# Patient Record
Sex: FEMALE | Race: WHITE | NOT HISPANIC OR LATINO | Employment: UNEMPLOYED | URBAN - METROPOLITAN AREA
[De-identification: names, ages, dates, MRNs, and addresses within clinical notes are randomized per-mention and may not be internally consistent; named-entity substitution may affect disease eponyms.]

---

## 2018-07-10 ENCOUNTER — OFFICE VISIT - HEALTHEAST (OUTPATIENT)
Dept: FAMILY MEDICINE | Facility: CLINIC | Age: 58
End: 2018-07-10

## 2018-07-10 DIAGNOSIS — L03.90 CELLULITIS: ICD-10-CM

## 2018-07-10 LAB
ANION GAP SERPL CALCULATED.3IONS-SCNC: 11 MMOL/L (ref 5–18)
BASOPHILS # BLD AUTO: 0.1 THOU/UL (ref 0–0.2)
BASOPHILS NFR BLD AUTO: 1 % (ref 0–2)
BUN SERPL-MCNC: 22 MG/DL (ref 8–22)
CHLORIDE BLD-SCNC: 97 MMOL/L (ref 98–107)
CO2 SERPL-SCNC: 31 MMOL/L (ref 22–31)
CREAT SERPL-MCNC: 1.1 MG/DL (ref 0.7–1.3)
EOSINOPHIL # BLD AUTO: 0.4 THOU/UL (ref 0–0.4)
EOSINOPHIL NFR BLD AUTO: 4 % (ref 0–6)
ERYTHROCYTE [DISTWIDTH] IN BLOOD BY AUTOMATED COUNT: 13.3 % (ref 11–14.5)
ERYTHROCYTE [SEDIMENTATION RATE] IN BLOOD BY WESTERGREN METHOD: 55 MM/HR (ref 0–20)
GLUCOSE BLD-MCNC: 285 MG/DL (ref 70–125)
HCT VFR BLD AUTO: 35.1 % (ref 35–47)
HGB BLD-MCNC: 11.5 G/DL (ref 12–16)
LYMPHOCYTES # BLD AUTO: 2.5 THOU/UL (ref 0.8–4.4)
LYMPHOCYTES NFR BLD AUTO: 27 % (ref 20–40)
MCH RBC QN AUTO: 27 PG (ref 27–34)
MCHC RBC AUTO-ENTMCNC: 32.8 G/DL (ref 32–36)
MCV RBC AUTO: 82 FL (ref 80–100)
MONOCYTES # BLD AUTO: 0.7 THOU/UL (ref 0–0.9)
MONOCYTES NFR BLD AUTO: 7 % (ref 2–10)
NEUTROPHILS # BLD AUTO: 5.6 THOU/UL (ref 2–7.7)
NEUTROPHILS NFR BLD AUTO: 61 % (ref 50–70)
PLATELET # BLD AUTO: 348 THOU/UL (ref 140–440)
PMV BLD AUTO: 7.8 FL (ref 7–10)
POTASSIUM BLD-SCNC: 4.1 MMOL/L (ref 3.5–5.5)
RBC # BLD AUTO: 4.26 MILL/UL (ref 3.8–5.4)
SODIUM SERPL-SCNC: 139 MMOL/L (ref 136–145)
WBC: 9.3 THOU/UL (ref 4–11)

## 2018-07-10 RX ORDER — AMLODIPINE BESYLATE 10 MG/1
10 TABLET ORAL DAILY
Refills: 0 | Status: SHIPPED | COMMUNITY
Start: 2018-04-16 | End: 2023-01-01

## 2018-07-10 RX ORDER — PRAMIPEXOLE DIHYDROCHLORIDE 0.25 MG/1
0.12 TABLET ORAL AT BEDTIME
Refills: 3 | Status: SHIPPED | COMMUNITY
Start: 2018-06-18

## 2018-07-10 RX ORDER — CITALOPRAM HYDROBROMIDE 20 MG/1
20 TABLET ORAL EVERY MORNING
Refills: 0 | Status: SHIPPED | COMMUNITY
Start: 2018-04-25

## 2018-07-10 RX ORDER — INSULIN GLARGINE 100 [IU]/ML
35 INJECTION, SOLUTION SUBCUTANEOUS AT BEDTIME
Refills: 3 | Status: SHIPPED | COMMUNITY
Start: 2018-04-25 | End: 2022-11-16

## 2018-07-11 ENCOUNTER — HOSPITAL ENCOUNTER (OUTPATIENT)
Dept: ULTRASOUND IMAGING | Facility: CLINIC | Age: 58
Discharge: HOME | End: 2018-07-11
Attending: FAMILY MEDICINE

## 2018-07-11 ENCOUNTER — OFFICE VISIT - HEALTHEAST (OUTPATIENT)
Dept: FAMILY MEDICINE | Facility: CLINIC | Age: 58
End: 2018-07-11

## 2018-07-11 ENCOUNTER — COMMUNICATION - HEALTHEAST (OUTPATIENT)
Dept: SCHEDULING | Facility: CLINIC | Age: 58
End: 2018-07-11

## 2018-07-11 DIAGNOSIS — M79.89 RIGHT LEG SWELLING: ICD-10-CM

## 2018-07-11 DIAGNOSIS — R11.0 NAUSEA: ICD-10-CM

## 2018-07-13 ENCOUNTER — OFFICE VISIT - HEALTHEAST (OUTPATIENT)
Dept: FAMILY MEDICINE | Facility: CLINIC | Age: 58
End: 2018-07-13

## 2018-07-13 DIAGNOSIS — Z51.89 ENCOUNTER FOR WOUND RE-CHECK: ICD-10-CM

## 2018-08-13 ENCOUNTER — OFFICE VISIT - HEALTHEAST (OUTPATIENT)
Dept: VASCULAR SURGERY | Facility: CLINIC | Age: 58
End: 2018-08-13

## 2018-08-13 DIAGNOSIS — Z87.2 HISTORY OF CELLULITIS: ICD-10-CM

## 2018-08-13 DIAGNOSIS — L97.512 DIABETIC ULCER OF TOE OF RIGHT FOOT ASSOCIATED WITH TYPE 2 DIABETES MELLITUS, WITH FAT LAYER EXPOSED (H): ICD-10-CM

## 2018-08-13 DIAGNOSIS — E11.621 DIABETIC ULCER OF TOE OF RIGHT FOOT ASSOCIATED WITH TYPE 2 DIABETES MELLITUS, WITH FAT LAYER EXPOSED (H): ICD-10-CM

## 2018-08-13 DIAGNOSIS — L85.3 XEROSIS CUTIS: ICD-10-CM

## 2018-08-13 DIAGNOSIS — L08.9 WOUND INFECTION: ICD-10-CM

## 2018-08-13 DIAGNOSIS — M20.41 HAMMERTOE OF RIGHT FOOT: ICD-10-CM

## 2018-08-13 DIAGNOSIS — T14.8XXA WOUND INFECTION: ICD-10-CM

## 2018-08-13 DIAGNOSIS — R23.4 FISSURE IN SKIN: ICD-10-CM

## 2018-08-14 ENCOUNTER — COMMUNICATION - HEALTHEAST (OUTPATIENT)
Dept: OTHER | Facility: CLINIC | Age: 58
End: 2018-08-14

## 2018-08-16 ENCOUNTER — COMMUNICATION - HEALTHEAST (OUTPATIENT)
Dept: OTHER | Facility: CLINIC | Age: 58
End: 2018-08-16

## 2018-08-17 ENCOUNTER — COMMUNICATION - HEALTHEAST (OUTPATIENT)
Dept: SCHEDULING | Facility: CLINIC | Age: 58
End: 2018-08-17

## 2018-08-21 ENCOUNTER — RECORDS - HEALTHEAST (OUTPATIENT)
Dept: VASCULAR ULTRASOUND | Facility: CLINIC | Age: 58
End: 2018-08-21

## 2018-08-21 ENCOUNTER — COMMUNICATION - HEALTHEAST (OUTPATIENT)
Dept: VASCULAR SURGERY | Facility: CLINIC | Age: 58
End: 2018-08-21

## 2018-08-21 DIAGNOSIS — R23.4 CHANGES IN SKIN TEXTURE: ICD-10-CM

## 2018-08-21 DIAGNOSIS — L85.3 XEROSIS CUTIS: ICD-10-CM

## 2018-08-21 DIAGNOSIS — L08.9 LOCAL INFECTION OF THE SKIN AND SUBCUTANEOUS TISSUE, UNSPECIFIED: ICD-10-CM

## 2018-08-21 DIAGNOSIS — M20.41 OTHER HAMMER TOE(S) (ACQUIRED), RIGHT FOOT: ICD-10-CM

## 2018-08-21 DIAGNOSIS — E11.621 TYPE 2 DIABETES MELLITUS WITH FOOT ULCER (CODE) (H): ICD-10-CM

## 2018-08-21 DIAGNOSIS — T14.8XXA OTHER INJURY OF UNSPECIFIED BODY REGION, INITIAL ENCOUNTER: ICD-10-CM

## 2018-08-21 DIAGNOSIS — Z87.2 PERSONAL HISTORY OF DISEASES OF THE SKIN AND SUBCUTANEOUS TISSUE: ICD-10-CM

## 2018-08-21 DIAGNOSIS — L97.512 NON-PRESSURE CHRONIC ULCER OF OTHER PART OF RIGHT FOOT WITH FAT LAYER EXPOSED (H): ICD-10-CM

## 2018-08-27 ENCOUNTER — OFFICE VISIT - HEALTHEAST (OUTPATIENT)
Dept: VASCULAR SURGERY | Facility: CLINIC | Age: 58
End: 2018-08-27

## 2018-08-27 ENCOUNTER — COMMUNICATION - HEALTHEAST (OUTPATIENT)
Dept: VASCULAR SURGERY | Facility: CLINIC | Age: 58
End: 2018-08-27

## 2018-08-27 ENCOUNTER — AMBULATORY - HEALTHEAST (OUTPATIENT)
Dept: LAB | Facility: CLINIC | Age: 58
End: 2018-08-27

## 2018-08-27 DIAGNOSIS — Z87.2 HISTORY OF CELLULITIS: ICD-10-CM

## 2018-08-27 DIAGNOSIS — M20.41 HAMMERTOE OF RIGHT FOOT: ICD-10-CM

## 2018-08-27 DIAGNOSIS — R23.4 FISSURE IN SKIN: ICD-10-CM

## 2018-08-27 DIAGNOSIS — L85.3 XEROSIS CUTIS: ICD-10-CM

## 2018-08-27 DIAGNOSIS — E11.621 DIABETIC ULCER OF TOE OF RIGHT FOOT ASSOCIATED WITH TYPE 2 DIABETES MELLITUS, WITH FAT LAYER EXPOSED (H): ICD-10-CM

## 2018-08-27 DIAGNOSIS — L97.512 DIABETIC ULCER OF TOE OF RIGHT FOOT ASSOCIATED WITH TYPE 2 DIABETES MELLITUS, WITH FAT LAYER EXPOSED (H): ICD-10-CM

## 2018-08-27 LAB
C REACTIVE PROTEIN LHE: 0.9 MG/DL (ref 0–0.8)
ERYTHROCYTE [SEDIMENTATION RATE] IN BLOOD BY WESTERGREN METHOD: 16 MM/HR (ref 0–20)

## 2018-08-27 ASSESSMENT — MIFFLIN-ST. JEOR: SCORE: 1365.14

## 2018-08-29 ENCOUNTER — COMMUNICATION - HEALTHEAST (OUTPATIENT)
Dept: VASCULAR SURGERY | Facility: CLINIC | Age: 58
End: 2018-08-29

## 2018-09-09 ENCOUNTER — OFFICE VISIT - HEALTHEAST (OUTPATIENT)
Dept: FAMILY MEDICINE | Facility: CLINIC | Age: 58
End: 2018-09-09

## 2018-09-09 DIAGNOSIS — L03.115 CELLULITIS OF RIGHT LOWER LEG: ICD-10-CM

## 2018-09-10 ENCOUNTER — OFFICE VISIT - HEALTHEAST (OUTPATIENT)
Dept: VASCULAR SURGERY | Facility: CLINIC | Age: 58
End: 2018-09-10

## 2018-09-10 ENCOUNTER — COMMUNICATION - HEALTHEAST (OUTPATIENT)
Dept: VASCULAR SURGERY | Facility: CLINIC | Age: 58
End: 2018-09-10

## 2018-09-10 DIAGNOSIS — Z87.2 HISTORY OF CELLULITIS: ICD-10-CM

## 2018-09-10 DIAGNOSIS — L97.512 DIABETIC ULCER OF TOE OF RIGHT FOOT ASSOCIATED WITH TYPE 2 DIABETES MELLITUS, WITH FAT LAYER EXPOSED (H): ICD-10-CM

## 2018-09-10 DIAGNOSIS — L08.9 WOUND INFECTION: ICD-10-CM

## 2018-09-10 DIAGNOSIS — M20.41 HAMMERTOE OF RIGHT FOOT: ICD-10-CM

## 2018-09-10 DIAGNOSIS — T14.8XXA WOUND INFECTION: ICD-10-CM

## 2018-09-10 DIAGNOSIS — L85.3 XEROSIS CUTIS: ICD-10-CM

## 2018-09-10 DIAGNOSIS — E11.621 DIABETIC ULCER OF TOE OF RIGHT FOOT ASSOCIATED WITH TYPE 2 DIABETES MELLITUS, WITH FAT LAYER EXPOSED (H): ICD-10-CM

## 2018-09-10 DIAGNOSIS — R23.4 FISSURE IN SKIN: ICD-10-CM

## 2018-09-13 ENCOUNTER — COMMUNICATION - HEALTHEAST (OUTPATIENT)
Dept: VASCULAR SURGERY | Facility: CLINIC | Age: 58
End: 2018-09-13

## 2018-09-14 ENCOUNTER — COMMUNICATION - HEALTHEAST (OUTPATIENT)
Dept: VASCULAR SURGERY | Facility: CLINIC | Age: 58
End: 2018-09-14

## 2018-09-24 ENCOUNTER — OFFICE VISIT - HEALTHEAST (OUTPATIENT)
Dept: VASCULAR SURGERY | Facility: CLINIC | Age: 58
End: 2018-09-24

## 2018-09-24 DIAGNOSIS — L97.512 DIABETIC ULCER OF TOE OF RIGHT FOOT ASSOCIATED WITH TYPE 2 DIABETES MELLITUS, WITH FAT LAYER EXPOSED (H): ICD-10-CM

## 2018-09-24 DIAGNOSIS — R23.4 FISSURE IN SKIN: ICD-10-CM

## 2018-09-24 DIAGNOSIS — M20.41 HAMMERTOE OF RIGHT FOOT: ICD-10-CM

## 2018-09-24 DIAGNOSIS — E11.621 DIABETIC ULCER OF TOE OF RIGHT FOOT ASSOCIATED WITH TYPE 2 DIABETES MELLITUS, WITH FAT LAYER EXPOSED (H): ICD-10-CM

## 2018-09-24 DIAGNOSIS — L85.3 XEROSIS CUTIS: ICD-10-CM

## 2018-09-24 DIAGNOSIS — Z87.2 HISTORY OF CELLULITIS: ICD-10-CM

## 2018-10-15 ENCOUNTER — OFFICE VISIT - HEALTHEAST (OUTPATIENT)
Dept: VASCULAR SURGERY | Facility: CLINIC | Age: 58
End: 2018-10-15

## 2018-10-15 DIAGNOSIS — M20.41 HAMMERTOE OF RIGHT FOOT: ICD-10-CM

## 2018-10-15 DIAGNOSIS — R23.4 FISSURE IN SKIN: ICD-10-CM

## 2018-10-15 DIAGNOSIS — L85.3 XEROSIS CUTIS: ICD-10-CM

## 2018-10-15 DIAGNOSIS — Z87.2 HISTORY OF CELLULITIS: ICD-10-CM

## 2018-10-15 DIAGNOSIS — E11.621 DIABETIC ULCER OF TOE OF RIGHT FOOT ASSOCIATED WITH TYPE 2 DIABETES MELLITUS, WITH FAT LAYER EXPOSED (H): ICD-10-CM

## 2018-10-15 DIAGNOSIS — L97.512 DIABETIC ULCER OF TOE OF RIGHT FOOT ASSOCIATED WITH TYPE 2 DIABETES MELLITUS, WITH FAT LAYER EXPOSED (H): ICD-10-CM

## 2018-11-05 ENCOUNTER — OFFICE VISIT - HEALTHEAST (OUTPATIENT)
Dept: VASCULAR SURGERY | Facility: CLINIC | Age: 58
End: 2018-11-05

## 2018-11-05 DIAGNOSIS — R23.4 FISSURE IN SKIN: ICD-10-CM

## 2018-11-05 DIAGNOSIS — L97.512 DIABETIC ULCER OF TOE OF RIGHT FOOT ASSOCIATED WITH TYPE 2 DIABETES MELLITUS, WITH FAT LAYER EXPOSED (H): ICD-10-CM

## 2018-11-05 DIAGNOSIS — M20.41 HAMMERTOE OF RIGHT FOOT: ICD-10-CM

## 2018-11-05 DIAGNOSIS — L85.3 XEROSIS CUTIS: ICD-10-CM

## 2018-11-05 DIAGNOSIS — E11.621 DIABETIC ULCER OF TOE OF RIGHT FOOT ASSOCIATED WITH TYPE 2 DIABETES MELLITUS, WITH FAT LAYER EXPOSED (H): ICD-10-CM

## 2018-11-26 ENCOUNTER — OFFICE VISIT - HEALTHEAST (OUTPATIENT)
Dept: VASCULAR SURGERY | Facility: CLINIC | Age: 58
End: 2018-11-26

## 2018-11-26 DIAGNOSIS — Z87.2 HISTORY OF CELLULITIS: ICD-10-CM

## 2018-11-26 DIAGNOSIS — L85.3 XEROSIS CUTIS: ICD-10-CM

## 2018-11-26 DIAGNOSIS — E11.621 DIABETIC ULCER OF TOE OF RIGHT FOOT ASSOCIATED WITH TYPE 2 DIABETES MELLITUS, WITH FAT LAYER EXPOSED (H): ICD-10-CM

## 2018-11-26 DIAGNOSIS — M20.41 HAMMERTOE OF RIGHT FOOT: ICD-10-CM

## 2018-11-26 DIAGNOSIS — R23.4 FISSURE IN SKIN: ICD-10-CM

## 2018-11-26 DIAGNOSIS — L97.512 DIABETIC ULCER OF TOE OF RIGHT FOOT ASSOCIATED WITH TYPE 2 DIABETES MELLITUS, WITH FAT LAYER EXPOSED (H): ICD-10-CM

## 2019-02-25 ENCOUNTER — OFFICE VISIT - HEALTHEAST (OUTPATIENT)
Dept: FAMILY MEDICINE | Facility: CLINIC | Age: 59
End: 2019-02-25

## 2019-02-25 DIAGNOSIS — I10 ESSENTIAL HYPERTENSION: ICD-10-CM

## 2019-02-25 DIAGNOSIS — R10.11 RUQ ABDOMINAL PAIN: ICD-10-CM

## 2019-02-25 DIAGNOSIS — R11.14 BILIOUS VOMITING WITH NAUSEA: ICD-10-CM

## 2019-02-26 ENCOUNTER — COMMUNICATION - HEALTHEAST (OUTPATIENT)
Dept: FAMILY MEDICINE | Facility: CLINIC | Age: 59
End: 2019-02-26

## 2019-02-26 ENCOUNTER — HOSPITAL ENCOUNTER (OUTPATIENT)
Dept: ULTRASOUND IMAGING | Facility: CLINIC | Age: 59
Discharge: HOME | End: 2019-02-26
Attending: FAMILY MEDICINE

## 2019-02-26 DIAGNOSIS — R10.11 RUQ ABDOMINAL PAIN: ICD-10-CM

## 2019-02-26 DIAGNOSIS — R11.14 BILIOUS VOMITING WITH NAUSEA: ICD-10-CM

## 2019-09-19 ENCOUNTER — RECORDS - HEALTHEAST (OUTPATIENT)
Dept: ADMINISTRATIVE | Facility: OTHER | Age: 59
End: 2019-09-19

## 2019-09-19 LAB
LAB AP CHARGES (HE HISTORICAL CONVERSION): NORMAL
PATH REPORT.COMMENTS IMP SPEC: NORMAL
PATH REPORT.FINAL DX SPEC: NORMAL
PATH REPORT.GROSS SPEC: NORMAL
PATH REPORT.MICROSCOPIC SPEC OTHER STN: NORMAL
PATH REPORT.RELEVANT HX SPEC: NORMAL
RESULT FLAG (HE HISTORICAL CONVERSION): NORMAL

## 2020-07-09 ENCOUNTER — COMMUNICATION - HEALTHEAST (OUTPATIENT)
Dept: VASCULAR SURGERY | Facility: CLINIC | Age: 60
End: 2020-07-09

## 2020-07-10 ENCOUNTER — OFFICE VISIT - HEALTHEAST (OUTPATIENT)
Dept: VASCULAR SURGERY | Facility: CLINIC | Age: 60
End: 2020-07-10

## 2020-07-10 DIAGNOSIS — L97.512 DIABETIC ULCER OF TOE OF RIGHT FOOT ASSOCIATED WITH TYPE 2 DIABETES MELLITUS, WITH FAT LAYER EXPOSED (H): ICD-10-CM

## 2020-07-10 DIAGNOSIS — E11.621 DIABETIC ULCER OF TOE OF RIGHT FOOT ASSOCIATED WITH TYPE 2 DIABETES MELLITUS, WITH FAT LAYER EXPOSED (H): ICD-10-CM

## 2020-07-10 DIAGNOSIS — M20.41 HAMMERTOE OF RIGHT FOOT: ICD-10-CM

## 2020-07-10 RX ORDER — PIOGLITAZONEHYDROCHLORIDE 15 MG/1
7.5 TABLET ORAL DAILY
Status: SHIPPED | COMMUNITY
Start: 2020-06-23 | End: 2022-06-25

## 2020-07-10 RX ORDER — ATORVASTATIN CALCIUM 40 MG/1
40 TABLET, FILM COATED ORAL DAILY
Status: SHIPPED | COMMUNITY
Start: 2020-02-01

## 2020-07-15 ENCOUNTER — OFFICE VISIT - HEALTHEAST (OUTPATIENT)
Dept: VASCULAR SURGERY | Facility: CLINIC | Age: 60
End: 2020-07-15

## 2020-07-15 DIAGNOSIS — L02.619 CELLULITIS AND ABSCESS OF FOOT: ICD-10-CM

## 2020-07-15 DIAGNOSIS — L03.119 CELLULITIS AND ABSCESS OF FOOT: ICD-10-CM

## 2020-07-15 DIAGNOSIS — M20.41 HAMMER TOE OF RIGHT FOOT: ICD-10-CM

## 2020-07-15 RX ORDER — LISINOPRIL 10 MG/1
10 TABLET ORAL DAILY
Status: SHIPPED | COMMUNITY
Start: 2020-07-15

## 2020-07-17 ENCOUNTER — COMMUNICATION - HEALTHEAST (OUTPATIENT)
Dept: VASCULAR SURGERY | Facility: CLINIC | Age: 60
End: 2020-07-17

## 2020-07-20 ENCOUNTER — OFFICE VISIT - HEALTHEAST (OUTPATIENT)
Dept: VASCULAR SURGERY | Facility: CLINIC | Age: 60
End: 2020-07-20

## 2020-07-20 DIAGNOSIS — L03.119 CELLULITIS AND ABSCESS OF FOOT: ICD-10-CM

## 2020-07-20 DIAGNOSIS — L97.513 DIABETIC ULCER OF TOE OF RIGHT FOOT ASSOCIATED WITH TYPE 2 DIABETES MELLITUS, WITH NECROSIS OF MUSCLE (H): ICD-10-CM

## 2020-07-20 DIAGNOSIS — E11.621 DIABETIC ULCER OF TOE OF RIGHT FOOT ASSOCIATED WITH TYPE 2 DIABETES MELLITUS, WITH NECROSIS OF MUSCLE (H): ICD-10-CM

## 2020-07-20 DIAGNOSIS — L02.619 CELLULITIS AND ABSCESS OF FOOT: ICD-10-CM

## 2020-07-22 ENCOUNTER — COMMUNICATION - HEALTHEAST (OUTPATIENT)
Dept: VASCULAR SURGERY | Facility: CLINIC | Age: 60
End: 2020-07-22

## 2020-07-22 LAB
BACTERIA SPEC CULT: NO GROWTH
GRAM STAIN RESULT: NORMAL
GRAM STAIN RESULT: NORMAL

## 2020-07-23 ENCOUNTER — AMBULATORY - HEALTHEAST (OUTPATIENT)
Dept: PODIATRY | Facility: CLINIC | Age: 60
End: 2020-07-23

## 2020-07-23 ENCOUNTER — SURGERY - HEALTHEAST (OUTPATIENT)
Dept: PODIATRY | Facility: CLINIC | Age: 60
End: 2020-07-23

## 2020-07-23 DIAGNOSIS — M86.9 OSTEOMYELITIS OF ANKLE AND FOOT (H): ICD-10-CM

## 2020-07-24 ENCOUNTER — AMBULATORY - HEALTHEAST (OUTPATIENT)
Dept: SURGERY | Facility: HOSPITAL | Age: 60
End: 2020-07-24

## 2020-07-24 DIAGNOSIS — Z11.59 ENCOUNTER FOR SCREENING FOR OTHER VIRAL DISEASES: ICD-10-CM

## 2020-07-27 ENCOUNTER — COMMUNICATION - HEALTHEAST (OUTPATIENT)
Dept: VASCULAR SURGERY | Facility: CLINIC | Age: 60
End: 2020-07-27

## 2020-07-28 ENCOUNTER — AMBULATORY - HEALTHEAST (OUTPATIENT)
Dept: FAMILY MEDICINE | Facility: CLINIC | Age: 60
End: 2020-07-28

## 2020-07-28 ENCOUNTER — COMMUNICATION - HEALTHEAST (OUTPATIENT)
Dept: VASCULAR SURGERY | Facility: CLINIC | Age: 60
End: 2020-07-28

## 2020-07-28 DIAGNOSIS — E11.621 DIABETIC ULCER OF TOE OF RIGHT FOOT ASSOCIATED WITH TYPE 2 DIABETES MELLITUS, WITH NECROSIS OF MUSCLE (H): ICD-10-CM

## 2020-07-28 DIAGNOSIS — Z11.59 ENCOUNTER FOR SCREENING FOR OTHER VIRAL DISEASES: ICD-10-CM

## 2020-07-28 DIAGNOSIS — L02.619 CELLULITIS AND ABSCESS OF FOOT: ICD-10-CM

## 2020-07-28 DIAGNOSIS — L03.119 CELLULITIS AND ABSCESS OF FOOT: ICD-10-CM

## 2020-07-28 DIAGNOSIS — L97.513 DIABETIC ULCER OF TOE OF RIGHT FOOT ASSOCIATED WITH TYPE 2 DIABETES MELLITUS, WITH NECROSIS OF MUSCLE (H): ICD-10-CM

## 2020-07-30 ENCOUNTER — ANESTHESIA - HEALTHEAST (OUTPATIENT)
Dept: SURGERY | Facility: HOSPITAL | Age: 60
End: 2020-07-30

## 2020-07-30 ASSESSMENT — MIFFLIN-ST. JEOR: SCORE: 1417.3

## 2020-07-31 ENCOUNTER — SURGERY - HEALTHEAST (OUTPATIENT)
Dept: SURGERY | Facility: HOSPITAL | Age: 60
End: 2020-07-31

## 2020-08-04 ENCOUNTER — COMMUNICATION - HEALTHEAST (OUTPATIENT)
Dept: VASCULAR SURGERY | Facility: CLINIC | Age: 60
End: 2020-08-04

## 2020-08-07 ENCOUNTER — COMMUNICATION - HEALTHEAST (OUTPATIENT)
Dept: VASCULAR SURGERY | Facility: CLINIC | Age: 60
End: 2020-08-07

## 2020-08-07 ENCOUNTER — OFFICE VISIT - HEALTHEAST (OUTPATIENT)
Dept: VASCULAR SURGERY | Facility: CLINIC | Age: 60
End: 2020-08-07

## 2020-08-07 DIAGNOSIS — L97.513 DIABETIC ULCER OF TOE OF RIGHT FOOT ASSOCIATED WITH TYPE 2 DIABETES MELLITUS, WITH NECROSIS OF MUSCLE (H): ICD-10-CM

## 2020-08-07 DIAGNOSIS — M86.9 OSTEOMYELITIS OF ANKLE AND FOOT (H): ICD-10-CM

## 2020-08-07 DIAGNOSIS — E11.621 DIABETIC ULCER OF TOE OF RIGHT FOOT ASSOCIATED WITH TYPE 2 DIABETES MELLITUS, WITH NECROSIS OF MUSCLE (H): ICD-10-CM

## 2020-08-10 ENCOUNTER — COMMUNICATION - HEALTHEAST (OUTPATIENT)
Dept: VASCULAR SURGERY | Facility: CLINIC | Age: 60
End: 2020-08-10

## 2020-08-20 ENCOUNTER — COMMUNICATION - HEALTHEAST (OUTPATIENT)
Dept: VASCULAR SURGERY | Facility: CLINIC | Age: 60
End: 2020-08-20

## 2020-08-21 ENCOUNTER — COMMUNICATION - HEALTHEAST (OUTPATIENT)
Dept: VASCULAR SURGERY | Facility: CLINIC | Age: 60
End: 2020-08-21

## 2020-08-21 ENCOUNTER — OFFICE VISIT - HEALTHEAST (OUTPATIENT)
Dept: VASCULAR SURGERY | Facility: CLINIC | Age: 60
End: 2020-08-21

## 2020-08-21 DIAGNOSIS — M86.9 OSTEOMYELITIS OF ANKLE AND FOOT (H): ICD-10-CM

## 2020-09-18 ENCOUNTER — AMBULATORY - HEALTHEAST (OUTPATIENT)
Dept: SURGERY | Facility: AMBULATORY SURGERY CENTER | Age: 60
End: 2020-09-18

## 2020-09-18 ENCOUNTER — SURGERY - HEALTHEAST (OUTPATIENT)
Dept: VASCULAR SURGERY | Facility: CLINIC | Age: 60
End: 2020-09-18

## 2020-09-18 ENCOUNTER — COMMUNICATION - HEALTHEAST (OUTPATIENT)
Dept: VASCULAR SURGERY | Facility: CLINIC | Age: 60
End: 2020-09-18

## 2020-09-18 ENCOUNTER — OFFICE VISIT - HEALTHEAST (OUTPATIENT)
Dept: VASCULAR SURGERY | Facility: CLINIC | Age: 60
End: 2020-09-18

## 2020-09-18 DIAGNOSIS — M20.41 HAMMER TOE OF RIGHT FOOT: ICD-10-CM

## 2020-09-18 DIAGNOSIS — Z11.59 ENCOUNTER FOR SCREENING FOR OTHER VIRAL DISEASES: ICD-10-CM

## 2020-09-18 RX ORDER — FUROSEMIDE 20 MG
20 TABLET ORAL
Status: SHIPPED | COMMUNITY
Start: 2020-09-02 | End: 2023-01-01

## 2020-09-18 RX ORDER — POTASSIUM CHLORIDE 1500 MG/1
20 TABLET, EXTENDED RELEASE ORAL DAILY
Status: SHIPPED | COMMUNITY
Start: 2020-09-02 | End: 2022-04-27

## 2020-09-18 ASSESSMENT — MIFFLIN-ST. JEOR: SCORE: 1417.3

## 2020-09-20 ENCOUNTER — AMBULATORY - HEALTHEAST (OUTPATIENT)
Dept: FAMILY MEDICINE | Facility: CLINIC | Age: 60
End: 2020-09-20

## 2020-09-20 DIAGNOSIS — Z11.59 ENCOUNTER FOR SCREENING FOR OTHER VIRAL DISEASES: ICD-10-CM

## 2020-09-22 ENCOUNTER — COMMUNICATION - HEALTHEAST (OUTPATIENT)
Dept: SCHEDULING | Facility: CLINIC | Age: 60
End: 2020-09-22

## 2020-09-22 ASSESSMENT — MIFFLIN-ST. JEOR: SCORE: 1455.86

## 2020-09-23 ENCOUNTER — ANESTHESIA - HEALTHEAST (OUTPATIENT)
Dept: SURGERY | Facility: AMBULATORY SURGERY CENTER | Age: 60
End: 2020-09-23

## 2020-09-24 ENCOUNTER — SURGERY - HEALTHEAST (OUTPATIENT)
Dept: SURGERY | Facility: AMBULATORY SURGERY CENTER | Age: 60
End: 2020-09-24

## 2020-09-24 ENCOUNTER — COMMUNICATION - HEALTHEAST (OUTPATIENT)
Dept: VASCULAR SURGERY | Facility: CLINIC | Age: 60
End: 2020-09-24

## 2020-09-24 ASSESSMENT — MIFFLIN-ST. JEOR: SCORE: 1455.86

## 2020-09-28 ENCOUNTER — AMBULATORY - HEALTHEAST (OUTPATIENT)
Dept: PODIATRY | Facility: CLINIC | Age: 60
End: 2020-09-28

## 2020-09-28 ENCOUNTER — COMMUNICATION - HEALTHEAST (OUTPATIENT)
Dept: VASCULAR SURGERY | Facility: CLINIC | Age: 60
End: 2020-09-28

## 2020-09-28 ENCOUNTER — COMMUNICATION - HEALTHEAST (OUTPATIENT)
Dept: PODIATRY | Facility: CLINIC | Age: 60
End: 2020-09-28

## 2020-09-28 DIAGNOSIS — M20.41 HAMMER TOE OF RIGHT FOOT: ICD-10-CM

## 2020-09-30 ENCOUNTER — HOSPITAL ENCOUNTER (OUTPATIENT)
Dept: NUCLEAR MEDICINE | Facility: CLINIC | Age: 60
Discharge: HOME | End: 2020-09-30
Attending: FAMILY MEDICINE

## 2020-09-30 ENCOUNTER — HOSPITAL ENCOUNTER (OUTPATIENT)
Dept: CARDIOLOGY | Facility: CLINIC | Age: 60
Discharge: HOME | End: 2020-09-30
Attending: FAMILY MEDICINE

## 2020-09-30 DIAGNOSIS — R07.9 CHEST PAIN, UNSPECIFIED TYPE: ICD-10-CM

## 2020-09-30 LAB
CV STRESS CURRENT BP HE: NORMAL
CV STRESS CURRENT HR HE: 101
CV STRESS CURRENT HR HE: 103
CV STRESS CURRENT HR HE: 104
CV STRESS CURRENT HR HE: 104
CV STRESS CURRENT HR HE: 106
CV STRESS CURRENT HR HE: 108
CV STRESS CURRENT HR HE: 111
CV STRESS CURRENT HR HE: 85
CV STRESS CURRENT HR HE: 85
CV STRESS CURRENT HR HE: 95
CV STRESS CURRENT HR HE: 96
CV STRESS CURRENT HR HE: 98
CV STRESS CURRENT HR HE: 98
CV STRESS CURRENT HR HE: 99
CV STRESS CURRENT HR HE: 99
CV STRESS DEVIATION TIME HE: NORMAL
CV STRESS ECHO PERCENT HR HE: NORMAL
CV STRESS EXERCISE STAGE HE: NORMAL
CV STRESS FINAL RESTING BP HE: NORMAL
CV STRESS FINAL RESTING HR HE: 96
CV STRESS MAX HR HE: 111
CV STRESS MAX TREADMILL GRADE HE: 0
CV STRESS MAX TREADMILL SPEED HE: 0
CV STRESS PEAK DIA BP HE: NORMAL
CV STRESS PEAK SYS BP HE: NORMAL
CV STRESS PHASE HE: NORMAL
CV STRESS PROTOCOL HE: NORMAL
CV STRESS RESTING PT POSITION HE: NORMAL
CV STRESS ST DEVIATION AMOUNT HE: NORMAL
CV STRESS ST DEVIATION ELEVATION HE: NORMAL
CV STRESS ST EVELATION AMOUNT HE: NORMAL
CV STRESS TEST TYPE HE: NORMAL
CV STRESS TOTAL STAGE TIME MIN 1 HE: NORMAL
NUC STRESS EJECTION FRACTION: 59 %
RATE PRESSURE PRODUCT: NORMAL
STRESS ECHO BASELINE DIASTOLIC HE: 78
STRESS ECHO BASELINE HR: 84
STRESS ECHO BASELINE SYSTOLIC BP: 172
STRESS ECHO CALCULATED PERCENT HR: 69 %
STRESS ECHO LAST STRESS DIASTOLIC BP: 77
STRESS ECHO LAST STRESS HR: 104
STRESS ECHO LAST STRESS SYSTOLIC BP: 155
STRESS ECHO TARGET HR: 162

## 2020-10-01 ENCOUNTER — OFFICE VISIT - HEALTHEAST (OUTPATIENT)
Dept: VASCULAR SURGERY | Facility: CLINIC | Age: 60
End: 2020-10-01

## 2020-10-01 DIAGNOSIS — M20.41 HAMMER TOE OF RIGHT FOOT: ICD-10-CM

## 2020-10-11 ENCOUNTER — COMMUNICATION - HEALTHEAST (OUTPATIENT)
Dept: VASCULAR SURGERY | Facility: CLINIC | Age: 60
End: 2020-10-11

## 2020-10-12 ENCOUNTER — COMMUNICATION - HEALTHEAST (OUTPATIENT)
Dept: VASCULAR SURGERY | Facility: CLINIC | Age: 60
End: 2020-10-12

## 2020-10-15 ENCOUNTER — OFFICE VISIT - HEALTHEAST (OUTPATIENT)
Dept: VASCULAR SURGERY | Facility: CLINIC | Age: 60
End: 2020-10-15

## 2020-10-15 DIAGNOSIS — L02.619 CELLULITIS AND ABSCESS OF FOOT: ICD-10-CM

## 2020-10-15 DIAGNOSIS — M20.41 HAMMER TOE OF RIGHT FOOT: ICD-10-CM

## 2020-10-15 DIAGNOSIS — L03.119 CELLULITIS AND ABSCESS OF FOOT: ICD-10-CM

## 2020-10-16 ENCOUNTER — COMMUNICATION - HEALTHEAST (OUTPATIENT)
Dept: VASCULAR SURGERY | Facility: CLINIC | Age: 60
End: 2020-10-16

## 2020-10-17 ENCOUNTER — COMMUNICATION - HEALTHEAST (OUTPATIENT)
Dept: SCHEDULING | Facility: CLINIC | Age: 60
End: 2020-10-17

## 2020-10-19 ENCOUNTER — COMMUNICATION - HEALTHEAST (OUTPATIENT)
Dept: SCHEDULING | Facility: CLINIC | Age: 60
End: 2020-10-19

## 2020-10-22 ENCOUNTER — COMMUNICATION - HEALTHEAST (OUTPATIENT)
Dept: VASCULAR SURGERY | Facility: CLINIC | Age: 60
End: 2020-10-22

## 2020-10-26 ENCOUNTER — OFFICE VISIT - HEALTHEAST (OUTPATIENT)
Dept: PHARMACY | Facility: CLINIC | Age: 60
End: 2020-10-26

## 2020-10-26 DIAGNOSIS — E78.5 HYPERLIPIDEMIA, UNSPECIFIED HYPERLIPIDEMIA TYPE: ICD-10-CM

## 2020-10-26 DIAGNOSIS — J45.20 MILD INTERMITTENT ASTHMA WITHOUT COMPLICATION: ICD-10-CM

## 2020-10-26 DIAGNOSIS — G25.81 RESTLESS LEG SYNDROME: ICD-10-CM

## 2020-10-26 DIAGNOSIS — I10 ESSENTIAL HYPERTENSION: ICD-10-CM

## 2020-10-26 DIAGNOSIS — F41.9 ANXIETY: ICD-10-CM

## 2020-10-26 DIAGNOSIS — L08.9 TOE INFECTION: ICD-10-CM

## 2020-10-26 DIAGNOSIS — E11.628 TYPE 2 DIABETES MELLITUS WITH OTHER SKIN COMPLICATION, WITHOUT LONG-TERM CURRENT USE OF INSULIN (H): ICD-10-CM

## 2020-10-26 PROCEDURE — 99607 MTMS BY PHARM ADDL 15 MIN: CPT | Performed by: PHARMACIST

## 2020-10-26 PROCEDURE — 99605 MTMS BY PHARM NP 15 MIN: CPT | Performed by: PHARMACIST

## 2020-10-28 ENCOUNTER — COMMUNICATION - HEALTHEAST (OUTPATIENT)
Dept: VASCULAR SURGERY | Facility: CLINIC | Age: 60
End: 2020-10-28

## 2020-10-30 ENCOUNTER — AMBULATORY - HEALTHEAST (OUTPATIENT)
Dept: VASCULAR SURGERY | Facility: CLINIC | Age: 60
End: 2020-10-30

## 2020-10-30 ENCOUNTER — OFFICE VISIT - HEALTHEAST (OUTPATIENT)
Dept: VASCULAR SURGERY | Facility: CLINIC | Age: 60
End: 2020-10-30

## 2020-10-30 DIAGNOSIS — D17.23 BENIGN LIPOMATOUS NEOPLASM OF SKIN AND SUBCUTANEOUS TISSUE OF RIGHT LEG: ICD-10-CM

## 2020-10-30 RX ORDER — DIAZEPAM 5 MG
5 TABLET ORAL EVERY 6 HOURS PRN
Qty: 1 TABLET | Refills: 0 | Status: SHIPPED | OUTPATIENT
Start: 2020-10-30 | End: 2023-03-15

## 2020-11-09 ENCOUNTER — COMMUNICATION - HEALTHEAST (OUTPATIENT)
Dept: VASCULAR SURGERY | Facility: CLINIC | Age: 60
End: 2020-11-09

## 2020-11-10 ENCOUNTER — AMBULATORY - HEALTHEAST (OUTPATIENT)
Dept: VASCULAR SURGERY | Facility: CLINIC | Age: 60
End: 2020-11-10

## 2020-11-11 ENCOUNTER — AMBULATORY - HEALTHEAST (OUTPATIENT)
Dept: PODIATRY | Facility: CLINIC | Age: 60
End: 2020-11-11

## 2020-11-20 ENCOUNTER — SURGERY - HEALTHEAST (OUTPATIENT)
Dept: PODIATRY | Facility: CLINIC | Age: 60
End: 2020-11-20

## 2020-11-20 ENCOUNTER — COMMUNICATION - HEALTHEAST (OUTPATIENT)
Dept: VASCULAR SURGERY | Facility: CLINIC | Age: 60
End: 2020-11-20

## 2020-11-20 ENCOUNTER — AMBULATORY - HEALTHEAST (OUTPATIENT)
Dept: VASCULAR SURGERY | Facility: CLINIC | Age: 60
End: 2020-11-20

## 2020-11-20 ENCOUNTER — OFFICE VISIT - HEALTHEAST (OUTPATIENT)
Dept: VASCULAR SURGERY | Facility: CLINIC | Age: 60
End: 2020-11-20

## 2020-11-20 DIAGNOSIS — Z20.822 COVID-19 RULED OUT: ICD-10-CM

## 2020-11-20 DIAGNOSIS — M20.42 HAMMER TOE OF LEFT FOOT: ICD-10-CM

## 2020-11-22 ENCOUNTER — COMMUNICATION - HEALTHEAST (OUTPATIENT)
Dept: VASCULAR SURGERY | Facility: CLINIC | Age: 60
End: 2020-11-22

## 2020-12-01 ENCOUNTER — COMMUNICATION - HEALTHEAST (OUTPATIENT)
Dept: VASCULAR SURGERY | Facility: CLINIC | Age: 60
End: 2020-12-01

## 2020-12-02 ENCOUNTER — AMBULATORY - HEALTHEAST (OUTPATIENT)
Dept: PODIATRY | Facility: CLINIC | Age: 60
End: 2020-12-02

## 2020-12-02 ENCOUNTER — COMMUNICATION - HEALTHEAST (OUTPATIENT)
Dept: VASCULAR SURGERY | Facility: CLINIC | Age: 60
End: 2020-12-02

## 2020-12-02 DIAGNOSIS — M20.41 HAMMER TOE OF RIGHT FOOT: ICD-10-CM

## 2020-12-03 ENCOUNTER — AMBULATORY - HEALTHEAST (OUTPATIENT)
Dept: VASCULAR SURGERY | Facility: CLINIC | Age: 60
End: 2020-12-03

## 2020-12-03 ENCOUNTER — AMBULATORY - HEALTHEAST (OUTPATIENT)
Dept: LAB | Facility: CLINIC | Age: 60
End: 2020-12-03

## 2020-12-03 DIAGNOSIS — Z20.822 COVID-19 RULED OUT: ICD-10-CM

## 2020-12-03 ASSESSMENT — MIFFLIN-ST. JEOR
SCORE: 1455.86
SCORE: 1455.86

## 2020-12-04 ENCOUNTER — ANESTHESIA - HEALTHEAST (OUTPATIENT)
Dept: SURGERY | Facility: AMBULATORY SURGERY CENTER | Age: 60
End: 2020-12-04

## 2020-12-04 LAB
SARS-COV-2 PCR COMMENT: NORMAL
SARS-COV-2 RNA SPEC QL NAA+PROBE: NEGATIVE
SARS-COV-2 VIRUS SPECIMEN SOURCE: NORMAL

## 2020-12-05 ENCOUNTER — COMMUNICATION - HEALTHEAST (OUTPATIENT)
Dept: SCHEDULING | Facility: CLINIC | Age: 60
End: 2020-12-05

## 2020-12-07 ENCOUNTER — SURGERY - HEALTHEAST (OUTPATIENT)
Dept: SURGERY | Facility: AMBULATORY SURGERY CENTER | Age: 60
End: 2020-12-07

## 2020-12-07 ENCOUNTER — HOSPITAL ENCOUNTER (OUTPATIENT)
Dept: SURGERY | Facility: AMBULATORY SURGERY CENTER | Age: 60
Discharge: HOME | End: 2020-12-07
Attending: PODIATRIST | Admitting: PODIATRIST
Payer: COMMERCIAL

## 2020-12-07 DIAGNOSIS — M20.42 HAMMER TOE OF LEFT FOOT: ICD-10-CM

## 2020-12-07 LAB
GLUCOSE BLDC GLUCOMTR-MCNC: 128 MG/DL (ref 70–125)
GLUCOSE BLDC GLUCOMTR-MCNC: 139 MG/DL (ref 70–125)
GLUCOSE BLDC GLUCOMTR-MCNC: 81 MG/DL (ref 70–125)

## 2020-12-07 RX ORDER — OXYCODONE HYDROCHLORIDE 5 MG/1
5 TABLET ORAL EVERY 6 HOURS PRN
Qty: 30 TABLET | Refills: 0 | Status: SHIPPED | OUTPATIENT
Start: 2020-12-07 | End: 2022-04-27

## 2020-12-07 ASSESSMENT — MIFFLIN-ST. JEOR
SCORE: 1455.86
SCORE: 1455.86

## 2020-12-16 ENCOUNTER — OFFICE VISIT - HEALTHEAST (OUTPATIENT)
Dept: VASCULAR SURGERY | Facility: CLINIC | Age: 60
End: 2020-12-16

## 2020-12-16 DIAGNOSIS — M20.42 HAMMER TOE OF LEFT FOOT: ICD-10-CM

## 2020-12-30 ENCOUNTER — OFFICE VISIT - HEALTHEAST (OUTPATIENT)
Dept: VASCULAR SURGERY | Facility: CLINIC | Age: 60
End: 2020-12-30

## 2020-12-30 DIAGNOSIS — M20.42 HAMMER TOE OF LEFT FOOT: ICD-10-CM

## 2021-01-09 ENCOUNTER — HEALTH MAINTENANCE LETTER (OUTPATIENT)
Age: 61
End: 2021-01-09

## 2021-01-21 ENCOUNTER — COMMUNICATION - HEALTHEAST (OUTPATIENT)
Dept: VASCULAR SURGERY | Facility: CLINIC | Age: 61
End: 2021-01-21

## 2021-03-12 ENCOUNTER — AMBULATORY - HEALTHEAST (OUTPATIENT)
Dept: NURSING | Facility: CLINIC | Age: 61
End: 2021-03-12

## 2021-03-22 ENCOUNTER — NURSE TRIAGE (OUTPATIENT)
Dept: NURSING | Facility: CLINIC | Age: 61
End: 2021-03-22

## 2021-03-22 ENCOUNTER — COMMUNICATION - HEALTHEAST (OUTPATIENT)
Dept: SCHEDULING | Facility: CLINIC | Age: 61
End: 2021-03-22

## 2021-03-22 ENCOUNTER — COMMUNICATION - HEALTHEAST (OUTPATIENT)
Dept: VASCULAR SURGERY | Facility: CLINIC | Age: 61
End: 2021-03-22

## 2021-04-02 ENCOUNTER — AMBULATORY - HEALTHEAST (OUTPATIENT)
Dept: NURSING | Facility: CLINIC | Age: 61
End: 2021-04-02

## 2021-05-01 ENCOUNTER — HEALTH MAINTENANCE LETTER (OUTPATIENT)
Age: 61
End: 2021-05-01

## 2021-05-04 ENCOUNTER — OFFICE VISIT - HEALTHEAST (OUTPATIENT)
Dept: FAMILY MEDICINE | Facility: CLINIC | Age: 61
End: 2021-05-04

## 2021-05-04 ENCOUNTER — COMMUNICATION - HEALTHEAST (OUTPATIENT)
Dept: VASCULAR SURGERY | Facility: CLINIC | Age: 61
End: 2021-05-04

## 2021-05-04 ENCOUNTER — HOSPITAL ENCOUNTER (OUTPATIENT)
Dept: ULTRASOUND IMAGING | Facility: CLINIC | Age: 61
Discharge: HOME | End: 2021-05-04
Payer: COMMERCIAL

## 2021-05-04 DIAGNOSIS — M79.662 PAIN OF LEFT LOWER LEG: ICD-10-CM

## 2021-05-26 ENCOUNTER — RECORDS - HEALTHEAST (OUTPATIENT)
Dept: ADMINISTRATIVE | Facility: CLINIC | Age: 61
End: 2021-05-26

## 2021-05-26 VITALS — SYSTOLIC BLOOD PRESSURE: 136 MMHG | DIASTOLIC BLOOD PRESSURE: 78 MMHG | HEART RATE: 76 BPM | RESPIRATION RATE: 16 BRPM

## 2021-05-26 VITALS
DIASTOLIC BLOOD PRESSURE: 82 MMHG | RESPIRATION RATE: 14 BRPM | SYSTOLIC BLOOD PRESSURE: 136 MMHG | TEMPERATURE: 98.1 F | HEART RATE: 84 BPM

## 2021-05-26 VITALS — SYSTOLIC BLOOD PRESSURE: 124 MMHG | RESPIRATION RATE: 18 BRPM | DIASTOLIC BLOOD PRESSURE: 80 MMHG | HEART RATE: 80 BPM

## 2021-05-27 ENCOUNTER — RECORDS - HEALTHEAST (OUTPATIENT)
Dept: ADMINISTRATIVE | Facility: CLINIC | Age: 61
End: 2021-05-27

## 2021-05-27 VITALS — DIASTOLIC BLOOD PRESSURE: 78 MMHG | SYSTOLIC BLOOD PRESSURE: 130 MMHG | HEART RATE: 88 BPM | TEMPERATURE: 98.3 F

## 2021-05-27 VITALS
SYSTOLIC BLOOD PRESSURE: 130 MMHG | DIASTOLIC BLOOD PRESSURE: 78 MMHG | TEMPERATURE: 98 F | HEART RATE: 76 BPM | RESPIRATION RATE: 16 BRPM

## 2021-05-27 VITALS
HEART RATE: 76 BPM | SYSTOLIC BLOOD PRESSURE: 130 MMHG | RESPIRATION RATE: 18 BRPM | TEMPERATURE: 99.4 F | DIASTOLIC BLOOD PRESSURE: 68 MMHG

## 2021-05-27 VITALS
SYSTOLIC BLOOD PRESSURE: 128 MMHG | HEART RATE: 86 BPM | WEIGHT: 196 LBS | OXYGEN SATURATION: 97 % | TEMPERATURE: 98.8 F | DIASTOLIC BLOOD PRESSURE: 79 MMHG | RESPIRATION RATE: 15 BRPM

## 2021-05-27 VITALS
DIASTOLIC BLOOD PRESSURE: 80 MMHG | HEART RATE: 94 BPM | RESPIRATION RATE: 16 BRPM | OXYGEN SATURATION: 97 % | TEMPERATURE: 98.8 F | SYSTOLIC BLOOD PRESSURE: 142 MMHG

## 2021-05-27 VITALS
SYSTOLIC BLOOD PRESSURE: 122 MMHG | TEMPERATURE: 97.2 F | RESPIRATION RATE: 18 BRPM | HEART RATE: 78 BPM | DIASTOLIC BLOOD PRESSURE: 78 MMHG

## 2021-05-29 ENCOUNTER — RECORDS - HEALTHEAST (OUTPATIENT)
Dept: ADMINISTRATIVE | Facility: CLINIC | Age: 61
End: 2021-05-29

## 2021-06-01 VITALS — BODY MASS INDEX: 28.99 KG/M2 | HEIGHT: 65 IN | WEIGHT: 174 LBS

## 2021-06-01 VITALS — BODY MASS INDEX: 29.12 KG/M2 | WEIGHT: 175 LBS

## 2021-06-01 VITALS — WEIGHT: 175 LBS | BODY MASS INDEX: 29.12 KG/M2

## 2021-06-02 ENCOUNTER — RECORDS - HEALTHEAST (OUTPATIENT)
Dept: ADMINISTRATIVE | Facility: CLINIC | Age: 61
End: 2021-06-02

## 2021-06-02 VITALS — WEIGHT: 188.5 LBS | BODY MASS INDEX: 31.37 KG/M2

## 2021-06-02 VITALS — WEIGHT: 180 LBS | BODY MASS INDEX: 29.95 KG/M2

## 2021-06-04 VITALS — WEIGHT: 189 LBS | BODY MASS INDEX: 32.27 KG/M2 | HEIGHT: 64 IN

## 2021-06-04 VITALS
SYSTOLIC BLOOD PRESSURE: 140 MMHG | OXYGEN SATURATION: 98 % | TEMPERATURE: 97.8 F | HEART RATE: 97 BPM | WEIGHT: 189 LBS | DIASTOLIC BLOOD PRESSURE: 80 MMHG | HEIGHT: 64 IN | BODY MASS INDEX: 32.27 KG/M2

## 2021-06-05 VITALS — BODY MASS INDEX: 32.32 KG/M2 | HEIGHT: 65 IN | WEIGHT: 194 LBS

## 2021-06-05 VITALS
BODY MASS INDEX: 32.32 KG/M2 | WEIGHT: 194 LBS | BODY MASS INDEX: 32.32 KG/M2 | HEIGHT: 65 IN | HEIGHT: 65 IN | WEIGHT: 194 LBS

## 2021-06-09 NOTE — TELEPHONE ENCOUNTER
Writer unable to find that MRI was done. Called patient who says it was done Tuesday at [facility].  Called [facility] to fax over the report.

## 2021-06-09 NOTE — PROGRESS NOTES
I reviewed the MRI report with the patient via phone this evening which is positive for osteomyelitis of the distal phalanx 3rd digit right foot. Due to the presence of osteomyelitis, I reviewed the treatment options to include either 6 weeks IV antibiotics with Infectious Disease vs amputation of the involved bone. She would like to proceed with amputation at this time for a definitive procedure. I will ask my office to coordinate surgery for next week. She will continue on doxycycline and follow-up with Dr. Garner's office for a pre-op physical, covid testing.

## 2021-06-09 NOTE — PROGRESS NOTES
Patient reports had ulcer on distal right 3rd toe, appears closed.  Thin callous covering area.  Denies pain.

## 2021-06-09 NOTE — PROGRESS NOTES
"Gill Mendez is a 60 y.o. female who is being evaluated via a billable video visit.      The patient has been notified of following:     \"This video visit will be conducted via a call between you and your physician/provider. We have found that certain health care needs can be provided without the need for an in-person physical exam.  This service lets us provide the care you need with a video conversation.  If a prescription is necessary we can send it directly to your pharmacy.  If lab work is needed we can place an order for that and you can then stop by our lab to have the test done at a later time.    Video visits are billed at different rates depending on your insurance coverage. Please reach out to your insurance provider with any questions.    If during the course of the call the physician/provider feels a video visit is not appropriate, you will not be charged for this service.\"    Patient has given verbal consent to a Video visit? Yes  How would you like to obtain your AVS? AVS Preference: Mail a copy.  Patient would like the video invitation sent by: Text to cell phone: [phone]  Will anyone else be joining your video visit? No              Video-Visit Details    Type of service:  Video Visit      Originating Location (pt. Location): Home    Distant Location (provider location):  [facility] VASCULAR [facility]      Platform used for Video Visit: Soundsupply            Patient reports:    Change in status of the wound:  yes; improvement    Change of drainage- color, amount, odor: no    Change of swelling:  yes; decreased    Change in Pain status:  no    New wounds developed:  no    Dressing Supplies Sufficient:  yes    Reviewed with patient that I will contact them with scheduling a follow up appointment, supply needs and any further teaching that is identified by the provider during the call. I will also send out an AVS with all education, a wound measuring tape and their next scheduled visit. I " will include instructions to assist with installing the application on their mobile device if appropriate for future video visits.

## 2021-06-09 NOTE — PATIENT INSTRUCTIONS - HE
Wound Care Instructions    daily Cleanse your toe wound(s) with Normal Saline    Pat Dry with non-sterile gauze    Apply dry gauze into/onto the wounds    It is not ok to get your wound wet in the bath or shower    Keep pressure off wound; try to use open toed shoe/sandal    Fax xray records to [phone]    SEEK MEDICAL CARE IF:    You have an increase in swelling, pain, or redness around the wound.    You have an increase in the amount of pus coming from the wound.    There is a bad smell coming from the wound.    The wound appears to be worsening/enlarging    You have a fever greater than 101.5 F        It is ok to continue current wound care treatment/products for the next 2-3 days until new wound care supplies are ordered and arrive. If longer than this please contact our office at [phone].      Malinda Gardner DNP, RN, CNP, [facility]  [phone]

## 2021-06-09 NOTE — TELEPHONE ENCOUNTER
Did not receive report from [facility]. Called back to check and was transferred to . The radiologist did not finish signing the report which is why it is unavailable. They will message him to finish signing and the results should be available in Epic.

## 2021-06-10 NOTE — PROGRESS NOTES
Podiatry Progress Note        ASSESSMENT: S/P amputation 3rd digit right foot     TREATMENT:  -Surgical site right foot is progressing well. Gauze dressing applied today which she will leave intact. Recommend limited to non-weight bearing with surgical shoe. Referred for x-rays today.     -Follow-up with me in 2 weeks for suture removal.       Chris Vega DPM  [facility]/Foot & Ankle Surgery      HPI: Gill Mendez was seen again today s/p amputation 3rd digit right foot. Doing well today. Denies foot pain.     Past Medical History:   Diagnosis Date     Asthma      Cellulitis      Diabetes mellitus (H)      Gastroparesis      Hyperlipidemia      Hyperlipidemia     Created by Conversion      Hypertension      Hypertension     Created by Conversion  Replacement Utility updated for latest IMO load     PONV (postoperative nausea and vomiting)      Type 2 Diabetes Mellitus     Created by Conversion        Allergies   Allergen Reactions     Aspirin Unknown     Codeine Unknown     Patient states possibly caused N/V but can't remember for sure     Aspirin (Tartrazine Only) Rash         Current Outpatient Medications:      albuterol (PROVENTIL HFA;VENTOLIN HFA) 90 mcg/actuation inhaler, Inhale 2 puffs every 6 (six) hours as needed for wheezing., Disp: , Rfl:      amLODIPine (NORVASC) 10 MG tablet, 10 mg daily. , Disp: , Rfl: 0     atorvastatin (LIPITOR) 40 MG tablet, TAKE ONE TABLET BY MOUTH NIGHTLY FOR CHOLESTEROL, Disp: , Rfl:      beclomethasone (QVAR) 80 mcg/actuation inhaler, Inhale 2 puffs 2 (two) times a day., Disp: , Rfl:      citalopram (CELEXA) 20 MG tablet, TAKE ONE TABLET BY MOUTH EVERY DAY FOR MOOD, Disp: , Rfl: 0     diazePAM (VALIUM) 5 MG tablet, Take 1 tablet (5 mg total) by mouth every 6 (six) hours as needed for anxiety. One tablet one hour prior to procedure, Disp: 1 tablet, Rfl: 0     glipiZIDE (GLUCOTROL) 5 MG tablet, Take 5 mg by mouth 2 (two) times a day before meals., Disp: , Rfl:       hydroCHLOROthiazide (HYDRODIURIL) 25 MG tablet, Take 25 mg by mouth daily., Disp: , Rfl: 1     HYDROcodone-acetaminophen 5-325 mg per tablet, Take 1 tablet by mouth every 4 (four) hours as needed., Disp: 30 tablet, Rfl: 0     HYDROcodone-acetaminophen 5-325 mg per tablet, Take 1 tablet by mouth every 4 (four) hours as needed., Disp: 30 tablet, Rfl: 0     LANTUS SOLOSTAR U-100 INSULIN 100 unit/mL (3 mL) pen, Inject 55 Units under the skin at bedtime. , Disp: , Rfl: 3     lisinopriL (PRINIVIL,ZESTRIL) 10 MG tablet, Take 10 mg by mouth daily., Disp: , Rfl:      metFORMIN (GLUCOPHAGE) 500 MG tablet, Take 2,000 mg by mouth daily with breakfast. , Disp: , Rfl:      pioglitazone (ACTOS) 15 MG tablet, Take 15 mg by mouth., Disp: , Rfl:      pramipexole (MIRAPEX) 0.25 MG tablet, every morning. , Disp: , Rfl: 3    Review of Systems - Negative       OBJECTIVE:  Appearance: alert, well appearing, and in no distress.    Vitals:    08/07/20 1056   BP: 130/78   Pulse: 88   Temp: 98.3  F (36.8  C)       Surgical site on the Right foot amputated 3rd digit has intact sutures with skin edges well approximated, no gapping. No erythema. Neurovascular status is unchanged.

## 2021-06-10 NOTE — TELEPHONE ENCOUNTER
Pt called with concern, she had AMPUTATION, third digit right foot  7/31 and her 4th digit is black and blue. She confirmed it looked like bruising, which would be normal. She will email a photo to confirm to the vascular 1 email, please review. F/U 8/7.

## 2021-06-10 NOTE — ANESTHESIA CARE TRANSFER NOTE
Last vitals:   Vitals:    07/31/20 0803   BP: 129/60   Pulse: 99   Resp: 16   Temp: 36.4  C (97.6  F)   SpO2: 99%     Patient's level of consciousness is drowsy  Spontaneous respirations: yes  Maintains airway independently: yes  Dentition unchanged: yes  Oropharynx: oropharynx clear of all foreign objects    QCDR Measures:  ASA# 20 - Surgical No data recorded  PQRS# 430 - Adult PONV Prevention: 4558F - Pt received => 2 anti-emetic agents (different classes) preop & intraop  ASA# 8 - Peds PONV Prevention: NA - Not pediatric patient, not GA or 2 or more risk factors NOT present  PQRS# 424 - Yecenia-op Temp Management: 4559F - At least one body temp DOCUMENTED => 35.5C or 95.9F within required timeframe  PQRS# 426 - PACU Transfer Protocol: - Transfer of care checklist used  ASA# 14 - Acute Post-op Pain: ASA14B - Patient did NOT experience pain >= 7 out of 10

## 2021-06-10 NOTE — PATIENT INSTRUCTIONS - HE
Please continue to remain non-weight bearing on the right foot.    Your doctor has ordered for you to have imaging done.  Please contact [facility] to arrange your appointment for imaging.     [id number] [facility] [address]  [place] [address]  [phone]    [address] [address]   [phone]        [facility] Virtual Check-In Number    [phone]    When you arrive for your IN OFFICE visit. Please call this number from the parking lot.      The staff will then virtually check you in and meet you at the door to escort you to a room.    If you arrive early and a room is not yet ready for you staff may have you wait can call you back when they are ready.     Please remember to wear a mask into your appointment     No Visitors Allowed    If you are having any symptoms- cough, fever, rash, loss of taste and smell or shortness of breath we ask that you please call and reschedule your appointment.

## 2021-06-10 NOTE — PROGRESS NOTES
Podiatry Progress Note        ASSESSMENT:   S/P amputation 3rd digit right foot   Hammertoe      TREATMENT:  -Surgical site right foot is progressing well. Sutures removed today, steri-strips applied.  Recommend limited to non-weight bearing with CAM boot x1 week.     -I have asked her to follow-up with me in 4 weeks to discuss hammertoe surgery on her right foot.       Chris Vega DPM  [facility] Podiatry/Foot & Ankle Surgery      HPI: Gill Mendez was seen again today s/p amputation 3rd digit right foot. Doing well today. Denies foot pain. She is interested in hammertoe surgery for digits 4,5 right foot.     Past Medical History:   Diagnosis Date     Asthma      Cellulitis      Diabetes mellitus (H)      Gastroparesis      Hyperlipidemia      Hyperlipidemia     Created by Conversion      Hypertension      Hypertension     Created by Conversion  Replacement Utility updated for latest IMO load     PONV (postoperative nausea and vomiting)      Type 2 Diabetes Mellitus     Created by Conversion        Allergies   Allergen Reactions     Aspirin Unknown     Codeine Unknown     Patient states possibly caused N/V but can't remember for sure     Aspirin (Tartrazine Only) Rash         Current Outpatient Medications:      albuterol (PROVENTIL HFA;VENTOLIN HFA) 90 mcg/actuation inhaler, Inhale 2 puffs every 6 (six) hours as needed for wheezing., Disp: , Rfl:      amLODIPine (NORVASC) 10 MG tablet, 10 mg daily. , Disp: , Rfl: 0     atorvastatin (LIPITOR) 40 MG tablet, TAKE ONE TABLET BY MOUTH NIGHTLY FOR CHOLESTEROL, Disp: , Rfl:      beclomethasone (QVAR) 80 mcg/actuation inhaler, Inhale 2 puffs 2 (two) times a day., Disp: , Rfl:      citalopram (CELEXA) 20 MG tablet, TAKE ONE TABLET BY MOUTH EVERY DAY FOR MOOD, Disp: , Rfl: 0     diazePAM (VALIUM) 5 MG tablet, Take 1 tablet (5 mg total) by mouth every 6 (six) hours as needed for anxiety. One tablet one hour prior to procedure, Disp: 1 tablet, Rfl: 0     glipiZIDE  (GLUCOTROL) 5 MG tablet, Take 5 mg by mouth 2 (two) times a day before meals., Disp: , Rfl:      hydroCHLOROthiazide (HYDRODIURIL) 25 MG tablet, Take 25 mg by mouth daily., Disp: , Rfl: 1     HYDROcodone-acetaminophen 5-325 mg per tablet, Take 1 tablet by mouth every 4 (four) hours as needed., Disp: 30 tablet, Rfl: 0     HYDROcodone-acetaminophen 5-325 mg per tablet, Take 1 tablet by mouth every 4 (four) hours as needed., Disp: 30 tablet, Rfl: 0     LANTUS SOLOSTAR U-100 INSULIN 100 unit/mL (3 mL) pen, Inject 55 Units under the skin at bedtime. , Disp: , Rfl: 3     lisinopriL (PRINIVIL,ZESTRIL) 10 MG tablet, Take 10 mg by mouth daily., Disp: , Rfl:      metFORMIN (GLUCOPHAGE) 500 MG tablet, Take 2,000 mg by mouth daily with breakfast. , Disp: , Rfl:      pioglitazone (ACTOS) 15 MG tablet, Take 15 mg by mouth., Disp: , Rfl:      pramipexole (MIRAPEX) 0.25 MG tablet, every morning. , Disp: , Rfl: 3    Review of Systems - Negative       OBJECTIVE:  Appearance: alert, well appearing, and in no distress.    Vitals:    08/21/20 0853   BP: 130/68   Pulse: 76   Resp: 18   Temp: 99.4  F (37.4  C)       Surgical site on the Right foot amputated 3rd digit has intact sutures with skin edges well coapted, no gapping. No erythema. Neurovascular status is unchanged. Contracted digits 4,5 right foot.

## 2021-06-10 NOTE — TELEPHONE ENCOUNTER
Spoke to patient to notify her that an order has been placed for a knee walker. Gave her the phone number for [facility] in [place].

## 2021-06-10 NOTE — ANESTHESIA POSTPROCEDURE EVALUATION
Patient: Gill Mendez  Procedure(s):  AMPUTATION, third digit right foot (Right)  Anesthesia type: general    Patient location: Phase II Recovery  Last vitals:   Vitals Value Taken Time   /60 7/31/2020  8:03 AM   Temp 36.5  C (97.7  F) 7/31/2020  8:42 AM   Pulse 94 7/31/2020  8:31 AM   Resp 16 7/31/2020  8:03 AM   SpO2 98 % 7/31/2020  8:31 AM   Vitals shown include unvalidated device data.  Post vital signs: stable  Level of consciousness: awake and responds to simple questions  Post-anesthesia pain: pain controlled  Post-anesthesia nausea and vomiting: no  Pulmonary: unassisted, return to baseline  Cardiovascular: stable and blood pressure at baseline  Hydration: adequate  Anesthetic events: no    QCDR Measures:  ASA# 11 - Yecenia-op Cardiac Arrest: ASA11B - Patient did NOT experience unanticipated cardiac arrest  ASA# 12 - Yecenia-op Mortality Rate: ASA12B - Patient did NOT die  ASA# 13 - PACU Re-Intubation Rate: NA - No ETT / LMA used for case  ASA# 10 - Composite Anes Safety: ASA10A - No serious adverse event    Additional Notes:

## 2021-06-11 NOTE — TELEPHONE ENCOUNTER
Called patient and left a message to inform them that a referral for a knee scooter was sent to [facility] and they would get in contact with them to set up an appointment for a rolling knee walker

## 2021-06-11 NOTE — PROGRESS NOTES
FOOT AND ANKLE SURGERY/PODIATRY Progress Note        ASSESSMENT:   Hammertoe  DM2      TREATMENT:  -Patient has semi-rigid hammertoes 4,5 on her right foot and would like to proceed with surgery in an attempt to avoid future skin breakdown which was observed in previous toe amputations. We discussed the procedures today including post-op course to remain limited walking x3-4 weeks or until fully healed. Reviewed potential risks of surgery including but not limited to infection, bleeding complications and need for additional surgery including amputation.     -All questions invited and answered. Patient consents to surgery. I will ask my office to coordinate surgery at [facility]. Pre-op physical with Dr. Garner's office.    Chris Vega DPM  [facility] Podiatry/Foot & Ankle Surgery      HPI: Patient states she is interested in hammertoe surgery for digits 4,5 on her right foot. She would like to schedule surgery to prevent possible skin breakdown in light of previous skin ulcerations.     Past Medical History:   Diagnosis Date     Asthma      Cellulitis      Diabetes mellitus (H)      Gastroparesis      Hyperlipidemia      Hyperlipidemia     Created by Conversion      Hypertension      Hypertension     Created by Conversion  Replacement Utility updated for latest IMO load     PONV (postoperative nausea and vomiting)      Type 2 Diabetes Mellitus     Created by Conversion        Past Surgical History:   Procedure Laterality Date     HERNIA REPAIR       HYSTERECTOMY       UT REMOVAL OF OVARY(S)      Description: Oophorectomy - Bilateral (Removal Of Both Ovaries);  Recorded: 10/09/2008;     TOE AMPUTATION Right 7/31/2020    Procedure: AMPUTATION, third digit right foot;  Surgeon: Chris eVga DPM;  Location: [facility];  Service: Podiatry     TONSILLECTOMY         Allergies   Allergen Reactions     Aspirin Unknown     Codeine Unknown     Patient states possibly caused N/V but can't  remember for sure     Aspirin (Tartrazine Only) Rash         Current Outpatient Medications:      albuterol (PROVENTIL HFA;VENTOLIN HFA) 90 mcg/actuation inhaler, Inhale 2 puffs every 6 (six) hours as needed for wheezing., Disp: , Rfl:      amLODIPine (NORVASC) 10 MG tablet, 10 mg daily. , Disp: , Rfl: 0     atorvastatin (LIPITOR) 40 MG tablet, TAKE ONE TABLET BY MOUTH NIGHTLY FOR CHOLESTEROL, Disp: , Rfl:      beclomethasone (QVAR) 80 mcg/actuation inhaler, Inhale 2 puffs 2 (two) times a day., Disp: , Rfl:      citalopram (CELEXA) 20 MG tablet, TAKE ONE TABLET BY MOUTH EVERY DAY FOR MOOD, Disp: , Rfl: 0     diazePAM (VALIUM) 5 MG tablet, Take 1 tablet (5 mg total) by mouth every 6 (six) hours as needed for anxiety. One tablet one hour prior to procedure, Disp: 1 tablet, Rfl: 0     furosemide (LASIX) 20 MG tablet, TAKE ONE TABLET BY MOUTH EVERY MORNING AND ONE TABLET IN THE EARLY AFTERNOON, Disp: , Rfl:      glipiZIDE (GLUCOTROL) 5 MG tablet, Take 5 mg by mouth 2 (two) times a day before meals., Disp: , Rfl:      hydroCHLOROthiazide (HYDRODIURIL) 25 MG tablet, Take 25 mg by mouth daily., Disp: , Rfl: 1     HYDROcodone-acetaminophen 5-325 mg per tablet, Take 1 tablet by mouth every 4 (four) hours as needed., Disp: 30 tablet, Rfl: 0     HYDROcodone-acetaminophen 5-325 mg per tablet, Take 1 tablet by mouth every 4 (four) hours as needed., Disp: 30 tablet, Rfl: 0     LANTUS SOLOSTAR U-100 INSULIN 100 unit/mL (3 mL) pen, Inject 55 Units under the skin at bedtime. , Disp: , Rfl: 3     lisinopriL (PRINIVIL,ZESTRIL) 10 MG tablet, Take 10 mg by mouth daily., Disp: , Rfl:      metFORMIN (GLUCOPHAGE) 500 MG tablet, Take 2,000 mg by mouth daily with breakfast. , Disp: , Rfl:      pioglitazone (ACTOS) 15 MG tablet, Take 15 mg by mouth., Disp: , Rfl:      potassium chloride 20 mEq TbER, TAKE ONE TABLET BY MOUTH EVERY DAY WITH MEALS, Disp: , Rfl:      pramipexole (MIRAPEX) 0.25 MG tablet, every morning. , Disp: , Rfl: 3    Family  History   Problem Relation Age of Onset     Diabetes Mother      Acute Myocardial Infarction Neg Hx        Social History     Socioeconomic History     Marital status:      Spouse name: Not on file     Number of children: Not on file     Years of education: Not on file     Highest education level: Not on file   Occupational History     Not on file   Social Needs     Financial resource strain: Not on file     Food insecurity     Worry: Not on file     Inability: Not on file     Transportation needs     Medical: Not on file     Non-medical: Not on file   Tobacco Use     Smoking status: Never Smoker     Smokeless tobacco: Never Used   Substance and Sexual Activity     Alcohol use: No     Drug use: No     Sexual activity: Not on file   Lifestyle     Physical activity     Days per week: Not on file     Minutes per session: Not on file     Stress: Not on file   Relationships     Social connections     Talks on phone: Not on file     Gets together: Not on file     Attends [employer] service: Not on file     Active member of club or organization: Not on file     Attends meetings of clubs or organizations: Not on file     Relationship status: Not on file     Intimate partner violence     Fear of current or ex partner: Not on file     Emotionally abused: Not on file     Physically abused: Not on file     Forced sexual activity: Not on file   Other Topics Concern     Not on file   Social History Narrative     Not on file       10 point Review of Systems is negative except for hammertoes which is noted in HPI.       Vitals:    09/18/20 0834   BP: 140/80   Pulse: 97   Temp: 97.8  F (36.6  C)   SpO2: 98%       BMI= Body mass index is 32.44 kg/m .    OBJECTIVE:  General appearance: Patient is alert and fully cooperative with history & exam.  No sign of distress is noted during the visit.  Vascular: Dorsalis pedis and posterior tibial pulses are palpable. There is pedal hair growth right.  CFT < 3 sec from anterior tibial  surface to distal digits right. There is no appreciable edema noted.  Dermatologic: Turgor and texture are within normal limits. No coloration or temperature changes. No primary or secondary lesions noted.  Neurologic: All epicritic and proprioceptive sensations are grossly intact right.  Musculoskeletal: Amputated digits 2,3 right. Contracted digits 4,5 right at PIPJ and 5th MPJ right 5th digit.

## 2021-06-11 NOTE — ANESTHESIA POSTPROCEDURE EVALUATION
Patient: Gill Mendez  Procedure(s):  ARTHROPLASTY, digits 4,5 right foot (Right)  Anesthesia type: MAC    Patient location: Phase II Recovery  Last vitals:   Vitals Value Taken Time   /70 9/24/2020 10:15 AM   Temp  9/24/2020 10:26 AM   Pulse 107 9/24/2020 10:23 AM   Resp 16 9/24/2020  9:49 AM   SpO2 94 % 9/24/2020 10:23 AM   Vitals shown include unvalidated device data.  Post vital signs: stable  Level of consciousness: awake and responds to simple questions  Post-anesthesia pain: pain controlled  Post-anesthesia nausea and vomiting: no  Pulmonary: unassisted, return to baseline  Cardiovascular: stable and blood pressure at baseline  Hydration: adequate  Anesthetic events: no    QCDR Measures:  ASA# 11 - Yecenia-op Cardiac Arrest: ASA11B - Patient did NOT experience unanticipated cardiac arrest  ASA# 12 - Yecenia-op Mortality Rate: ASA12B - Patient did NOT die  ASA# 13 - PACU Re-Intubation Rate: NA - No ETT / LMA used for case  ASA# 10 - Composite Anes Safety: ASA10A - No serious adverse event    Additional Notes:

## 2021-06-11 NOTE — PATIENT INSTRUCTIONS - HE
Surgery Information    **ALL [facility] PAPERWORK IS TO BE FAXED -[phone], IT WILL BE PROCESSED AFTER SURGERY IS COMPLETE.      Surgery Date     Surgery Time   ( Arrive at the hospital two hours prior to your surgery and 1 hour prior at the surgery center. Check in at the . The only exception is if you are scheduled for surgery at 7am, you should arrive at 5:30am)    IF YOU NEED TO RESCHEDULE OR CANCEL YOUR SURGERY FOR ANY REASON PLEASE CALL THE CLINIC AS SOON AS POSSIBLE [phone].    Admission Type: Outpatient    Surgeon: Dr. Vega    Surgery Procedure:Hammertoe 4th and 5th Right foot (cpt 47269    Surgery Location: [facility], [facility], [address]    Anticoagulation Schedule      1. Plavix:    2. Aspirin:    3. Other:     Additional Information: If you use a CPAP machine for sleep apnea please bring it with you for surgery. We will want to monitor your breathing using your normal equipment.   If you need to reach the surgery scheduler please call the main number at [phone] and ask for Roberta for Dr. Vega    [facility] INFORMATION    We need to know a lot of information about you before surgery.     1-5 Days Before:     A nurse will call you for a pre-screening interview. The phone call with the nurse will be much faster and easier if you  Have organized your information prior to the call.     Please have the following information available:    Preoperative Exam completed and faxed to our office    Primary care provider's and any specialty providers' contact information available. .    If requested by your primary care provider, have any heart and lung exams at least 3 days before surgery.    Have a complete and accurate list of medications available.     Have a list of your allergies/sensitivities and reactions    Know your health history, surgical history, and medical problems    Know any anesthesia issues with you or within your family.     BE  SURE TO NOTIFY US IF YOU ARE TAKING ANY BLOOD THINNING AGENTS: Coumadin, Plavix, Aspirin, Xarelto, Eliquis    Someone planned to bring you and stay with you after the surgery    Day Before Surgery    1. STOP Smoking and Drinking: It is important to stop smoking and drinking at least 24 hours before surgery. Smoking and drinking may cause complications in your recovery from anesthesia and may lengthen the healing process.    2. Pack for your hospital stay: If it will be required for you to stay at the hospital after surgery, bring personal items such as a robe, slippers, pajamas, additional clothing and toiletries. Don't forget:    List of medication    Eyeglass case or contact case with solution. You cannot wear contacts during surgery    Copies of your physical exam , lab results and EKG    Copy of Health Care Directives, Living Will or Power of     Insurance Cards    Photo ID    CPAP machine    3. NOTHING BY MOUTH 8 HOURS BEFORE. This includes gum, hard candy, water and mints. The only exception is if you have been instructed by your doctor to take your medications with sips of water. You may rinse your mouth or brush your teeth, but do not swallow water.     4. Remove Nail Polish  Day of Surgery    1. Medications- Take as indicated with sips of water     2. Wear comfortable loose fitting clothes. Wear your glasses-Not contacts. Do not wear jewelry and remove body piercing's. Surgery may be cancelled if they are not removed.     3. If same day surgery-Have a someone come with you to surgery that can help you understand the surgeon's instructions, drive you home and stay with you overnight the first night.     4. A nurse will call you at home within 72 hours following surgery to see how you are doing. Our nurses and doctors will discuss recovery with you.      The surgery scheduler Roberta for Dr. Vega will contact you to schedule if you were not scheduled today or you need to make any adjustments to your  surgery.     You will need a preop physical within 30 days before surgery with your primary care provider. Please note if you do not get a complete History and Physical your surgery will be cancelled.   Please contact your primary to schedule.     A nurse should contact you from the hospital 1-2 days before surgery to review with you.    If you would like a [facility] [place] Estimate for your upcoming service/procedure contact Cost of Care Estimates at [phone], advocates are available Monday through Friday 8am - 5pm. You may also submit a request online at [email or web address]    [facility]  [address]  [place]  [address]     [phone] for facility charge    Anesthesiology charge  [phone]          Please don't hesitate to call us with any additional question or problems  Our number is [phone]     Instructions for Patients Scheduled for Vascular or Podiatry Procedures During the COVID 19 Pandemic    Your Provider has determined that your condition warrants going ahead with your procedure at this time.  You will need to be tested for COVID19 within 72 hours of your procedure. We highly encourage patients to get tested for COVID-19 at one of our designated [facility] testing sites. We process the tests in our lab, which allows us to get the results quickly. If you choose to get tested at a non-[facility] location, you will need to contact your primary care provider to make those arrangements and ensure the results are available to your surgeon before you are arriving for your procedure. If we do not receive the results in time, your procedure may be postponed or canceled.  Please make sure your test is collected 3-days prior to your procedure date.  The results will need to get faxed to [phone].  After testing, you will need to remain in self-quarantine until your procedure.  If you are notified of a  positive COVID-19 result; you will need to call your provider for further recommendations.    Please call [phone] and press option 2 to speak to a nurse.  If the test is positive; DO NOT PRESENT FOR YOUR PROCEDURE until you have been given further instructions.  You will not be called with negative results so arrive as instructed unless otherwise notified.  Don't:    Don't invite visitors or friends into your home.    Don't leave your home unless absolutely necessary.    Don't share utensils and other household items with others in the home.  Do:    Wash your hands regularly with soap and water and use hand  with at least 60% alcohol if you don't have easy access to soap and water.     Disinfect surface areas daily, including doorknobs, electronics - especially phones, laptops and other devices.     Wash utensils and other items thoroughly.     Separate yourself from others in the household as best you can, including pets.    Keep your hands away from your face.     Practice all other prevention tips the CDC recommends, including covering your coughs and sneezes with a tissue or your sleeve and immediately throwing the tissue into the trash and washing your hands.    Call your hospital if you develop the following signs before your surgery:    Fever.    Cough.    Shortness of breath.    Sore throat.    Runny or stuffy nose.    Muscle or body aches.    Headaches.    Fatigue.    Vomiting and diarrhea.    These steps will help keep you & your family, other patients, and hospital staff safe from COVID19.

## 2021-06-11 NOTE — PATIENT INSTRUCTIONS - HE
Please go and get an xray downstairs after your appointment today. Please leave gauze dressing intact.

## 2021-06-11 NOTE — PROGRESS NOTES
Podiatry Progress Note        ASSESSMENT: S/P Arthroplasty digits 4,5 right foot      TREATMENT:  -Surgical sites right foot are progressing well. Gauze dressing applied today which she will keep intact, dry. Continue limited walking with knee walker. Referred for x-rays.     -She will follow-up with me in 2 weeks for suture removal.     Chris Vega DPM  [facility] Podiatry/Foot & Ankle Surgery      HPI: Gill Mendez was seen again today s/p arthroplasty digits 4,5 right foot. She has remained non-weight bearing with a knee walker. Recently admitted to the hospital for chest tightness but doing well today.     Past Medical History:   Diagnosis Date     Anxiety      Asthma      Cellulitis      Diabetes mellitus (H)      Gastroparesis      Hyperlipidemia      Hyperlipidemia     Created by Conversion      Hypertension      Hypertension     Created by Conversion  Replacement Utility updated for latest IMO load     PONV (postoperative nausea and vomiting)      Shortness of breath      Type 2 Diabetes Mellitus     Created by Conversion        Allergies   Allergen Reactions     Aspirin Unknown     Codeine Unknown     Patient states possibly caused N/V but can't remember for sure     Aspirin (Tartrazine Only) Rash         Current Outpatient Medications:      albuterol (PROVENTIL HFA;VENTOLIN HFA) 90 mcg/actuation inhaler, Inhale 2 puffs every 6 (six) hours as needed for wheezing., Disp: , Rfl:      amLODIPine (NORVASC) 10 MG tablet, Take 10 mg by mouth daily. , Disp: , Rfl: 0     atorvastatin (LIPITOR) 40 MG tablet, Take 40 mg by mouth at bedtime. , Disp: , Rfl:      cetirizine (ZYRTEC) 10 MG tablet, Take 10 mg by mouth daily., Disp: , Rfl:      cholecalciferol, vitamin D3, 5,000 unit Tab, Take 5,000 Units by mouth daily., Disp: , Rfl:      citalopram (CELEXA) 20 MG tablet, Take 20 mg by mouth every morning. , Disp: , Rfl: 0     fluticasone propionate (FLOVENT HFA) 110 mcg/actuation inhaler, Inhale 1 puff 2  (two) times a day., Disp: , Rfl:      furosemide (LASIX) 20 MG tablet, Take 20 mg by mouth 2 (two) times a day at 9am and 6pm. , Disp: , Rfl:      glipiZIDE (GLUCOTROL) 5 MG tablet, Take 5 mg by mouth 2 (two) times a day before meals., Disp: , Rfl:      lisinopriL (PRINIVIL,ZESTRIL) 10 MG tablet, Take 10 mg by mouth daily., Disp: , Rfl:      metFORMIN (GLUCOPHAGE) 500 MG tablet, Take 2,000 mg by mouth daily with breakfast. , Disp: , Rfl:      oxyCODONE (ROXICODONE) 5 MG immediate release tablet, Take 1 tablet (5 mg total) by mouth every 6 (six) hours as needed for pain., Disp: 30 tablet, Rfl: 0     pioglitazone (ACTOS) 15 MG tablet, Take 7.5 mg by mouth daily. , Disp: , Rfl:      potassium chloride 20 mEq TbER, Take 20 mEq by mouth daily. , Disp: , Rfl:      pramipexole (MIRAPEX) 0.25 MG tablet, Take 0.25 mg by mouth 2 (two) times a day. , Disp: , Rfl: 3     LANTUS SOLOSTAR U-100 INSULIN 100 unit/mL (3 mL) pen, Inject 40 Units under the skin at bedtime. , Disp: , Rfl: 3  No current facility-administered medications for this visit.     Review of Systems - Negative       OBJECTIVE:  Appearance: alert, well appearing, and in no distress.    Vitals:    10/01/20 1343   BP: 136/82   Pulse: 84   Resp: 14   Temp: 98.1  F (36.7  C)       Surgical site on the Right foot digits 4,5 have intact sutures, skin edges well approximated, rectus digits. Neurovascular status is unchanged right foot. No erythema.

## 2021-06-11 NOTE — TELEPHONE ENCOUNTER
Patient called with tightness in her chest and a headache wondering if she should go in. Dr. Vega suggested that with those symptoms that she should go in to the ER.

## 2021-06-11 NOTE — TELEPHONE ENCOUNTER
Patient scheduled for surgery with DR Vega @ [facility] on 9/24/2020 @ 9:00am for arthroplasty digit 4,5 right foot patient will obtain pre-op H&P covid 9/20 @ [facility] post-ops made patient given all instructions confirmed all cd 9/18/2020

## 2021-06-11 NOTE — ANESTHESIA CARE TRANSFER NOTE
Last vitals:   Vitals:    09/24/20 0949   BP: 144/70   Pulse: (!) 110   Resp: 16   Temp:    SpO2: 100%     Patient's level of consciousness is drowsy  Spontaneous respirations: yes  Maintains airway independently: yes  Dentition unchanged: yes  Oropharynx: oropharynx clear of all foreign objects    QCDR Measures:  ASA# 20 - Surgical Safety Checklist: WHO surgical safety checklist completed prior to induction    PQRS# 430 - Adult PONV Prevention: 4558F - Pt received => 2 anti-emetic agents (different classes) preop & intraop  ASA# 8 - Peds PONV Prevention: NA - Not pediatric patient, not GA or 2 or more risk factors NOT present  PQRS# 424 - [place]enia-op Temp Management: 4559F - At least one body temp DOCUMENTED => 35.5C or 95.9F within required timeframe  PQRS# 426 - PACU Transfer Protocol: - Transfer of care checklist used  ASA# 14 - Acute Post-op Pain: ASA14B - Patient did NOT experience pain >= 7 out of 10

## 2021-06-12 NOTE — PATIENT INSTRUCTIONS - HE
Please continue to remain limited to non-weight bearing on the right foot and use the rolling knee walker. Change the dressing 1x a day.

## 2021-06-12 NOTE — PROGRESS NOTES
Podiatry Progress Note        ASSESSMENT:   Cellulitis 5th digit right foot  S/P Arthroplasty digits 4,5 right foot  Soft tissue mass right leg       TREATMENT:  -Surgical sites right foot are progressing well. Although, there is localized erythema to the 5th digit. I will start her on doxycycline. Also discussed that there is superficial gapping along the 5th digit incision, and despite this I will remove all sutures today because I am concerned that the sutures are irritating her skin. Sutures removed, steri-strips applied. Gauze dressing applied today which she will change daily. Continue limited walking with knee walker.     -There is a small soft tissue prominence along the distal anterior right leg. No signs of a discrete mass or signs of infection. Because the lesions is not causing pain and has only been present for about one week without a history of trauma, I recommend she continue to monitor. I have asked her to continue to monitor and contact my office with any increase in symptoms. Will consider an MRI.     -She will follow-up with me in 7-10 days.     Chris Vega DPM  [facility] Podiatry/Foot & Ankle Surgery      HPI: Gill Mendez was seen again today s/p arthroplasty digits 4,5 right foot. She has remained non-weight bearing with a knee walker. She has noticed an area of a possible soft tissue mass distal right leg. She denies trauma, pain or redness.    Past Medical History:   Diagnosis Date     Anxiety      Asthma      Cellulitis      Diabetes mellitus (H)      Gastroparesis      Hyperlipidemia      Hyperlipidemia     Created by Conversion      Hypertension      Hypertension     Created by Conversion  Replacement Utility updated for latest IMO load     PONV (postoperative nausea and vomiting)      Shortness of breath      Type 2 Diabetes Mellitus     Created by Conversion        Allergies   Allergen Reactions     Aspirin Unknown     Codeine Unknown     Patient states possibly caused N/V  but can't remember for sure     Aspirin (Tartrazine Only) Rash         Current Outpatient Medications:      albuterol (PROVENTIL HFA;VENTOLIN HFA) 90 mcg/actuation inhaler, Inhale 2 puffs every 6 (six) hours as needed for wheezing., Disp: , Rfl:      amLODIPine (NORVASC) 10 MG tablet, Take 10 mg by mouth daily. , Disp: , Rfl: 0     atorvastatin (LIPITOR) 40 MG tablet, Take 40 mg by mouth at bedtime. , Disp: , Rfl:      cetirizine (ZYRTEC) 10 MG tablet, Take 10 mg by mouth daily., Disp: , Rfl:      cholecalciferol, vitamin D3, 5,000 unit Tab, Take 5,000 Units by mouth daily., Disp: , Rfl:      citalopram (CELEXA) 20 MG tablet, Take 20 mg by mouth every morning. , Disp: , Rfl: 0     fluticasone propionate (FLOVENT HFA) 110 mcg/actuation inhaler, Inhale 1 puff 2 (two) times a day., Disp: , Rfl:      furosemide (LASIX) 20 MG tablet, Take 20 mg by mouth 2 (two) times a day at 9am and 6pm. , Disp: , Rfl:      glipiZIDE (GLUCOTROL) 5 MG tablet, Take 5 mg by mouth 2 (two) times a day before meals., Disp: , Rfl:      lisinopriL (PRINIVIL,ZESTRIL) 10 MG tablet, Take 10 mg by mouth daily., Disp: , Rfl:      metFORMIN (GLUCOPHAGE) 500 MG tablet, Take 2,000 mg by mouth daily with breakfast. , Disp: , Rfl:      oxyCODONE (ROXICODONE) 5 MG immediate release tablet, Take 1 tablet (5 mg total) by mouth every 6 (six) hours as needed for pain., Disp: 30 tablet, Rfl: 0     pioglitazone (ACTOS) 15 MG tablet, Take 7.5 mg by mouth daily. , Disp: , Rfl:      potassium chloride 20 mEq TbER, Take 20 mEq by mouth daily. , Disp: , Rfl:      pramipexole (MIRAPEX) 0.25 MG tablet, Take 0.25 mg by mouth 2 (two) times a day. , Disp: , Rfl: 3     doxycycline (MONODOX) 100 MG capsule, Take 1 capsule (100 mg total) by mouth 2 (two) times a day for 10 days., Disp: 20 capsule, Rfl: 0     LANTUS SOLOSTAR U-100 INSULIN 100 unit/mL (3 mL) pen, Inject 40 Units under the skin at bedtime. , Disp: , Rfl: 3    Review of Systems - Negative        OBJECTIVE:  Appearance: alert, well appearing, and in no distress.    Vitals:    10/15/20 1231   BP: 124/80   Pulse: 80   Resp: 18       Surgical site on the Right foot digits 4,5 have intact sutures, skin edges well coapted 4th digit with superficial gapping along dorsal 5th digit, rectus digits. Neurovascular status is unchanged right foot. Mild erythema 5th digit right. Small raised soft tissue prominence along distal anterior right leg, no open lesions, drainage or erythema noted.

## 2021-06-12 NOTE — PROGRESS NOTES
Podiatry Progress Note        ASSESSMENT:   S/P Arthroplasty digits 4,5 right foot  Lipoma right leg      TREATMENT:  Surgical sites right foot: No erythema 4th digit with very small stable eschar along the dorsal digit. There is blanchable erythema to the 5th digit which has not changed very much over the past few weeks despite IV and oral antibiotics. I discussed with the patient that there does not appear to be an infection in the 5th digit, but we should continue to closely monitor. Pen line drawn along base of the 5th digit, I have asked her to contact my office should erythema extend proximal to this line. She has finished course of bactrim. Limited walking in the surgical shoe.     Lipoma right leg: Small soft tissue mass along the anterior lateral left leg, which may have increased in size since her last visit. Referred for MRI. I will contact her with the MRI report when available and we will be guided by the results.     -She will follow-up with me in 3 weeks or sooner if problems develop.     Chris Vega DPM  [facility] Podiatry/Foot & Ankle Surgery      HPI: Gill Mendez was seen again today s/p arthroplasty digits 4,5 right foot. Since her hospitalization, she has remained limited walking in a surgical shoe and finished course of antibiotics. She believes the soft tissue mass on the right leg has increased in size and somewhat painful.     Past Medical History:   Diagnosis Date     Anxiety      Asthma      Cellulitis      Diabetes mellitus (H)      Gastroparesis      Hyperlipidemia      Hyperlipidemia     Created by Conversion      Hypertension      Hypertension     Created by Conversion  Replacement Utility updated for latest IMO load     PONV (postoperative nausea and vomiting)      Shortness of breath      Type 2 Diabetes Mellitus     Created by Conversion        Allergies   Allergen Reactions     Aspirin Unknown     Codeine Unknown     Patient states possibly caused N/V but can't  remember for sure     Aspirin (Tartrazine Only) Rash         Current Outpatient Medications:      albuterol (PROVENTIL HFA;VENTOLIN HFA) 90 mcg/actuation inhaler, Inhale 2 puffs every 6 (six) hours as needed for wheezing., Disp: , Rfl:      amLODIPine (NORVASC) 10 MG tablet, Take 10 mg by mouth daily. , Disp: , Rfl: 0     atorvastatin (LIPITOR) 40 MG tablet, Take 40 mg by mouth at bedtime. , Disp: , Rfl:      cetirizine (ZYRTEC) 10 MG tablet, Take 10 mg by mouth daily., Disp: , Rfl:      citalopram (CELEXA) 20 MG tablet, Take 20 mg by mouth every morning. , Disp: , Rfl: 0     fluticasone propionate (FLOVENT HFA) 110 mcg/actuation inhaler, Inhale 1 puff 2 (two) times a day., Disp: , Rfl:      furosemide (LASIX) 20 MG tablet, Take 20 mg by mouth 2 (two) times a day at 9am and 6pm. , Disp: , Rfl:      LANTUS SOLOSTAR U-100 INSULIN 100 unit/mL (3 mL) pen, Inject 40 Units under the skin at bedtime. , Disp: , Rfl: 3     lisinopriL (PRINIVIL,ZESTRIL) 10 MG tablet, Take 10 mg by mouth daily., Disp: , Rfl:      metFORMIN (GLUCOPHAGE) 500 MG tablet, Take 2,000 mg by mouth daily with breakfast. , Disp: , Rfl:      pioglitazone (ACTOS) 15 MG tablet, Take 7.5 mg by mouth daily. , Disp: , Rfl:      potassium chloride 20 mEq TbER, Take 20 mEq by mouth daily. , Disp: , Rfl:      pramipexole (MIRAPEX) 0.25 MG tablet, Take 0.25 mg by mouth 2 (two) times a day. , Disp: , Rfl: 3     semaglutide 14 mg Tab, Take by mouth., Disp: , Rfl:      semaglutide 7 mg Tab, Take by mouth., Disp: , Rfl:      diazePAM (VALIUM) 5 MG tablet, Take 1 tablet (5 mg total) by mouth every 6 (six) hours as needed for anxiety., Disp: 1 tablet, Rfl: 0     glipiZIDE (GLUCOTROL) 5 MG tablet, Take 5 mg by mouth 2 (two) times a day before meals., Disp: , Rfl:     Review of Systems - Negative       OBJECTIVE:  Appearance: alert, well appearing, and in no distress.    Vitals:    10/30/20 1003   BP: 130/78   Pulse: 76   Resp: 16   Temp: 98  F (36.7  C)       Surgical  site on the Right foot digits 4,5 have small stable eschar along dorsal 5th digit with blanchable rubor of the 5th digit to the base of the digit, rectus digits. Incision along dorsal 4th digit appears well coapted with small eschar centrally, no erythema. Neurovascular status is unchanged right foot. Small soft tissue mass along the distal lateral right leg, similar in size to previous visit with mild pain to palpation.

## 2021-06-12 NOTE — PROGRESS NOTES
MTM Transition of Care Encounter  Assessment & Plan                                                     Post Discharge Medication Reconciliation Status: discharge medications reconciled, continue medications without change    1. Toe infection  Recently hospitalized. Finishing course of antibiotic and following up with podiatry.     2. Type 2 diabetes mellitus   Most recent A1c uncontrolled and above goal of <7% per ADA guidelines. Agree with endocrinology recommendation to switch glipizide to GLP-1 agonist Rybelsus. Medication education provided. Question continued need for very low dose pioglitazone, which appears to have contributed to weight gain per patient report.     3. Hypertension  Most recent blood pressure is above goal of <140/90 mm Hg per JNC-8 hypertension guidelines, but typically within goal. Recommend recheck at upcoming PCP visit. Room to increase lisinopril if needed.     4. Hyperlipidemia  Appropriately on a high intensity statin given age and diabetes diagnosis per ACC/AHA guidelines.     5. Restless leg syndrome  Well controlled on pramipexole. Consider checking ferritin level as iron deficiency can contribute to symptoms.    6. Mild intermittent asthma  Well controlled on ICS. Reviewed administration technique and recommended rinsing mouth after use to prevent thrush.   Plan   1. Inhaler education.     7. Anxiety  Well controlled on selective serotonin reuptake inhibitor.     Follow Up  PRN with MTM    Subjective & Objective                                                       Gill Mendez is a 60 y.o. female called for a transitions of care visit. she was discharged from [facility] on 10/19/20 for toe infection.    Patient consented to a telehealth visit: Yes  Chief Complaint: Hospital discharge medication review  Medication Adherence/Access: Good; no issues identified.     Per discharge summary:  Gill Mendez is a 60 y.o. old female with history of DM2, HTN, mild moderate asthma, history of  osteomyelitis, and recent right 4th and 5th digit arthroplasty presents with cellulitis vs osteomyelitis of 2-days failing outpatient PO antibiotic treatment with doxycycline. She was started on intravenous antibiotics including typical diabetic pseudomonal coverage and continuation of empiric MRSA coverage, with zosyn and vancomycin, and podiatry evaluated patient further with plain film imaging. Clinically she improved quickly and markedly; podiatry determined there was surgical or invasive indication and she was transitioned to monotherapy with Bactrim for 10-additional days. Podiatry outpatient follow-up as per podiatry team, and PCP f/u ordered/recommended.      She verifies continuing to take course of Bactrim 800-160 mg 1 tablet two times a day. A little bit of nausea with the antibiotic, but tolerable. She has a follow up with podiatry 10/30.     She has type 2 diabetes. Takes metformin 1000 mg two times a day, glipizide 5 mg two times a day, pioglitazone 7.5 mg daily, and Lantus 40 units subcutaneous at bedtime. She sees an endocrinologist. Next appointment soon. Plan is to take her off glipizide and replace with Rybelsus. She mentions pioglitazone caused weight gain. She cannot take aspirin, as this causes a rash.     For HTN, she is on amlodipine 10 mg daily, lisinopril 10 mg daily, and furosemide 20 mg two times a day. She takes potassium chloride 20 meq daily.   For hyperlipidemia, she is on atorvastatin 40 mg daily. Wonders if it is best to take morning or evening.     She has mild intermittent asthma. Maintenance inhaler is Flovent 100 mcg 1 puff two times a day and has albuterol MDI for rescue PRN use. She does not rinse her mouth after Flovent use. She hasn't used albuterol in a long time. Feels her breathing is well controlled. Also takes cetirizine for allergies.     For anxiety, she takes citalopram 20 mg everyday. She says it keeps her calm. She is on pramipexole for RLS. Dose is 0.25 mg 2-3  times a day. She finds this effective.      Lab Results   Component Value Date    HGBA1C 8.7 (H) 09/25/2020    HGBA1C 11.3 (H) 08/11/2011    HGBA1C 13.2 (H) 05/31/2011     Lab Results   Component Value Date    MICROALBUR 2.08 (H) 05/31/2011    LDLCALC 129.0 08/11/2011    CREATININE 1.04 10/18/2020       PMH: reviewed in EPIC   Allergies/ADRs: reviewed in EPIC   Alcohol: no  Tobacco:   Social History     Tobacco Use   Smoking Status Never Smoker   Smokeless Tobacco Never Used     Recent Vitals:   BP Readings from Last 3 Encounters:   10/19/20 153/73   10/15/20 124/80   10/01/20 136/82      Wt Readings from Last 3 Encounters:   10/19/20 197 lb 12.8 oz (89.7 kg)   09/25/20 206 lb 4.8 oz (93.6 kg)   09/24/20 194 lb (88 kg)     ----------------    The patient was given a summary of these recommendations via [employer]    I spent 30 minutes with this patient today;  . I offer these suggestions with the understanding that I don't fully understand Gill's past medical history and the complexity of her health conditions.  Gill should make no changes without the approval of his primary care provider.. A copy of the visit note was provided to the patient's provider.     Radha Bowers, PharmD, BCACP  Medication Management (MTM) Pharmacist  [facility]       Telemedicine Visit Details    Type of service:  Telephone     Start Time: 11:00 AM  End Time (time video/phone call stopped): 11:30 AM    Originating Location (pt. Location): Home    Distant Location (provider location):  [place] [facility]ERAPY MANAGEMENT [facility]    Mode of Communication:   Telephone     Current Outpatient Medications   Medication Sig Dispense Refill     albuterol (PROVENTIL HFA;VENTOLIN HFA) 90 mcg/actuation inhaler Inhale 2 puffs every 6 (six) hours as needed for wheezing.       amLODIPine (NORVASC) 10 MG tablet Take 10 mg by mouth daily.   0     atorvastatin (LIPITOR) 40 MG tablet Take 40 mg by mouth  at bedtime.        cetirizine (ZYRTEC) 10 MG tablet Take 10 mg by mouth daily.       citalopram (CELEXA) 20 MG tablet Take 20 mg by mouth every morning.   0     fluticasone propionate (FLOVENT HFA) 110 mcg/actuation inhaler Inhale 1 puff 2 (two) times a day.       furosemide (LASIX) 20 MG tablet Take 20 mg by mouth 2 (two) times a day at 9am and 6pm.        glipiZIDE (GLUCOTROL) 5 MG tablet Take 5 mg by mouth 2 (two) times a day before meals.       LANTUS SOLOSTAR U-100 INSULIN 100 unit/mL (3 mL) pen Inject 40 Units under the skin at bedtime.   3     lisinopriL (PRINIVIL,ZESTRIL) 10 MG tablet Take 10 mg by mouth daily.       metFORMIN (GLUCOPHAGE) 500 MG tablet Take 2,000 mg by mouth daily with breakfast.        pioglitazone (ACTOS) 15 MG tablet Take 7.5 mg by mouth daily.        potassium chloride 20 mEq TbER Take 20 mEq by mouth daily.        pramipexole (MIRAPEX) 0.25 MG tablet Take 0.25 mg by mouth 2 (two) times a day.   3     sulfamethoxazole-trimethoprim (SEPTRA DS) 800-160 mg per tablet Take 1 tablet by mouth 2 (two) times a day for 10 days. 20 tablet 0     No current facility-administered medications for this visit.

## 2021-06-12 NOTE — TELEPHONE ENCOUNTER
Per Dr. Vega, pt to call back today if redness of right toes has not improved.  Pt states no improve and a little more swelling.  Dr. Vega per note is On Fab and paged to call back pt.  Radha Hobson RN, MA  [facility]    Triage Nurse Advisor

## 2021-06-13 NOTE — ANESTHESIA PROCEDURE NOTES
Peripheral Block    Patient location during procedure: pre-op  Start time: 12/7/2020 12:46 PM  End time: 12/7/2020 12:51 PM  post-op analgesia per surgeon order as noted in medical record  Staffing:  Performing  Anesthesiologist: Michelle Gilliland MD  Preanesthetic Checklist  Completed: patient identified, site marked, risks, benefits, and alternatives discussed, timeout performed, consent obtained, airway assessed, oxygen available, suction available, emergency drugs available and hand hygiene performed  Peripheral Block  Block type: sciatic, popliteal  Prep: ChloraPrep  Patient position: supine  Patient monitoring: cardiac monitor, continuous pulse oximetry, heart rate and blood pressure  Laterality: left  Injection technique: ultrasound guided    Ultrasound used to visualize needle placement in proximity to nerve being blocked: yes   US used to visualize anesthetic spread  Visualized anatomic structures normal  No Pathological Findings  Permanent ultrasound image captured for medical record  Sterile gel and probe cover used for ultrasound.  Needle  Needle type: Stimuplex   Needle gauge: 21 G  Needle length: 4 in  no peripheral nerve catheter placed  Assessment  Injection assessment: no difficulty with injection, negative aspiration for heme, no paresthesia on injection and incremental injection

## 2021-06-13 NOTE — PROGRESS NOTES
I reviewed MRI report with the patient which indicates no mass in the right leg. I have left a message for a return call with radiology on 11/4, but they have not returned my call. I will again contact them tomorrow and plan to talk with the patient tomorrow.

## 2021-06-13 NOTE — PROGRESS NOTES
I talked with the patient regarding her concerns. She continues to deny having pain along the distal right leg, no increase in soft tissue prominence. We discussed that surgery is an option, however, I recommend she avoid surgery at this time in the presence of no symptoms. She agrees with the treatment plan and will continue to monitor. She is scheduled [facility].

## 2021-06-13 NOTE — ANESTHESIA PROCEDURE NOTES
Peripheral Block    Patient location during procedure: pre-op  Start time: 12/7/2020 12:56 PM  End time: 12/7/2020 1:01 PM  post-op analgesia per surgeon order as noted in medical record  Staffing:  Performing  Anesthesiologist: Michelle Gilliland MD  Preanesthetic Checklist  Completed: patient identified, site marked, risks, benefits, and alternatives discussed, timeout performed, consent obtained, airway assessed, oxygen available, suction available, emergency drugs available and hand hygiene performed  Peripheral Block  Block type: saphenous, adductor canal block  Prep: ChloraPrep  Patient position: supine  Patient monitoring: cardiac monitor, continuous pulse oximetry, heart rate and blood pressure  Laterality: left  Injection technique: ultrasound guided    Ultrasound used to visualize needle placement in proximity to nerve being blocked: yes   US used to visualize anesthetic spread  Visualized anatomic structures normal  No Pathological Findings  Permanent ultrasound image captured for medical record  Sterile gel and probe cover used for ultrasound.  Needle  Needle type: Stimuplex   Needle gauge: 21 G  Needle length: 6 in  no peripheral nerve catheter placed  Assessment  Injection assessment: no difficulty with injection, negative aspiration for heme, no paresthesia on injection and incremental injection

## 2021-06-13 NOTE — PROGRESS NOTES
I reviewed the MRI report with radiology today which is negative for any type of soft tissue mass or gas in the soft tissues of the dorsal lateral right leg. The patient is having minimal symptoms at this time, and as such I recommend she continue to monitor but contact me should her symptoms increase. She will follow-up with me next week.

## 2021-06-13 NOTE — ANESTHESIA CARE TRANSFER NOTE
Last vitals:   Vitals:    12/07/20 1409   BP: 127/59   Pulse: 100   Resp: 16   Temp: 36.4  C (97.5  F)   SpO2: 95%     Patient's level of consciousness is drowsy  Spontaneous respirations: yes  Maintains airway independently: yes  Dentition unchanged: yes  Oropharynx: oropharynx clear of all foreign objects    QCDR Measures:  ASA# 20 - Surgical Safety Checklist: WHO surgical safety checklist completed prior to induction    PQRS# 430 - Adult PONV Prevention: 4558F - Pt received => 2 anti-emetic agents (different classes) preop & intraop  ASA# 8 - Peds PONV Prevention: NA - Not pediatric patient, not GA or 2 or more risk factors NOT present  PQRS# 424 - [place]enia-op Temp Management: 4559F - At least one body temp DOCUMENTED => 35.5C or 95.9F within required timeframe  PQRS# 426 - PACU Transfer Protocol: - Transfer of care checklist used  ASA# 14 - Acute Post-op Pain: ASA14B - Patient did NOT experience pain >= 7 out of 10

## 2021-06-13 NOTE — PROGRESS NOTES
On admission to pre-op pt's BG=81.  Pt asymptomatic.  Informed MDA Dr. Kim and obtained order for 1/2 amp D50.  Med given as ordered.  Recheck glucometer 30 minutes later with YJ=792.  Care turned over to CRNA and OR RN and they will monitor.

## 2021-06-13 NOTE — PROGRESS NOTES
FOOT AND ANKLE SURGERY/PODIATRY Progress Note        ASSESSMENT:   Hammertoe  DM2      TREATMENT:  -Surgical sites on the right foot are well coapted, progressing well. She is concerned about developing sores on the left foot associated with hammertoe deformities and we discussed arthroplasty procedure today for digits 2-5. Reviewed post-op course. Reviewed potential risks of surgery including but not limited to infection, bleeding complications and need for additional surgery including amputation.     -All questions invited and answered. She consents to surgery. I will ask my staff to coordinate surgery at [facility]. Pre-op physical with Dr. Garner's office.    Chris Vega DPM  [facility] Podiatry/Foot & Ankle Surgery      HPI: Gill Mendez was seen again today s/p arthroplasty digits 4,5 right foot. She is doing well, denies pain in the 5th digit on the right foot. She is concerned about developing sores on the left foot and would like to discuss surgery for digits 2-5 on her left foot.     Past Medical History:   Diagnosis Date     Anxiety      Asthma      Cellulitis      Diabetes mellitus (H)      Gastroparesis      Hyperlipidemia      Hyperlipidemia     Created by Conversion      Hypertension      Hypertension     Created by Conversion  Replacement Utility updated for latest IMO load     PONV (postoperative nausea and vomiting)      Shortness of breath      Type 2 Diabetes Mellitus     Created by Conversion        Past Surgical History:   Procedure Laterality Date     ACHILLES TENDON REPAIR Bilateral     Left was plate  , right was torn     ENDOMETRIAL BIOPSY       FOOT ARTHROPLASTY Right 09/24/2020    Fourth and fifth toe by Dr. Vega     HERNIA REPAIR       HYSTERECTOMY       CT REMOVAL OF OVARY(S)      Description: Oophorectomy - Bilateral (Removal Of Both Ovaries);  Recorded: 10/09/2008;     TOE AMPUTATION Right 7/31/2020    Procedure: AMPUTATION, third digit right foot;   Surgeon: Chris Vega DPM;  Location: [facility];  Service: Podiatry     TONSILLECTOMY         Allergies   Allergen Reactions     Aspirin Unknown     Codeine Unknown     Patient states possibly caused N/V but can't remember for sure     Aspirin (Tartrazine Only) Rash         Current Outpatient Medications:      albuterol (PROVENTIL HFA;VENTOLIN HFA) 90 mcg/actuation inhaler, Inhale 2 puffs every 6 (six) hours as needed for wheezing., Disp: , Rfl:      amLODIPine (NORVASC) 10 MG tablet, Take 10 mg by mouth daily. , Disp: , Rfl: 0     atorvastatin (LIPITOR) 40 MG tablet, Take 40 mg by mouth at bedtime. , Disp: , Rfl:      cetirizine (ZYRTEC) 10 MG tablet, Take 10 mg by mouth daily., Disp: , Rfl:      citalopram (CELEXA) 20 MG tablet, Take 20 mg by mouth every morning. , Disp: , Rfl: 0     diazePAM (VALIUM) 5 MG tablet, Take 1 tablet (5 mg total) by mouth every 6 (six) hours as needed for anxiety., Disp: 1 tablet, Rfl: 0     fluticasone propionate (FLOVENT HFA) 110 mcg/actuation inhaler, Inhale 1 puff 2 (two) times a day., Disp: , Rfl:      furosemide (LASIX) 20 MG tablet, Take 20 mg by mouth 2 (two) times a day at 9am and 6pm. , Disp: , Rfl:      glipiZIDE (GLUCOTROL) 5 MG tablet, Take 5 mg by mouth 2 (two) times a day before meals., Disp: , Rfl:      LANTUS SOLOSTAR U-100 INSULIN 100 unit/mL (3 mL) pen, Inject 40 Units under the skin at bedtime. , Disp: , Rfl: 3     lisinopriL (PRINIVIL,ZESTRIL) 10 MG tablet, Take 10 mg by mouth daily., Disp: , Rfl:      metFORMIN (GLUCOPHAGE) 500 MG tablet, Take 2,000 mg by mouth daily with breakfast. , Disp: , Rfl:      pioglitazone (ACTOS) 15 MG tablet, Take 7.5 mg by mouth daily. , Disp: , Rfl:      potassium chloride 20 mEq TbER, Take 20 mEq by mouth daily. , Disp: , Rfl:      pramipexole (MIRAPEX) 0.25 MG tablet, Take 0.25 mg by mouth 2 (two) times a day. , Disp: , Rfl: 3     semaglutide 14 mg Tab, Take by mouth., Disp: , Rfl:      semaglutide 7 mg Tab, Take by  mouth., Disp: , Rfl:     Family History   Problem Relation Age of Onset     Diabetes Mother      Hypertension Mother      Acute Myocardial Infarction Neg Hx        Social History     Socioeconomic History     Marital status:      Spouse name: Not on file     Number of children: Not on file     Years of education: Not on file     Highest education level: Not on file   Occupational History     Not on file   Social Needs     Financial resource strain: Not on file     Food insecurity     Worry: Not on file     Inability: Not on file     Transportation needs     Medical: Not on file     Non-medical: Not on file   Tobacco Use     Smoking status: Never Smoker     Smokeless tobacco: Never Used   Substance and Sexual Activity     Alcohol use: No     Drug use: No     Sexual activity: Not on file   Lifestyle     Physical activity     Days per week: Not on file     Minutes per session: Not on file     Stress: Not on file   Relationships     Social connections     Talks on phone: Not on file     Gets together: Not on file     Attends Sikh service: Not on file     Active member of club or organization: Not on file     Attends meetings of clubs or organizations: Not on file     Relationship status: Not on file     Intimate partner violence     Fear of current or ex partner: Not on file     Emotionally abused: Not on file     Physically abused: Not on file     Forced sexual activity: Not on file   Other Topics Concern     Not on file   Social History Narrative     Not on file       10 point Review of Systems is negative except for hammertoes which is noted in HPI.       Vitals:    11/20/20 0959   BP: 136/78   Pulse: 76   Resp: 16       BMI= There is no height or weight on file to calculate BMI.    OBJECTIVE:  General appearance: Patient is alert and fully cooperative with history & exam.  No sign of distress is noted during the visit.  Vascular: Dorsalis pedis palpable bilateral. There is no appreciable edema  noted.  Dermatologic: Turgor and texture are within normal limits. No coloration or temperature changes. No primary or secondary lesions noted.  Neurologic: Diminished to light touch bilateral.   Musculoskeletal: Contracted digits 2-5 left foot at PIPJ, adductovarus 5th digit left.     Imaging:     Mr Tibia Fibular With Without Contrast Right    Result Date: 11/3/2020  EXAM DATE:         11/03/2020 EXAM: MRI ANKLE RIGHT W/O CONTRAST LOCATION: [facility] DATE/TIME: 11/3/2020 1:30 PM INDICATION: Lump anterolateral distal right leg. COMPARISON: Right foot radiographic exam 10/18/2020. TECHNIQUE: Unenhanced. FINDINGS: TENDONS: Visualized peroneal tendons appear intact with peroneus longus tendinopathy. Visualized posterior tibialis and flexor digitorum longus tendons are remarkable for tendinopathy of the distal posterior tibialis. Flexor hallucis longus tendon transfer to the posteromedial calcaneus. No significant medial tenosynovitis. Anterior ankle extensors where visualized unremarkable. Distal Achilles tendinopathy without significant tear. No distal Achilles paratenonitis or retrocalcaneal bursitis. LIGAMENTS: Anterior talofibular ligament intact. Calcaneofibular ligament where visualized unremarkable. Posterior talofibular ligament intact. Syndesmotic inferior tibiofibular ligaments are intact. Deltoid ligament complex intact. JOINTS AND BONES: No acute ankle or hindfoot fracture. No stress fracture. Tarsal bones where visualized intact. No bone lesion. No osteonecrosis. No focal talar dome osteochondral lesion. No focal hindfoot cartilage defect. No significant ankle or hindfoot joint effusion. SOFT TISSUES: Preservation of sinus tarsi fat signal. Intrinsic muscle bulk normal with some fatty infiltration of the distal leg musculature. No high-grade muscle edema. Distal leg soft tissue swelling. No well-defined space-occupying mass in the anterolateral distal leg. Thickened proximal medial  bundle plantar fascia without adjacent edema. IMPRESSION: 1.  No space-occupying mass seen in the anterolateral right distal leg soft tissues. 2.  Postop change right flexor hallucis longus tendon transfer to the posteromedial calcaneus. Moderate distal Achilles tendinopathy. 3.  No evidence for acute right ankle or tendon injury. 4.  No acute ankle or hindfoot fracture. No stress fracture.

## 2021-06-13 NOTE — TELEPHONE ENCOUNTER
Surgery Scheduled      Surgery/Procedure: arthroplasty digits two three four and five left foot     Special Equipment: TBD    [place] [facility] [place]: 12[place]07/2020    Time: 13:00    Surgeon: Dr. Vega    OR Confirmed/ :  Yes with Bitty on 11/20/2020      Orders In:  Yes    Entered on [employer] / [employer] Calendar:  Yes    Post Op: 12/16 & 12/30 MW    Covid Scheduled: 12/3 [place] lab     Blood Thinners Addressed: No

## 2021-06-13 NOTE — BRIEF OP NOTE
[facility]    Brief Operative Note    Pre-operative diagnosis: Hammer toe of left foot [M20.42]  Post-operative diagnosis Same as pre-operative diagnosis    Procedure: ARTHROPLASTY, digits two, three, four and five left foot (Left)  Surgeon: Surgeon(s) and Role:     * Chris Vega DPM - Primary  Anesthesia: MAC with Popliteal Block   Estimated Blood Loss: Less than 10 ml    Drains:      Specimens: * No specimens in log *  Findings:   None.  Complications: None.  Implants:         [unfilled]

## 2021-06-13 NOTE — OP NOTE
Date: 12/7/20    Surgeon: GEORGE Vega DPM    Preoperative diagnosis:   1. Hammertoe 2nd digit left foot  2. Hammertoe 3rd digit left foot  3. Hammertoe 4th digit left foot  4. Hammertoe 5th digit left foot    Postoperative diagnosis: Same    Procedure:   1. Arthroplasty 2nd digit left foot   2. Arthroplasty 3rd digit left foot   3. Arthroplasty 4th digit left foot   4. Arthroplasty 5th digit left foot     Anesthesia: Popliteal block with IV-sedation    Hemostasis: Pneumatic ankle tourniquet 250 mmHg    Pathology: none    Injectables: none    Materials: Arthrex 1.5mm absorbable wire, 4-0 vicryl, 4-0 nylon    Complications: None    Blood loss: 2 cc      Findings: Patient presents for operative intervention for hammertoe deformity digits 2,3,4,5 left foot. Conservative measures were attempted and exhausted. Patient questions invited and answered, including appropriate risk, benefits and complications. No guarantees given or implied. Patient has been NPO.    Description: Patient was brought to the operating room and placed on the table in supine position. IV-sedation and popliteal block was administered by the anesthesia department. The foot was then prepped and draped in usual aseptic manner. The extremity was elevated and exsanguinated. Well-padded ankle pneumatic tourniquet was inflated to 250mmHg and the following procedure was then performed: Attention was directed to the dorsal aspect of the 2nd digit where a dorsal incision was made overlying the PIPJ. The incision was carried into the subcutaneous tissue with care taken to avoid any neurovascular structures and cauterize superficial bleeders. Again the incision was carried into the PIPJ where the head of the proximal phalanx was freed from all soft tissue including the collateral ligaments and extensor digitorum longus tendon. A sagittal saw was used the resect the head of the proximal phalanx and removed from the surgical site en toto. Next, a 1.5mm Arthrex  absorbable pin was inserted into the middle phalanx, cut, and inserted into the proximal phalanx while the digit was held in a corrected and rectus position. The extensor digitorum longus tendon and subcutaneous tissue was reapproximated with 4-0 vicryl in a simple suture fashion and the skin was closed with 4-0 nylon in a simple suture fashion. There was noted to be a small contracture at the 2nd MPJ. A #15 blade was used to make a an incision along the dorsal 2nd MPJ where the EDL tendon was released and the digit was noted to be in a more rectus position. The skin was closed with 4-0 nylon in a simple suture fashion.    Attention was directed to the dorsal aspect of the 3rd digit where a dorsal incision was made overlying the PIPJ. The incision was carried into the subcutaneous tissue with care taken to avoid any neurovascular structures and cauterize superficial bleeders. Again the incision was carried into the PIPJ where the head of the proximal phalanx was freed from all soft tissue including the collateral ligaments and extensor digitorum longus tendon. A sagittal saw was used the resect the head of the proximal phalanx and removed from the surgical site en toto. Next, a 1.5mm Arthrex absorbable pin was inserted into the middle phalanx, cut, and inserted into the proximal phalanx while the digit was held in a corrected and rectus position. The extensor digitorum longus tendon and subcutaneous tissue was reapproximated with 4-0 vicryl in a simple suture fashion and the skin was closed with 4-0 nylon in a simple suture fashion.     Attention was directed to the dorsal aspect of the 4th digit where a dorsal incision was made overlying the PIPJ. The incision was carried into the subcutaneous tissue with care taken to avoid any neurovascular structures and cauterize superficial bleeders. Again the incision was carried into the PIPJ where the head of the proximal phalanx was freed from all soft tissue including the  collateral ligaments and extensor digitorum longus tendon. A sagittal saw was used the resect the head of the proximal phalanx and removed from the surgical site en toto. Next, a 1.5mm Arthrex absorbable pin was inserted into the middle phalanx, cut, and inserted into the proximal phalanx while the digit was held in a corrected and rectus position. The extensor digitorum longus tendon and subcutaneous tissue was reapproximated with 4-0 vicryl in a simple suture fashion and the skin was closed with 4-0 nylon in a simple suture fashion.     Attention was directed to the dorsal aspect of the 5th digit where two dorsal oblique semi-converging incisions were made overlying the PIPJ. The incisions was carried into the subcutaneous tissue with care taken to avoid any neurovascular structures and cauterize superficial bleeders. Again the incision was carried into the PIPJ where the head of the proximal phalanx was freed from all soft tissue including the collateral ligaments and extensor digitorum longus tendon. A sagittal saw was used the resect the head of the proximal phalanx and removed from the surgical site en toto. The extensor digitorum longus tendon and subcutaneous tissue was reapproximated with 4-0 vicryl in a simple suture fashion and the skin was closed with 4-0 nylon in a simple suture fashion.     C-arm was used to visualize the reduction of each digit. Dressings consisted of adaptic, 4x4's, kaleigh roll and an ace wrap. The pneumatic tourniquet was released and a hyperemic response was noted to the digits on the left foot.     The patient appeared to tolerate all the procedures and anesthesia well without apparent complications. Patient was transported from the operating room to the recovery room with vital signs stable and neurovascular status as it was pre-operatively to the left foot. Patient to be discharged home per anesthesia. Written and verbal homecare instructions given to remain limited walking on  the left foot in a surgical shoe, keep surgical dressing intact until her first post-op visit with me. Patient to follow up in office for post-operative management in one week.

## 2021-06-13 NOTE — PATIENT INSTRUCTIONS - HE
Please stop and get an xray on your way out.    You are able to go back to a regular shoe on the right foot.         You will nee a pre op physical before the surgery       Surgery Date 12/7/20    Surgery Time TBD  ( Arrive at the hospital two hours prior to your surgery and 1 hour prior at the surgery center. Check in at the . The only exception is if you are scheduled for surgery at 7am, you should arrive at 5:30am)    IF YOU NEED TO RESCHEDULE OR CANCEL YOUR SURGERY FOR ANY REASON PLEASE CALL THE CLINIC AS SOON AS POSSIBLE -[phone].    Admission Type: Outpatient     Surgeon: Dr. Vega    Surgery Procedure:     Surgery Location: [facility], [address]      Additional Information: If you use a CPAP machine for sleep apnea please bring it with you for surgery. We will want to monitor your breathing using your normal equipment.     HOSPITAL AND SURGERY CENTER INFORMATION    We need to know a lot of information about you before surgery.     1-5 Days Before:     A nurse will call you for a pre-screening interview. The phone call with the nurse will be much faster and easier if you  Have organized your information prior to the call.     Please have the following information available:    Preoperative Exam completed and faxed to our office    Primary care provider's and any specialty providers' contact information available. .    If requested by your primary care provider, have any heart and lung exams at least 3 days before surgery.    Have a complete and accurate list of medications available.     Have a list of your allergies/sensitivities and reactions    Know your health history, surgical history, and medical problems    Know any anesthesia issues with you or within your family.     BE SURE TO NOTIFY US IF YOU ARE TAKING ANY BLOOD THINNING AGENTS: Coumadin, Plavix, Aspirin, Xarelto, Eliquis    Someone planned to bring you and stay with you after the  surgery    Day Before Surgery    1. STOP Smoking and Drinking: It is important to stop smoking and drinking at least 24 hours before surgery. Smoking and drinking may cause complications in your recovery from anesthesia and may lengthen the healing process.    2. Pack for your hospital stay: If it will be required for you to stay at the hospital after surgery, bring personal items such as a robe, slippers, pajamas, additional clothing and toiletries. Don't forget:    List of medication    Eyeglass case or contact case with solution. You cannot wear contacts during surgery    Copies of your physical exam , lab results and EKG    Copy of Health Care Directives, Living Will or Power of     Insurance Cards    Photo ID    CPAP machine    3. NOTHING BY MOUTH 8 HOURS BEFORE. This includes gum, hard candy, water and mints. The only exception is if you have been instructed by your doctor to take your medications with sips of water. You may rinse your mouth or brush your teeth, but do not swallow water.     4. Remove Nail Polish  Day of Surgery    1. Medications- Take as indicated with sips of water     2. Wear comfortable loose fitting clothes. Wear your glasses-Not contacts. Do not wear jewelry and remove body piercing's. Surgery may be cancelled if they are not removed.     3. If same day surgery-Have a someone come with you to surgery that can help you understand the surgeon's instructions, drive you home and stay with you overnight the first night.     4. A nurse will call you at home within 72 hours following surgery to see how you are doing. Our nurses and doctors will discuss recovery with you.        The surgery scheduler Roberta Bello at [phone] will contact you to schedule if you were not scheduled today.     You will need a preop physical within 30 days before surgery with your primary care provider. Please note if you do not get a complete History and Physical your surgery will be cancelled.   Please  contact your primary to schedule.     A nurse should contact you from the hospital 1-2 days before surgery to review with you.    If you would like a [facility] Estimate for your upcoming service/procedure contact Cost of Care Estimates at [phone], advocates are available Monday through Friday 8am - 5pm. You may also submit a request online at [email or web address]    [facility]  [address]  [place]  [address]     [phone] for facility charge    Anesthesiology charge  [phone]          Please don't hesitate to call us with any additional question or problems  Our number is [phone]     Instructions for Patients Scheduled for Vascular or Podiatry Procedures During the COVID 19 Pandemic    Your Provider has determined that your condition warrants going ahead with your procedure at this time.  You will need to be tested for COVID19 within 72 hours of your procedure. We highly encourage patients to get tested for COVID-19 at one of our designated [facility] testing sites. We process the tests in our lab, which allows us to get the results quickly. If you choose to get tested at a non-[facility] location, you will need to contact your primary care provider to make those arrangements and ensure the results are available to your surgeon before you are arriving for your procedure. If we do not receive the results in time, your procedure may be postponed or canceled.  Please make sure your test is collected 3-days prior to your procedure date.  The results will need to get faxed to [phone].  After testing, you will need to remain in self-quarantine until your procedure.  If you are notified of a positive COVID-19 result; you will need to call your provider for further recommendations.    Please call [phone] and press option 2 to speak to a nurse.  If the test is positive; DO NOT PRESENT FOR YOUR  PROCEDURE until you have been given further instructions.  You will not be called with negative results so arrive as instructed unless otherwise notified.  Don't:    Don't invite visitors or friends into your home.    Don't leave your home unless absolutely necessary.    Don't share utensils and other household items with others in the home.  Do:    Wash your hands regularly with soap and water and use hand  with at least 60% alcohol if you don't have easy access to soap and water.     Disinfect surface areas daily, including doorknobs, electronics - especially phones, laptops and other devices.     Wash utensils and other items thoroughly.     Separate yourself from others in the household as best you can, including pets.    Keep your hands away from your face.     Practice all other prevention tips the CDC recommends, including covering your coughs and sneezes with a tissue or your sleeve and immediately throwing the tissue into the trash and washing your hands.    Call your hospital if you develop the following signs before your surgery:    Fever.    Cough.    Shortness of breath.    Sore throat.    Runny or stuffy nose.    Muscle or body aches.    Headaches.    Fatigue.    Vomiting and diarrhea.    These steps will help keep you & your family, other patients, and hospital staff safe from COVID19.

## 2021-06-13 NOTE — ANESTHESIA POSTPROCEDURE EVALUATION
Patient: Gill Mendez  Procedure(s):  ARTHROPLASTY, digits two, three, four and five left foot (Left)  Anesthesia type: regional    Patient location: PACU  Last vitals:   Vitals Value Taken Time   /62 12/07/20 1420   Temp 36.4  C (97.5  F) 12/07/20 1409   Pulse 99 12/07/20 1432   Resp 16 12/07/20 1409   SpO2 93 % 12/07/20 1432   Vitals shown include unvalidated device data.  Post vital signs: stable  Level of consciousness: awake and responds to simple questions  Post-anesthesia pain: pain controlled  Post-anesthesia nausea and vomiting: no  Pulmonary: unassisted, return to baseline  Cardiovascular: stable and blood pressure at baseline  Hydration: adequate  Anesthetic events: no    QCDR Measures:  ASA# 11 - Yecenia-op Cardiac Arrest: ASA11B - Patient did NOT experience unanticipated cardiac arrest  ASA# 12 - Yecenia-op Mortality Rate: ASA12B - Patient did NOT die  ASA# 13 - PACU Re-Intubation Rate: ASA13B - Patient did NOT require a new airway mgmt  ASA# 10 - Composite Anes Safety: ASA10A - No serious adverse event    Additional Notes:

## 2021-06-13 NOTE — PROGRESS NOTES
Podiatry Progress Note        ASSESSMENT: S/P Arthroplasty digits 2-5 left foot      TREATMENT:  -Surgical sites on the left foot are progressing well. No signs of infection. Gauze dressing applied today which she will leave. Continue non-weight bearing when able. Referred for x-rays.     -She will follow-up with me in 2 weeks for suture removal.     Chris Vega DPM  [facility] Podiatry/Foot & Ankle Surgery      HPI: Gill Mendez was seen again today s/p arthroplasty digits 2-5 left foot. Doing well today. Her surgical dressing did fall off, but her  replaced it. Denies foot pain.    Past Medical History:   Diagnosis Date     Anxiety      Asthma      Cellulitis      Diabetes mellitus (H)      Gastroparesis      Hyperlipidemia      Hyperlipidemia     Created by Conversion      Hypertension      Hypertension     Created by Conversion  Replacement Utility updated for latest IMO load     PONV (postoperative nausea and vomiting)      Shortness of breath      Type 2 Diabetes Mellitus     Created by Conversion        Allergies   Allergen Reactions     Aspirin Unknown     Codeine Unknown     Patient states possibly caused N/V but can't remember for sure     Aspirin (Tartrazine Only) Rash         Current Outpatient Medications:      albuterol (PROVENTIL HFA;VENTOLIN HFA) 90 mcg/actuation inhaler, Inhale 2 puffs every 6 (six) hours as needed for wheezing., Disp: , Rfl:      amLODIPine (NORVASC) 10 MG tablet, Take 10 mg by mouth daily. , Disp: , Rfl: 0     atorvastatin (LIPITOR) 40 MG tablet, Take 40 mg by mouth at bedtime. , Disp: , Rfl:      cetirizine (ZYRTEC) 10 MG tablet, Take 10 mg by mouth daily., Disp: , Rfl:      citalopram (CELEXA) 20 MG tablet, Take 20 mg by mouth every morning. , Disp: , Rfl: 0     diazePAM (VALIUM) 5 MG tablet, Take 1 tablet (5 mg total) by mouth every 6 (six) hours as needed for anxiety., Disp: 1 tablet, Rfl: 0     fluticasone propionate (FLOVENT HFA) 110 mcg/actuation inhaler,  Inhale 1 puff 2 (two) times a day., Disp: , Rfl:      furosemide (LASIX) 20 MG tablet, Take 20 mg by mouth 2 (two) times a day at 9am and 6pm. , Disp: , Rfl:      glipiZIDE (GLUCOTROL) 5 MG tablet, Take 5 mg by mouth 2 (two) times a day before meals., Disp: , Rfl:      LANTUS SOLOSTAR U-100 INSULIN 100 unit/mL (3 mL) pen, Inject 40 Units under the skin at bedtime. , Disp: , Rfl: 3     lisinopriL (PRINIVIL,ZESTRIL) 10 MG tablet, Take 10 mg by mouth daily., Disp: , Rfl:      metFORMIN (GLUCOPHAGE) 500 MG tablet, Take 2,000 mg by mouth daily with breakfast. , Disp: , Rfl:      oxyCODONE (ROXICODONE) 5 MG immediate release tablet, Take 1 tablet (5 mg total) by mouth every 6 (six) hours as needed for pain., Disp: 30 tablet, Rfl: 0     pioglitazone (ACTOS) 15 MG tablet, Take 7.5 mg by mouth daily. , Disp: , Rfl:      potassium chloride 20 mEq TbER, Take 20 mEq by mouth daily. , Disp: , Rfl:      pramipexole (MIRAPEX) 0.25 MG tablet, Take 0.25 mg by mouth 2 (two) times a day. , Disp: , Rfl: 3     semaglutide 14 mg Tab, Take by mouth., Disp: , Rfl:      semaglutide 7 mg Tab, Take by mouth., Disp: , Rfl:     Review of Systems - Negative       OBJECTIVE:  Appearance: alert, well appearing, and in no distress.    Vitals:    12/16/20 0900   BP: 122/78   Pulse: 78   Resp: 18   Temp: 97.2  F (36.2  C)       Surgical sites on the Left foot digits 2-5 have intact sutures with skin edges well approximated, no gapping. No erythema left foot. Rectus digits 2-5 left foot.

## 2021-06-14 NOTE — TELEPHONE ENCOUNTER
Spoke with Dr Vega and Patient. Review photo. Dr Vega felt looks good for surgery done. If a wound advised to make an apt. Patient reports small scab. Will cover with dry gauze and make apt if concerns arise.

## 2021-06-14 NOTE — PATIENT INSTRUCTIONS - HE
The bandage that were placed on today can be removed today or tomorrow.   Steri strips should stay in place until they naturally fall off.  Keep it dry for 2 days. Then you can shower but do not soak.

## 2021-06-14 NOTE — PROGRESS NOTES
Podiatry Progress Note        ASSESSMENT: S/P Arthroplasty digits 2-5 left foot      TREATMENT:  -Surgical sites on the left foot are progressing well. Sutures removed today, steri-strips applied. Recommend walking in the CAM boot x2-3 days then transfer to regular shoes. Ok to shower in 2 days, no soaking x1 weeks.    -Gill Mendez is discharged from my care at this time, but encouraged to return with concerns as they develop.    Chris Vega DPM  [facility] Podiatry/Foot & Ankle Surgery      HPI: Gill Mendez was seen again today s/p arthroplasty digits 2-5 left foot. Doing well today. Denies foot pain.    Past Medical History:   Diagnosis Date     Anxiety      Asthma      Cellulitis      Diabetes mellitus (H)      Gastroparesis      Hyperlipidemia      Hyperlipidemia     Created by Conversion      Hypertension      Hypertension     Created by Conversion  Replacement Utility updated for latest IMO load     PONV (postoperative nausea and vomiting)      Shortness of breath      Type 2 Diabetes Mellitus     Created by Conversion        Allergies   Allergen Reactions     Aspirin Unknown     Codeine Unknown     Patient states possibly caused N/V but can't remember for sure     Aspirin (Tartrazine Only) Rash         Current Outpatient Medications:      albuterol (PROVENTIL HFA;VENTOLIN HFA) 90 mcg/actuation inhaler, Inhale 2 puffs every 6 (six) hours as needed for wheezing., Disp: , Rfl:      amLODIPine (NORVASC) 10 MG tablet, Take 10 mg by mouth daily. , Disp: , Rfl: 0     atorvastatin (LIPITOR) 40 MG tablet, Take 40 mg by mouth at bedtime. , Disp: , Rfl:      cetirizine (ZYRTEC) 10 MG tablet, Take 10 mg by mouth daily., Disp: , Rfl:      citalopram (CELEXA) 20 MG tablet, Take 20 mg by mouth every morning. , Disp: , Rfl: 0     diazePAM (VALIUM) 5 MG tablet, Take 1 tablet (5 mg total) by mouth every 6 (six) hours as needed for anxiety., Disp: 1 tablet, Rfl: 0     empagliflozin 25 mg Tab, Take 25 mg by  mouth., Disp: , Rfl:      fluticasone propionate (FLOVENT HFA) 110 mcg/actuation inhaler, Inhale 1 puff 2 (two) times a day., Disp: , Rfl:      furosemide (LASIX) 20 MG tablet, Take 20 mg by mouth 2 (two) times a day at 9am and 6pm. , Disp: , Rfl:      glipiZIDE (GLUCOTROL) 5 MG tablet, Take 5 mg by mouth 2 (two) times a day before meals., Disp: , Rfl:      LANTUS SOLOSTAR U-100 INSULIN 100 unit/mL (3 mL) pen, Inject 40 Units under the skin at bedtime. , Disp: , Rfl: 3     lisinopriL (PRINIVIL,ZESTRIL) 10 MG tablet, Take 10 mg by mouth daily., Disp: , Rfl:      metFORMIN (GLUCOPHAGE) 500 MG tablet, Take 2,000 mg by mouth daily with breakfast. , Disp: , Rfl:      oxyCODONE (ROXICODONE) 5 MG immediate release tablet, Take 1 tablet (5 mg total) by mouth every 6 (six) hours as needed for pain., Disp: 30 tablet, Rfl: 0     pioglitazone (ACTOS) 15 MG tablet, Take 7.5 mg by mouth daily. , Disp: , Rfl:      potassium chloride 20 mEq TbER, Take 20 mEq by mouth daily. , Disp: , Rfl:      pramipexole (MIRAPEX) 0.25 MG tablet, Take 0.25 mg by mouth 2 (two) times a day. , Disp: , Rfl: 3     semaglutide 14 mg Tab, Take by mouth., Disp: , Rfl:      semaglutide 7 mg Tab, Take by mouth., Disp: , Rfl:     Review of Systems - Negative       OBJECTIVE:  Appearance: alert, well appearing, and in no distress.    Vitals:    12/30/20 0901   BP: 142/80   Pulse: 94   Resp: 16   Temp: 98.8  F (37.1  C)   SpO2: 97%       Surgical sites on the Left foot digits 2-5 have intact sutures with skin edges well coapted, no gapping. No erythema left foot. Rectus digits 2-5 left foot.

## 2021-06-16 PROBLEM — Z87.39 HISTORY OF OSTEOMYELITIS: Status: ACTIVE | Noted: 2020-07-24

## 2021-06-16 PROBLEM — L03.031 CELLULITIS OF TOE OF RIGHT FOOT: Status: ACTIVE | Noted: 2020-10-17

## 2021-06-16 PROBLEM — E11.621 DIABETIC ULCER OF TOE OF RIGHT FOOT ASSOCIATED WITH TYPE 2 DIABETES MELLITUS, WITH NECROSIS OF MUSCLE (H): Status: ACTIVE | Noted: 2020-07-20

## 2021-06-16 PROBLEM — L08.9 TOE INFECTION: Status: ACTIVE | Noted: 2020-10-17

## 2021-06-16 PROBLEM — R07.9 CHEST PAIN: Status: ACTIVE | Noted: 2020-09-24

## 2021-06-16 PROBLEM — L97.513 DIABETIC ULCER OF TOE OF RIGHT FOOT ASSOCIATED WITH TYPE 2 DIABETES MELLITUS, WITH NECROSIS OF MUSCLE (H): Status: ACTIVE | Noted: 2020-07-20

## 2021-06-16 PROBLEM — E11.628 TYPE 2 DIABETES MELLITUS WITH SKIN COMPLICATION, WITHOUT LONG-TERM CURRENT USE OF INSULIN (H): Status: ACTIVE | Noted: 2020-10-17

## 2021-06-16 PROBLEM — M20.42 HAMMER TOE OF LEFT FOOT: Status: ACTIVE | Noted: 2020-07-15

## 2021-06-16 NOTE — TELEPHONE ENCOUNTER
Pt is calling in about a cut under her right foot, like a crack in her skin that has now developed into a possible cellulitis, or infection. Pt reports red rash, that started today on the inside of her right ankle, and is swollen, and painful, and spreading up her right leg. Pt reports fever of 101.4. Pt has had cellulitis many times before.   Care advice given, and per protocol pt should be evaluated by a physician within 4 hours. Pt may need IV antibiotics. Pt will have her  drive her to the ER.    Ruy Kelly RN [facility] Triage/Medication Refill    Additional Information    Negative: [1] Widespread rash AND [2] bright red, sunburn-like AND [3] too weak to stand    Negative: Sounds like a life-threatening emergency to the triager    Negative: Stitches (or staples) and  not  infected    Negative: Skin glue used to close wound and not infected    Negative:  surgical wound infection suspected    Negative: Surgical wound infection suspected (post-op)    Negative: Animal bite wound infection suspected    [1] Looks infected (spreading redness, red streak, pus) AND [2] fever    Negative: Black (necrotic) or blisters develop in wound    Negative: Patient sounds very sick or weak to the triager    Negative: [1] Widespread rash AND [2] bright red, sunburn-like    Negative: Severe pain in the wound    Protocols used: WOUND INFECTION-A-AH

## 2021-06-17 NOTE — TELEPHONE ENCOUNTER
Telephone Encounter by Emelia Hawley at 7/9/2020 12:31 PM     Author: Emelia Hawley Service: -- Author Type: Patient Access    Filed: 7/9/2020 12:32 PM Encounter Date: 7/9/2020 Status: Signed    : Emelia Hawley (Patient Access)       Zeb Fernandez her sister-in -law .  Thank you so much for taking care of Mayelin. In the past 3 days, there is decreased erythema and edema, but still very swollen on the distal end. I put a mepilex dressing on and that seemed to help ( hope thats okay) she had a small - mod amt of serous drainage..  Thanks again! Wondering if Augmentin would work better than the Clindamycin, she is not allergic to PCN. This is not my area of expertise , really appreciate any suggestions you might have. Thanks again !!

## 2021-06-17 NOTE — TELEPHONE ENCOUNTER
Telephone Encounter by Emelia Hawley at 8/7/2020  2:28 PM     Author: Emelia Hawley Service: -- Author Type: Patient Access    Filed: 8/7/2020  2:29 PM Encounter Date: 8/7/2020 Status: Signed    : Emelia Hawley (Patient Access)       Is the bruised toe normal. It looks swollen . Had an x ray are the results back !  Worried

## 2021-06-17 NOTE — TELEPHONE ENCOUNTER
Telephone Encounter by Emelia Hawley at 8/4/2020 11:56 AM     Author: Emelia Hawley Service: -- Author Type: Patient Access    Filed: 8/4/2020 11:57 AM Encounter Date: 8/4/2020 Status: Signed    : Emelia Hawley (Patient Access)

## 2021-06-17 NOTE — TELEPHONE ENCOUNTER
Telephone Encounter by Emelia Hawley at 5/4/2021 10:59 AM     Author: Emelia Hawley Service: -- Author Type: Patient Access    Filed: 5/4/2021 11:00 AM Encounter Date: 5/4/2021 Status: Signed    : Emelia Hawley (Patient Access)

## 2021-06-17 NOTE — PROGRESS NOTES
"  Assessment & Plan:       1. Pain of left lower leg  US Venous Leg Left      Medical Decision Making  Patient presents with acute onset left calf pain.  Leg ultrasound was negative for DVT as well as an obvious muscle tear.  Patient was noted to have ecchymosis and tenderness throughout the left calf possibly consistent with a mild muscle injury/calf sprain.  Recommend rest, alternate warm and cold compresses, and using over-the-counter analgesics as needed for discomfort.  Recommend patient monitor symptoms for 1 week.  If no improvement by that time, she will follow-up with her PCP or orthopedic specialty.  Discussed signs of worsening symptoms and when to be seen more urgently.      Subjective:       Gill Mendez is a 61 y.o. female here for evaluation of left calf pain.  Patient first noticed symptoms while she was gardening yesterday.  She noted a burning pain in the left calf.  Since then she has noted bruising and indentation of the left calf.  Patient does have previous history of Achilles tendon repair in the bilateral lower extremities.  She also had a tendon rupture of the right calf previously that required surgical intervention.  Patient denies any sensation of \"popping\" or specific injury to the leg.  She has no history of blood clots.  Patient is not on any blood thinners.  No recent procedures or history of estrogen containing medications.    The following portions of the patient's history were reviewed and updated as appropriate: allergies, current medications and problem list.    Review of Systems  Pertinent items are noted in HPI.     Allergies  Allergies   Allergen Reactions     Aspirin Unknown     Codeine Unknown     Patient states possibly caused N/V but can't remember for sure     Semaglutide Nausea And Vomiting     Aspirin (Tartrazine Only) Rash       Family History   Problem Relation Age of Onset     Diabetes Mother      Hypertension Mother      Acute Myocardial Infarction Neg Hx  "       Social History     Socioeconomic History     Marital status:      Spouse name: None     Number of children: None     Years of education: None     Highest education level: None   Occupational History     None   Social Needs     Financial resource strain: None     Food insecurity     Worry: None     Inability: None     Transportation needs     Medical: None     Non-medical: None   Tobacco Use     Smoking status: Never Smoker     Smokeless tobacco: Never Used   Substance and Sexual Activity     Alcohol use: No     Drug use: No     Sexual activity: None   Lifestyle     Physical activity     Days per week: None     Minutes per session: None     Stress: None   Relationships     Social connections     Talks on phone: None     Gets together: None     Attends [employer] service: None     Active member of club or organization: None     Attends meetings of clubs or organizations: None     Relationship status: None     Intimate partner violence     Fear of current or ex partner: None     Emotionally abused: None     Physically abused: None     Forced sexual activity: None   Other Topics Concern     None   Social History Narrative     None         Objective:       /79   Pulse 86   Temp 98.8  F (37.1  C)   Resp 15   Wt 196 lb (88.9 kg)   SpO2 97%   Breastfeeding No   BMI 32.62 kg/m    General appearance: alert, appears stated age, cooperative, no distress and non-toxic  Extremities: Left lower extremity: appearance of indentation of the lower calf region with tenderness to palpation throughout; full range of motion of left ankle without difficulty, no tenderness or pain in the knee  Skin: Left lower extremity: ecchymosis of the posterior calf; no erythema or increased warmth to touch; skin intact; no purulence seen    Imaging    Us Venous Leg Left    Result Date: 5/4/2021  EXAM: US VENOUS LEG LEFT LOCATION: [facility] DATE/TIME: 5/4/2021 1:41 PM INDICATION: Left posterior calf  pain and bruising. COMPARISON: None. TECHNIQUE: Venous Duplex ultrasound of the left lower extremity with and without compression, augmentation and duplex. Color flow and spectral Doppler with waveform analysis performed. FINDINGS: Exam includes the common femoral, femoral, popliteal, and contralateral common femoral veins as well as segmentally visualized deep calf veins and greater saphenous vein. LEFT: No deep vein thrombosis. No superficial thrombophlebitis. No popliteal cyst. No evidence of discrete sonographic correlative at site of pain/bruising. No discrete intramuscular nor soft tissue hematoma.     1.  Left leg negative for DVT. 2.  No evidence of discrete hematoma.    Discussed findings with the patient.

## 2021-06-17 NOTE — TELEPHONE ENCOUNTER
Telephone Encounter by Emelia Hawley at 7/17/2020  8:45 AM     Author: Emelia Hawley Service: -- Author Type: Patient Access    Filed: 7/17/2020  8:46 AM Encounter Date: 7/17/2020 Status: Signed    : Emelia Hawley (Patient Access)

## 2021-06-17 NOTE — PATIENT INSTRUCTIONS - HE
Patient Instructions by Roberto Carlos Harper MD at 2/25/2019  6:00 PM     Author: Roberto Carlos Harper MD Service: -- Author Type: Resident    Filed: 2/25/2019  6:37 PM Encounter Date: 2/25/2019 Status: Signed    : Roberto Carlos Harper MD (Resident)       Patient Education     Abdominal Pain    Abdominal pain is pain in the stomach or belly area. Everyone has this pain from time to time. In many cases it goes away on its own. But abdominal pain can sometimes be due to a serious problem, such as appendicitis. So its important to know when to seek help.  Causes of abdominal pain  There are many possible causes of abdominal pain. Common causes in adults include:    Constipation, diarrhea, or gas    Stomach acid flowing back up into the esophagus (acid reflux or heartburn)    Severe acid reflux, called GERD (gastroesophageal reflux disease)    A sore in the lining of the stomach or small intestine (peptic ulcer)    Inflammation of the gallbladder, liver, or pancreas    Gallstones or kidney stones    Appendicitis     Intestinal blockage     An internal organ pushing through a muscle or other tissue (hernia)    Urinary tract infections    In women, menstrual cramps, fibroids, or endometriosis    Inflammation or infection of the intestines  Diagnosing the cause of abdominal pain  Your healthcare provider will do a physical exam help find the cause of your pain. If needed, tests will be ordered. Belly pain has many possible causes. So it can be hard to find the reason for your pain. Giving details about your pain can help. Tell your provider where and when you feel the pain, and what makes it better or worse. Also let your provider know if you have other symptoms such as:    Fever    Tiredness    Upset stomach (nausea)    Vomiting    Changes in bathroom habits  Treating abdominal pain  Some causes of pain need emergency medical treatment right away. These include appendicitis or a bowel blockage. Other problems can be treated  with rest, fluids, or medicines. Your healthcare provider can give you specific instructions for treatment or self-care based on what is causing your pain.  If you have vomiting or diarrhea, sip water or other clear fluids. When you are ready to eat solid foods again, start with small amounts of easy-to-digest, low-fat foods. These include apple sauce, toast, or crackers.   When to seek medical care  Call 911 or go to the hospital right away if you:    Cant pass stool and are vomiting    Are vomiting blood or have bloody diarrhea or black, tarry diarrhea    Have chest, neck, or shoulder pain    Feel like you might pass out    Have pain in your shoulder blades with nausea    Have sudden, severe belly pain    Have new, severe pain unlike any you have felt before    Have a belly that is rigid, hard, and tender to touch  Call your healthcare provider if you have:    Pain for more than 5 days    Bloating for more than 2 days    Diarrhea for more than 5 days    A fever of 100.4 F (38 C) or higher, or as directed by your healthcare provider    Pain that gets worse    Weight loss for no reason    Continued lack of appetite    Blood in your stool  How to prevent abdominal pain  Here are some tips to help prevent abdominal pain:    Eat smaller amounts of food at one time.    Avoid greasy, fried, or other high-fat foods.    Avoid foods that give you gas.    Exercise regularly.    Drink plenty of fluids.  To help prevent GERD symptoms:    Quit smoking.    Reduce alcohol and certain foods that increase stomach acid.    Avoid aspirin and over-the-counter pain and fever medicines (NSAIDS or nonsteroidal anti-inflammatory drugs), if possible    Lose extra weight.    Finish eating at least 2 hours before you go to bed or lie down.    Raise the head of your bed.  Date Last Reviewed: 7/1/2016 2000-2017 [employer]. [address] [place] [address], [place] [address]. All rights reserved. This information is not intended as a  substitute for professional medical care. Always follow your healthcare professional's instructions.

## 2021-06-17 NOTE — TELEPHONE ENCOUNTER
Telephone Encounter by Emelia Hawley at 10/12/2020 10:07 AM     Author: Emelia Hawley Service: -- Author Type: Patient Access    Filed: 10/12/2020 10:07 AM Encounter Date: 10/12/2020 Status: Signed    : Emelia Hawley (Patient Access)

## 2021-06-17 NOTE — TELEPHONE ENCOUNTER
Patient is calling stating there are a few marks on her lower leg near the ankle that are bruised and dented in.  She will email photo for us to help assess

## 2021-06-18 NOTE — PATIENT INSTRUCTIONS - HE
Patient Instructions by Ashley Askew RN at 7/20/2020  1:00 PM     Author: Ashley Askew RN Service: -- Author Type: Registered Nurse    Filed: 7/20/2020  1:28 PM Encounter Date: 7/20/2020 Status: Signed    : Ashley Askew RN (Registered Nurse)       Right 3rd toe:    Daily cleanse with normal saline.  Cover with dry gauze.            An MRI has been scheduled for 7/21/20, arrival time 7:15 AM.    [facility]  [address], [address]  [place] [address]  [phone]      Magnetic Resonance Imaging (MRI)     You will be asked to hold very still during the scan.     Magnetic resonance imaging (MRI) is a test that lets your doctor see detailed pictures of the inside of your body. MRI combines the use of strong magnets and radio waves to form an MRI image.  How do I get ready for an MRI?    Follow any directions you are given for not eating or drinking before the test.    Ask your provider if you should stop taking any medicine before the test.    Follow your normal daily routine unless your provider tells you otherwise.    You'll be asked to remove your watch, jewelry, hearing aids, credit cards, pens, pocket knives, eyeglasses, and other metal objects.    You may be asked to remove your makeup. Makeup may contain some metal.    Most MRI tests take 30 to 60 minutes. Depending on the type of MRI you are having, the test may take longer. Give yourself extra time to check in.      MRI uses strong magnets. Metal is affected by magnets and can distort the image. The magnet used in MRI can cause metal objects in your body to move. If you have a metal implant, you may not be able to have an MRI unless the implant is certified as MRI safe. People with these implants should not have an MRI:    Ear (cochlear) implants    Certain clips used for brain aneurysms    Certain metal coils put in blood vessels    Most defibrillators    Most pacemakers  Be sure to tell the radiologist or  technologist if you:    Have had any previous surgeries    Have a pacemaker, surgical clips, metal plate or pins, an artificial joint, staples or screws, ear (cochlear) implants, or other implants    Wear a medicated adhesive patch    Have metal splinters in your body    Have implanted nerve stimulators or drug-infusion ports    Have tattoos or body piercings. Some tattoo inks contain metal.    Work with metal    Have braces. You must remove any dental work.    Have a bullet or other metal in your body  Also tell the radiologist or technologist if you:    Are pregnant or think you may be    Are afraid of small, enclosed spaces (claustrophobic)    Are allergic to X-ray dye (contrast medium), iodine, shellfish, or any medicines    Have other allergies    Are breastfeeding    Have a history of cancer    Have any serious health problems. This includes kidney disease or a liver transplant. You may not be able to have the contrast material used for MRI.   What happens during an MRI?    You may be asked to wear a hospital gown.    You may be given earplugs to wear if you need them.    You may be injected with a special dye (contrast) that improves the MRI image.     Youll lie down on a platform that slides into the magnet.  What happens after an MRI?    You can get back to normal activities right away. If you were given contrast, it will pass naturally through your body within a day. You may be told to drink more water or other fluids during this time.     Your doctor will discuss the test results with you over the phone.    Date Last Reviewed: 6/1/2017 2000-2017 [employer]. [address] [place] [address], [place] [id number]. All rights reserved. This information is not intended as a substitute for professional medical care. Always follow your healthcare professional's instructions.

## 2021-06-18 NOTE — PATIENT INSTRUCTIONS - HE
Patient Instructions by Ashley Askew RN at 10/30/2020 10:00 AM     Author: Ashley Askew RN Service: -- Author Type: Registered Nurse    Filed: 10/30/2020 10:24 AM Encounter Date: 10/30/2020 Status: Signed    : Ashley Askew RN (Registered Nurse)       Continue limited walking in surgical shoe on right foot.    Apply dry gauze, change every other day.    Call to schedule MRI.  [phone]    Please let MRI scheduling know that you will be taking Valium at time of MRI and you will need a .    Magnetic Resonance Imaging (MRI)     You will be asked to hold very still during the scan.     Magnetic resonance imaging (MRI) is a test that lets your doctor see detailed pictures of the inside of your body. MRI combines the use of strong magnets and radio waves to form an MRI image.  How do I get ready for an MRI?    Follow any directions you are given for not eating or drinking before the test.    Ask your provider if you should stop taking any medicine before the test.    Follow your normal daily routine unless your provider tells you otherwise.    You'll be asked to remove your watch, jewelry, hearing aids, credit cards, pens, pocket knives, eyeglasses, and other metal objects.    You may be asked to remove your makeup. Makeup may contain some metal.    Most MRI tests take 30 to 60 minutes. Depending on the type of MRI you are having, the test may take longer. Give yourself extra time to check in.      MRI uses strong magnets. Metal is affected by magnets and can distort the image. The magnet used in MRI can cause metal objects in your body to move. If you have a metal implant, you may not be able to have an MRI unless the implant is certified as MRI safe. People with these implants should not have an MRI:    Ear (cochlear) implants    Certain clips used for brain aneurysms    Certain metal coils put in blood vessels    Most defibrillators    Most pacemakers  Be sure to tell the  radiologist or technologist if you:    Have had any previous surgeries    Have a pacemaker, surgical clips, metal plate or pins, an artificial joint, staples or screws, ear (cochlear) implants, or other implants    Wear a medicated adhesive patch    Have metal splinters in your body    Have implanted nerve stimulators or drug-infusion ports    Have tattoos or body piercings. Some tattoo inks contain metal.    Work with metal    Have braces. You must remove any dental work.    Have a bullet or other metal in your body  Also tell the radiologist or technologist if you:    Are pregnant or think you may be    Are afraid of small, enclosed spaces (claustrophobic)    Are allergic to X-ray dye (contrast medium), iodine, shellfish, or any medicines    Have other allergies    Are breastfeeding    Have a history of cancer    Have any serious health problems. This includes kidney disease or a liver transplant. You may not be able to have the contrast material used for MRI.   What happens during an MRI?    You may be asked to wear a hospital gown.    You may be given earplugs to wear if you need them.    You may be injected with a special dye (contrast) that improves the MRI image.     Youll lie down on a platform that slides into the magnet.  What happens after an MRI?    You can get back to normal activities right away. If you were given contrast, it will pass naturally through your body within a day. You may be told to drink more water or other fluids during this time.     Your doctor will discuss the test results with you during a follow-up appointment or over the phone.      Date Last Reviewed: 6/1/2017 2000-2017 [employer]. [address] [place] [address], [place] [address]. All rights reserved. This information is not intended as a substitute for professional medical care. Always follow your healthcare professional's instructions.        [facility] Virtual Check-In  Number    [phone]    When you arrive for your IN OFFICE visit. Please call this number from the parking lot.      The staff will then virtually check you in and meet you at the door to escort you to a room.    If you arrive early and a room is not yet ready for you staff may have you wait can call you back when they are ready.     Please remember to wear a mask into your appointment     No Visitors Allowed    If you are having any symptoms- cough, fever, rash, loss of taste and smell or shortness of breath we ask that you please call and reschedule your appointment.

## 2021-06-19 NOTE — PROGRESS NOTES
Assessment/Plan:   Right leg swelling  Right lower leg cellulitis and wounds right heel and 4th toe.  Nausea  She is being treated for cellulitis right lower leg since last week and yesterday was increased to 500mg four times a day of cephalexin.  She has a full thickness crack on her right heel through a very thick callous and an open wound on the the right 4th toe tip and plantar surface - thick layer of skin off and surface raw but not indurated, purulent or red. Not much change from yesterday, maybe more nauseated which I think is due to the antibiotics. No fever. Foot is puffy, mild calf tenderness and possibly positive bakari's sign. Venous ultrasound obtained and there was no DVT. Zofran improved nausea. I did not repeat labs today given minimal change from yesterday. She has a plan to follow up on Friday. I think referral to wound care clinic is worth considering given these wounds on foot with persistent cellulitis lower leg felt to be related to the foot wounds - the callous may well need debridement - and she has diabetes.  She will check with her insurance before her follow up visit or a specialty  can determine if and/or where she can go for wound care.   - US Venous Leg Right  - ondansetron disintegrating tablet 4 mg (ZOFRAN-ODT); Take 1 tablet (4 mg total) by mouth once.  - ondansetron (ZOFRAN ODT) 4 MG disintegrating tablet; Take 1 tablet (4 mg total) by mouth every 8 (eight) hours as needed for nausea.  Dispense: 20 tablet; Refill: 0    Continue to  elevate the leg, consider ice and heat packs to the area  Continue compression sock when working, ACE wrap at home. Wear socks, lotion for dry calloused feet.   Continue wound care  Continue cephalexin, eat yogurt or probiotics  Zofran for nausea as needed  Follow up as planned.     Subjective:      Gill Mendez is a 58 y.o. female who presents to recheck the right leg swelling and redness. She was diagnosed with cellulitis of her right lower  leg last week while on vacation up [place].  She was 2 days in the [facility] and discharged on cephalexin 250mg four times a day. She had a large crack in the callous skin on her heel which was felt to be the source of infection. She has had some nausea and not much improvement and so was seen yesterday in [facility]. At that time it was noted that there was some swelling, some persistent redness and induration of the right medial lower shin. Her antibiotic was increased to 500mg four times a day. She comes in today due to increased swelling of the lower leg and foot, more nausea and skin peeling of the sore on her right 4th toe.  The crack in the heel skin persists. She has a follow up visit arranged for Friday.  No fever. No diarrhea or vomiting. Nausea and decreased appetite. No rash or other new lesions. She raises concern about blood clot in the leg. No shortness of breath or chest pain. No history of blood clots. zofran dose in the office was given and helped with the nausea.   Allergies   Allergen Reactions     Codeine Unknown     Patient states possibly caused N/V but can't remember for sure     Aspirin (Tartrazine Only) Rash     Current Outpatient Prescriptions on File Prior to Visit   Medication Sig Dispense Refill     amLODIPine (NORVASC) 10 MG tablet 10 mg.  0     amLODIPine (NORVASC) 5 MG tablet Take 1 tablet (5 mg total) by mouth daily. 90 tablet 3     beclomethasone (QVAR) 80 mcg/actuation inhaler Inhale 2 puffs 2 (two) times a day.       cholecalciferol, vitamin D3, 1,000 unit tablet Take 1,000 Units by mouth daily.       citalopram (CELEXA) 20 MG tablet TAKE ONE TABLET BY MOUTH EVERY DAY FOR MOOD  0     cyanocobalamin, vitamin B-12, 1,000 mcg Subl Place 1,000 mcg under the tongue daily.       hydroCHLOROthiazide (HYDRODIURIL) 25 MG tablet Take 25 mg by mouth daily.  1     JANUVIA 100 mg tablet TAKE ONE TABLET BY MOUTH EVERY DAY FOR BLOOD SUGAR  0     LANTUS SOLOSTAR U-100 INSULIN 100 unit/mL  (3 mL) pen INJECT 58 UNITS NIGHTLY AT BEDTIME  3     lisinopril-hydrochlorothiazide (ZESTORETIC) 20-25 mg per tablet Take 1 tablet by mouth daily.       meloxicam (MOBIC) 15 MG tablet Take 15 mg by mouth daily.  1     metFORMIN (GLUCOPHAGE) 500 MG tablet Take 1,000 mg by mouth 2 (two) times a day with meals.       pramipexole (MIRAPEX) 0.25 MG tablet TAKE ONE TABLET BY MOUTH TWICE A DAY FOR RESTLESS LEGS  3     tiZANidine (ZANAFLEX) 4 MG tablet Take 4 mg by mouth at bedtime.  0     albuterol (PROVENTIL HFA;VENTOLIN HFA) 90 mcg/actuation inhaler Inhale 2 puffs every 6 (six) hours as needed for wheezing.       No current facility-administered medications on file prior to visit.      Patient Active Problem List   Diagnosis     Trochanteric Bursitis     Hypertension     Gastroparesis     Type 2 Diabetes Mellitus     Mild Intermittent Asthma     Allergy To Aspirin     Hyperlipidemia     Skin Abscess Of The Leg     Cellulitis     Dyspnea on exertion       Objective:     /72  Pulse (!) 101  Temp 98  F (36.7  C) (Oral)   Resp 16  Wt 175 lb (79.4 kg)  SpO2 96%  Breastfeeding? No  BMI 29.12 kg/m2    Physical  General Appearance: Alert, cooperative, no distress  Head: Normocephalic, without obvious abnormality, atraumatic  Eyes: Conjunctivae are normal.   Lungs: Clear to auscultation bilaterally, respirations unlabored  Heart: Regular rate and rhythm  Abdomen: Soft, non-tender, non-distended, no masses, no organomegaly  Extremities: Foot is puffy extending to above the wound, mild calf tenderness and possibly positive bakari's sign. Pulses palpable, normal cap refill.   Skin: the wound remains inside the marker line drawn yesterday. She has a full thickness crack on her right heel through a very thick callous and an open wound on the the right 4th toe tip and plantar surface - thick layer of skin off and surface raw but not indurated, purulent or red.  Psychiatric: Patient has a normal mood and affect.

## 2021-06-19 NOTE — PROGRESS NOTES
ASSESSMENT:   1. Cellulitis  HM1(CBC and Differential)    ISTAT CHEM 7 (HE CLINIC ONLY)    HM1 (CBC with Diff)    Sedimentation Rate    JIC RED    cephalexin (KEFLEX) 500 MG capsule    sulfamethoxazole-trimethoprim (BACTRIM DS) 800-160 mg per tablet       PLAN:  58-year-old female presents to clinic for evaluation of worsening cellulitis to her right lower extremity.  Of note, patient was discharged from [facility] approximately 6 days ago where she was on IV antibiotics for cellulitis for 3 days.  Patient does not have lab results available.  Notes that she was improving on 250 mg Keflex 4 times daily, however she returned to work today and had worsening of the redness and pain to her lower extremity.  CBC is obtained, no leukocytosis noted.  No significant electrolyte derangement, blood sugar is elevated.  Patient notes that her sugars at home have been in the  range.  Sed rate is elevated at 55.  There is only a moderate area of cellulitis noted at this point, I think it would be reasonable to increase her dose of Keflex to 500 mg 4 times daily and add Bactrim and allow the patient to attempt outpatient management for another 24 hours.  We had a lengthy discussion about reasons that would prompt presentation to the emergency department including fevers, worsening of the erythema.  This area was marked with indelible marker.  Did also consider DVT as part of my differential, however without calf tenderness and with what appears to be a cellulitis I think this is less likely, however I did discuss this possibility with the patient and should she develop worsening of the swelling, calf pain she will also presented to the emergency department.  She will return here to clinic on Friday (today being Tuesday) for a recheck of this area with me if she has no worsening in that time.    I discussed red flag symptoms, return precautions to clinic/ER and follow up care with patient/parent.   Expected clinical course, symptomatic cares advised. Questions answered. Patient/parent amenable with plan.    Patient Instructions:  Patient Instructions   Please stop taking your current Keflex prescription, and begin the new one that I sent to the pharmacy today.  This is an increased dose of this medication.  I have also added a second antibiotic, Bactrim.  Please begin this today as well.  You will take this twice daily for 10 days.  Please monitor this closely for the next 24 hours.  If this worsens, you develop high fevers, he began to vomit or have any other concerns please present immediately to the emergency department as she would likely need to be admitted at that time.  Please see your primary care provider in the next 2-3 days for a recheck.      SUBJECTIVE:   Gill Mendez is a 58 y.o. female who presents today with redness and swelling to the right lower leg.  Was discharged from [facility] for cellulitis to this same area on Thursday (today being Tuesday).  Is diabetic, sugars in 90's.  Today feels feverish, nauseated which is how she felt upon admission.  Is taking Keflex 250mg four times a day, had initially been feeling better and now worse again today.  No drainage from the leg.  Is now painful, swelling noted to the ankle.      ROS:  Comprehensive 12 pt ROS completed, positives noted in HPI, otherwise negative.      Past Medical History:  Patient Active Problem List   Diagnosis     Trochanteric Bursitis     Hypertension     Gastroparesis     Type 2 Diabetes Mellitus     Mild Intermittent Asthma     Allergy To Aspirin     Hyperlipidemia     Skin Abscess Of The Leg     Cellulitis     Dyspnea on exertion       Surgical History:  Past Surgical History:   Procedure Laterality Date     NH ABDOMEN SURGERY PROC UNLISTED      Description: Hernia Repair;  Recorded: 10/09/2008;  Comments: right groin area sx as a child     NH REMOVAL OF OVARY(S)      Description: Oophorectomy - Bilateral (Removal Of  Both Ovaries);  Recorded: 10/09/2008;     HI REMOVE TONSILS/ADENOIDS,<13 Y/O      Description: Tonsillectomy With Adenoidectomy;  Recorded: 03/20/2008;     HI TOTAL ABDOM HYSTERECTOMY      Description: Hysterectomy;  Recorded: 03/20/2008;     HI VAG HYST,RMV TUBE/OVARY,FIX ENTEROCE      Description: Vag Hysterect Uterus <250g Remov Both Ovaries Rep Enterocele;  Recorded: 01/26/2010;           Family History:  Family History   Problem Relation Age of Onset     Acute Myocardial Infarction Neg Hx        Reviewed; Non-contributory    History   Smoking Status     Never Smoker   Smokeless Tobacco     Never Used         Current Medications:  Current Outpatient Prescriptions on File Prior to Visit   Medication Sig Dispense Refill     albuterol (PROVENTIL HFA;VENTOLIN HFA) 90 mcg/actuation inhaler Inhale 2 puffs every 6 (six) hours as needed for wheezing.       amLODIPine (NORVASC) 5 MG tablet Take 1 tablet (5 mg total) by mouth daily. 90 tablet 3     beclomethasone (QVAR) 80 mcg/actuation inhaler Inhale 2 puffs 2 (two) times a day.       cholecalciferol, vitamin D3, 1,000 unit tablet Take 1,000 Units by mouth daily.       lisinopril-hydrochlorothiazide (ZESTORETIC) 20-25 mg per tablet Take 1 tablet by mouth daily.       metFORMIN (GLUCOPHAGE) 500 MG tablet Take 1,000 mg by mouth 2 (two) times a day with meals.       [DISCONTINUED] insulin glargine (LANTUS) 100 unit/mL injection Inject 52 Units under the skin bedtime.        cyanocobalamin, vitamin B-12, 1,000 mcg Subl Place 1,000 mcg under the tongue daily.       No current facility-administered medications on file prior to visit.        Allergies:   Allergies   Allergen Reactions     Codeine Unknown     Patient states possibly caused N/V but can't remember for sure     Aspirin (Tartrazine Only) Rash       OBJECTIVE:   Vitals:    07/10/18 1459   BP: 132/74   Pulse: 89   Resp: 14   Temp: 98.3  F (36.8  C)   TempSrc: Oral   SpO2: 96%   Weight: 175 lb (79.4 kg)     Physical  exam reveals a pleasant 58 y.o. female.   Appears healthy, alert and cooperative. Non-toxic appearance.  Lungs: Chest is clear, no wheezing, rhonchi or rales. Symmetric air entry throughout both lung fields.  Heart: regular rate and rhythm, no murmur, rub or gallop  Abdomen: soft, nontender. No masses or organomegaly  MSK: Right lower extremity-normal range of motion of the right lower extremity.  CMS is intact.  No calf tenderness, no swelling of the calf.  Mild amount of swelling distal to the area of cellulitis overlying the ankle.  2+ bilateral pedal pulses.  Sensation to light touch is intact.  Skin: pink, warm, dry.  There is an approximate 6 cm area of erythema overlying the anterior portion of the right lower leg just proximal to the medial malleolus.  No abscess is identified, no fluctuance.  No drainage from this area.  Area is warm to the touch.  It is tender to palpation.  Distal CMS is intact.  There is a significant cracking of the skin overlying the heel.     RADIOLOGY  none  LABORATORY STUDIES    Recent Results (from the past 24 hour(s))   ISTAT CHEM 7 (HE CLINIC ONLY)   Result Value Ref Range    Sodium 139 136 - 145 mmol/L    Potassium 4.1 3.5 - 5.5 mmol/L    CO2 31 22 - 31 mmol/L    Chloride 97 (L) 98 - 107 mmol/L    Anion Gap, Calculation 11 5 - 18 mmol/L    Glucose 285 (H) 70 - 125 mg/dL    BUN 22 8 - 22 mg/dL    Creatinine 1.10 0.70 - 1.30 mg/dL   HM1 (CBC with Diff)   Result Value Ref Range    WBC 9.3 4.0 - 11.0 thou/uL    RBC 4.26 3.80 - 5.40 mill/uL    Hemoglobin 11.5 (L) 12.0 - 16.0 g/dL    Hematocrit 35.1 35.0 - 47.0 %    MCV 82 80 - 100 fL    MCH 27.0 27.0 - 34.0 pg    MCHC 32.8 32.0 - 36.0 g/dL    RDW 13.3 11.0 - 14.5 %    Platelets 348 140 - 440 thou/uL    MPV 7.8 7.0 - 10.0 fL    Neutrophils % 61 50 - 70 %    Lymphocytes % 27 20 - 40 %    Monocytes % 7 2 - 10 %    Eosinophils % 4 0 - 6 %    Basophils % 1 0 - 2 %    Neutrophils Absolute 5.6 2.0 - 7.7 thou/uL    Lymphocytes Absolute 2.5  0.8 - 4.4 thou/uL    Monocytes Absolute 0.7 0.0 - 0.9 thou/uL    Eosinophils Absolute 0.4 0.0 - 0.4 thou/uL    Basophils Absolute 0.1 0.0 - 0.2 thou/uL   Sedimentation Rate   Result Value Ref Range    Sed Rate 55 (H) 0 - 20 mm/hr           Felicitas Fraire, CNP

## 2021-06-19 NOTE — PROGRESS NOTES
ASSESSMENT:   1. Cellulitis  Ambulatory referral to Wound Clinic       PLAN:  58-year-old female presents for recheck of cellulitis to the right lower extremity.  This is markedly improved since her initial evaluation 3 days ago.  She does note that she feels much better.  She was seen the day after I initially saw her, DVT was ruled out at that time.  She will continue on her current antibiotic regimen of Keflex and Bactrim.  I did place a referral to the wound clinic for evaluation of this heel wound which was thought to be the port of entry causing the cellulitis.  She will return here to clinic with new or worsening symptoms, will follow up with her own primary care provider next week for a recheck.    I discussed red flag symptoms, return precautions to clinic/ER and follow up care with patient/parent.  Expected clinical course, symptomatic cares advised. Questions answered. Patient/parent amenable with plan.    Patient Instructions:  Patient Instructions   Continue your antibiotics until they are gone.  I have placed a referral to the wound clinic for evaluation of your heel. They will call you to schedule this.  Return here to clinic with fever, worsening symptoms.  See primary care as needed for follow up.      SUBJECTIVE:   Gill Mendez is a 58 y.o. female who presents today for a recheck of cellulitis to her right lower extremity.  This patient was seen by me 3 days ago and diagnosed with ongoing cellulitis following hospitalization for the same.  At that point, I did alter her antibiotic regimen increasing it to 500 mg Keflex 4 times daily as well as Bactrim DS twice daily.  She has been taking this since that time.  She did present to clinic again the following day after I saw her with ongoing nausea and concern for DVT.  Venous Doppler was obtained, and it was negative.  Nausea was likely secondary to antibiotic regimen.  She does note that nausea is now improved, she is taking Zofran for this and also  eating yogurt with her antibiotics.  She notes that her leg feels much improved, minimal pain, improved swelling.  No further nausea, no vomiting, no fevers, no paresthesias.      ROS:  Comprehensive 12 pt ROS completed, positives noted in HPI, otherwise negative.      Past Medical History:  Patient Active Problem List   Diagnosis     Trochanteric Bursitis     Hypertension     Gastroparesis     Type 2 Diabetes Mellitus     Mild Intermittent Asthma     Allergy To Aspirin     Hyperlipidemia     Skin Abscess Of The Leg     Cellulitis     Dyspnea on exertion       Surgical History:  Past Surgical History:   Procedure Laterality Date     NH ABDOMEN SURGERY PROC UNLISTED      Description: Hernia Repair;  Recorded: 10/09/2008;  Comments: right groin area sx as a child     NH REMOVAL OF OVARY(S)      Description: Oophorectomy - Bilateral (Removal Of Both Ovaries);  Recorded: 10/09/2008;     NH REMOVE TONSILS/ADENOIDS,<11 Y/O      Description: Tonsillectomy With Adenoidectomy;  Recorded: 03/20/2008;     NH TOTAL ABDOM HYSTERECTOMY      Description: Hysterectomy;  Recorded: 03/20/2008;     NH VAG HYST,RMV TUBE/OVARY,FIX ENTEROCE      Description: Vag Hysterect Uterus <250g Remov Both Ovaries Rep Enterocele;  Recorded: 01/26/2010;           Family History:  Family History   Problem Relation Age of Onset     Acute Myocardial Infarction Neg Hx        Reviewed; Non-contributory    History   Smoking Status     Never Smoker   Smokeless Tobacco     Never Used     Current Medications:  Current Outpatient Prescriptions on File Prior to Visit   Medication Sig Dispense Refill     albuterol (PROVENTIL HFA;VENTOLIN HFA) 90 mcg/actuation inhaler Inhale 2 puffs every 6 (six) hours as needed for wheezing.       amLODIPine (NORVASC) 10 MG tablet 10 mg.  0     amLODIPine (NORVASC) 5 MG tablet Take 1 tablet (5 mg total) by mouth daily. 90 tablet 3     beclomethasone (QVAR) 80 mcg/actuation inhaler Inhale 2 puffs 2 (two) times a day.        cephalexin (KEFLEX) 500 MG capsule Take 1 capsule (500 mg total) by mouth 4 (four) times a day for 7 days. 28 capsule 0     cholecalciferol, vitamin D3, 1,000 unit tablet Take 1,000 Units by mouth daily.       citalopram (CELEXA) 20 MG tablet TAKE ONE TABLET BY MOUTH EVERY DAY FOR MOOD  0     cyanocobalamin, vitamin B-12, 1,000 mcg Subl Place 1,000 mcg under the tongue daily.       hydroCHLOROthiazide (HYDRODIURIL) 25 MG tablet Take 25 mg by mouth daily.  1     JANUVIA 100 mg tablet TAKE ONE TABLET BY MOUTH EVERY DAY FOR BLOOD SUGAR  0     LANTUS SOLOSTAR U-100 INSULIN 100 unit/mL (3 mL) pen INJECT 58 UNITS NIGHTLY AT BEDTIME  3     lisinopril-hydrochlorothiazide (ZESTORETIC) 20-25 mg per tablet Take 1 tablet by mouth daily.       meloxicam (MOBIC) 15 MG tablet Take 15 mg by mouth daily.  1     metFORMIN (GLUCOPHAGE) 500 MG tablet Take 1,000 mg by mouth 2 (two) times a day with meals.       ondansetron (ZOFRAN ODT) 4 MG disintegrating tablet Take 1 tablet (4 mg total) by mouth every 8 (eight) hours as needed for nausea. 20 tablet 0     pramipexole (MIRAPEX) 0.25 MG tablet TAKE ONE TABLET BY MOUTH TWICE A DAY FOR RESTLESS LEGS  3     sulfamethoxazole-trimethoprim (BACTRIM DS) 800-160 mg per tablet Take 1 tablet by mouth 2 (two) times a day for 10 days. 20 tablet 0     tiZANidine (ZANAFLEX) 4 MG tablet Take 4 mg by mouth at bedtime.  0     No current facility-administered medications on file prior to visit.        Allergies:   Allergies   Allergen Reactions     Codeine Unknown     Patient states possibly caused N/V but can't remember for sure     Aspirin (Tartrazine Only) Rash       OBJECTIVE:   Vitals:    07/13/18 1040   Pulse: 100   Resp: 16   Temp: 98.4  F (36.9  C)   TempSrc: Oral   SpO2: 96%   Weight: 175 lb (79.4 kg)     Physical exam reveals a pleasant 58 y.o. female.   Appears healthy, alert and cooperative. Non-toxic appearance.  Skin: pink, warm, dry.  There is an area of erythema to the medial aspect of the  right lower extremity.  This was marked by me with indelible marker on her first presentation, it is improved vastly from initial evaluation.  Nontender.  Swelling to the lower extremity is also improved.  2+ bilateral pedal pulses.  CMS is intact.  There continues to be significant cracking of the surface of the heel, nontender.     RADIOLOGY    none  LABORATORY STUDIES    none      Felicitas Fraire, CNP

## 2021-06-20 NOTE — PROGRESS NOTES
Chief Complaint   Patient presents with     Ankle Pain     Right ankle          HPI    Patient is here for one day of mild pain with redness and swelling of right lower leg without injury. No fever, chills.     ROS: Pertinent ROS noted in HPI.     Allergies   Allergen Reactions     Codeine Unknown     Patient states possibly caused N/V but can't remember for sure     Aspirin (Tartrazine Only) Rash       Patient Active Problem List   Diagnosis     Trochanteric Bursitis     Hypertension     Gastroparesis     Type 2 Diabetes Mellitus     Mild Intermittent Asthma     Allergy To Aspirin     Hyperlipidemia     Skin Abscess Of The Leg     Cellulitis     Dyspnea on exertion       Family History   Problem Relation Age of Onset     Diabetes Mother      Acute Myocardial Infarction Neg Hx        Social History     Social History     Marital status:      Spouse name: N/A     Number of children: N/A     Years of education: N/A     Occupational History     Not on file.     Social History Main Topics     Smoking status: Never Smoker     Smokeless tobacco: Never Used     Alcohol use No     Drug use: No     Sexual activity: Not on file     Other Topics Concern     Not on file     Social History Narrative           Objective:    Vitals:    09/09/18 1240   BP: 124/78   Pulse: 80   Resp: 18   Temp: 98  F (36.7  C)   SpO2: 96%     Gen:NAD  MSK: large area of moderate erythema with mild induration at right medial distal lower leg to the medial ankle, with warmth and tenderness to palpation. No fluctuance. There is a 0.5 cm open sore at right plantar aspect of the heel without erythema, purulence nor fluctuance. Full ROM and strength of right foot and and ankle.         Cellulitis of right lower leg  -     cephalexin (KEFLEX) 500 MG capsule; Take 1 capsule (500 mg total) by mouth 4 (four) times a day for 10 days.      Affected area demarcated. Close follow up as directed.

## 2021-06-24 NOTE — PROGRESS NOTES
"WALK IN CARE - VISIT NOTE    HPI    Gill Mendez is a 59 y.o. female brought in by self presenting for evaluation of RUQ pain:    Right sided pain, started about a week ago  She has been vomiting once each day with continuous nausea the past week - sometimes food if she eats before, sometimes bile like  No fevers  No inciting event  Does have diabetes and her blood sugars have been up and down  Has been taking tylenol for pain but not helping  Deep breaths and movement make the pain worse  When she eats the pain comes as soon as she is cleaning up the table.   No Hx of gallstones or issues with gallbladder   Has had diarrhea the past week - BMs do not help the pain  No blood in stools  No other URI symptoms  \"just blah and nauseated\"  No joint pain  Small headache once and awhile       ROS  Complete ROS negative except as noted in HPI.    OBJECTIVE  Vitals:    02/25/19 1833   BP: 130/80   Pulse:    Resp:    Temp:    SpO2:        Physical Exam  General: No acute distress, sitting comfortable in chair  Eyes: EOMI,  normal conjunctiva, normal sclera   Ears: external ears normal  Nose: no rhinorrhea  Neck: good ROM, supple  CV: RRR, distal and peripheral pulses in tact  Resp: CTA bilaterally, no wheezing, no rhonchi, no rhales, no respiratory distress  Abdomen: soft, not tympanic, Positive Vitale's sign, negative McBurney's, normoactive bowel sounds   Neuro: moves all 4 extremities, no focal deficits noted  Extrem: no edema, no cyanosis, well-perfused      ASSESSMENT/PLAN  1. RUQ abdominal pain  2. Bilious vomiting with nausea  At this point differential remains broad but most likely a gallbladder source, cholelithiasis versus cholecystitis.  Patient elected not to collect labs including CBC and CMP today in clinic.  Given duration of symptoms and overall wellness, I do not think this is an emergent cholecystitis.  Differential could also include a pancreatitis, however patient is not having alcohol use history.  " Possible this is peptic ulcer disease, but no blood in stools or history of.  Patient does not have an acute abdomen.  Given prior hysterectomy it is possible there are some adhesions resulting in bowel obstruction, but has normoactive bowel sounds throughout and is still having regular bowel movements so this is less likely.  Urinary source possible, but no changes to urgency frequency or blood in her urine making less likely.  Given that patient has eaten in the last 8 hours will place future ultrasound order to get done likely in the morning after fasting.  - US Abdomen Limited; Future    If US is normal or not consistent with pato, please call patient and instruct to go to follow up here or PCP for further labs and work up (Lipase, UA/UC, CMP, CBC) if symptoms persist.    If US is abnormal please direct patient as needed (surgery vs further work up)    3. Hypertension  Normalized on recheck - likely white coat HTN      Options for treatment and follow-up care were reviewed with the patient and/or guardian. Discussed symptoms in which to return to clinic sooner or go to the ER for evaluation. Gill Mendez and/or guardian engaged in the decision making process and verbalized understanding of the options discussed and agreed with the final plan.    Roberto Carlos Harper MD

## 2021-06-24 NOTE — TELEPHONE ENCOUNTER
Test Results  Who is calling?:  Patient  Who ordered the test:  Dr. Roberto Carlos Harper  Type of test: Imaging  Date of test:  Today  Where was the test performed:  [facility]  What are your questions/concerns?:  Patient stated she would like to know her US results from today.  Okay to leave a detailed message?:  Yes   [phone]

## 2021-06-26 NOTE — PROGRESS NOTES
Progress Notes by Malinda Gardner NP at 8/13/2018  2:00 PM     Author: Malinda Gardner NP Service: -- Author Type: Nurse Practitioner    Filed: 8/13/2018  3:20 PM Encounter Date: 8/13/2018 Status: Signed    : Malinda Gardner NP (Nurse Practitioner)       [facility] Consult Note    Date of Service:  8/13/2018    Requesting Provider: Josie Spaulding CNP [facility]    Chief Complaint: right heel wound; right 4th toe wound    History of Present Illness: Gill Mendez is being seen at the Vascular Clinic today regarding right heel wound; recent cellulitis and new right 4th toe ulcer. They arrive to the clinic today alone. The patient reports that July 4th she was vacationing at [facility] she developed infection; was hospitalized for 3 days; source of infection was a fissure in her right heel; this has since closed over but is very hard; and she has been soaking and applying vanicream. The 4th toe wound started 2 weeks ago after she cut her nail too short she continued to pick at this. No recent antibiotics. Was previously using vanicream to the toe wound; arrives today with the shoe and sock soaked with blood.  She does not have diabetes shoes or insoles; wears sketchers; She works 35 hours per week at [employer] is on her feet most of the day. Reports pain of 2/10 shooting intermittent pain; currently using apap or motrin for pain. Checks fs daily running . Denies any fevers, chills, or generalized ill feeling. Denies history of cancer. Sleeps in a bed with legs elevated. Lives  and adult son; 2 cats they leave the wound alone; non-smoker.      Review of Systems:   Constitutional:  negative  for fever, chills or night sweats  EENTM: negative for glasses;  negative [place]iatam  GI:  negative for nausea/vomiting;  negative for constipation  negative diarrhea;  negative for fecal incontinence negative weight loss  :  negative dysuria, negative incontinence  MS: patient is ambulatory;  does not  use assistive devices  Cardiac:  negative   Respiratory:  positive shortness of breath; +asthma; well controlled with MDI  Endocrine:  positive diabetes  Psych:  negative depression/anxiety    Past Medical History:    Past Medical History:   Diagnosis Date   ? Allergy To Aspirin     Created by Conversion    ? Asthma    ? Cellulitis    ? Cellulitis     Created by Conversion    ? Diabetes mellitus (H)    ? Dyspnea on exertion 8/20/2015   ? Gastroparesis    ? Hyperlipidemia    ? Hyperlipidemia     Created by Conversion    ? Hypertension    ? Hypertension     Created by Conversion  Replacement Utility updated for latest IMO load   ? Mild Intermittent Asthma     Created by Conversion    ? Skin Abscess Of The Leg     Created by Conversion    ? Trochanteric Bursitis     Created by Conversion    ? Type 2 Diabetes Mellitus     Created by Conversion         Surgical History:   Past Surgical History:   Procedure Laterality Date   ? NM ABDOMEN SURGERY PROC UNLISTED      Description: Hernia Repair;  Recorded: 10/09/2008;  Comments: right groin area sx as a child   ? NM REMOVAL OF OVARY(S)      Description: Oophorectomy - Bilateral (Removal Of Both Ovaries);  Recorded: 10/09/2008;   ? NM REMOVE TONSILS/ADENOIDS,<13 Y/O      Description: Tonsillectomy With Adenoidectomy;  Recorded: 03/20/2008;   ? NM TOTAL ABDOM HYSTERECTOMY      Description: Hysterectomy;  Recorded: 03/20/2008;   ? NM VAG HYST,RMV TUBE/OVARY,FIX ENTEROCE      Description: Vag Hysterect Uterus <250g Remov Both Ovaries Rep Enterocele;  Recorded: 01/26/2010;        Medications:    Current Outpatient Prescriptions:   ?  albuterol (PROVENTIL HFA;VENTOLIN HFA) 90 mcg/actuation inhaler, Inhale 2 puffs every 6 (six) hours as needed for wheezing., Disp: , Rfl:   ?  amLODIPine (NORVASC) 10 MG tablet, 10 mg., Disp: , Rfl: 0  ?  beclomethasone (QVAR) 80 mcg/actuation inhaler, Inhale 2 puffs 2 (two) times a day., Disp: , Rfl:   ?  cholecalciferol, vitamin D3, 1,000 unit  tablet, Take 1,000 Units by mouth daily., Disp: , Rfl:   ?  citalopram (CELEXA) 20 MG tablet, TAKE ONE TABLET BY MOUTH EVERY DAY FOR MOOD, Disp: , Rfl: 0  ?  cyanocobalamin, vitamin B-12, 1,000 mcg Subl, Place 1,000 mcg under the tongue daily., Disp: , Rfl:   ?  hydroCHLOROthiazide (HYDRODIURIL) 25 MG tablet, Take 25 mg by mouth daily., Disp: , Rfl: 1  ?  JANUVIA 100 mg tablet, TAKE ONE TABLET BY MOUTH EVERY DAY FOR BLOOD SUGAR, Disp: , Rfl: 0  ?  LANTUS SOLOSTAR U-100 INSULIN 100 unit/mL (3 mL) pen, INJECT 58 UNITS NIGHTLY AT BEDTIME, Disp: , Rfl: 3  ?  lisinopril-hydrochlorothiazide (ZESTORETIC) 20-25 mg per tablet, Take 1 tablet by mouth daily., Disp: , Rfl:   ?  meloxicam (MOBIC) 15 MG tablet, Take 15 mg by mouth daily., Disp: , Rfl: 1  ?  metFORMIN (GLUCOPHAGE) 500 MG tablet, Take 1,000 mg by mouth 2 (two) times a day with meals., Disp: , Rfl:   ?  pramipexole (MIRAPEX) 0.25 MG tablet, TAKE ONE TABLET BY MOUTH TWICE A DAY FOR RESTLESS LEGS, Disp: , Rfl: 3  ?  tiZANidine (ZANAFLEX) 4 MG tablet, Take 4 mg by mouth at bedtime., Disp: , Rfl: 0    Current Facility-Administered Medications:   ?  lidocaine 2 % jelly (XYLOCAINE), , Topical, PRN, Malinda Gardner NP, 1 application at 08/13/18 1416    Allergies:   Allergies   Allergen Reactions   ? Codeine Unknown     Patient states possibly caused N/V but can't remember for sure   ? Aspirin (Tartrazine Only) Rash       Family history:   Family History   Problem Relation Age of Onset   ? Diabetes Mother    ? Acute Myocardial Infarction Neg Hx         Social History:   Social History     Social History   ? Marital status:      Spouse name: N/A   ? Number of children: N/A   ? Years of education: N/A     Occupational History   ? Not on file.     Social History Main Topics   ? Smoking status: Never Smoker   ? Smokeless tobacco: Never Used   ? Alcohol use No   ? Drug use: No   ? Sexual activity: Not on file     Other Topics Concern   ? Not on file     Social History  Narrative        Physical Exam  Vitals: Blood pressure 130/78, pulse 88, temperature 98.8  F (37.1  C), temperature source Oral, resp. rate 16, not currently breastfeeding.  General: This is a 58 y.o. female who appears their reported age, not in acute distress  Head: normocephalic, Atraumatic; not wearing glasses; non-icteric sclera; no exudate; no hearing loss  Respiratory: Clear throughout all lung fields; unlabored breathing; no cough   Cardiac: Regular, Rate and Rhythm, no murmurs appreciated   Skin: Uniformly warm and dry  Psych: Alert and oriented x4; calm and cooperative throughout exam  Extremities: right 4th toe is swollen; mallet toe deformity; on the tip there is a full thickness wound; when this was debrided there was significant bleeding; pressure applied; there was blood on her old sock and shoe; the wound did not probe to deeper tissue stuctures; no overt bone exposed; no pain     Sensation: Decreased to pinprick and light touch in a stocking distribution bilaterally     Peripheral Vascular: normal dorsalis pedis, posterior tibial pulses to right , using a handheld doppler these were strong easily found and biphasic in nature.  Good capillary refill. No unusual venous distention. Negative for spider veins, telangiectasias, hemosiderin deposition or hyperpigmentation and fibrosis or scarring     Circumferential volume measures:    No flowsheet data found.    Ulceration(s)/Wound(s):     Wound 08/13/18 right 4th toe (Active)   Pre Size Length 1 8/13/2018  2:00 PM   Pre Size Width 1.5 8/13/2018  2:00 PM   Pre Size Depth 0.1 8/13/2018  2:00 PM   Pre Total Sq cm 1.5 8/13/2018  2:00 PM       Laboratory studies:   Lab Results   Component Value Date    SEDRATE 55 (H) 07/10/2018     Lab Results   Component Value Date    CREATININE 1.10 07/10/2018     Lab Results   Component Value Date    HGBA1C 11.3 (H) 08/11/2011     Lab Results   Component Value Date    BUN 22 07/10/2018     Lab Results   Component Value  Date    ALBUMIN 3.4 08/11/2011     No results found for: [id number]    8/13/18 right 4th toe plantar          Impression:   Xerosis of right heel  Hammertoe deformity  Type 2 diabetes with right 4th toe ulcer  Wound infection  Hx of cellulitis      Assessment/Plan:  1. Excisional debridement of all the ulcer(s) was recommended today. After consent was obtained and 2% Lidocaine HCL jelly was applied all of the ulcers were excisionally debrided using a sterile curet under clean condition the epidermal, dermal and down into the subcutaneous tissue  were sharply debrided for a total square cm of 1.5. Devitalized and non viable tissue was removed to improve granulation tissue formation, stimulate wound healing, decrease overall bacteria load, disrupt biofilm formation and decrease edge senescence. Patient tolerated this well and the ulcers appeared much  afterwards.    2. Edema. na    3. Treatment appears to have infection today; did want to obtain culture however due to the bleeding could not do this; will empirically start on augmentin; has tolerated this in the past; 875-125mg two times a day for 10 days; will have her purchase am lactin and apply to dry skin on right heel; will have her apply silvercel to the toe; cover with 2x2 gauze; secure with surgilast; change every 2 days; we had to hold pressure on the wound for 15 minutes to achieve hemostasis; if wound is not improved in 2 weeks will have her obtain esr and crp and imaging if these are elevated; will check TERESA due to toe wound co-morbidity of diabetes; will help get this scheduled    4. Offloading: will have her  crest pad to help offload the 4th toe; will have her get diabetic shoes and insoles; can get these through [facility] [employer]; rx written for these today; will take her out of work; needs to limit time up on her feet; will have her get a knee rolling walker    5. Nutrition: diabetic diet; we spoke about having tight glucose control  for optimal wound healing    Patient to return to clinic in 2 week(s) for re-evaluation. They were instructed to call the clinic sooner with any further questions or concerns. Answered all questions.    Malinda Gardner DNP, RN, CNP, [facility]  [phone]      This note was electronically signed by Malinda Gardner

## 2021-06-26 NOTE — PROGRESS NOTES
Progress Notes by Malinda Gardner NP at 9/24/2018 10:40 AM     Author: Malinda Gardner NP Service: -- Author Type: Nurse Practitioner    Filed: 9/24/2018 11:07 AM Encounter Date: 9/24/2018 Status: Signed    : Malinda Gardner NP (Nurse Practitioner)       Follow up Vascular Visit       Date of Service:9/24/2018    Date Last Seen: 8/21/2018; 8/13/2018    Chief Complaint: right 4th toe ulcer; new heel ulcer; new cellulitis of the left leg    History:   Past Medical History:   Diagnosis Date   ? Allergy To Aspirin     Created by Conversion    ? Asthma    ? Cellulitis    ? Cellulitis     Created by Conversion    ? Diabetes mellitus (H)    ? Dyspnea on exertion 8/20/2015   ? Gastroparesis    ? Hyperlipidemia    ? Hyperlipidemia     Created by Conversion    ? Hypertension    ? Hypertension     Created by Conversion  Replacement Utility updated for latest IMO load   ? Mild Intermittent Asthma     Created by Conversion    ? Skin Abscess Of The Leg     Created by Conversion    ? Trochanteric Bursitis     Created by Conversion    ? Type 2 Diabetes Mellitus     Created by Conversion        Pt returns to the Vascular clinic with regards to their right 4th toe ulcer and right plantar heel wound and recent left leg cellulitis. Pt arrives alone today. Last seen 2 weeks ago. Completed Keflex tolerated well. She has returned to work; 4hour shifts 5 days per week and using the knee rolling walker.  She picked up silicone crest pad and states she likes this and has been using this however today she arrives without this in place. She picked up the am lactin lotion and has been applying to the heel twice per day. She has been dressing the toe wound with silvercel and surgilast. She had ABIs completed and this demonstrated adequate blood flow. She is wearing her new diabetic shoes and insoles likes these; fitting well. We also obtained inflammatory markers and xray previously these were normal.     Allergies: Codeine and  Aspirin (tartrazine only)    Physical Exam:    /70  Pulse 84  Temp 98.4  F (36.9  C) (Oral)   Resp 16    General:  Patient presents to clinic in no apparent distress.  Head: normocephalic atraumatic  Psychiatric:  Alert and oriented x3.   Respiratory: unlabored breathing; no cough  Integumentary:  Skin is uniformly warm, dry and pink.    Wound #1 Location: right 4th toe tip  Size: 0.3x0.4W x 0.1cmdepth. Post debridement This is improved. No sinus tract present, Wound base: initially covered with fibrinous material this was sharply debrided to reveal nearly 100% viable tissue; the bottom edge was not attached; this was trimmed back  No undermining present. Periwound: no denudement, erythema, induration, maceration or warmth.    Heel is still very dry with small fissures; thickened skin was pared down with scalpel intact underneath    Circumferential volume measures:    No flowsheet data found.    Ulceration(s)/Wound(s):     Wound 08/13/18 right 4th toe (Active)   Pre Size Length 0.2 9/24/2018 10:00 AM   Pre Size Width 0.3 9/24/2018 10:00 AM   Pre Size Depth 0.1 9/24/2018 10:00 AM   Pre Total Sq cm 0.06 9/24/2018 10:00 AM   Post Size Length 0.3 9/24/2018 10:00 AM   Post Size Width 0.4 9/24/2018 10:00 AM   Post Size Depth 0.1 9/24/2018 10:00 AM   Post Total Sq cm 0.12 9/24/2018 10:00 AM       VASC Wound 09/10/18 right  heel (Active)   Pre Size Length 0 9/24/2018 10:00 AM   Pre Size Width 0 9/24/2018 10:00 AM   Pre Size Depth 0 9/24/2018 10:00 AM   Pre Total Sq cm 0 9/24/2018 10:00 AM   Post Size Length 0 9/24/2018 10:00 AM   Post Size Width 0 9/24/2018 10:00 AM   Post Size Depth 0 9/24/2018 10:00 AM   Post Total Sq cm 0 9/24/2018 10:00 AM   Undermined 0 9/24/2018 10:00 AM        Lab Values    Lab Results   Component Value Date    SEDRATE 16 08/27/2018     Lab Results   Component Value Date    CREATININE 1.10 07/10/2018     Lab Results   Component Value Date    HGBA1C 11.3 (H) 08/11/2011     Lab Results    Component Value Date    BUN 22 07/10/2018     Lab Results   Component Value Date    ALBUMIN 3.4 08/11/2011     No results found for: [id number]      9/24/18 right 4th toe post myrna            9/10/18 right 4th toe tip      9/10/18 right plantar heel      8/27/18 right 4th toe tip        8/14/18 right 4th toe tip          Impression:  1. Diabetic ulcer of toe of right foot associated with type 2 diabetes mellitus, with fat layer exposed (H)     2. Hammertoe of right foot     3. History of cellulitis     4. Fissure in skin     5. Xerosis cutis           Are any of these wounds new today: yes right plantar heel    Assessment/Plan:          1. Debridement: Excisional debridement of the ulcer(s) was recommended today, after consent was obtained and 2% Xylocaine was applied using a sterile curet the epidermal, dermal and down into the subcutaneous tissue was sharply debrided for a total square cm of 0.12. The non-viable and necrotic tissue was removed and the wounds appeared much  afterwards.             2. Edema: na.              3.  Wound treatment:will continue with the am lactin to the heel apply to intact skin only; will continue with silvercel to the toe, last esr and crp were normal; xray normal; brayan showed adequate blood flow for wound healing; ok to work will still keep this to 4 hour shifts.           4. Nutrition: diabetic diet           5. Offloading: continue crest pad; continue crutches or knee rolling walker; diabetic shoes insoles on order     Patient will follow up with me in 3 weeks for reevaluation. They were instructed to call the clinic sooner with any signs or symptoms of infection or any further questions/concerns. Answered all questions.    Malinda Gardner DNP, RN, CNP, CWOCN  [facility]  [phone]        This note was electronically signed by Malinda Gardner

## 2021-06-26 NOTE — PROGRESS NOTES
Progress Notes by Malinda Gardner NP at 11/26/2018 10:40 AM     Author: Malinda Gardner NP Service: -- Author Type: Nurse Practitioner    Filed: 11/26/2018 11:10 AM Encounter Date: 11/26/2018 Status: Signed    : Malinda Gardner NP (Nurse Practitioner)       Follow up Vascular Visit       Date of Service:11/26/2018    Date Last Seen: 8/21/2018; 8/13/2018    Chief Complaint: right 4th toe ulcer; heel ulcer; recent cellulitis of the left leg    History:   Past Medical History:   Diagnosis Date   ? Allergy To Aspirin     Created by Conversion    ? Asthma    ? Cellulitis    ? Cellulitis     Created by Conversion    ? Diabetes mellitus (H)    ? Dyspnea on exertion 8/20/2015   ? Gastroparesis    ? Hyperlipidemia    ? Hyperlipidemia     Created by Conversion    ? Hypertension    ? Hypertension     Created by Conversion  Replacement Utility updated for latest IMO load   ? Mild Intermittent Asthma     Created by Conversion    ? Skin Abscess Of The Leg     Created by Conversion    ? Trochanteric Bursitis     Created by Conversion    ? Type 2 Diabetes Mellitus     Created by Conversion        Pt returns to the Vascular clinic with regards to their right 4th toe ulcer and right plantar heel wound and recent left leg cellulitis. Pt arrives alone today. Last seen 3 weeks ago. Completed Keflex tolerated well. She had her carpal tunnel surgery and has been off work for 2 weeks; returns back today  She picked up silicone crest pad and states she likes this and has been using this she arrives today with this in place; we had her flip this the other we had her flip this the other way and this has been working well for her. She picked up the am lactin lotion and has been applying to the heel twice per day. She has been dressing the toe wound with silvercel and surgilast. She had ABIs completed and this demonstrated adequate blood flow. She is wearing her new diabetic shoes and insoles likes these; fitting well. We also  obtained inflammatory markers and xray previously these were normal.     Allergies: Codeine and Aspirin (tartrazine only)    Physical Exam:    /86   Pulse 96   Temp 98.7  F (37.1  C) (Oral)   Resp 16     General:  Patient presents to clinic in no apparent distress.  Head: normocephalic atraumatic  Psychiatric:  Alert and oriented x3.   Respiratory: unlabored breathing; no cough  Integumentary:  Skin is uniformly warm, dry and pink.    Wound #1 Location: right 4th toe tip  Size: healed    Circumferential volume measures:    No flowsheet data found.    Ulceration(s)/Wound(s):     Wound 08/13/18 right 4th toe (Active)   Pre Size Length 0 11/26/2018 10:00 AM   Pre Size Width 0 11/26/2018 10:00 AM   Pre Size Depth 0 11/26/2018 10:00 AM   Pre Total Sq cm 0 11/26/2018 10:00 AM   Post Size Length 0.3 11/5/2018 10:00 AM   Post Size Width 0.1 11/5/2018 10:00 AM   Post Size Depth 0.1 11/5/2018 10:00 AM   Post Total Sq cm 0.03 11/5/2018 10:00 AM        Lab Values    Lab Results   Component Value Date    SEDRATE 16 08/27/2018     Lab Results   Component Value Date    CREATININE 1.10 07/10/2018     Lab Results   Component Value Date    HGBA1C 11.3 (H) 08/11/2011     Lab Results   Component Value Date    BUN 22 07/10/2018     Lab Results   Component Value Date    ALBUMIN 3.4 08/11/2011     No results found for: [id number]      10/15/18 right 4th toe tip; pre debridement          9/24/18 right 4th toe post myrna            9/10/18 right 4th toe tip      9/10/18 right plantar heel      8/27/18 right 4th toe tip        8/14/18 right 4th toe tip          Impression:  1. Diabetic ulcer of toe of right foot associated with type 2 diabetes mellitus, with fat layer exposed (H)     2. Hammertoe of right foot     3. Fissure in skin     4. Xerosis cutis     5. History of cellulitis           Are any of these wounds new today: no    Assessment/Plan:          1. Debridement: na           2. Edema: na.              3.  Wound  treatment:will continue with the am lactin to the heel apply to intact skin only; last esr and crp were normal; xray normal; brayan showed adequate blood flow for wound healing; wound healed; no dressing; will slowly get her back to full time work; 8 hour shift 3 days per week x3 weeks and then can go to full time 5 days per week; note written             4. Nutrition: diabetic diet           5. Offloading: continue crest pad; we will have her flip this the other way and see how this goes for additional offloading; diabetic shoes insoles brand new      Patient will follow up with me PRN for reevaluation. They were instructed to call the clinic sooner with any signs or symptoms of infection or any further questions/concerns. Answered all questions.    Malinda Gardner DNP, RN, CNP, [facility]  [facility]  [phone]        This note was electronically signed by Malinda Gardner

## 2021-06-26 NOTE — PROGRESS NOTES
Progress Notes by Malinda Gardner NP at 9/10/2018 11:00 AM     Author: Malinda Gardner NP Service: -- Author Type: Nurse Practitioner    Filed: 9/10/2018  1:00 PM Encounter Date: 9/10/2018 Status: Signed    : Malinda Gardner NP (Nurse Practitioner)       Follow up Vascular Visit       Date of Service:9/10/2018    Date Last Seen: 8/21/2018; 8/13/2018    Chief Complaint: right 4th toe ulcer; new heel ulcer; new cellulitis of the left leg    History:   Past Medical History:   Diagnosis Date   ? Allergy To Aspirin     Created by Conversion    ? Asthma    ? Cellulitis    ? Cellulitis     Created by Conversion    ? Diabetes mellitus (H)    ? Dyspnea on exertion 8/20/2015   ? Gastroparesis    ? Hyperlipidemia    ? Hyperlipidemia     Created by Conversion    ? Hypertension    ? Hypertension     Created by Conversion  Replacement Utility updated for latest IMO load   ? Mild Intermittent Asthma     Created by Conversion    ? Skin Abscess Of The Leg     Created by Conversion    ? Trochanteric Bursitis     Created by Conversion    ? Type 2 Diabetes Mellitus     Created by Conversion        Pt returns to the Vascular clinic with regards to their right 4th toe ulcer and now new right plantar heel wound and new left leg cellulitis. Pt arrives alone today. Last seen 2 weeks ago. She developed increased redness; fevers up to 102F and chills 3 days ago; was seen in UC and started on Keflex tolerating well; temp was 99F yesterday and feeling better. She has returned to work; 4hour shifts 5 days per week and using the knee rolling walker. She noted the heel wound also on Saturday does not know how this started or what caused this.  She picked up silicone crest pad and states she likes this and has been using this however today she arrives without this in place. She picked up the am lactin lotion and has been applying to the heel twice per day prior to the wound occuring. She has been dressing the toe wound with silvercel  and surgilast. She had ABIs completed and this demonstrated adequate blood flow. We provided her with rx for diabetic shoes and insoles these will be ready in a few weeks; arrives today wearing crocs. We also obtained inflammatory markers last visit and these were normal.     Allergies: Codeine and Aspirin (tartrazine only)    Physical Exam:    /72  Pulse 88  Temp 98.6  F (37  C) (Oral)   Resp 16    General:  Patient presents to clinic in no apparent distress.  Head: normocephalic atraumatic  Psychiatric:  Alert and oriented x3.   Respiratory: unlabored breathing; no cough  Integumentary:  Skin is uniformly warm, dry and pink.    Wound #1 Location: right 4th toe tip  Size: 0.5x0.6W x 0.1cmdepth. This is improved. No sinus tract present, Wound base: initially covered with fibrinous material this was sharply debrided to reveal nearly 100% viable tissue; the bottom edge was not attached; this was trimmed back  No undermining present. Periwound: no denudement, erythema, induration, maceration or warmth.    Heel is still very dry with small fissures; is improved from initial visit but now with a 0.3x0.2x0.1cm ulcer; this was debrided; 100% viable; could not feel any foreign retained body; non-tender  There is erythema which has been outlined to the right medial ankle region; the erythema has not spread past the outlined area; minimally tender to palpation; minimally warm to touch    Circumferential volume measures:    No flowsheet data found.    Ulceration(s)/Wound(s):     Wound 08/13/18 right 4th toe (Active)   Pre Size Length 0.5 9/10/2018 10:00 AM   Pre Size Width 0.6 9/10/2018 10:00 AM   Pre Size Depth 0.1 9/10/2018 10:00 AM   Pre Total Sq cm 0.3 9/10/2018 10:00 AM       VASC Wound 09/10/18 right  heel (Active)   Pre Size Length 0.3 9/10/2018 10:00 AM   Pre Size Width 0.2 9/10/2018 10:00 AM   Pre Size Depth 0.1 9/10/2018 10:00 AM   Pre Total Sq cm 0.06 9/10/2018 10:00 AM        Lab Values    Lab Results    Component Value Date    SEDRATE 16 08/27/2018     Lab Results   Component Value Date    CREATININE 1.10 07/10/2018     Lab Results   Component Value Date    HGBA1C 11.3 (H) 08/11/2011     Lab Results   Component Value Date    BUN 22 07/10/2018     Lab Results   Component Value Date    ALBUMIN 3.4 08/11/2011     No results found for: [id number]    9/10/18 right 4th toe tip      9/10/18 right plantar heel      8/27/18 right 4th toe tip        8/14/18 right 4th toe tip          Impression:  1. Xerosis cutis  XR Foot Right 3 or More VWS Standing    XR Foot Right 3 or More VWS Standing    Change dressing   2. Fissure in skin  XR Foot Right 3 or More VWS Standing    XR Foot Right 3 or More VWS Standing    Change dressing   3. Diabetic ulcer of toe of right foot associated with type 2 diabetes mellitus, with fat layer exposed (H)  XR Foot Right 3 or More VWS Standing    XR Foot Right 3 or More VWS Standing    Change dressing   4. Hammertoe of right foot  XR Foot Right 3 or More VWS Standing    XR Foot Right 3 or More VWS Standing    Change dressing   5. History of cellulitis  XR Foot Right 3 or More VWS Standing    XR Foot Right 3 or More VWS Standing    Change dressing   6. Wound infection  XR Foot Right 3 or More VWS Standing    XR Foot Right 3 or More VWS Standing    Change dressing         Are any of these wounds new today: yes right plantar heel    Assessment/Plan:          1. Debridement: Excisional debridement of the ulcer(s) was recommended today, after consent was obtained and 2% Xylocaine was applied using a sterile curet the epidermal, dermal and down into the subcutaneous tissue was sharply debrided for a total square cm of 0.36. The non-viable and necrotic tissue was removed and the wounds appeared much  afterwards.             2. Edema: na.              3.  Wound treatment:will continue with the am lactin to the heel apply to intact skin only; will continue with silvercel to the toe and now the  heel; wound is improved on the toe; last esr and crp were normal; questionable retained foreign body to the right plantar heel will obtain 3 view xray and also look at the toe at the same time for osteomyelitis; brayan showed adequate blood flow for wound healing; continue on keflex for the cellulitis until completion will take out of work for 1 week to help recovery of the cellulitis; when she returns to work in 1 week will still keep this to 4 hour shifts.           4. Nutrition: diabetic diet           5. Offloading: continue crest pad; continue crutches or knee rolling walker; diabetic shoes insoles on order     Patient will follow up with me in 2 weeks for reevaluation. They were instructed to call the clinic sooner with any signs or symptoms of infection or any further questions/concerns. Answered all questions.    Malinda Gardner DNP, RN, CNP, [facility]  [phone]        This note was electronically signed by Malinda Gardner

## 2021-06-26 NOTE — PROGRESS NOTES
Progress Notes by Malinda Gardner NP at 8/27/2018 10:20 AM     Author: Malinda Gardner NP Service: -- Author Type: Nurse Practitioner    Filed: 8/27/2018 12:50 PM Encounter Date: 8/27/2018 Status: Signed    : Malinda Gardner NP (Nurse Practitioner)       Follow up Vascular Visit       Date of Service:8/27/2018    Date Last Seen: 8/21/2018; 8/13/2018    Chief Complaint: right 4th toe ulcer    History:   Past Medical History:   Diagnosis Date   ? Allergy To Aspirin     Created by Conversion    ? Asthma    ? Cellulitis    ? Cellulitis     Created by Conversion    ? Diabetes mellitus (H)    ? Dyspnea on exertion 8/20/2015   ? Gastroparesis    ? Hyperlipidemia    ? Hyperlipidemia     Created by Conversion    ? Hypertension    ? Hypertension     Created by Conversion  Replacement Utility updated for latest IMO load   ? Mild Intermittent Asthma     Created by Conversion    ? Skin Abscess Of The Leg     Created by Conversion    ? Trochanteric Bursitis     Created by Conversion    ? Type 2 Diabetes Mellitus     Created by Conversion        Pt returns to the Vascular clinic with regards to their right 4th toe ulcer. Pt arrives alone today. Last seen 2 weeks ago. She has completed her augmentin; tolerated well; no side effects. She denies fevers, chills, or flu like symptoms. She has been taken out of work. She is using crutches to get around however today she is not using these. She picked up silicone crest pad and states she likes this and has been using this however today she arrives without this in place. She picked up the am lactin lotion and has been applying to the heel twice per day. She has been dressing with silvercel and surgilast. She had ABIs completed and this demonstrated adequate blood flow. We provided her with rx for diabetic shoes and insoles she was contacted by [facility] but did not make appt she thought these were not covered by insurance and she cannot afford; explained to her that these  "usually are covered by insurance; she was more willing to consider these.    Allergies: Codeine and Aspirin (tartrazine only)    Physical Exam:    /78  Pulse 88  Temp 98.4  F (36.9  C) (Oral)   Resp 16  Ht 5' 5\" (1.651 m) Comment: per pt report  Wt 174 lb (78.9 kg) Comment: per pt report  BMI 28.96 kg/m2    General:  Patient presents to clinic in no apparent distress.  Head: normocephalic atraumatic  Psychiatric:  Alert and oriented x3.   Respiratory: unlabored breathing; no cough  Integumentary:  Skin is uniformly warm, dry and pink.    Wound #1 Location: right 4th toe tip  Size: 0.7L x 1W x 0.1cmdepth.  No sinus tract present, Wound base: initially covered with fibrinous material this was sharply debrided to reveal nearly 100% viable tissue; the bottom edge was not attached; this was trimmed back  No undermining present. Periwound: no denudement, erythema, induration, maceration or warmth.    Heel is still very dry with small fissures; is improved from initial visit    Circumferential volume measures:    No flowsheet data found.    Ulceration(s)/Wound(s):     Wound 08/13/18 right 4th toe (Active)   Pre Size Length 0.7 8/27/2018 10:00 AM   Pre Size Width 1 8/27/2018 10:00 AM   Pre Size Depth 0.1 8/27/2018 10:00 AM   Pre Total Sq cm 0.7 8/27/2018 10:00 AM        Lab Values    Lab Results   Component Value Date    SEDRATE 55 (H) 07/10/2018     Lab Results   Component Value Date    CREATININE 1.10 07/10/2018     Lab Results   Component Value Date    HGBA1C 11.3 (H) 08/11/2011     Lab Results   Component Value Date    BUN 22 07/10/2018     Lab Results   Component Value Date    ALBUMIN 3.4 08/11/2011     No results found for: [id number]          Impression:  1. Xerosis cutis  Sedimentation Rate    C-Reactive Protein(CRP)    Change dressing   2. Fissure in skin  Sedimentation Rate    C-Reactive Protein(CRP)    Change dressing   3. Diabetic ulcer of toe of right foot associated with type 2 diabetes mellitus, " with fat layer exposed (H)  Sedimentation Rate    C-Reactive Protein(CRP)    Change dressing   4. Hammertoe of right foot  Sedimentation Rate    C-Reactive Protein(CRP)    Change dressing   5. History of cellulitis  Sedimentation Rate    C-Reactive Protein(CRP)    Change dressing         8/27/18 right 4th toe         8/13/18 right 4th toe            Are any of these wounds new today: No; Location: na    Assessment/Plan:          1. Debridement: Excisional debridement of the ulcer(s) was recommended today, after consent was obtained and 2% Xylocaine was applied using a sterile curet the epidermal, dermal and down into the subcutaneous tissue was sharply debrided for a total square cm of 0.7. The non-viable and necrotic tissue was removed and the wounds appeared much  afterwards.             2. Edema: na.              3.  Wound treatment:will continue with the am lactin to the heel; will continue with silvercel to the toe; wound is minimally improved; will obtain esr and crp; if these are elevated will obtain MRI and xrays to rule out osteomyelitis; brayan showed adequate blood flow for wound healing           4. Nutrition: diabetic diet           5. Offloading: continue crest pad; continue crutches or knee rolling walker; make appt with [facility] for diabetic shoes insoles; keep out of work     Patient will follow up with me in 2 weeks for reevaluation. They were instructed to call the clinic sooner with any signs or symptoms of infection or any further questions/concerns. Answered all questions.    Malinda Gardner DNP, RN, CNP, [facility]  [facility]  [phone]        This note was electronically signed by Malinda Gardner

## 2021-06-26 NOTE — PROGRESS NOTES
Progress Notes by Malinda Gardner NP at 10/15/2018 10:40 AM     Author: Malinda Gardner NP Service: -- Author Type: Nurse Practitioner    Filed: 10/15/2018 10:54 AM Encounter Date: 10/15/2018 Status: Signed    : Malinda Gardner NP (Nurse Practitioner)       Follow up Vascular Visit       Date of Service:10/15/2018    Date Last Seen: 8/21/2018; 8/13/2018    Chief Complaint: right 4th toe ulcer; heel ulcer; recent cellulitis of the left leg    History:   Past Medical History:   Diagnosis Date   ? Allergy To Aspirin     Created by Conversion    ? Asthma    ? Cellulitis    ? Cellulitis     Created by Conversion    ? Diabetes mellitus (H)    ? Dyspnea on exertion 8/20/2015   ? Gastroparesis    ? Hyperlipidemia    ? Hyperlipidemia     Created by Conversion    ? Hypertension    ? Hypertension     Created by Conversion  Replacement Utility updated for latest IMO load   ? Mild Intermittent Asthma     Created by Conversion    ? Skin Abscess Of The Leg     Created by Conversion    ? Trochanteric Bursitis     Created by Conversion    ? Type 2 Diabetes Mellitus     Created by Conversion        Pt returns to the Vascular clinic with regards to their right 4th toe ulcer and right plantar heel wound and recent left leg cellulitis. Pt arrives alone today. Last seen 3 weeks ago. Completed Keflex tolerated well. She has returned to work; 4hour shifts 5 days per week and using the knee rolling walker.  She picked up silicone crest pad and states she likes this and has been using this however today she arrives without this in place; also did not arrive using the knee rolling walker. She picked up the am lactin lotion and has been applying to the heel twice per day. She has been dressing the toe wound with silvercel and surgilast. She had ABIs completed and this demonstrated adequate blood flow. She is wearing her new diabetic shoes and insoles likes these; fitting well. We also obtained inflammatory markers and xray  previously these were normal.     Allergies: Codeine and Aspirin (tartrazine only)    Physical Exam:    /80  Pulse 88  Temp 98.2  F (36.8  C) (Oral)   Resp 16    General:  Patient presents to clinic in no apparent distress.  Head: normocephalic atraumatic  Psychiatric:  Alert and oriented x3.   Respiratory: unlabored breathing; no cough  Integumentary:  Skin is uniformly warm, dry and pink.    Wound #1 Location: right 4th toe tip  Size: 0.5x0.2W x 0.1cmdepth. Post debridement This is improved. No sinus tract present, Wound base: initially covered with fibrinous material this was sharply debrided to reveal nearly 100% viable tissue; the bottom edge was not attached; this was trimmed back  No undermining present. Periwound: no denudement, erythema, induration, maceration or warmth.    Heel is still very dry with small fissures    Circumferential volume measures:    No flowsheet data found.    Ulceration(s)/Wound(s):     Wound 08/13/18 right 4th toe (Active)   Pre Size Length 0.2 10/15/2018 10:00 AM   Pre Size Width 0.2 10/15/2018 10:00 AM   Pre Size Depth 0.1 10/15/2018 10:00 AM   Pre Total Sq cm 0.04 10/15/2018 10:00 AM   Post Size Length 0.5 10/15/2018 10:00 AM   Post Size Width 0.2 10/15/2018 10:00 AM   Post Size Depth 0.1 10/15/2018 10:00 AM   Post Total Sq cm 0.1 10/15/2018 10:00 AM        Lab Values    Lab Results   Component Value Date    SEDRATE 16 08/27/2018     Lab Results   Component Value Date    CREATININE 1.10 07/10/2018     Lab Results   Component Value Date    HGBA1C 11.3 (H) 08/11/2011     Lab Results   Component Value Date    BUN 22 07/10/2018     Lab Results   Component Value Date    ALBUMIN 3.4 08/11/2011     No results found for: [id number]      10/15/18 right 4th toe tip; pre debridement          9/24/18 right 4th toe post myrna            9/10/18 right 4th toe tip      9/10/18 right plantar heel      8/27/18 right 4th toe tip        8/14/18 right 4th toe tip          Impression:  1.  Diabetic ulcer of toe of right foot associated with type 2 diabetes mellitus, with fat layer exposed (H)  Change dressing   2. Hammertoe of right foot  Change dressing   3. History of cellulitis  Change dressing   4. Fissure in skin  Change dressing   5. Xerosis cutis  Change dressing         Are any of these wounds new today: no    Assessment/Plan:          1. Debridement: Excisional debridement of the ulcer(s) was recommended today, after consent was obtained and 2% Xylocaine was applied using a sterile curet the epidermal, dermal and down into the subcutaneous tissue was sharply debrided for a total square cm of 0.10. The non-viable and necrotic tissue was removed and the wounds appeared much  afterwards.             2. Edema: na.              3.  Wound treatment:will continue with the am lactin to the heel apply to intact skin only; will continue with silvercel to the toe, last esr and crp were normal; xray normal; brayan showed adequate blood flow for wound healing; ok to work will still keep this to 4 hour shifts; note written today for this.           4. Nutrition: diabetic diet           5. Offloading: continue crest pad; continue crutches or knee rolling walker; diabetic shoes insoles brand new; reminded her again that she needs to be using knee rolling walker and crest pad 100% of the time; she has arrived to all her appt without the knee rolling walker and crest pad.     Patient will follow up with me in 3 weeks for reevaluation. They were instructed to call the clinic sooner with any signs or symptoms of infection or any further questions/concerns. Answered all questions.    Malinda Gardner DNP, RN, CNP, [facility]  [phone]        This note was electronically signed by Malinda Gardner

## 2021-06-26 NOTE — PROGRESS NOTES
Progress Notes by Malinda Gardner NP at 11/5/2018 10:40 AM     Author: Malinda Gardner NP Service: -- Author Type: Nurse Practitioner    Filed: 11/5/2018 12:21 PM Encounter Date: 11/5/2018 Status: Signed    : Malinda Gardner NP (Nurse Practitioner)       Follow up Vascular Visit       Date of Service:11/5/2018    Date Last Seen: 8/21/2018; 8/13/2018    Chief Complaint: right 4th toe ulcer; heel ulcer; recent cellulitis of the left leg    History:   Past Medical History:   Diagnosis Date   ? Allergy To Aspirin     Created by Conversion    ? Asthma    ? Cellulitis    ? Cellulitis     Created by Conversion    ? Diabetes mellitus (H)    ? Dyspnea on exertion 8/20/2015   ? Gastroparesis    ? Hyperlipidemia    ? Hyperlipidemia     Created by Conversion    ? Hypertension    ? Hypertension     Created by Conversion  Replacement Utility updated for latest IMO load   ? Mild Intermittent Asthma     Created by Conversion    ? Skin Abscess Of The Leg     Created by Conversion    ? Trochanteric Bursitis     Created by Conversion    ? Type 2 Diabetes Mellitus     Created by Conversion        Pt returns to the Vascular clinic with regards to their right 4th toe ulcer and right plantar heel wound and recent left leg cellulitis. Pt arrives alone today. Last seen 3 weeks ago. Completed Keflex tolerated well. She has returned to work; 4hour shifts 5 days per week and using the knee rolling walker.  She picked up silicone crest pad and states she likes this and has been using this she arrives today with this in place; also did not arrive using the knee rolling walker. She picked up the am lactin lotion and has been applying to the heel twice per day. She has been dressing the toe wound with silvercel and surgilast. She had ABIs completed and this demonstrated adequate blood flow. She is wearing her new diabetic shoes and insoles likes these; fitting well. We also obtained inflammatory markers and xray previously these were  normal.     Allergies: Codeine and Aspirin (tartrazine only)    Physical Exam:    /80  Pulse 72  Temp 98.7  F (37.1  C) (Oral)   Resp 16    General:  Patient presents to clinic in no apparent distress.  Head: normocephalic atraumatic  Psychiatric:  Alert and oriented x3.   Respiratory: unlabored breathing; no cough  Integumentary:  Skin is uniformly warm, dry and pink.    Wound #1 Location: right 4th toe tip  Size: 0.3x0.2W x 0.1cmdepth. Post debridement This is improved. No sinus tract present, Wound base: initially covered with fibrinous material this was sharply debrided to reveal nearly 100% viable tissue; the bottom edge was not attached; this was trimmed back  No undermining present. Periwound: no denudement, erythema, induration, maceration or warmth.    Heel is still very dry with small fissures    Circumferential volume measures:    No flowsheet data found.    Ulceration(s)/Wound(s):     Wound 08/13/18 right 4th toe (Active)   Pre Size Length 0.3 11/5/2018 10:00 AM   Pre Size Width 0.3 11/5/2018 10:00 AM   Pre Size Depth 0.1 11/5/2018 10:00 AM   Pre Total Sq cm 0.09 11/5/2018 10:00 AM   Post Size Length 0.5 10/15/2018 10:00 AM   Post Size Width 0.2 10/15/2018 10:00 AM   Post Size Depth 0.1 10/15/2018 10:00 AM   Post Total Sq cm 0.1 10/15/2018 10:00 AM        Lab Values    Lab Results   Component Value Date    SEDRATE 16 08/27/2018     Lab Results   Component Value Date    CREATININE 1.10 07/10/2018     Lab Results   Component Value Date    HGBA1C 11.3 (H) 08/11/2011     Lab Results   Component Value Date    BUN 22 07/10/2018     Lab Results   Component Value Date    ALBUMIN 3.4 08/11/2011     No results found for: [id number]      10/15/18 right 4th toe tip; pre debridement          9/24/18 right 4th toe post myrna            9/10/18 right 4th toe tip      9/10/18 right plantar heel      8/27/18 right 4th toe tip        8/14/18 right 4th toe tip          Impression:  1. Diabetic ulcer of toe of  right foot associated with type 2 diabetes mellitus, with fat layer exposed (H)     2. Hammertoe of right foot     3. Fissure in skin     4. Xerosis cutis           Are any of these wounds new today: no    Assessment/Plan:          1. Debridement: Excisional debridement of the ulcer(s) was recommended today, after consent was obtained and 2% Xylocaine was applied using a sterile curet the epidermal, dermal and down into the subcutaneous tissue was sharply debrided for a total square cm of 0.06. The non-viable and necrotic tissue was removed and the wounds appeared much  afterwards.             2. Edema: na.              3.  Wound treatment:will continue with the am lactin to the heel apply to intact skin only; will continue with silvercel to the toe, last esr and crp were normal; xray normal; brayan showed adequate blood flow for wound healing; ok to work will still keep this to 4 hour shifts; note written today for this.           4. Nutrition: diabetic diet           5. Offloading: continue crest pad; we will have her flip this the other way and see how this goes for additional offloading; continue crutches or knee rolling walker; diabetic shoes insoles brand new; reminded her again that she needs to be using knee rolling walker and crest pad 100% of the time; she has arrived to all her appt without the knee rolling walker but had her crest pad.     Patient will follow up with me in 3 weeks for reevaluation. They were instructed to call the clinic sooner with any signs or symptoms of infection or any further questions/concerns. Answered all questions.    Malinda Gardner DNP, RN, CNP, [facility]  [facility]  [phone]        This note was electronically signed by Malinda Gardner

## 2021-06-29 NOTE — PROGRESS NOTES
Progress Notes by Chris Vega DPM at 7/15/2020 11:00 AM     Author: Chris Vega DPM Service: -- Author Type: Physician    Filed: 7/15/2020  1:51 PM Encounter Date: 7/15/2020 Status: Signed    : Chris Vega DPM (Physician)       FOOT AND ANKLE SURGERY/PODIATRY CONSULT NOTE        ASSESSMENT:   Resolving cellulitis 3rd digit right foot  Hammertoe  DM2        TREATMENT:  -No erythema or signs of infection on the 3rd digit right foot. I discussed with the patient that there is a very small area of fluctuance along the distal 3rd digit, but in comparison to the previous picture, the infection seems to be progressing well.    -We discussed pros and cons of sharp debridement to drain the area of fluid collection. Because the area of fluid collection is very small and the toe seems to be progressing well, I recommend we avoid sharp debridement today. She will closely monitor for any erythema, open lesions and contact my office if this occurs.     -I have asked her to follow-up with me in 7-10 days if the area of fluctuance has not improved and we will consider sharp debridement at that time if necessary. All questions invited and answered.       Chris Vega DPM  [facility] [place]      HPI: Gill Mendez was seen today at the request of Malinda Gardner for an infection in the 3rd digit right foot. The patient states she believes she injured the 3rd toe on 7/4 while walking in water. The next day she noticed redness and was placed on antibiotics which she finished two days ago. She has noticed significant improvement since taking antibiotics, denies any open lesions. Denies smoking. PMH significant for DM2. Previous amputation of the 2nd digit right foot due to infection.     Past Medical History:   Diagnosis Date   ? Allergy To Aspirin     Created by Conversion    ? Asthma    ? Cellulitis    ? Cellulitis     Created by Conversion    ? Diabetes mellitus (H)    ? Dyspnea on exertion  8/20/2015   ? Gastroparesis    ? Hyperlipidemia    ? Hyperlipidemia     Created by Conversion    ? Hypertension    ? Hypertension     Created by Conversion  Replacement Utility updated for latest IMO load   ? Mild Intermittent Asthma     Created by Conversion    ? Skin Abscess Of The Leg     Created by Conversion    ? Trochanteric Bursitis     Created by Conversion    ? Type 2 Diabetes Mellitus     Created by Conversion        Past Surgical History:   Procedure Laterality Date   ? MD ABDOMEN SURGERY PROC UNLISTED      Description: Hernia Repair;  Recorded: 10/09/2008;  Comments: right groin area sx as a child   ? MD REMOVAL OF OVARY(S)      Description: Oophorectomy - Bilateral (Removal Of Both Ovaries);  Recorded: 10/09/2008;   ? MD REMOVE TONSILS/ADENOIDS,<11 Y/O      Description: Tonsillectomy With Adenoidectomy;  Recorded: 03/20/2008;   ? MD TOTAL ABDOM HYSTERECTOMY      Description: Hysterectomy;  Recorded: 03/20/2008;   ? MD VAG HYST,RMV TUBE/OVARY,FIX ENTEROCE      Description: Vag Hysterect Uterus <250g Remov Both Ovaries Rep Enterocele;  Recorded: 01/26/2010;       Allergies   Allergen Reactions   ? Codeine Unknown     Patient states possibly caused N/V but can't remember for sure   ? Aspirin (Tartrazine Only) Rash         Current Outpatient Medications:   ?  albuterol (PROVENTIL HFA;VENTOLIN HFA) 90 mcg/actuation inhaler, Inhale 2 puffs every 6 (six) hours as needed for wheezing., Disp: , Rfl:   ?  amLODIPine (NORVASC) 10 MG tablet, 10 mg., Disp: , Rfl: 0  ?  atorvastatin (LIPITOR) 40 MG tablet, TAKE ONE TABLET BY MOUTH NIGHTLY FOR CHOLESTEROL, Disp: , Rfl:   ?  beclomethasone (QVAR) 80 mcg/actuation inhaler, Inhale 2 puffs 2 (two) times a day., Disp: , Rfl:   ?  citalopram (CELEXA) 20 MG tablet, TAKE ONE TABLET BY MOUTH EVERY DAY FOR MOOD, Disp: , Rfl: 0  ?  hydroCHLOROthiazide (HYDRODIURIL) 25 MG tablet, Take 25 mg by mouth daily., Disp: , Rfl: 1  ?  JANUVIA 100 mg tablet, TAKE ONE TABLET BY MOUTH EVERY  DAY FOR BLOOD SUGAR, Disp: , Rfl: 0  ?  LANTUS SOLOSTAR U-100 INSULIN 100 unit/mL (3 mL) pen, INJECT 58 UNITS NIGHTLY AT BEDTIME, Disp: , Rfl: 3  ?  lisinopriL (PRINIVIL,ZESTRIL) 10 MG tablet, Take 10 mg by mouth daily., Disp: , Rfl:   ?  metFORMIN (GLUCOPHAGE) 500 MG tablet, Take 1,000 mg by mouth 2 (two) times a day with meals., Disp: , Rfl:   ?  pioglitazone (ACTOS) 15 MG tablet, Take 15 mg by mouth., Disp: , Rfl:   ?  pramipexole (MIRAPEX) 0.25 MG tablet, TAKE ONE TABLET BY MOUTH TWICE A DAY FOR RESTLESS LEGS, Disp: , Rfl: 3    Past Surgical History:   Procedure Laterality Date   ? NE ABDOMEN SURGERY PROC UNLISTED      Description: Hernia Repair;  Recorded: 10/09/2008;  Comments: right groin area sx as a child   ? NE REMOVAL OF OVARY(S)      Description: Oophorectomy - Bilateral (Removal Of Both Ovaries);  Recorded: 10/09/2008;   ? NE REMOVE TONSILS/ADENOIDS,<11 Y/O      Description: Tonsillectomy With Adenoidectomy;  Recorded: 03/20/2008;   ? NE TOTAL ABDOM HYSTERECTOMY      Description: Hysterectomy;  Recorded: 03/20/2008;   ? NE VAG HYST,RMV TUBE/OVARY,FIX ENTEROCE      Description: Vag Hysterect Uterus <250g Remov Both Ovaries Rep Enterocele;  Recorded: 01/26/2010;       Social History     Social History Narrative   ? Not on file       Family History   Problem Relation Age of Onset   ? Diabetes Mother    ? Acute Myocardial Infarction Neg Hx        Review of Systems - 10 point Review of Systems is negative except for infection right foot which is noted in HPI.      OBJECTIVE:  Appearance: alert, well appearing, and in no distress.    There were no vitals filed for this visit.    BMI= There is no height or weight on file to calculate BMI.    General appearance: Patient is alert and fully cooperative with history & exam.  No sign of distress is noted during the visit.     Psychiatric: Affect is pleasant & appropriate.  Patient appears motivated to improve health.     Respiratory: Breathing is regular &  unlabored while sitting.     HEENT: Hearing is intact to spoken word.  Speech is clear.  No gross evidence of visual impairment that would impact ambulation.        Vascular: Dorsalis pedis palpableRight.  Dermatologic:   Wound 08/13/18 right 4th toe (Active)   Pre Size Length 0 11/26/18 1000   Pre Size Width 0 11/26/18 1000   Pre Size Depth 0 11/26/18 1000   Pre Total Sq cm 0 11/26/18 1000   Post Size Length 0.3 11/05/18 1000   Post Size Width 0.1 11/05/18 1000   Post Size Depth 0.1 11/05/18 1000   Post Total Sq cm 0.03 11/05/18 1000   No erythema 3rd digit right foot. Small area of fluctuance distal 3rd digit right foot, no open lesions noted.   Neurologic: Diminished to light touch Right.  Musculoskeletal: Contracted digits noted Right. Amputated 2nd digit right foot.     Imaging:     No results found.         Picture:

## 2021-06-29 NOTE — PROGRESS NOTES
Progress Notes by Chris Vega DPM at 7/20/2020  1:00 PM     Author: Chris Vega DPM Service: -- Author Type: Physician    Filed: 7/20/2020  2:18 PM Encounter Date: 7/20/2020 Status: Signed    : Chris Vega DPM (Physician)       FOOT AND ANKLE SURGERY/PODIATRY Progress Note      ASSESSMENT:   Ulceration 3rd digit right foot  Cellulitis 3rd digit right foot  Hammertoe  DM2      A new wound was identified today: yes,  it is located 3rd digit right foot.  TREATMENT:  Cellulitis right foot: There is fluctuance along the distal 3rd digit on exam today which is a new finding since her last visit. Localized erythema. After sharp debridement, the resulting ulceration probes to deep tissues. I will start her on doxycycline, wound culture obtained today. Referred for x-rays and MRI to evaluate for osteomyelitis.     Ulceration right 3rd digit: After discussion of risk factors and consent obtained 2% Lidocaine HCL jelly was applied, under clean conditions, the right and foot ulceration(s) were debrided using #15 blade scalpel.  Devitalized and nonviable tissue, along with any fibrin and slough, was removed to improve granulation tissue formation, stimulate wound healing, decrease overall bacteria load, disrupt biofilm formation and decrease edge senescence.  Total excisional debridement was 0.4 sq cm into the muscle/fascia with a depth of 0.4 cm.   Ulcers were improved afterwards and .  Measures were as noted on the flow sheet. Patient tolerated this well. A gauze dressing was applied. She will continue to apply a gauze dressing qday.    -She will follow-up with me in one week, but I will contact her with the imaging reports and we will be guided by the results.      Chris Vega DPM  [facility]      HPI: Gill Mendez was seen again today after she became concerned about fluid collection along the distal 3rd digit right foot. She has not noticed any open lesions or  drainage.       Past Medical History:   Diagnosis Date   ? Allergy To Aspirin     Created by Conversion    ? Asthma    ? Cellulitis    ? Cellulitis     Created by Conversion    ? Diabetes mellitus (H)    ? Dyspnea on exertion 8/20/2015   ? Gastroparesis    ? Hyperlipidemia    ? Hyperlipidemia     Created by Conversion    ? Hypertension    ? Hypertension     Created by Conversion  Replacement Utility updated for latest IMO load   ? Mild Intermittent Asthma     Created by Conversion    ? Skin Abscess Of The Leg     Created by Conversion    ? Trochanteric Bursitis     Created by Conversion    ? Type 2 Diabetes Mellitus     Created by Conversion        Past Surgical History:   Procedure Laterality Date   ? VT ABDOMEN SURGERY PROC UNLISTED      Description: Hernia Repair;  Recorded: 10/09/2008;  Comments: right groin area sx as a child   ? VT REMOVAL OF OVARY(S)      Description: Oophorectomy - Bilateral (Removal Of Both Ovaries);  Recorded: 10/09/2008;   ? VT REMOVE TONSILS/ADENOIDS,<11 Y/O      Description: Tonsillectomy With Adenoidectomy;  Recorded: 03/20/2008;   ? VT TOTAL ABDOM HYSTERECTOMY      Description: Hysterectomy;  Recorded: 03/20/2008;   ? VT VAG HYST,RMV TUBE/OVARY,FIX ENTEROCE      Description: Vag Hysterect Uterus <250g Remov Both Ovaries Rep Enterocele;  Recorded: 01/26/2010;       Allergies   Allergen Reactions   ? Codeine Unknown     Patient states possibly caused N/V but can't remember for sure   ? Aspirin (Tartrazine Only) Rash         Current Outpatient Medications:   ?  albuterol (PROVENTIL HFA;VENTOLIN HFA) 90 mcg/actuation inhaler, Inhale 2 puffs every 6 (six) hours as needed for wheezing., Disp: , Rfl:   ?  amLODIPine (NORVASC) 10 MG tablet, 10 mg., Disp: , Rfl: 0  ?  atorvastatin (LIPITOR) 40 MG tablet, TAKE ONE TABLET BY MOUTH NIGHTLY FOR CHOLESTEROL, Disp: , Rfl:   ?  beclomethasone (QVAR) 80 mcg/actuation inhaler, Inhale 2 puffs 2 (two) times a day., Disp: , Rfl:   ?  citalopram (CELEXA)  20 MG tablet, TAKE ONE TABLET BY MOUTH EVERY DAY FOR MOOD, Disp: , Rfl: 0  ?  hydroCHLOROthiazide (HYDRODIURIL) 25 MG tablet, Take 25 mg by mouth daily., Disp: , Rfl: 1  ?  JANUVIA 100 mg tablet, TAKE ONE TABLET BY MOUTH EVERY DAY FOR BLOOD SUGAR, Disp: , Rfl: 0  ?  LANTUS SOLOSTAR U-100 INSULIN 100 unit/mL (3 mL) pen, INJECT 58 UNITS NIGHTLY AT BEDTIME, Disp: , Rfl: 3  ?  lisinopriL (PRINIVIL,ZESTRIL) 10 MG tablet, Take 10 mg by mouth daily., Disp: , Rfl:   ?  metFORMIN (GLUCOPHAGE) 500 MG tablet, Take 1,000 mg by mouth 2 (two) times a day with meals., Disp: , Rfl:   ?  pioglitazone (ACTOS) 15 MG tablet, Take 15 mg by mouth., Disp: , Rfl:   ?  pramipexole (MIRAPEX) 0.25 MG tablet, TAKE ONE TABLET BY MOUTH TWICE A DAY FOR RESTLESS LEGS, Disp: , Rfl: 3  ?  diazePAM (VALIUM) 5 MG tablet, Take 1 tablet (5 mg total) by mouth every 6 (six) hours as needed for anxiety., Disp: 1 tablet, Rfl: 0  ?  doxycycline (MONODOX) 100 MG capsule, Take 1 capsule (100 mg total) by mouth 2 (two) times a day for 10 days., Disp: 20 capsule, Rfl: 0    Review of Systems - 10 point Review of Systems is negative except for right foot ulcer which is noted in HPI.      OBJECTIVE:  There were no vitals filed for this visit.  General appearance: Patient is alert and fully cooperative with history & exam.  No sign of distress is noted during the visit.   Vascular: Dorsalis pedis palpableRight.  Dermatologic:    Wound 08/13/18 right 4th toe (Active)   Pre Size Length 0 11/26/18 1000   Pre Size Width 0 11/26/18 1000   Pre Size Depth 0 11/26/18 1000   Pre Total Sq cm 0 11/26/18 1000   Post Size Length 0.3 11/05/18 1000   Post Size Width 0.1 11/05/18 1000   Post Size Depth 0.1 11/05/18 1000   Post Total Sq cm 0.03 11/05/18 1000       VASC Wound 07/20/20 Right 3rd toe (Active)   Post Size Length 0.5 07/20/20 1331   Post Size Width 0.8 07/20/20 1331   Post Size Depth 0.2 07/20/20 1331   Post Total Sq cm 0.4 07/20/20 1331   After sharp debridement of  hyperkeratotic build-up and fluctuance, purulent drainage distal 3rd digit with localized erythema, ulceration probes to deep tissues does not appear to probe to bone.   Neurologic: Diminished to light touch Right.  Musculoskeletal: Amputated distal 2nd digit right. Contracted digits noted Right.    Imaging:     No results found.       Picture:

## 2021-06-29 NOTE — PROGRESS NOTES
Progress Notes by Malinda Gardner NP at 7/10/2020  2:15 PM     Author: Malinda Gardner NP Service: -- Author Type: Nurse Practitioner    Filed: 7/10/2020  2:36 PM Encounter Date: 7/10/2020 Status: Signed    : Malinda Gardner NP (Nurse Practitioner)                   Follow up Vascular Visit       Date of Service:7/10/2020    Date Last Seen: 7/9/2020; 7/9/2020    Chief Complaint: new right 3rd toe wound        Pt returns to [facility] with regards to their new right 3rd toe wound. This visit is being done virtually due to the Covid-19 pandemic. They arrive today on the phone with her sister in law. They are currently using mepilex border to the wounds. This is being done by the patient every 2 days; she is having moderate serous drainage. [place] She went to [facility]; 3 view xrays were done; I cannot see these in her EHR; she reports these were negative for osteomyelitis. She was started on clindamycin; she is tolerating this well; taking probiotics. She has diabetes with peripheral neuropathy; she is currently working with an endocrinologist and her blood sugars are improving; running 100-150s. . They are using nothing for compression. They are feeling well today. Denies fevers, chills. No shortness of breath. Denies history of gout.     Allergies: Codeine and Aspirin (tartrazine only)    Medications:   Current Outpatient Medications:   ?  albuterol (PROVENTIL HFA;VENTOLIN HFA) 90 mcg/actuation inhaler, Inhale 2 puffs every 6 (six) hours as needed for wheezing., Disp: , Rfl:   ?  amLODIPine (NORVASC) 10 MG tablet, 10 mg., Disp: , Rfl: 0  ?  atorvastatin (LIPITOR) 40 MG tablet, TAKE ONE TABLET BY MOUTH NIGHTLY FOR CHOLESTEROL, Disp: , Rfl:   ?  cyanocobalamin, vitamin B-12, 1,000 mcg Subl, Place 1,000 mcg under the tongue daily., Disp: , Rfl:   ?  JANUVIA 100 mg tablet, TAKE ONE TABLET BY MOUTH EVERY DAY FOR BLOOD  SUGAR, Disp: , Rfl: 0  ?  LANTUS SOLOSTAR U-100 INSULIN 100 unit/mL (3 mL) pen, INJECT 58 UNITS NIGHTLY AT BEDTIME, Disp: , Rfl: 3  ?  lisinopril-hydrochlorothiazide (ZESTORETIC) 20-25 mg per tablet, Take 1 tablet by mouth daily., Disp: , Rfl:   ?  metFORMIN (GLUCOPHAGE) 500 MG tablet, Take 1,000 mg by mouth 2 (two) times a day with meals., Disp: , Rfl:   ?  pioglitazone (ACTOS) 15 MG tablet, Take 15 mg by mouth., Disp: , Rfl:   ?  pramipexole (MIRAPEX) 0.25 MG tablet, TAKE ONE TABLET BY MOUTH TWICE A DAY FOR RESTLESS LEGS, Disp: , Rfl: 3  ?  beclomethasone (QVAR) 80 mcg/actuation inhaler, Inhale 2 puffs 2 (two) times a day., Disp: , Rfl:   ?  cholecalciferol, vitamin D3, 1,000 unit tablet, Take 1,000 Units by mouth daily., Disp: , Rfl:   ?  citalopram (CELEXA) 20 MG tablet, TAKE ONE TABLET BY MOUTH EVERY DAY FOR MOOD, Disp: , Rfl: 0  ?  hydroCHLOROthiazide (HYDRODIURIL) 25 MG tablet, Take 25 mg by mouth daily., Disp: , Rfl: 1    History:   Past Medical History:   Diagnosis Date   ? Allergy To Aspirin     Created by Conversion    ? Asthma    ? Cellulitis    ? Cellulitis     Created by Conversion    ? Diabetes mellitus (H)    ? Dyspnea on exertion 8/20/2015   ? Gastroparesis    ? Hyperlipidemia    ? Hyperlipidemia     Created by Conversion    ? Hypertension    ? Hypertension     Created by Conversion  Replacement Utility updated for latest IMO load   ? Mild Intermittent Asthma     Created by Conversion    ? Skin Abscess Of The Leg     Created by Conversion    ? Trochanteric Bursitis     Created by Conversion    ? Type 2 Diabetes Mellitus     Created by Conversion        Physical Exam:    There were no vitals taken for this visit.    General:  Patient presents to clinic in no apparent distress.  Head: normocephalic atraumatic  Psychiatric:  Alert and oriented x3.   Respiratory: unlabored breathing; no cough  Integumentary:  Skin is uniformly warm, dry and pink.    Wound #1 Location: right 3rd toe tip  Size: 1L x 1W x  0depth.  nosinus tract present, Wound base: area of fluctuance on the tip of the 3rd toe; tip is swollen and bulbous  No undermining present. Wound is ? thickness. There is moderate drainage. Periwound: no denudement, erythema, induration, maceration or warmth.      Circumferential volume measures:    No flowsheet data found.    Ulceration(s)/Wound(s):     Wound 08/13/18 right 4th toe (Active)   Pre Size Length 0 11/26/18 1000   Pre Size Width 0 11/26/18 1000   Pre Size Depth 0 11/26/18 1000   Pre Total Sq cm 0 11/26/18 1000   Post Size Length 0.3 11/05/18 1000   Post Size Width 0.1 11/05/18 1000   Post Size Depth 0.1 11/05/18 1000   Post Total Sq cm 0.03 11/05/18 1000        Lab Values    Lab Results   Component Value Date    SEDRATE 16 08/27/2018     Lab Results   Component Value Date    CREATININE 1.10 07/10/2018     Lab Results   Component Value Date    HGBA1C 11.3 (H) 08/11/2011     Lab Results   Component Value Date    BUN 22 07/10/2018     Lab Results   Component Value Date    ALBUMIN 3.4 08/11/2011     No results found for: [id number]          Impression:  1. Diabetic ulcer of toe of right foot associated with type 2 diabetes mellitus, with fat layer exposed (H)     2. Hammertoe of right foot                     Are any of these wounds new today: Yes; Location: right 3rd toe    Assessment/Plan:          1. Debridement: an     2.  Wound treatment: wound treatment will include irrigation and dressings to promote autolytic debridement which will include:stop the mepilex; may be trapping too much moisture; go to dry gauze; change daily; keep dry; will have her see Dr. Vega next week; she was instructed to fax xrays she had done in [place] to our clinic new wound           3. Edema: na.            4. Nutrition: diabetic diet; working with endocrinologist now; having improved blood sugars           5. Offloading: opened toed shoes for now; most likely will need post op shoe when comes to clinic     Patient will  follow up with me in 1 weeks for reevaluation. They were instructed to call the clinic sooner with any signs or symptoms of infection or any further questions/concerns. Answered all questions.          Malinda Gardner DNP, RN, CNP, CWOCN, CFCN, CLT  [facility]   [phone]        This note was electronically signed by Malinda Gardner

## 2021-07-03 NOTE — ANESTHESIA PREPROCEDURE EVALUATION
Anesthesia Preprocedure Evaluation by Michelle Gilliland MD at 12/7/2020 11:42 AM     Author: Michelle Gilliland MD Service: -- Author Type: Physician    Filed: 12/7/2020 11:44 AM Date of Service: 12/7/2020 11:42 AM Status: Signed    : Michelle Gilliland MD (Physician)       Anesthesia Evaluation      Patient summary reviewed   History of anesthetic complications (PONV)     Airway   Mallampati: III  Neck ROM: full   Pulmonary     breath sounds clear to auscultation  (+) asthma                           Cardiovascular   Exercise tolerance: > or = 4 METS  (+) hypertension, , hypercholesterolemia,     (-) angina  ECG reviewed  Rhythm: regular     ROS comment: retinopathy     Neuro/Psych      Endo/Other    (+) diabetes mellitus type 2,      Comments: Hammer toe of L food s/f arthroplasty of digits 2-5    GI/Hepatic/Renal      Comments: Gastroparesis - mild, asymptomatic     Other findings:     NPO > 8 hrs      Dental - normal exam                              Anesthesia Plan  Planned anesthetic: MAC and peripheral nerve block  Preop pop/saph PNB for post-op pain per surgeon request (vs local from surgeon)  MAC -   Plan for propofol gtt w/ ketamine / fentanyl PRN for pain.  Discussed potential need to convert to GA w/ ETT vs LMA if not tolerating.  Explained that it is possible to remember certain aspects of the OR experience during MAC dependent on the depth of sedation and patient understands this.  Decadron and zofran for PONV ppx.      ASA 3   Induction: intravenous   Anesthetic plan and risks discussed with: patient  Anesthesia plan special considerations: antiemetics,   Post-op plan: routine recovery      Results for orders placed or performed in visit on 12/03/20   COVID-19 Virus PCR MRF    Specimen: Respiratory   Result Value Ref Range    COVID-19 VIRUS SPECIMEN SOURCE Nasopharyngeal     2019-nCOV       Test received-See reflex to IDDL test SARS CoV2 (COVID-19) Virus RT-PCR   SARS-CoV-2  (COVID-19) RT-PCR-IDDL    Specimen: Respiratory   Result Value Ref Range    SARS-CoV-2 Virus Specimen Source Nasopharyngeal     SARS-CoV-2 PCR Result NEGATIVE     SARS-COV-2 PCR COMMENT       Testing was performed using the [employer] SARS-CoV-2 Assay on the [employer]

## 2021-07-03 NOTE — ADDENDUM NOTE
Addendum Note by Katalina Askew RN at 7/29/2020 10:21 AM     Author: Katalina Askew RN Service: -- Author Type: Registered Nurse    Filed: 7/29/2020 10:21 AM Encounter Date: 7/28/2020 Status: Signed    : Katalina Askew RN (Registered Nurse)    Addended by: KATALINA ASKEW on: 7/29/2020 10:21 AM        Modules accepted: Orders

## 2021-07-03 NOTE — ADDENDUM NOTE
Addendum Note by Katalina Askew RN at 7/20/2020  1:00 PM     Author: Katalina Askew RN Service: -- Author Type: Registered Nurse    Filed: 7/20/2020  2:40 PM Encounter Date: 7/20/2020 Status: Signed    : Katalina Askew RN (Registered Nurse)    Addended by: KATALINA ASKEW on: 7/20/2020 02:40 PM        Modules accepted: Orders

## 2021-07-03 NOTE — ADDENDUM NOTE
Addendum Note by Katalina Askew RN at 11/20/2020 10:00 AM     Author: Katalina Askew RN Service: -- Author Type: Registered Nurse    Filed: 11/20/2020 10:41 AM Encounter Date: 11/20/2020 Status: Signed    : Katalina Askew RN (Registered Nurse)    Addended by: KATALINA ASKEW on: 11/20/2020 10:41 AM        Modules accepted: Orders

## 2021-07-03 NOTE — ANESTHESIA PREPROCEDURE EVALUATION
Anesthesia Preprocedure Evaluation by Rudolph An MD at 9/24/2020  8:26 AM     Author: Rudolph An MD Service: -- Author Type: Physician    Filed: 9/24/2020  8:27 AM Date of Service: 9/24/2020  8:26 AM Status: Signed    : Rudolph An MD (Physician)       Anesthesia Evaluation      Patient summary reviewed   History of anesthetic complications (PONV)     Airway   Mallampati: III  Neck ROM: full   Pulmonary     breath sounds clear to auscultation  (+) asthma  mild,                          Cardiovascular   Exercise tolerance: > or = 4 METS  (+) hypertension, , hypercholesterolemia,     (-) angina  ECG reviewed  Rhythm: regular     ROS comment: retinopathy     Neuro/Psych      Endo/Other    (+) diabetes mellitus type 2,      GI/Hepatic/Renal      Comments: Gastroparesis - mild     Other findings:     NPO > 8 hrs      Dental - normal exam                              Anesthesia Plan  Planned anesthetic: MAC    Versed/fentanyl/propofol  Ketamine PRN  Decadron/zofran      ASA 3   Induction: intravenous   Anesthetic plan and risks discussed with: patient  Anesthesia plan special considerations: antiemetics,   Post-op plan: routine recovery      Results for orders placed or performed in visit on 09/20/20   COVID-19 Virus PCR MRF    Specimen: Nasopharyngeal Swab; Respiratory   Result Value Ref Range    COVID-19 VIRUS SPECIMEN SOURCE Nasopharyngeal     2019-nCOV Not Detected

## 2021-07-03 NOTE — ANESTHESIA PREPROCEDURE EVALUATION
Anesthesia Preprocedure Evaluation by Sahuna Sutton MD at 7/31/2020  6:01 AM     Author: Shauna Sutton MD Service: -- Author Type: Physician    Filed: 7/31/2020  6:53 AM Date of Service: 7/31/2020  6:01 AM Status: Addendum    : Shauna Sutton MD (Physician)    Related Notes: Original Note by Shauna Sutton MD (Physician) filed at 7/31/2020  6:33 AM       Anesthesia Evaluation      Patient summary reviewed   History of anesthetic complications (PONV)     Airway   Mallampati: III  Neck ROM: full   Pulmonary     breath sounds clear to auscultation  (+) asthma  mild,                          Cardiovascular   Exercise tolerance: > or = 4 METS  (+) hypertension, , hypercholesterolemia,     (-) angina  ECG reviewed  Rhythm: regular     ROS comment: retinopathy     Neuro/Psych      Endo/Other    (+) diabetes mellitus type 2,      GI/Hepatic/Renal      Comments: Gastroparesis - mild     Other findings:     NPO > 8 hrs     Results for KATIUSKA JUSTIN (MRN [id number]) as of 7/31/2020 7/28/2020 10:51  2019-nCOV: Not Detected        Dental                             Anesthesia Plan  Planned anesthetic: total IV anesthesia and MAC      TIVA  Did fine with MAC previously. Mild gastroparesis.  Zofran, decadron 4 mg    ASA 3     Anesthetic plan and risks discussed with: patient  Anesthesia plan special considerations: antiemetics,   Post-op plan: routine recovery

## 2021-07-03 NOTE — ADDENDUM NOTE
Addendum Note by Bassem Yoder, RN at 7/24/2020  9:37 AM     Author: Bassem Yoder RN Service: -- Author Type: Registered Nurse    Filed: 7/24/2020 10:50 AM Encounter Date: 7/24/2020 Status: Signed    : Bassem Yoder RN (Registered Nurse)    Addended by: BASSEM YODER on: 7/24/2020 10:50 AM        Modules accepted: Orders

## 2021-07-03 NOTE — ADDENDUM NOTE
Addendum Note by Bassem Yoder, RN at 7/24/2020  9:37 AM     Author: Bassem Yoder, RN Service: -- Author Type: Registered Nurse    Filed: 7/27/2020  8:58 AM Encounter Date: 7/24/2020 Status: Signed    : Bassem Yoder RN (Registered Nurse)    Addended by: BASSEM YODER on: 7/27/2020 08:58 AM        Modules accepted: Orders

## 2021-07-13 ENCOUNTER — RECORDS - HEALTHEAST (OUTPATIENT)
Dept: ADMINISTRATIVE | Facility: CLINIC | Age: 61
End: 2021-07-13

## 2021-07-21 ENCOUNTER — RECORDS - HEALTHEAST (OUTPATIENT)
Dept: ADMINISTRATIVE | Facility: CLINIC | Age: 61
End: 2021-07-21

## 2021-08-02 ENCOUNTER — OFFICE VISIT (OUTPATIENT)
Dept: PODIATRY | Facility: CLINIC | Age: 61
End: 2021-08-02
Payer: COMMERCIAL

## 2021-08-02 VITALS — BODY MASS INDEX: 31.49 KG/M2 | WEIGHT: 189 LBS | HEART RATE: 97 BPM | HEIGHT: 65 IN | OXYGEN SATURATION: 97 %

## 2021-08-02 DIAGNOSIS — M21.622 TAILOR'S BUNION OF LEFT FOOT: Primary | ICD-10-CM

## 2021-08-02 DIAGNOSIS — S90.812A ABRASION OF LEFT FOOT, INITIAL ENCOUNTER: ICD-10-CM

## 2021-08-02 PROCEDURE — 99212 OFFICE O/P EST SF 10 MIN: CPT | Performed by: PODIATRIST

## 2021-08-02 RX ORDER — POTASSIUM CHLORIDE 1500 MG/1
20 TABLET, EXTENDED RELEASE ORAL
COMMUNITY
Start: 2020-09-02 | End: 2022-04-27

## 2021-08-02 RX ORDER — SAXAGLIPTIN 5 MG/1
5 TABLET, FILM COATED ORAL
COMMUNITY
Start: 2021-04-23 | End: 2022-04-27

## 2021-08-02 RX ORDER — FLUTICASONE PROPIONATE 110 UG/1
1 AEROSOL, METERED RESPIRATORY (INHALATION)
COMMUNITY
Start: 2020-06-18 | End: 2022-07-13

## 2021-08-02 ASSESSMENT — PAIN SCALES - GENERAL: PAINLEVEL: NO PAIN (0)

## 2021-08-02 ASSESSMENT — MIFFLIN-ST. JEOR: SCORE: 1433.18

## 2021-08-02 NOTE — PROGRESS NOTES
FOOT AND ANKLE SURGERY/PODIATRY PROGRESS NOTE        ASSESSMENT:   Tailor's bunion left foot  Abrasion left foot  DM2      TREATMENT:  -There is a very small superficial abrasion along the medial 1st MPJ left foot. No signs of infection. I recommend she avoid the shoes that may have caused this lesion and monitor for any signs of infection. No dressing necessary due to the superficial nature of the lesion.    -She does have a very mild tailor's bunion on the left foot which appears to be mostly asymptomatic. I discussed that although I do not recommend surgery at this time, remodeling of the head of the 5th metatarsal is possible. She will continue to monitor.     -Patient's questions invited and answered. She was encouraged to call my office with any further questions or concerns.     Chris Vega DPM  [facility]/Foot & Ankle Surgery  [phone]      HPI: Gill Mendez was seen again today for concerns related to an abrasion on her left foot and possible bunion. She believes the abrasion occurred as a result of a pair of shoes. No active drainage noted. She has not had any significant symptoms from the bunion along the lateral left foot.    Past Medical History:   Diagnosis Date     Anxiety      Asthma      Cellulitis      Diabetes mellitus (H)      Essential hypertension     Created by Conversion  Replacement Utility updated for latest IMO load     Gastroparesis      Hyperlipidemia      Hypertension      Other and unspecified hyperlipidemia     Created by Conversion      PONV (postoperative nausea and vomiting)      Shortness of breath      Type II or unspecified type diabetes mellitus without mention of complication, not stated as uncontrolled     Created by Conversion        Past Surgical History:   Procedure Laterality Date     AMPUTATE TOE(S) Right 7/31/2020    Procedure: AMPUTATION, third digit right foot;  Surgeon: Chris Vega DPM;  Location: [facility] [place];  Service:  Podiatry     C REMOVAL OF OVARY(S)      Description: Oophorectomy - Bilateral (Removal Of Both Ovaries);  Recorded: 10/09/2008;     ENDOMETRIAL BIOPSY       FOOT ARTHROPLASTY Right 09/24/2020    Fourth and fifth toe by Dr. Vega     HC REPAIR OF HAMMERTOE,ONE Left 12/7/2020    Procedure: ARTHROPLASTY, digits two, three, four and five left foot;  Surgeon: Chris Vega DPM;  Location: [facility];  Service: Podiatry     HERNIA REPAIR       HYSTERECTOMY       REPAIR TENDON ACHILLES Bilateral     Left was plate  , right was torn     TONSILLECTOMY         Allergies   Allergen Reactions     Aspirin Unknown     Codeine Unknown     Patient states possibly caused N/V but can't remember for sure     Semaglutide Nausea and Vomiting     Acetaminophen-Codeine Rash     Aspirin (Tartrazine Only) [Tartrazine] Rash         Current Outpatient Medications:      albuterol (PROVENTIL HFA;VENTOLIN HFA) 90 mcg/actuation inhaler, [ALBUTEROL (PROVENTIL HFA;VENTOLIN HFA) 90 MCG/ACTUATION INHALER] Inhale 2 puffs every 6 (six) hours as needed for wheezing., Disp: , Rfl:      atorvastatin (LIPITOR) 40 MG tablet, [ATORVASTATIN (LIPITOR) 40 MG TABLET] Take 40 mg by mouth at bedtime. , Disp: , Rfl:      cetirizine (ZYRTEC) 10 MG tablet, [CETIRIZINE (ZYRTEC) 10 MG TABLET] Take 10 mg by mouth daily., Disp: , Rfl:      citalopram (CELEXA) 20 MG tablet, [CITALOPRAM (CELEXA) 20 MG TABLET] Take 20 mg by mouth every morning. , Disp: , Rfl: 0     diazePAM (VALIUM) 5 MG tablet, [DIAZEPAM (VALIUM) 5 MG TABLET] Take 1 tablet (5 mg total) by mouth every 6 (six) hours as needed for anxiety., Disp: 1 tablet, Rfl: 0     empagliflozin 25 mg Tab, [EMPAGLIFLOZIN 25 MG TAB] Take 25 mg by mouth., Disp: , Rfl:      fluticasone propionate (FLOVENT HFA) 110 mcg/actuation inhaler, [FLUTICASONE PROPIONATE (FLOVENT HFA) 110 MCG/ACTUATION INHALER] Inhale 1 puff 2 (two) times a day., Disp: , Rfl:      furosemide (LASIX) 20 MG tablet, [FUROSEMIDE (LASIX) 20 MG  TABLET] Take 20 mg by mouth 2 (two) times a day at 9am and 6pm. , Disp: , Rfl:      glipiZIDE (GLUCOTROL) 5 MG tablet, [GLIPIZIDE (GLUCOTROL) 5 MG TABLET] Take 5 mg by mouth 2 (two) times a day before meals., Disp: , Rfl:      LANTUS SOLOSTAR U-100 INSULIN 100 unit/mL (3 mL) pen, [LANTUS SOLOSTAR U-100 INSULIN 100 UNIT/ML (3 ML) PEN] Inject 40 Units under the skin at bedtime. , Disp: , Rfl: 3     lisinopriL (PRINIVIL,ZESTRIL) 10 MG tablet, [LISINOPRIL (PRINIVIL,ZESTRIL) 10 MG TABLET] Take 10 mg by mouth daily., Disp: , Rfl:      metFORMIN (GLUCOPHAGE) 500 MG tablet, [METFORMIN (GLUCOPHAGE) 500 MG TABLET] Take 2,000 mg by mouth daily with breakfast. , Disp: , Rfl:      oxyCODONE (ROXICODONE) 5 MG immediate release tablet, [OXYCODONE (ROXICODONE) 5 MG IMMEDIATE RELEASE TABLET] Take 1 tablet (5 mg total) by mouth every 6 (six) hours as needed for pain., Disp: 30 tablet, Rfl: 0     pioglitazone (ACTOS) 15 MG tablet, [PIOGLITAZONE (ACTOS) 15 MG TABLET] Take 7.5 mg by mouth daily. , Disp: , Rfl:      potassium chloride ER (KLOR-CON M) 20 MEQ CR tablet, Take 20 mEq by mouth, Disp: , Rfl:      pramipexole (MIRAPEX) 0.25 MG tablet, [PRAMIPEXOLE (MIRAPEX) 0.25 MG TABLET] Take 0.25 mg by mouth 2 (two) times a day. , Disp: , Rfl: 3     saxagliptin (ONGLYZA) 5 MG TABS tablet, Take 5 mg by mouth, Disp: , Rfl:      amLODIPine (NORVASC) 10 MG tablet, [AMLODIPINE (NORVASC) 10 MG TABLET] Take 10 mg by mouth daily.  (Patient not taking: Reported on 8/2/2021), Disp: , Rfl: 0     fluticasone (FLOVENT HFA) 110 MCG/ACT inhaler, Inhale 1 puff into the lungs (Patient not taking: Reported on 8/2/2021), Disp: , Rfl:      potassium chloride 20 mEq TbER, [POTASSIUM CHLORIDE 20 MEQ TBER] Take 20 mEq by mouth daily.  (Patient not taking: Reported on 8/2/2021), Disp: , Rfl:     Family History   Problem Relation Age of Onset     Diabetes Mother      Hypertension Mother      Acute Myocardial Infarction No family hx of        Social History  "    Socioeconomic History     Marital status:      Spouse name: Not on file     Number of children: Not on file     Years of education: Not on file     Highest education level: Not on file   Occupational History     Not on file   Tobacco Use     Smoking status: Never Smoker     Smokeless tobacco: Never Used   Substance and Sexual Activity     Alcohol use: No     Drug use: No     Sexual activity: Not on file   Other Topics Concern     Not on file   Social History Narrative     Not on file     Social Determinants of Health     Financial Resource Strain:      Difficulty of Paying Living Expenses:    Food Insecurity:      Worried About Running Out of Food in the Last Year:      Ran Out of Food in the Last Year:    Transportation Needs:      Lack of Transportation (Medical):      Lack of Transportation (Non-Medical):    Physical Activity:      Days of Exercise per Week:      Minutes of Exercise per Session:    Stress:      Feeling of Stress :    Social Connections:      Frequency of Communication with Friends and Family:      Frequency of Social Gatherings with Friends and Family:      Attends Confucianist Services:      Active Member of Clubs or Organizations:      Attends Club or Organization Meetings:      Marital Status:    Intimate Partner Violence:      Fear of Current or Ex-Partner:      Emotionally Abused:      Physically Abused:      Sexually Abused:        10 point Review of Systems is negative except for bunion which is noted in HPI.     Pulse 97   Ht 1.651 m (5' 5\")   Wt 85.7 kg (189 lb)   SpO2 97%   BMI 31.45 kg/m      BMI= Body mass index is 31.45 kg/m .    OBJECTIVE:  General appearance: Patient is alert and fully cooperative with history & exam.  No sign of distress is noted during the visit.    Vascular: Dorsalis pedis and posterior tibial pulses are palpable. There is pedal hair growth left.  CFT < 3 sec from anterior tibial surface to distal digits left. There is no appreciable edema " noted.  Dermatologic: Superficial small abrasion medial 1st MPJ left foot, does not probe to deep tissues. No erythema or active bleeding noted left foot.   Neurologic: Diminished to light touch left.   Musculoskeletal: Mild tailor's bunion left foot.    Imaging:     No results found.

## 2021-08-02 NOTE — LETTER
8/2/2021         RE: Gill Mendez  [address] [facility] [address]        Dear Colleague,    Thank you for referring your patient, Gill Mendez, to the [facility]. Please see a copy of my visit note below.        FOOT AND ANKLE SURGERY/PODIATRY PROGRESS NOTE        ASSESSMENT:   Tailor's bunion left foot  Abrasion left foot  DM2      TREATMENT:  -There is a very small superficial abrasion along the medial 1st MPJ left foot. No signs of infection. I recommend she avoid the shoes that may have caused this lesion and monitor for any signs of infection. No dressing necessary due to the superficial nature of the lesion.    -She does have a very mild tailor's bunion on the left foot which appears to be mostly asymptomatic. I discussed that although I do not recommend surgery at this time, remodeling of the head of the 5th metatarsal is possible. She will continue to monitor.     -Patient's questions invited and answered. She was encouraged to call my office with any further questions or concerns.     Chris Vega DPM  [facility] Podiatry/Foot & Ankle Surgery  [phone]      HPI: Gill Mendez was seen again today for concerns related to an abrasion on her left foot and possible bunion. She believes the abrasion occurred as a result of a pair of shoes. No active drainage noted. She has not had any significant symptoms from the bunion along the lateral left foot.    Past Medical History:   Diagnosis Date     Anxiety      Asthma      Cellulitis      Diabetes mellitus (H)      Essential hypertension     Created by Conversion  Replacement Utility updated for latest IMO load     Gastroparesis      Hyperlipidemia      Hypertension      Other and unspecified hyperlipidemia     Created by Conversion      PONV (postoperative nausea and vomiting)      Shortness of breath      Type II or unspecified type diabetes mellitus without mention of complication, not stated as uncontrolled      Created by Conversion        Past Surgical History:   Procedure Laterality Date     AMPUTATE TOE(S) Right 7/31/2020    Procedure: AMPUTATION, third digit right foot;  Surgeon: Chris Vega DPM;  Location: [facility];  Service: Podiatry     C REMOVAL OF OVARY(S)      Description: Oophorectomy - Bilateral (Removal Of Both Ovaries);  Recorded: 10/09/2008;     ENDOMETRIAL BIOPSY       FOOT ARTHROPLASTY Right 09/24/2020    Fourth and fifth toe by Dr. Vega     HC REPAIR OF HAMMERTOE,ONE Left 12/7/2020    Procedure: ARTHROPLASTY, digits two, three, four and five left foot;  Surgeon: Chris Vega DPM;  Location: [facility];  Service: Podiatry     HERNIA REPAIR       HYSTERECTOMY       REPAIR TENDON ACHILLES Bilateral     Left was plate  , right was torn     TONSILLECTOMY         Allergies   Allergen Reactions     Aspirin Unknown     Codeine Unknown     Patient states possibly caused N/V but can't remember for sure     Semaglutide Nausea and Vomiting     Acetaminophen-Codeine Rash     Aspirin (Tartrazine Only) [Tartrazine] Rash         Current Outpatient Medications:      albuterol (PROVENTIL HFA;VENTOLIN HFA) 90 mcg/actuation inhaler, [ALBUTEROL (PROVENTIL HFA;VENTOLIN HFA) 90 MCG/ACTUATION INHALER] Inhale 2 puffs every 6 (six) hours as needed for wheezing., Disp: , Rfl:      atorvastatin (LIPITOR) 40 MG tablet, [ATORVASTATIN (LIPITOR) 40 MG TABLET] Take 40 mg by mouth at bedtime. , Disp: , Rfl:      cetirizine (ZYRTEC) 10 MG tablet, [CETIRIZINE (ZYRTEC) 10 MG TABLET] Take 10 mg by mouth daily., Disp: , Rfl:      citalopram (CELEXA) 20 MG tablet, [CITALOPRAM (CELEXA) 20 MG TABLET] Take 20 mg by mouth every morning. , Disp: , Rfl: 0     diazePAM (VALIUM) 5 MG tablet, [DIAZEPAM (VALIUM) 5 MG TABLET] Take 1 tablet (5 mg total) by mouth every 6 (six) hours as needed for anxiety., Disp: 1 tablet, Rfl: 0     empagliflozin 25 mg Tab, [EMPAGLIFLOZIN 25 MG TAB] Take 25 mg by mouth., Disp: , Rfl:       fluticasone propionate (FLOVENT HFA) 110 mcg/actuation inhaler, [FLUTICASONE PROPIONATE (FLOVENT HFA) 110 MCG/ACTUATION INHALER] Inhale 1 puff 2 (two) times a day., Disp: , Rfl:      furosemide (LASIX) 20 MG tablet, [FUROSEMIDE (LASIX) 20 MG TABLET] Take 20 mg by mouth 2 (two) times a day at 9am and 6pm. , Disp: , Rfl:      glipiZIDE (GLUCOTROL) 5 MG tablet, [GLIPIZIDE (GLUCOTROL) 5 MG TABLET] Take 5 mg by mouth 2 (two) times a day before meals., Disp: , Rfl:      LANTUS SOLOSTAR U-100 INSULIN 100 unit/mL (3 mL) pen, [LANTUS SOLOSTAR U-100 INSULIN 100 UNIT/ML (3 ML) PEN] Inject 40 Units under the skin at bedtime. , Disp: , Rfl: 3     lisinopriL (PRINIVIL,ZESTRIL) 10 MG tablet, [LISINOPRIL (PRINIVIL,ZESTRIL) 10 MG TABLET] Take 10 mg by mouth daily., Disp: , Rfl:      metFORMIN (GLUCOPHAGE) 500 MG tablet, [METFORMIN (GLUCOPHAGE) 500 MG TABLET] Take 2,000 mg by mouth daily with breakfast. , Disp: , Rfl:      oxyCODONE (ROXICODONE) 5 MG immediate release tablet, [OXYCODONE (ROXICODONE) 5 MG IMMEDIATE RELEASE TABLET] Take 1 tablet (5 mg total) by mouth every 6 (six) hours as needed for pain., Disp: 30 tablet, Rfl: 0     pioglitazone (ACTOS) 15 MG tablet, [PIOGLITAZONE (ACTOS) 15 MG TABLET] Take 7.5 mg by mouth daily. , Disp: , Rfl:      potassium chloride ER (KLOR-CON M) 20 MEQ CR tablet, Take 20 mEq by mouth, Disp: , Rfl:      pramipexole (MIRAPEX) 0.25 MG tablet, [PRAMIPEXOLE (MIRAPEX) 0.25 MG TABLET] Take 0.25 mg by mouth 2 (two) times a day. , Disp: , Rfl: 3     saxagliptin (ONGLYZA) 5 MG TABS tablet, Take 5 mg by mouth, Disp: , Rfl:      amLODIPine (NORVASC) 10 MG tablet, [AMLODIPINE (NORVASC) 10 MG TABLET] Take 10 mg by mouth daily.  (Patient not taking: Reported on 8/2/2021), Disp: , Rfl: 0     fluticasone (FLOVENT HFA) 110 MCG/ACT inhaler, Inhale 1 puff into the lungs (Patient not taking: Reported on 8/2/2021), Disp: , Rfl:      potassium chloride 20 mEq TbER, [POTASSIUM CHLORIDE 20 MEQ TBER] Take 20 mEq by mouth  "daily.  (Patient not taking: Reported on 8/2/2021), Disp: , Rfl:     Family History   Problem Relation Age of Onset     Diabetes Mother      Hypertension Mother      Acute Myocardial Infarction No family hx of        Social History     Socioeconomic History     Marital status:      Spouse name: Not on file     Number of children: Not on file     Years of education: Not on file     Highest education level: Not on file   Occupational History     Not on file   Tobacco Use     Smoking status: Never Smoker     Smokeless tobacco: Never Used   Substance and Sexual Activity     Alcohol use: No     Drug use: No     Sexual activity: Not on file   Other Topics Concern     Not on file   Social History Narrative     Not on file     Social Determinants of Health     Financial Resource Strain:      Difficulty of Paying Living Expenses:    Food Insecurity:      Worried About Running Out of Food in the Last Year:      Ran Out of Food in the Last Year:    Transportation Needs:      Lack of Transportation (Medical):      Lack of Transportation (Non-Medical):    Physical Activity:      Days of Exercise per Week:      Minutes of Exercise per Session:    Stress:      Feeling of Stress :    Social Connections:      Frequency of Communication with Friends and Family:      Frequency of Social Gatherings with Friends and Family:      Attends Congregational Services:      Active Member of Clubs or Organizations:      Attends Club or Organization Meetings:      Marital Status:    Intimate Partner Violence:      Fear of Current or Ex-Partner:      Emotionally Abused:      Physically Abused:      Sexually Abused:        10 point Review of Systems is negative except for bunion which is noted in HPI.     Pulse 97   Ht 1.651 m (5' 5\")   Wt 85.7 kg (189 lb)   SpO2 97%   BMI 31.45 kg/m      BMI= Body mass index is 31.45 kg/m .    OBJECTIVE:  General appearance: Patient is alert and fully cooperative with history & exam.  No sign of distress is " noted during the visit.    Vascular: Dorsalis pedis and posterior tibial pulses are palpable. There is pedal hair growth left.  CFT < 3 sec from anterior tibial surface to distal digits left. There is no appreciable edema noted.  Dermatologic: Superficial small abrasion medial 1st MPJ left foot, does not probe to deep tissues. No erythema or active bleeding noted left foot.   Neurologic: Diminished to light touch left.   Musculoskeletal: Mild tailor's bunion left foot.    Imaging:     No results found.         Again, thank you for allowing me to participate in the care of your patient.        Sincerely,        Chris Vega DPM

## 2021-10-11 ENCOUNTER — HEALTH MAINTENANCE LETTER (OUTPATIENT)
Age: 61
End: 2021-10-11

## 2021-12-05 ENCOUNTER — HEALTH MAINTENANCE LETTER (OUTPATIENT)
Age: 61
End: 2021-12-05

## 2022-01-27 ENCOUNTER — OFFICE VISIT (OUTPATIENT)
Dept: FAMILY MEDICINE | Facility: CLINIC | Age: 62
End: 2022-01-27
Payer: COMMERCIAL

## 2022-01-27 ENCOUNTER — TELEPHONE (OUTPATIENT)
Dept: PODIATRY | Facility: CLINIC | Age: 62
End: 2022-01-27
Payer: COMMERCIAL

## 2022-01-27 VITALS
SYSTOLIC BLOOD PRESSURE: 136 MMHG | WEIGHT: 186 LBS | DIASTOLIC BLOOD PRESSURE: 81 MMHG | RESPIRATION RATE: 16 BRPM | TEMPERATURE: 98.3 F | BODY MASS INDEX: 30.95 KG/M2 | HEART RATE: 97 BPM | OXYGEN SATURATION: 99 %

## 2022-01-27 DIAGNOSIS — L03.116 CELLULITIS OF LEFT LOWER EXTREMITY: ICD-10-CM

## 2022-01-27 DIAGNOSIS — M79.89 SWELLING OF LEFT FOOT: Primary | ICD-10-CM

## 2022-01-27 PROCEDURE — 99214 OFFICE O/P EST MOD 30 MIN: CPT | Performed by: FAMILY MEDICINE

## 2022-01-27 RX ORDER — CEPHALEXIN 500 MG/1
500 CAPSULE ORAL 3 TIMES DAILY
Qty: 30 CAPSULE | Refills: 0 | Status: SHIPPED | OUTPATIENT
Start: 2022-01-27 | End: 2022-02-06

## 2022-01-27 NOTE — TELEPHONE ENCOUNTER
Spoke to patient.  Picture sent via [employer].  Dr. Vega has not seen patient since early August 2021.  Advised patient to go to walk in or urgent care to be evaluated.  Patient stated that even though it has been this long, it is the same foot Dr. Vega was treating prior.  Writer again began to ask patient to go to walk in or urgent care and the call was disconnected.  Attempted to call patient back, no answer.  Will send recommendations to patient through [employer].

## 2022-01-27 NOTE — TELEPHONE ENCOUNTER
Patient states that her foot is significantly swollen, tight, and there are red lines on the foot. Patient will send photos via [employer] for review, and would like a call back to discuss. She would like to know if she needs to come in to be seen right away or what she should do. Ph: [phone]

## 2022-01-27 NOTE — PROGRESS NOTES
OUTPATIENT VISIT NOTE                                                   Date of Visit: 1/27/2022     Chief Complaint   Patient presents with:  Foot Swelling: left foot swollen  red gong up leg noticed it last night            History of Present Illness   Gill Mendez is a 61 year old adult with DM and diabetic neuropathy in for evaluation of left foot swelling that she first noted last night.  Has also noted redness and warmth of the foot.  Fell about three weeks ago, but no pain following that fall  Has had no fever.  Has noted something on the bottom of her left foot than is a little tender.  She states she has had multiple episodes of cellulitis in the past.    As noted patient has diabetic neuropathy.  She has had two toes amputated on the right foot.       MEDICATIONS   Current Outpatient Medications   Medication     albuterol (PROVENTIL HFA;VENTOLIN HFA) 90 mcg/actuation inhaler     atorvastatin (LIPITOR) 40 MG tablet     cephALEXin (KEFLEX) 500 MG capsule     cetirizine (ZYRTEC) 10 MG tablet     citalopram (CELEXA) 20 MG tablet     fluticasone (FLOVENT HFA) 110 MCG/ACT inhaler     furosemide (LASIX) 20 MG tablet     LANTUS SOLOSTAR U-100 INSULIN 100 unit/mL (3 mL) pen     lisinopriL (PRINIVIL,ZESTRIL) 10 MG tablet     metFORMIN (GLUCOPHAGE) 500 MG tablet     pioglitazone (ACTOS) 15 MG tablet     potassium chloride 20 mEq TbER     amLODIPine (NORVASC) 10 MG tablet     diazePAM (VALIUM) 5 MG tablet     empagliflozin 25 mg Tab     fluticasone propionate (FLOVENT HFA) 110 mcg/actuation inhaler     glipiZIDE (GLUCOTROL) 5 MG tablet     oxyCODONE (ROXICODONE) 5 MG immediate release tablet     potassium chloride ER (KLOR-CON M) 20 MEQ CR tablet     pramipexole (MIRAPEX) 0.25 MG tablet     saxagliptin (ONGLYZA) 5 MG TABS tablet     No current facility-administered medications for this visit.         SOCIAL HISTORY   Social History     Tobacco Use     Smoking status: Never Smoker     Smokeless tobacco: Never Used    Substance Use Topics     Alcohol use: No           Physical Exam   Vitals:    01/27/22 1503   BP: 136/81   Pulse: 97   Resp: 16   Temp: 98.3  F (36.8  C)   TempSrc: Oral   SpO2: 99%   Weight: 84.4 kg (186 lb)        GEN:  NAD  LEFT FOOT:  Generalized swelling and warmth over the foot.  Small Wound on the sole of foot over the 1st MTP joint--mildly tender.  Normal DP pulse.  Mild tenderness along 5th metatarsal.    Posterior calf without tenderness or cord.     Diagnostic Studies   XRAY:  X-RAY left foot:    No  dislocation. Chip--probably old off base of 2nd proximal phalanx . No other fracture Personally reviewed.          Assessment and Plan     Swelling of left foot  - XR Foot Left G/E 3 Views    Cellulitis of left lower extremity  - cephALEXin (KEFLEX) 500 MG capsule  Dispense: 30 capsule; Refill: 0    Small chip fracture of 2nd proximal phalanx.  Non displaced--appears old.  ? Happened two weeks ago with fall.    Advised rigid soled shoe.    Cellulitis--elevate, antibiotic as directed.  Avoid extensive weight bearing.  Recheck promptly if worsening--fever, increasing swelling, increasing pain, increased erythema                     Discussed signs / symptoms that warrant urgent / emergent medical attention.     Recheck if worsening or not improving.       Ramakrishna Peoples MD          Pertinent History     The following portions of the patient's history were reviewed and updated as appropriate: allergies, current medications, past family history, past medical history, past social history, past surgical history and problem list.

## 2022-01-30 ENCOUNTER — HEALTH MAINTENANCE LETTER (OUTPATIENT)
Age: 62
End: 2022-01-30

## 2022-01-31 ENCOUNTER — TELEPHONE (OUTPATIENT)
Dept: PODIATRY | Facility: CLINIC | Age: 62
End: 2022-01-31
Payer: COMMERCIAL

## 2022-01-31 NOTE — TELEPHONE ENCOUNTER
Swelling of left foot  - XR Foot Left G/E 3 Views     Cellulitis of left lower extremity  - cephALEXin (KEFLEX) 500 MG capsule  Dispense: 30 capsule; Refill: 0     Small chip fracture of 2nd proximal phalanx.  Non displaced--appears old.  ? Happened two weeks ago with fall.    Advised rigid soled shoe.    Recommended from Urgent care-     Dr patel can you review pt chart and see if she should see you or someone else.

## 2022-01-31 NOTE — TELEPHONE ENCOUNTER
Patient was seen at walk in care last week for what she believes is cellulitis on her foot. She was started on abx, but today she says the area looks darker/a little more red. Offered to schedule follow up appointment with Dr. Vega on Thursday, but patient would like to speak with nursing first to see if it is OK to wait to be seen until then or if she needs to go back to walk in care.   Asked pt to send photos; she states that she sent photos via [employer] last week and the area looks about the same, there is just an increase in redness.    [phone]

## 2022-01-31 NOTE — TELEPHONE ENCOUNTER
Please see chart notes before calling pt- she hung up on nurse last week- does not look like visit for jorge kilpatrick- looks like venous insufficiency- I would encourage her to see her PCP first.

## 2022-02-03 ENCOUNTER — OFFICE VISIT (OUTPATIENT)
Dept: PODIATRY | Facility: CLINIC | Age: 62
End: 2022-02-03
Attending: PODIATRIST
Payer: COMMERCIAL

## 2022-02-03 VITALS — RESPIRATION RATE: 18 BRPM | SYSTOLIC BLOOD PRESSURE: 138 MMHG | HEART RATE: 64 BPM | DIASTOLIC BLOOD PRESSURE: 76 MMHG

## 2022-02-03 DIAGNOSIS — E08.621 DIABETIC ULCER OF LEFT MIDFOOT ASSOCIATED WITH DIABETES MELLITUS DUE TO UNDERLYING CONDITION, LIMITED TO BREAKDOWN OF SKIN (H): ICD-10-CM

## 2022-02-03 DIAGNOSIS — L97.421 DIABETIC ULCER OF LEFT MIDFOOT ASSOCIATED WITH DIABETES MELLITUS DUE TO UNDERLYING CONDITION, LIMITED TO BREAKDOWN OF SKIN (H): ICD-10-CM

## 2022-02-03 DIAGNOSIS — L02.619 CELLULITIS AND ABSCESS OF FOOT: Primary | ICD-10-CM

## 2022-02-03 DIAGNOSIS — L03.119 CELLULITIS AND ABSCESS OF FOOT: Primary | ICD-10-CM

## 2022-02-03 PROCEDURE — 10060 I&D ABSCESS SIMPLE/SINGLE: CPT | Mod: 59 | Performed by: PODIATRIST

## 2022-02-03 PROCEDURE — 87070 CULTURE OTHR SPECIMN AEROBIC: CPT | Performed by: PODIATRIST

## 2022-02-03 PROCEDURE — 11042 DBRDMT SUBQ TIS 1ST 20SQCM/<: CPT | Performed by: PODIATRIST

## 2022-02-03 PROCEDURE — 99213 OFFICE O/P EST LOW 20 MIN: CPT | Mod: 25 | Performed by: PODIATRIST

## 2022-02-03 RX ORDER — SULFAMETHOXAZOLE/TRIMETHOPRIM 800-160 MG
1 TABLET ORAL 2 TIMES DAILY
Qty: 20 TABLET | Refills: 0 | Status: SHIPPED | OUTPATIENT
Start: 2022-02-03 | End: 2022-04-27

## 2022-02-03 ASSESSMENT — PAIN SCALES - GENERAL: PAINLEVEL: MODERATE PAIN (5)

## 2022-02-03 NOTE — LETTER
2/3/2022         RE: Gill Mendez  [address] [address]        Dear Colleague,    Thank you for referring your patient, Gill Mendez, to the [facility]. Please see a copy of my visit note below.    FOOT AND ANKLE SURGERY/PODIATRY Progress Note      ASSESSMENT:   Cellulitis left foot  Diabetic Ulceration left foot     A new wound was identified today: yes,  it is located left foot.    TREATMENT:  -I discussed with the patient that she has non-viable tissue along the plantar left foot with fluctuance indication of a localized abscess. I recommend I&D today. She consents to this procedure.     -A #15 blade was used to sharply excisionally debride non-viable tissue along the plantar left midfoot which tracked from the ulceration just proximal to the 2nd MPJ into the plantar proximal left arch. Serous drainage was noted. Aerobic/anaerobic culture obtained. After removal of all non-viable tissue, there were noted to be three full thickness wounds which did not probe to deep tissues.     -After discussion of risk factors and consent obtained 2% Lidocaine HCL jelly was applied, under clean conditions, the left and foot ulceration(s) were debrided using #15 blade scalpel.  Devitalized and nonviable tissue, along with any fibrin and slough, was removed to improve granulation tissue formation, stimulate wound healing, decrease overall bacteria load, disrupt biofilm formation and decrease edge senescence. Wound drainage was scant Serous. Total excisional debridement was 8 sq cm into the subcutaneous tissue with a depth of 0.2 cm.   Ulcers were improved afterwards and .  Measures were as noted on the flow sheet. A gauze dressing was applied. She will continue to apply a gauze dressing qday.    -CAM boot dispensed today. Recommend limited walking on the left foot.     -I have asked her to discontinue keflex and begin Bactrim pending culture report.     -She will  follow-up in one week.     Chris Vega DPM  [facility]      HPI: Gill Mendez was seen again today for a new ulcer on the plantar left foot. She first noticed the sore about one week ago and shortly after noticed redness in the left foot. After starting keflex, she has noticed decreased redness. Denies N/V/F/C.       Past Medical History:   Diagnosis Date     Anxiety      Asthma      Cellulitis      Diabetes mellitus (H)      Essential hypertension     Created by Conversion  Replacement Utility updated for latest IMO load     Gastroparesis      Hyperlipidemia      Hypertension      Other and unspecified hyperlipidemia     Created by Conversion      PONV (postoperative nausea and vomiting)      Shortness of breath      Type II or unspecified type diabetes mellitus without mention of complication, not stated as uncontrolled     Created by Conversion        Past Surgical History:   Procedure Laterality Date     AMPUTATE TOE(S) Right 7/31/2020    Procedure: AMPUTATION, third digit right foot;  Surgeon: Chris Vega DPM;  Location: [facility];  Service: Podiatry     ENDOMETRIAL BIOPSY       FOOT ARTHROPLASTY Right 09/24/2020    Fourth and fifth toe by Dr. Vega     HC REPAIR OF HAMMERTOE,ONE Left 12/7/2020    Procedure: ARTHROPLASTY, digits two, three, four and five left foot;  Surgeon: Chris Vega DPM;  Location: [facility];  Service: Podiatry     HERNIA REPAIR       HYSTERECTOMY       REPAIR TENDON ACHILLES Bilateral     Left was plate  , right was torn     TONSILLECTOMY       ZZC REMOVAL OF OVARY(S)      Description: Oophorectomy - Bilateral (Removal Of Both Ovaries);  Recorded: 10/09/2008;       Allergies   Allergen Reactions     Codeine Unknown     Patient states possibly caused N/V but can't remember for sure     Semaglutide Nausea and Vomiting     Acetaminophen-Codeine Rash     Aspirin Rash         Current Outpatient Medications:       sulfamethoxazole-trimethoprim (BACTRIM DS) 800-160 MG tablet, Take 1 tablet by mouth 2 times daily, Disp: 20 tablet, Rfl: 0     albuterol (PROVENTIL HFA;VENTOLIN HFA) 90 mcg/actuation inhaler, [ALBUTEROL (PROVENTIL HFA;VENTOLIN HFA) 90 MCG/ACTUATION INHALER] Inhale 2 puffs every 6 (six) hours as needed for wheezing., Disp: , Rfl:      amLODIPine (NORVASC) 10 MG tablet, [AMLODIPINE (NORVASC) 10 MG TABLET] Take 10 mg by mouth daily.  (Patient not taking: Reported on 8/2/2021), Disp: , Rfl: 0     atorvastatin (LIPITOR) 40 MG tablet, [ATORVASTATIN (LIPITOR) 40 MG TABLET] Take 40 mg by mouth at bedtime. , Disp: , Rfl:      cephALEXin (KEFLEX) 500 MG capsule, Take 1 capsule (500 mg) by mouth 3 times daily for 10 days, Disp: 30 capsule, Rfl: 0     cetirizine (ZYRTEC) 10 MG tablet, [CETIRIZINE (ZYRTEC) 10 MG TABLET] Take 10 mg by mouth daily., Disp: , Rfl:      citalopram (CELEXA) 20 MG tablet, [CITALOPRAM (CELEXA) 20 MG TABLET] Take 20 mg by mouth every morning. , Disp: , Rfl: 0     diazePAM (VALIUM) 5 MG tablet, [DIAZEPAM (VALIUM) 5 MG TABLET] Take 1 tablet (5 mg total) by mouth every 6 (six) hours as needed for anxiety. (Patient not taking: Reported on 1/27/2022), Disp: 1 tablet, Rfl: 0     empagliflozin 25 mg Tab, [EMPAGLIFLOZIN 25 MG TAB] Take 25 mg by mouth. (Patient not taking: Reported on 1/27/2022), Disp: , Rfl:      fluticasone (FLOVENT HFA) 110 MCG/ACT inhaler, Inhale 1 puff into the lungs , Disp: , Rfl:      fluticasone propionate (FLOVENT HFA) 110 mcg/actuation inhaler, [FLUTICASONE PROPIONATE (FLOVENT HFA) 110 MCG/ACTUATION INHALER] Inhale 1 puff 2 (two) times a day. (Patient not taking: Reported on 1/27/2022), Disp: , Rfl:      furosemide (LASIX) 20 MG tablet, [FUROSEMIDE (LASIX) 20 MG TABLET] Take 20 mg by mouth 2 (two) times a day at 9am and 6pm. , Disp: , Rfl:      glipiZIDE (GLUCOTROL) 5 MG tablet, [GLIPIZIDE (GLUCOTROL) 5 MG TABLET] Take 5 mg by mouth 2 (two) times a day before meals. (Patient not  taking: Reported on 1/27/2022), Disp: , Rfl:      LANTUS SOLOSTAR U-100 INSULIN 100 unit/mL (3 mL) pen, [LANTUS SOLOSTAR U-100 INSULIN 100 UNIT/ML (3 ML) PEN] Inject 40 Units under the skin at bedtime. , Disp: , Rfl: 3     lisinopriL (PRINIVIL,ZESTRIL) 10 MG tablet, [LISINOPRIL (PRINIVIL,ZESTRIL) 10 MG TABLET] Take 10 mg by mouth daily., Disp: , Rfl:      metFORMIN (GLUCOPHAGE) 500 MG tablet, [METFORMIN (GLUCOPHAGE) 500 MG TABLET] Take 2,000 mg by mouth daily with breakfast. , Disp: , Rfl:      oxyCODONE (ROXICODONE) 5 MG immediate release tablet, [OXYCODONE (ROXICODONE) 5 MG IMMEDIATE RELEASE TABLET] Take 1 tablet (5 mg total) by mouth every 6 (six) hours as needed for pain. (Patient not taking: Reported on 1/27/2022), Disp: 30 tablet, Rfl: 0     pioglitazone (ACTOS) 15 MG tablet, [PIOGLITAZONE (ACTOS) 15 MG TABLET] Take 7.5 mg by mouth daily. , Disp: , Rfl:      potassium chloride 20 mEq TbER, Take 20 mEq by mouth daily , Disp: , Rfl:      potassium chloride ER (KLOR-CON M) 20 MEQ CR tablet, Take 20 mEq by mouth, Disp: , Rfl:      pramipexole (MIRAPEX) 0.25 MG tablet, [PRAMIPEXOLE (MIRAPEX) 0.25 MG TABLET] Take 0.25 mg by mouth 2 (two) times a day. , Disp: , Rfl: 3     saxagliptin (ONGLYZA) 5 MG TABS tablet, Take 5 mg by mouth, Disp: , Rfl:     Review of Systems - 10 point Review of Systems is negative except for left foot infection which is noted in HPI.      OBJECTIVE:  /76   Pulse 64   Resp 18   General appearance: Patient is alert and fully cooperative with history & exam.  No sign of distress is noted during the visit.    Vascular: Dorsalis pedis palpableLeft.  Dermatologic:    VASC Wound left plantart foot distal (Active)   Post Size Length 0.7 02/03/22 1300   Post Size Width 1 02/03/22 1300   Post Size Depth 0.1 02/03/22 1300   Post Total Sq cm 0.7 02/03/22 1300       VASC Wound Left plantar foot middle (Active)   Post Size Length 2.6 02/03/22 1300   Post Size Width 2.4 02/03/22 1300   Post Size  Depth 0.1 02/03/22 1300   Post Total Sq cm 6.24 02/03/22 1300       VASC Wound Left plantar foot proximal (Active)   Post Size Length 0.4 02/03/22 1300   Post Size Width 0.6 02/03/22 1300   Post Size Depth 0.1 02/03/22 1300   Post Total Sq cm 0.24 02/03/22 1300   Ulceration plantar left foot x3, do not probe to deep tissues. There is superficial fluctuance that connects the three ulcers, after debridement there is serous drainage. Erythema from the plantar ulcer extends to the medial arch left foot, no ascending cellulitis noted.   Neurologic: Diminished to light touch Left.  Musculoskeletal: Contracted digits noted Left.    Imaging:     XR Foot Left G/E 3 Views    Result Date: 1/27/2022  EXAM: XR FOOT LEFT G/E 3 VIEWS LOCATION: [facility] DATE/TIME: 1/27/2022 3:36 PM INDICATION: Swelling of left foot. COMPARISON: None.     IMPRESSION: Postoperative changes of screw fixation across an old fracture or osteotomy of the calcaneus. This appears well-healed. There is degenerative change at the subtalar joints. Degenerative change at the first TMT joint. Postoperative changes to the second through fourth toes. No evidence for acute fracture.         Picture:                 Again, thank you for allowing me to participate in the care of your patient.        Sincerely,        Chris Vega DPM

## 2022-02-03 NOTE — PROGRESS NOTES
FOOT AND ANKLE SURGERY/PODIATRY Progress Note      ASSESSMENT:   Cellulitis left foot  Diabetic Ulceration left foot     A new wound was identified today: yes,  it is located left foot.    TREATMENT:  -I discussed with the patient that she has non-viable tissue along the plantar left foot with fluctuance indication of a localized abscess. I recommend I&D today. She consents to this procedure.     -A #15 blade was used to sharply excisionally debride non-viable tissue along the plantar left midfoot which tracked from the ulceration just proximal to the 2nd MPJ into the plantar proximal left arch. Serous drainage was noted. Aerobic/anaerobic culture obtained. After removal of all non-viable tissue, there were noted to be three full thickness wounds which did not probe to deep tissues.     -After discussion of risk factors and consent obtained 2% Lidocaine HCL jelly was applied, under clean conditions, the left and foot ulceration(s) were debrided using #15 blade scalpel.  Devitalized and nonviable tissue, along with any fibrin and slough, was removed to improve granulation tissue formation, stimulate wound healing, decrease overall bacteria load, disrupt biofilm formation and decrease edge senescence. Wound drainage was scant Serous. Total excisional debridement was 8 sq cm into the subcutaneous tissue with a depth of 0.2 cm.   Ulcers were improved afterwards and .  Measures were as noted on the flow sheet. A gauze dressing was applied. She will continue to apply a gauze dressing qday.    -CAM boot dispensed today. Recommend limited walking on the left foot.     -I have asked her to discontinue keflex and begin Bactrim pending culture report.     -She will follow-up in one week.     Chris Vega DPM  [facility] [place]      HPI: Gill Mendez was seen again today for a new ulcer on the plantar left foot. She first noticed the sore about one week ago and shortly after noticed redness in the  left foot. After starting keflex, she has noticed decreased redness. Denies N/V/F/C.       Past Medical History:   Diagnosis Date     Anxiety      Asthma      Cellulitis      Diabetes mellitus (H)      Essential hypertension     Created by Conversion  Replacement Utility updated for latest IMO load     Gastroparesis      Hyperlipidemia      Hypertension      Other and unspecified hyperlipidemia     Created by Conversion      PONV (postoperative nausea and vomiting)      Shortness of breath      Type II or unspecified type diabetes mellitus without mention of complication, not stated as uncontrolled     Created by Conversion        Past Surgical History:   Procedure Laterality Date     AMPUTATE TOE(S) Right 7/31/2020    Procedure: AMPUTATION, third digit right foot;  Surgeon: Chris Vega DPM;  Location: [facility] OR;  Service: Podiatry     ENDOMETRIAL BIOPSY       FOOT ARTHROPLASTY Right 09/24/2020    Fourth and fifth toe by Dr. Vega     HC REPAIR OF HAMMERTOE,ONE Left 12/7/2020    Procedure: ARTHROPLASTY, digits two, three, four and five left foot;  Surgeon: Chris Vega DPM;  Location: [facility] OR;  Service: Podiatry     HERNIA REPAIR       HYSTERECTOMY       REPAIR TENDON ACHILLES Bilateral     Left was plate  , right was torn     TONSILLECTOMY       ZZC REMOVAL OF OVARY(S)      Description: Oophorectomy - Bilateral (Removal Of Both Ovaries);  Recorded: 10/09/2008;       Allergies   Allergen Reactions     Codeine Unknown     Patient states possibly caused N/V but can't remember for sure     Semaglutide Nausea and Vomiting     Acetaminophen-Codeine Rash     Aspirin Rash         Current Outpatient Medications:      sulfamethoxazole-trimethoprim (BACTRIM DS) 800-160 MG tablet, Take 1 tablet by mouth 2 times daily, Disp: 20 tablet, Rfl: 0     albuterol (PROVENTIL HFA;VENTOLIN HFA) 90 mcg/actuation inhaler, [ALBUTEROL (PROVENTIL HFA;VENTOLIN HFA) 90 MCG/ACTUATION INHALER] Inhale 2 puffs every  6 (six) hours as needed for wheezing., Disp: , Rfl:      amLODIPine (NORVASC) 10 MG tablet, [AMLODIPINE (NORVASC) 10 MG TABLET] Take 10 mg by mouth daily.  (Patient not taking: Reported on 8/2/2021), Disp: , Rfl: 0     atorvastatin (LIPITOR) 40 MG tablet, [ATORVASTATIN (LIPITOR) 40 MG TABLET] Take 40 mg by mouth at bedtime. , Disp: , Rfl:      cephALEXin (KEFLEX) 500 MG capsule, Take 1 capsule (500 mg) by mouth 3 times daily for 10 days, Disp: 30 capsule, Rfl: 0     cetirizine (ZYRTEC) 10 MG tablet, [CETIRIZINE (ZYRTEC) 10 MG TABLET] Take 10 mg by mouth daily., Disp: , Rfl:      citalopram (CELEXA) 20 MG tablet, [CITALOPRAM (CELEXA) 20 MG TABLET] Take 20 mg by mouth every morning. , Disp: , Rfl: 0     diazePAM (VALIUM) 5 MG tablet, [DIAZEPAM (VALIUM) 5 MG TABLET] Take 1 tablet (5 mg total) by mouth every 6 (six) hours as needed for anxiety. (Patient not taking: Reported on 1/27/2022), Disp: 1 tablet, Rfl: 0     empagliflozin 25 mg Tab, [EMPAGLIFLOZIN 25 MG TAB] Take 25 mg by mouth. (Patient not taking: Reported on 1/27/2022), Disp: , Rfl:      fluticasone (FLOVENT HFA) 110 MCG/ACT inhaler, Inhale 1 puff into the lungs , Disp: , Rfl:      fluticasone propionate (FLOVENT HFA) 110 mcg/actuation inhaler, [FLUTICASONE PROPIONATE (FLOVENT HFA) 110 MCG/ACTUATION INHALER] Inhale 1 puff 2 (two) times a day. (Patient not taking: Reported on 1/27/2022), Disp: , Rfl:      furosemide (LASIX) 20 MG tablet, [FUROSEMIDE (LASIX) 20 MG TABLET] Take 20 mg by mouth 2 (two) times a day at 9am and 6pm. , Disp: , Rfl:      glipiZIDE (GLUCOTROL) 5 MG tablet, [GLIPIZIDE (GLUCOTROL) 5 MG TABLET] Take 5 mg by mouth 2 (two) times a day before meals. (Patient not taking: Reported on 1/27/2022), Disp: , Rfl:      LANTUS SOLOSTAR U-100 INSULIN 100 unit/mL (3 mL) pen, [LANTUS SOLOSTAR U-100 INSULIN 100 UNIT/ML (3 ML) PEN] Inject 40 Units under the skin at bedtime. , Disp: , Rfl: 3     lisinopriL (PRINIVIL,ZESTRIL) 10 MG tablet, [LISINOPRIL  (PRINIVIL,ZESTRIL) 10 MG TABLET] Take 10 mg by mouth daily., Disp: , Rfl:      metFORMIN (GLUCOPHAGE) 500 MG tablet, [METFORMIN (GLUCOPHAGE) 500 MG TABLET] Take 2,000 mg by mouth daily with breakfast. , Disp: , Rfl:      oxyCODONE (ROXICODONE) 5 MG immediate release tablet, [OXYCODONE (ROXICODONE) 5 MG IMMEDIATE RELEASE TABLET] Take 1 tablet (5 mg total) by mouth every 6 (six) hours as needed for pain. (Patient not taking: Reported on 1/27/2022), Disp: 30 tablet, Rfl: 0     pioglitazone (ACTOS) 15 MG tablet, [PIOGLITAZONE (ACTOS) 15 MG TABLET] Take 7.5 mg by mouth daily. , Disp: , Rfl:      potassium chloride 20 mEq TbER, Take 20 mEq by mouth daily , Disp: , Rfl:      potassium chloride ER (KLOR-CON M) 20 MEQ CR tablet, Take 20 mEq by mouth, Disp: , Rfl:      pramipexole (MIRAPEX) 0.25 MG tablet, [PRAMIPEXOLE (MIRAPEX) 0.25 MG TABLET] Take 0.25 mg by mouth 2 (two) times a day. , Disp: , Rfl: 3     saxagliptin (ONGLYZA) 5 MG TABS tablet, Take 5 mg by mouth, Disp: , Rfl:     Review of Systems - 10 point Review of Systems is negative except for left foot infection which is noted in HPI.      OBJECTIVE:  /76   Pulse 64   Resp 18   General appearance: Patient is alert and fully cooperative with history & exam.  No sign of distress is noted during the visit.    Vascular: Dorsalis pedis palpableLeft.  Dermatologic:    VASC Wound left plantart foot distal (Active)   Post Size Length 0.7 02/03/22 1300   Post Size Width 1 02/03/22 1300   Post Size Depth 0.1 02/03/22 1300   Post Total Sq cm 0.7 02/03/22 1300       VASC Wound Left plantar foot middle (Active)   Post Size Length 2.6 02/03/22 1300   Post Size Width 2.4 02/03/22 1300   Post Size Depth 0.1 02/03/22 1300   Post Total Sq cm 6.24 02/03/22 1300       VASC Wound Left plantar foot proximal (Active)   Post Size Length 0.4 02/03/22 1300   Post Size Width 0.6 02/03/22 1300   Post Size Depth 0.1 02/03/22 1300   Post Total Sq cm 0.24 02/03/22 1300   Ulceration plantar  left foot x3, do not probe to deep tissues. There is superficial fluctuance that connects the three ulcers, after debridement there is serous drainage. Erythema from the plantar ulcer extends to the medial arch left foot, no ascending cellulitis noted.   Neurologic: Diminished to light touch Left.  Musculoskeletal: Contracted digits noted Left.    Imaging:     XR Foot Left G/E 3 Views    Result Date: 1/27/2022  EXAM: XR FOOT LEFT G/E 3 VIEWS LOCATION: [facility] DATE/TIME: 1/27/2022 3:36 PM INDICATION: Swelling of left foot. COMPARISON: None.     IMPRESSION: Postoperative changes of screw fixation across an old fracture or osteotomy of the calcaneus. This appears well-healed. There is degenerative change at the subtalar joints. Degenerative change at the first TMT joint. Postoperative changes to the second through fourth toes. No evidence for acute fracture.         Picture:

## 2022-02-03 NOTE — PATIENT INSTRUCTIONS
"Important lnstructions      Stop the kelflex, start bactrim.     Monitor redness, if extending beyond pen line please call clinic.      1. WEIGHT BEARING STATUS: You are to remain LIMITED WEIGHT BEARING on your left foot. Limited weight bearing means that it is only okay for you to apply light pressure on the affected foot when transferring from your assistive device to a chair or bed.    2. OFFLOADING DEVICE: N/A    3. STABILIZATION DEVICE: Use a CAM BOOT . You will need to Wear this anytime you are up and out of bed, it is okay to remove when you are sleeping..     4. PROTEIN SUPPLEMENTS: Drink protein shakes 2x per day, morning and night (Ensure, Boost, Glucerna)     This is in addition to your normal diet    If you are on a renal diet, make sure to get a protein shake that is best suited for this diet. Please ask if you would like a referral to see a  Registered Dietician.     5. ELEVATE: Elevating your leg means laying with your head on a pillow and your foot ABOVE YOUR WAIST.     6. DO NOT MOVE YOUR FOOT.    There is a risk of worsening the wound or incision. To give yourself a higher chance of healing, please DO NOT swing foot back and forth and wiggle foot/toes especially when inside a stabilization device.        Dressing Change lnstructions                 DAILY and as needed, Cleanse your left foot wound(s) with Normal saline.    Pat Dry with non-sterile gauze    Primary Dressing: Apply dry gauze into/onto the wounds    Secondary dressing: Cover with dry gauze    Secure with non-sterile roll gauze (4\" x 75\" roll) and tape (1\" roll tape) as needed; avoid adhesive directly on the skin    It IS NOT ok to get your wound wet in the bath or shower    SEEK MEDICAL CARE IF:    You have an increase in swelling, pain, or redness around the wound.    You have an increase in the amount of pus coming from the wound.    There is a bad smell coming from the wound.    The wound appears to be worsening/enlarging    You " have a fever greater than 101.5 F      It is ok to continue current wound care treatment/products for the next 2-3 days until new wound care supplies are ordered and arrive. If longer than this please contact our office at [phone].

## 2022-02-05 LAB — BACTERIA SPEC CULT: NO GROWTH

## 2022-02-06 ENCOUNTER — NURSE TRIAGE (OUTPATIENT)
Dept: NURSING | Facility: CLINIC | Age: 62
End: 2022-02-06
Payer: COMMERCIAL

## 2022-02-06 ENCOUNTER — HOSPITAL ENCOUNTER (EMERGENCY)
Facility: CLINIC | Age: 62
Discharge: HOME | End: 2022-02-06
Attending: EMERGENCY MEDICINE | Admitting: EMERGENCY MEDICINE
Payer: COMMERCIAL

## 2022-02-06 VITALS
OXYGEN SATURATION: 96 % | RESPIRATION RATE: 16 BRPM | HEART RATE: 90 BPM | BODY MASS INDEX: 30.29 KG/M2 | WEIGHT: 182 LBS | SYSTOLIC BLOOD PRESSURE: 140 MMHG | DIASTOLIC BLOOD PRESSURE: 68 MMHG | TEMPERATURE: 98.5 F

## 2022-02-06 DIAGNOSIS — Z51.89 VISIT FOR WOUND CHECK: ICD-10-CM

## 2022-02-06 PROCEDURE — 99282 EMERGENCY DEPT VISIT SF MDM: CPT

## 2022-02-06 ASSESSMENT — ENCOUNTER SYMPTOMS
WOUND: 1
FEVER: 0

## 2022-02-06 NOTE — DISCHARGE INSTRUCTIONS
Keep your appointment to see Dr. Vega for Thursday.  Continue to follow all instructions provided by him at your last visit.  Continue any antibiotics you are currently on.  Try to limit pressure or walking on this area.    Contact Dr. Vega or return the emergency department if you develop increasing pain or redness, pus draining, fever, or any other concerns.    Thank you for choosing [facility] Emergency [facility].  It has been my pleasure caring for you today.     ~Dr. Nichole MD

## 2022-02-06 NOTE — TELEPHONE ENCOUNTER
Pt calling to report discoloration and increased pain to her wound on the bottom of her foot. She is being treated for cellulitis on the bottom of the foot and started antibiotics on Friday    New discoloration: Yellow-green color noted under the skin around the wound bed yesterday. Pt sent in pictures of the wound to her PCP via [employer]    The area aches more than it had been  No fever  Pain: 4.5/10  T: 97.7    Pt has a hx of loosing toes from worsening cellulitis and she wants to make sure that it is getting better; not worse.     Disposition: See PCP within 24 hours. Pt was given care advice and transferred to [facility]. She will go to the [facility] if no appts.     Debbie Reyez RN  [facility] Nurse Advisor 8:13 AM 2022    Reason for Disposition    [1] Taking antibiotic > 24 hours AND [2] cellulitis symptoms are WORSE (e.g., spreading redness, pain, swelling)    Additional Information    Negative: Shock suspected (e.g., cold/pale/clammy skin, too weak to stand, low BP, rapid pulse)    Negative: Sounds like a life-threatening emergency to the triager    Negative:  surgical wound infection suspected (post-op)    Negative: Surgical wound infection suspected (post-op)    Negative: [1] Widespread rash AND [2] drug rash suspected (i.e., allergic reaction to antibiotic)    Negative: Animal bite wound infection suspected    Negative: SEVERE pain    Negative: [1] SEVERE pain with bending of finger (or toe) AND [2] cellulitis on hand (or foot)    Negative: Fever > 104 F (40 C)    Negative: [1] Widespread rash AND [2] bright red, sunburn-like    Negative: Black (necrotic), dark purple, or blisters develop in area of cellulitis    Negative: Patient sounds very sick or weak to the triager    Negative: [1] Taking antibiotic > 24 hours AND [2] fever > 100.4 F (38.0 C)    Negative: [1] Taking antibiotic > 24 hours AND [2] red streak (or line) runs from area of infection    Negative: [1] Fever > 100.0 F (37.8 C)  AND [2] new onset    Negative: [1] Red streak runs from area of infection AND [2] new onset    Negative: [1] Caller has URGENT question AND [2] triager unable to answer question    Protocols used: CELLULITIS ON ANTIBIOTIC FOLLOW-UP CALL-A-    COVID 19 Nurse Triage Plan/Patient Instructions    Please be aware that novel coronavirus (COVID-19) may be circulating in the community. If you develop symptoms such as fever, cough, or SOB or if you have concerns about the presence of another infection including coronavirus (COVID-19), please contact your health care provider or visit [email or web address].     Disposition/Instructions    In-Person Visit with provider recommended. Reference Visit Selection Guide.    Thank you for taking steps to prevent the spread of this virus.  o Limit your contact with others.  o Wear a simple mask to cover your cough.  o Wash your hands well and often.    Resources    [facility]: About COVID-19: [email or web address]    CDC: What to Do If You're Sick: www.cdc.gov/coronavirus/2019-ncov/about/steps-when-sick.html    CDC: Ending Home Isolation: www.cdc.gov/coronavirus/2019-ncov/hcp/disposition-in-home-patients.html     CDC: Caring for Someone: www.cdc.gov/coronavirus/2019-ncov/if-you-are-sick/care-for-someone.html     [facility]: Interim Guidance for Hospital Discharge to Home: www.health.[facility].[place]/diseases/coronavirus/hcp/hospdischarge.pdf    [facility] clinical trials (COVID-19 research studies): [email or web address]-[email or web address]-trials     Below are the COVID-19 hotlines at the [place] Department of Health ([facility]). Interpreters are available.   o For health questions: Call 779-690-6411 or 1-606.778.1388 (7 a.m. to 7 p.m.)  o For questions about schools and childcare: Call 734-647-3239 or 1-356.973.5806 (7 a.m. to 7 p.m.)

## 2022-02-06 NOTE — ED PROVIDER NOTES
EMERGENCY DEPARTMENT ENCOUNTER      NAME: Gill Mendez  AGE: 61 year old adult  YOB: 1960  MRN: [id number]  EVALUATION DATE & TIME: 2022  9:05 AM    PCP: Marian Garner    ED PROVIDER: Mulu Varela M.D.        Chief Complaint   Patient presents with     Foot Pain         FINAL IMPRESSION:    1. Visit for wound check            MEDICAL DECISION MAKIN year old adult with history of diabetes and underwent incision and drainage of the left foot diabetic ulcer by Dr. Vega of podiatry just a few days ago.  She has been taking her antibiotics.  Today she noticed that the skin seem to be a little more white around the wound margins.  Concerned this could be abscess.  Referred to the ER by nurse triage line.  No increasing pain, swelling, redness.  No drainage.  On exam it seems more consistent with a little bit of ischemia to the skin itself but there is no fluctuance or swelling.  No erythema to this area.  Discussed with on-call podiatry covering for Dr. Vega and plan is for patient to keep her outpatient appointment for this Thursday to see Dr. Vega.  No indication for change in antibiotics.        ED COURSE:  9:21 AM I met with the patient to gather history and perform my exam. ED course and treatment discussed.    9:44 AM I spoke with Dr. Guajardo, Podiatry.  We discussed the exam.  He is feels comfortable with the patient being discharged with a soft dressing and keeping her appointment with Dr. Vega for Thursday.  We both agree this does not seem consistent with infection, I discussed with him about the possibility of a little ischemic skin condition but he feels at this time we will just continue to monitor.    9:49 AM I rechecked and updated the patient. I discussed the plan for discharge with the patient, and patient is agreeable. We discussed supportive cares at home and reasons for return to the ER including new or worsening symptoms - all questions and concerns  "addressed. Patient to be discharged by RN.  Patient feels very comfortable with this plan and all of her questions have been answered.        COVID-19 PPE worn during patient evaluation:  Mask: n95 and homemade masks   Eye Protection: goggles   Gown: none  Hair cover: yes  Face shield: yes   Patient wearing a mask: yes    At the conclusion of the encounter I discussed the disposition. Their questions were answered. The patient (and any family present) acknowledged understanding and were agreeable with the care plan.        CONSULTANTS:  Podiatry - Dr. Guajardo        MEDICATIONS GIVEN IN THE EMERGENCY:  Medications - No data to display          NEW PRESCRIPTIONS STARTED AT TODAY'S ER VISIT     Medication List      There are no discharge medications for this visit.             CONDITION:  stable        DISPOSITION:  discharge home         =================================================================  =================================================================    HPI    Patient information was obtained from: the patient     Use of Intrepreter: N/A      Gill Mendez is a 61 year old adult with history of anxiety, type 2 diabetes mellitus, cellulitis, s/p amputate toes (right, 2020), hypertension, and hyperlipidemia who presents to the ER with complaints of foot pain.     Per Chart Review:   2/3/2022 the patient presented to her primary care clinic for evaluation of a diabetic ulceration on her left foot. She underwent an I&D in which serous drainage was noted. She was given a CAM boot and recommended limiting walking on the left foot. She was prescribed Bactrim pending the culture report (which has come back reporting \"no growth\").     The patient reports that around the wound on her left foot there is a new area of whiteness. She states she is concerned that this whiteness is an abscess. She denies any redness or increased pain around the area. The patient has a history of toe amputation due to cellulitis, " and so she is being very cautious about her current cellulitis. She called the nurse triage line this morning and was referred to the ED. The patient notes that her next appointment with her podiatrist is in four days (2/10). The patient denies fever, and any other symptoms or complaints at this time.     REVIEW OF SYSTEMS  Review of Systems   Constitutional: Negative for fever.   Musculoskeletal:        Denies increased foot pain or swellin   Skin: Positive for wound.         PAST MEDICAL HISTORY:  Past Medical History:   Diagnosis Date     Anxiety      Asthma      Cellulitis      Diabetes mellitus (H)      Essential hypertension     Created by Conversion  Replacement Utility updated for latest IMO load     Gastroparesis      Hyperlipidemia      Hypertension      Other and unspecified hyperlipidemia     Created by Conversion      PONV (postoperative nausea and vomiting)      Shortness of breath      Type II or unspecified type diabetes mellitus without mention of complication, not stated as uncontrolled     Created by Conversion          PAST SURGICAL HISTORY:  Past Surgical History:   Procedure Laterality Date     AMPUTATE TOE(S) Right 7/31/2020    Procedure: AMPUTATION, third digit right foot;  Surgeon: Chris Vega DPM;  Location: [facility] [place];  Service: Podiatry     ENDOMETRIAL BIOPSY       FOOT ARTHROPLASTY Right 09/24/2020    Fourth and fifth toe by Dr. Vega     HC REPAIR OF HAMMERTOE,ONE Left 12/7/2020    Procedure: ARTHROPLASTY, digits two, three, four and five left foot;  Surgeon: Chris Vega DPM;  Location: [facility];  Service: Podiatry     HERNIA REPAIR       HYSTERECTOMY       REPAIR TENDON ACHILLES Bilateral     Left was plate  , right was torn     TONSILLECTOMY       ZZC REMOVAL OF OVARY(S)      Description: Oophorectomy - Bilateral (Removal Of Both Ovaries);  Recorded: 10/09/2008;         CURRENT MEDICATIONS:    Prior to Admission medications    Medication Sig Start Date  End Date Taking? Authorizing Provider   albuterol (PROVENTIL HFA;VENTOLIN HFA) 90 mcg/actuation inhaler [ALBUTEROL (PROVENTIL HFA;VENTOLIN HFA) 90 MCG/ACTUATION INHALER] Inhale 2 puffs every 6 (six) hours as needed for wheezing. 8/17/15   Provider, Historical   amLODIPine (NORVASC) 10 MG tablet [AMLODIPINE (NORVASC) 10 MG TABLET] Take 10 mg by mouth daily.   Patient not taking: Reported on 8/2/2021 4/16/18   Provider, Historical   atorvastatin (LIPITOR) 40 MG tablet [ATORVASTATIN (LIPITOR) 40 MG TABLET] Take 40 mg by mouth at bedtime.  2/1/20   Provider, Historical   cephALEXin (KEFLEX) 500 MG capsule Take 1 capsule (500 mg) by mouth 3 times daily for 10 days 1/27/22 2/6/22  Ramakrishna Peoples MD   cetirizine (ZYRTEC) 10 MG tablet [CETIRIZINE (ZYRTEC) 10 MG TABLET] Take 10 mg by mouth daily. 9/24/20   Provider, Historical   citalopram (CELEXA) 20 MG tablet [CITALOPRAM (CELEXA) 20 MG TABLET] Take 20 mg by mouth every morning.  4/25/18   Provider, Historical   diazePAM (VALIUM) 5 MG tablet [DIAZEPAM (VALIUM) 5 MG TABLET] Take 1 tablet (5 mg total) by mouth every 6 (six) hours as needed for anxiety.  Patient not taking: Reported on 1/27/2022 10/30/20   Chris Vega DPM   empagliflozin 25 mg Tab [EMPAGLIFLOZIN 25 MG TAB] Take 25 mg by mouth.  Patient not taking: Reported on 1/27/2022 11/24/20   Provider, Historical   fluticasone (FLOVENT HFA) 110 MCG/ACT inhaler Inhale 1 puff into the lungs  6/18/20   Reported, Patient   fluticasone propionate (FLOVENT HFA) 110 mcg/actuation inhaler [FLUTICASONE PROPIONATE (FLOVENT HFA) 110 MCG/ACTUATION INHALER] Inhale 1 puff 2 (two) times a day.  Patient not taking: Reported on 1/27/2022 9/24/20   Provider, Historical   furosemide (LASIX) 20 MG tablet [FUROSEMIDE (LASIX) 20 MG TABLET] Take 20 mg by mouth 2 (two) times a day at 9am and 6pm.  9/2/20   Provider, Historical   glipiZIDE (GLUCOTROL) 5 MG tablet [GLIPIZIDE (GLUCOTROL) 5 MG TABLET] Take 5 mg by mouth 2 (two) times a  day before meals.  Patient not taking: Reported on 1/27/2022 7/30/20   Provider, Historical   LANTUS SOLOSTAR U-100 INSULIN 100 unit/mL (3 mL) pen [LANTUS SOLOSTAR U-100 INSULIN 100 UNIT/ML (3 ML) PEN] Inject 40 Units under the skin at bedtime.  4/25/18   Provider, Historical   lisinopriL (PRINIVIL,ZESTRIL) 10 MG tablet [LISINOPRIL (PRINIVIL,ZESTRIL) 10 MG TABLET] Take 10 mg by mouth daily. 7/15/20   Provider, Historical   metFORMIN (GLUCOPHAGE) 500 MG tablet [METFORMIN (GLUCOPHAGE) 500 MG TABLET] Take 2,000 mg by mouth daily with breakfast.  8/17/15   Provider, Historical   oxyCODONE (ROXICODONE) 5 MG immediate release tablet [OXYCODONE (ROXICODONE) 5 MG IMMEDIATE RELEASE TABLET] Take 1 tablet (5 mg total) by mouth every 6 (six) hours as needed for pain.  Patient not taking: Reported on 1/27/2022 12/7/20   Chris Vega DPM   pioglitazone (ACTOS) 15 MG tablet [PIOGLITAZONE (ACTOS) 15 MG TABLET] Take 7.5 mg by mouth daily.  6/23/20   Provider, Historical   potassium chloride 20 mEq TbER Take 20 mEq by mouth daily  9/2/20   Provider, Historical   potassium chloride ER (KLOR-CON M) 20 MEQ CR tablet Take 20 mEq by mouth 9/2/20   Reported, Patient   pramipexole (MIRAPEX) 0.25 MG tablet [PRAMIPEXOLE (MIRAPEX) 0.25 MG TABLET] Take 0.25 mg by mouth 2 (two) times a day.  6/18/18   Provider, Historical   saxagliptin (ONGLYZA) 5 MG TABS tablet Take 5 mg by mouth 4/23/21   Reported, Patient   sulfamethoxazole-trimethoprim (BACTRIM DS) 800-160 MG tablet Take 1 tablet by mouth 2 times daily 2/3/22   Chris Vega DPM         ALLERGIES:  Allergies   Allergen Reactions     Codeine Unknown     Patient states possibly caused N/V but can't remember for sure     Semaglutide Nausea and Vomiting     Acetaminophen-Codeine Rash     Aspirin Rash         FAMILY HISTORY:  Family History   Problem Relation Age of Onset     Diabetes Mother      Hypertension Mother      Acute Myocardial Infarction No family hx of           SOCIAL HISTORY:  Social History     Socioeconomic History     Marital status:      Spouse name: Not on file     Number of children: Not on file     Years of education: Not on file     Highest education level: Not on file   Occupational History     Not on file   Tobacco Use     Smoking status: Never Smoker     Smokeless tobacco: Never Used   Substance and Sexual Activity     Alcohol use: No     Drug use: No     Sexual activity: Not on file   Other Topics Concern     Not on file   Social History Narrative     Not on file     Social Determinants of Health     Financial Resource Strain: Not on file   Food Insecurity: Not on file   Transportation Needs: Not on file   Physical Activity: Not on file   Stress: Not on file   Social Connections: Not on file   Intimate Partner Violence: Not on file   Housing Stability: Not on file         VITALS:  Patient Vitals for the past 24 hrs:   BP Temp Pulse Resp SpO2 Weight   02/06/22 0911 (!) 156/76 98.5  F (36.9  C) 98 16 99 % 82.6 kg (182 lb)       Wt Readings from Last 3 Encounters:   02/06/22 82.6 kg (182 lb)   01/27/22 84.4 kg (186 lb)   08/02/21 85.7 kg (189 lb)         PHYSICAL EXAM    Constitutional:  Well developed, Well nourished, NAD, GCS 15  HENT:  Normocephalic, Atraumatic, Bilateral external ears normal, Nose normal. Neck-  Normal range of motion, No tenderness, Supple, No stridor.   Eyes:  PERRL, EOMI, Conjunctiva normal, No discharge.  Respiratory:  No respiratory distress, Speaks full sentences easily. No cough.   Cardiovascular:  Normal heart rate  GI:  No excessive obesity.    : deferred  Musculoskeletal: 2+ DP pulses. No edema. No cyanosis, No clubbing. Good range of motion in all major joints. No major deformities noted.   Integument:  Warm, Dry, No erythema, No rash.  No petechiae. (See photo below).  Prior incision and drainage wound appears to be healing well.  Good scab formation.  No significant erythema.  No drainage.  No  fluctuance.  Neurologic:  Alert & oriented x 3, No focal deficits noted.   Psychiatric:  Affect normal, Cooperative                LAB:  Chart review    Swab Aerobic Bacterial Culture Routine  Order: [id number]   Collected 2/3/2022  1:27 PM     Status: Final result     Visible to patient: Yes (seen)     Dx: Cellulitis and abscess of foot; Diabe...    Specimen Information: Foot, Left; Swab         0 Result Notes    Culture No Growth            Resulting Agency: [facility]           Specimen Collected: 02/03/22  1:27 PM Last Resulted: 02/05/22  9:52 AM               RADIOLOGY:  None      EKG:    None.         PROCEDURES:  None.       I, Liberty Song, am serving as a scribe to document services personally performed by Dr. Mulu Varela based on my observation and the provider's statements to me. I, Dr. Mulu Varela MD attest that Liberyt Dykeston is acting in a scribe capacity, has observed my performance of the services and has documented them in accordance with my direction.        Mulu Varela M.D. [facility]  Emergency Medicine and Medical Toxicology  Formerly [facility] EMERGENCY ROOM  [address] [id number] [phone]  Dept: [phone]           Mulu Varela MD  02/06/22 0963

## 2022-02-10 ENCOUNTER — OFFICE VISIT (OUTPATIENT)
Dept: VASCULAR SURGERY | Facility: CLINIC | Age: 62
End: 2022-02-10
Attending: PODIATRIST
Payer: COMMERCIAL

## 2022-02-10 VITALS
HEART RATE: 80 BPM | DIASTOLIC BLOOD PRESSURE: 66 MMHG | RESPIRATION RATE: 18 BRPM | TEMPERATURE: 97.8 F | SYSTOLIC BLOOD PRESSURE: 138 MMHG

## 2022-02-10 DIAGNOSIS — E11.621 DIABETIC ULCER OF LEFT MIDFOOT ASSOCIATED WITH TYPE 2 DIABETES MELLITUS, UNSPECIFIED ULCER STAGE (H): Primary | ICD-10-CM

## 2022-02-10 DIAGNOSIS — L97.429 DIABETIC ULCER OF LEFT MIDFOOT ASSOCIATED WITH TYPE 2 DIABETES MELLITUS, UNSPECIFIED ULCER STAGE (H): Primary | ICD-10-CM

## 2022-02-10 PROCEDURE — G0463 HOSPITAL OUTPT CLINIC VISIT: HCPCS

## 2022-02-10 PROCEDURE — 99212 OFFICE O/P EST SF 10 MIN: CPT | Performed by: PODIATRIST

## 2022-02-10 ASSESSMENT — PAIN SCALES - GENERAL: PAINLEVEL: NO PAIN (0)

## 2022-02-10 NOTE — PATIENT INSTRUCTIONS
Congratulations! Your wound has healed.    A gauze dressing was placed today and can be removed tomorrow     For the next 2 weeks Dr. Vega would like you to remain LIMITED WEIGHT BEARING MEANS THAT IT IS ONLY OKAY FOR YOU TO APPLY LIGHT PRESSURE ON THE AFFECTED FOOT WHEN TRANSFERRING FROM YOUR ASSISTIVE DEVICE TO A CHAIR OR BED. on your left foot with the use of your CAM BOOT, to allow the newly healed skin to become stronger.      You may begin showering as normal in 1 weeks      Continue to monitor the area for breakdown & call us if your wound reopens.        We want to hear from you!   In the next few weeks, you should receive a call or email to complete a survey about your visit at [facility]. Please help us improve your appointment experience by letting us know how we did today. We strive to make your experience good and value any ways in which we could do better.      We value your input and suggestions.    Thank you for choosing the [facility]!

## 2022-02-10 NOTE — PROGRESS NOTES
FOOT AND ANKLE SURGERY/PODIATRY Progress Note      ASSESSMENT:   Diabetic Ulceration left foot       TREATMENT:  -No open lesions along the plantar left foot with stable eschars. I reviewed previous culture report, no growth.     -I am pleased with her progress and recommend continued limited walking x2 weeks in the CAM boot, keep dry x1 week.     -She will closely monitor for any skin irritation or skin breakdown and return to see me should this occur.     -She is discharged from my care and will follow-up with me as concerns develop.    Chris Vega DPM  [facility]      HPI: Gill Mendez was seen again today for left foot ulcers. She has remained limited walking in the CAM boot and taking bactrim as directed.      Past Medical History:   Diagnosis Date     Anxiety      Asthma      Cellulitis      Diabetes mellitus (H)      Essential hypertension     Created by Conversion  Replacement Utility updated for latest IMO load     Gastroparesis      Hyperlipidemia      Hypertension      Other and unspecified hyperlipidemia     Created by Conversion      PONV (postoperative nausea and vomiting)      Shortness of breath      Type II or unspecified type diabetes mellitus without mention of complication, not stated as uncontrolled     Created by Conversion        Past Surgical History:   Procedure Laterality Date     AMPUTATE TOE(S) Right 7/31/2020    Procedure: AMPUTATION, third digit right foot;  Surgeon: Chris Vega DPM;  Location: [facility];  Service: Podiatry     ENDOMETRIAL BIOPSY       FOOT ARTHROPLASTY Right 09/24/2020    Fourth and fifth toe by Dr. Vega      REPAIR OF HAMMERTOE,ONE Left 12/7/2020    Procedure: ARTHROPLASTY, digits two, three, four and five left foot;  Surgeon: Chris Vega DPM;  Location: [facility];  Service: Podiatry     HERNIA REPAIR       HYSTERECTOMY       REPAIR TENDON ACHILLES Bilateral     Left was plate  , right was torn      TONSILLECTOMY       ZZC REMOVAL OF OVARY(S)      Description: Oophorectomy - Bilateral (Removal Of Both Ovaries);  Recorded: 10/09/2008;       Allergies   Allergen Reactions     Codeine Unknown     Patient states possibly caused N/V but can't remember for sure     Semaglutide Nausea and Vomiting     Acetaminophen-Codeine Rash     Aspirin Rash         Current Outpatient Medications:      albuterol (PROVENTIL HFA;VENTOLIN HFA) 90 mcg/actuation inhaler, [ALBUTEROL (PROVENTIL HFA;VENTOLIN HFA) 90 MCG/ACTUATION INHALER] Inhale 2 puffs every 6 (six) hours as needed for wheezing., Disp: , Rfl:      amLODIPine (NORVASC) 10 MG tablet, [AMLODIPINE (NORVASC) 10 MG TABLET] Take 10 mg by mouth daily.  (Patient not taking: Reported on 8/2/2021), Disp: , Rfl: 0     atorvastatin (LIPITOR) 40 MG tablet, [ATORVASTATIN (LIPITOR) 40 MG TABLET] Take 40 mg by mouth at bedtime. , Disp: , Rfl:      cetirizine (ZYRTEC) 10 MG tablet, [CETIRIZINE (ZYRTEC) 10 MG TABLET] Take 10 mg by mouth daily., Disp: , Rfl:      citalopram (CELEXA) 20 MG tablet, [CITALOPRAM (CELEXA) 20 MG TABLET] Take 20 mg by mouth every morning. , Disp: , Rfl: 0     diazePAM (VALIUM) 5 MG tablet, [DIAZEPAM (VALIUM) 5 MG TABLET] Take 1 tablet (5 mg total) by mouth every 6 (six) hours as needed for anxiety. (Patient not taking: Reported on 1/27/2022), Disp: 1 tablet, Rfl: 0     empagliflozin 25 mg Tab, [EMPAGLIFLOZIN 25 MG TAB] Take 25 mg by mouth. (Patient not taking: Reported on 1/27/2022), Disp: , Rfl:      fluticasone (FLOVENT HFA) 110 MCG/ACT inhaler, Inhale 1 puff into the lungs , Disp: , Rfl:      fluticasone propionate (FLOVENT HFA) 110 mcg/actuation inhaler, [FLUTICASONE PROPIONATE (FLOVENT HFA) 110 MCG/ACTUATION INHALER] Inhale 1 puff 2 (two) times a day. (Patient not taking: Reported on 1/27/2022), Disp: , Rfl:      furosemide (LASIX) 20 MG tablet, [FUROSEMIDE (LASIX) 20 MG TABLET] Take 20 mg by mouth 2 (two) times a day at 9am and 6pm. , Disp: , Rfl:       glipiZIDE (GLUCOTROL) 5 MG tablet, [GLIPIZIDE (GLUCOTROL) 5 MG TABLET] Take 5 mg by mouth 2 (two) times a day before meals. (Patient not taking: Reported on 1/27/2022), Disp: , Rfl:      LANTUS SOLOSTAR U-100 INSULIN 100 unit/mL (3 mL) pen, [LANTUS SOLOSTAR U-100 INSULIN 100 UNIT/ML (3 ML) PEN] Inject 40 Units under the skin at bedtime. , Disp: , Rfl: 3     lisinopriL (PRINIVIL,ZESTRIL) 10 MG tablet, [LISINOPRIL (PRINIVIL,ZESTRIL) 10 MG TABLET] Take 10 mg by mouth daily., Disp: , Rfl:      metFORMIN (GLUCOPHAGE) 500 MG tablet, [METFORMIN (GLUCOPHAGE) 500 MG TABLET] Take 2,000 mg by mouth daily with breakfast. , Disp: , Rfl:      oxyCODONE (ROXICODONE) 5 MG immediate release tablet, [OXYCODONE (ROXICODONE) 5 MG IMMEDIATE RELEASE TABLET] Take 1 tablet (5 mg total) by mouth every 6 (six) hours as needed for pain. (Patient not taking: Reported on 1/27/2022), Disp: 30 tablet, Rfl: 0     pioglitazone (ACTOS) 15 MG tablet, [PIOGLITAZONE (ACTOS) 15 MG TABLET] Take 7.5 mg by mouth daily. , Disp: , Rfl:      potassium chloride 20 mEq TbER, Take 20 mEq by mouth daily , Disp: , Rfl:      potassium chloride ER (KLOR-CON M) 20 MEQ CR tablet, Take 20 mEq by mouth, Disp: , Rfl:      pramipexole (MIRAPEX) 0.25 MG tablet, [PRAMIPEXOLE (MIRAPEX) 0.25 MG TABLET] Take 0.25 mg by mouth 2 (two) times a day. , Disp: , Rfl: 3     saxagliptin (ONGLYZA) 5 MG TABS tablet, Take 5 mg by mouth, Disp: , Rfl:      sulfamethoxazole-trimethoprim (BACTRIM DS) 800-160 MG tablet, Take 1 tablet by mouth 2 times daily, Disp: 20 tablet, Rfl: 0    Review of Systems - 10 point Review of Systems is negative except for left foot ulcer which is noted in HPI.      OBJECTIVE:  /66   Pulse 80   Temp 97.8  F (36.6  C)   Resp 18   General appearance: Patient is alert and fully cooperative with history & exam.  No sign of distress is noted during the visit.    Vascular: Dorsalis pedis non-palpableLeft.  Dermatologic:    VASC Wound left plantart foot distal  (Active)   Post Size Length 0 02/10/22 1500   Post Size Width 0 02/10/22 1500   Post Size Depth 0 02/10/22 1500   Post Total Sq cm 0 02/10/22 1500   Undermined 0 02/10/22 1500       VASC Wound Left plantar foot middle (Active)   Post Size Length 2.6 02/03/22 1300   Post Size Width 2.4 02/03/22 1300   Post Size Depth 0.1 02/03/22 1300   Post Total Sq cm 6.24 02/03/22 1300       VASC Wound Left plantar foot proximal (Active)   Post Size Length 0.4 02/03/22 1300   Post Size Width 0.6 02/03/22 1300   Post Size Depth 0.1 02/03/22 1300   Post Total Sq cm 0.24 02/03/22 1300   Stable eschars plantar left foot, no open lesions or erythema noted.   Neurologic: Diminished to light touch Left.  Musculoskeletal: Contracted digits noted Left.    Imaging:     XR Foot Left G/E 3 Views    Result Date: 1/27/2022  EXAM: XR FOOT LEFT G/E 3 VIEWS LOCATION: [facility] DATE/TIME: 1/27/2022 3:36 PM INDICATION: Swelling of left foot. COMPARISON: None.     IMPRESSION: Postoperative changes of screw fixation across an old fracture or osteotomy of the calcaneus. This appears well-healed. There is degenerative change at the subtalar joints. Degenerative change at the first TMT joint. Postoperative changes to the second through fourth toes. No evidence for acute fracture.         Picture:

## 2022-02-28 ENCOUNTER — TRANSFERRED RECORDS (OUTPATIENT)
Dept: HEALTH INFORMATION MANAGEMENT | Facility: CLINIC | Age: 62
End: 2022-02-28
Payer: COMMERCIAL

## 2022-02-28 LAB — RETINOPATHY: POSITIVE

## 2022-03-21 ENCOUNTER — TELEPHONE (OUTPATIENT)
Dept: VASCULAR SURGERY | Facility: CLINIC | Age: 62
End: 2022-03-21
Payer: COMMERCIAL

## 2022-03-21 ENCOUNTER — OFFICE VISIT (OUTPATIENT)
Dept: FAMILY MEDICINE | Facility: CLINIC | Age: 62
End: 2022-03-21
Payer: COMMERCIAL

## 2022-03-21 VITALS
DIASTOLIC BLOOD PRESSURE: 77 MMHG | HEART RATE: 92 BPM | SYSTOLIC BLOOD PRESSURE: 133 MMHG | TEMPERATURE: 98.5 F | WEIGHT: 182 LBS | BODY MASS INDEX: 30.29 KG/M2 | OXYGEN SATURATION: 97 % | RESPIRATION RATE: 16 BRPM

## 2022-03-21 DIAGNOSIS — E11.628 DIABETIC FOOT INFECTION (H): Primary | ICD-10-CM

## 2022-03-21 DIAGNOSIS — E11.621 TYPE 2 DIABETES MELLITUS WITH FOOT ULCER, WITHOUT LONG-TERM CURRENT USE OF INSULIN (H): ICD-10-CM

## 2022-03-21 DIAGNOSIS — L97.509 TYPE 2 DIABETES MELLITUS WITH FOOT ULCER, WITHOUT LONG-TERM CURRENT USE OF INSULIN (H): ICD-10-CM

## 2022-03-21 DIAGNOSIS — L08.9 DIABETIC FOOT INFECTION (H): Primary | ICD-10-CM

## 2022-03-21 PROCEDURE — 99214 OFFICE O/P EST MOD 30 MIN: CPT | Performed by: PHYSICIAN ASSISTANT

## 2022-03-21 RX ORDER — CLINDAMYCIN HCL 300 MG
300 CAPSULE ORAL 3 TIMES DAILY
Qty: 30 CAPSULE | Refills: 0 | Status: SHIPPED | OUTPATIENT
Start: 2022-03-21 | End: 2022-03-31

## 2022-03-21 ASSESSMENT — ENCOUNTER SYMPTOMS
WOUND: 1
CHILLS: 0
ARTHRALGIAS: 1
COLOR CHANGE: 1
FEVER: 0

## 2022-03-21 NOTE — TELEPHONE ENCOUNTER
Patient is concerned of an infection on the left foot; she states there is an increase in redness and the sore on the bottom of her foot has opened up a little. Patient will send photo via myOrder.   Ph: [phone]

## 2022-03-22 NOTE — PROGRESS NOTES
Patient presents with:  Sore: on left foot redness, swelling and pain      Clinical Decision Making: Patient has cellulitis secondary from foot wound.  Recommend offloading wound with the boot given by podiatry.  Patient treated with clindamycin today.  Recommend close follow-up with podiatry in 2 days.  No current findings concerning for osteomyelitis or sepsis.      ICD-10-CM    1. Diabetic foot infection (H)  E11.628 clindamycin (CLEOCIN) 300 MG capsule    L08.9    2. Type 2 diabetes mellitus with foot ulcer, without long-term current use of insulin (H)  E11.621     L97.509        Patient Instructions   -Complete antibiotics as ordered. Take with food to help prevent stomach upset.  -Elevate the affected limb as much as possible. Wear your boot when you are up and about.   -Wash your hands before and after touching any cuts, scratches, or bandages to prevent infections.  -Keep the infected area clean; warm water soak for 10-15 minutes daily. Pat dry.  -Tylenol as needed for discomfort  - Keep your follow up appointment.           HPI:  Gill Mendez is a 62 year old adult with past medical history of diabetic foot ulcer, toe infection, type 2 diabetes, cellulitis, osteomyelitis, hammertoe of the left foot, hypertension who presents today complaining of left foot redness, swelling, and pain x 2 days. Patient had reopened a heeling wound. She has a follow up appointment with Podiatry in 2 days. She has not had any fevers or chills. Pain is 8/10. She has a protective boot at home.  Patient was previously on Bactrim on 2/3/2022 for this wound.    History obtained from the patient.    Problem List:  2022-02: Diabetic ulcer of left midfoot associated with diabetes   mellitus due to underlying condition, limited to breakdown of skin (H)  2020-10: Toe infection  2020-10: Type 2 diabetes mellitus with skin complication, without   long-term current use of insulin (H)  2020-10: Cellulitis of toe of right foot  2020-09:  Chest pain  2020-07: History of osteomyelitis  2020-07: Diabetic ulcer of toe of right foot associated with type 2   diabetes mellitus, with necrosis of muscle (H)  2020-07: Hammer toe of left foot  2016-06: Cellulitis of fifth toe of left foot  2015-08: Dyspnea on exertion  2006-06: Insomnia, unspecified  2006-05: Herpetic gingivostomatitis  Trochanteric Bursitis  Hypertension  Gastroparesis  Type 2 Diabetes Mellitus  Asthma  Allergy To Aspirin  Hyperlipidemia  Cellulitis and abscess of foot  Cellulitis      Past Medical History:   Diagnosis Date     Anxiety      Asthma      Cellulitis      Diabetes mellitus (H)      Essential hypertension     Created by Conversion  Replacement Utility updated for latest IMO load     Gastroparesis      Hyperlipidemia      Hypertension      Other and unspecified hyperlipidemia     Created by Conversion      PONV (postoperative nausea and vomiting)      Shortness of breath      Type II or unspecified type diabetes mellitus without mention of complication, not stated as uncontrolled     Created by Conversion        Social History     Tobacco Use     Smoking status: Never Smoker     Smokeless tobacco: Never Used   Substance Use Topics     Alcohol use: No       Review of Systems   Constitutional: Negative for chills and fever.   Musculoskeletal: Positive for arthralgias.   Skin: Positive for color change and wound.       Vitals:    03/21/22 1847   BP: 133/77   Pulse: 92   Resp: 16   Temp: 98.5  F (36.9  C)   TempSrc: Oral   SpO2: 97%   Weight: 82.6 kg (182 lb)       Physical Exam  Vitals and nursing note reviewed.   Constitutional:       General: Mayelin Mendez is not in acute distress.     Appearance: Mayelin Mendez is not toxic-appearing or diaphoretic.   HENT:      Head: Normocephalic and atraumatic.      Right Ear: External ear normal.      Left Ear: External ear normal.   Eyes:      Conjunctiva/sclera: Conjunctivae normal.   Pulmonary:      Effort: Pulmonary effort is normal. No  respiratory distress.   Musculoskeletal:        Feet:    Neurological:      Mental Status: Mayelin eMndez is alert.   Psychiatric:         Mood and Affect: Mood normal.         Behavior: Behavior normal.         Thought Content: Thought content normal.         Judgment: Judgment normal.         At the end of the encounter, I discussed results, diagnosis, medications. Discussed red flags for immediate return to clinic/ER, as well as indications for follow up if no improvement. Patient understood and agreed to plan. Patient was stable for discharge.

## 2022-03-22 NOTE — PATIENT INSTRUCTIONS
-Complete antibiotics as ordered. Take with food to help prevent stomach upset.  -Elevate the affected limb as much as possible. Wear your boot when you are up and about.   -Wash your hands before and after touching any cuts, scratches, or bandages to prevent infections.  -Keep the infected area clean; warm water soak for 10-15 minutes daily. Pat dry.  -Tylenol as needed for discomfort  - Keep your follow up appointment.

## 2022-03-23 ENCOUNTER — OFFICE VISIT (OUTPATIENT)
Dept: VASCULAR SURGERY | Facility: CLINIC | Age: 62
End: 2022-03-23
Attending: PODIATRIST
Payer: COMMERCIAL

## 2022-03-23 ENCOUNTER — ANCILLARY PROCEDURE (OUTPATIENT)
Dept: VASCULAR ULTRASOUND | Facility: CLINIC | Age: 62
End: 2022-03-23
Attending: PODIATRIST
Payer: COMMERCIAL

## 2022-03-23 VITALS — SYSTOLIC BLOOD PRESSURE: 130 MMHG | HEART RATE: 68 BPM | DIASTOLIC BLOOD PRESSURE: 80 MMHG

## 2022-03-23 DIAGNOSIS — I82.4Y2 DEEP VEIN THROMBOSIS (DVT) OF PROXIMAL VEIN OF LEFT LOWER EXTREMITY, UNSPECIFIED CHRONICITY (H): ICD-10-CM

## 2022-03-23 DIAGNOSIS — M79.669 CALF PAIN: ICD-10-CM

## 2022-03-23 DIAGNOSIS — L74.4 ANHIDROSIS: Primary | ICD-10-CM

## 2022-03-23 PROCEDURE — 99213 OFFICE O/P EST LOW 20 MIN: CPT | Performed by: PODIATRIST

## 2022-03-23 PROCEDURE — 93971 EXTREMITY STUDY: CPT | Mod: 26 | Performed by: SURGERY

## 2022-03-23 PROCEDURE — 93971 EXTREMITY STUDY: CPT | Mod: LT

## 2022-03-23 PROCEDURE — G0463 HOSPITAL OUTPT CLINIC VISIT: HCPCS

## 2022-03-23 RX ORDER — AMMONIUM LACTATE 12 G/100G
LOTION TOPICAL 2 TIMES DAILY
Qty: 225 G | Refills: 3 | Status: SHIPPED | OUTPATIENT
Start: 2022-03-23

## 2022-03-23 ASSESSMENT — PAIN SCALES - GENERAL: PAINLEVEL: SEVERE PAIN (6)

## 2022-03-23 NOTE — PROGRESS NOTES
FOOT AND ANKLE SURGERY/PODIATRY Progress Note      ASSESSMENT:   Fissure left foot  Left calf pain      TREATMENT:  -Superficial fissure left plantar forefoot. No signs of infection on exam today.     -I will start her on LacHydrin cream bid.     -She will finish course of clindamycin.     -There is mild pain along the posterior left calf. Ultrasound was negative for DVT.    -She will follow-up with me in 3-4 weeks if no improvement is noted.     Chris Vega DPM  [facility]      HPI: Gill Mendez was seen again today for a new sore on her left foot which she noticed last week. She was seen at urgent care on 3/21 and placed on clindamycin.     Past Medical History:   Diagnosis Date     Anxiety      Asthma      Cellulitis      Diabetes mellitus (H)      Essential hypertension     Created by Conversion  Replacement Utility updated for latest IMO load     Gastroparesis      Hyperlipidemia      Hypertension      Other and unspecified hyperlipidemia     Created by Conversion      PONV (postoperative nausea and vomiting)      Shortness of breath      Type II or unspecified type diabetes mellitus without mention of complication, not stated as uncontrolled     Created by Conversion        Past Surgical History:   Procedure Laterality Date     AMPUTATE TOE(S) Right 7/31/2020    Procedure: AMPUTATION, third digit right foot;  Surgeon: Chris Vega DPM;  [place] [facility] [place]: Podiatry     ENDOMETRIAL BIOPSY       FOOT ARTHROPLASTY Right 09/24/2020    Fourth and fifth toe by Dr. Vega      REPAIR OF HAMMERTOE,ONE Left 12/7/2020    Procedure: ARTHROPLASTY, digits two, three, four and five left foot;  Surgeon: Chris Vega DPM;  Location: [facility];  Service: Podiatry     HERNIA REPAIR       HYSTERECTOMY       REPAIR TENDON ACHILLES Bilateral     Left was plate  , right was torn     TONSILLECTOMY       ZZC REMOVAL OF OVARY(S)      Description: Oophorectomy -  Bilateral (Removal Of Both Ovaries);  Recorded: 10/09/2008;       Allergies   Allergen Reactions     Codeine Unknown     Patient states possibly caused N/V but can't remember for sure     Semaglutide Nausea and Vomiting     Acetaminophen-Codeine Rash     Aspirin Rash         Current Outpatient Medications:      albuterol (PROVENTIL HFA;VENTOLIN HFA) 90 mcg/actuation inhaler, [ALBUTEROL (PROVENTIL HFA;VENTOLIN HFA) 90 MCG/ACTUATION INHALER] Inhale 2 puffs every 6 (six) hours as needed for wheezing., Disp: , Rfl:      amLODIPine (NORVASC) 10 MG tablet, Take 10 mg by mouth daily , Disp: , Rfl: 0     ammonium lactate (LAC-HYDRIN) 12 % external lotion, Apply topically 2 times daily, Disp: 225 g, Rfl: 3     atorvastatin (LIPITOR) 40 MG tablet, [ATORVASTATIN (LIPITOR) 40 MG TABLET] Take 40 mg by mouth at bedtime. , Disp: , Rfl:      cetirizine (ZYRTEC) 10 MG tablet, [CETIRIZINE (ZYRTEC) 10 MG TABLET] Take 10 mg by mouth daily., Disp: , Rfl:      citalopram (CELEXA) 20 MG tablet, [CITALOPRAM (CELEXA) 20 MG TABLET] Take 20 mg by mouth every morning. , Disp: , Rfl: 0     clindamycin (CLEOCIN) 300 MG capsule, Take 1 capsule (300 mg) by mouth 3 times daily for 10 days, Disp: 30 capsule, Rfl: 0     diazePAM (VALIUM) 5 MG tablet, [DIAZEPAM (VALIUM) 5 MG TABLET] Take 1 tablet (5 mg total) by mouth every 6 (six) hours as needed for anxiety., Disp: 1 tablet, Rfl: 0     empagliflozin 25 mg Tab, Take 25 mg by mouth , Disp: , Rfl:      fluticasone (FLOVENT HFA) 110 MCG/ACT inhaler, Inhale 1 puff into the lungs , Disp: , Rfl:      fluticasone propionate (FLOVENT HFA) 110 mcg/actuation inhaler, Inhale 1 puff into the lungs 2 times daily , Disp: , Rfl:      furosemide (LASIX) 20 MG tablet, [FUROSEMIDE (LASIX) 20 MG TABLET] Take 20 mg by mouth 2 (two) times a day at 9am and 6pm. , Disp: , Rfl:      glipiZIDE (GLUCOTROL) 5 MG tablet, Take 5 mg by mouth 2 times daily (before meals) , Disp: , Rfl:      LANTUS SOLOSTAR U-100 INSULIN 100  unit/mL (3 mL) pen, [LANTUS SOLOSTAR U-100 INSULIN 100 UNIT/ML (3 ML) PEN] Inject 40 Units under the skin at bedtime. , Disp: , Rfl: 3     lisinopriL (PRINIVIL,ZESTRIL) 10 MG tablet, [LISINOPRIL (PRINIVIL,ZESTRIL) 10 MG TABLET] Take 10 mg by mouth daily., Disp: , Rfl:      metFORMIN (GLUCOPHAGE) 500 MG tablet, [METFORMIN (GLUCOPHAGE) 500 MG TABLET] Take 2,000 mg by mouth daily with breakfast. , Disp: , Rfl:      oxyCODONE (ROXICODONE) 5 MG immediate release tablet, [OXYCODONE (ROXICODONE) 5 MG IMMEDIATE RELEASE TABLET] Take 1 tablet (5 mg total) by mouth every 6 (six) hours as needed for pain., Disp: 30 tablet, Rfl: 0     pioglitazone (ACTOS) 15 MG tablet, [PIOGLITAZONE (ACTOS) 15 MG TABLET] Take 7.5 mg by mouth daily. , Disp: , Rfl:      potassium chloride 20 mEq TbER, Take 20 mEq by mouth daily , Disp: , Rfl:      potassium chloride ER (KLOR-CON M) 20 MEQ CR tablet, Take 20 mEq by mouth, Disp: , Rfl:      pramipexole (MIRAPEX) 0.25 MG tablet, [PRAMIPEXOLE (MIRAPEX) 0.25 MG TABLET] Take 0.25 mg by mouth 2 (two) times a day. , Disp: , Rfl: 3     saxagliptin (ONGLYZA) 5 MG TABS tablet, Take 5 mg by mouth, Disp: , Rfl:      sulfamethoxazole-trimethoprim (BACTRIM DS) 800-160 MG tablet, Take 1 tablet by mouth 2 times daily, Disp: 20 tablet, Rfl: 0    Review of Systems - 10 point Review of Systems is negative except for fissure left foot which is noted in HPI.      OBJECTIVE:  /80   Pulse 68   General appearance: Patient is alert and fully cooperative with history & exam.  No sign of distress is noted during the visit.    Vascular: Dorsalis pedis palpableLeft.  Dermatologic:    VASC Wound left foot  (Active)   Superficial fissure along the plantar left forefoot. No erythema left foot.  Neurologic: Diminished to light touch Left.  Musculoskeletal: Mild pain posterior left calf.     Imaging:     No results found.       Picture:

## 2022-03-23 NOTE — PATIENT INSTRUCTIONS
Your provider would like you to start using Lac-Hydrin    Apply this to the affected area twice per day

## 2022-04-20 ENCOUNTER — TRANSFERRED RECORDS (OUTPATIENT)
Dept: HEALTH INFORMATION MANAGEMENT | Facility: CLINIC | Age: 62
End: 2022-04-20
Payer: COMMERCIAL

## 2022-04-20 LAB
ALBUMIN (URINE) MG/SPEC: 14.8 UG/ML
ALBUMIN/CREATININE RATIO: 47 MG/G CREAT (ref 0–29)
ALT SERPL-CCNC: 24 IU/L (ref 0–32)
AST SERPL-CCNC: 24 IU/L (ref 0–40)
CHOLESTEROL (EXTERNAL): 128 MG/DL (ref 100–199)
CREATININE (EXTERNAL): 1.17 MG/DL (ref 0.57–1)
CREATININE (URINE): 31.7 MG/DL
GFR ESTIMATED (EXTERNAL): 53 ML/MIN/1.73M2
GLUCOSE (EXTERNAL): 121 MG/DL (ref 65–99)
HBA1C MFR BLD: 9.8 % (ref 4.8–5.6)
HDLC SERPL-MCNC: 54 MG/DL
LDL CHOLESTEROL CALCULATED (EXTERNAL): 57 MG/DL (ref 0–99)
POTASSIUM (EXTERNAL): 5.1 MMOL/L (ref 3.5–5.2)
TRIGLYCERIDES (EXTERNAL): 89 MG/DL (ref 0–149)
TSH SERPL-ACNC: 1.49 UIU/ML (ref 0.45–4.5)

## 2022-04-27 ENCOUNTER — OFFICE VISIT (OUTPATIENT)
Dept: ENDOCRINOLOGY | Facility: CLINIC | Age: 62
End: 2022-04-27
Payer: COMMERCIAL

## 2022-04-27 ENCOUNTER — LAB (OUTPATIENT)
Dept: LAB | Facility: CLINIC | Age: 62
End: 2022-04-27

## 2022-04-27 VITALS
WEIGHT: 182.2 LBS | HEART RATE: 100 BPM | BODY MASS INDEX: 30.32 KG/M2 | DIASTOLIC BLOOD PRESSURE: 62 MMHG | SYSTOLIC BLOOD PRESSURE: 114 MMHG

## 2022-04-27 DIAGNOSIS — L97.509 TYPE 2 DIABETES MELLITUS WITH FOOT ULCER, WITHOUT LONG-TERM CURRENT USE OF INSULIN (H): ICD-10-CM

## 2022-04-27 DIAGNOSIS — L97.509 TYPE 2 DIABETES MELLITUS WITH FOOT ULCER, WITHOUT LONG-TERM CURRENT USE OF INSULIN (H): Primary | ICD-10-CM

## 2022-04-27 DIAGNOSIS — E11.621 TYPE 2 DIABETES MELLITUS WITH FOOT ULCER, WITHOUT LONG-TERM CURRENT USE OF INSULIN (H): Primary | ICD-10-CM

## 2022-04-27 DIAGNOSIS — E11.621 TYPE 2 DIABETES MELLITUS WITH FOOT ULCER, WITHOUT LONG-TERM CURRENT USE OF INSULIN (H): ICD-10-CM

## 2022-04-27 PROCEDURE — 82985 ASSAY OF GLYCATED PROTEIN: CPT | Mod: 90

## 2022-04-27 PROCEDURE — 99204 OFFICE O/P NEW MOD 45 MIN: CPT | Performed by: INTERNAL MEDICINE

## 2022-04-27 PROCEDURE — 99000 SPECIMEN HANDLING OFFICE-LAB: CPT

## 2022-04-27 PROCEDURE — 36415 COLL VENOUS BLD VENIPUNCTURE: CPT

## 2022-04-27 RX ORDER — DULAGLUTIDE 0.75 MG/.5ML
0.75 INJECTION, SOLUTION SUBCUTANEOUS
Qty: 2 ML | Refills: 1 | Status: SHIPPED | OUTPATIENT
Start: 2022-04-27 | End: 2022-05-24 | Stop reason: DRUGHIGH

## 2022-04-27 NOTE — PROGRESS NOTES
ENDOCRINOLOGY NEW PATIENT VISIT        HISTORY OF PRESENT ILLNESS    Gill Mendez is seen in consultation at the request of Marian Garner for diabetes mellitus.      Has previously followed with Dr. Quinteros in the [facility] system, with last visit on 6/3/2021.  I reviewed that note through care everywhere.    Diabetes was diagnosed in 2005.  Has been complicated by diabetic retinopathy, neuropathy and nephropathy.    The patient has recently been following in podiatry at [facility] for foot ulcer; has history of 2 toe amputations.    She recalls last eye exam at [facility] in [place] showed diabetic retinopathy and she has been receiving a Avastin injections.    She does not have history of hypoglycemia requiring hospitalization.  No history of severe hypoglycemia requiring the aid of another person or causing loss of consciousness.  No history suggestive of hypoglycemia unawareness.    Current diabetes regimen: Lantus 42 units nightly, Onglyza 5 mg daily, Jardiance 25 mg daily, Actos 7.5 mg daily, metformin extended release 2000 mg daily.    Previously on prandial insulin: Found it difficult to take consistently.  Also prescribed Rybelsus in the past: This caused nausea.  Was on glipizide, this was discontinued due to escalating insulin requirement.    No history of pancreatitis or thyroid cancer.    Typically has 3 meals per day.  Breakfast is usually whole-wheat toast with peanut butter.  Lunch is very light, typically either a fruit or vegetable.  Dinner is her largest meal of the day (typically roast chicken or other meat with vegetables or sweet potatoes).    Does not have glucose meter with her today.  Typically checks blood glucose in the morning and at bedtime (usually 3 to 4 hours after her evening meal).  Fasting glucose ranges 80s to 170s, bedtime glucose values are typically in the 200s.  No recurrent hypoglycemia.    Previously used freestyle lilia but had reaction to sensor adhesive.    Pertinent Social  History: , has 2 sons and a daughter.  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    PAST MEDICAL HISTORY  Past Medical History:   Diagnosis Date     Anxiety      Asthma      Cellulitis      Diabetes mellitus (H)      Essential hypertension     Created by Conversion  Replacement Utility updated for latest IMO load     Gastroparesis      Hyperlipidemia      Hypertension      Other and unspecified hyperlipidemia     Created by Conversion      PONV (postoperative nausea and vomiting)      Shortness of breath      Type II or unspecified type diabetes mellitus without mention of complication, not stated as uncontrolled     Created by Conversion        MEDICATIONS  Current Outpatient Medications   Medication Sig Dispense Refill     albuterol (PROVENTIL HFA;VENTOLIN HFA) 90 mcg/actuation inhaler [ALBUTEROL (PROVENTIL HFA;VENTOLIN HFA) 90 MCG/ACTUATION INHALER] Inhale 2 puffs every 6 (six) hours as needed for wheezing.       amLODIPine (NORVASC) 10 MG tablet Take 10 mg by mouth daily   0     ammonium lactate (LAC-HYDRIN) 12 % external lotion Apply topically 2 times daily 225 g 3     atorvastatin (LIPITOR) 40 MG tablet [ATORVASTATIN (LIPITOR) 40 MG TABLET] Take 40 mg by mouth at bedtime.        citalopram (CELEXA) 20 MG tablet [CITALOPRAM (CELEXA) 20 MG TABLET] Take 20 mg by mouth every morning.   0     fluticasone (FLOVENT HFA) 110 MCG/ACT inhaler Inhale 1 puff into the lungs        furosemide (LASIX) 20 MG tablet [FUROSEMIDE (LASIX) 20 MG TABLET] Take 20 mg by mouth 2 (two) times a day at 9am and 6pm.        LANTUS SOLOSTAR U-100 INSULIN 100 unit/mL (3 mL) pen Inject 42 Units Subcutaneous At Bedtime  3     lisinopriL (PRINIVIL,ZESTRIL) 10 MG tablet [LISINOPRIL (PRINIVIL,ZESTRIL) 10 MG TABLET] Take 10 mg by mouth daily.       metFORMIN (GLUCOPHAGE) 500 MG tablet [METFORMIN (GLUCOPHAGE) 500 MG TABLET] Take 2,000 mg by mouth  daily with breakfast.        pramipexole (MIRAPEX) 0.25 MG tablet [PRAMIPEXOLE (MIRAPEX) 0.25 MG TABLET] Take 0.25 mg by mouth 2 (two) times a day.   3     saxagliptin (ONGLYZA) 5 MG TABS tablet Take 5 mg by mouth       cetirizine (ZYRTEC) 10 MG tablet [CETIRIZINE (ZYRTEC) 10 MG TABLET] Take 10 mg by mouth daily. (Patient not taking: Reported on 4/27/2022)       diazePAM (VALIUM) 5 MG tablet [DIAZEPAM (VALIUM) 5 MG TABLET] Take 1 tablet (5 mg total) by mouth every 6 (six) hours as needed for anxiety. (Patient not taking: Reported on 4/27/2022) 1 tablet 0     empagliflozin 25 mg Tab Take 25 mg by mouth  (Patient not taking: Reported on 4/27/2022)       fluticasone propionate (FLOVENT HFA) 110 mcg/actuation inhaler Inhale 1 puff into the lungs 2 times daily  (Patient not taking: Reported on 4/27/2022)       glipiZIDE (GLUCOTROL) 5 MG tablet Take 5 mg by mouth 2 times daily (before meals)  (Patient not taking: Reported on 4/27/2022)       pioglitazone (ACTOS) 15 MG tablet [PIOGLITAZONE (ACTOS) 15 MG TABLET] Take 7.5 mg by mouth daily.  (Patient not taking: Reported on 4/27/2022)         Allergies, family, and social history were reviewed and documented as needed in EHR.     REVIEW OF SYSTEMS  A complete 10-point ROS was performed and pertinent positives and negatives are noted in the HPI.    PHYSICAL EXAM  /62 (BP Location: Right arm, Patient Position: Sitting, Cuff Size: Adult Large)   Pulse 100   Wt 82.6 kg (182 lb 3.2 oz)   BMI 30.32 kg/m    Body mass index is 30.32 kg/m .  Constitutional: Vital signs reviewed, as recorded above. Patient is alert, oriented and appears in no acute distress.  Eyes: PER, EOMI, no stare, lid lag, or retraction; no conjunctival injection.  Neck: Neck supple, no palpable thyromegaly.  Lymphatic: No cervical or supraclavicular LAD.  Cardiovascular: RRR, normal S1/S2, no audible murmurs, rubs or gallops; No LE edema.  Respiratory: CTAB, without wheezes, crackles or rhonchi; normal  chest wall motion and respiratory effort.  GI: Positive bowel sounds, soft, NT/ND.  MSK: No clubbing or cyanosis; normal muscle bulk and tone.  Skin: Normal skin color, temperature, turgor and texture, no purple striae.  Neurological: Alert and oriented times 3. No tremor.    Foot exam: DP and PT pulses palpable, although diminished.  Ulceration in the left plantar midfoot appears to be healing, without any discharge or surrounding erythema.    DATA REVIEW  Each of the following laboratory and/or imaging studies were reviewed.    Patient is able to access lab results from primary care visit on 4/21/2022: Cholesterol 128, triglycerides 89, HDL 54, LDL 57.  TSH 1.49.  CMP remarkable for creatinine of 1.17, EGFR 53.  A1c 9.8.  Urine microalbumin to creatinine ratio 47.    ASSESSMENT  1.  Diabetes mellitus, type 2.  With hyperglycemia based on reported glucose data and recent A1c.  Most pronounced hyperglycemia appears to be postprandial.  Would discontinue Onglyza and add GLP-1 receptor agonist, advancing dose as tolerated over the coming months.  There has been some question about whether A1c is a true reflection of her degree of glycemic control; we will check fructosamine today to confirm.  Referral to diabetes care and  for comprehensive education and consideration of CGM, if covered.  We discussed benefits and potential side effects of GLP-1 receptor agonists, changes in diabetes regimen and glucose monitoring today.    2.  Diabetes preventive care.  -History of diabetic retinopathy, we will request records of eye exam  -CKD and microalbuminuria on urine microalbumin screen on 4/21/2022, on lisinopril; blood pressure optimally controlled  -Foot exam performed today: Foot care discussed in detail    3.  Dyslipidemia.  On statin therapy, optimal lipid profile in 4/2022.    PLAN  -Labs today  -Start Trulicity 0.75 mg once weekly  -When you start Trulicity, stop Onglyza  -Continue remainder of  medications without changes  -Check blood glucose fasting, and either before evening meal or 2 hours after evening meal; also check with any unusual symptoms that could be due to low blood glucose  -Update me via [facility] on tolerance of Trulicity in 3 weeks, then we can adjust dose   -Referral to diabetes education for comprehensive education and possible application for Dexcom CGM  -Return for a follow-up visit earliest available  -We will communicate results via [facility], or if needed by phone      Orders Placed This Encounter   Procedures     Fructosamine     I spent a total of 53 minutes on the date of encounter reviewing medical records, evaluating the patient, coordinating care and documenting in the EHR, as detailed above.    Addendum 4/29/2022:  Received records from vitreoretinal surgery, which will be scanned into EMR.  Last seen 2/28/2022 for proliferative diabetic retinopathy with diabetic macular edema, partial posterior vitreous detachment detachment and resolving vitreous hemorrhage.  Avastin was administered and follow-up was planned in 7 to 8 weeks.    Levy Lake MD   Division of Diabetes, Endocrinology and Metabolism  Department of Medicine    cc: Marian Garner MD

## 2022-04-27 NOTE — PATIENT INSTRUCTIONS
-Labs today  -Start Trulicity 0.75 mg once weekly  -When you start Trulicity, stop Onglyza  -Continue remainder of medications without changes  -Check blood glucose fasting, and either before evening meal or 2 hours after evening meal; also check with any unusual symptoms that could be due to low blood glucose  -Update me via [employer] how you are tolerating Trulicity in 3 weeks, then we can adjust dose   -Referral to diabetes education for comprehensive education and possible application for Dexcom CGM  -Return for a follow-up visit earliest available  -We will communicate results via [employer], or if needed by phone

## 2022-04-27 NOTE — LETTER
4/27/2022         RE: Gill Mendez  [address] [place] [id number]        Dear Colleague,    Thank you for referring your patient, Gill Mendez, to the [facility]. Please see a copy of my visit note below.      ENDOCRINOLOGY NEW PATIENT VISIT        HISTORY OF PRESENT ILLNESS    Gill Mendez is seen in consultation at the request of Marian Garner for diabetes mellitus.      Has previously followed with Dr. Quinteros in the [facility] system, with last visit on 6/3/2021.  I reviewed that note through care everywhere.    Diabetes was diagnosed in 2005.  Has been complicated by diabetic retinopathy, neuropathy and nephropathy.    The patient has recently been following in podiatry at [facility] for foot ulcer; has history of 2 toe amputations.    She recalls last eye exam at [facility] in [place] showed diabetic retinopathy and she has been receiving a Avastin injections.    She does not have history of hypoglycemia requiring hospitalization.  No history of severe hypoglycemia requiring the aid of another person or causing loss of consciousness.  No history suggestive of hypoglycemia unawareness.    Current diabetes regimen: Lantus 42 units nightly, Onglyza 5 mg daily, Jardiance 25 mg daily, Actos 7.5 mg daily, metformin extended release 2000 mg daily.    Previously on prandial insulin: Found it difficult to take consistently.  Also prescribed Rybelsus in the past: This caused nausea.  Was on glipizide, this was discontinued due to escalating insulin requirement.    No history of pancreatitis or thyroid cancer.    Typically has 3 meals per day.  Breakfast is usually whole-wheat toast with peanut butter.  Lunch is very light, typically either a fruit or vegetable.  Dinner is her largest meal of the day (typically roast chicken or other meat with vegetables or sweet potatoes).    Does not have glucose meter with her today.  Typically checks blood glucose in the morning and at bedtime (usually 3  to 4 hours after her evening meal).  Fasting glucose ranges 80s to 170s, bedtime glucose values are typically in the 200s.  No recurrent hypoglycemia.    Previously used freestyle lilia but had reaction to sensor adhesive.    Pertinent Social History: , has 2 sons and a daughter.  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    PAST MEDICAL HISTORY  Past Medical History:   Diagnosis Date     Anxiety      Asthma      Cellulitis      Diabetes mellitus (H)      Essential hypertension     Created by Conversion  Replacement Utility updated for latest IMO load     Gastroparesis      Hyperlipidemia      Hypertension      Other and unspecified hyperlipidemia     Created by Conversion      PONV (postoperative nausea and vomiting)      Shortness of breath      Type II or unspecified type diabetes mellitus without mention of complication, not stated as uncontrolled     Created by Conversion        MEDICATIONS  Current Outpatient Medications   Medication Sig Dispense Refill     albuterol (PROVENTIL HFA;VENTOLIN HFA) 90 mcg/actuation inhaler [ALBUTEROL (PROVENTIL HFA;VENTOLIN HFA) 90 MCG/ACTUATION INHALER] Inhale 2 puffs every 6 (six) hours as needed for wheezing.       amLODIPine (NORVASC) 10 MG tablet Take 10 mg by mouth daily   0     ammonium lactate (LAC-HYDRIN) 12 % external lotion Apply topically 2 times daily 225 g 3     atorvastatin (LIPITOR) 40 MG tablet [ATORVASTATIN (LIPITOR) 40 MG TABLET] Take 40 mg by mouth at bedtime.        citalopram (CELEXA) 20 MG tablet [CITALOPRAM (CELEXA) 20 MG TABLET] Take 20 mg by mouth every morning.   0     fluticasone (FLOVENT HFA) 110 MCG/ACT inhaler Inhale 1 puff into the lungs        furosemide (LASIX) 20 MG tablet [FUROSEMIDE (LASIX) 20 MG TABLET] Take 20 mg by mouth 2 (two) times a day at 9am and 6pm.        LANTUS SOLOSTAR U-100 INSULIN 100 unit/mL (3 mL) pen Inject 42 Units  Subcutaneous At Bedtime  3     lisinopriL (PRINIVIL,ZESTRIL) 10 MG tablet [LISINOPRIL (PRINIVIL,ZESTRIL) 10 MG TABLET] Take 10 mg by mouth daily.       metFORMIN (GLUCOPHAGE) 500 MG tablet [METFORMIN (GLUCOPHAGE) 500 MG TABLET] Take 2,000 mg by mouth daily with breakfast.        pramipexole (MIRAPEX) 0.25 MG tablet [PRAMIPEXOLE (MIRAPEX) 0.25 MG TABLET] Take 0.25 mg by mouth 2 (two) times a day.   3     saxagliptin (ONGLYZA) 5 MG TABS tablet Take 5 mg by mouth       cetirizine (ZYRTEC) 10 MG tablet [CETIRIZINE (ZYRTEC) 10 MG TABLET] Take 10 mg by mouth daily. (Patient not taking: Reported on 4/27/2022)       diazePAM (VALIUM) 5 MG tablet [DIAZEPAM (VALIUM) 5 MG TABLET] Take 1 tablet (5 mg total) by mouth every 6 (six) hours as needed for anxiety. (Patient not taking: Reported on 4/27/2022) 1 tablet 0     empagliflozin 25 mg Tab Take 25 mg by mouth  (Patient not taking: Reported on 4/27/2022)       fluticasone propionate (FLOVENT HFA) 110 mcg/actuation inhaler Inhale 1 puff into the lungs 2 times daily  (Patient not taking: Reported on 4/27/2022)       glipiZIDE (GLUCOTROL) 5 MG tablet Take 5 mg by mouth 2 times daily (before meals)  (Patient not taking: Reported on 4/27/2022)       pioglitazone (ACTOS) 15 MG tablet [PIOGLITAZONE (ACTOS) 15 MG TABLET] Take 7.5 mg by mouth daily.  (Patient not taking: Reported on 4/27/2022)         Allergies, family, and social history were reviewed and documented as needed in EHR.     REVIEW OF SYSTEMS  A complete 10-point ROS was performed and pertinent positives and negatives are noted in the HPI.    PHYSICAL EXAM  /62 (BP Location: Right arm, Patient Position: Sitting, Cuff Size: Adult Large)   Pulse 100   Wt 82.6 kg (182 lb 3.2 oz)   BMI 30.32 kg/m    Body mass index is 30.32 kg/m .  Constitutional: Vital signs reviewed, as recorded above. Patient is alert, oriented and appears in no acute distress.  Eyes: PER, EOMI, no stare, lid lag, or retraction; no conjunctival  injection.  Neck: Neck supple, no palpable thyromegaly.  Lymphatic: No cervical or supraclavicular LAD.  Cardiovascular: RRR, normal S1/S2, no audible murmurs, rubs or gallops; No LE edema.  Respiratory: CTAB, without wheezes, crackles or rhonchi; normal chest wall motion and respiratory effort.  GI: Positive bowel sounds, soft, NT/ND.  MSK: No clubbing or cyanosis; normal muscle bulk and tone.  Skin: Normal skin color, temperature, turgor and texture, no purple striae.  Neurological: Alert and oriented times 3. No tremor.    Foot exam: DP and PT pulses palpable, although diminished.  Ulceration in the left plantar midfoot appears to be healing, without any discharge or surrounding erythema.    DATA REVIEW  Each of the following laboratory and/or imaging studies were reviewed.    Patient is able to access lab results from primary care visit on 4/21/2022: Cholesterol 128, triglycerides 89, HDL 54, LDL 57.  TSH 1.49.  CMP remarkable for creatinine of 1.17, EGFR 53.  A1c 9.8.  Urine microalbumin to creatinine ratio 47.    ASSESSMENT  1.  Diabetes mellitus, type 2.  With hyperglycemia based on reported glucose data and recent A1c.  Most pronounced hyperglycemia appears to be postprandial.  Would discontinue Onglyza and add GLP-1 receptor agonist, advancing dose as tolerated over the coming months.  There has been some question about whether A1c is a true reflection of her degree of glycemic control; we will check fructosamine today to confirm.  Referral to diabetes care and  for comprehensive education and consideration of CGM, if covered.  We discussed benefits and potential side effects of GLP-1 receptor agonists, changes in diabetes regimen and glucose monitoring today.    2.  Diabetes preventive care.  -History of diabetic retinopathy, we will request records of eye exam  -CKD and microalbuminuria on urine microalbumin screen on 4/21/2022, on lisinopril; blood pressure optimally controlled  -Foot  exam performed today: Foot care discussed in detail    3.  Dyslipidemia.  On statin therapy, optimal lipid profile in 4/2022.    PLAN  -Labs today  -Start Trulicity 0.75 mg once weekly  -When you start Trulicity, stop Onglyza  -Continue remainder of medications without changes  -Check blood glucose fasting, and either before evening meal or 2 hours after evening meal; also check with any unusual symptoms that could be due to low blood glucose  -Update me via [employer] on tolerance of Trulicity in 3 weeks, then we can adjust dose   -Referral to diabetes education for comprehensive education and possible application for Dexcom CGM  -Return for a follow-up visit earliest available  -We will communicate results via [employer], or if needed by phone      Orders Placed This Encounter   Procedures     Fructosamine     I spent a total of 53 minutes on the date of encounter reviewing medical records, evaluating the patient, coordinating care and documenting in the EHR, as detailed above.      Jeffrey Lake MD   Division of Diabetes, Endocrinology and Metabolism  Department of Medicine      cc: Marian Garner MD             Again, thank you for allowing me to participate in the care of your patient.        Sincerely,        JEFFREY Lake MD

## 2022-04-29 LAB — FRUCTOSAMINE SERPL-SCNC: 362 UMOL/L

## 2022-06-25 ENCOUNTER — MYC MEDICAL ADVICE (OUTPATIENT)
Dept: VASCULAR SURGERY | Facility: CLINIC | Age: 62
End: 2022-06-25

## 2022-06-25 ENCOUNTER — OFFICE VISIT (OUTPATIENT)
Dept: FAMILY MEDICINE | Facility: CLINIC | Age: 62
End: 2022-06-25
Payer: COMMERCIAL

## 2022-06-25 ENCOUNTER — HOSPITAL ENCOUNTER (OUTPATIENT)
Dept: GENERAL RADIOLOGY | Facility: HOSPITAL | Age: 62
Discharge: HOME | End: 2022-06-25
Attending: FAMILY MEDICINE | Admitting: FAMILY MEDICINE
Payer: COMMERCIAL

## 2022-06-25 ENCOUNTER — NURSE TRIAGE (OUTPATIENT)
Dept: NURSING | Facility: CLINIC | Age: 62
End: 2022-06-25

## 2022-06-25 ENCOUNTER — TELEPHONE (OUTPATIENT)
Dept: FAMILY MEDICINE | Facility: CLINIC | Age: 62
End: 2022-06-25

## 2022-06-25 VITALS
DIASTOLIC BLOOD PRESSURE: 81 MMHG | OXYGEN SATURATION: 98 % | HEART RATE: 90 BPM | SYSTOLIC BLOOD PRESSURE: 127 MMHG | RESPIRATION RATE: 16 BRPM | WEIGHT: 175.3 LBS | BODY MASS INDEX: 29.17 KG/M2 | TEMPERATURE: 98.3 F

## 2022-06-25 DIAGNOSIS — L03.116 CELLULITIS OF LEFT LOWER EXTREMITY: ICD-10-CM

## 2022-06-25 DIAGNOSIS — M79.672 LEFT FOOT PAIN: ICD-10-CM

## 2022-06-25 DIAGNOSIS — M79.89 SWELLING OF LEFT FOOT: Primary | ICD-10-CM

## 2022-06-25 DIAGNOSIS — M79.89 SWELLING OF LEFT FOOT: ICD-10-CM

## 2022-06-25 PROCEDURE — 73630 X-RAY EXAM OF FOOT: CPT | Mod: LT,FY

## 2022-06-25 PROCEDURE — 99214 OFFICE O/P EST MOD 30 MIN: CPT | Performed by: FAMILY MEDICINE

## 2022-06-25 NOTE — TELEPHONE ENCOUNTER
Patient called back, she is aware of Dr. Brown's message. She has contacted her provider and will follow up with him/her.     No questions at this time.     Christian Griffin, Medical Assistant

## 2022-06-25 NOTE — TELEPHONE ENCOUNTER
Mayelin is having trouble with left foot as she has a cut on the bottom of her foot and now is red and painful.  Foot is red and warm to touch.   Patient denies fever cough and shortness of breath.  Patient states that she will go to urgent care to be seen in person.      COVID 19 Nurse Triage Plan/Patient Instructions    Please be aware that novel coronavirus (COVID-19) may be circulating in the community. If you develop symptoms such as fever, cough, or SOB or if you have concerns about the presence of another infection including coronavirus (COVID-19), please contact your health care provider or visit [email or web address].     Disposition/Instructions    In-Person Visit with provider recommended. Reference Visit Selection Guide.    Thank you for taking steps to prevent the spread of this virus.  o Limit your contact with others.  o Wear a simple mask to cover your cough.  o Wash your hands well and often.    Resources    [facility]: About COVID-19: [email or web address]    CDC: What to Do If You're Sick: www.cdc.gov/coronavirus/2019-ncov/about/steps-when-sick.html    CDC: Ending Home Isolation: www.cdc.gov/coronavirus/2019-ncov/hcp/disposition-in-home-patients.html     CDC: Caring for Someone: www.cdc.gov/coronavirus/2019-ncov/if-you-are-sick/care-for-someone.html     [facility]: Interim Guidance for Hospital Discharge to Home: www.health.[facility].mn.us/diseases/coronavirus/hcp/hospdischarge.pdf    [facility] clinical trials (COVID-19 research studies): [email or web address].[email or web address]-clinical-trials     Below are the COVID-19 hotlines at the [facility] ([facility]). Interpreters are available.   o For health questions: Call 531-171-8479 or 1-252.660.5803 (7 a.m. to 7 p.m.)  o For questions about schools and childcare: Call 979-545-5590 or 1-945.162.7985 (7 a.m. to 7 p.m.)                       Reason for Disposition    [1] Looks infected AND [2] large red area (>2 inches or 5  cm) or streak    Additional Information    Negative: [1] Major bleeding (e.g., actively dripping or spurting) AND [2] can't be stopped    Negative: Amputation    Negative: Shock suspected (e.g., cold/pale/clammy skin, too weak to stand, low BP, rapid pulse)    Negative: [1] Knife wound (or other possibly deep cut) AND [2] to chest, abdomen, back, neck, or head    Negative: [1] Cutter (self-mutilator) AND [2] suicidal or out-of-control    Negative: Sounds like a life-threatening emergency to the triager    Negative: [1] Bleeding AND [2] won't stop after 10 minutes of direct pressure (using correct technique)    Negative: Skin is split open or gaping (or length > 1/2 inch or 12 mm on the skin, 1/4 inch or 6 mm on the face)    Negative: [1] Deep cut AND [2] can see bone or tendons    Negative: Sensation of something in the wound (i.e., retained object in wound)    Negative: [1] Dirt in the wound AND [2] not removed with 15 minutes of scrubbing    Negative: Wound causes numbness (i.e., loss of sensation)    Negative: Wound causes weakness (i.e., decreased ability to move hand, finger, toe)    Negative: [1] SEVERE pain AND [2] not improved 2 hours after pain medicine    Protocols used: CUTS AND [id number]

## 2022-06-25 NOTE — TELEPHONE ENCOUNTER
Called patient, left message to return phone call.     Please relay Dr. Brown's message below to patient.     Christian Griffin, Medical Assistant

## 2022-06-25 NOTE — PROGRESS NOTES
Assessment & Plan     Swelling of left foot  - XR Foot Left G/E 3 Views    Left foot pain  - XR Foot Left G/E 3 Views    Cellulitis of left lower extremity  - amoxicillin-clavulanate (AUGMENTIN) 875-125 MG tablet  Dispense: 20 tablet; Refill: 0  I think that she does have cellulitis, I am going to go ahead and treat her as noted above.  The laceration noted on the plantar aspect was well-healed and not the source of infection.  Though she has some tenderness around the mid foot.  The x-ray did not show any major signs of osteomyelitis, but if the pain continues she will need to follow-up with her primary care physician.      {Provider  Link to [facility] Help Grid :[id number]}    No follow-ups on file.    Kevin Prabhakar MD  [facility]    David Dick is a 62 year old adult who presents to clinic today for the following health issues:  Chief Complaint   Patient presents with     Foot Pain     Redness and pain of the left sole of the foot since yesterday, has almost heeled wound there, hx of cellulitis on this foot      HPI      MS Injury/Pain    Onset of symptoms was 1 day(s) ago.  Location: left foot on the plantar surface. Has some redness with the pain. Has history of laceration on the same foot which is being managed but has pain close to the area.   Context:       The injury happened while no injury.     Course of symptoms is worsening.    Severity moderate  Current and Associated symptoms: Pain, Swelling, Redness and Tenderness  Denies  Current injury  Aggravating Factors: walking and movement  Therapies to improve symptoms include: none  This is the first time this type of problem has occurred for this patient.   Has a history of DM2 and worried about cellulitis. Worried about infection to the bone as well.    Review of Systems  Constitutional, HEENT, cardiovascular, pulmonary, gi and gu systems are negative, except as otherwise noted.      Objective    /81   Pulse 90   Temp  98.3  F (36.8  C) (Oral)   Resp 16   Wt 79.5 kg (175 lb 4.8 oz)   SpO2 98%   BMI 29.17 kg/m    Physical Exam   GENERAL: healthy, alert and no distress  RESP: Quiet unlabored respiration noted.  CV: peripheral pulses strong and no peripheral edema  MS: Left foot showing mild redness on the mid  medial foot around the first metatarsal tarsal bone.  There is also tenderness noted around deep tissues.  She does have a scar and noted in the crease of the pad of the first metatarso phalangeal joint.  There is normal pulsation of the dorsalis pedis though thin.

## 2022-06-25 NOTE — TELEPHONE ENCOUNTER
----- Message from Kevin Prabhakar MD sent at 6/25/2022  4:07 PM CDT -----  X-ray of the foot did not show any fractures or erosions.  Other findings were nonspecific.  As we had discussed I think that she needs to follow-up with the primary as well as the surgeon for continued evaluation.  Encouraged to finish the with antibiotics as well.

## 2022-06-27 NOTE — TELEPHONE ENCOUNTER
Please advise time to double-book patient with Dr. Vega. She is available anytime after 1:00pm Tuesday or anytime Wednesday. She is leaving town on Thursday and needs to rule out infection prior to leaving town. Okay to schedule and leave message with appointment time. [phone]

## 2022-06-29 ENCOUNTER — OFFICE VISIT (OUTPATIENT)
Dept: VASCULAR SURGERY | Facility: CLINIC | Age: 62
End: 2022-06-29
Attending: PODIATRIST
Payer: COMMERCIAL

## 2022-06-29 VITALS — DIASTOLIC BLOOD PRESSURE: 76 MMHG | SYSTOLIC BLOOD PRESSURE: 120 MMHG | TEMPERATURE: 98.5 F | HEART RATE: 96 BPM

## 2022-06-29 DIAGNOSIS — L74.4 ANHIDROSIS: Primary | ICD-10-CM

## 2022-06-29 PROCEDURE — G0463 HOSPITAL OUTPT CLINIC VISIT: HCPCS

## 2022-06-29 PROCEDURE — 99212 OFFICE O/P EST SF 10 MIN: CPT | Performed by: PODIATRIST

## 2022-06-29 ASSESSMENT — PAIN SCALES - GENERAL: PAINLEVEL: MILD PAIN (3)

## 2022-06-29 NOTE — PROGRESS NOTES
FOOT AND ANKLE SURGERY/PODIATRY Progress Note      ASSESSMENT:   Anhidrosis   DM2      TREATMENT:  -I discussed with the patient that she has dry skin along the plantar left foot with possible superficial fissuring. No open lesions or signs of infection.     -I recommend she continue to use LacHydrin bid and avoid soaking.    -She will continue to monitor for any open lesions or skin irritation.     -All questions invited and answered. She will follow-up with me as concerns develop.     Chris Vega DPM  [facility]      HPI: Gill Mendez was seen again today for a right foot infection. She was seen in urgent care on 6/25 for a right foot infection and placed on Augmentin. She reports noticing some mild pain in both feet over the past week and became concerned. She denies noticing any open lesions.     Past Medical History:   Diagnosis Date     Anxiety      Asthma      Cellulitis      Diabetes mellitus (H)      Essential hypertension     Created by Conversion  Replacement Utility updated for latest IMO load     Gastroparesis      Hyperlipidemia      Hypertension      Other and unspecified hyperlipidemia     Created by Conversion      PONV (postoperative nausea and vomiting)      Shortness of breath      Type II or unspecified type diabetes mellitus without mention of complication, not stated as uncontrolled     Created by Conversion        Past Surgical History:   Procedure Laterality Date     AMPUTATE TOE(S) Right 7/31/2020    Procedure: AMPUTATION, third digit right foot;  Surgeon: Chris Vega DPM;  Location: [facility];  Service: Podiatry     ENDOMETRIAL BIOPSY       FOOT ARTHROPLASTY Right 09/24/2020    Fourth and fifth toe by Dr. Vega     HC REPAIR OF HAMMERTOSHERYL,ONE Left 12/7/2020    Procedure: ARTHROPLASTY, digits two, three, four and five left foot;  Surgeon: Chris Vega DPM;  Location: [facility];  Service: Podiatry     HERNIA REPAIR       HYSTERECTOMY        REPAIR TENDON ACHILLES Bilateral     Left was plate  , right was torn     TONSILLECTOMY       ZZC REMOVAL OF OVARY(S)      Description: Oophorectomy - Bilateral (Removal Of Both Ovaries);  Recorded: 10/09/2008;       Allergies   Allergen Reactions     Codeine Unknown     Patient states possibly caused N/V but can't remember for sure     Semaglutide Nausea and Vomiting     Acetaminophen-Codeine Rash     Aspirin Rash         Current Outpatient Medications:      albuterol (PROVENTIL HFA;VENTOLIN HFA) 90 mcg/actuation inhaler, [ALBUTEROL (PROVENTIL HFA;VENTOLIN HFA) 90 MCG/ACTUATION INHALER] Inhale 2 puffs every 6 (six) hours as needed for wheezing., Disp: , Rfl:      amLODIPine (NORVASC) 10 MG tablet, Take 10 mg by mouth daily , Disp: , Rfl: 0     ammonium lactate (LAC-HYDRIN) 12 % external lotion, Apply topically 2 times daily, Disp: 225 g, Rfl: 3     amoxicillin-clavulanate (AUGMENTIN) 875-125 MG tablet, Take 1 tablet by mouth 2 times daily, Disp: 20 tablet, Rfl: 0     atorvastatin (LIPITOR) 40 MG tablet, Take 40 mg by mouth At Bedtime, Disp: , Rfl:      blood glucose (NO BRAND SPECIFIED) test strip, Use to test blood sugar 2 times daily as directed., Disp: 200 strip, Rfl: 3     blood glucose monitoring (NO BRAND SPECIFIED) meter device kit, Use to test blood sugar two times daily or as directed., Disp: 1 kit, Rfl: 0     citalopram (CELEXA) 20 MG tablet, [CITALOPRAM (CELEXA) 20 MG TABLET] Take 20 mg by mouth every morning. , Disp: , Rfl: 0     diazePAM (VALIUM) 5 MG tablet, [DIAZEPAM (VALIUM) 5 MG TABLET] Take 1 tablet (5 mg total) by mouth every 6 (six) hours as needed for anxiety., Disp: 1 tablet, Rfl: 0     dulaglutide (TRULICITY) 1.5 MG/0.5ML pen, Inject 1.5 mg Subcutaneous every 7 days, Disp: 2 mL, Rfl: 4     empagliflozin 25 mg Tab, Take 25 mg by mouth, Disp: , Rfl:      fluticasone (FLOVENT HFA) 110 MCG/ACT inhaler, Inhale 1 puff into the lungs , Disp: , Rfl:      fluticasone propionate (FLOVENT HFA) 110  mcg/actuation inhaler, Inhale 1 puff into the lungs 2 times daily, Disp: , Rfl:      furosemide (LASIX) 20 MG tablet, [FUROSEMIDE (LASIX) 20 MG TABLET] Take 20 mg by mouth 2 (two) times a day at 9am and 6pm. , Disp: , Rfl:      LANTUS SOLOSTAR U-100 INSULIN 100 unit/mL (3 mL) pen, Inject 42 Units Subcutaneous At Bedtime, Disp: , Rfl: 3     lisinopriL (PRINIVIL,ZESTRIL) 10 MG tablet, [LISINOPRIL (PRINIVIL,ZESTRIL) 10 MG TABLET] Take 10 mg by mouth daily., Disp: , Rfl:      metFORMIN (GLUCOPHAGE) 500 MG tablet, [METFORMIN (GLUCOPHAGE) 500 MG TABLET] Take 2,000 mg by mouth daily with breakfast. , Disp: , Rfl:      pramipexole (MIRAPEX) 0.25 MG tablet, [PRAMIPEXOLE (MIRAPEX) 0.25 MG TABLET] Take 0.25 mg by mouth 2 (two) times a day. , Disp: , Rfl: 3    Review of Systems - 10 point Review of Systems is negative except for foot pain which is noted in HPI.      OBJECTIVE:  /76   Pulse 96   Temp 98.5  F (36.9  C)   General appearance: Patient is alert and fully cooperative with history & exam.  No sign of distress is noted during the visit.    Vascular: Dorsalis pedis palpableBilateral.  Dermatologic: Dry, flaky skin plantar left forefoot. No erythema or open lesions bilateral feet.   Neurologic: Diminished to light touch Bilateral.  Musculoskeletal: Contracted digits noted Bilateral.    Imaging:     XR Foot Left G/E 3 Views    Result Date: 6/25/2022  EXAM: XR FOOT LEFT G/E 3 VIEWS LOCATION: [facility] DATE/TIME: 6/25/2022 10:55 AM INDICATION: Left foot pain and swelling. COMPARISON: 01/27/2022.     IMPRESSION: Left foot negative for fracture, erosion, or joint malalignment. Nonspecific sclerosis in the fibular sesamoid at the MTP joint. Operative changes and arthroplasty involving the PIP joints in the proximal phalanges of the lesser toes. Subtalar joints remain normally aligned. Operative changes in the calcaneus, including posterior body calcaneal deformity and screw fixation. These  appear well-healed without evidence of hardware loosening or other complication. Moderate nonspecific heterotopic ossification at the Achilles and plantar fascial attachments. Moderate arterial calcifications throughout the foot and ankle. Moderate soft tissue swelling in the hindfoot. Marked soft tissue swelling in the forefoot.         Picture: None

## 2022-07-13 ENCOUNTER — OFFICE VISIT (OUTPATIENT)
Dept: ENDOCRINOLOGY | Facility: CLINIC | Age: 62
End: 2022-07-13

## 2022-07-13 ENCOUNTER — LAB (OUTPATIENT)
Dept: LAB | Facility: CLINIC | Age: 62
End: 2022-07-13
Payer: COMMERCIAL

## 2022-07-13 ENCOUNTER — HOSPITAL ENCOUNTER (OUTPATIENT)
Dept: ULTRASOUND IMAGING | Facility: CLINIC | Age: 62
Discharge: HOME | End: 2022-07-13
Attending: INTERNAL MEDICINE | Admitting: INTERNAL MEDICINE
Payer: COMMERCIAL

## 2022-07-13 VITALS
DIASTOLIC BLOOD PRESSURE: 72 MMHG | BODY MASS INDEX: 29.17 KG/M2 | SYSTOLIC BLOOD PRESSURE: 140 MMHG | HEART RATE: 88 BPM | WEIGHT: 175.3 LBS

## 2022-07-13 DIAGNOSIS — E04.9 GOITER: ICD-10-CM

## 2022-07-13 DIAGNOSIS — L97.509 TYPE 2 DIABETES MELLITUS WITH FOOT ULCER, WITHOUT LONG-TERM CURRENT USE OF INSULIN (H): Primary | ICD-10-CM

## 2022-07-13 DIAGNOSIS — L97.509 TYPE 2 DIABETES MELLITUS WITH FOOT ULCER, WITHOUT LONG-TERM CURRENT USE OF INSULIN (H): ICD-10-CM

## 2022-07-13 DIAGNOSIS — E11.621 TYPE 2 DIABETES MELLITUS WITH FOOT ULCER, WITHOUT LONG-TERM CURRENT USE OF INSULIN (H): Primary | ICD-10-CM

## 2022-07-13 DIAGNOSIS — E11.621 TYPE 2 DIABETES MELLITUS WITH FOOT ULCER, WITHOUT LONG-TERM CURRENT USE OF INSULIN (H): ICD-10-CM

## 2022-07-13 LAB
ANION GAP SERPL CALCULATED.3IONS-SCNC: 11 MMOL/L (ref 7–15)
BUN SERPL-MCNC: 23.1 MG/DL (ref 8–23)
CALCIUM SERPL-MCNC: 9.6 MG/DL (ref 8.8–10.2)
CHLORIDE SERPL-SCNC: 99 MMOL/L (ref 98–107)
CREAT SERPL-MCNC: 0.96 MG/DL (ref 0.51–1.17)
DEPRECATED HCO3 PLAS-SCNC: 30 MMOL/L (ref 22–29)
GFR SERPL CREATININE-BSD FRML MDRD: 67 ML/MIN/1.73M2
GLUCOSE SERPL-MCNC: 261 MG/DL (ref 70–99)
HBA1C MFR BLD: 8.6 % (ref 0–5.6)
POTASSIUM SERPL-SCNC: 4.2 MMOL/L (ref 3.4–5.3)
SODIUM SERPL-SCNC: 140 MMOL/L (ref 136–145)

## 2022-07-13 PROCEDURE — 36415 COLL VENOUS BLD VENIPUNCTURE: CPT

## 2022-07-13 PROCEDURE — 83036 HEMOGLOBIN GLYCOSYLATED A1C: CPT

## 2022-07-13 PROCEDURE — 99215 OFFICE O/P EST HI 40 MIN: CPT | Performed by: INTERNAL MEDICINE

## 2022-07-13 PROCEDURE — 76536 US EXAM OF HEAD AND NECK: CPT

## 2022-07-13 PROCEDURE — 80048 BASIC METABOLIC PNL TOTAL CA: CPT

## 2022-07-13 RX ORDER — DULAGLUTIDE 3 MG/.5ML
3 INJECTION, SOLUTION SUBCUTANEOUS WEEKLY
Qty: 2 ML | Refills: 3 | Status: SHIPPED | OUTPATIENT
Start: 2022-07-13 | End: 2022-09-28 | Stop reason: DRUGHIGH

## 2022-07-13 NOTE — LETTER
7/13/2022         RE: Gill Mendez  [id number], [place] [address]        Dear Colleague,    Thank you for referring your patient, Gill Mendez, to the [facility]. Please see a copy of my visit note below.      ENDOCRINOLOGY FOLLOW-UP         HISTORY OF PRESENT ILLNESS    Gill Mendez is seen in follow-up for the issues outlined below.     1.  Diabetes mellitus.     In the interim since last visit, the patient was seen on 6/25/2022 with cellulitis: Antibiotics were prescribed and she notes resolution of her symptoms.  She was seen in podiatry and was generally thought to be doing well aside from fissures on feet: Lac-Hydrin cream was prescribed.  The patient has been using this and has no new concerns related to feet.    We transitioned from Onglyza to Trulicity at last visit: Since she was tolerating Trulicity, we increased dose on 5/24/2022.  She had about 1 week of significant nausea which improved thereafter.    Current diabetes regimen: Lantus 38 units nightly, Trulicity 1.5 mg weekly, Jardiance 25 mg daily, Actos 7.5 mg daily, metformin extended release 2000 mg daily.    I had referred patient for visit with diabetes care and : She had to reschedule her appointment in June since her mother was hospitalized.  She has another appointment scheduled for 7/25/2022.  Is looking forward to discussing diabetes management and CGM options.    She is not checking glucose as frequently as she would like.  Average 0.3 readings per day.  Average glucose is 174.  She does note that if she delays a meal, she sometimes feels shaky and sweaty.  Does not typically check glucose when she has the symptoms.    She has lost 7 pounds since last visit.    2.  Goiter.  Noted on today's exam.    The patient does not recall prior history of thyroid disease.  She does not have history of excessive head or neck radiation exposure.  Family history is notable for mother who had  thyroid nodules removed surgically: No thyroid cancer.    Pertinent endocrine and related history:  1.  Diabetes mellitus, type 2. Has previously followed with Dr. Quinteros in the [facility] system.  -Diabetes was diagnosed in 2005.  Has been complicated by diabetic retinopathy, neuropathy and nephropathy.  -Previously on prandial insulin: Found it difficult to take consistently.  Also prescribed Rybelsus in the past: This caused nausea.  Was on glipizide, this was discontinued due to escalating insulin requirement.  -Previously used freestyle lilia but had reaction to sensor adhesive.  2.  History of foot ulcer; has history of 2 toe amputations.    Pertinent Social History: , has 2 sons and a daughter.  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    PAST MEDICAL HISTORY  Past Medical History:   Diagnosis Date     Anxiety      Asthma      Cellulitis      Diabetes mellitus (H)      Essential hypertension     Created by Conversion  Replacement Utility updated for latest IMO load     Gastroparesis      Hyperlipidemia      Hypertension      Other and unspecified hyperlipidemia     Created by Conversion      PONV (postoperative nausea and vomiting)      Shortness of breath      Type II or unspecified type diabetes mellitus without mention of complication, not stated as uncontrolled     Created by Conversion        MEDICATIONS  Current Outpatient Medications   Medication Sig Dispense Refill     albuterol (PROVENTIL HFA;VENTOLIN HFA) 90 mcg/actuation inhaler [ALBUTEROL (PROVENTIL HFA;VENTOLIN HFA) 90 MCG/ACTUATION INHALER] Inhale 2 puffs every 6 (six) hours as needed for wheezing.       amLODIPine (NORVASC) 10 MG tablet Take 10 mg by mouth daily   0     ammonium lactate (LAC-HYDRIN) 12 % external lotion Apply topically 2 times daily 225 g 3     atorvastatin (LIPITOR) 40 MG tablet Take 40 mg by mouth At Bedtime       blood  glucose (NO BRAND SPECIFIED) test strip Use to test blood sugar 2 times daily as directed. 200 strip 3     blood glucose monitoring (NO BRAND SPECIFIED) meter device kit Use to test blood sugar two times daily or as directed. 1 kit 0     citalopram (CELEXA) 20 MG tablet [CITALOPRAM (CELEXA) 20 MG TABLET] Take 20 mg by mouth every morning.   0     diazePAM (VALIUM) 5 MG tablet [DIAZEPAM (VALIUM) 5 MG TABLET] Take 1 tablet (5 mg total) by mouth every 6 (six) hours as needed for anxiety. 1 tablet 0     dulaglutide (TRULICITY) 1.5 MG/0.5ML pen Inject 1.5 mg Subcutaneous every 7 days 2 mL 4     empagliflozin 25 mg Tab Take 25 mg by mouth       fluticasone propionate (FLOVENT HFA) 110 mcg/actuation inhaler Inhale 1 puff into the lungs 2 times daily       furosemide (LASIX) 20 MG tablet [FUROSEMIDE (LASIX) 20 MG TABLET] Take 20 mg by mouth 2 (two) times a day at 9am and 6pm.        LANTUS SOLOSTAR U-100 INSULIN 100 unit/mL (3 mL) pen Inject 38 Units Subcutaneous At Bedtime  3     lisinopriL (PRINIVIL,ZESTRIL) 10 MG tablet [LISINOPRIL (PRINIVIL,ZESTRIL) 10 MG TABLET] Take 10 mg by mouth daily.       metFORMIN (GLUCOPHAGE) 500 MG tablet [METFORMIN (GLUCOPHAGE) 500 MG TABLET] Take 2,000 mg by mouth daily with breakfast.        pramipexole (MIRAPEX) 0.25 MG tablet [PRAMIPEXOLE (MIRAPEX) 0.25 MG TABLET] Take 0.25 mg by mouth 2 (two) times a day.   3       Allergies, family, and social history were reviewed and documented as needed in EHR.     REVIEW OF SYSTEMS  A focused ROS was performed, with pertinent positives and negatives as noted in the HPI.    PHYSICAL EXAM  BP (!) 140/72 (BP Location: Right arm, Patient Position: Sitting, Cuff Size: Adult Regular)   Pulse 88   Wt 79.5 kg (175 lb 4.8 oz)   BMI 29.17 kg/m    Body mass index is 29.17 kg/m .  Constitutional: Vital signs reviewed, as recorded above. Patient is alert, oriented and appears in no acute distress.  Eyes: PER, EOMI, no stare, lid lag, or retraction; no  conjunctival injection.  Neck: On my exam today, the right lobe of the thyroid feels more prominent (approximately 1.5 times the size of normal) although the gland is low-lying and it is unclear if there is a palpable nodule.  No tenderness on exam.  Lymphatic: No cervical or supraclavicular LAD.  Cardiovascular: No LE edema.  MSK: No clubbing or cyanosis; normal muscle bulk and tone.  Skin: Normal skin color, temperature, turgor and texture.  Neurological: Alert and oriented times 3. No tremor.    DATA REVIEW  Each of the following laboratory and/or imaging studies were reviewed.    Lab on 04/27/2022   Component Date Value Ref Range Status     Fructosamine 04/27/2022 362 (A) 205 - 285 umol/L Final    INTERPRETIVE INFORMATION:  Fructosamine  Variations in levels of serum proteins (albumin and   immunoglobulins) may affect fructosamine results.  Performed By: [facility]  [address] [place] [id number]  : Theodora Cortes MD     Component      Latest Ref Rng & Units 4/20/2022   Cholesterol (External)      100 - 199 mg/dL 128   Triglycerides (External)      0 - 149 mg/dL 89   HDL Cholesterol (External)      >39 mg/dL 54   LDL Cholesterol Calculated (External)      0 - 99 mg/dL 57   Hemoglobin A1C (External)      4.8 - 5.6 % 9.8 (A)   TSH (External)      0.450 - 4.500 uIU/mL 1.490       ASSESSMENT  1.  Diabetes mellitus, type 2.  Limited glucose data, but most glucose values appear to be within acceptable range.  May have some hypoglycemia when delaying meals.  Since she is tolerating Trulicity, would increase dose upward and reduce basal insulin slightly.  Continue remainder of regimen without changes, although would consider tapering off Actos in the future depending on her progress.  Anticipating visit with diabetes care and  later this month: Would benefit from comprehensive education and consideration of CGM options, if covered.    2.  Diabetes preventive  care.  -History of diabetic retinopathy, I reviewed records of eye exam performed on 2/28/2022: Proliferative diabetic retinopathy with diabetic macular edema, status post PRP and Avastin, most recently receiving a Avastin in ophthalmology  -CKD and microalbuminuria on urine microalbumin screen on 4/21/2022, on lisinopril; blood pressure optimally controlled  -Foot exam performed 4/27/2022: Foot care discussed in detail at that visit    3.  Dyslipidemia.  On statin therapy, optimal lipid profile in 4/2022.    PLAN  -Labs today  -Increase Trulicity to 3 mg once weekly  -With increase in Trulicity, reduce Lantus to 35 units once daily  -Continue remainder of medications without changes  -Schedule thyroid ultrasound  -Return for a follow-up visit in 2 months  -We will communicate results via [employer], or if needed by phone    Orders Placed This Encounter   Procedures     US Thyroid     Hemoglobin A1c     Basic metabolic panel     I spent a total of 44 minutes on the date of encounter reviewing medical records, evaluating the patient, coordinating care and documenting in the EHR, as detailed above.      Jeffrey Lake MD   Division of Diabetes, Endocrinology and Metabolism  Department of Medicine              Again, thank you for allowing me to participate in the care of your patient.        Sincerely,        JEFFREY Lake MD

## 2022-07-13 NOTE — PROGRESS NOTES
ENDOCRINOLOGY FOLLOW-UP         HISTORY OF PRESENT ILLNESS    Gill Mendez is seen in follow-up for the issues outlined below.     1.  Diabetes mellitus.     In the interim since last visit, the patient was seen on 6/25/2022 with cellulitis: Antibiotics were prescribed and she notes resolution of her symptoms.  She was seen in podiatry and was generally thought to be doing well aside from fissures on feet: Lac-Hydrin cream was prescribed.  The patient has been using this and has no new concerns related to feet.    We transitioned from Onglyza to Trulicity at last visit: Since she was tolerating Trulicity, we increased dose on 5/24/2022.  She had about 1 week of significant nausea which improved thereafter.    Current diabetes regimen: Lantus 38 units nightly, Trulicity 1.5 mg weekly, Jardiance 25 mg daily, Actos 7.5 mg daily, metformin extended release 2000 mg daily.    I had referred patient for visit with diabetes care and : She had to reschedule her appointment in June since her mother was hospitalized.  She has another appointment scheduled for 7/25/2022.  Is looking forward to discussing diabetes management and CGM options.    She is not checking glucose as frequently as she would like.  Average 0.3 readings per day.  Average glucose is 174.  She does note that if she delays a meal, she sometimes feels shaky and sweaty.  Does not typically check glucose when she has the symptoms.    She has lost 7 pounds since last visit.    2.  Goiter.  Noted on today's exam.    The patient does not recall prior history of thyroid disease.  She does not have history of excessive head or neck radiation exposure.  Family history is notable for mother who had thyroid nodules removed surgically: No thyroid cancer.    Pertinent endocrine and related history:  1.  Diabetes mellitus, type 2. Has previously followed with Dr. Quinteros in the [facility] system.  -Diabetes was diagnosed in 2005.  Has been complicated  by diabetic retinopathy, neuropathy and nephropathy.  -Previously on prandial insulin: Found it difficult to take consistently.  Also prescribed Rybelsus in the past: This caused nausea.  Was on glipizide, this was discontinued due to escalating insulin requirement.  -Previously used freestyle lilia but had reaction to sensor adhesive.  2.  History of foot ulcer; has history of 2 toe amputations.    Pertinent Social History: , has 2 sons and a daughter.  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    PAST MEDICAL HISTORY  Past Medical History:   Diagnosis Date     Anxiety      Asthma      Cellulitis      Diabetes mellitus (H)      Essential hypertension     Created by Conversion  Replacement Utility updated for latest IMO load     Gastroparesis      Hyperlipidemia      Hypertension      Other and unspecified hyperlipidemia     Created by Conversion      PONV (postoperative nausea and vomiting)      Shortness of breath      Type II or unspecified type diabetes mellitus without mention of complication, not stated as uncontrolled     Created by Conversion        MEDICATIONS  Current Outpatient Medications   Medication Sig Dispense Refill     albuterol (PROVENTIL HFA;VENTOLIN HFA) 90 mcg/actuation inhaler [ALBUTEROL (PROVENTIL HFA;VENTOLIN HFA) 90 MCG/ACTUATION INHALER] Inhale 2 puffs every 6 (six) hours as needed for wheezing.       amLODIPine (NORVASC) 10 MG tablet Take 10 mg by mouth daily   0     ammonium lactate (LAC-HYDRIN) 12 % external lotion Apply topically 2 times daily 225 g 3     atorvastatin (LIPITOR) 40 MG tablet Take 40 mg by mouth At Bedtime       blood glucose (NO BRAND SPECIFIED) test strip Use to test blood sugar 2 times daily as directed. 200 strip 3     blood glucose monitoring (NO BRAND SPECIFIED) meter device kit Use to test blood sugar two times daily or as directed. 1 kit 0     citalopram  (CELEXA) 20 MG tablet [CITALOPRAM (CELEXA) 20 MG TABLET] Take 20 mg by mouth every morning.   0     diazePAM (VALIUM) 5 MG tablet [DIAZEPAM (VALIUM) 5 MG TABLET] Take 1 tablet (5 mg total) by mouth every 6 (six) hours as needed for anxiety. 1 tablet 0     dulaglutide (TRULICITY) 1.5 MG/0.5ML pen Inject 1.5 mg Subcutaneous every 7 days 2 mL 4     empagliflozin 25 mg Tab Take 25 mg by mouth       fluticasone propionate (FLOVENT HFA) 110 mcg/actuation inhaler Inhale 1 puff into the lungs 2 times daily       furosemide (LASIX) 20 MG tablet [FUROSEMIDE (LASIX) 20 MG TABLET] Take 20 mg by mouth 2 (two) times a day at 9am and 6pm.        LANTUS SOLOSTAR U-100 INSULIN 100 unit/mL (3 mL) pen Inject 38 Units Subcutaneous At Bedtime  3     lisinopriL (PRINIVIL,ZESTRIL) 10 MG tablet [LISINOPRIL (PRINIVIL,ZESTRIL) 10 MG TABLET] Take 10 mg by mouth daily.       metFORMIN (GLUCOPHAGE) 500 MG tablet [METFORMIN (GLUCOPHAGE) 500 MG TABLET] Take 2,000 mg by mouth daily with breakfast.        pramipexole (MIRAPEX) 0.25 MG tablet [PRAMIPEXOLE (MIRAPEX) 0.25 MG TABLET] Take 0.25 mg by mouth 2 (two) times a day.   3       Allergies, family, and social history were reviewed and documented as needed in EHR.     REVIEW OF SYSTEMS  A focused ROS was performed, with pertinent positives and negatives as noted in the HPI.    PHYSICAL EXAM  BP (!) 140/72 (BP Location: Right arm, Patient Position: Sitting, Cuff Size: Adult Regular)   Pulse 88   Wt 79.5 kg (175 lb 4.8 oz)   BMI 29.17 kg/m    Body mass index is 29.17 kg/m .  Constitutional: Vital signs reviewed, as recorded above. Patient is alert, oriented and appears in no acute distress.  Eyes: PER, EOMI, no stare, lid lag, or retraction; no conjunctival injection.  Neck: On my exam today, the right lobe of the thyroid feels more prominent (approximately 1.5 times the size of normal) although the gland is low-lying and it is unclear if there is a palpable nodule.  No tenderness on  exam.  Lymphatic: No cervical or supraclavicular LAD.  Cardiovascular: No LE edema.  MSK: No clubbing or cyanosis; normal muscle bulk and tone.  Skin: Normal skin color, temperature, turgor and texture.  Neurological: Alert and oriented times 3. No tremor.    DATA REVIEW  Each of the following laboratory and/or imaging studies were reviewed.    Lab on 04/27/2022   Component Date Value Ref Range Status     Fructosamine 04/27/2022 362 (A) 205 - 285 umol/L Final    INTERPRETIVE INFORMATION:  Fructosamine  Variations in levels of serum proteins (albumin and   immunoglobulins) may affect fructosamine results.  Performed By: [id number]  [address] [place] [address]  : Theodora Cortes MD     Component      Latest Ref Rng & Units 4/20/2022   Cholesterol (External)      100 - 199 mg/dL 128   Triglycerides (External)      0 - 149 mg/dL 89   HDL Cholesterol (External)      >39 mg/dL 54   LDL Cholesterol Calculated (External)      0 - 99 mg/dL 57   Hemoglobin A1C (External)      4.8 - 5.6 % 9.8 (A)   TSH (External)      0.450 - 4.500 uIU/mL 1.490       ASSESSMENT  1.  Diabetes mellitus, type 2.  Limited glucose data, but most glucose values appear to be within acceptable range.  May have some hypoglycemia when delaying meals.  Since she is tolerating Trulicity, would increase dose upward and reduce basal insulin slightly.  Continue remainder of regimen without changes, although would consider tapering off Actos in the future depending on her progress.  Anticipating visit with diabetes care and  later this month: Would benefit from comprehensive education and consideration of CGM options, if covered.    2.  Diabetes preventive care.  -History of diabetic retinopathy, I reviewed records of eye exam performed on 2/28/2022: Proliferative diabetic retinopathy with diabetic macular edema, status post PRP and Avastin, most recently receiving a Avastin in ophthalmology  -CKD  and microalbuminuria on urine microalbumin screen on 4/21/2022, on lisinopril; blood pressure optimally controlled  -Foot exam performed 4/27/2022: Foot care discussed in detail at that visit    3.  Dyslipidemia.  On statin therapy, optimal lipid profile in 4/2022.    4.  Goiter.  Asymmetric thyroid, with prominence of the right lobe of the thyroid noted on exam today.  Normal thyroid function test earlier this year.  Referred for thyroid ultrasound to further evaluate.  We discussed frequency of thyroid nodules, low risk of thyroid malignancy, and proposed testing.    PLAN  -Labs today  -Increase Trulicity to 3 mg once weekly  -With increase in Trulicity, reduce Lantus to 35 units once daily  -Continue remainder of medications without changes  -Schedule thyroid ultrasound  -Return for a follow-up visit in 2 months  -We will communicate results via [employer], or if needed by phone    Orders Placed This Encounter   Procedures     US Thyroid     Hemoglobin A1c     Basic metabolic panel     I spent a total of 44 minutes on the date of encounter reviewing medical records, evaluating the patient, coordinating care and documenting in the EHR, as detailed above.      Levy Lake MD   Division of Diabetes, Endocrinology and Metabolism  Department of Medicine

## 2022-07-13 NOTE — PATIENT INSTRUCTIONS
-Labs today  -Increase Trulicity to 3 mg once weekly  -With increase in Trulicity, reduce Lantus to 35 units once daily  -Continue remainder of medications without changes  -Schedule thyroid ultrasound  -Return for a follow-up visit in 2 months  -We will communicate results via [employer], or if needed by phone

## 2022-07-14 ENCOUNTER — MYC MEDICAL ADVICE (OUTPATIENT)
Dept: ENDOCRINOLOGY | Facility: CLINIC | Age: 62
End: 2022-07-14

## 2022-07-14 DIAGNOSIS — E04.1 THYROID NODULE: Primary | ICD-10-CM

## 2022-07-20 ENCOUNTER — HOSPITAL ENCOUNTER (OUTPATIENT)
Dept: ULTRASOUND IMAGING | Facility: HOSPITAL | Age: 62
Discharge: HOME | End: 2022-07-20
Attending: INTERNAL MEDICINE | Admitting: RADIOLOGY
Payer: COMMERCIAL

## 2022-07-20 DIAGNOSIS — E04.1 THYROID NODULE: ICD-10-CM

## 2022-07-20 PROCEDURE — 88173 CYTOPATH EVAL FNA REPORT: CPT | Mod: 26 | Performed by: PATHOLOGY

## 2022-07-20 PROCEDURE — 88173 CYTOPATH EVAL FNA REPORT: CPT | Mod: TC | Performed by: INTERNAL MEDICINE

## 2022-07-20 PROCEDURE — 88172 CYTP DX EVAL FNA 1ST EA SITE: CPT | Mod: 26 | Performed by: PATHOLOGY

## 2022-07-20 PROCEDURE — 272N000431 US BIOPSY THYROID FINE NEEDLE ASPIRATION

## 2022-07-22 LAB
PATH REPORT.COMMENTS IMP SPEC: NORMAL
PATH REPORT.COMMENTS IMP SPEC: NORMAL
PATH REPORT.FINAL DX SPEC: NORMAL
PATH REPORT.GROSS SPEC: NORMAL
PATH REPORT.MICROSCOPIC SPEC OTHER STN: NORMAL
PATH REPORT.RELEVANT HX SPEC: NORMAL

## 2022-08-10 ENCOUNTER — ALLIED HEALTH/NURSE VISIT (OUTPATIENT)
Dept: EDUCATION SERVICES | Facility: CLINIC | Age: 62
End: 2022-08-10
Payer: COMMERCIAL

## 2022-08-10 VITALS — WEIGHT: 176 LBS | BODY MASS INDEX: 29.29 KG/M2

## 2022-08-10 DIAGNOSIS — E11.628: Primary | ICD-10-CM

## 2022-08-10 PROCEDURE — G0108 DIAB MANAGE TRN  PER INDIV: HCPCS

## 2022-08-10 NOTE — LETTER
"    8/10/2022         RE: Gill Mendez  [address] [facility] [address]        Dear Colleague,    Thank you for referring your patient, Gill Mendez, to the [facility]. Please see a copy of my visit note below.    Diabetes Self-Management Education & Support    Presents for:      SUBJECTIVE/OBJECTIVE:  Diabetes education in the past 24mo: No  Diabetes type: Type 2  Disease course: Improving  How confident are you filling out medical forms by yourself:: Quite a bit  Cultural Influences/Ethnic Background:  Not  or       Diabetes Symptoms & Complications:  Neuropathy: No  Polydipsia: No  Polyphagia: No  Polyuria: No  Visual change: No  Slow healing wounds: No  Autonomic neuropathy: No  CVA: No  Heart disease: No  Nephropathy: No  Peripheral neuropathy: No  Peripheral Vascular Disease: No  Retinopathy: No  Sexual dysfunction: No    Patient Problem List and Family Medical History reviewed for relevant medical history, current medical status, and diabetes risk factors.    Vitals:  Wt 79.8 kg (176 lb)   BMI 29.29 kg/m    Estimated body mass index is 29.29 kg/m  as calculated from the following:    Height as of 8/2/21: 1.651 m (5' 5\").    Weight as of this encounter: 79.8 kg (176 lb).   Last 3 BP:   BP Readings from Last 3 Encounters:   07/13/22 (!) 140/72   06/29/22 120/76   06/25/22 127/81       History   Smoking Status     Never Smoker   Smokeless Tobacco     Never Used       Labs:  Lab Results   Component Value Date    A1C 8.6 07/13/2022     Lab Results   Component Value Date     07/13/2022     03/22/2021     03/22/2021     Lab Results   Component Value Date    .0 08/11/2011    .0 07/23/2009     Direct Measure HDL   Date Value Ref Range Status   08/11/2011 49 >39 mg/dL Final   ]  GFR Estimate   Date Value Ref Range Status   07/13/2022 67 >60 mL/min/1.73m2 Final     Comment:     GFR not calculated when sex unspecified or " nonbinary.  Effective December 21, 2021 eGFRcr in adults is calculated using the 2021 CKD-EPI creatinine equation which includes age and gender (Nicolasa denney al., [place], DOI: 10.1056/LFBYdn1412679)   03/22/2021 43 (L) >60 mL/min/1.73m2 Final     GFR Estimate If Black   Date Value Ref Range Status   03/22/2021 53 (L) >60 mL/min/1.73m2 Final     Lab Results   Component Value Date    CR 0.96 07/13/2022     No results found for: MICROALBUMIN    Healthy Eating:  Cultural/Episcopalian diet restrictions?: No  Meal planning/habits: Avoiding sweets, Carb counting, Smaller portions  How many times a week on average do you eat food made away from home (restaurant/take-out)?: 1  Meals include: Breakfast, Dinner  Beverages: Water, Coffee    Being Active:  Days per week of moderate to strenuous exercise (like a brisk walk): (P) 2  On average, minutes per day of exercise at this level: (P) 30  How intense was your typical exercise? : (P) Moderate (like brisk walking)  Exercise Minutes per Week: (P) 60  Barrier to exercise: None    Monitoring:  Blood Glucose Meter: Accu-chek  Times checking blood sugar at home (number): 3  Times checking blood sugar at home (per): Day  Blood glucose trend: Decreasing      Taking Medications:  Diabetes Medication(s)     Biguanides       metFORMIN (GLUCOPHAGE) 500 MG tablet    [METFORMIN (GLUCOPHAGE) 500 MG TABLET] Take 2,000 mg by mouth daily with breakfast.     Insulin       LANTUS SOLOSTAR U-100 INSULIN 100 unit/mL (3 mL) pen    Inject 35 Units Subcutaneous At Bedtime    Sodium-Glucose Co-Transporter 2 (SGLT2) Inhibitors       empagliflozin 25 mg Tab    Take 25 mg by mouth    Incretin Mimetic Agents (GLP-1 Receptor Agonists)       Dulaglutide (TRULICITY) 3 MG/0.5ML SOPN    Inject 3 mg Subcutaneous once a week     Patient taking differently: Inject 3 mg Subcutaneous once a week Taking Wednesday          Current Treatments: Insulin Injections  Dose schedule: (P) At bedtime  Given by: (P)  Patient  Injection/Infusion sites: (P) Abdomen    Problem Solving:                 Reducing Risks:  CAD Risks: Diabetes Mellitus, Hypertension  CAD Risks: Diabetes Mellitus, Hypertension  Has dilated eye exam at least once a year?: Yes  Sees dentist every 6 months?: No  Feet checked by healthcare provider in the last year?: Yes    Healthy Coping:  Informal Support system:: Children, Family, Spouse  Patient Activation Measure Survey Score:  No flowsheet data found.    Diabetes knowledge and skills assessment:   Patient is knowledgeable in diabetes management concepts related to: Healthy Eating, Being Active, Taking Medication and Problem Solving    Patient needs further education on the following diabetes management concepts: Monitoring    Based on learning assessment above, most appropriate setting for further diabetes education would be: Individual setting.      INTERVENTIONS:         Sensor was inserted with no resistance or bleeding at insertion site.    Education provided today on:  AADE Self-Care Behaviors:  Monitoring: individual blood glucose targets, frequency of monitoring and use of glucose control solution  Problem Solving: low blood glucose - causes, signs/symptoms, treatment and prevention and carrying a carbohydrate source at all times    CGM-specific education:   Dexcom sensor: insertion technique, sensor site location and rotation, insulin administration in relation to sensor placement, Dexcom : setting alerts/alarms, Dosing insulin based on sensor glucose results and Dexcom Mobile annamarie       Education Materials Provided:  No new materials provided today    ASSESSMENT:  Mayelin is here alone today for her diabetes education.  She is interested in getting a Dexcom continuous glucose monitor.  She has previously tried the freestyle lilia and did have a reaction to the adhesive.    She is currently taking Trulicity 3 mg once a week on Wednesdays, empagliflozin 25 mg daily, Lantus 35 units in the  evening and metformin 2000 mg daily.    Reviewed how Dexcom sensor works to monitor glucose, how often glucose is monitored, need for Bluetooth connection since she is going to use her phone to get her data and 10-day where.  Reviewed sensor as well as transmitter application.    Mayelin was able to apply Dexcom sensor to her lower right abdomen as well as attached transmitter without assistance.  Gave her a cover tape in case the sensor does start to come off before the 10-day timeframe.  Also gave her a uni-solve wipes to assist in adhesive removal if she does have a reaction.    Unfortunately we were not able to get the annamarie to work on her phone while she was in clinic.  She did opt to leave an attempt to get the annamarie up and running at home.  Reviewed 2 hour sensor warm up after the sensor is started with the annamarie.  If she is unable to get the annamarie to work or is having difficulty getting readings from the sensor asked her to contact me via [employer].    All additional questions and concerns addressed today.    Pt verbalized understanding of concepts discussed and recommendations provided today.    PLAN  See Patient Instructions for co-developed, patient-stated behavior change goals.  AVS printed and provided to patient today. See Follow-Up section for recommended follow-up.     The service provided today was under the supervising provider, Abena Madrigal, who was available if needed.     Time Spent: 30 minutes  Encounter Type: Individual    Any diabetes medication dose changes were made via the CDE Protocol and Collaborative Practice Agreement with the patient's endocrinology provider. A copy of this encounter was shared with the provider.             Yes

## 2022-08-11 NOTE — PROGRESS NOTES
"Diabetes Self-Management Education & Support    Presents for:      SUBJECTIVE/OBJECTIVE:  Diabetes education in the past 24mo: No  Diabetes type: Type 2  Disease course: Improving  How confident are you filling out medical forms by yourself:: Quite a bit  Cultural Influences/Ethnic Background:  Not  or       Diabetes Symptoms & Complications:  Neuropathy: No  Polydipsia: No  Polyphagia: No  Polyuria: No  Visual change: No  Slow healing wounds: No  Autonomic neuropathy: No  CVA: No  Heart disease: No  Nephropathy: No  Peripheral neuropathy: No  Peripheral Vascular Disease: No  Retinopathy: No  Sexual dysfunction: No    Patient Problem List and Family Medical History reviewed for relevant medical history, current medical status, and diabetes risk factors.    Vitals:  Wt 79.8 kg (176 lb)   BMI 29.29 kg/m    Estimated body mass index is 29.29 kg/m  as calculated from the following:    Height as of 8/2/21: 1.651 m (5' 5\").    Weight as of this encounter: 79.8 kg (176 lb).   Last 3 BP:   BP Readings from Last 3 Encounters:   07/13/22 (!) 140/72   06/29/22 120/76   06/25/22 127/81       History   Smoking Status     Never Smoker   Smokeless Tobacco     Never Used       Labs:  Lab Results   Component Value Date    A1C 8.6 07/13/2022     Lab Results   Component Value Date     07/13/2022     03/22/2021     03/22/2021     Lab Results   Component Value Date    .0 08/11/2011    .0 07/23/2009     Direct Measure HDL   Date Value Ref Range Status   08/11/2011 49 >39 mg/dL Final   ]  GFR Estimate   Date Value Ref Range Status   07/13/2022 67 >60 mL/min/1.73m2 Final     Comment:     GFR not calculated when sex unspecified or nonbinary.  Effective December 21, 2021 eGFRcr in adults is calculated using the 2021 CKD-EPI creatinine equation which includes age and gender (Nicolasa et al., NEJ, DOI: 10.1056/JSCQbu1113646)   03/22/2021 43 (L) >60 mL/min/1.73m2 Final     GFR Estimate If Black "   Date Value Ref Range Status   03/22/2021 53 (L) >60 mL/min/1.73m2 Final     Lab Results   Component Value Date    CR 0.96 07/13/2022     No results found for: MICROALBUMIN    Healthy Eating:  Cultural/Episcopal diet restrictions?: No  Meal planning/habits: Avoiding sweets, Carb counting, Smaller portions  How many times a week on average do you eat food made away from home (restaurant/take-out)?: 1  Meals include: Breakfast, Dinner  Beverages: Water, Coffee    Being Active:  Days per week of moderate to strenuous exercise (like a brisk walk): (P) 2  On average, minutes per day of exercise at this level: (P) 30  How intense was your typical exercise? : (P) Moderate (like brisk walking)  Exercise Minutes per Week: (P) 60  Barrier to exercise: None    Monitoring:  Blood Glucose Meter: Accu-chek  Times checking blood sugar at home (number): 3  Times checking blood sugar at home (per): Day  Blood glucose trend: Decreasing      Taking Medications:  Diabetes Medication(s)     Biguanides       metFORMIN (GLUCOPHAGE) 500 MG tablet    [METFORMIN (GLUCOPHAGE) 500 MG TABLET] Take 2,000 mg by mouth daily with breakfast.     Insulin       LANTUS SOLOSTAR U-100 INSULIN 100 unit/mL (3 mL) pen    Inject 35 Units Subcutaneous At Bedtime    Sodium-Glucose Co-Transporter 2 (SGLT2) Inhibitors       empagliflozin 25 mg Tab    Take 25 mg by mouth    Incretin Mimetic Agents (GLP-1 Receptor Agonists)       Dulaglutide (TRULICITY) 3 MG/0.5ML SOPN    Inject 3 mg Subcutaneous once a week     Patient taking differently: Inject 3 mg Subcutaneous once a week Taking Wednesday          Current Treatments: Insulin Injections  Dose schedule: (P) At bedtime  Given by: (P) Patient  Injection/Infusion sites: (P) Abdomen    Problem Solving:                 Reducing Risks:  CAD Risks: Diabetes Mellitus, Hypertension  CAD Risks: Diabetes Mellitus, Hypertension  Has dilated eye exam at least once a year?: Yes  Sees dentist every 6 months?: No  Feet  checked by healthcare provider in the last year?: Yes    Healthy Coping:  Informal Support system:: Children, Family, Spouse  Patient Activation Measure Survey Score:  No flowsheet data found.    Diabetes knowledge and skills assessment:   Patient is knowledgeable in diabetes management concepts related to: Healthy Eating, Being Active, Taking Medication and Problem Solving    Patient needs further education on the following diabetes management concepts: Monitoring    Based on learning assessment above, most appropriate setting for further diabetes education would be: Individual setting.      INTERVENTIONS:         Sensor was inserted with no resistance or bleeding at insertion site.    Education provided today on:  AADE Self-Care Behaviors:  Monitoring: individual blood glucose targets, frequency of monitoring and use of glucose control solution  Problem Solving: low blood glucose - causes, signs/symptoms, treatment and prevention and carrying a carbohydrate source at all times    CGM-specific education:   Dexcom sensor: insertion technique, sensor site location and rotation, insulin administration in relation to sensor placement, Dexcom : setting alerts/alarms, Dosing insulin based on sensor glucose results and Dexcom Mobile annamarie       Education Materials Provided:  No new materials provided today    ASSESSMENT:  Mayelin is here alone today for her diabetes education.  She is interested in getting a Dexcom continuous glucose monitor.  She has previously tried the freestyle lilia and did have a reaction to the adhesive.    She is currently taking Trulicity 3 mg once a week on Wednesdays, empagliflozin 25 mg daily, Lantus 35 units in the evening and metformin 2000 mg daily.    Reviewed how Dexcom sensor works to monitor glucose, how often glucose is monitored, need for Bluetooth connection since she is going to use her phone to get her data and 10-day where.  Reviewed sensor as well as transmitter  application.    Mayelin was able to apply Dexcom sensor to her lower right abdomen as well as attached transmitter without assistance.  Gave her a cover tape in case the sensor does start to come off before the 10-day timeframe.  Also gave her a uni-solve wipes to assist in adhesive removal if she does have a reaction.    Unfortunately we were not able to get the annamarie to work on her phone while she was in clinic.  She did opt to leave an attempt to get the annamraie up and running at home.  Reviewed 2 hour sensor warm up after the sensor is started with the annamarie.  If she is unable to get the annamarie to work or is having difficulty getting readings from the sensor asked her to contact me via [employer].    All additional questions and concerns addressed today.    Pt verbalized understanding of concepts discussed and recommendations provided today.    PLAN  See Patient Instructions for co-developed, patient-stated behavior change goals.  AVS printed and provided to patient today. See Follow-Up section for recommended follow-up.     The service provided today was under the supervising provider, Abena Madrigal, who was available if needed.     Time Spent: 30 minutes  Encounter Type: Individual    Any diabetes medication dose changes were made via the CDE Protocol and Collaborative Practice Agreement with the patient's endocrinology provider. A copy of this encounter was shared with the provider.

## 2022-09-24 ENCOUNTER — HEALTH MAINTENANCE LETTER (OUTPATIENT)
Age: 62
End: 2022-09-24

## 2022-09-28 ENCOUNTER — OFFICE VISIT (OUTPATIENT)
Dept: ENDOCRINOLOGY | Facility: CLINIC | Age: 62
End: 2022-09-28
Payer: COMMERCIAL

## 2022-09-28 ENCOUNTER — LAB (OUTPATIENT)
Dept: LAB | Facility: CLINIC | Age: 62
End: 2022-09-28

## 2022-09-28 VITALS
SYSTOLIC BLOOD PRESSURE: 136 MMHG | WEIGHT: 172.1 LBS | DIASTOLIC BLOOD PRESSURE: 62 MMHG | BODY MASS INDEX: 28.64 KG/M2 | HEART RATE: 92 BPM

## 2022-09-28 DIAGNOSIS — E11.621 TYPE 2 DIABETES MELLITUS WITH FOOT ULCER, WITHOUT LONG-TERM CURRENT USE OF INSULIN (H): Primary | ICD-10-CM

## 2022-09-28 DIAGNOSIS — E11.621 TYPE 2 DIABETES MELLITUS WITH FOOT ULCER, WITHOUT LONG-TERM CURRENT USE OF INSULIN (H): ICD-10-CM

## 2022-09-28 DIAGNOSIS — L97.509 TYPE 2 DIABETES MELLITUS WITH FOOT ULCER, WITHOUT LONG-TERM CURRENT USE OF INSULIN (H): Primary | ICD-10-CM

## 2022-09-28 DIAGNOSIS — L97.509 TYPE 2 DIABETES MELLITUS WITH FOOT ULCER, WITHOUT LONG-TERM CURRENT USE OF INSULIN (H): ICD-10-CM

## 2022-09-28 DIAGNOSIS — E04.1 THYROID NODULE: ICD-10-CM

## 2022-09-28 LAB
ANION GAP SERPL CALCULATED.3IONS-SCNC: 11 MMOL/L (ref 7–15)
BUN SERPL-MCNC: 23.2 MG/DL (ref 8–23)
CALCIUM SERPL-MCNC: 9.4 MG/DL (ref 8.8–10.2)
CHLORIDE SERPL-SCNC: 101 MMOL/L (ref 98–107)
CREAT SERPL-MCNC: 1 MG/DL (ref 0.51–1.17)
DEPRECATED HCO3 PLAS-SCNC: 30 MMOL/L (ref 22–29)
GFR SERPL CREATININE-BSD FRML MDRD: 64 ML/MIN/1.73M2
GLUCOSE SERPL-MCNC: 91 MG/DL (ref 70–99)
HBA1C MFR BLD: 6.8 % (ref 0–5.6)
POTASSIUM SERPL-SCNC: 4.4 MMOL/L (ref 3.4–5.3)
SODIUM SERPL-SCNC: 142 MMOL/L (ref 136–145)

## 2022-09-28 PROCEDURE — 80048 BASIC METABOLIC PNL TOTAL CA: CPT

## 2022-09-28 PROCEDURE — 99214 OFFICE O/P EST MOD 30 MIN: CPT | Performed by: INTERNAL MEDICINE

## 2022-09-28 PROCEDURE — 83036 HEMOGLOBIN GLYCOSYLATED A1C: CPT

## 2022-09-28 PROCEDURE — 36415 COLL VENOUS BLD VENIPUNCTURE: CPT

## 2022-09-28 RX ORDER — DULAGLUTIDE 4.5 MG/.5ML
4.5 INJECTION, SOLUTION SUBCUTANEOUS
Qty: 2 ML | Refills: 5 | Status: SHIPPED | OUTPATIENT
Start: 2022-09-28 | End: 2022-11-28 | Stop reason: ALTCHOICE

## 2022-09-28 NOTE — LETTER
9/28/2022         RE: Gill Mendez  [address] [facility] [address]        Dear Colleague,    Thank you for referring your patient, Gill Mendez, to the [facility]. Please see a copy of my visit note below.      ENDOCRINOLOGY FOLLOW-UP         HISTORY OF PRESENT ILLNESS    Gill Mendez is seen in follow-up for the issues outlined below.     1.  Diabetes mellitus.  After last visit in 7/2022, we increased Trulicity dose and reduced Lantus dose.  Tolerating Trulicity without side effects.    Saw diabetes care and  and trialed Dexcom CGM: Unfortunately, she developed a rash with use of adhesive, much like she did with the freestyle lilia.  Since last visit, she has been checking glucose more frequently, checking first thing in the morning and at times either before dinner or at bedtime.  I reviewed glucose meter download: Average glucose is 115 (average check of 1.6 readings per day).  No hypoglycemia recorded.    Current diabetes regimen: Lantus 35 units nightly, Trulicity  3 mg weekly, Jardiance 25 mg daily, Actos 7.5 mg daily, metformin extended release 2000 mg daily.    Has lost 4 pounds since last visit.    Hoping to have hip replacement surgery at the end of this year, provided glycemic control is optimized.    2.  Goiter.  Noted on exam in 7/2022. I referred patient for thyroid US, which was performed 7/13/2022 and showed a   heterogenous gland with a 3.7 x 3 x 1.8 cm mixed cystic and solid nodule in the right lobe of the thyroid.    The patient underwent ultrasound-guided FNA of thyroid nodule on 7/20/2022, with benign cytology findings.    She has not noted a change in the feel of her neck.    She discovered that 2 of her sisters also have thyroid nodules, although no thyroid cancer history.    Pertinent endocrine and related history:  1.  Diabetes mellitus, type 2. Has previously followed with Dr. Quinteros in the [facility].  -Diabetes was  diagnosed in 2005.  Has been complicated by diabetic retinopathy, neuropathy and nephropathy.  -Previously on prandial insulin: Found it difficult to take consistently.  Also prescribed Rybelsus in the past: This caused nausea.  Was on glipizide, this was discontinued due to escalating insulin requirement.  -Previously used freestyle lilia but had reaction to sensor adhesive.  2.  History of foot ulcer; has history of 2 toe amputations.  3. Thyroid nodule. Goiter palpated on exam in 7/2022.  -No  history of excessive head or neck radiation exposure.  Family history is notable for mother who had thyroid nodules removed surgically: No thyroid cancer.  2 sisters also have thyroid nodules, no thyroid cancer.    Pertinent Social History: , has 2 sons and a daughter.  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    PAST MEDICAL HISTORY  Past Medical History:   Diagnosis Date     Anxiety      Asthma      Cellulitis      Diabetes mellitus (H)      Essential hypertension     Created by Conversion  Replacement Utility updated for latest IMO load     Gastroparesis      Hyperlipidemia      Hypertension      Other and unspecified hyperlipidemia     Created by Conversion      PONV (postoperative nausea and vomiting)      Shortness of breath      Type II or unspecified type diabetes mellitus without mention of complication, not stated as uncontrolled     Created by Conversion        MEDICATIONS  Current Outpatient Medications   Medication Sig Dispense Refill     albuterol (PROVENTIL HFA;VENTOLIN HFA) 90 mcg/actuation inhaler [ALBUTEROL (PROVENTIL HFA;VENTOLIN HFA) 90 MCG/ACTUATION INHALER] Inhale 2 puffs every 6 (six) hours as needed for wheezing.       amLODIPine (NORVASC) 10 MG tablet Take 10 mg by mouth daily   0     ammonium lactate (LAC-HYDRIN) 12 % external lotion Apply topically 2 times daily 225 g 3     atorvastatin  (LIPITOR) 40 MG tablet Take 40 mg by mouth At Bedtime       blood glucose (NO BRAND SPECIFIED) test strip Use to test blood sugar 2 times daily as directed. 200 strip 3     blood glucose monitoring (NO BRAND SPECIFIED) meter device kit Use to test blood sugar two times daily or as directed. 1 kit 0     citalopram (CELEXA) 20 MG tablet [CITALOPRAM (CELEXA) 20 MG TABLET] Take 20 mg by mouth every morning.   0     diazePAM (VALIUM) 5 MG tablet [DIAZEPAM (VALIUM) 5 MG TABLET] Take 1 tablet (5 mg total) by mouth every 6 (six) hours as needed for anxiety. 1 tablet 0     Dulaglutide (TRULICITY) 3 MG/0.5ML SOPN Inject 3 mg Subcutaneous once a week (Patient taking differently: Inject 3 mg Subcutaneous once a week Taking Wednesday) 2 mL 3     empagliflozin 25 mg Tab Take 25 mg by mouth       fluticasone propionate (FLOVENT HFA) 110 mcg/actuation inhaler Inhale 1 puff into the lungs 2 times daily       furosemide (LASIX) 20 MG tablet [FUROSEMIDE (LASIX) 20 MG TABLET] Take 20 mg by mouth 2 (two) times a day at 9am and 6pm.        LANTUS SOLOSTAR U-100 INSULIN 100 unit/mL (3 mL) pen Inject 35 Units Subcutaneous At Bedtime  3     lisinopriL (PRINIVIL,ZESTRIL) 10 MG tablet [LISINOPRIL (PRINIVIL,ZESTRIL) 10 MG TABLET] Take 10 mg by mouth daily.       metFORMIN (GLUCOPHAGE) 500 MG tablet [METFORMIN (GLUCOPHAGE) 500 MG TABLET] Take 2,000 mg by mouth daily with breakfast.        pramipexole (MIRAPEX) 0.25 MG tablet [PRAMIPEXOLE (MIRAPEX) 0.25 MG TABLET] Take 0.25 mg by mouth 2 (two) times a day.   3       Allergies, family, and social history were reviewed and documented as needed in EHR.     REVIEW OF SYSTEMS  A focused ROS was performed, with pertinent positives and negatives as noted in the HPI.    PHYSICAL EXAM  /62 (BP Location: Right arm, Patient Position: Sitting)   Pulse 92   Wt 78.1 kg (172 lb 1.6 oz)   BMI 28.64 kg/m    Body mass index is 28.64 kg/m .  Constitutional: Vital signs reviewed, as recorded above. Patient  is alert, oriented and appears in no acute distress.  Eyes: PER, EOMI, no stare, lid lag, or retraction; no conjunctival injection.  Respiratory: Normal chest wall motion and respiratory effort.  MSK: No clubbing or cyanosis; normal muscle bulk and tone.  Skin: No lesions on visible skin,  Neurological: Alert and oriented times 3. No tremor.    DATA REVIEW  Each of the following laboratory and/or imaging studies were reviewed.    Final Diagnosis   THYROID, RIGHT, NODULE, ULTRASOUND-GUIDED FNA, CYTOLOGY:  -BENIGN.  BENIGN APPEARING FOLLICULAR CELLS AND ABUNDANT COLLOID COMPATIBLE WITH BENIGN COLLOID NODULE       Component      Latest Ref Rng & Units 7/13/2022   Creatinine      0.51 - 1.17 mg/dL 0.96   Sodium      136 - 145 mmol/L 140   Potassium      3.4 - 5.3 mmol/L 4.2   Urea Nitrogen      8.0 - 23.0 mg/dL 23.1 (H)   Chloride      98 - 107 mmol/L 99   Carbon Dioxide (CO2)      22 - 29 mmol/L 30 (H)   Anion Gap      7 - 15 mmol/L 11   Glucose      70 - 99 mg/dL 261 (H)   GFR Estimate      >60 mL/min/1.73m2 67   Calcium      8.8 - 10.2 mg/dL 9.6   Hemoglobin A1C      0.0 - 5.6 % 8.6 (H)       ASSESSMENT  1.  Diabetes mellitus, type 2.  Optimal control based on review of glucose meter data.  Unfortunately, unable to use CGM due to skin rash/reaction to adhesives.  Would maximize Trulicity dose and discontinue Actos altogether.    2.  Diabetes preventive care.  -History of diabetic retinopathy, I reviewed records of eye exam performed on 2/28/2022: Proliferative diabetic retinopathy with diabetic macular edema, status post PRP and Avastin, most recently receiving a Avastin in ophthalmology  -CKD and microalbuminuria on urine microalbumin screen on 4/21/2022, on lisinopril; blood pressure optimally controlled  -Foot exam performed 4/27/2022: Foot care discussed in detail at that visit    3.  Dyslipidemia.  On statin therapy, optimal lipid profile in 4/2022.    4.  Goiter, with thyroid nodule.  Asymmetric thyroid, with  prominence of the right lobe of the thyroid noted on exam in 7/2022; thyroid ultrasound revealed a right lobe nodule, which was aspirated on 7/20/2022.  Cytology was benign.  Anticipate follow-up ultrasound in 7/2023.      PLAN  -Labs today  -Increase Trulicity to 4.5 mg once weekly  -With increase in Trulicity, discontinue Actos  -Continue remainder of medications without changes  -Return for a follow-up visit in 3 months--contact me sooner if noting glucose is consistently 180s or higher or under 70 twice or more in a week  -We will communicate results via [employer], or if needed by phone      Orders Placed This Encounter   Procedures     Hemoglobin A1c     Basic metabolic panel       I spent a total of 31 minutes on the date of encounter reviewing medical records, evaluating the patient, coordinating care and documenting in the EHR, as detailed above.      Jeffrey Lake MD   Division of Diabetes, Endocrinology and Metabolism  Department of Medicine              Again, thank you for allowing me to participate in the care of your patient.        Sincerely,        JEFFREY Lake MD

## 2022-09-28 NOTE — PROGRESS NOTES
ENDOCRINOLOGY FOLLOW-UP         HISTORY OF PRESENT ILLNESS    Gill Mendez is seen in follow-up for the issues outlined below.     1.  Diabetes mellitus.  After last visit in 7/2022, we increased Trulicity dose and reduced Lantus dose.  Tolerating Trulicity without side effects.    Saw diabetes care and  and trialed Dexcom CGM: Unfortunately, she developed a rash with use of adhesive, much like she did with the freestyle lilia.  Since last visit, she has been checking glucose more frequently, checking first thing in the morning and at times either before dinner or at bedtime.  I reviewed glucose meter download: Average glucose is 115 (average check of 1.6 readings per day).  No hypoglycemia recorded.    Current diabetes regimen: Lantus 35 units nightly, Trulicity  3 mg weekly, Jardiance 25 mg daily, Actos 7.5 mg daily, metformin extended release 2000 mg daily.    Has lost 4 pounds since last visit.    Hoping to have hip replacement surgery at the end of this year, provided glycemic control is optimized.    2.  Goiter.  Noted on exam in 7/2022. I referred patient for thyroid US, which was performed 7/13/2022 and showed a   heterogenous gland with a 3.7 x 3 x 1.8 cm mixed cystic and solid nodule in the right lobe of the thyroid.    The patient underwent ultrasound-guided FNA of thyroid nodule on 7/20/2022, with benign cytology findings.    She has not noted a change in the feel of her neck.    She discovered that 2 of her sisters also have thyroid nodules, although no thyroid cancer history.    Pertinent endocrine and related history:  1.  Diabetes mellitus, type 2. Has previously followed with Dr. Quinteros in the [facility].  -Diabetes was diagnosed in 2005.  Has been complicated by diabetic retinopathy, neuropathy and nephropathy.  -Previously on prandial insulin: Found it difficult to take consistently.  Also prescribed Rybelsus in the past: This caused nausea.  Was on glipizide, this was  discontinued due to escalating insulin requirement.  -Previously used freestyle lilia but had reaction to sensor adhesive.  2.  History of foot ulcer; has history of 2 toe amputations.  3. Thyroid nodule. Goiter palpated on exam in 7/2022.  -No  history of excessive head or neck radiation exposure.  Family history is notable for mother who had thyroid nodules removed surgically: No thyroid cancer.  2 sisters also have thyroid nodules, no thyroid cancer.    Pertinent Social History: , has 2 sons and a daughter.  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    PAST MEDICAL HISTORY  Past Medical History:   Diagnosis Date     Anxiety      Asthma      Cellulitis      Diabetes mellitus (H)      Essential hypertension     Created by Conversion  Replacement Utility updated for latest IMO load     Gastroparesis      Hyperlipidemia      Hypertension      Other and unspecified hyperlipidemia     Created by Conversion      PONV (postoperative nausea and vomiting)      Shortness of breath      Type II or unspecified type diabetes mellitus without mention of complication, not stated as uncontrolled     Created by Conversion        MEDICATIONS  Current Outpatient Medications   Medication Sig Dispense Refill     albuterol (PROVENTIL HFA;VENTOLIN HFA) 90 mcg/actuation inhaler [ALBUTEROL (PROVENTIL HFA;VENTOLIN HFA) 90 MCG/ACTUATION INHALER] Inhale 2 puffs every 6 (six) hours as needed for wheezing.       amLODIPine (NORVASC) 10 MG tablet Take 10 mg by mouth daily   0     ammonium lactate (LAC-HYDRIN) 12 % external lotion Apply topically 2 times daily 225 g 3     atorvastatin (LIPITOR) 40 MG tablet Take 40 mg by mouth At Bedtime       blood glucose (NO BRAND SPECIFIED) test strip Use to test blood sugar 2 times daily as directed. 200 strip 3     blood glucose monitoring (NO BRAND SPECIFIED) meter device kit Use to test blood sugar two  times daily or as directed. 1 kit 0     citalopram (CELEXA) 20 MG tablet [CITALOPRAM (CELEXA) 20 MG TABLET] Take 20 mg by mouth every morning.   0     diazePAM (VALIUM) 5 MG tablet [DIAZEPAM (VALIUM) 5 MG TABLET] Take 1 tablet (5 mg total) by mouth every 6 (six) hours as needed for anxiety. 1 tablet 0     Dulaglutide (TRULICITY) 3 MG/0.5ML SOPN Inject 3 mg Subcutaneous once a week (Patient taking differently: Inject 3 mg Subcutaneous once a week Taking Wednesday) 2 mL 3     empagliflozin 25 mg Tab Take 25 mg by mouth       fluticasone propionate (FLOVENT HFA) 110 mcg/actuation inhaler Inhale 1 puff into the lungs 2 times daily       furosemide (LASIX) 20 MG tablet [FUROSEMIDE (LASIX) 20 MG TABLET] Take 20 mg by mouth 2 (two) times a day at 9am and 6pm.        LANTUS SOLOSTAR U-100 INSULIN 100 unit/mL (3 mL) pen Inject 35 Units Subcutaneous At Bedtime  3     lisinopriL (PRINIVIL,ZESTRIL) 10 MG tablet [LISINOPRIL (PRINIVIL,ZESTRIL) 10 MG TABLET] Take 10 mg by mouth daily.       metFORMIN (GLUCOPHAGE) 500 MG tablet [METFORMIN (GLUCOPHAGE) 500 MG TABLET] Take 2,000 mg by mouth daily with breakfast.        pramipexole (MIRAPEX) 0.25 MG tablet [PRAMIPEXOLE (MIRAPEX) 0.25 MG TABLET] Take 0.25 mg by mouth 2 (two) times a day.   3       Allergies, family, and social history were reviewed and documented as needed in EHR.     REVIEW OF SYSTEMS  A focused ROS was performed, with pertinent positives and negatives as noted in the HPI.    PHYSICAL EXAM  /62 (BP Location: Right arm, Patient Position: Sitting)   Pulse 92   Wt 78.1 kg (172 lb 1.6 oz)   BMI 28.64 kg/m    Body mass index is 28.64 kg/m .  Constitutional: Vital signs reviewed, as recorded above. Patient is alert, oriented and appears in no acute distress.  Eyes: PER, EOMI, no stare, lid lag, or retraction; no conjunctival injection.  Respiratory: Normal chest wall motion and respiratory effort.  MSK: No clubbing or cyanosis; normal muscle bulk and tone.  Skin:  No lesions on visible skin,  Neurological: Alert and oriented times 3. No tremor.    DATA REVIEW  Each of the following laboratory and/or imaging studies were reviewed.    Final Diagnosis   THYROID, RIGHT, NODULE, ULTRASOUND-GUIDED FNA, CYTOLOGY:  -BENIGN.  BENIGN APPEARING FOLLICULAR CELLS AND ABUNDANT COLLOID COMPATIBLE WITH BENIGN COLLOID NODULE       Component      Latest Ref Rng & Units 7/13/2022   Creatinine      0.51 - 1.17 mg/dL 0.96   Sodium      136 - 145 mmol/L 140   Potassium      3.4 - 5.3 mmol/L 4.2   Urea Nitrogen      8.0 - 23.0 mg/dL 23.1 (H)   Chloride      98 - 107 mmol/L 99   Carbon Dioxide (CO2)      22 - 29 mmol/L 30 (H)   Anion Gap      7 - 15 mmol/L 11   Glucose      70 - 99 mg/dL 261 (H)   GFR Estimate      >60 mL/min/1.73m2 67   Calcium      8.8 - 10.2 mg/dL 9.6   Hemoglobin A1C      0.0 - 5.6 % 8.6 (H)       ASSESSMENT  1.  Diabetes mellitus, type 2.  Optimal control based on review of glucose meter data.  Unfortunately, unable to use CGM due to skin rash/reaction to adhesives.  Would maximize Trulicity dose and discontinue Actos altogether.    2.  Diabetes preventive care.  -History of diabetic retinopathy, I reviewed records of eye exam performed on 2/28/2022: Proliferative diabetic retinopathy with diabetic macular edema, status post PRP and Avastin, most recently receiving a Avastin in ophthalmology  -CKD and microalbuminuria on urine microalbumin screen on 4/21/2022, on lisinopril; blood pressure optimally controlled  -Foot exam performed 4/27/2022: Foot care discussed in detail at that visit    3.  Dyslipidemia.  On statin therapy, optimal lipid profile in 4/2022.    4.  Goiter, with thyroid nodule.  Asymmetric thyroid, with prominence of the right lobe of the thyroid noted on exam in 7/2022; thyroid ultrasound revealed a right lobe nodule, which was aspirated on 7/20/2022.  Cytology was benign.  Anticipate follow-up ultrasound in 7/2023.      PLAN  -Labs today  -Increase Trulicity  to 4.5 mg once weekly  -With increase in Trulicity, discontinue Actos  -Continue remainder of medications without changes  -Return for a follow-up visit in 3 months--contact me sooner if noting glucose is consistently 180s or higher or under 70 twice or more in a week  -We will communicate results via [employer], or if needed by phone      Orders Placed This Encounter   Procedures     Hemoglobin A1c     Basic metabolic panel       I spent a total of 31 minutes on the date of encounter reviewing medical records, evaluating the patient, coordinating care and documenting in the EHR, as detailed above.      Levy Lake MD   Division of Diabetes, Endocrinology and Metabolism  Department of Medicine

## 2022-09-28 NOTE — PATIENT INSTRUCTIONS
-Labs today  -Increase Trulicity to 4.5 mg once weekly  -With increase in Trulicity, discontinue Actos  -Continue remainder of medications without changes  -Return for a follow-up visit in 3 months--contact me sooner if noting glucose is consistently 180s or higher or under 70 twice or more in a week  -We will communicate results via [employer], or if needed by phone

## 2022-10-17 ENCOUNTER — TRANSFERRED RECORDS (OUTPATIENT)
Dept: HEALTH INFORMATION MANAGEMENT | Facility: CLINIC | Age: 62
End: 2022-10-17

## 2022-10-17 LAB — RETINOPATHY: POSITIVE

## 2022-11-16 ENCOUNTER — MYC MEDICAL ADVICE (OUTPATIENT)
Dept: ENDOCRINOLOGY | Facility: CLINIC | Age: 62
End: 2022-11-16

## 2022-11-16 DIAGNOSIS — L97.509 TYPE 2 DIABETES MELLITUS WITH FOOT ULCER, WITHOUT LONG-TERM CURRENT USE OF INSULIN (H): Primary | ICD-10-CM

## 2022-11-16 DIAGNOSIS — E11.621 TYPE 2 DIABETES MELLITUS WITH FOOT ULCER, WITHOUT LONG-TERM CURRENT USE OF INSULIN (H): Primary | ICD-10-CM

## 2022-11-16 RX ORDER — INSULIN GLARGINE 100 [IU]/ML
35 INJECTION, SOLUTION SUBCUTANEOUS AT BEDTIME
Qty: 30 ML | Refills: 0 | OUTPATIENT
Start: 2022-11-16 | End: 2022-11-21

## 2022-11-21 RX ORDER — INSULIN GLARGINE 100 [IU]/ML
35 INJECTION, SOLUTION SUBCUTANEOUS AT BEDTIME
Qty: 30 ML | Refills: 0 | Status: SHIPPED | OUTPATIENT
Start: 2022-11-21 | End: 2023-07-21

## 2022-11-28 RX ORDER — DULAGLUTIDE 3 MG/.5ML
3 INJECTION, SOLUTION SUBCUTANEOUS WEEKLY
Qty: 2 ML | Refills: 3 | Status: SHIPPED | OUTPATIENT
Start: 2022-11-28 | End: 2022-12-18

## 2022-11-28 NOTE — TELEPHONE ENCOUNTER
Pt on 35 units lantus, pt's trulicity 4.5mg is on backorder. Please advise if would like to try different dosage of trulicity or increase lantus.

## 2022-12-15 ENCOUNTER — MYC MEDICAL ADVICE (OUTPATIENT)
Dept: ENDOCRINOLOGY | Facility: CLINIC | Age: 62
End: 2022-12-15

## 2022-12-15 DIAGNOSIS — L97.509 TYPE 2 DIABETES MELLITUS WITH FOOT ULCER, WITHOUT LONG-TERM CURRENT USE OF INSULIN (H): Primary | ICD-10-CM

## 2022-12-15 DIAGNOSIS — E11.621 TYPE 2 DIABETES MELLITUS WITH FOOT ULCER, WITHOUT LONG-TERM CURRENT USE OF INSULIN (H): Primary | ICD-10-CM

## 2022-12-18 RX ORDER — DULAGLUTIDE 1.5 MG/.5ML
1.5 INJECTION, SOLUTION SUBCUTANEOUS
Qty: 2 ML | Refills: 1 | Status: SHIPPED | OUTPATIENT
Start: 2022-12-18 | End: 2023-03-15

## 2023-01-01 ENCOUNTER — HOSPITAL ENCOUNTER (INPATIENT)
Facility: CLINIC | Age: 63
LOS: 1 days | Discharge: HOME | DRG: 177 | End: 2023-01-02
Attending: EMERGENCY MEDICINE | Admitting: FAMILY MEDICINE
Payer: COMMERCIAL

## 2023-01-01 ENCOUNTER — APPOINTMENT (OUTPATIENT)
Dept: CT IMAGING | Facility: CLINIC | Age: 63
DRG: 177 | End: 2023-01-01
Payer: COMMERCIAL

## 2023-01-01 DIAGNOSIS — J12.82 PNEUMONIA DUE TO 2019 NOVEL CORONAVIRUS: Primary | ICD-10-CM

## 2023-01-01 DIAGNOSIS — J45.21 EXACERBATION OF INTERMITTENT ASTHMA, UNSPECIFIED ASTHMA SEVERITY: ICD-10-CM

## 2023-01-01 DIAGNOSIS — R09.02 HYPOXIA: ICD-10-CM

## 2023-01-01 DIAGNOSIS — U07.1 PNEUMONIA DUE TO 2019 NOVEL CORONAVIRUS: Primary | ICD-10-CM

## 2023-01-01 LAB
ANION GAP SERPL CALCULATED.3IONS-SCNC: 9 MMOL/L (ref 5–18)
ATRIAL RATE - MUSE: 112 BPM
BASOPHILS # BLD AUTO: 0 10E3/UL (ref 0–0.2)
BASOPHILS NFR BLD AUTO: 0 %
BUN SERPL-MCNC: 24 MG/DL (ref 8–22)
CALCIUM SERPL-MCNC: 8.8 MG/DL (ref 8.5–10.5)
CHLORIDE BLD-SCNC: 99 MMOL/L (ref 98–107)
CO2 SERPL-SCNC: 29 MMOL/L (ref 22–31)
CREAT SERPL-MCNC: 1.07 MG/DL (ref 0.6–1.3)
D DIMER PPP FEU-MCNC: 1.67 UG/ML FEU (ref 0–0.5)
DIASTOLIC BLOOD PRESSURE - MUSE: NORMAL MMHG
EOSINOPHIL # BLD AUTO: 0 10E3/UL (ref 0–0.7)
EOSINOPHIL NFR BLD AUTO: 0 %
ERYTHROCYTE [DISTWIDTH] IN BLOOD BY AUTOMATED COUNT: 14 % (ref 10–15)
GFR SERPL CREATININE-BSD FRML MDRD: 59 ML/MIN/1.73M2
GLUCOSE BLD-MCNC: 128 MG/DL (ref 70–125)
GLUCOSE BLDC GLUCOMTR-MCNC: 172 MG/DL (ref 70–99)
GLUCOSE BLDC GLUCOMTR-MCNC: 315 MG/DL (ref 70–99)
HBA1C MFR BLD: 8.7 %
HCT VFR BLD AUTO: 32.3 % (ref 35–53)
HGB BLD-MCNC: 10.2 G/DL (ref 11.7–17.7)
IMM GRANULOCYTES # BLD: 0 10E3/UL
IMM GRANULOCYTES NFR BLD: 1 %
INTERPRETATION ECG - MUSE: NORMAL
LYMPHOCYTES # BLD AUTO: 1.5 10E3/UL (ref 0.8–5.3)
LYMPHOCYTES NFR BLD AUTO: 24 %
MCH RBC QN AUTO: 26.2 PG (ref 26.5–33)
MCHC RBC AUTO-ENTMCNC: 31.6 G/DL (ref 31.5–36.5)
MCV RBC AUTO: 83 FL (ref 78–100)
MONOCYTES # BLD AUTO: 0.6 10E3/UL (ref 0–1.3)
MONOCYTES NFR BLD AUTO: 10 %
NEUTROPHILS # BLD AUTO: 4.1 10E3/UL (ref 1.6–8.3)
NEUTROPHILS NFR BLD AUTO: 65 %
NRBC # BLD AUTO: 0 10E3/UL
NRBC BLD AUTO-RTO: 0 /100
P AXIS - MUSE: 38 DEGREES
PLATELET # BLD AUTO: 352 10E3/UL (ref 150–450)
POTASSIUM BLD-SCNC: 4.1 MMOL/L (ref 3.5–5)
PR INTERVAL - MUSE: 130 MS
QRS DURATION - MUSE: 70 MS
QT - MUSE: 336 MS
QTC - MUSE: 458 MS
R AXIS - MUSE: 5 DEGREES
RBC # BLD AUTO: 3.89 10E6/UL (ref 3.8–5.9)
SODIUM SERPL-SCNC: 137 MMOL/L (ref 136–145)
SYSTOLIC BLOOD PRESSURE - MUSE: NORMAL MMHG
T AXIS - MUSE: 58 DEGREES
TROPONIN I SERPL-MCNC: <0.01 NG/ML (ref 0–0.29)
VENTRICULAR RATE- MUSE: 112 BPM
WBC # BLD AUTO: 6.3 10E3/UL (ref 4–11)

## 2023-01-01 PROCEDURE — 250N000013 HC RX MED GY IP 250 OP 250 PS 637

## 2023-01-01 PROCEDURE — 120N000001 HC R&B MED SURG/OB

## 2023-01-01 PROCEDURE — 96375 TX/PRO/DX INJ NEW DRUG ADDON: CPT

## 2023-01-01 PROCEDURE — 83036 HEMOGLOBIN GLYCOSYLATED A1C: CPT

## 2023-01-01 PROCEDURE — 93005 ELECTROCARDIOGRAM TRACING: CPT | Performed by: EMERGENCY MEDICINE

## 2023-01-01 PROCEDURE — 999N000157 HC STATISTIC RCP TIME EA 10 MIN

## 2023-01-01 PROCEDURE — XW033E5 INTRODUCTION OF REMDESIVIR ANTI-INFECTIVE INTO PERIPHERAL VEIN, PERCUTANEOUS APPROACH, NEW TECHNOLOGY GROUP 5: ICD-10-PCS | Performed by: FAMILY MEDICINE

## 2023-01-01 PROCEDURE — 84484 ASSAY OF TROPONIN QUANT: CPT

## 2023-01-01 PROCEDURE — 250N000011 HC RX IP 250 OP 636: Performed by: EMERGENCY MEDICINE

## 2023-01-01 PROCEDURE — 96374 THER/PROPH/DIAG INJ IV PUSH: CPT

## 2023-01-01 PROCEDURE — 250N000012 HC RX MED GY IP 250 OP 636 PS 637

## 2023-01-01 PROCEDURE — 80048 BASIC METABOLIC PNL TOTAL CA: CPT

## 2023-01-01 PROCEDURE — 258N000003 HC RX IP 258 OP 636

## 2023-01-01 PROCEDURE — 71275 CT ANGIOGRAPHY CHEST: CPT

## 2023-01-01 PROCEDURE — 85379 FIBRIN DEGRADATION QUANT: CPT

## 2023-01-01 PROCEDURE — 250N000011 HC RX IP 250 OP 636

## 2023-01-01 PROCEDURE — 96361 HYDRATE IV INFUSION ADD-ON: CPT

## 2023-01-01 PROCEDURE — 36415 COLL VENOUS BLD VENIPUNCTURE: CPT

## 2023-01-01 PROCEDURE — 85025 COMPLETE CBC W/AUTO DIFF WBC: CPT

## 2023-01-01 PROCEDURE — 94640 AIRWAY INHALATION TREATMENT: CPT

## 2023-01-01 PROCEDURE — 99285 EMERGENCY DEPT VISIT HI MDM: CPT | Mod: 25

## 2023-01-01 PROCEDURE — 99223 1ST HOSP IP/OBS HIGH 75: CPT | Mod: AI

## 2023-01-01 PROCEDURE — 250N000009 HC RX 250

## 2023-01-01 RX ORDER — LIDOCAINE 40 MG/G
CREAM TOPICAL
Status: DISCONTINUED | OUTPATIENT
Start: 2023-01-01 | End: 2023-01-02 | Stop reason: HOSPADM

## 2023-01-01 RX ORDER — CALCIUM CARBONATE 500 MG/1
1000 TABLET, CHEWABLE ORAL 4 TIMES DAILY PRN
Status: DISCONTINUED | OUTPATIENT
Start: 2023-01-01 | End: 2023-01-02 | Stop reason: HOSPADM

## 2023-01-01 RX ORDER — HYDROXYZINE HYDROCHLORIDE 25 MG/1
25 TABLET, FILM COATED ORAL EVERY 6 HOURS PRN
Status: DISCONTINUED | OUTPATIENT
Start: 2023-01-01 | End: 2023-01-02 | Stop reason: HOSPADM

## 2023-01-01 RX ORDER — ONDANSETRON 2 MG/ML
4 INJECTION INTRAMUSCULAR; INTRAVENOUS ONCE
Status: COMPLETED | OUTPATIENT
Start: 2023-01-01 | End: 2023-01-01

## 2023-01-01 RX ORDER — IOPAMIDOL 755 MG/ML
75 INJECTION, SOLUTION INTRAVASCULAR ONCE
Status: COMPLETED | OUTPATIENT
Start: 2023-01-01 | End: 2023-01-01

## 2023-01-01 RX ORDER — CITALOPRAM HYDROBROMIDE 20 MG/1
20 TABLET ORAL EVERY MORNING
Status: DISCONTINUED | OUTPATIENT
Start: 2023-01-02 | End: 2023-01-02 | Stop reason: HOSPADM

## 2023-01-01 RX ORDER — ALBUTEROL SULFATE 90 UG/1
2 AEROSOL, METERED RESPIRATORY (INHALATION) EVERY 6 HOURS PRN
Status: DISCONTINUED | OUTPATIENT
Start: 2023-01-01 | End: 2023-01-02 | Stop reason: HOSPADM

## 2023-01-01 RX ORDER — ONDANSETRON 4 MG/1
4 TABLET, ORALLY DISINTEGRATING ORAL EVERY 6 HOURS PRN
Status: DISCONTINUED | OUTPATIENT
Start: 2023-01-01 | End: 2023-01-02 | Stop reason: HOSPADM

## 2023-01-01 RX ORDER — ALBUTEROL SULFATE 0.83 MG/ML
5 SOLUTION RESPIRATORY (INHALATION) ONCE
Status: COMPLETED | OUTPATIENT
Start: 2023-01-01 | End: 2023-01-01

## 2023-01-01 RX ORDER — DEXTROSE MONOHYDRATE 25 G/50ML
25-50 INJECTION, SOLUTION INTRAVENOUS
Status: DISCONTINUED | OUTPATIENT
Start: 2023-01-01 | End: 2023-01-02 | Stop reason: HOSPADM

## 2023-01-01 RX ORDER — LISINOPRIL 10 MG/1
10 TABLET ORAL DAILY
Status: DISCONTINUED | OUTPATIENT
Start: 2023-01-02 | End: 2023-01-02 | Stop reason: HOSPADM

## 2023-01-01 RX ORDER — ASPIRIN 81 MG/1
81 TABLET ORAL 2 TIMES DAILY
COMMUNITY
End: 2023-08-23

## 2023-01-01 RX ORDER — DEXAMETHASONE SODIUM PHOSPHATE 10 MG/ML
6 INJECTION, SOLUTION INTRAMUSCULAR; INTRAVENOUS ONCE
Status: COMPLETED | OUTPATIENT
Start: 2023-01-01 | End: 2023-01-01

## 2023-01-01 RX ORDER — METFORMIN HCL 500 MG
1000 TABLET, EXTENDED RELEASE 24 HR ORAL
COMMUNITY
End: 2024-09-27

## 2023-01-01 RX ORDER — DIAZEPAM 5 MG
5 TABLET ORAL EVERY 6 HOURS PRN
Status: DISCONTINUED | OUTPATIENT
Start: 2023-01-01 | End: 2023-01-02 | Stop reason: HOSPADM

## 2023-01-01 RX ORDER — PROCHLORPERAZINE MALEATE 10 MG
10 TABLET ORAL EVERY 6 HOURS PRN
Status: DISCONTINUED | OUTPATIENT
Start: 2023-01-01 | End: 2023-01-02 | Stop reason: HOSPADM

## 2023-01-01 RX ORDER — OXYCODONE HYDROCHLORIDE 5 MG/1
5 TABLET ORAL EVERY 6 HOURS PRN
COMMUNITY
End: 2023-03-15

## 2023-01-01 RX ORDER — PRAMIPEXOLE DIHYDROCHLORIDE 0.5 MG/1
0.5 TABLET ORAL AT BEDTIME
Status: DISCONTINUED | OUTPATIENT
Start: 2023-01-01 | End: 2023-01-02 | Stop reason: HOSPADM

## 2023-01-01 RX ORDER — ASPIRIN 81 MG/1
81 TABLET ORAL 2 TIMES DAILY
Status: DISCONTINUED | OUTPATIENT
Start: 2023-01-01 | End: 2023-01-02 | Stop reason: HOSPADM

## 2023-01-01 RX ORDER — ONDANSETRON 2 MG/ML
4 INJECTION INTRAMUSCULAR; INTRAVENOUS EVERY 6 HOURS PRN
Status: DISCONTINUED | OUTPATIENT
Start: 2023-01-01 | End: 2023-01-02 | Stop reason: HOSPADM

## 2023-01-01 RX ORDER — POLYETHYLENE GLYCOL 3350 17 G/17G
17 POWDER, FOR SOLUTION ORAL DAILY PRN
Status: DISCONTINUED | OUTPATIENT
Start: 2023-01-01 | End: 2023-01-02 | Stop reason: HOSPADM

## 2023-01-01 RX ORDER — AMOXICILLIN 250 MG
1 CAPSULE ORAL 2 TIMES DAILY PRN
Status: DISCONTINUED | OUTPATIENT
Start: 2023-01-01 | End: 2023-01-02 | Stop reason: HOSPADM

## 2023-01-01 RX ORDER — IBUPROFEN 600 MG/1
600 TABLET, FILM COATED ORAL ONCE
Status: COMPLETED | OUTPATIENT
Start: 2023-01-01 | End: 2023-01-01

## 2023-01-01 RX ORDER — AMOXICILLIN 250 MG
2 CAPSULE ORAL 2 TIMES DAILY PRN
Status: DISCONTINUED | OUTPATIENT
Start: 2023-01-01 | End: 2023-01-02 | Stop reason: HOSPADM

## 2023-01-01 RX ORDER — ENOXAPARIN SODIUM 100 MG/ML
40 INJECTION SUBCUTANEOUS EVERY 24 HOURS
Status: DISCONTINUED | OUTPATIENT
Start: 2023-01-01 | End: 2023-01-02 | Stop reason: HOSPADM

## 2023-01-01 RX ORDER — FLUTICASONE PROPIONATE 110 UG/1
2 AEROSOL, METERED RESPIRATORY (INHALATION) DAILY
Status: DISCONTINUED | OUTPATIENT
Start: 2023-01-02 | End: 2023-01-02 | Stop reason: HOSPADM

## 2023-01-01 RX ORDER — PROCHLORPERAZINE 25 MG
25 SUPPOSITORY, RECTAL RECTAL EVERY 12 HOURS PRN
Status: DISCONTINUED | OUTPATIENT
Start: 2023-01-01 | End: 2023-01-02 | Stop reason: HOSPADM

## 2023-01-01 RX ORDER — ATORVASTATIN CALCIUM 40 MG/1
40 TABLET, FILM COATED ORAL DAILY
Status: DISCONTINUED | OUTPATIENT
Start: 2023-01-02 | End: 2023-01-02 | Stop reason: HOSPADM

## 2023-01-01 RX ORDER — NICOTINE POLACRILEX 4 MG
15-30 LOZENGE BUCCAL
Status: DISCONTINUED | OUTPATIENT
Start: 2023-01-01 | End: 2023-01-02 | Stop reason: HOSPADM

## 2023-01-01 RX ORDER — AMMONIUM LACTATE 12 G/100G
LOTION TOPICAL 2 TIMES DAILY
Status: DISCONTINUED | OUTPATIENT
Start: 2023-01-01 | End: 2023-01-02 | Stop reason: HOSPADM

## 2023-01-01 RX ORDER — ONDANSETRON 4 MG/1
4 TABLET, ORALLY DISINTEGRATING ORAL EVERY 6 HOURS PRN
COMMUNITY
End: 2023-03-15

## 2023-01-01 RX ORDER — HYDROXYZINE HYDROCHLORIDE 25 MG/1
25 TABLET, FILM COATED ORAL EVERY 6 HOURS PRN
COMMUNITY
End: 2023-03-15

## 2023-01-01 RX ORDER — DEXAMETHASONE SODIUM PHOSPHATE 10 MG/ML
6 INJECTION, SOLUTION INTRAMUSCULAR; INTRAVENOUS DAILY
Status: DISCONTINUED | OUTPATIENT
Start: 2023-01-02 | End: 2023-01-02 | Stop reason: HOSPADM

## 2023-01-01 RX ADMIN — ONDANSETRON 4 MG: 2 INJECTION INTRAMUSCULAR; INTRAVENOUS at 13:04

## 2023-01-01 RX ADMIN — SODIUM CHLORIDE 50 ML: 9 INJECTION, SOLUTION INTRAVENOUS at 18:45

## 2023-01-01 RX ADMIN — IBUPROFEN 600 MG: 600 TABLET ORAL at 13:50

## 2023-01-01 RX ADMIN — ALBUTEROL SULFATE 5 MG: 2.5 SOLUTION RESPIRATORY (INHALATION) at 13:05

## 2023-01-01 RX ADMIN — ASPIRIN 81 MG: 81 TABLET ORAL at 20:08

## 2023-01-01 RX ADMIN — ENOXAPARIN SODIUM 40 MG: 100 INJECTION SUBCUTANEOUS at 17:52

## 2023-01-01 RX ADMIN — DEXAMETHASONE SODIUM PHOSPHATE 6 MG: 10 INJECTION, SOLUTION INTRAMUSCULAR; INTRAVENOUS at 14:33

## 2023-01-01 RX ADMIN — PRAMIPEXOLE DIHYDROCHLORIDE 0.5 MG: 0.5 TABLET ORAL at 20:08

## 2023-01-01 RX ADMIN — REMDESIVIR 200 MG: 100 INJECTION, POWDER, LYOPHILIZED, FOR SOLUTION INTRAVENOUS at 17:52

## 2023-01-01 RX ADMIN — SODIUM CHLORIDE 1000 ML: 9 INJECTION, SOLUTION INTRAVENOUS at 13:04

## 2023-01-01 RX ADMIN — INSULIN GLARGINE 35 UNITS: 100 INJECTION, SOLUTION SUBCUTANEOUS at 22:10

## 2023-01-01 RX ADMIN — IOPAMIDOL 75 ML: 755 INJECTION, SOLUTION INTRAVENOUS at 13:54

## 2023-01-01 ASSESSMENT — ACTIVITIES OF DAILY LIVING (ADL)
WALKING_OR_CLIMBING_STAIRS_DIFFICULTY: NO
CONCENTRATING,_REMEMBERING_OR_MAKING_DECISIONS_DIFFICULTY: NO
ADLS_ACUITY_SCORE: 35
WEAR_GLASSES_OR_BLIND: YES
VISION_MANAGEMENT: GLASSES
FALL_HISTORY_WITHIN_LAST_SIX_MONTHS: NO
ADLS_ACUITY_SCORE: 35
ADLS_ACUITY_SCORE: 35
TOILETING_ISSUES: NO
EQUIPMENT_CURRENTLY_USED_AT_HOME: WALKER, ROLLING
ADLS_ACUITY_SCORE: 35
ADLS_ACUITY_SCORE: 35
DIFFICULTY_EATING/SWALLOWING: NO
ADLS_ACUITY_SCORE: 21
CHANGE_IN_FUNCTIONAL_STATUS_SINCE_ONSET_OF_CURRENT_ILLNESS/INJURY: NO
DOING_ERRANDS_INDEPENDENTLY_DIFFICULTY: NO
DRESSING/BATHING_DIFFICULTY: NO

## 2023-01-01 ASSESSMENT — ENCOUNTER SYMPTOMS
FEVER: 0
CHILLS: 0
HEADACHES: 0
COUGH: 1
NAUSEA: 1
SHORTNESS OF BREATH: 1
VOMITING: 0
PALPITATIONS: 0
DIARRHEA: 0
ABDOMINAL PAIN: 0

## 2023-01-01 NOTE — ED TRIAGE NOTES
Patient presents to ED with SOB, she has Covid and began to have sx of that on Dec 25th, she reports that she has asthma and has been doing her scheduled inhalers, but not using her rescue inhaler.  Lindsay Guan RN.......1/1/2023 11:20 AM     Triage Assessment     Row Name 01/01/23 1119       Triage Assessment (Adult)    Airway WDL WDL       Respiratory WDL    Respiratory WDL X;cough  SOB       Skin Circulation/Temperature WDL    Skin Circulation/Temperature WDL WDL       Cardiac WDL    Cardiac WDL WDL       Peripheral/Neurovascular WDL    Peripheral Neurovascular WDL WDL       Cognitive/Neuro/Behavioral WDL    Cognitive/Neuro/Behavioral WDL WDL

## 2023-01-01 NOTE — PHARMACY-ADMISSION MEDICATION HISTORY
Pharmacy Note - Admission Medication History    Pertinent Provider Information: List updated with patient over phone. Patient was fairly confident about most medications. Has been taking aspirin BID since surgery 12/1/22. Stated she is supposed to take for longer than 30 days.      ______________________________________________________________________    Prior To Admission (PTA) med list completed and updated in EMR.       PTA Med List   Medication Sig Note Last Dose     albuterol (PROVENTIL HFA;VENTOLIN HFA) 90 mcg/actuation inhaler [ALBUTEROL (PROVENTIL HFA;VENTOLIN HFA) 90 MCG/ACTUATION INHALER] Inhale 2 puffs every 6 (six) hours as needed for wheezing.  Unknown     ammonium lactate (LAC-HYDRIN) 12 % external lotion Apply topically 2 times daily  1/1/2023 at AM     aspirin 81 MG EC tablet Take 81 mg by mouth 2 times daily 1/1/2023: Post-op 1/1/2023 at AM     atorvastatin (LIPITOR) 40 MG tablet Take 40 mg by mouth daily  1/1/2023     citalopram (CELEXA) 20 MG tablet [CITALOPRAM (CELEXA) 20 MG TABLET] Take 20 mg by mouth every morning.   1/1/2023 at AM     diazePAM (VALIUM) 5 MG tablet [DIAZEPAM (VALIUM) 5 MG TABLET] Take 1 tablet (5 mg total) by mouth every 6 (six) hours as needed for anxiety.  Unknown     dulaglutide (TRULICITY) 1.5 MG/0.5ML pen Inject 1.5 mg Subcutaneous every 7 days (Patient taking differently: Inject 4.5 mg Subcutaneous every 7 days Fridays)  12/30/2022     empagliflozin 25 mg Tab Take 25 mg by mouth daily  1/1/2023 at AM     fluticasone propionate (FLOVENT HFA) 110 mcg/actuation inhaler Inhale 2 puffs into the lungs daily  1/1/2023 at AM, didn't bring     hydrOXYzine (ATARAX) 25 MG tablet Take 25 mg by mouth every 6 hours as needed (pain)  Unknown     insulin aspart (NOVOLOG PEN) 100 UNIT/ML pen Do Not give Correction Insulin if Pre-Meal BG less than 150.   For Pre-Meal  - 199 give 1 unit.   For Pre-Meal  - 249 give 2 units.   For Pre-Meal  - 299 give 3 units.   For Pre-Meal   - 349 give 4 units.   For Pre-Meal - 399 give 5 units.   For Pre-Meal -449 give 6 units  For Pre-Meal BG greater than or equal to 450 give 7 units.  not started yet     insulin glargine (LANTUS SOLOSTAR) 100 UNIT/ML pen Inject 35 Units Subcutaneous At Bedtime  12/31/2022 at PM     lisinopriL (PRINIVIL,ZESTRIL) 10 MG tablet [LISINOPRIL (PRINIVIL,ZESTRIL) 10 MG TABLET] Take 10 mg by mouth daily.  1/1/2023 at AM     metFORMIN (GLUCOPHAGE XR) 500 MG 24 hr tablet Take 2,000 mg by mouth daily (with breakfast)  1/1/2023 at AM     ondansetron (ZOFRAN ODT) 4 MG ODT tab Take 4 mg by mouth every 6 hours as needed for nausea  Unknown     oxyCODONE (ROXICODONE) 5 MG tablet Take 5 mg by mouth every 6 hours as needed for severe pain (7-10)  Unknown     pramipexole (MIRAPEX) 0.25 MG tablet Take 0.5 mg by mouth At Bedtime  12/31/2022 at PM       Information source(s): Patient and [facility]  Method of interview communication: phone    Summary of Changes to PTA Med List  New: ASA, oxycodone, Zofran, Hydroxyzine  Discontinued: Lasix, amlodipine  Changed: Mirapex dose, Trulicity dose, Flovent dosing    Patient was asked about OTC/herbal products specifically.  PTA med list reflects this.    In the past week, patient estimated taking medication this percent of the time:  greater than 90%.    Allergies were reviewed, assessed, and updated with the patient.      Patient did not bring any medications to the hospital and can't retrieve from home. No multi-dose medications are available for use during hospital stay.     The information provided in this note is only as accurate as the sources available at the time of the update(s).    Thank you for the opportunity to participate in the care of this patient.    Tomy Vee [facility]  1/1/2023 3:02 PM

## 2023-01-01 NOTE — ED PROVIDER NOTES
"Emergency Department Midlevel Supervisory Note     I personally saw the patient and performed a substantive portion of the visit including all aspects of the medical decision making.    ED Course:   Nely Hernandez PA-C staffed patient with me. I agree with their assessment and plan of management, and I will see the patient.  I met with the patient to introduce myself, gather additional history, perform my initial exam, and discuss the plan.     Brief HPI:     Gill Mendez is a 62 year old adult who presents for evaluation of shortness of breath.     On 12/1/2022, patient had hip replacement surgery on the left hip. Patient reports nerve injury during surgery causing her to lose function of her left lower leg. She is currently regaining function of the left lower leg. 7 days ago patient was diagnosed with COVID. She has been at home doing well until yesterday evening. Yesterday evening, patient started to notice increased shortness of breath. Patient used her oxygen saturation monitor yesterday evening which showed her stating at 88%. She also complains of chest pain with coughing. She denies chest pain while not coughing. Chest pain never goes to her back. She also complains of mild nausea.    I, Melva Benites, am serving as a scribe to document services personally performed by Josie Hayes DO, based on my observations and the provider's statements to me.   I, Josie Hayes DO attest that Melva Benites was acting in a scribe capacity, has observed my performance of the services and has documented them in accordance with my direction.    Brief Physical Exam: /57 (BP Location: Right arm)   Pulse 98   Temp 98.9  F (37.2  C) (Oral)   Resp 16   Ht 1.651 m (5' 5\")   Wt 81.6 kg (180 lb)   SpO2 93%   BMI 29.95 kg/m    Constitutional:  Alert, in no acute distress  EYES: Conjunctivae clear  HENT:  Atraumatic, normocephalic  Respiratory:  Respirations even, unlabored, in no acute respiratory " distress  Cardiovascular:  Tachycardic rate and regular rhythm, good peripheral perfusion  GI: Soft, nondistended, nontender, no palpable masses, no rebound, no guarding   Musculoskeletal:  No edema. No cyanosis. Range of motion major extremities intact.    Integument: Warm, Dry, No erythema, No rash.   Neurologic:  Alert & oriented, no focal deficits noted  Psych: Normal mood and affect     MDM:  Patient presenting for evaluation of shortness of breath.  Diagnosed with COVID one week ago.  Symptoms worse over past 3 days.  Recent surgery.  She is tachycardic and hypoxic on arrival.  She notes subjective fevers.  Will plan for tylenol, fluids, nebulizer.  EKG obtained to evaluate for arrhythmia or ischemia, reassuring.  Troponin negative.  Labs reassuring.  Pending CT chest to evaluate for PE, pneumonia, pericardial effusion,etc.  Pending these results and patient reevaluation at time of hand off to oncoming provider.       1. Pneumonia due to 2019 novel coronavirus    2. Exacerbation of intermittent asthma, unspecified asthma severity    3. Hypoxia        Labs and Imaging:  Results for orders placed or performed during the hospital encounter of 01/01/23   CT Chest Pulmonary Embolism w Contrast    Impression    IMPRESSION:  1.  No PE.  2.  Groundglass and more confluent pulmonary opacities have the appearance of atypical infection.  3.  Hepatic steatosis.    Basic metabolic panel   Result Value Ref Range    Sodium 137 136 - 145 mmol/L    Potassium 4.1 3.5 - 5.0 mmol/L    Chloride 99 98 - 107 mmol/L    Carbon Dioxide (CO2) 29 22 - 31 mmol/L    Anion Gap 9 5 - 18 mmol/L    Urea Nitrogen 24 (H) 8 - 22 mg/dL    Creatinine 1.07 0.60 - 1.30 mg/dL    Calcium 8.8 8.5 - 10.5 mg/dL    Glucose 128 (H) 70 - 125 mg/dL    GFR Estimate 59 (L) >60 mL/min/1.73m2   Troponin I (now)   Result Value Ref Range    Troponin I <0.01 0.00 - 0.29 ng/mL   CBC with platelets and differential   Result Value Ref Range    WBC Count 6.3 4.0 - 11.0  10e3/uL    RBC Count 3.89 3.80 - 5.90 10e6/uL    Hemoglobin 10.2 (L) 11.7 - 17.7 g/dL    Hematocrit 32.3 (L) 35.0 - 53.0 %    MCV 83 78 - 100 fL    MCH 26.2 (L) 26.5 - 33.0 pg    MCHC 31.6 31.5 - 36.5 g/dL    RDW 14.0 10.0 - 15.0 %    Platelet Count 352 150 - 450 10e3/uL    % Neutrophils 65 %    % Lymphocytes 24 %    % Monocytes 10 %    % Eosinophils 0 %    % Basophils 0 %    % Immature Granulocytes 1 %    NRBCs per 100 WBC 0 <1 /100    Absolute Neutrophils 4.1 1.6 - 8.3 10e3/uL    Absolute Lymphocytes 1.5 0.8 - 5.3 10e3/uL    Absolute Monocytes 0.6 0.0 - 1.3 10e3/uL    Absolute Eosinophils 0.0 0.0 - 0.7 10e3/uL    Absolute Basophils 0.0 0.0 - 0.2 10e3/uL    Absolute Immature Granulocytes 0.0 <=0.4 10e3/uL    Absolute NRBCs 0.0 10e3/uL   Result Value Ref Range    Hemoglobin A1C 8.7 (H) <5.7 %   D dimer quantitative   Result Value Ref Range    D-Dimer Quantitative 1.67 (H) 0.00 - 0.50 ug/mL FEU   Glucose by meter   Result Value Ref Range    GLUCOSE BY METER POCT 172 (H) 70 - 99 mg/dL   ECG 12-LEAD WITH MUSE (LHE)   Result Value Ref Range    Systolic Blood Pressure  mmHg    Diastolic Blood Pressure  mmHg    Ventricular Rate 112 BPM    Atrial Rate 112 BPM    CT Interval 130 ms    QRS Duration 70 ms     ms    QTc 458 ms    P Axis 38 degrees    R AXIS 5 degrees    T Axis 58 degrees    Interpretation ECG       Sinus tachycardia  Junctional ST depression, probably normal  Borderline ECG  When compared with ECG of 24-SEP-2020 14:06,  No significant change was found  Confirmed by SEE ED PROVIDER NOTE FOR, ECG INTERPRETATION (4000),  Virgilio Caballero ([id number]) on 1/1/2023 11:32:10 AM       I have reviewed the relevant laboratory and radiology studies    Procedures:  I was present for the key portions of this procedure: none    Josie Hayes DO  [facility] EMERGENCY ROOM  [id number] [id number] [phone]      Freddy Josie SAUCEDO DO  01/01/23 2030

## 2023-01-01 NOTE — ED NOTES
EMERGENCY DEPARTMENT SIGN OUT NOTE      ED COURSE AND MEDICAL DECISION MAKING  2:05 PM Patient was signed out to me by Dr Anna Hayes.  2:08 PM I met with the patient to gather history and to perform my initial exam. I discussed the plan for care while in the Emergency Department. Pt still requiring 2L supplemental O2, just received neb and lung sounds improved, but still having some faint wheezes.  IV decadron 6mg ordered for covid/asthma. Awaiting CTA.  3:50 PM CTA neg, consistent with covid PNA.  Will hospitalize for hypoxia, covid infection.  Will discuss with admitting remdesivir.  Pt on decadron in ED.  4:00 PM I spoke with [place] resident, accepts for hospitalization. They will order remdesivir or any other treatments per covid protocol.  Accepts for hospitalization.    In brief, Gill Mendez is a 62 year old adult who initially presented for shortness of breath.   Seven days ago, patient was diagnosed with COVID. She has been at home doing well until yesterday evening. Yesterday evening patient started to notice increased shortness of breath. Patient used her oxygen saturation monitor yesterday evening which showed her stating at 88%. She also complains of chest pain with coughing.    At time of sign out, disposition was pending a CT study and repeat evaluation.     FINAL IMPRESSION    1. Pneumonia due to 2019 novel coronavirus    2. Exacerbation of intermittent asthma, unspecified asthma severity    3. Hypoxia        ED MEDS  Medications   aspirin EC tablet 81 mg (has no administration in time range)   ammonium lactate (LAC-HYDRIN) 12 % lotion (has no administration in time range)   atorvastatin (LIPITOR) tablet 40 mg (has no administration in time range)   citalopram (celeXA) tablet 20 mg (has no administration in time range)   diazepam (VALIUM) tablet 5 mg (has no administration in time range)   hydrOXYzine (ATARAX) tablet 25 mg (has no administration in time range)   insulin glargine (LANTUS  PEN) injection 35 Units (has no administration in time range)   lisinopril (ZESTRIL) tablet 10 mg (has no administration in time range)   pramipexole (MIRAPEX) tablet 0.5 mg (has no administration in time range)   albuterol (PROVENTIL HFA/VENTOLIN HFA) inhaler (has no administration in time range)   lidocaine 1 % 0.1-1 mL (has no administration in time range)   lidocaine (LMX4) cream (has no administration in time range)   sodium chloride (PF) 0.9% PF flush 3 mL (has no administration in time range)   sodium chloride (PF) 0.9% PF flush 3 mL (has no administration in time range)   melatonin tablet 1 mg (has no administration in time range)   Patient is already receiving anticoagulation with heparin, enoxaparin (LOVENOX), warfarin (COUMADIN)  or other anticoagulant medication (has no administration in time range)   senna-docusate (SENOKOT-S/PERICOLACE) 8.6-50 MG per tablet 1 tablet (has no administration in time range)     Or   senna-docusate (SENOKOT-S/PERICOLACE) 8.6-50 MG per tablet 2 tablet (has no administration in time range)   polyethylene glycol (MIRALAX) Packet 17 g (has no administration in time range)   ondansetron (ZOFRAN ODT) ODT tab 4 mg (has no administration in time range)     Or   ondansetron (ZOFRAN) injection 4 mg (has no administration in time range)   prochlorperazine (COMPAZINE) injection 10 mg (has no administration in time range)     Or   prochlorperazine (COMPAZINE) tablet 10 mg (has no administration in time range)     Or   prochlorperazine (COMPAZINE) suppository 25 mg (has no administration in time range)   calcium carbonate (TUMS) chewable tablet 1,000 mg (has no administration in time range)   glucose gel 15-30 g (has no administration in time range)     Or   dextrose 50 % injection 25-50 mL (has no administration in time range)     Or   glucagon injection 1 mg (has no administration in time range)   insulin aspart (NovoLOG) injection (RAPID ACTING) (has no administration in time range)    insulin aspart (NovoLOG) injection (RAPID ACTING) (has no administration in time range)   remdesivir 200 mg in sodium chloride 0.9 % 250 mL intermittent infusion (has no administration in time range)     Followed by   0.9% sodium chloride BOLUS (has no administration in time range)   remdesivir 100 mg in sodium chloride 0.9 % 250 mL intermittent infusion (has no administration in time range)     And   0.9% sodium chloride BOLUS (has no administration in time range)   dexamethasone PF (DECADRON) injection 6 mg (has no administration in time range)   fluticasone (FLOVENT HFA) 110 MCG/ACT Inhaler 2 puff (has no administration in time range)   enoxaparin ANTICOAGULANT (LOVENOX) injection 40 mg (has no administration in time range)   ondansetron (ZOFRAN) injection 4 mg (4 mg Intravenous Given 1/1/23 1304)   0.9% sodium chloride BOLUS (1,000 mLs Intravenous New Bag 1/1/23 1304)   ibuprofen (ADVIL/MOTRIN) tablet 600 mg (600 mg Oral Given 1/1/23 1350)   albuterol (PROVENTIL) neb solution 5 mg (5 mg Nebulization Given 1/1/23 1305)   iopamidol (ISOVUE-370) solution 75 mL (75 mLs Intravenous Given 1/1/23 1354)   dexamethasone PF (DECADRON) injection 6 mg (6 mg Intravenous Given 1/1/23 1433)       LAB  Labs Ordered and Resulted from Time of ED Arrival to Time of ED Departure   BASIC METABOLIC PANEL - Abnormal       Result Value    Sodium 137      Potassium 4.1      Chloride 99      Carbon Dioxide (CO2) 29      Anion Gap 9      Urea Nitrogen 24 (*)     Creatinine 1.07      Calcium 8.8      Glucose 128 (*)     GFR Estimate 59 (*)    CBC WITH PLATELETS AND DIFFERENTIAL - Abnormal    WBC Count 6.3      RBC Count 3.89      Hemoglobin 10.2 (*)     Hematocrit 32.3 (*)     MCV 83      MCH 26.2 (*)     MCHC 31.6      RDW 14.0      Platelet Count 352      % Neutrophils 65      % Lymphocytes 24      % Monocytes 10      % Eosinophils 0      % Basophils 0      % Immature Granulocytes 1      NRBCs per 100 WBC 0      Absolute Neutrophils  4.1      Absolute Lymphocytes 1.5      Absolute Monocytes 0.6      Absolute Eosinophils 0.0      Absolute Basophils 0.0      Absolute Immature Granulocytes 0.0      Absolute NRBCs 0.0     TROPONIN I - Normal    Troponin I <0.01     GLUCOSE MONITOR NURSING POCT   HEMOGLOBIN A1C   GLUCOSE MONITOR NURSING POCT   GLUCOSE MONITOR NURSING POCT   D DIMER QUANTITATIVE       RADIOLOGY    CT Chest Pulmonary Embolism w Contrast   Final Result   IMPRESSION:   1.  No PE.   2.  Groundglass and more confluent pulmonary opacities have the appearance of atypical infection.   3.  Hepatic steatosis.           DISCHARGE MEDS  New Prescriptions    No medications on file       Miguel Sandoval DO  [facility] EMERGENCY ROOM  [address] [id number] [phone]     Miguel Sandoval MD  01/01/23 1756

## 2023-01-01 NOTE — ED PROVIDER NOTES
EMERGENCY DEPARTMENT ENCOUNTER      NAME: Gill Mendez  AGE: 62 year old adult  YOB: 1960  MRN: [id number]  EVALUATION DATE & TIME: 1/1/2023 11:18 AM    PCP: Marian Garner    ED PROVIDER: Nely Hernandez PA-C      Chief Complaint   Patient presents with     Covid Concern     Shortness of Breath     FINAL IMPRESSION:  1. Infection due to 2019 novel coronavirus    2. Exacerbation of intermittent asthma, unspecified asthma severity    3. Hypoxia          ED COURSE & MEDICAL DECISION MAKING:    Pertinent Labs & Imaging studies reviewed. (See chart for details)  62 year old adult presents to the Emergency Department for evaluation of COVID+ for 7 days and gradually worsening SOB for two days. On 12/1/22, patient had left hip replaced.  Vital signs reviewed and patient is tachycardic and hypertensive.  Patient was hypoxic in triage. Patient is febrile.  On exam patient is resting comfortably.  Patient is currently on 4 L of oxygen at 100%.  Patient's lungs are clear to auscultation bilaterally.  No respiratory distress.  No wheezing.     Differential diagnosis includes but not limited to pneumonia, pneumothorax, hemothorax, PE, costochondritis, ACS, dissection.  No elevated white blood count.  Decreased hemoglobin at 10.2.  Basic and Troponin are unremarkable.  CT PE is pending.  EKG was unremarkable.  No ischemia.    Patient received IV fluids, albuterol neb, Decadron, ibuprofen and Zofran for symptoms.  Patient signed out to Dr. Sandoval pending CT PE studies.      ED COURSE:   11:44 AM I saw the patient. Staffed with Dr. Hayes.   2:25 PM I signed out to Dr. Sandoval.      Additional Documentation    History:    Supplemental history from: Documented in HPI, if applicable    External Record(s) reviewed: Documented in HPI, if applicable.    Work Up:    Chart documentation includes differential considered and any EKGs or imaging interpreted by provider.    In additional to work up documented, I considered  the following work up: See chart documentation, if applicable.    External consultation:    Discussion of management with another provider: See chart documentation, if applicable    Complicating factors:    Care impacted by chronic illness: N/A    Care affected by social determinants of health: N/A    Disposition considerations: Signed out to Dr. Sandoval.      MEDICATIONS GIVEN IN THE EMERGENCY:  Medications   dexamethasone PF (DECADRON) injection 6 mg (has no administration in time range)   ondansetron (ZOFRAN) injection 4 mg (4 mg Intravenous Given 1/1/23 1304)   0.9% sodium chloride BOLUS (1,000 mLs Intravenous New Bag 1/1/23 1304)   ibuprofen (ADVIL/MOTRIN) tablet 600 mg (600 mg Oral Given 1/1/23 1350)   albuterol (PROVENTIL) neb solution 5 mg (5 mg Nebulization Given 1/1/23 1305)   iopamidol (ISOVUE-370) solution 75 mL (75 mLs Intravenous Given 1/1/23 1354)       NEW PRESCRIPTIONS STARTED AT TODAY'S ER VISIT  New Prescriptions    No medications on file          =================================================================    HPI    Patient information was obtained from: pati.    Use of : N/A    Gill Mendez is a 62 year old adult with a pertinent history of hip replacement, hypertension, diabetes type 2, asthma, cellulitis, who presents to this ED for evaluation of shortness of breath.    On 12/1/2022, patient had hip replacement surgery on the left hip.  Patient reports nerve injury during surgery causing her to lose function of her left lower leg.  She is currently regaining function of the left lower leg.  7 days ago patient was diagnosed with COVID.  She has been at home doing well until yesterday evening.  Yesterday evening patient started to notice increased shortness of breath.  Patient used her oxygen saturation monitor yesterday evening which showed her stating at 88%.  She also complains of chest pain with coughing.  She denies chest pain while not coughing.  Chest pain never goes  to her back.  She also complains of mild nausea.    She denies fevers, chills, headache, palpitations, abdominal pain, vomiting, diarrhea, urinary symptoms      REVIEW OF SYSTEMS   Review of Systems   Constitutional: Negative for chills and fever.   Respiratory: Positive for cough and shortness of breath.    Cardiovascular: Negative for chest pain and palpitations.   Gastrointestinal: Positive for nausea. Negative for abdominal pain, diarrhea and vomiting.   Neurological: Negative for headaches.   All other systems reviewed and are negative.      PAST MEDICAL HISTORY:  Past Medical History:   Diagnosis Date     Anxiety      Asthma      Cellulitis      Diabetes mellitus (H)      Essential hypertension     Created by Conversion  Replacement Utility updated for latest IMO load     Gastroparesis      Hyperlipidemia      Hypertension      Other and unspecified hyperlipidemia     Created by Conversion      PONV (postoperative nausea and vomiting)      Shortness of breath      Type II or unspecified type diabetes mellitus without mention of complication, not stated as uncontrolled     Created by Conversion        PAST SURGICAL HISTORY:  Past Surgical History:   Procedure Laterality Date     AMPUTATE TOE(S) Right 7/31/2020    Procedure: AMPUTATION, third digit right foot;  Surgeon: Chris Vega DPM;  Location: [facility];  Service: Podiatry     ENDOMETRIAL BIOPSY       FOOT ARTHROPLASTY Right 09/24/2020    Fourth and fifth toe by Dr. Vega     HC REPAIR OF HAMMERTOE,ONE Left 12/7/2020    Procedure: ARTHROPLASTY, digits two, three, four and five left foot;  Surgeon: Chris Vega DPM;  [facility];  Service: Podiatry     HERNIA REPAIR       HYSTERECTOMY       REPAIR TENDON ACHILLES Bilateral     Left was plate  , right was torn     TONSILLECTOMY       ZZC REMOVAL OF OVARY(S)      Description: Oophorectomy - Bilateral (Removal Of Both Ovaries);  Recorded: 10/09/2008;     CURRENT  "MEDICATIONS:    albuterol (PROVENTIL HFA;VENTOLIN HFA) 90 mcg/actuation inhaler  amLODIPine (NORVASC) 10 MG tablet  ammonium lactate (LAC-HYDRIN) 12 % external lotion  atorvastatin (LIPITOR) 40 MG tablet  blood glucose (NO BRAND SPECIFIED) test strip  blood glucose monitoring (NO BRAND SPECIFIED) meter device kit  citalopram (CELEXA) 20 MG tablet  diazePAM (VALIUM) 5 MG tablet  dulaglutide (TRULICITY) 1.5 MG/0.5ML pen  empagliflozin 25 mg Tab  fluticasone propionate (FLOVENT HFA) 110 mcg/actuation inhaler  furosemide (LASIX) 20 MG tablet  insulin aspart (NOVOLOG PEN) 100 UNIT/ML pen  insulin glargine (LANTUS SOLOSTAR) 100 UNIT/ML pen  lisinopriL (PRINIVIL,ZESTRIL) 10 MG tablet  metFORMIN (GLUCOPHAGE) 500 MG tablet  pramipexole (MIRAPEX) 0.25 MG tablet        ALLERGIES:  Allergies   Allergen Reactions     Codeine Unknown     Patient states possibly caused N/V but can't remember for sure     Semaglutide Nausea and Vomiting     Acetaminophen-Codeine Rash       FAMILY HISTORY:  Family History   Problem Relation Age of Onset     Diabetes Mother      Hypertension Mother      Acute Myocardial Infarction No family hx of        SOCIAL HISTORY:   Social History     Socioeconomic History     Marital status:    Tobacco Use     Smoking status: Never     Smokeless tobacco: Never   Substance and Sexual Activity     Alcohol use: No     Drug use: No       VITALS:  BP (!) 142/61   Pulse 119   Temp 100.2  F (37.9  C) (Oral)   Resp 17   Ht 1.651 m (5' 5\")   Wt 81.6 kg (180 lb)   SpO2 94%   BMI 29.95 kg/m      PHYSICAL EXAM    Physical Exam  Vitals and nursing note reviewed.   Constitutional:       General: Mayelin Mendez is not in acute distress.     Appearance: Normal appearance. Mayelin Mendez is not ill-appearing.   HENT:      Head: Atraumatic.      Right Ear: External ear normal.      Left Ear: External ear normal.      Nose: Nose normal.      Mouth/Throat:      Mouth: Mucous membranes are moist.   Eyes:      " Conjunctiva/sclera: Conjunctivae normal.      Pupils: Pupils are equal, round, and reactive to light.   Cardiovascular:      Rate and Rhythm: Regular rhythm. Tachycardia present.      Pulses: Normal pulses.      Heart sounds: Normal heart sounds. No murmur heard.    No friction rub. No gallop.   Pulmonary:      Effort: Pulmonary effort is normal. No tachypnea, accessory muscle usage or respiratory distress.      Breath sounds: Normal breath sounds. No stridor. No decreased breath sounds, wheezing or rales.   Abdominal:      Palpations: Abdomen is soft.      Tenderness: There is no abdominal tenderness. There is no guarding or rebound.   Musculoskeletal:      Cervical back: Normal range of motion.      Right lower leg: No edema.      Left lower leg: No edema.      Comments: Left leg in brace.   Skin:     General: Skin is dry.      Findings: No rash.   Neurological:      General: No focal deficit present.      Mental Status: Mayelin Mendez is alert and oriented to person, place, and time. Mental status is at baseline.      Cranial Nerves: No cranial nerve deficit.      Motor: No weakness.   Psychiatric:         Mood and Affect: Mood normal.         Thought Content: Thought content normal.         LAB:  All pertinent labs reviewed and interpreted.  Labs Ordered and Resulted from Time of ED Arrival to Time of ED Departure   BASIC METABOLIC PANEL - Abnormal       Result Value    Sodium 137      Potassium 4.1      Chloride 99      Carbon Dioxide (CO2) 29      Anion Gap 9      Urea Nitrogen 24 (*)     Creatinine 1.07      Calcium 8.8      Glucose 128 (*)     GFR Estimate 59 (*)    CBC WITH PLATELETS AND DIFFERENTIAL - Abnormal    WBC Count 6.3      RBC Count 3.89      Hemoglobin 10.2 (*)     Hematocrit 32.3 (*)     MCV 83      MCH 26.2 (*)     MCHC 31.6      RDW 14.0      Platelet Count 352      % Neutrophils 65      % Lymphocytes 24      % Monocytes 10      % Eosinophils 0      % Basophils 0      % Immature Granulocytes 1       NRBCs per 100 WBC 0      Absolute Neutrophils 4.1      Absolute Lymphocytes 1.5      Absolute Monocytes 0.6      Absolute Eosinophils 0.0      Absolute Basophils 0.0      Absolute Immature Granulocytes 0.0      Absolute NRBCs 0.0     TROPONIN I - Normal    Troponin I <0.01          RADIOLOGY:  Reviewed all pertinent imaging. Please see official radiology report.  CT Chest Pulmonary Embolism w Contrast    (Results Pending)       ECG results from 01/01/23   ECG 12-LEAD WITH MUSE ([facility])     Value    Systolic Blood Pressure     Diastolic Blood Pressure     Ventricular Rate 112    Atrial Rate 112    VA Interval 130    QRS Duration 70        QTc 458    P Axis 38    R AXIS 5    T Axis 58    Interpretation ECG      Sinus tachycardia  Junctional ST depression, probably normal  Borderline ECG  When compared with ECG of 24-SEP-2020 14:06,  No significant change was found  Confirmed by SEE ED PROVIDER NOTE FOR, ECG INTERPRETATION ([id number]),  Virgilio Caballero ([id number]) on 1/1/2023 11:32:10 AM     EKG Reviewed by Dr. Freddy Hernandez PA-C  [facility] EMERGENCY ROOM  [address] [place] [id number] [phone]     Nely Hernandez PA-C  01/02/23 0497

## 2023-01-01 NOTE — H&P
"[facility]    History and Physical - Hospitalist Service       Date of Admission:  1/1/2023    Assessment & Plan      Gill Mendez is a 62 year old adult admitted on 1/1/2023 and is admitted for COVID-19 infection, hypoxia, and exacerbation of intermittent asthma.    # Confirmed COVID-19 infection    # Acute Hypoxic Respiratory Failure secondary to COVID-19 infection   # Intermittent Asthma Exacerbation     Symptom Onset Date used to determine duration of isolation.  If unsure, enter \"unknown\"   Date of 1st Positive Test 12/25/2022   Vaccination Status Please review vaccine status on Storyboard       - COVID-19 special precautions, continuous pulse-ox  - Oxygen: continue current support with O2 Device: None (Room air) at Oxygen Delivery: 2 LPM; titrate to keep SpO2 between 90-96%  - Labs: Standard COVID admission labs ordered (CBC with diff, CMP, INR, D-dimer, CRP).   - Imaging: no additional imaging needed at this time  - Breathing treatments: PTA fluticasone, PTA albuterol  - Antibiotics: not indicated   - COVID-Focused Medications: Dexamethasone 6 mg x 10 days or until hospital discharge, started on 1/1/2023 and Remdesivir x 5 days or until hospital discharge, started on 1/1/2023  - DVT Prophylaxis:              - At high risk of thrombotic complications due to COVID-19 (DDimer = pending )         - PROPHYLACTIC dosing: lovenox 40mg daily    Type 2 Diabetes Mellitus   Last A1c unknown.   -Sliding scale insulin, medium insulin resistance dosing  - PTA lantus 35 units at bedtime  -Held PTA metformin, Trulicity, empagliflozin      Chronic Conditions:  Hypertension  Normotensive on admission.  Continue to monitor.  - PTA lisinopril 10mg PO every day    HLD  - PTA atorvastatin 40mg PO every day     Anxiety  - PTA hydroxyzine 25mg PO q6hrs prn  - PTA diazepam 5mg PO q6hrs prn  - PTA celexa 20mg PO every morning    Restless Leg Syndrome  - PTA pramipexole 0.25mg at bed time         Diet: " "Moderate Consistent Carb (60 g CHO per Meal) Diet    DVT Prophylaxis: Enoxaparin (Lovenox) SQ  Mcdaniels Catheter: Not present  Fluids: PO  Lines: None     Cardiac Monitoring: None  Code Status: Full Code      Clinically Significant Risk Factors Present on Admission                  # Hypertension: home medication list includes antihypertensive(s)   # Acute Respiratory Failure: Documented O2 saturation < 91%.  Continue supplemental oxygen as needed     # Overweight: Estimated body mass index is 29.95 kg/m  as calculated from the following:    Height as of this encounter: 1.651 m (5' 5\").    Weight as of this encounter: 81.6 kg (180 lb).           Disposition Plan      Expected Discharge Date: 01/03/2023                The patient's care was discussed with the Attending Physician, Dr. Phoebe Fried.      Miguel Augustin DO  Hospitalist Service  [facility]  Securely message with [employer] (more info)  Text page via [facility] Paging/Directory   ______________________________________________________________________    Chief Complaint   Shortness of breath    History of Present Illness   Gill Mendez is a 62 year old adult who is being admitted for acute hypoxic respiratory failure and asthma exacerbation 2/2 COVID-19 infection. Patient states symptoms of malaise and nausea onset on 12/25/22 and she tested positive for COVID that day. Since then she has had a cough and progressive SOB that was not responsive to her inhalers so she decided to present to the ED.     In the ED, received decadron IV and was given a nebulizer with mild relief. She was stable on 2 lpm via NC, not on oxygen at home. CTA chest was performed and did not show any evidence of PE but did show ground glass opacities and more confluent pulmonary opacities that have the appearance of atypical infection.    Patient was seen in the ED room where she was boarding and she was sitting comfortably in bed. She states she does not feel short of " "breath with supplemental oxygen and is feeling better. Cough and malaise improved as well. Has an appetite and would like to eat.     Of note, she had a left JOHNATHON that resulted in nerve damage and she is now unable to extend her lower leg. She was placed on ASA 81 following this procedure, and instructed to take it \"for longer than 30 days.\" Symptoms are currently at baseline, no acute worsening of pain.      Past Medical History    Past Medical History:   Diagnosis Date     Anxiety      Asthma      Cellulitis      Diabetes mellitus (H)      Essential hypertension     Created by Conversion  Replacement Utility updated for latest IMO load     Gastroparesis      Hyperlipidemia      Hypertension      Other and unspecified hyperlipidemia     Created by Conversion      PONV (postoperative nausea and vomiting)      Shortness of breath      Type II or unspecified type diabetes mellitus without mention of complication, not stated as uncontrolled     Created by Conversion        Past Surgical History   Past Surgical History:   Procedure Laterality Date     AMPUTATE TOE(S) Right 7/31/2020    Procedure: AMPUTATION, third digit right foot;  Surgeon: Chris Vega DPM;  [place] [facility] [place]iat[place]     ENDOMETRIAL BIOPSY       FOOT ARTHROPLASTY Right 09/24/2020    Fourth and fifth toe by Dr. Vega     HC REPAIR OF HAMMERTOE,ONE Left 12/7/2020    Procedure: ARTHROPLASTY, digits two, three, four and five left foot;  Surgeon: Chris Vega DPM;  Location: [facility];  Service: Podiatry     HERNIA REPAIR       HYSTERECTOMY       REPAIR TENDON ACHILLES Bilateral     Left was plate  , right was torn     TONSILLECTOMY       ZZC REMOVAL OF OVARY(S)      Description: Oophorectomy - Bilateral (Removal Of Both Ovaries);  Recorded: 10/09/2008;       Prior to Admission Medications   Prior to Admission Medications   Prescriptions Last Dose Informant Patient Reported? Taking?   albuterol (PROVENTIL " HFA;VENTOLIN HFA) 90 mcg/actuation inhaler Unknown  Yes Yes   Sig: [ALBUTEROL (PROVENTIL HFA;VENTOLIN HFA) 90 MCG/ACTUATION INHALER] Inhale 2 puffs every 6 (six) hours as needed for wheezing.   ammonium lactate (LAC-HYDRIN) 12 % external lotion 1/1/2023 at AM  No Yes   Sig: Apply topically 2 times daily   aspirin 81 MG EC tablet 1/1/2023 at AM  Yes Yes   Sig: Take 81 mg by mouth 2 times daily   atorvastatin (LIPITOR) 40 MG tablet 1/1/2023  Yes Yes   Sig: Take 40 mg by mouth daily   blood glucose (NO BRAND SPECIFIED) test strip   No No   Sig: Use to test blood sugar 2 times daily as directed.   blood glucose monitoring (NO BRAND SPECIFIED) meter device kit   No No   Sig: Use to test blood sugar two times daily or as directed.   citalopram (CELEXA) 20 MG tablet 1/1/2023 at AM  Yes Yes   Sig: [CITALOPRAM (CELEXA) 20 MG TABLET] Take 20 mg by mouth every morning.    diazePAM (VALIUM) 5 MG tablet Unknown  No Yes   Sig: [DIAZEPAM (VALIUM) 5 MG TABLET] Take 1 tablet (5 mg total) by mouth every 6 (six) hours as needed for anxiety.   dulaglutide (TRULICITY) 1.5 MG/0.5ML pen 12/30/2022  No Yes   Sig: Inject 1.5 mg Subcutaneous every 7 days   Patient taking differently: Inject 4.5 mg Subcutaneous every 7 days Fridays   empagliflozin 25 mg Tab 1/1/2023 at AM  Yes Yes   Sig: Take 25 mg by mouth daily   fluticasone propionate (FLOVENT HFA) 110 mcg/actuation inhaler 1/1/2023 at AM, didn't bring  Yes Yes   Sig: Inhale 2 puffs into the lungs daily   hydrOXYzine (ATARAX) 25 MG tablet Unknown  Yes Yes   Sig: Take 25 mg by mouth every 6 hours as needed (pain)   insulin aspart (NOVOLOG PEN) 100 UNIT/ML pen not started yet  No Yes   Sig: Do Not give Correction Insulin if Pre-Meal BG less than 150.   For Pre-Meal  - 199 give 1 unit.   For Pre-Meal  - 249 give 2 units.   For Pre-Meal  - 299 give 3 units.   For Pre-Meal  - 349 give 4 units.   For Pre-Meal - 399 give 5 units.   For Pre-Meal -449 give 6  units  For Pre-Meal BG greater than or equal to 450 give 7 units.   insulin glargine (LANTUS SOLOSTAR) 100 UNIT/ML pen 12/31/2022 at PM  No Yes   Sig: Inject 35 Units Subcutaneous At Bedtime   lisinopriL (PRINIVIL,ZESTRIL) 10 MG tablet 1/1/2023 at AM  Yes Yes   Sig: [LISINOPRIL (PRINIVIL,ZESTRIL) 10 MG TABLET] Take 10 mg by mouth daily.   metFORMIN (GLUCOPHAGE XR) 500 MG 24 hr tablet 1/1/2023 at AM  Yes Yes   Sig: Take 2,000 mg by mouth daily (with breakfast)   ondansetron (ZOFRAN ODT) 4 MG ODT tab Unknown  Yes Yes   Sig: Take 4 mg by mouth every 6 hours as needed for nausea   oxyCODONE (ROXICODONE) 5 MG tablet Unknown  Yes Yes   Sig: Take 5 mg by mouth every 6 hours as needed for severe pain (7-10)   pramipexole (MIRAPEX) 0.25 MG tablet 12/31/2022 at PM  Yes Yes   Sig: Take 0.5 mg by mouth At Bedtime      Facility-Administered Medications: None        Review of Systems    The 10 point Review of Systems is negative other than noted in the HPI or here.     Allergies   Allergies   Allergen Reactions     Codeine Unknown     Patient states possibly caused N/V but can't remember for sure     Semaglutide Nausea and Vomiting     Acetaminophen-Codeine Rash        Physical Exam   Vital Signs: Temp: 100.2  F (37.9  C) Temp src: Oral BP: 122/62 Pulse: 106   Resp: 18 SpO2: 95 % O2 Device: None (Room air) Oxygen Delivery: 2 LPM  Weight: 180 lbs 0 oz    Constitutional: Awake, alert, cooperative, no apparent distress, and appears stated age.  Eyes: Lids and lashes normal, pupils equal, round and reactive to light, extra ocular muscles intact, sclera clear, conjunctiva normal.  ENT: Normocephalic, atraumatic. Moist mucous membranes.  Hematologic / Lymphatic: No cervical lymphadenopathy.  Respiratory: Basilar crackles bilaterally. No increased work of breathing, no accessory muscle use, no wheezing.  Cardiovascular: Regular rate and rhythm, normal S1 and S2, no S3 or S4, and no murmur noted  GI: Abdomen soft, non-tender,  non-distended, no masses palpated. Bowel sounds present.  Skin: No bruising or bleeding and normal skin color, texture, turgor.  Musculoskeletal: There is no redness, warmth, or swelling of the joints. Tone is normal. LLE splint in place.   Neurologic: Awake, alert, oriented to name, place and time.  Cranial nerves II-XII are grossly intact.      Medical Decision Making           Data     I have personally reviewed the following data over the past 24 hrs:    6.3  \   10.2 (L)   / 352     137 99 24 (H) /  172 (H)   4.1 29 1.07 \       INR:  N/A PTT:  N/A   D-dimer:  1.67 (H) Fibrinogen:  N/A       Imaging results reviewed over the past 24 hrs:   Recent Results (from the past 24 hour(s))   CT Chest Pulmonary Embolism w Contrast    Narrative    EXAM: CT CHEST PULMONARY EMBOLISM W CONTRAST  LOCATION: [facility]  DATE/TIME: 1/1/2023 1:57 PM    INDICATION: SOB.  COMPARISON: 5/5/16 CT. Thyroid ultrasound and biopsy from July 2022.  TECHNIQUE: CT chest pulmonary angiogram during arterial phase injection of IV contrast. Multiplanar reformats and MIP reconstructions were performed. Dose reduction techniques were used.   CONTRAST: Isovue 370 75mL    FINDINGS:  ANGIOGRAM CHEST: Pulmonary arteries are normal caliber and negative for pulmonary emboli. Thoracic aorta is negative for dissection.    LUNGS AND PLEURA: There are groundglass and more confluent opacities which exhibit a basilar predominance.     MEDIASTINUM/AXILLAE: A low-attenuation right thyroid lesion is larger than on the comparison CT study from 2016. Note that this was recently biopsied.  The heart is at the upper limits of normal in size.    CORONARY ARTERY CALCIFICATION: Severe.    UPPER ABDOMEN: Hepatic steatosis.     MUSCULOSKELETAL: Degenerative change in the spine.       Impression    IMPRESSION:  1.  No PE.  2.  Groundglass and more confluent pulmonary opacities have the appearance of atypical infection.  3.  Hepatic steatosis.

## 2023-01-02 ENCOUNTER — APPOINTMENT (OUTPATIENT)
Dept: PHYSICAL THERAPY | Facility: CLINIC | Age: 63
DRG: 177 | End: 2023-01-02
Payer: COMMERCIAL

## 2023-01-02 VITALS
HEART RATE: 93 BPM | DIASTOLIC BLOOD PRESSURE: 60 MMHG | WEIGHT: 176.15 LBS | TEMPERATURE: 98.8 F | OXYGEN SATURATION: 94 % | HEIGHT: 65 IN | SYSTOLIC BLOOD PRESSURE: 128 MMHG | RESPIRATION RATE: 18 BRPM | BODY MASS INDEX: 29.35 KG/M2

## 2023-01-02 LAB
ANION GAP SERPL CALCULATED.3IONS-SCNC: 11 MMOL/L (ref 5–18)
BUN SERPL-MCNC: 30 MG/DL (ref 8–22)
CALCIUM SERPL-MCNC: 8.2 MG/DL (ref 8.5–10.5)
CHLORIDE BLD-SCNC: 102 MMOL/L (ref 98–107)
CO2 SERPL-SCNC: 25 MMOL/L (ref 22–31)
CREAT SERPL-MCNC: 1.08 MG/DL (ref 0.6–1.3)
ERYTHROCYTE [DISTWIDTH] IN BLOOD BY AUTOMATED COUNT: 14 % (ref 10–15)
GFR SERPL CREATININE-BSD FRML MDRD: 59 ML/MIN/1.73M2
GLUCOSE BLD-MCNC: 182 MG/DL (ref 70–125)
GLUCOSE BLDC GLUCOMTR-MCNC: 152 MG/DL (ref 70–99)
GLUCOSE BLDC GLUCOMTR-MCNC: 166 MG/DL (ref 70–99)
HCT VFR BLD AUTO: 30.4 % (ref 35–53)
HGB BLD-MCNC: 9.4 G/DL (ref 11.7–17.7)
MCH RBC QN AUTO: 26 PG (ref 26.5–33)
MCHC RBC AUTO-ENTMCNC: 30.9 G/DL (ref 31.5–36.5)
MCV RBC AUTO: 84 FL (ref 78–100)
PLATELET # BLD AUTO: 323 10E3/UL (ref 150–450)
POTASSIUM BLD-SCNC: 4 MMOL/L (ref 3.5–5)
RBC # BLD AUTO: 3.62 10E6/UL (ref 3.8–5.9)
SODIUM SERPL-SCNC: 138 MMOL/L (ref 136–145)
WBC # BLD AUTO: 6.1 10E3/UL (ref 4–11)

## 2023-01-02 PROCEDURE — 250N000013 HC RX MED GY IP 250 OP 250 PS 637

## 2023-01-02 PROCEDURE — 80048 BASIC METABOLIC PNL TOTAL CA: CPT

## 2023-01-02 PROCEDURE — 250N000012 HC RX MED GY IP 250 OP 636 PS 637

## 2023-01-02 PROCEDURE — 99238 HOSP IP/OBS DSCHRG MGMT 30/<: CPT | Mod: GC

## 2023-01-02 PROCEDURE — 999N000111 HC STATISTIC OT IP EVAL DEFER

## 2023-01-02 PROCEDURE — 97161 PT EVAL LOW COMPLEX 20 MIN: CPT | Mod: GP

## 2023-01-02 PROCEDURE — 250N000011 HC RX IP 250 OP 636

## 2023-01-02 PROCEDURE — 85014 HEMATOCRIT: CPT

## 2023-01-02 PROCEDURE — 36415 COLL VENOUS BLD VENIPUNCTURE: CPT

## 2023-01-02 PROCEDURE — 97116 GAIT TRAINING THERAPY: CPT | Mod: GP

## 2023-01-02 RX ADMIN — INSULIN ASPART 1 UNITS: 100 INJECTION, SOLUTION INTRAVENOUS; SUBCUTANEOUS at 07:57

## 2023-01-02 RX ADMIN — INSULIN ASPART 1 UNITS: 100 INJECTION, SOLUTION INTRAVENOUS; SUBCUTANEOUS at 12:18

## 2023-01-02 RX ADMIN — DEXAMETHASONE SODIUM PHOSPHATE 6 MG: 10 INJECTION, SOLUTION INTRAMUSCULAR; INTRAVENOUS at 12:19

## 2023-01-02 RX ADMIN — LISINOPRIL 10 MG: 10 TABLET ORAL at 07:57

## 2023-01-02 RX ADMIN — CITALOPRAM HYDROBROMIDE 20 MG: 20 TABLET ORAL at 07:57

## 2023-01-02 RX ADMIN — FLUTICASONE PROPIONATE 2 PUFF: 110 AEROSOL, METERED RESPIRATORY (INHALATION) at 09:43

## 2023-01-02 RX ADMIN — ENOXAPARIN SODIUM 40 MG: 100 INJECTION SUBCUTANEOUS at 16:03

## 2023-01-02 RX ADMIN — ATORVASTATIN CALCIUM 40 MG: 40 TABLET, FILM COATED ORAL at 07:57

## 2023-01-02 RX ADMIN — ASPIRIN 81 MG: 81 TABLET ORAL at 07:57

## 2023-01-02 ASSESSMENT — ACTIVITIES OF DAILY LIVING (ADL)
ADLS_ACUITY_SCORE: 21
DEPENDENT_IADLS:: INDEPENDENT
ADLS_ACUITY_SCORE: 21

## 2023-01-02 NOTE — CONSULTS
Care Management Initial Consult    General Information  Assessment completed with: Patient,    Type of CM/SW Visit: Initial Assessment    Primary Care Provider verified and updated as needed: Yes   Readmission within the last 30 days: no previous admission in last 30 days      Reason for Consult: discharge planning  Advance Care Planning:            Communication Assessment  Patient's communication style: spoken language (English or Bilingual)    Hearing Difficulty or Deaf: no   Wear Glasses or Blind: yes    Cognitive  Cognitive/Neuro/Behavioral: WDL                      Living Environment:   People in home: child(mary), adult, spouse     Current living Arrangements: house      Able to return to prior arrangements: yes       Family/Social Support:  Care provided by: self  Provides care for: no one  Marital Status:   , Children          Description of Support System: Supportive, Involved         Current Resources:   Patient receiving home care services: No     Community Resources: None  Equipment currently used at home: walker, rolling  Supplies currently used at home: None    Employment/Financial:  Employment Status: unemployed        Financial Concerns: No concerns identified   Referral to Financial Worker: No       Lifestyle & Psychosocial Needs:  Social Determinants of Health     Tobacco Use: Low Risk      Smoking Tobacco Use: Never     Smokeless Tobacco Use: Never     Passive Exposure: Not on file   Alcohol Use: Not on file   Financial Resource Strain: Not on file   Food Insecurity: Not on file   Transportation Needs: Not on file   Physical Activity: Not on file   Stress: Not on file   Social Connections: Not on file   Intimate Partner Violence: Not on file   Depression: Not on file   Housing Stability: Not on file       Functional Status:  Prior to admission patient needed assistance:   Dependent ADLs:: Ambulation-walker  Dependent IADLs:: Independent       Mental Health Status:  Mental Health  Status: No Current Concerns       Chemical Dependency Status:  Chemical Dependency Status: No Current Concerns             Values/Beliefs:  Spiritual, Cultural Beliefs, Moravian Practices, Values that affect care: no               Additional Information:  SW introduced self and CM services to Pt and completed initial assessment over the phone due to Covid precautions.  Pt reports living in a private residence with her spouse and adult son.  Pt reports independence at baseline and has been using a walker recently.  Pt is in between primary care providers, however [facility]     No CM needs identified at this time.  SW discussed PT and OT consults with Pt.  Pt reports independence with mobility.  CM following for recommendations.      Family to transport.     CHRISTIANE Redman

## 2023-01-02 NOTE — PROGRESS NOTES
Walked patient in room without oxygen per [facility] orders. O2 stat decreased to 85-86%. Notified [facility].  Lucia Daley RN

## 2023-01-02 NOTE — PROGRESS NOTES
"[facility]    Progress Note - Hospitalist Service       Date of Admission:  1/1/2023    Assessment & Plan   Gill Mendez is a 62 year old adult admitted on 1/1/2023 and is admitted for COVID-19 infection, hypoxia, and exacerbation of intermittent asthma.  Clinically improved today although still requiring oxygen supplementation, anticipate discharge this evening or early morning 1/3/2023.     # Confirmed COVID-19 infection    # Acute Hypoxic Respiratory Failure secondary to COVID-19 infection   # Intermittent Asthma Exacerbation   Patient still requiring oxygen with activity, satting 90-92% at rest on RA but drops to 85% with activity. Repeating walking O2 assessment and hope to discharge patient to home as she is clinically improved. Should patient remain admitted overnight, anticipate early discharge tomorrow 1/3/23 pending improved oxygen saturations as she has a follow up with her orthopedic surgeon and is clinically improved.  Symptom Onset Date used to determine duration of isolation.  If unsure, enter \"unknown\"   Date of 1st Positive Test 12/25/2022   Vaccination Status Please review vaccine status on Storyboard         - COVID-19 special precautions, continuous pulse-ox  - Oxygen: continue current support with O2 Device: None (Room air) at Oxygen Delivery: 2 LPM; titrate to keep SpO2 between 90-96%  - Labs: Standard COVID admission labs ordered (CBC with diff, CMP, INR, D-dimer, CRP).   - Imaging: no additional imaging needed at this time  - Breathing treatments: PTA fluticasone, PTA albuterol  - Antibiotics: not indicated   - COVID-Focused Medications: Dexamethasone 6 mg x 10 days or until hospital discharge, started on 1/1/2023 and Remdesivir x 5 days or until hospital discharge, started on 1/1/2023  - DVT Prophylaxis:              - At high risk of thrombotic complications due to COVID-19 (DDimer = 1.67 )         - PROPHYLACTIC dosing: lovenox 40mg daily     Type 2 Diabetes " "Mellitus   Last A1c unknown.   -Sliding scale insulin, medium insulin resistance dosing  - PTA lantus 35 units at bedtime  - Held PTA metformin, Trulicity, empagliflozin        Chronic Conditions:  Hypertension  Normotensive on admission.  Continue to monitor.  - PTA lisinopril 10mg PO every day     HLD  - PTA atorvastatin 40mg PO every day      Anxiety  - PTA hydroxyzine 25mg PO q6hrs prn  - PTA diazepam 5mg PO q6hrs prn  - PTA celexa 20mg PO every morning     Restless Leg Syndrome  - PTA pramipexole 0.25mg at bed time         Diet: Moderate Consistent Carb (60 g CHO per Meal) Diet    DVT Prophylaxis: Enoxaparin (Lovenox) SQ  Mcdaniels Catheter: Not present  Fluids: PO  Lines: None     Cardiac Monitoring: None  Code Status: Full Code      Clinically Significant Risk Factors                       # DMII: A1C = 8.7 % (Ref range: <5.7 %) within past 3 months, PRESENT ON ADMISSION  # Overweight: Estimated body mass index is 29.31 kg/m  as calculated from the following:    Height as of this encounter: 1.651 m (5' 5\").    Weight as of this encounter: 79.9 kg (176 lb 2.4 oz)., PRESENT ON ADMISSION         Disposition Plan     Expected Discharge Date: 01/03/2023      Destination: home          The patient's care was discussed with the Attending Physician, Dr. Yesi Estrada.    Miguel Augustin DO  Hospitalist Service  [facility]  Securely message with [employer] (more info)  Text page via [facility] Paging/Directory   ______________________________________________________________________    Interval History   Patient was seen in the room where she was sitting on the edge of the bed.  She states she feels greatly improved, no shortness of breath on supplemental oxygen.  Denies fevers, chills, chest pain, cough, abdominal pain, N/V/D.  When discussing possible need to stay admitted for an additional day due to supplemental oxygen requirements, patient became very tearful and stated that she cannot stay here " another night.  We will attempt throughout the day today to perform walking O2 trials performed by nursing staff.    Physical Exam   Vital Signs: Temp: 97.5  F (36.4  C) Temp src: Oral BP: 119/56 Pulse: 97   Resp: 18 SpO2: (!) 85 % O2 Device: None (Room air) Oxygen Delivery: 1/2 LPM  Weight: 176 lbs 2.36 oz    Constitutional: Awake, alert, cooperative, no apparent distress, and appears stated age.  Eyes: Lids and lashes normal, pupils equal, round and reactive to light, extra ocular muscles intact, sclera clear, conjunctiva normal.  ENT: Normocephalic, atraumatic. Moist mucous membranes.  Hematologic / Lymphatic: No cervical lymphadenopathy.  Respiratory: No increased work of breathing, no accessory muscle use, clear to auscultation bilaterally, no crackles or wheezing.  Cardiovascular: Regular rate and rhythm, normal S1 and S2, no S3 or S4, and no murmur noted  GI: Abdomen soft, non-tender, non-distended, no masses palpated. Bowel sounds present.  Skin: No bruising or bleeding and normal skin color, texture, turgor.  Musculoskeletal: There is no redness, warmth, or swelling of the joints.  3/5 LLE strength but 5/5 left dorsiflexion and plantarflexion strength, 5/5 RLE strength, at baseline.  Neurologic: Awake, alert, oriented to name, place and time.  Cranial nerves II-XII are grossly intact.  LLE sensation decreased, at baseline.      Data   ------------------------- PAST 24 HR DATA REVIEWED -----------------------------------------------    I have personally reviewed the following data over the past 24 hrs:    6.1  \   9.4 (L)   / 323     138 102 30 (H) /  152 (H)   4.0 25 1.08 \       TSH: N/A T4: N/A A1C: N/A       INR:  N/A PTT:  N/A   D-dimer:  1.67 (H) Fibrinogen:  N/A

## 2023-01-02 NOTE — PROGRESS NOTES
Physical Therapy Discharge Summary    Reason for therapy discharge:    All goals and outcomes met, no further needs identified.    Progress towards therapy goal(s). See goals on Care Plan in [facility] electronic health record for goal details.  Goals met    Therapy recommendation(s):    Continue home exercise program.

## 2023-01-02 NOTE — PLAN OF CARE
Occupational Therapy: Orders received. Chart reviewed and discussed with care team.? Occupational Therapy not indicated, discussed care with patient and PT and patient appears at baseline with ADLs.? Defer discharge recommendations to physical therapy.? Will complete orders.

## 2023-01-02 NOTE — DISCHARGE SUMMARY
[facility]  Discharge Summary - Medicine & Pediatrics       Date of Admission:  1/1/2023  Date of Discharge:  1/2/2023  Discharging Provider: Miguel Augustin DO       Attending Physician: Yesi Estrada MD  Discharge Service: Hospitalist Service    Discharge Diagnoses   Acute hypoxic respiratory failure due to COVID-19    Follow-ups Needed After Discharge   Follow up with your primary care physician in 1 to 2 weeks for hospital follow-up.      Discharge Disposition   Discharged to home  Condition at discharge: Stable      Hospital Course   Gill Mendez was admitted on 1/1/2023 for acute hypoxic respiratory failure 2/2 COVID-19 infection as well as an asthma exacerbation.  Patient began feeling symptoms on 12/25/2022 with malaise and a cough.  Began to feel shortness of breath that progressed so she decided to present to the ED.  Not on oxygen at home, in the ED was on 2 LPM satting well.  She was started on remdesivir and dexamethasone as well as prophylactic Lovenox.  Patient rapidly improved and on day 2 of admission was on room air and satting 90 to 92% with activity.  She was eager to discharge as she had a follow-up appointment with her surgeon on 1/3 for her hip and she was feeling greatly improved.  Considering her improvement and stable oxygen sats saturations on room air decision was made to allow her to discharge to home.  She was discharged home in stable condition with instructions to follow-up with her primary care provider in 1 to 2 weeks to reevaluate her respiratory symptoms and her asthma.    Consultations This Hospital Stay   PHYSICAL THERAPY ADULT IP CONSULT  OCCUPATIONAL THERAPY ADULT IP CONSULT  CARE MANAGEMENT / SOCIAL WORK IP CONSULT    Code Status   Prior       The patient was discussed with the Attending Physician, Dr. Yesi Augustin DO  [facility] Res[facility]  [facility] [address]  [address]  [place]  [place] [address]  Phone: [phone]  Fax: [phone]  ______________________________________________________________________    Physical Exam   Vital Signs: Temp: 98.8  F (37.1  C) Temp src: Oral BP: 128/60 Pulse: 93   Resp: 18 SpO2: 94 % O2 Device: None (Room air)    Weight: 176 lbs 2.36 oz  Constitutional: Awake, alert, cooperative, no apparent distress, and appears stated age.  Eyes: Lids and lashes normal, pupils equal, round and reactive to light, extra ocular muscles intact, sclera clear, conjunctiva normal.  ENT: Normocephalic, atraumatic. Moist mucous membranes.  Hematologic / Lymphatic: No cervical lymphadenopathy.  Respiratory: No increased work of breathing, no accessory muscle use, clear to auscultation bilaterally, no crackles or wheezing.  Cardiovascular: Regular rate and rhythm, normal S1 and S2, no S3 or S4, and no murmur noted  GI: Abdomen soft, non-tender, non-distended, no masses palpated. Bowel sounds present.  Skin: No bruising or bleeding and normal skin color, texture, turgor.  Musculoskeletal: There is no redness, warmth, or swelling of the joints.  3/5 LLE strength but 5/5 left dorsiflexion and plantarflexion strength, 5/5 RLE strength, at baseline.  Neurologic: Awake, alert, oriented to name, place and time.  Cranial nerves II-XII are grossly intact.  LLE sensation decreased, at baseline.      Primary Care Physician   No primary care provider on file.    Discharge Orders      Reason for your hospital stay    Acute hypoxic respiratory failure due to COVID-19  Asthma exacerbation     Follow-up and recommended labs and tests     Follow up with your primary care provider in 1-2 weeks for a hospital follow up.     Activity    Your activity upon discharge: activity as tolerated     Diet    Follow this diet upon discharge: Orders Placed This Encounter      Moderate Consistent Carb (60 g CHO per Meal) Diet       Significant Results and Procedures   Most Recent 3 CBC's:Recent Labs    Lab Test 01/02/23  0848 01/01/23  1303 03/22/21  1937   WBC 6.1 6.3 15.2*   HGB 9.4* 10.2* 12.5   MCV 84 83 87    352 261     Most Recent 3 BMP's:Recent Labs   Lab Test 01/02/23  1217 01/02/23  0848 01/02/23  0755 01/01/23  1739 01/01/23  1303 09/28/22  0909   NA  --  138  --   --  137 142   POTASSIUM  --  4.0  --   --  4.1 4.4   CHLORIDE  --  102  --   --  99 101   CO2  --  25  --   --  29 30*   BUN  --  30*  --   --  24* 23.2*   CR  --  1.08  --   --  1.07 1.00   ANIONGAP  --  11  --   --  9 11   VALENTE  --  8.2*  --   --  8.8 9.4   * 182* 166*   < > 128* 91    < > = values in this interval not displayed.   ,   Results for orders placed or performed during the hospital encounter of 01/01/23   CT Chest Pulmonary Embolism w Contrast    Narrative    EXAM: CT CHEST PULMONARY EMBOLISM W CONTRAST  LOCATION: [facility]  DATE/TIME: 1/1/2023 1:57 PM    INDICATION: SOB.  COMPARISON: 5/5/16 CT. Thyroid ultrasound and biopsy from July 2022.  TECHNIQUE: CT chest pulmonary angiogram during arterial phase injection of IV contrast. Multiplanar reformats and MIP reconstructions were performed. Dose reduction techniques were used.   CONTRAST: Isovue 370 75mL    FINDINGS:  ANGIOGRAM CHEST: Pulmonary arteries are normal caliber and negative for pulmonary emboli. Thoracic aorta is negative for dissection.    LUNGS AND PLEURA: There are groundglass and more confluent opacities which exhibit a basilar predominance.     MEDIASTINUM/AXILLAE: A low-attenuation right thyroid lesion is larger than on the comparison CT study from 2016. Note that this was recently biopsied.  The heart is at the upper limits of normal in size.    CORONARY ARTERY CALCIFICATION: Severe.    UPPER ABDOMEN: Hepatic steatosis.     MUSCULOSKELETAL: Degenerative change in the spine.       Impression    IMPRESSION:  1.  No PE.  2.  Groundglass and more confluent pulmonary opacities have the appearance of atypical infection.  3.   Hepatic steatosis.        Discharge Medications   Discharge Medication List as of 1/2/2023  5:35 PM      CONTINUE these medications which have NOT CHANGED    Details   albuterol (PROVENTIL HFA;VENTOLIN HFA) 90 mcg/actuation inhaler [ALBUTEROL (PROVENTIL HFA;VENTOLIN HFA) 90 MCG/ACTUATION INHALER] Inhale 2 puffs every 6 (six) hours as needed for wheezing., Historical      ammonium lactate (LAC-HYDRIN) 12 % external lotion Apply topically 2 times dailyDisp-225 g, [id number]      aspirin 81 MG EC tablet Take 81 mg by mouth 2 times daily, Historical      atorvastatin (LIPITOR) 40 MG tablet Take 40 mg by mouth daily, Historical      citalopram (CELEXA) 20 MG tablet [CITALOPRAM (CELEXA) 20 MG TABLET] Take 20 mg by mouth every morning. , R-0, Historical      diazePAM (VALIUM) 5 MG tablet [DIAZEPAM (VALIUM) 5 MG TABLET] Take 1 tablet (5 mg total) by mouth every 6 (six) hours as needed for anxiety., Disp-1 tablet, R-0, NormalOne tab one hour prior to MRI      dulaglutide (TRULICITY) 1.5 MG/0.5ML pen Inject 1.5 mg Subcutaneous every 7 days, Disp-2 mL, R-1, E-PrescribeTo be filled while 4.5 mg dose is out of supply. Once 4.5 mg dose is available, can return to prior dose.      empagliflozin 25 mg Tab Take 25 mg by mouth daily, Historical      fluticasone propionate (FLOVENT HFA) 110 mcg/actuation inhaler Inhale 2 puffs into the lungs daily, Historical      hydrOXYzine (ATARAX) 25 MG tablet Take 25 mg by mouth every 6 hours as needed (pain), Historical      insulin aspart (NOVOLOG PEN) 100 UNIT/ML pen Do Not give Correction Insulin if Pre-Meal BG less than 150.   For Pre-Meal  - 199 give 1 unit.   For Pre-Meal  - 249 give 2 units.   For Pre-Meal  - 299 give 3 units.   For Pre-Meal  - 349 give 4 units.   For Pre-Meal - 399  give 5 units.   For Pre-Meal -449 give 6 units  For Pre-Meal BG greater than or equal to 450 give 7 units., Disp-15 mL, R-0, Local Print      insulin glargine (LANTUS  SOLOSTAR) 100 UNIT/ML pen Inject 35 Units Subcutaneous At Bedtime, Disp-30 mL, R-0, E-PrescribePlease cancel previous rx      lisinopriL (PRINIVIL,ZESTRIL) 10 MG tablet [LISINOPRIL (PRINIVIL,ZESTRIL) 10 MG TABLET] Take 10 mg by mouth daily., Historical      metFORMIN (GLUCOPHAGE XR) 500 MG 24 hr tablet Take 2,000 mg by mouth daily (with breakfast), Historical      ondansetron (ZOFRAN ODT) 4 MG ODT tab Take 4 mg by mouth every 6 hours as needed for nausea, Historical      oxyCODONE (ROXICODONE) 5 MG tablet Take 5 mg by mouth every 6 hours as needed for severe pain (7-10), Historical      pramipexole (MIRAPEX) 0.25 MG tablet Take 0.5 mg by mouth At Bedtime, R-3, Historical      blood glucose (NO BRAND SPECIFIED) test strip Use to test blood sugar 2 times daily as directed., Disp-200 strip, R-3, E-PrescribeFill brand best covered by insurance. Diagnosis: E11.9  (type 2 DM)      blood glucose monitoring (NO BRAND SPECIFIED) meter device kit Use to test blood sugar two times daily or as directed.Disp-1 kit, [id number] brand best covered by insurance. Diagnosis: E11.9  (type 2 DM)           Allergies   Allergies   Allergen Reactions     Codeine Unknown     Patient states possibly caused N/V but can't remember for sure     Semaglutide Nausea and Vomiting     Acetaminophen-Codeine Rash

## 2023-01-02 NOTE — PLAN OF CARE
Problem: Gas Exchange Impaired  Goal: Optimal Gas Exchange  Outcome: Progressing  Intervention: Optimize Oxygenation and Ventilation  Recent Flowsheet Documentation  Taken 1/1/2023 1940 by Meg Musa, RN  Head of Bed (HOB) Positioning: HOB at 30-45 degrees     Problem: Infection  Goal: Absence of Infection Signs and Symptoms  Outcome: Progressing   Goal Outcome Evaluation:               Pt arrived from ED with Covid + pneumonia. Med with Remdesivir in ED. Pt placed on 2 liters O2 and on continuous pulse oximetry.. Pt with recent Left hip replacement.

## 2023-01-02 NOTE — PROGRESS NOTES
01/02/23 1515   Appointment Info   Signing Clinician's Name / Credentials (PT) Adam Gay, PT, DPT   Living Environment   People in Home spouse;child(mary), adult   Current Living Arrangements house   Self-Care   Usual Activity Tolerance good   Current Activity Tolerance good   Equipment Currently Used at Home walker, rolling   Fall history within last six months no   General Information   Onset of Illness/Injury or Date of Surgery 01/01/23   Referring Physician Dr. Estrada   Patient/Family Therapy Goals Statement (PT) None stated   Pertinent History of Current Problem (include personal factors and/or comorbidities that impact the POC) COVID-19, hypoxia   Existing Precautions/Restrictions no known precautions/restrictions   Weight-Bearing Status - LLE full weight-bearing   Weight-Bearing Status - RLE full weight-bearing   Range of Motion (ROM)   ROM Comment Decreased LLE s/p hip surgery due to nerve damage   Strength (Manual Muscle Testing)   Strength Comments WFL   Transfers   Transfers sit-stand transfer   Sit-Stand Transfer   Sit-Stand [place] (Transfers) supervision   Assistive Device (Sit-Stand Transfers) walker, front-wheeled   Gait/Stairs (Locomotion)   [place] Level (Gait) contact guard   Assistive Device (Gait) walker, front-wheeled   Distance in Feet 10'   Distance in Feet (Gait) 60' x 2   Pattern (Gait) step-through   Deviations/Abnormal Patterns (Gait) janna decreased;gait speed decreased   Clinical Impression   Criteria for Skilled Therapeutic Intervention Yes, treatment indicated   PT Diagnosis (PT) Impaired functional mobility   Influenced by the following impairments SOB   Functional limitations due to impairments Transfers, gait   Clinical Presentation (PT Evaluation Complexity) Stable/Uncomplicated   Clinical Presentation Rationale Pt presents as medically diagnosed   Clinical Decision Making (Complexity) low complexity   Planned Therapy Interventions (PT) gait training;home  exercise program;patient/family education;strengthening;transfer training   Anticipated Equipment Needs at Discharge (PT) walker, rolling   Risk & Benefits of therapy have been explained care plan/treatment goals reviewed;patient   PT Total Evaluation Time   PT Eval, Low Complexity Minutes (85927) 10   Physical Therapy Goals   PT Frequency One time eval and treatment only   PT Predicted Duration/Target Date for Goal Attainment 01/02/23   PT Goals Transfers;Gait   PT: Transfers Supervision/stand-by assist;Sit to/from stand;Goal Met   PT: Gait Supervision/stand-by assist;Rolling walker;100 feet;Goal Met   Interventions   Interventions Quick Adds Gait Training;Therapeutic Activity   Therapeutic Activity   Treatment Detail/Skilled Intervention Sit<>stand x2 SBA, no concerns with transfers.   Gait Training   Gait Training Minutes (69667) 15   Symptoms Noted During/After Treatment (Gait Training) none   Treatment Detail/Skilled Intervention Pt amb around room 3 times, short seated rest break, and amb around room 3 more times all SBA with FWW. Moving slowly but no LOB or adverse s/s, on RA, O2 sats around 92%-93% after amb, no SOB or fatigue. Education provided on activity modification and PLB. Cues for navigation and safety.   [place] Level (Gait Training) stand-by assist   Physical Assistance Level (Gait Training) supervision;verbal cues   Weight Bearing (Gait Training) weight-bearing as tolerated   Assistive Device (Gait Training) rolling walker   Pattern Analysis (Gait Training) swing-through gait   Gait Analysis Deviations decreased janna;decreased step length   PT Discharge Planning   PT Plan D/C PT   PT Discharge Recommendation (DC Rec) (S)  home with assist   PT Rationale for DC Rec Home with assist from family as needed, ambulating well, O2 sats are staying up with activity, no mobility concerns with d/c at this time.   PT Brief overview of current status SBA for mobility, amb around room multiple times  with FWW, on RA

## 2023-01-03 NOTE — PLAN OF CARE
Patient A&O,CMS intact, and VSS. Monitored oxygen status throughout day. Communicated with resident about O2 stats and cleared for discharge per resident. Patient ready for discharge. Completed discharge paperwork with patient. Patient stated understanding and questions were answered. All belongings were sent with the patient. Discharged safely.  Lucia Daley RN

## 2023-01-04 ENCOUNTER — PATIENT OUTREACH (OUTPATIENT)
Dept: CARE COORDINATION | Facility: CLINIC | Age: 63
End: 2023-01-04

## 2023-01-04 NOTE — PROGRESS NOTES
"Clinic Care Coordination Contact  [facility]: Post-Discharge Note  SITUATION                                                      Admission:    Admission Date: 01/01/23   Reason for Admission: shortness of breath  Discharge:   Discharge Date: 01/02/23  Discharge Diagnosis: Acute hypoxic respiratory failure due to COVID-19    BACKGROUND                                                      Per hospital discharge summary and inpatient provider notes:      Gill Mendez was admitted on 1/1/2023 for acute hypoxic respiratory failure 2/2 COVID-19 infection as well as an asthma exacerbation  ASSESSMENT           Discharge Assessment  How are you doing now that you are home?: \"much better\"  How are your symptoms? (Red Flag symptoms escalate to triage hotline per guidelines): Improved  Do you feel your condition is stable enough to be safe at home until your provider visit?: Yes  Does the patient have their discharge instructions? : Yes  Does the patient have questions regarding their discharge instructions? : No  Were you started on any new medications or were there changes to any of your previous medications? : No  Do you have questions regarding any of your medications? : Yes (see comment)  Discharge follow-up appointment scheduled within 14 calendar days? : No (patient will call to schedule appointment)  Is patient agreeable to assistance with scheduling? : No       Patient reports shortness of breath is improved. Has dry cough but this is manageable. Reports home oximeter readings of 94-95%. No further concerns or questions per patient. She is provided with 24/7 triage nurse phone number and is encouraged to call if needed.            PLAN                                                      Outpatient Plan:  Follow up with your primary care physician in 1 to 2 weeks for hospital follow-up.       No future appointments.      For any urgent concerns, please contact our 24 hour nurse triage line: [phone] " ([phone])         Meg Amin RN

## 2023-01-29 ENCOUNTER — HEALTH MAINTENANCE LETTER (OUTPATIENT)
Age: 63
End: 2023-01-29

## 2023-02-19 ENCOUNTER — MYC MEDICAL ADVICE (OUTPATIENT)
Dept: ENDOCRINOLOGY | Facility: CLINIC | Age: 63
End: 2023-02-19
Payer: COMMERCIAL

## 2023-02-19 DIAGNOSIS — E11.621 TYPE 2 DIABETES MELLITUS WITH FOOT ULCER, WITHOUT LONG-TERM CURRENT USE OF INSULIN (H): Primary | ICD-10-CM

## 2023-02-19 DIAGNOSIS — L97.509 TYPE 2 DIABETES MELLITUS WITH FOOT ULCER, WITHOUT LONG-TERM CURRENT USE OF INSULIN (H): Primary | ICD-10-CM

## 2023-03-15 ENCOUNTER — OFFICE VISIT (OUTPATIENT)
Dept: ENDOCRINOLOGY | Facility: CLINIC | Age: 63
End: 2023-03-15
Payer: COMMERCIAL

## 2023-03-15 VITALS
BODY MASS INDEX: 30.12 KG/M2 | SYSTOLIC BLOOD PRESSURE: 122 MMHG | HEART RATE: 88 BPM | WEIGHT: 181 LBS | DIASTOLIC BLOOD PRESSURE: 68 MMHG

## 2023-03-15 DIAGNOSIS — L97.509 TYPE 2 DIABETES MELLITUS WITH FOOT ULCER, WITHOUT LONG-TERM CURRENT USE OF INSULIN (H): ICD-10-CM

## 2023-03-15 DIAGNOSIS — E04.1 THYROID NODULE: ICD-10-CM

## 2023-03-15 DIAGNOSIS — E11.621 TYPE 2 DIABETES MELLITUS WITH FOOT ULCER, WITHOUT LONG-TERM CURRENT USE OF INSULIN (H): ICD-10-CM

## 2023-03-15 DIAGNOSIS — Z79.4 TYPE 2 DIABETES MELLITUS WITH HYPERGLYCEMIA, WITH LONG-TERM CURRENT USE OF INSULIN (H): Primary | ICD-10-CM

## 2023-03-15 DIAGNOSIS — E11.65 TYPE 2 DIABETES MELLITUS WITH HYPERGLYCEMIA, WITH LONG-TERM CURRENT USE OF INSULIN (H): Primary | ICD-10-CM

## 2023-03-15 PROCEDURE — 99214 OFFICE O/P EST MOD 30 MIN: CPT | Performed by: INTERNAL MEDICINE

## 2023-03-15 RX ORDER — DULAGLUTIDE 4.5 MG/.5ML
4.5 INJECTION, SOLUTION SUBCUTANEOUS WEEKLY
Qty: 2 ML | Refills: 5 | Status: SHIPPED | OUTPATIENT
Start: 2023-03-15 | End: 2023-03-15

## 2023-03-15 RX ORDER — FLUTICASONE PROPIONATE 110 UG/1
AEROSOL, METERED RESPIRATORY (INHALATION)
COMMUNITY
Start: 2023-02-04 | End: 2024-09-27

## 2023-03-15 RX ORDER — DULAGLUTIDE 4.5 MG/.5ML
4.5 INJECTION, SOLUTION SUBCUTANEOUS WEEKLY
Qty: 6 ML | Refills: 1 | Status: SHIPPED | OUTPATIENT
Start: 2023-03-15 | End: 2023-08-23 | Stop reason: ALTCHOICE

## 2023-03-15 RX ORDER — CHLORTHALIDONE 25 MG/1
1 TABLET ORAL DAILY
COMMUNITY
Start: 2023-02-05

## 2023-03-15 RX ORDER — AMLODIPINE BESYLATE 5 MG/1
1 TABLET ORAL DAILY
COMMUNITY
Start: 2023-02-05

## 2023-03-15 NOTE — PROGRESS NOTES
ENDOCRINOLOGY FOLLOW-UP         HISTORY OF PRESENT ILLNESS    Gill Mendez is seen in follow-up for the issues outlined below.     1.  Diabetes mellitus.  We increased Trulicity and discontinued Actos altogether at last visit on 9/28/2022.    Ms. Mendez confirms that she has made these changes.    In the interim, Ms. Mendez had hip replacement: Unfortunately, she sustained nerve injury after surgery and has had limited mobility.  She just started physical therapy.  She was also hospitalized in 1/2023 for acute hypoxic respiratory failure due to COVID-19 infection as well as asthma exacerbation.    Current diabetes regimen: Lantus 35 units nightly, Trulicity  4.5 mg weekly, Jardiance 25 mg daily, metformin extended release 2000 mg daily.    She was discharged on correction scale NovoLog after her hospitalization but has not been using it.    Due to multiple medical issues since last visit, she has not been able to check glucose as consistently and does not have glucose meter with her today.    2.  Goiter.  She has not noted a change in the feel of her neck.      Pertinent endocrine and related history:  1.  Diabetes mellitus, type 2. Has previously followed with Dr. Quinteros in the [facility] system.  -Diabetes was diagnosed in 2005.  Has been complicated by diabetic retinopathy, neuropathy and nephropathy.  -Previously on prandial insulin: Found it difficult to take consistently.  Also prescribed Rybelsus in the past: This caused nausea.  Was on glipizide, this was discontinued due to escalating insulin requirement.  -Previously used freestyle lilia but had reaction to sensor adhesive.  2.  History of foot ulcer; has history of 2 toe amputations.  3. Thyroid nodule. Goiter palpated on exam in 7/2022.  -No  history of excessive head or neck radiation exposure.  Family history is notable for mother who had thyroid nodules removed surgically: No thyroid cancer.  2 sisters also have thyroid nodules, no thyroid  cancer.  -Thyroid US performed 7/13/2022 and showed a heterogenous gland with a 3.7 x 3 x 1.8 cm mixed cystic and solid nodule in the right lobe of the thyroid.  -Ultrasound-guided FNA of thyroid nodule on 7/20/2022, with benign cytology findings.      Pertinent Social History: , has 2 sons and a daughter.  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    PAST MEDICAL HISTORY  Past Medical History:   Diagnosis Date     Anxiety      Asthma      Cellulitis      Diabetes mellitus (H)      Essential hypertension     Created by Conversion  Replacement Utility updated for latest IMO load     Gastroparesis      Hyperlipidemia      Hypertension      Other and unspecified hyperlipidemia     Created by Conversion      PONV (postoperative nausea and vomiting)      Shortness of breath      Type II or unspecified type diabetes mellitus without mention of complication, not stated as uncontrolled     Created by Conversion        MEDICATIONS  Current Outpatient Medications   Medication Sig Dispense Refill     albuterol (PROVENTIL HFA;VENTOLIN HFA) 90 mcg/actuation inhaler [ALBUTEROL (PROVENTIL HFA;VENTOLIN HFA) 90 MCG/ACTUATION INHALER] Inhale 2 puffs every 6 (six) hours as needed for wheezing.       ammonium lactate (LAC-HYDRIN) 12 % external lotion Apply topically 2 times daily 225 g 3     aspirin 81 MG EC tablet Take 81 mg by mouth 2 times daily       atorvastatin (LIPITOR) 40 MG tablet Take 40 mg by mouth daily       blood glucose (NO BRAND SPECIFIED) test strip Use to test blood sugar 2 times daily as directed. 200 strip 3     blood glucose monitoring (NO BRAND SPECIFIED) meter device kit Use to test blood sugar two times daily or as directed. 1 kit 0     citalopram (CELEXA) 20 MG tablet [CITALOPRAM (CELEXA) 20 MG TABLET] Take 20 mg by mouth every morning.   0     diazePAM (VALIUM) 5 MG tablet [DIAZEPAM (VALIUM) 5 MG TABLET]  Take 1 tablet (5 mg total) by mouth every 6 (six) hours as needed for anxiety. 1 tablet 0     dulaglutide (TRULICITY) 1.5 MG/0.5ML pen Inject 1.5 mg Subcutaneous every 7 days (Patient taking differently: Inject 4.5 mg Subcutaneous every 7 days Fridays) 2 mL 1     empagliflozin (JARDIANCE) 25 MG TABS tablet Take 1 tablet (25 mg) by mouth daily 30 tablet 1     fluticasone propionate (FLOVENT HFA) 110 mcg/actuation inhaler Inhale 2 puffs into the lungs daily       hydrOXYzine (ATARAX) 25 MG tablet Take 25 mg by mouth every 6 hours as needed (pain)       insulin aspart (NOVOLOG PEN) 100 UNIT/ML pen Do Not give Correction Insulin if Pre-Meal BG less than 150.   For Pre-Meal  - 199 give 1 unit.   For Pre-Meal  - 249 give 2 units.   For Pre-Meal  - 299 give 3 units.   For Pre-Meal  - 349 give 4 units.   For Pre-Meal - 399 give 5 units.   For Pre-Meal -449 give 6 units  For Pre-Meal BG greater than or equal to 450 give 7 units. 15 mL 0     insulin glargine (LANTUS SOLOSTAR) 100 UNIT/ML pen Inject 35 Units Subcutaneous At Bedtime 30 mL 0     lisinopriL (PRINIVIL,ZESTRIL) 10 MG tablet [LISINOPRIL (PRINIVIL,ZESTRIL) 10 MG TABLET] Take 10 mg by mouth daily.       metFORMIN (GLUCOPHAGE XR) 500 MG 24 hr tablet Take 2,000 mg by mouth daily (with breakfast)       ondansetron (ZOFRAN ODT) 4 MG ODT tab Take 4 mg by mouth every 6 hours as needed for nausea       oxyCODONE (ROXICODONE) 5 MG tablet Take 5 mg by mouth every 6 hours as needed for severe pain (7-10)       pramipexole (MIRAPEX) 0.25 MG tablet Take 0.5 mg by mouth At Bedtime  3       Allergies, family, and social history were reviewed and documented as needed in EHR.     REVIEW OF SYSTEMS  A focused ROS was performed, with pertinent positives and negatives as noted in the HPI.  Some lower extremity edema after hip surgery.    PHYSICAL EXAM  /68 (BP Location: Right arm, Patient Position: Sitting, Cuff Size: Adult Regular)   Pulse 88    Wt 82.1 kg (181 lb)   BMI 30.12 kg/m    Body mass index is 30.12 kg/m .  Constitutional: Vital signs reviewed, as recorded above. Patient is alert, oriented and appears in no acute distress.  Eyes: PER, EOMI, no stare, lid lag, or retraction; no conjunctival injection.  Cardiovascular: Bilateral lower extremity edema.  Respiratory: Normal chest wall motion and respiratory effort.  MSK: No clubbing or cyanosis; normal muscle bulk and tone.  Skin: No lesions on visible skin,  Neurological: Alert and oriented times 3. No tremor.    DATA REVIEW  Each of the following laboratory and/or imaging studies were reviewed.    Component      Latest Ref Rng & Units 1/1/2023 1/2/2023   Sodium      136 - 145 mmol/L  138   Potassium      3.5 - 5.0 mmol/L  4.0   Chloride      98 - 107 mmol/L  102   Carbon Dioxide      22 - 31 mmol/L  25   Anion Gap      5 - 18 mmol/L  11   Urea Nitrogen      8 - 22 mg/dL  30 (H)   Creatinine      0.60 - 1.30 mg/dL  1.08   Calcium      8.5 - 10.5 mg/dL  8.2 (L)   Glucose      70 - 125 mg/dL  182 (H)   GFR Estimate      >60 mL/min/1.73m2  59 (L)   WBC      4.0 - 11.0 10e3/uL  6.1   RBC Count      3.80 - 5.90 10e6/uL  3.62 (L)   Hemoglobin      11.7 - 17.7 g/dL  9.4 (L)   Hematocrit      35.0 - 53.0 %  30.4 (L)   MCV      78 - 100 fL  84   MCH      26.5 - 33.0 pg  26.0 (L)   MCHC      31.5 - 36.5 g/dL  30.9 (L)   RDW      10.0 - 15.0 %  14.0   Platelet Count      150 - 450 10e3/uL  323   Hemoglobin A1C      <5.7 % 8.7 (H)          ASSESSMENT  1.  Diabetes mellitus, type 2.  Hemoglobin A1c in 1/2023 indicates hyperglycemia, although we do not have recent glucose meter data.  Suspect limited physical activity after recent surgical complication and stress of recent illnesses are probable contributors to progressing hyperglycemia.  Would not make changes at present given limited data: Continue current regimen and start monitoring glucose at home more consistently (unable to use CGM due to skin  rash/reaction to adhesives).  Ms. Mendez will update me with glucose data in 1 week so that we can adjust her regimen as needed.  I will also request follow-up with diabetes care and  for continued titration of her regimen.  Follow-up with me at earliest opening.    2.  Diabetes preventive care.  -History of diabetic retinopathy, I reviewed records of eye exam performed on 10/17/2022: Proliferative diabetic retinopathy, no diabetic macular edema, status post PRP and Avastin (continues on Avastin)  -CKD and microalbuminuria on urine microalbumin screen on 4/21/2022, on lisinopril; blood pressure optimally controlled (check urine microalbumin screen prior to next visit)  -Foot exam performed 4/27/2022: Foot care discussed in detail at that visit    3.  Dyslipidemia.  On statin therapy, optimal lipid profile in 4/2022.  Check fasting lipid profile prior to next visit    4.  Goiter, with thyroid nodule.  Asymmetric thyroid, with prominence of the right lobe of the thyroid noted on exam in 7/2022; thyroid ultrasound revealed a right lobe nodule, which was aspirated on 7/20/2022.  Cytology was benign.  Anticipate follow-up ultrasound prior to next visit.    PLAN  -Continue medications without changes  -Check blood glucose twice daily, fasting and before evening meal; also check with any unusual symptoms  -Please send [employer] message in 1 week with blood glucose data and we can adjust regimen if needed  -Make an appointment with Radha Rodriguez in about 6 weeks  -Follow-up earliest available: please have fasting labs and thyroid ultrasound before visit  -We will communicate results via [employer], or if needed by phone      Orders Placed This Encounter   Procedures     US Thyroid     Comprehensive metabolic panel     Hemoglobin A1c     Lipid Profile     TSH with free T4 reflex       I spent a total of 31 minutes on the date of encounter reviewing medical records, evaluating the patient, coordinating care and  documenting in the EHR, as detailed above.      Levy Lake MD   Division of Diabetes, Endocrinology and Metabolism  Department of Medicine

## 2023-03-15 NOTE — PATIENT INSTRUCTIONS
-Continue medications without changes  -Check blood glucose twice daily, fasting and before evening meal; also check with any unusual symptoms  -Please send [employer] message in 1 week with blood glucose data and we can adjust regimen if needed  -Make an appointment with Radha Rodriguez in about 6 weeks  -Follow-up earliest available: please have fasting labs and thyroid ultrasound before visit  -We will communicate results via [employer], or if needed by phone

## 2023-05-08 ENCOUNTER — HEALTH MAINTENANCE LETTER (OUTPATIENT)
Age: 63
End: 2023-05-08

## 2023-05-12 ENCOUNTER — ALLIED HEALTH/NURSE VISIT (OUTPATIENT)
Dept: EDUCATION SERVICES | Facility: CLINIC | Age: 63
End: 2023-05-12
Payer: COMMERCIAL

## 2023-05-12 VITALS — BODY MASS INDEX: 30.62 KG/M2 | WEIGHT: 184 LBS

## 2023-05-12 DIAGNOSIS — E11.628 TYPE 2 DIABETES MELLITUS WITH OTHER SKIN COMPLICATION, WITHOUT LONG-TERM CURRENT USE OF INSULIN (H): Primary | ICD-10-CM

## 2023-05-12 PROCEDURE — G0108 DIAB MANAGE TRN  PER INDIV: HCPCS

## 2023-05-12 NOTE — CONFIDENTIAL NOTE
100-108  This morning 167 after raisin bran  At night 130-140  202 last night    Not taking Novolog    Wake-up 5-6am  B-most days  Raisin bran  Yogurt and granola  Berries  Piece toast and PB  Scrambled eggs  L-most days  [place]  Egg salad and crackers  Pizza rolls  What ever is around  Chicken  Leftovers  D-last night  Chicken kabobs abs sweet potatoes  Roast beef  Pork chops  Tenderloins  About 6-7pm  Loves to cook  Shopping  Yogurt-yes  Milk-some  Cottage cheese-yes  Cheese-yes  Nuts-yes  Fruit-no-blackberries  Vegetables-brussel sprouts  Fish-not often  Both-grill and kitchen    Desserts and snacks  S-dried fruits, pickles-for RLS    Left leg bad at night  Left side  Not rested  Up for 2-3 hours  No more sleeping pills    pedeler on floor started doing that  Back and leg pain with walking  Before walking tape-3 mile  Some weights with arms  PT didn't help

## 2023-05-12 NOTE — LETTER
5/12/2023         RE: Gill Mendez  [address] [facility] [address]        Dear Colleague,    Thank you for referring your patient, Gill Mendez, to the [facility]. Please see a copy of my visit note below.    Diabetes Self-Management Education & Support    Presents for:      Type of Service: In Person Visit    Assessment Type:   ASSESSMENT:  Mayelin is here alone today for her Diabetes Education.  She had hip surgery that caused a nerve injury and due to lack of physical activity she is having hyperglycemia.  Her last education was about 6 months ago.    She is currently taking Lantus 35 units in the evening, Trulicity 4.5mg weekly on Mondays and Jardiance 25 mg daily.  Stated she has not been using Novolog for some time as her glucose seems to be doing well without it.  She was using this with a correction of 1:50 >150.  Stated given her recent readings she would take 1-2 units and feels this is not needed.    She is checking her glucose 1-3 times daily.  She tried using a CGM, had difficulty with the adhesive.  Stated she does not mind poking her finger.  Reviewed readings, some are noted below:  Fasting Before lunch After lunch Before dinner After dinner  108  147    139   139  167    202       188  104  117      202  163  Her highest reading in the last month was 254  Her 30 day average of 40 readings is 164    We reviewed her current meal plan, which is noted below:  Wakes 5-6am  B-eats most days  Raisin bran, yogurt and granola with berries, peanut butter toast, scrambled eggs  L-most days  [place], egg salad and crackers, pizza rolls, whatever is around, leftovers  D-last night was chicken kabobs and sweet potatoes  Roast beef, pork chops, pork tenderloins  Eats 6-7pm  Loves to cook, does most of shopping  Yogurt-yes  Milk-yes  Cottage cheese-yes  Cheese-yes  Nuts-yes  Fruit-no blackberries  Vegetables-no brussel sprouts  Fish-yes, not often  Likes to grill  and cook in the kitchen  Desserts and snacks-dried fruits, pickles-was told helps with RLS sx  Discussed she is doing well.  Her meal plan is well balanced.  She is not eating many carbs at each meal and not eating much added sugar.    Mayelin's activity has been very limited since her hip surgery in December.  She is having troubled with damaged nerve as well as pain in her back and leg with activity.  She wears a brace since she is unstable and her knee will give out.  She did try PT and feels this did not help her.  She has started doing some light weight lifting to help her arm strength and using a peddler the sits on the floor in front of her.  She and her  are working on something to help keep Mayelin's foot in the device as she can not do this on her own and it keeps falling off.  Encouraged her efforts.  She is working hard on recovery and doing the best she can at this time.  Discussed not pushing herself too hard being sore and uncomfortable the next days.    Mayelin also has trouble sleeping.  Stated the pain in her leg is worse at night.  When she wakes she does not feel rested.  Her leg pain will wake her during the night and she will be up for 2-3 hours.  She tried sleeping pills, did not like how they made her feel and has not been using them.    All additional questions and concerns addressed today.    Patient's most recent   Lab Results   Component Value Date    A1C 8.7 01/01/2023     is not meeting goal of <8.0    Diabetes knowledge and skills assessment:   Patient is knowledgeable in diabetes management concepts related to: Healthy Eating, Being Active, Monitoring and Taking Medication    Continue education with the following diabetes management concepts: Problem Solving and Healthy Coping    Based on learning assessment above, most appropriate setting for further diabetes education would be: Individual setting.      PLAN    Encouraged Mayelin to continue working on her meal planning and activity  "as tolerated.  Discussed some natural options for helping her sleep and she will consider this.  Will check in with her via [employer] in 1 month.  Topics to cover at upcoming visits: Taking Medication, Problem Solving and Healthy Coping    Follow-up: 1 month    See Care Plan for co-developed, patient-state behavior change goals.  AVS provided for patient today.    Education Materials Provided:  Carbohydrate Counting      SUBJECTIVE/OBJECTIVE:  Diabetes education in the past 24mo: No  Diabetes type: Type 2  Disease course: Improving  How confident are you filling out medical forms by yourself:: Extremely  Cultural Influences/Ethnic Background:  Not  or       Diabetes Symptoms & Complications:  Fatigue: No  Neuropathy: No  Polydipsia: No  Polyphagia: No  Polyuria: No  Visual change: No  Slow healing wounds: No  Autonomic neuropathy: No  CVA: No  Heart disease: No  Nephropathy: No  Peripheral neuropathy: Yes  Peripheral Vascular Disease: No  Retinopathy: Yes  Sexual dysfunction: No    Patient Problem List and Family Medical History reviewed for relevant medical history, current medical status, and diabetes risk factors.    Vitals:  Wt 83.5 kg (184 lb)   BMI 30.62 kg/m    Estimated body mass index is 30.62 kg/m  as calculated from the following:    Height as of 1/2/23: 1.651 m (5' 5\").    Weight as of this encounter: 83.5 kg (184 lb).   Last 3 BP:   BP Readings from Last 3 Encounters:   03/15/23 122/68   01/02/23 128/60   09/28/22 136/62       History   Smoking Status     Never   Smokeless Tobacco     Never       Labs:  Lab Results   Component Value Date    A1C 8.7 01/01/2023     Lab Results   Component Value Date     01/02/2023     01/02/2023     Lab Results   Component Value Date    .0 08/11/2011    .0 07/23/2009     Direct Measure HDL   Date Value Ref Range Status   08/11/2011 49 >39 mg/dL Final   ]  GFR Estimate   Date Value Ref Range Status   01/02/2023 59 (L) >60 " mL/min/1.73m2 Final     Comment:     GFR not calculated when sex unspecified or nonbinary.  Effective December 21, 2021 eGFRcr in adults is calculated using the 2021 CKD-EPI creatinine equation which includes age and gender (Nicolasa denney al., NEJM, DOI: 10.1056/HZGCzk7938391)   03/22/2021 43 (L) >60 mL/min/1.73m2 Final     GFR Estimate If Black   Date Value Ref Range Status   03/22/2021 53 (L) >60 mL/min/1.73m2 Final     Lab Results   Component Value Date    CR 1.08 01/02/2023     No results found for: MICROALBUMIN    Healthy Eating:  Cultural/Anglican diet restrictions?: No  Meal planning/habits: Carb counting, Low carb, Low salt, Smaller portions    Being Active:  Barrier to exercise: Other    Monitoring:  Blood Glucose Meter: Accu-chek  Times checking blood sugar at home (number): 2  Times checking blood sugar at home (per): Day  Blood glucose trend: Decreasing      Taking Medications:  Diabetes Medication(s)       Biguanides       metFORMIN (GLUCOPHAGE XR) 500 MG 24 hr tablet    Take 2,000 mg by mouth daily (with breakfast)      Insulin       insulin aspart (NOVOLOG PEN) 100 UNIT/ML pen    Do Not give Correction Insulin if Pre-Meal BG less than 150.   For Pre-Meal  - 199 give 1 unit.   For Pre-Meal  - 249 give 2 units.   For Pre-Meal  - 299 give 3 units.   For Pre-Meal  - 349 give 4 units.   For Pre-Meal - 399 give 5 units.   For Pre-Meal -449 give 6 units  For Pre-Meal BG greater than or equal to 450 give 7 units.     Patient not taking: Reported on 3/15/2023     insulin glargine (LANTUS SOLOSTAR) 100 UNIT/ML pen    Inject 35 Units Subcutaneous At Bedtime      Sodium-Glucose Co-Transporter 2 (SGLT2) Inhibitors       empagliflozin (JARDIANCE) 25 MG TABS tablet    Take 1 tablet (25 mg) by mouth daily      Incretin Mimetic Agents       Dulaglutide (TRULICITY) 4.5 MG/0.5ML SOPN    Inject 4.5 mg Subcutaneous once a week            Current Treatments: Insulin Injections, Oral  Medication (taken by mouth)    Problem Solving:       Reducing Risks:  CAD Risks: Diabetes Mellitus, Hypertension    Healthy Coping:  Informal Support system:: Children, Family, Partner  Patient Activation Measure Survey Score:       View : No data to display.                  Care Plan and Education Provided:       The service provided today was under the supervising provider, Marie Starkey, who was available if needed.       Time Spent: 60 minutes  Encounter Type: Individual    Any diabetes medication dose changes were made via the CDE Protocol per the patient's referring provider. A copy of this encounter was shared with the provider.

## 2023-06-12 NOTE — PROGRESS NOTES
Diabetes Self-Management Education & Support    Presents for:      Type of Service: In Person Visit    Assessment Type:   ASSESSMENT:  Mayelin is here alone today for her Diabetes Education.  She had hip surgery that caused a nerve injury and due to lack of physical activity she is having hyperglycemia.  Her last education was about 6 months ago.    She is currently taking Lantus 35 units in the evening, Trulicity 4.5mg weekly on Mondays and Jardiance 25 mg daily.  Stated she has not been using Novolog for some time as her glucose seems to be doing well without it.  She was using this with a correction of 1:50 >150.  Stated given her recent readings she would take 1-2 units and feels this is not needed.    She is checking her glucose 1-3 times daily.  She tried using a CGM, had difficulty with the adhesive.  Stated she does not mind poking her finger.  Reviewed readings, some are noted below:  Fasting Before lunch After lunch Before dinner After dinner  108  147    139   139  167    202       188  104  117      202  163  Her highest reading in the last month was 254  Her 30 day average of 40 readings is 164    We reviewed her current meal plan, which is noted below:  Wakes 5-6am  B-eats most days  Raisin bran, yogurt and granola with berries, peanut butter toast, scrambled eggs  L-most days  [place], egg salad and crackers, pizza rolls, whatever is around, leftovers  D-last night was chicken kabobs and sweet potatoes  Roast beef, pork chops, pork tenderloins  Eats 6-7pm  Loves to cook, does most of shopping  Yogurt-yes  Milk-yes  Cottage cheese-yes  Cheese-yes  Nuts-yes  Fruit-no blackberries  Vegetables-no brussel sprouts  Fish-yes, not often  Likes to grill and cook in the kitchen  Desserts and snacks-dried fruits, pickles-was told helps with RLS sx  Discussed she is doing well.  Her meal plan is well balanced.  She is not eating many carbs at each meal and not eating much added sugar.    Mayelin's activity has been  very limited since her hip surgery in December.  She is having troubled with damaged nerve as well as pain in her back and leg with activity.  She wears a brace since she is unstable and her knee will give out.  She did try PT and feels this did not help her.  She has started doing some light weight lifting to help her arm strength and using a peddler the sits on the floor in front of her.  She and her  are working on something to help keep Mayelin's foot in the device as she can not do this on her own and it keeps falling off.  Encouraged her efforts.  She is working hard on recovery and doing the best she can at this time.  Discussed not pushing herself too hard being sore and uncomfortable the next days.    Mayelin also has trouble sleeping.  Stated the pain in her leg is worse at night.  When she wakes she does not feel rested.  Her leg pain will wake her during the night and she will be up for 2-3 hours.  She tried sleeping pills, did not like how they made her feel and has not been using them.    All additional questions and concerns addressed today.    Patient's most recent   Lab Results   Component Value Date    A1C 8.7 01/01/2023     is not meeting goal of <8.0    Diabetes knowledge and skills assessment:   Patient is knowledgeable in diabetes management concepts related to: Healthy Eating, Being Active, Monitoring and Taking Medication    Continue education with the following diabetes management concepts: Problem Solving and Healthy Coping    Based on learning assessment above, most appropriate setting for further diabetes education would be: Individual setting.      PLAN    Encouraged Mayelin to continue working on her meal planning and activity as tolerated.  Discussed some natural options for helping her sleep and she will consider this.  Will check in with her via [employer] in 1 month.  Topics to cover at upcoming visits: Taking Medication, Problem Solving and Healthy Coping    Follow-up: 1 month    See  "Care Plan for co-developed, patient-state behavior change goals.  AVS provided for patient today.    Education Materials Provided:  Carbohydrate Counting      SUBJECTIVE/OBJECTIVE:  Diabetes education in the past 24mo: No  Diabetes type: Type 2  Disease course: Improving  How confident are you filling out medical forms by yourself:: Extremely  Cultural Influences/Ethnic Background:  Not  or       Diabetes Symptoms & Complications:  Fatigue: No  Neuropathy: No  Polydipsia: No  Polyphagia: No  Polyuria: No  Visual change: No  Slow healing wounds: No  Autonomic neuropathy: No  CVA: No  Heart disease: No  Nephropathy: No  Peripheral neuropathy: Yes  Peripheral Vascular Disease: No  Retinopathy: Yes  Sexual dysfunction: No    Patient Problem List and Family Medical History reviewed for relevant medical history, current medical status, and diabetes risk factors.    Vitals:  Wt 83.5 kg (184 lb)   BMI 30.62 kg/m    Estimated body mass index is 30.62 kg/m  as calculated from the following:    Height as of 1/2/23: 1.651 m (5' 5\").    Weight as of this encounter: 83.5 kg (184 lb).   Last 3 BP:   BP Readings from Last 3 Encounters:   03/15/23 122/68   01/02/23 128/60   09/28/22 136/62       History   Smoking Status     Never   Smokeless Tobacco     Never       Labs:  Lab Results   Component Value Date    A1C 8.7 01/01/2023     Lab Results   Component Value Date     01/02/2023     01/02/2023     Lab Results   Component Value Date    .0 08/11/2011    .0 07/23/2009     Direct Measure HDL   Date Value Ref Range Status   08/11/2011 49 >39 mg/dL Final   ]  GFR Estimate   Date Value Ref Range Status   01/02/2023 59 (L) >60 mL/min/1.73m2 Final     Comment:     GFR not calculated when sex unspecified or nonbinary.  Effective December 21, 2021 eGFRcr in adults is calculated using the 2021 CKD-EPI creatinine equation which includes age and gender (Nicolasa denney al., NEJM, DOI: 10.1056/MUSGvi0905857) "   03/22/2021 43 (L) >60 mL/min/1.73m2 Final     GFR Estimate If Black   Date Value Ref Range Status   03/22/2021 53 (L) >60 mL/min/1.73m2 Final     Lab Results   Component Value Date    CR 1.08 01/02/2023     No results found for: MICROALBUMIN    Healthy Eating:  Cultural/Quaker diet restrictions?: No  Meal planning/habits: Carb counting, Low carb, Low salt, Smaller portions    Being Active:  Barrier to exercise: Other    Monitoring:  Blood Glucose Meter: Accu-chek  Times checking blood sugar at home (number): 2  Times checking blood sugar at home (per): Day  Blood glucose trend: Decreasing      Taking Medications:  Diabetes Medication(s)     Biguanides       metFORMIN (GLUCOPHAGE XR) 500 MG 24 hr tablet    Take 2,000 mg by mouth daily (with breakfast)    Insulin       insulin aspart (NOVOLOG PEN) 100 UNIT/ML pen    Do Not give Correction Insulin if Pre-Meal BG less than 150.   For Pre-Meal  - 199 give 1 unit.   For Pre-Meal  - 249 give 2 units.   For Pre-Meal  - 299 give 3 units.   For Pre-Meal  - 349 give 4 units.   For Pre-Meal - 399 give 5 units.   For Pre-Meal -449 give 6 units  For Pre-Meal BG greater than or equal to 450 give 7 units.     Patient not taking: Reported on 3/15/2023     insulin glargine (LANTUS SOLOSTAR) 100 UNIT/ML pen    Inject 35 Units Subcutaneous At Bedtime    Sodium-Glucose Co-Transporter 2 (SGLT2) Inhibitors       empagliflozin (JARDIANCE) 25 MG TABS tablet    Take 1 tablet (25 mg) by mouth daily    Incretin Mimetic Agents       Dulaglutide (TRULICITY) 4.5 MG/0.5ML SOPN    Inject 4.5 mg Subcutaneous once a week          Current Treatments: Insulin Injections, Oral Medication (taken by mouth)    Problem Solving:       Reducing Risks:  CAD Risks: Diabetes Mellitus, Hypertension    Healthy Coping:  Informal Support system:: Children, Family, Partner  Patient Activation Measure Survey Score:       View : No data to display.                  Care Plan and  Education Provided:       The service provided today was under the supervising provider, Marie Starkey, who was available if needed.       Time Spent: 60 minutes  Encounter Type: Individual    Any diabetes medication dose changes were made via the CDE Protocol per the patient's referring provider. A copy of this encounter was shared with the provider.

## 2023-07-19 DIAGNOSIS — E11.65 TYPE 2 DIABETES MELLITUS WITH HYPERGLYCEMIA, WITH LONG-TERM CURRENT USE OF INSULIN (H): ICD-10-CM

## 2023-07-19 DIAGNOSIS — Z79.4 TYPE 2 DIABETES MELLITUS WITH HYPERGLYCEMIA, WITH LONG-TERM CURRENT USE OF INSULIN (H): ICD-10-CM

## 2023-07-19 RX ORDER — DULAGLUTIDE 4.5 MG/.5ML
4.5 INJECTION, SOLUTION SUBCUTANEOUS WEEKLY
Qty: 6 ML | Refills: 1 | OUTPATIENT
Start: 2023-07-19

## 2023-07-19 NOTE — TELEPHONE ENCOUNTER
"Requested Prescriptions   Pending Prescriptions Disp Refills     Dulaglutide (TRULICITY) 4.5 MG/0.5ML SOPN 6 mL 1     Sig: Inject 4.5 mg Subcutaneous once a week       GLP-1 Agonists Protocol Failed - 7/19/2023  7:11 AM        Failed - HgbA1C in past 3 or 6 months     If HgbA1C is 8 or greater, it needs to be on file within the past 3 months.  If less than 8, must be on file within the past 6 months.     Recent Labs   Lab Test 01/01/23  1303 07/13/22  1059 04/20/22  0822   A1C 8.7*   < >  --    35787  --   --  9.8*    < > = values in this interval not displayed.             Passed - Medication is active on med list        Passed - Patient is age 18 or older        Passed - No active pregnancy on record        Passed - Normal serum creatinine on file in past 12 months     Recent Labs   Lab Test 01/02/23  0848   CR 1.08       Ok to refill medication if creatinine is low          Passed - No positive pregnancy test in past 12 months        Passed - Recent (6 mo) or future (30 days) visit within the authorizing provider's specialty     Patient had office visit in the last 6 months or has a visit in the next 30 days with authorizing provider.  See \"Patient Info\" tab in inbasket, or \"Choose Columns\" in Meds & Orders section of the refill encounter.                 "

## 2023-07-21 DIAGNOSIS — E11.621 TYPE 2 DIABETES MELLITUS WITH FOOT ULCER, WITHOUT LONG-TERM CURRENT USE OF INSULIN (H): ICD-10-CM

## 2023-07-21 DIAGNOSIS — L97.509 TYPE 2 DIABETES MELLITUS WITH FOOT ULCER, WITHOUT LONG-TERM CURRENT USE OF INSULIN (H): ICD-10-CM

## 2023-07-21 RX ORDER — INSULIN GLARGINE 100 [IU]/ML
35 INJECTION, SOLUTION SUBCUTANEOUS AT BEDTIME
Qty: 30 ML | Refills: 0 | Status: SHIPPED | OUTPATIENT
Start: 2023-07-21 | End: 2023-08-23

## 2023-07-21 NOTE — TELEPHONE ENCOUNTER
insulin glargine (LANTUS SOLOSTAR) 100 UNIT/ML pen         Last Written Prescription Date:  11/21/22  Last Fill Quantity: 30 ml,   # refills: 0  Last Office Visit : 3/15/23  Future Office visit:  8/23/23    Routing refill request to provider for review/approval because:  Insulin - refilled per clinic

## 2023-07-31 NOTE — TELEPHONE ENCOUNTER
"Requested Prescriptions   Signed Prescriptions Disp Refills    insulin glargine (LANTUS SOLOSTAR) 100 UNIT/ML pen 30 mL 0     Sig: Inject 35 Units Subcutaneous At Bedtime       Long Acting Insulin Protocol Failed - 7/21/2023  2:36 PM        Failed - HgbA1C in past 3 or 6 months     If HgbA1C is 8 or greater, it needs to be on file within the past 3 months.  If less than 8, must be on file within the past 6 months.     Recent Labs   Lab Test 01/01/23  1303 07/13/22  1059 04/20/22  0822   A1C 8.7*   < >  --    [id number]  --   --  9.8*    < > = values in this interval not displayed.             Passed - Serum creatinine on file in past 12 months     Recent Labs   Lab Test 01/02/23  0848   CR 1.08       Ok to refill medication if creatinine is low          Passed - Medication is active on med list        Passed - Patient is age 18 or older        Passed - Recent (6 mo) or future (30 days) visit within the authorizing provider's specialty     Patient had office visit in the last 6 months or has a visit in the next 30 days with authorizing provider or within the authorizing provider's specialty.  See \"Patient Info\" tab in inbasket, or \"Choose Columns\" in Meds & Orders section of the refill encounter.              empagliflozin (JARDIANCE) 25 MG TABS tablet 30 tablet 0     Sig: Take 1 tablet (25 mg) by mouth daily       There is no refill protocol information for this order          "

## 2023-08-05 ENCOUNTER — HEALTH MAINTENANCE LETTER (OUTPATIENT)
Age: 63
End: 2023-08-05

## 2023-08-21 ENCOUNTER — LAB (OUTPATIENT)
Dept: LAB | Facility: CLINIC | Age: 63
End: 2023-08-21
Payer: COMMERCIAL

## 2023-08-21 DIAGNOSIS — E11.65 TYPE 2 DIABETES MELLITUS WITH HYPERGLYCEMIA, WITH LONG-TERM CURRENT USE OF INSULIN (H): ICD-10-CM

## 2023-08-21 DIAGNOSIS — E04.1 THYROID NODULE: ICD-10-CM

## 2023-08-21 DIAGNOSIS — Z79.4 TYPE 2 DIABETES MELLITUS WITH HYPERGLYCEMIA, WITH LONG-TERM CURRENT USE OF INSULIN (H): ICD-10-CM

## 2023-08-21 LAB
ALBUMIN SERPL BCG-MCNC: 4.2 G/DL (ref 3.5–5.2)
ALP SERPL-CCNC: 98 U/L (ref 35–129)
ALT SERPL W P-5'-P-CCNC: 23 U/L (ref 0–70)
ANION GAP SERPL CALCULATED.3IONS-SCNC: 7 MMOL/L (ref 7–15)
AST SERPL W P-5'-P-CCNC: 26 U/L (ref 0–45)
BILIRUB SERPL-MCNC: 0.2 MG/DL
BUN SERPL-MCNC: 38.1 MG/DL (ref 8–23)
CALCIUM SERPL-MCNC: 9.6 MG/DL (ref 8.8–10.2)
CHLORIDE SERPL-SCNC: 101 MMOL/L (ref 98–107)
CHOLEST SERPL-MCNC: 166 MG/DL
CREAT SERPL-MCNC: 1.06 MG/DL (ref 0.51–1.17)
DEPRECATED HCO3 PLAS-SCNC: 31 MMOL/L (ref 22–29)
GFR SERPL CREATININE-BSD FRML MDRD: 59 ML/MIN/1.73M2
GLUCOSE SERPL-MCNC: 147 MG/DL (ref 70–99)
HBA1C MFR BLD: 10.5 % (ref 0–5.6)
HDLC SERPL-MCNC: 72 MG/DL
LDLC SERPL CALC-MCNC: 78 MG/DL
NONHDLC SERPL-MCNC: 94 MG/DL
POTASSIUM SERPL-SCNC: 4.9 MMOL/L (ref 3.4–5.3)
PROT SERPL-MCNC: 6.9 G/DL (ref 6.4–8.3)
SODIUM SERPL-SCNC: 139 MMOL/L (ref 136–145)
TRIGL SERPL-MCNC: 78 MG/DL
TSH SERPL DL<=0.005 MIU/L-ACNC: 0.92 UIU/ML (ref 0.3–4.2)

## 2023-08-21 PROCEDURE — 83036 HEMOGLOBIN GLYCOSYLATED A1C: CPT

## 2023-08-21 PROCEDURE — 84443 ASSAY THYROID STIM HORMONE: CPT

## 2023-08-21 PROCEDURE — 80053 COMPREHEN METABOLIC PANEL: CPT

## 2023-08-21 PROCEDURE — 36415 COLL VENOUS BLD VENIPUNCTURE: CPT

## 2023-08-21 PROCEDURE — 80061 LIPID PANEL: CPT

## 2023-08-23 ENCOUNTER — OFFICE VISIT (OUTPATIENT)
Dept: ENDOCRINOLOGY | Facility: CLINIC | Age: 63
End: 2023-08-23
Payer: COMMERCIAL

## 2023-08-23 VITALS
BODY MASS INDEX: 29.79 KG/M2 | HEART RATE: 100 BPM | DIASTOLIC BLOOD PRESSURE: 72 MMHG | WEIGHT: 179 LBS | SYSTOLIC BLOOD PRESSURE: 130 MMHG

## 2023-08-23 DIAGNOSIS — E04.1 THYROID NODULE: ICD-10-CM

## 2023-08-23 DIAGNOSIS — E11.621 TYPE 2 DIABETES MELLITUS WITH FOOT ULCER, WITHOUT LONG-TERM CURRENT USE OF INSULIN (H): ICD-10-CM

## 2023-08-23 DIAGNOSIS — Z79.4 TYPE 2 DIABETES MELLITUS WITH HYPERGLYCEMIA, WITH LONG-TERM CURRENT USE OF INSULIN (H): Primary | ICD-10-CM

## 2023-08-23 DIAGNOSIS — E04.9 GOITER: ICD-10-CM

## 2023-08-23 DIAGNOSIS — E11.65 TYPE 2 DIABETES MELLITUS WITH HYPERGLYCEMIA, WITH LONG-TERM CURRENT USE OF INSULIN (H): Primary | ICD-10-CM

## 2023-08-23 DIAGNOSIS — L97.509 TYPE 2 DIABETES MELLITUS WITH FOOT ULCER, WITHOUT LONG-TERM CURRENT USE OF INSULIN (H): ICD-10-CM

## 2023-08-23 PROCEDURE — 99214 OFFICE O/P EST MOD 30 MIN: CPT | Performed by: INTERNAL MEDICINE

## 2023-08-23 RX ORDER — INSULIN GLARGINE 100 [IU]/ML
40 INJECTION, SOLUTION SUBCUTANEOUS AT BEDTIME
Qty: 45 ML | Refills: 1 | Status: SHIPPED | OUTPATIENT
Start: 2023-08-23 | End: 2024-03-13

## 2023-08-23 RX ORDER — TIRZEPATIDE 7.5 MG/.5ML
7.5 INJECTION, SOLUTION SUBCUTANEOUS
Qty: 2 ML | Refills: 1 | Status: SHIPPED | OUTPATIENT
Start: 2023-08-23 | End: 2023-09-14 | Stop reason: DRUGHIGH

## 2023-08-23 NOTE — Clinical Note
8/23/2023         RE: Gill Mendez  [address] [facility] [address]        Dear Colleague,    Thank you for referring your patient, Gill Mendez, to the [facility]. Please see a copy of my visit note below.      ENDOCRINOLOGY FOLLOW-UP         HISTORY OF PRESENT ILLNESS    Gill Mendez is seen in follow-up for the issues outlined below.     1.  Diabetes mellitus.    We increased Trulicity and discontinued Actos in 9/2022.  However, by the time of visit in 3/2023 Ms. Mendez was experiencing some hyperglycemia.  Since there were clear factors contributing to hyperglycemia (hip replacement with complications thereafter, hospitalization in 1/2023 for acute hypoxic respiratory failure due to COVID-19 infection and asthma exacerbation) we continued her diabetes regimen without changes.    Today, she notes that hyperglycemia has persisted.  Indeed, previsit labs show rising A1c.    She forgot her meter at home today but notes fasting glucose values have mostly been in the mid 100s (100 this morning) and glucose values in the evening have been in the 200s (270's last night).  No hypoglycemia in the past few months.    Has had skin reaction to adhesive from CGM in the past.    Current diabetes regimen: Lantus 35 units nightly, Trulicity  4.5 mg weekly, Jardiance 25 mg daily, metformin extended release 2000 mg daily.    2.  Goiter.  She has not noted a change in the feel of her neck.  She did not yet have a chance to schedule previsit thyroid ultrasound.    Pertinent endocrine and related history:  1.  Diabetes mellitus, type 2. Has previously followed with Dr. Quinteros in the [facility] system.  -Diabetes was diagnosed in 2005.  Has been complicated by diabetic retinopathy, neuropathy and nephropathy.  -Previously on prandial insulin: Found it difficult to take consistently.  Also prescribed Rybelsus in the past: This caused nausea.  Was on glipizide, this was discontinued due to  escalating insulin requirement.  -Previously used freestyle lilia but had reaction to sensor adhesive.  2.  History of foot ulcer; has history of 2 toe amputations.  3. Thyroid nodule. Goiter palpated on exam in 7/2022.  -No  history of excessive head or neck radiation exposure.  Family history is notable for mother who had thyroid nodules removed surgically: No thyroid cancer.  2 sisters also have thyroid nodules, no thyroid cancer.  -Thyroid US performed 7/13/2022 and showed a heterogenous gland with a 3.7 x 3 x 1.8 cm mixed cystic and solid nodule in the right lobe of the thyroid.  -Ultrasound-guided FNA of thyroid nodule on 7/20/2022, with benign cytology findings.    Pertinent Social History: , has 2 sons and a daughter.  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    PAST MEDICAL HISTORY  Past Medical History:   Diagnosis Date    Anxiety     Asthma     Cellulitis     Diabetes mellitus (H)     Essential hypertension     Created by Conversion  Replacement Utility updated for latest IMO load    Gastroparesis     Hyperlipidemia     Hypertension     Other and unspecified hyperlipidemia     Created by Conversion     PONV (postoperative nausea and vomiting)     Shortness of breath     Type II or unspecified type diabetes mellitus without mention of complication, not stated as uncontrolled     Created by Conversion        MEDICATIONS  Current Outpatient Medications   Medication Sig Dispense Refill    albuterol (PROVENTIL HFA;VENTOLIN HFA) 90 mcg/actuation inhaler [ALBUTEROL (PROVENTIL HFA;VENTOLIN HFA) 90 MCG/ACTUATION INHALER] Inhale 2 puffs every 6 (six) hours as needed for wheezing.      amLODIPine (NORVASC) 5 MG tablet Take 1 tablet by mouth daily      ammonium lactate (LAC-HYDRIN) 12 % external lotion Apply topically 2 times daily 225 g 3    aspirin 81 MG EC tablet Take 81 mg by mouth 2 times daily       atorvastatin (LIPITOR) 40 MG tablet Take 40 mg by mouth daily (Patient not taking: Reported on 3/15/2023)      blood glucose (NO BRAND SPECIFIED) test strip Use to test blood sugar 2 times daily as directed. 200 strip 3    blood glucose monitoring (NO BRAND SPECIFIED) meter device kit Use to test blood sugar two times daily or as directed. 1 kit 0    chlorthalidone (HYGROTON) 25 MG tablet Take 1 tablet by mouth daily      citalopram (CELEXA) 20 MG tablet [CITALOPRAM (CELEXA) 20 MG TABLET] Take 20 mg by mouth every morning.   0    Dulaglutide (TRULICITY) 4.5 MG/0.5ML SOPN Inject 4.5 mg Subcutaneous once a week 6 mL 1    empagliflozin (JARDIANCE) 25 MG TABS tablet Take 1 tablet (25 mg) by mouth daily 30 tablet 0    fluticasone (FLOVENT HFA) 110 MCG/ACT inhaler 2 Puffs inhaled each morning. Rinse mouth with water after use to reduce aftertaste and incidence of candidiasis. Do not swallow.      insulin aspart (NOVOLOG PEN) 100 UNIT/ML pen Do Not give Correction Insulin if Pre-Meal BG less than 150.   For Pre-Meal  - 199 give 1 unit.   For Pre-Meal  - 249 give 2 units.   For Pre-Meal  - 299 give 3 units.   For Pre-Meal  - 349 give 4 units.   For Pre-Meal - 399 give 5 units.   For Pre-Meal -449 give 6 units  For Pre-Meal BG greater than or equal to 450 give 7 units. (Patient not taking: Reported on 3/15/2023) 15 mL 0    insulin glargine (LANTUS SOLOSTAR) 100 UNIT/ML pen Inject 35 Units Subcutaneous At Bedtime 30 mL 0    lisinopriL (PRINIVIL,ZESTRIL) 10 MG tablet [LISINOPRIL (PRINIVIL,ZESTRIL) 10 MG TABLET] Take 10 mg by mouth daily.      metFORMIN (GLUCOPHAGE XR) 500 MG 24 hr tablet Take 2,000 mg by mouth daily (with breakfast)      pramipexole (MIRAPEX) 0.25 MG tablet Take 0.5 mg by mouth At Bedtime  3       Allergies, family, and social history were reviewed and documented as needed in EHR.     REVIEW OF SYSTEMS  A focused ROS was performed, with pertinent positives and negatives as  noted in the HPI.  Some lower extremity edema after hip surgery.    PHYSICAL EXAM  /72 (BP Location: Right arm, Patient Position: Sitting, Cuff Size: Adult Regular)   Pulse 100   Wt 81.2 kg (179 lb)   BMI 29.79 kg/m    Body mass index is 29.79 kg/m .  Constitutional: Vital signs reviewed, as recorded above. Patient is alert, oriented and appears in no acute distress.  Eyes: PER, EOMI, no stare, lid lag, or retraction; no conjunctival injection.  Cardiovascular: Bilateral lower extremity edema.  Respiratory: Normal chest wall motion and respiratory effort.  MSK: No clubbing or cyanosis; normal muscle bulk and tone.  Skin: No lesions on visible skin,  Neurological: Alert and oriented times 3. No tremor.    DATA REVIEW  Each of the following laboratory and/or imaging studies were reviewed.      Component      Latest Ref Rng 8/21/2023  8:21 AM   Sodium      136 - 145 mmol/L 139    Potassium      3.4 - 5.3 mmol/L 4.9    Chloride      98 - 107 mmol/L 101    Carbon Dioxide (CO2)      22 - 29 mmol/L 31 (H)    Anion Gap      7 - 15 mmol/L 7    Urea Nitrogen      8.0 - 23.0 mg/dL 38.1 (H)    Creatinine      0.51 - 1.17 mg/dL 1.06    Calcium      8.8 - 10.2 mg/dL 9.6    Glucose      70 - 99 mg/dL 147 (H)    Alkaline Phosphatase      35 - 129 U/L 98    AST      0 - 45 U/L 26    ALT      0 - 70 U/L 23    Protein Total      6.4 - 8.3 g/dL 6.9    Albumin      3.5 - 5.2 g/dL 4.2    Bilirubin Total      <=1.2 mg/dL 0.2    GFR Estimate      >60 mL/min/1.73m2 59 (L)    Cholesterol      <200 mg/dL 166    Triglycerides      <150 mg/dL 78    HDL Cholesterol      >=40 mg/dL 72    LDL Cholesterol Calculated      <=100 mg/dL 78    Non HDL Cholesterol      <130 mg/dL 94    Hemoglobin A1C      0.0 - 5.6 % 10.5 (H)    TSH      0.30 - 4.20 uIU/mL 0.92       Legend:  (H) High  (L) Low  ASSESSMENT  1.  Diabetes mellitus, type 2.  Hemoglobin A1c has risen further.  Would recommend increasing basal insulin and also changing to a GLP-1  receptor/GIP agonist in hopes of intensifying management of postprandial hyperglycemia.  If she has persistent hyperglycemia despite maximizing this regimen, we would need to consider mealtime insulin.  We will transition to a moderate dose of Mounjaro but I have asked patient to update me if she notices GI side effects: In this case, we can reduce dose.  We will continue remainder of medications without changes and plan for follow-up with diabetes care and  in 4 to 6 weeks, follow-up with me in 3 months.    2.  Diabetes preventive care.  -History of diabetic retinopathy, I reviewed records of eye exam performed on 10/17/2022: Proliferative diabetic retinopathy, no diabetic macular edema, status post PRP and Avastin (continues on Avastin)  -CKD and microalbuminuria on urine microalbumin screen on 4/21/2022, on lisinopril; blood pressure optimally controlled.  We had plan to check urine microalbumin screen prior to this visit but it does not appear to have been completed.  -Foot exam performed 4/27/2022: Foot care discussed in detail at that visit; will need foot exam at future visits (we did not have time to address this today)    3.  Dyslipidemia.  On statin therapy, optimal lipid profile on labs drawn prior to this visit.    4.  Goiter, with thyroid nodule.  Asymmetric thyroid, with prominence of the right lobe of the thyroid noted on exam in 7/2022; thyroid ultrasound revealed a right lobe nodule, which was aspirated on 7/20/2022.  Cytology was benign.  Previsit labs show normal TSH.    We had planned for thyroid ultrasound prior to this visit but it does not appear that Ms. Mendez had a chance to complete this: We will anticipate ultrasound prior to next visit.    PLAN  -Increase Lantus 40 units daily  -Stop Trulicity   -When next due for Trulicity injection, can switch to Mounjaro 7.5 mg weekly  -We will plan to increase dose of Mounjaro every month if tolerance--please update me via [employer]  on tolerance of medication and we can keep adjusting dose up  -Continue remainder of medications without changes   -Check blood glucose twice daily, fasting and before evening meal; also check with any unusual symptoms  -Follow-up with Radha Rodriguez in about 4-6 weeks  -Follow-up in November 2023 as scheduled: Please have labs and thyroid ultrasound before visit  -We will communicate results via [employer], or if needed by phone      Orders Placed This Encounter   Procedures    Hemoglobin A1c    Comprehensive metabolic panel    Albumin Random Urine Quantitative with Creat Ratio       I spent a total of 31 minutes on the date of encounter reviewing medical records, evaluating the patient, coordinating care and documenting in the EHR, as detailed above.      Jeffrey Lake MD   Division of Diabetes, Endocrinology and Metabolism  Department of Medicine            Again, thank you for allowing me to participate in the care of your patient.        Sincerely,        JEFFREY Lake MD

## 2023-08-23 NOTE — PATIENT INSTRUCTIONS
-Increase Lantus 40 units daily  -Stop Trulicity   -When next due for Trulicity injection, can switch to Mounjaro 7.5 mg weekly  -We will plan to increase dose of Mounjaro every month if tolerance--please update me via [employer] on tolerance of medication and we can keep adjusting dose up  -Continue remainder of medications without changes   -Check blood glucose twice daily, fasting and before evening meal; also check with any unusual symptoms  -Follow-up with Radha Rodriguez in about 4-6 weeks  -Follow-up in November 2023 as scheduled: Please have labs and thyroid ultrasound before visit  -We will communicate results via [employer], or if needed by phone

## 2023-08-23 NOTE — PROGRESS NOTES
ENDOCRINOLOGY FOLLOW-UP         HISTORY OF PRESENT ILLNESS    Gill Mendez is seen in follow-up for the issues outlined below.     1.  Diabetes mellitus.    We increased Trulicity and discontinued Actos in 9/2022.  However, by the time of visit in 3/2023 Ms. Mendez was experiencing some hyperglycemia.  Since there were clear factors contributing to hyperglycemia (hip replacement with complications thereafter, hospitalization in 1/2023 for acute hypoxic respiratory failure due to COVID-19 infection and asthma exacerbation) we continued her diabetes regimen without changes.    Today, she notes that hyperglycemia has persisted.  Indeed, previsit labs show rising A1c.    She forgot her meter at home today but notes fasting glucose values have mostly been in the mid 100s (100 this morning) and glucose values in the evening have been in the 200s (270's last night).  No hypoglycemia in the past few months.    Has had skin reaction to adhesive from CGM in the past.    Current diabetes regimen: Lantus 35 units nightly, Trulicity  4.5 mg weekly, Jardiance 25 mg daily, metformin extended release 2000 mg daily.    2.  Goiter.  She has not noted a change in the feel of her neck.  She did not yet have a chance to schedule previsit thyroid ultrasound.    Pertinent endocrine and related history:  1.  Diabetes mellitus, type 2. Has previously followed with Dr. Quinteros in the [facility].  -Diabetes was diagnosed in 2005.  Has been complicated by diabetic retinopathy, neuropathy and nephropathy.  -Previously on prandial insulin: Found it difficult to take consistently.  Also prescribed Rybelsus in the past: This caused nausea.  Was on glipizide, this was discontinued due to escalating insulin requirement.  -Previously used freestyle lilia but had reaction to sensor adhesive.  2.  History of foot ulcer; has history of 2 toe amputations.  3. Thyroid nodule. Goiter palpated on exam in 7/2022.  -No  history of excessive head or  neck radiation exposure.  Family history is notable for mother who had thyroid nodules removed surgically: No thyroid cancer.  2 sisters also have thyroid nodules, no thyroid cancer.  -Thyroid US performed 7/13/2022 and showed a heterogenous gland with a 3.7 x 3 x 1.8 cm mixed cystic and solid nodule in the right lobe of the thyroid.  -Ultrasound-guided FNA of thyroid nodule on 7/20/2022, with benign cytology findings.    Pertinent Social History: , has 2 sons and a daughter.  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    PAST MEDICAL HISTORY  Past Medical History:   Diagnosis Date    Anxiety     Asthma     Cellulitis     Diabetes mellitus (H)     Essential hypertension     Created by Conversion  Replacement Utility updated for latest IMO load    Gastroparesis     Hyperlipidemia     Hypertension     Other and unspecified hyperlipidemia     Created by Conversion     PONV (postoperative nausea and vomiting)     Shortness of breath     Type II or unspecified type diabetes mellitus without mention of complication, not stated as uncontrolled     Created by Conversion        MEDICATIONS  Current Outpatient Medications   Medication Sig Dispense Refill    albuterol (PROVENTIL HFA;VENTOLIN HFA) 90 mcg/actuation inhaler [ALBUTEROL (PROVENTIL HFA;VENTOLIN HFA) 90 MCG/ACTUATION INHALER] Inhale 2 puffs every 6 (six) hours as needed for wheezing.      amLODIPine (NORVASC) 5 MG tablet Take 1 tablet by mouth daily      ammonium lactate (LAC-HYDRIN) 12 % external lotion Apply topically 2 times daily 225 g 3    aspirin 81 MG EC tablet Take 81 mg by mouth 2 times daily      atorvastatin (LIPITOR) 40 MG tablet Take 40 mg by mouth daily (Patient not taking: Reported on 3/15/2023)      blood glucose (NO BRAND SPECIFIED) test strip Use to test blood sugar 2 times daily as directed. 200 strip 3    blood glucose monitoring (NO BRAND  SPECIFIED) meter device kit Use to test blood sugar two times daily or as directed. 1 kit 0    chlorthalidone (HYGROTON) 25 MG tablet Take 1 tablet by mouth daily      citalopram (CELEXA) 20 MG tablet [CITALOPRAM (CELEXA) 20 MG TABLET] Take 20 mg by mouth every morning.   0    Dulaglutide (TRULICITY) 4.5 MG/0.5ML SOPN Inject 4.5 mg Subcutaneous once a week 6 mL 1    empagliflozin (JARDIANCE) 25 MG TABS tablet Take 1 tablet (25 mg) by mouth daily 30 tablet 0    fluticasone (FLOVENT HFA) 110 MCG/ACT inhaler 2 Puffs inhaled each morning. Rinse mouth with water after use to reduce aftertaste and incidence of candidiasis. Do not swallow.      insulin aspart (NOVOLOG PEN) 100 UNIT/ML pen Do Not give Correction Insulin if Pre-Meal BG less than 150.   For Pre-Meal  - 199 give 1 unit.   For Pre-Meal  - 249 give 2 units.   For Pre-Meal  - 299 give 3 units.   For Pre-Meal  - 349 give 4 units.   For Pre-Meal - 399 give 5 units.   For Pre-Meal -449 give 6 units  For Pre-Meal BG greater than or equal to 450 give 7 units. (Patient not taking: Reported on 3/15/2023) 15 mL 0    insulin glargine (LANTUS SOLOSTAR) 100 UNIT/ML pen Inject 35 Units Subcutaneous At Bedtime 30 mL 0    lisinopriL (PRINIVIL,ZESTRIL) 10 MG tablet [LISINOPRIL (PRINIVIL,ZESTRIL) 10 MG TABLET] Take 10 mg by mouth daily.      metFORMIN (GLUCOPHAGE XR) 500 MG 24 hr tablet Take 2,000 mg by mouth daily (with breakfast)      pramipexole (MIRAPEX) 0.25 MG tablet Take 0.5 mg by mouth At Bedtime  3       Allergies, family, and social history were reviewed and documented as needed in EHR.     REVIEW OF SYSTEMS  A focused ROS was performed, with pertinent positives and negatives as noted in the HPI.  Some lower extremity edema after hip surgery.    PHYSICAL EXAM  /72 (BP Location: Right arm, Patient Position: Sitting, Cuff Size: Adult Regular)   Pulse 100   Wt 81.2 kg (179 lb)   BMI 29.79 kg/m    Body mass index is 29.79  kg/m .  Constitutional: Vital signs reviewed, as recorded above. Patient is alert, oriented and appears in no acute distress.  Eyes: PER, EOMI, no stare, lid lag, or retraction; no conjunctival injection.  Cardiovascular: Bilateral lower extremity edema.  Respiratory: Normal chest wall motion and respiratory effort.  MSK: No clubbing or cyanosis; normal muscle bulk and tone.  Skin: No lesions on visible skin,  Neurological: Alert and oriented times 3. No tremor.    DATA REVIEW  Each of the following laboratory and/or imaging studies were reviewed.      Component      Latest Ref Rng 8/21/2023  8:21 AM   Sodium      136 - 145 mmol/L 139    Potassium      3.4 - 5.3 mmol/L 4.9    Chloride      98 - 107 mmol/L 101    Carbon Dioxide (CO2)      22 - 29 mmol/L 31 (H)    Anion Gap      7 - 15 mmol/L 7    Urea Nitrogen      8.0 - 23.0 mg/dL 38.1 (H)    Creatinine      0.51 - 1.17 mg/dL 1.06    Calcium      8.8 - 10.2 mg/dL 9.6    Glucose      70 - 99 mg/dL 147 (H)    Alkaline Phosphatase      35 - 129 U/L 98    AST      0 - 45 U/L 26    ALT      0 - 70 U/L 23    Protein Total      6.4 - 8.3 g/dL 6.9    Albumin      3.5 - 5.2 g/dL 4.2    Bilirubin Total      <=1.2 mg/dL 0.2    GFR Estimate      >60 mL/min/1.73m2 59 (L)    Cholesterol      <200 mg/dL 166    Triglycerides      <150 mg/dL 78    HDL Cholesterol      >=40 mg/dL 72    LDL Cholesterol Calculated      <=100 mg/dL 78    Non HDL Cholesterol      <130 mg/dL 94    Hemoglobin A1C      0.0 - 5.6 % 10.5 (H)    TSH      0.30 - 4.20 uIU/mL 0.92       Legend:  (H) High  (L) Low  ASSESSMENT  1.  Diabetes mellitus, type 2.  Hemoglobin A1c has risen further.  Would recommend increasing basal insulin and also changing to a GLP-1 receptor/GIP agonist in hopes of intensifying management of postprandial hyperglycemia.  If she has persistent hyperglycemia despite maximizing this regimen, we would need to consider mealtime insulin.  We will transition to a moderate dose of Mounjaro but  I have asked patient to update me if she notices GI side effects: In this case, we can reduce dose.  We will continue remainder of medications without changes and plan for follow-up with diabetes care and  in 4 to 6 weeks, follow-up with me in 3 months.    2.  Diabetes preventive care.  -History of diabetic retinopathy, I reviewed records of eye exam performed on 10/17/2022: Proliferative diabetic retinopathy, no diabetic macular edema, status post PRP and Avastin (continues on Avastin)  -CKD and microalbuminuria on urine microalbumin screen on 4/21/2022, on lisinopril; blood pressure optimally controlled.  We had plan to check urine microalbumin screen prior to this visit but it does not appear to have been completed.  -Foot exam performed 4/27/2022: Foot care discussed in detail at that visit; will need foot exam at future visits (we did not have time to address this today)    3.  Dyslipidemia.  On statin therapy, optimal lipid profile on labs drawn prior to this visit.    4.  Goiter, with thyroid nodule.  Asymmetric thyroid, with prominence of the right lobe of the thyroid noted on exam in 7/2022; thyroid ultrasound revealed a right lobe nodule, which was aspirated on 7/20/2022.  Cytology was benign.  Previsit labs show normal TSH.    We had planned for thyroid ultrasound prior to this visit but it does not appear that Ms. Mendez had a chance to complete this: We will anticipate ultrasound prior to next visit.    PLAN  -Increase Lantus 40 units daily  -Stop Trulicity   -When next due for Trulicity injection, can switch to Mounjaro 7.5 mg weekly  -We will plan to increase dose of Mounjaro every month if tolerance--please update me via MyChart on tolerance of medication and we can keep adjusting dose up  -Continue remainder of medications without changes   -Check blood glucose twice daily, fasting and before evening meal; also check with any unusual symptoms  -Follow-up with Radha Rodriguez in about  4-6 weeks  -Follow-up in November 2023 as scheduled: Please have labs and thyroid ultrasound before visit  -We will communicate results via MyChart, or if needed by phone      Orders Placed This Encounter   Procedures    Hemoglobin A1c    Comprehensive metabolic panel    Albumin Random Urine Quantitative with Creat Ratio       I spent a total of 31 minutes on the date of encounter reviewing medical records, evaluating the patient, coordinating care and documenting in the EHR, as detailed above.      Levy Lake MD   Division of Diabetes, Endocrinology and Metabolism  Department of Medicine

## 2023-09-05 ENCOUNTER — HOSPITAL ENCOUNTER (OUTPATIENT)
Dept: ULTRASOUND IMAGING | Facility: CLINIC | Age: 63
Discharge: HOME | End: 2023-09-05
Attending: INTERNAL MEDICINE | Admitting: INTERNAL MEDICINE
Payer: COMMERCIAL

## 2023-09-05 DIAGNOSIS — E04.1 THYROID NODULE: ICD-10-CM

## 2023-09-05 PROCEDURE — 76536 US EXAM OF HEAD AND NECK: CPT

## 2023-09-13 ENCOUNTER — MYC MEDICAL ADVICE (OUTPATIENT)
Dept: ENDOCRINOLOGY | Facility: CLINIC | Age: 63
End: 2023-09-13
Payer: COMMERCIAL

## 2023-09-13 DIAGNOSIS — E11.65 TYPE 2 DIABETES MELLITUS WITH HYPERGLYCEMIA, WITH LONG-TERM CURRENT USE OF INSULIN (H): Primary | ICD-10-CM

## 2023-09-13 DIAGNOSIS — Z79.4 TYPE 2 DIABETES MELLITUS WITH HYPERGLYCEMIA, WITH LONG-TERM CURRENT USE OF INSULIN (H): Primary | ICD-10-CM

## 2023-09-14 RX ORDER — TIRZEPATIDE 10 MG/.5ML
10 INJECTION, SOLUTION SUBCUTANEOUS
Qty: 2 ML | Refills: 1 | Status: SHIPPED | OUTPATIENT
Start: 2023-09-14 | End: 2024-03-13

## 2023-10-05 DIAGNOSIS — Z79.4 TYPE 2 DIABETES MELLITUS WITH HYPERGLYCEMIA, WITH LONG-TERM CURRENT USE OF INSULIN (H): Primary | ICD-10-CM

## 2023-10-05 DIAGNOSIS — E11.65 TYPE 2 DIABETES MELLITUS WITH HYPERGLYCEMIA, WITH LONG-TERM CURRENT USE OF INSULIN (H): Primary | ICD-10-CM

## 2023-10-06 ENCOUNTER — ALLIED HEALTH/NURSE VISIT (OUTPATIENT)
Dept: EDUCATION SERVICES | Facility: CLINIC | Age: 63
End: 2023-10-06
Payer: COMMERCIAL

## 2023-10-06 VITALS — BODY MASS INDEX: 28.79 KG/M2 | WEIGHT: 173 LBS

## 2023-10-06 DIAGNOSIS — E11.65 TYPE 2 DIABETES MELLITUS WITH HYPERGLYCEMIA, WITH LONG-TERM CURRENT USE OF INSULIN (H): ICD-10-CM

## 2023-10-06 DIAGNOSIS — Z79.4 TYPE 2 DIABETES MELLITUS WITH HYPERGLYCEMIA, WITH LONG-TERM CURRENT USE OF INSULIN (H): ICD-10-CM

## 2023-10-06 PROCEDURE — G0108 DIAB MANAGE TRN  PER INDIV: HCPCS

## 2023-10-06 NOTE — LETTER
10/6/2023         RE: Gill Mendez  [address] [facility] [address]        Dear Colleague,    Thank you for referring your patient, Gill Mendez, to the [facility]. Please see a copy of my visit note below.    Diabetes Self-Management Education & Support    Presents for:      Type of Service: In Person Visit    Assessment Type:   ASSESSMENT:  Mayelin is here alone today for her follow up on Diabetes.  Stated she is feeling good about her recent success.    She is currently taking Lantus 25 units in the evening, Mounjaro 10mg weekly, Jardiance 25 mg in the morning and Metformin 2000mg daily.  Stated she has not needed to take Novolog for some time.  Since increasing to Mounjaro 10mg weekly she has not adjusted her Lantus dose.  Stated she does feel some nausea the day after taking her Mounjaro, this improves the next day.    She is checking her glucose 1-2 times daily.  Her most recent readings are noted below:  119/169-no insulin last night  116/122/153  130/149  152  147  114/163  152  135/111  123  122/106  128/138  She is concerned about having lower readings overnight.  We discussed goal of bedtime glucose is 100-150.  Discussed decreasing her Lantus to 20 units.  If her fasting glucose starts to increase, above 130, then reach out.  May need to increase dose.    Mayelin is having surgery on her hip again in December.  Stated she is hoping this will decrease her pain.  Stated most days her pain is at a 8/10 when she is sitting and walking.  Stated her surgeon told her to start using  a stationary bike to help strengthen her muscles.  She picked the bike from her parents this week and plans to start soon.    All additional questions and concerns addressed today.    Patient's most recent   Lab Results   Component Value Date    A1C 10.5 08/21/2023     is not meeting goal of <8.0    Diabetes knowledge and skills assessment:   Patient is knowledgeable in diabetes  "management concepts related to: Monitoring and Taking Medication    Continue education with the following diabetes management concepts: Healthy Eating and Taking Medication    Based on learning assessment above, most appropriate setting for further diabetes education would be: Individual setting.      PLAN    Mayelin plans on working on her meal planning and activity.  Decrease Lantus dose to 20 units.  Call if she is having glucose below 80 in the morning or 100 before bed.  Topics to cover at upcoming visits: Healthy Eating, Being Active, and Monitoring    Follow-up: PRN after surgery    See Care Plan for co-developed, patient-state behavior change goals.  AVS provided for patient today.    Education Materials Provided:  No new materials provided today      SUBJECTIVE/OBJECTIVE:     Cultural Influences/Ethnic Background:  Not  or       Diabetes Symptoms & Complications:          Patient Problem List and Family Medical History reviewed for relevant medical history, current medical status, and diabetes risk factors.    Vitals:  Wt 78.5 kg (173 lb)   BMI 28.79 kg/m    Estimated body mass index is 28.79 kg/m  as calculated from the following:    Height as of 1/2/23: 1.651 m (5' 5\").    Weight as of this encounter: 78.5 kg (173 lb).   Last 3 BP:   BP Readings from Last 3 Encounters:   08/23/23 130/72   03/15/23 122/68   01/02/23 128/60       History   Smoking Status    Never   Smokeless Tobacco    Never       Labs:  Lab Results   Component Value Date    A1C 10.5 08/21/2023     Lab Results   Component Value Date     08/21/2023     01/02/2023     01/02/2023     Lab Results   Component Value Date    LDL 78 08/21/2023    .0 07/23/2009     Direct Measure HDL   Date Value Ref Range Status   08/21/2023 72 >=40 mg/dL Final   ]  GFR Estimate   Date Value Ref Range Status   08/21/2023 59 (L) >60 mL/min/1.73m2 Final     Comment:     The generation of the estimated GFR is currently based on " "binary male or female sex. If the electronic health record information indicates another gender identity or if Legal Sex is recorded as \"Unknown\", GFR estimates are not automatically calculated, and application of GFR equations or a direct GFR measurement should be considered according to the individual's appropriate clinical context.   03/22/2021 43 (L) >60 mL/min/1.73m2 Final     GFR Estimate If Black   Date Value Ref Range Status   03/22/2021 53 (L) >60 mL/min/1.73m2 Final     Lab Results   Component Value Date    CR 1.06 08/21/2023     No results found for: MICROALBUMIN    Healthy Eating:       Being Active:       Monitoring:           Taking Medications:  Diabetes Medication(s)       Biguanides       metFORMIN (GLUCOPHAGE XR) 500 MG 24 hr tablet    Take 2,000 mg by mouth daily (with breakfast)      Insulin       insulin glargine (LANTUS SOLOSTAR) 100 UNIT/ML pen    Inject 40 Units Subcutaneous At Bedtime     Patient taking differently: Inject 25 Units Subcutaneous at bedtime     insulin aspart (NOVOLOG PEN) 100 UNIT/ML pen    Do Not give Correction Insulin if Pre-Meal BG less than 150.   For Pre-Meal  - 199 give 1 unit.   For Pre-Meal  - 249 give 2 units.   For Pre-Meal  - 299 give 3 units.   For Pre-Meal  - 349 give 4 units.   For Pre-Meal - 399 give 5 units.   For Pre-Meal -449 give 6 units  For Pre-Meal BG greater than or equal to 450 give 7 units.     Patient not taking: Reported on 10/6/2023      Sodium-Glucose Co-Transporter 2 (SGLT2) Inhibitors       empagliflozin (JARDIANCE) 25 MG TABS tablet    Take 1 tablet (25 mg) by mouth daily      Incretin Mimetic Agents       tirzepatide (MOUNJARO) 10 MG/0.5ML pen    Inject 10 mg Subcutaneous every 7 days                 Problem Solving:                 Reducing Risks:       Healthy Coping:     Patient Activation Measure Survey Score:       No data to display                  Care Plan and Education Provided:  Care Plan: " Diabetes   Updates made by Pilar Rodriguez RN since 10/10/2023 12:00 AM        Problem: HbA1C Not In Goal         Goal: Establish Regular Follow-Ups with PCP         Task: Discuss with PCP the recommended timing for patient's next follow up visit(s)    Responsible User: Pilar Rodriguez RN        Task: Discuss schedule for PCP visits with patient    Responsible User: Pilar Rodriguez RN        Goal: Get HbA1C Level in Goal         Task: Educate patient on diabetes education self-management topics    Responsible User: Pilar Rodriguez RN        Task: Educate patient on benefits of regular glucose monitoring    Responsible User: Pilar Rodriguez RN        Task: Refer patient to appropriate extended care team member, as needed (Medication Therapy Management, Behavioral Health, Physical Therapy, etc.)    Responsible User: Pilar Rodriguez RN        Task: Discuss diabetes treatment plan with patient    Responsible User: Pilar Rodriguez RN        Problem: Diabetes Self-Management Education Needed to Optimize Self-Care Behaviors         Goal: Understand diabetes pathophysiology and disease progression         Task: Provide education on diabetes pathophysiology and disease progression specfic to patient's diabetes type    Responsible User: Pilar Rodriguez RN        Goal: Healthy Eating - follow a healthy eating pattern for diabetes         Task: Provide education on portion control and consistency in amount, composition and timing of food intake    Responsible User: Pilar Rodriguez RN        Task: Provide education on managing carbohydrate intake (carbohydrate counting, plate planning method, etc.)    Responsible User: Pilar Rodriguez RN        Task: Provide education on weight management    Responsible User: Pilar Rodriguez RN        Task: Provide education on heart healthy eating    Responsible User: Pilar Rodriguez RN        Task: Provide education on eating out    Responsible  User: Pilar Rodriguez RN        Task: Develop individualized healthy eating plan with patient    Responsible User: Pilar Rodriguez RN        Goal: Being Active - get regular physical activity, working up to at least 150 minutes per week    Start Date: 10/6/2023   This Visit's Progress: 0%   Note:    Start working with bike, increasing time as able.       Task: Provide education on relationship of activity to glucose and precautions to take if at risk for low glucose    Responsible User: Pilar Rodriguez RN        Task: Discuss barriers to physical activity with patient    Responsible User: Pilar Rodriguez RN        Task: Develop physical activity plan with patient    Responsible User: Pilar Rodriguez RN        Task: Explore community resources including walking groups, assistance programs, and home videos    Responsible User: Pilar Rodriguez RN        Goal: Monitoring - monitor glucose and ketones as directed         Task: Provide education on blood glucose monitoring (purpose, proper technique, frequency, glucose targets, interpreting results, when to use glucose control solution, sharps disposal)    Responsible User: Pilar Rodriguez RN        Task: Provide education on continuous glucose monitoring (sensor placement, use of annamarie or /reader, understanding glucose trends, alerts and alarms, differences between sensor glucose and blood glucose)    Responsible User: Pilar Rodriguez RN        Task: Provide education on ketone monitoring (when to monitor, frequency, etc.)    Responsible User: Pilar Rodriguez RN        Goal: Taking Medication - patient is consistently taking medications as directed         Task: Provide education on action of prescribed medication, including when to take and possible side effects    Responsible User: Pilar Rodriguez RN        Task: Provide education on insulin and injectable diabetes medications, including administration, storage, site  selection and rotation for injection sites    Responsible User: Pilar Rodriguez RN        Task: Discuss barriers to medication adherence with patient and provide management technique ideas as appropriate    Responsible User: Pilar Rodriguez RN        Task: Provide education on frequency and refill details of medications    Responsible User: Pilar Rodriguez RN        Goal: Problem Solving - know how to prevent and manage short-term diabetes complications         Task: Provide education on high blood glucose - causes, signs/symptoms, prevention and treatment    Responsible User: Pilar Rodriguez RN        Task: Provide education on low blood glucose - causes, signs/symptoms, prevention, treatment, carrying a carbohydrate source at all times, and medical identification    Responsible User: Pilar Rodriguez RN        Task: Provide education on safe travel with diabetes    Responsible User: Pilar Rodriguez RN        Task: Provide education on how to care for diabetes on sick days    Responsible User: Pilar Rodriguez RN        Task: Provide education on when to call a health care provider    Responsible User: Pilar Rodriguez RN        Goal: Reducing Risks - know how to prevent and treat long-term diabetes complications         Task: Provide education on major complications of diabetes, prevention, early diagnostic measures and treatment of complications    Responsible User: Pilar Rodriguez RN        Task: Provide education on recommended care for dental, eye and foot health    Responsible User: Pilar Rodriguez RN        Task: Provide education on Hemoglobin A1c - goals and relationship to blood glucose levels    Responsible User: Pilar Rodriguez RN        Task: Provide education on recommendations for heart health - lipid levels and goals, blood pressure and goals, and aspirin therapy, if indicated    Responsible User: Pilar Rodriguez RN        Task: Provide education on  tobacco cessation    Responsible User: Pilar Rodriguez RN        Goal: Healthy Coping - use available resources to cope with the challenges of managing diabetes         Task: Discuss recognizing feelings about having diabetes    Responsible User: Pilar Rodriguez RN        Task: Provide education on the benefits of making appropriate lifestyle changes    Responsible User: Pilar Rodriguez RN        Task: Provide education on benefits of utilizing support systems    Responsible User: Pilar Rodriguez RN        Task: Discuss methods for coping with stress    Responsible User: Pilar Rodriguez RN        Task: Provide education on when to seek professional counseling    Responsible User: Pilar Rodriguez RN           The service provided today was under the supervising provider, Jarad Mccoy, who was available if needed.       Time Spent: 30 minutes  Encounter Type: Individual    Any diabetes medication dose changes were made via the CDE Protocol per the patient's referring provider. A copy of this encounter was shared with the provider.

## 2023-10-06 NOTE — CONFIDENTIAL NOTE
Having surgery again        Avocado toast  Nuts bar  Yogurt  Not real hungry  Sick to stomach day after    8/10  Sitting walking

## 2023-10-06 NOTE — PROGRESS NOTES
Diabetes Self-Management Education & Support    Presents for:      Type of Service: In Person Visit    Assessment Type:   ASSESSMENT:  Mayelin is here alone today for her follow up on Diabetes.  Stated she is feeling good about her recent success.    She is currently taking Lantus 25 units in the evening, Mounjaro 10mg weekly, Jardiance 25 mg in the morning and Metformin 2000mg daily.  Stated she has not needed to take Novolog for some time.  Since increasing to Mounjaro 10mg weekly she has not adjusted her Lantus dose.  Stated she does feel some nausea the day after taking her Mounjaro, this improves the next day.    She is checking her glucose 1-2 times daily.  Her most recent readings are noted below:  119/169-no insulin last night  116/122/153  130/149  152  147  114/163  152  135/111  123  122/106  128/138  She is concerned about having lower readings overnight.  We discussed goal of bedtime glucose is 100-150.  Discussed decreasing her Lantus to 20 units.  If her fasting glucose starts to increase, above 130, then reach out.  May need to increase dose.    Mayelin is having surgery on her hip again in December.  Stated she is hoping this will decrease her pain.  Stated most days her pain is at a 8/10 when she is sitting and walking.  Stated her surgeon told her to start using  a stationary bike to help strengthen her muscles.  She picked the bike from her parents this week and plans to start soon.    All additional questions and concerns addressed today.    Patient's most recent   Lab Results   Component Value Date    A1C 10.5 08/21/2023     is not meeting goal of <8.0    Diabetes knowledge and skills assessment:   Patient is knowledgeable in diabetes management concepts related to: Monitoring and Taking Medication    Continue education with the following diabetes management concepts: Healthy Eating and Taking Medication    Based on learning assessment above, most appropriate setting for further diabetes education  "would be: Individual setting.      PLAN    Mayelin plans on working on her meal planning and activity.  Decrease Lantus dose to 20 units.  Call if she is having glucose below 80 in the morning or 100 before bed.  Topics to cover at upcoming visits: Healthy Eating, Being Active, and Monitoring    Follow-up: PRN after surgery    See Care Plan for co-developed, patient-state behavior change goals.  AVS provided for patient today.    Education Materials Provided:  No new materials provided today      SUBJECTIVE/OBJECTIVE:     Cultural Influences/Ethnic Background:  Not  or       Diabetes Symptoms & Complications:          Patient Problem List and Family Medical History reviewed for relevant medical history, current medical status, and diabetes risk factors.    Vitals:  Wt 78.5 kg (173 lb)   BMI 28.79 kg/m    Estimated body mass index is 28.79 kg/m  as calculated from the following:    Height as of 1/2/23: 1.651 m (5' 5\").    Weight as of this encounter: 78.5 kg (173 lb).   Last 3 BP:   BP Readings from Last 3 Encounters:   08/23/23 130/72   03/15/23 122/68   01/02/23 128/60       History   Smoking Status    Never   Smokeless Tobacco    Never       Labs:  Lab Results   Component Value Date    A1C 10.5 08/21/2023     Lab Results   Component Value Date     08/21/2023     01/02/2023     01/02/2023     Lab Results   Component Value Date    LDL 78 08/21/2023    .0 07/23/2009     Direct Measure HDL   Date Value Ref Range Status   08/21/2023 72 >=40 mg/dL Final   ]  GFR Estimate   Date Value Ref Range Status   08/21/2023 59 (L) >60 mL/min/1.73m2 Final     Comment:     The generation of the estimated GFR is currently based on binary male or female sex. If the electronic health record information indicates another gender identity or if Legal Sex is recorded as \"Unknown\", GFR estimates are not automatically calculated, and application of GFR equations or a direct GFR measurement should be " considered according to the individual's appropriate clinical context.   03/22/2021 43 (L) >60 mL/min/1.73m2 Final     GFR Estimate If Black   Date Value Ref Range Status   03/22/2021 53 (L) >60 mL/min/1.73m2 Final     Lab Results   Component Value Date    CR 1.06 08/21/2023     No results found for: MICROALBUMIN    Healthy Eating:       Being Active:       Monitoring:           Taking Medications:  Diabetes Medication(s)       Biguanides       metFORMIN (GLUCOPHAGE XR) 500 MG 24 hr tablet    Take 2,000 mg by mouth daily (with breakfast)      Insulin       insulin glargine (LANTUS SOLOSTAR) 100 UNIT/ML pen    Inject 40 Units Subcutaneous At Bedtime     Patient taking differently: Inject 25 Units Subcutaneous at bedtime     insulin aspart (NOVOLOG PEN) 100 UNIT/ML pen    Do Not give Correction Insulin if Pre-Meal BG less than 150.   For Pre-Meal  - 199 give 1 unit.   For Pre-Meal  - 249 give 2 units.   For Pre-Meal  - 299 give 3 units.   For Pre-Meal  - 349 give 4 units.   For Pre-Meal - 399 give 5 units.   For Pre-Meal -449 give 6 units  For Pre-Meal BG greater than or equal to 450 give 7 units.     Patient not taking: Reported on 10/6/2023      Sodium-Glucose Co-Transporter 2 (SGLT2) Inhibitors       empagliflozin (JARDIANCE) 25 MG TABS tablet    Take 1 tablet (25 mg) by mouth daily      Incretin Mimetic Agents       tirzepatide (MOUNJARO) 10 MG/0.5ML pen    Inject 10 mg Subcutaneous every 7 days                 Problem Solving:                 Reducing Risks:       Healthy Coping:     Patient Activation Measure Survey Score:       No data to display                  Care Plan and Education Provided:  Care Plan: Diabetes   Updates made by Pialr Rodriguez RN since 10/10/2023 12:00 AM        Problem: HbA1C Not In Goal         Goal: Establish Regular Follow-Ups with PCP         Task: Discuss with PCP the recommended timing for patient's next follow up visit(s)    Responsible  User: Pilar Rodriguez RN        Task: Discuss schedule for PCP visits with patient    Responsible User: Pilar Rodriguez RN        Goal: Get HbA1C Level in Goal         Task: Educate patient on diabetes education self-management topics    Responsible User: Pilar Rodriguez RN        Task: Educate patient on benefits of regular glucose monitoring    Responsible User: Pilar Rodriguez RN        Task: Refer patient to appropriate extended care team member, as needed (Medication Therapy Management, Behavioral Health, Physical Therapy, etc.)    Responsible User: Pilar Rodriguez RN        Task: Discuss diabetes treatment plan with patient    Responsible User: Pilar Rodrgiuez RN        Problem: Diabetes Self-Management Education Needed to Optimize Self-Care Behaviors         Goal: Understand diabetes pathophysiology and disease progression         Task: Provide education on diabetes pathophysiology and disease progression specfic to patient's diabetes type    Responsible User: Pilar Rodriguez RN        Goal: Healthy Eating - follow a healthy eating pattern for diabetes         Task: Provide education on portion control and consistency in amount, composition and timing of food intake    Responsible User: Pilar Rodriguez RN        Task: Provide education on managing carbohydrate intake (carbohydrate counting, plate planning method, etc.)    Responsible User: Pilar Rodriguez RN        Task: Provide education on weight management    Responsible User: Pilar Rodriguez RN        Task: Provide education on heart healthy eating    Responsible User: Pilar Rodriguez RN        Task: Provide education on eating out    Responsible User: Pilar Rodriguez RN        Task: Develop individualized healthy eating plan with patient    Responsible User: Pilar Rodriguez RN        Goal: Being Active - get regular physical activity, working up to at least 150 minutes per week    Start Date: 10/6/2023    This Visit's Progress: 0%   Note:    Start working with bike, increasing time as able.       Task: Provide education on relationship of activity to glucose and precautions to take if at risk for low glucose    Responsible User: Pilar Rodriguez RN        Task: Discuss barriers to physical activity with patient    Responsible User: Pilar Rodriguez RN        Task: Develop physical activity plan with patient    Responsible User: Pilar Rodriguez RN        Task: Explore community resources including walking groups, assistance programs, and home videos    Responsible User: Pilar Rodriguez RN        Goal: Monitoring - monitor glucose and ketones as directed         Task: Provide education on blood glucose monitoring (purpose, proper technique, frequency, glucose targets, interpreting results, when to use glucose control solution, sharps disposal)    Responsible User: Pilar Rodriguez RN        Task: Provide education on continuous glucose monitoring (sensor placement, use of annamarie or /reader, understanding glucose trends, alerts and alarms, differences between sensor glucose and blood glucose)    Responsible User: Pilar Rodriguez RN        Task: Provide education on ketone monitoring (when to monitor, frequency, etc.)    Responsible User: Pilar Rodriguez RN        Goal: Taking Medication - patient is consistently taking medications as directed         Task: Provide education on action of prescribed medication, including when to take and possible side effects    Responsible User: Pilar Rodriguez RN        Task: Provide education on insulin and injectable diabetes medications, including administration, storage, site selection and rotation for injection sites    Responsible User: Pilar Rodriguez RN        Task: Discuss barriers to medication adherence with patient and provide management technique ideas as appropriate    Responsible User: Pilar Rodriguez RN        Task: Provide  education on frequency and refill details of medications    Responsible User: Pilar Rodriguez RN        Goal: Problem Solving - know how to prevent and manage short-term diabetes complications         Task: Provide education on high blood glucose - causes, signs/symptoms, prevention and treatment    Responsible User: Pilar Rodriguez RN        Task: Provide education on low blood glucose - causes, signs/symptoms, prevention, treatment, carrying a carbohydrate source at all times, and medical identification    Responsible User: Pilar Rodriguez RN        Task: Provide education on safe travel with diabetes    Responsible User: Pilar Rodriguez RN        Task: Provide education on how to care for diabetes on sick days    Responsible User: Pilar Rodriguez RN        Task: Provide education on when to call a health care provider    Responsible User: Pilar Rodriguez RN        Goal: Reducing Risks - know how to prevent and treat long-term diabetes complications         Task: Provide education on major complications of diabetes, prevention, early diagnostic measures and treatment of complications    Responsible User: Pilar Rodriguez RN        Task: Provide education on recommended care for dental, eye and foot health    Responsible User: Pilar Rodriguez RN        Task: Provide education on Hemoglobin A1c - goals and relationship to blood glucose levels    Responsible User: Pilar Rodriguez RN        Task: Provide education on recommendations for heart health - lipid levels and goals, blood pressure and goals, and aspirin therapy, if indicated    Responsible User: Pilar Rodriguez RN        Task: Provide education on tobacco cessation    Responsible User: Pilar Rodriguez RN        Goal: Healthy Coping - use available resources to cope with the challenges of managing diabetes         Task: Discuss recognizing feelings about having diabetes    Responsible User: Pilar Rodriguez RN         Task: Provide education on the benefits of making appropriate lifestyle changes    Responsible User: Pliar Rodriguez RN        Task: Provide education on benefits of utilizing support systems    Responsible User: Pilar Rodriguez RN        Task: Discuss methods for coping with stress    Responsible User: Pilar Rodriguez RN        Task: Provide education on when to seek professional counseling    Responsible User: Pilar Rodriguez RN           The service provided today was under the supervising provider, Jarad Mccoy, who was available if needed.       Time Spent: 30 minutes  Encounter Type: Individual    Any diabetes medication dose changes were made via the CDE Protocol per the patient's referring provider. A copy of this encounter was shared with the provider.

## 2023-10-16 ENCOUNTER — LAB (OUTPATIENT)
Dept: LAB | Facility: CLINIC | Age: 63
End: 2023-10-16
Payer: COMMERCIAL

## 2023-10-16 DIAGNOSIS — Z79.4 TYPE 2 DIABETES MELLITUS WITH HYPERGLYCEMIA, WITH LONG-TERM CURRENT USE OF INSULIN (H): ICD-10-CM

## 2023-10-16 DIAGNOSIS — E11.65 TYPE 2 DIABETES MELLITUS WITH HYPERGLYCEMIA, WITH LONG-TERM CURRENT USE OF INSULIN (H): ICD-10-CM

## 2023-10-16 LAB
ALBUMIN SERPL BCG-MCNC: 4.1 G/DL (ref 3.5–5.2)
ALP SERPL-CCNC: 88 U/L (ref 35–129)
ALT SERPL W P-5'-P-CCNC: 18 U/L (ref 0–70)
ANION GAP SERPL CALCULATED.3IONS-SCNC: 10 MMOL/L (ref 7–15)
AST SERPL W P-5'-P-CCNC: 22 U/L (ref 0–45)
BILIRUB SERPL-MCNC: 0.2 MG/DL
BUN SERPL-MCNC: 20.7 MG/DL (ref 8–23)
CALCIUM SERPL-MCNC: 9.7 MG/DL (ref 8.8–10.2)
CHLORIDE SERPL-SCNC: 101 MMOL/L (ref 98–107)
CREAT SERPL-MCNC: 0.97 MG/DL (ref 0.51–1.17)
CREAT UR-MCNC: 49 MG/DL
DEPRECATED HCO3 PLAS-SCNC: 29 MMOL/L (ref 22–29)
EGFRCR SERPLBLD CKD-EPI 2021: 66 ML/MIN/1.73M2
GLUCOSE SERPL-MCNC: 216 MG/DL (ref 70–99)
HBA1C MFR BLD: 8.2 % (ref 0–5.6)
MICROALBUMIN UR-MCNC: 24.6 MG/L
MICROALBUMIN/CREAT UR: 50.2 MG/G CR (ref 0–25)
POTASSIUM SERPL-SCNC: 4.1 MMOL/L (ref 3.4–5.3)
PROT SERPL-MCNC: 7.1 G/DL (ref 6.4–8.3)
SODIUM SERPL-SCNC: 140 MMOL/L (ref 135–145)

## 2023-10-16 PROCEDURE — 80053 COMPREHEN METABOLIC PANEL: CPT

## 2023-10-16 PROCEDURE — 82043 UR ALBUMIN QUANTITATIVE: CPT

## 2023-10-16 PROCEDURE — 82570 ASSAY OF URINE CREATININE: CPT

## 2023-10-16 PROCEDURE — 36415 COLL VENOUS BLD VENIPUNCTURE: CPT

## 2023-10-16 PROCEDURE — 83036 HEMOGLOBIN GLYCOSYLATED A1C: CPT

## 2023-10-23 ENCOUNTER — TELEPHONE (OUTPATIENT)
Dept: ENDOCRINOLOGY | Facility: CLINIC | Age: 63
End: 2023-10-23
Payer: COMMERCIAL

## 2023-10-23 NOTE — TELEPHONE ENCOUNTER
Original fax emailed to [facility] team RN.   Casi Wilhelm RN on 10/23/2023 at 2:56 PM                    Colchicine Counseling:  Patient counseled regarding adverse effects including but not limited to stomach upset (nausea, vomiting, stomach pain, or diarrhea).  Patient instructed to limit alcohol consumption while taking this medication.  Colchicine may reduce blood counts especially with prolonged use.  The patient understands that monitoring of kidney function and blood counts may be required, especially at baseline. The patient verbalized understanding of the proper use and possible adverse effects of colchicine.  All of the patient's questions and concerns were addressed.

## 2023-10-31 ENCOUNTER — MYC MEDICAL ADVICE (OUTPATIENT)
Dept: ENDOCRINOLOGY | Facility: CLINIC | Age: 63
End: 2023-10-31
Payer: COMMERCIAL

## 2023-11-22 ENCOUNTER — LAB (OUTPATIENT)
Dept: LAB | Facility: CLINIC | Age: 63
End: 2023-11-22
Payer: COMMERCIAL

## 2023-11-22 DIAGNOSIS — E11.65 TYPE 2 DIABETES MELLITUS WITH HYPERGLYCEMIA, WITH LONG-TERM CURRENT USE OF INSULIN (H): ICD-10-CM

## 2023-11-22 DIAGNOSIS — Z79.4 TYPE 2 DIABETES MELLITUS WITH HYPERGLYCEMIA, WITH LONG-TERM CURRENT USE OF INSULIN (H): ICD-10-CM

## 2023-11-22 LAB — HBA1C MFR BLD: 7.2 % (ref 0–5.6)

## 2023-11-22 PROCEDURE — 36415 COLL VENOUS BLD VENIPUNCTURE: CPT

## 2023-11-22 PROCEDURE — 83036 HEMOGLOBIN GLYCOSYLATED A1C: CPT

## 2023-11-28 DIAGNOSIS — E11.65 TYPE 2 DIABETES MELLITUS WITH HYPERGLYCEMIA, WITH LONG-TERM CURRENT USE OF INSULIN (H): ICD-10-CM

## 2023-11-28 DIAGNOSIS — Z79.4 TYPE 2 DIABETES MELLITUS WITH HYPERGLYCEMIA, WITH LONG-TERM CURRENT USE OF INSULIN (H): ICD-10-CM

## 2023-11-29 ENCOUNTER — OFFICE VISIT (OUTPATIENT)
Dept: ENDOCRINOLOGY | Facility: CLINIC | Age: 63
End: 2023-11-29
Payer: COMMERCIAL

## 2023-11-29 VITALS
SYSTOLIC BLOOD PRESSURE: 122 MMHG | HEART RATE: 96 BPM | BODY MASS INDEX: 28.29 KG/M2 | DIASTOLIC BLOOD PRESSURE: 64 MMHG | WEIGHT: 170 LBS

## 2023-11-29 DIAGNOSIS — Z79.4 TYPE 2 DIABETES MELLITUS WITH HYPERGLYCEMIA, WITH LONG-TERM CURRENT USE OF INSULIN (H): Primary | ICD-10-CM

## 2023-11-29 DIAGNOSIS — E04.1 THYROID NODULE: ICD-10-CM

## 2023-11-29 DIAGNOSIS — E11.65 TYPE 2 DIABETES MELLITUS WITH HYPERGLYCEMIA, WITH LONG-TERM CURRENT USE OF INSULIN (H): Primary | ICD-10-CM

## 2023-11-29 PROCEDURE — 99214 OFFICE O/P EST MOD 30 MIN: CPT | Performed by: INTERNAL MEDICINE

## 2023-11-29 RX ORDER — MULTIVITAMIN WITH IRON
1 TABLET ORAL DAILY
COMMUNITY

## 2023-11-29 RX ORDER — TIRZEPATIDE 12.5 MG/.5ML
12.5 INJECTION, SOLUTION SUBCUTANEOUS
Qty: 2 ML | Refills: 3 | Status: SHIPPED | OUTPATIENT
Start: 2023-11-29 | End: 2024-03-13 | Stop reason: DRUGHIGH

## 2023-11-29 RX ORDER — TIRZEPATIDE 10 MG/.5ML
10 INJECTION, SOLUTION SUBCUTANEOUS
Qty: 2 ML | Refills: 1 | OUTPATIENT
Start: 2023-11-29

## 2023-11-29 RX ORDER — TRAZODONE HYDROCHLORIDE 50 MG/1
50 TABLET, FILM COATED ORAL AT BEDTIME
COMMUNITY

## 2023-11-29 NOTE — PATIENT INSTRUCTIONS
-Reduce Lantus to 30 units daily  -Increase Mounjaro to 12.5 mg weekly  -Continue remainder of medications without changes   Prior to surgery:  1. Hold metformin day of surgery  2. Hold Jardiance 3 days before surgery  3. Hold Mounjaro 1 week before surgery  4. Reduce Lantus to 24 units the night before surgery  -Check blood glucose twice daily, fasting and before evening meal; also check with any unusual symptoms  -Follow-up in March 2024 as scheduled, with labs before visit  -We will communicate results via [employer], or if needed by phone

## 2023-11-29 NOTE — LETTER
11/29/2023         RE: Gill Mendez  [address] [facility] [address]        Dear Colleague,    Thank you for referring your patient, Gill Mendez, to the [facility]. Please see a copy of my visit note below.      ENDOCRINOLOGY FOLLOW-UP         HISTORY OF PRESENT ILLNESS    Gill Mendez is seen in follow-up for the issues outlined below.     1.  Diabetes mellitus.    We transitioned from Trulicity to Mounjaro in 8/2023.  We have titrated Mounjaro dose and that interim since last visit and she has been seen by diabetes care and .  Tolerating Mounjaro without side effects.    Current diabetes regimen: Lantus 35 units nightly, Mounjaro 10 mg weekly, Jardiance 25 mg daily, metformin extended release 2000 mg daily.    Has had skin reaction to adhesive from CGM in the past.    Using glucometer at home, although she does not have this with her today.  Recalls fasting glucose values are typically in the 110's, 130's at bedtime.  No hypoglycemia to less than 70.    Has lost 9 pounds since last visit in 8/2023.    Anticipating hip revision surgery on 12/14/2023.    2.  Nodular goiter. She has not noted a change in the feel of her neck.      Follow-up thyroid ultrasound performed on 9/5/2023 showed stable appearing right dominant nodule, measuring 3.9 x 3.2 x 2.5 cm (previously 3.7 x 3 x 1.8 cm), mixed cystic and solid with macrocalcifications.    Pertinent endocrine and related history:  1.  Diabetes mellitus, type 2. Has previously followed with Dr. Quinteros in the [facility] system.  -Diabetes was diagnosed in 2005.  Has been complicated by diabetic retinopathy, neuropathy and nephropathy.  -Previously on prandial insulin: Found it difficult to take consistently.  Also prescribed Rybelsus in the past: This caused nausea.  Was on glipizide, this was discontinued due to escalating insulin requirement.  We discontinued Actos in 9/2022.  -Previously used freestyle lilia  but had reaction to sensor adhesive.  2.  History of foot ulcer; has history of 2 toe amputations.  3. Thyroid nodule. Goiter palpated on exam in 7/2022.  -No  history of excessive head or neck radiation exposure.  Family history is notable for mother who had thyroid nodules removed surgically: No thyroid cancer.  2 sisters also have thyroid nodules, no thyroid cancer.  -Thyroid US performed 7/13/2022 and showed a heterogenous gland with a 3.7 x 3 x 1.8 cm mixed cystic and solid nodule in the right lobe of the thyroid.  -Ultrasound-guided FNA of thyroid nodule on 7/20/2022, with benign cytology findings.    Pertinent Social History: , has 2 sons and a daughter.  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    PAST MEDICAL HISTORY  Past Medical History:   Diagnosis Date     Anxiety      Asthma      Cellulitis      Diabetes mellitus (H)      Essential hypertension     Created by Conversion  Replacement Utility updated for latest IMO load     Gastroparesis      Hyperlipidemia      Hypertension      Other and unspecified hyperlipidemia     Created by Conversion      PONV (postoperative nausea and vomiting)      Shortness of breath      Type II or unspecified type diabetes mellitus without mention of complication, not stated as uncontrolled     Created by Conversion        MEDICATIONS  Current Outpatient Medications   Medication Sig Dispense Refill     albuterol (PROVENTIL HFA;VENTOLIN HFA) 90 mcg/actuation inhaler [ALBUTEROL (PROVENTIL HFA;VENTOLIN HFA) 90 MCG/ACTUATION INHALER] Inhale 2 puffs every 6 (six) hours as needed for wheezing.       amLODIPine (NORVASC) 5 MG tablet Take 1 tablet by mouth daily       ammonium lactate (LAC-HYDRIN) 12 % external lotion Apply topically 2 times daily 225 g 3     atorvastatin (LIPITOR) 40 MG tablet Take 40 mg by mouth daily       blood glucose (NO BRAND SPECIFIED) test strip Use to  test blood sugar 2 times daily as directed. 200 strip 3     blood glucose monitoring (NO BRAND SPECIFIED) meter device kit Use to test blood sugar two times daily or as directed. 1 kit 0     chlorthalidone (HYGROTON) 25 MG tablet Take 1 tablet by mouth daily       citalopram (CELEXA) 20 MG tablet [CITALOPRAM (CELEXA) 20 MG TABLET] Take 20 mg by mouth every morning.   0     empagliflozin (JARDIANCE) 25 MG TABS tablet Take 1 tablet (25 mg) by mouth daily 90 tablet 1     fluticasone (FLOVENT HFA) 110 MCG/ACT inhaler 2 Puffs inhaled each morning. Rinse mouth with water after use to reduce aftertaste and incidence of candidiasis. Do not swallow.       insulin aspart (NOVOLOG PEN) 100 UNIT/ML pen Do Not give Correction Insulin if Pre-Meal BG less than 150.   For Pre-Meal  - 199 give 1 unit.   For Pre-Meal  - 249 give 2 units.   For Pre-Meal  - 299 give 3 units.   For Pre-Meal  - 349 give 4 units.   For Pre-Meal - 399 give 5 units.   For Pre-Meal -449 give 6 units  For Pre-Meal BG greater than or equal to 450 give 7 units. (Patient not taking: Reported on 10/6/2023) 15 mL 0     insulin glargine (LANTUS SOLOSTAR) 100 UNIT/ML pen Inject 40 Units Subcutaneous At Bedtime (Patient taking differently: Inject 25 Units Subcutaneous at bedtime) 45 mL 1     lisinopriL (PRINIVIL,ZESTRIL) 10 MG tablet [LISINOPRIL (PRINIVIL,ZESTRIL) 10 MG TABLET] Take 10 mg by mouth daily.       metFORMIN (GLUCOPHAGE XR) 500 MG 24 hr tablet Take 2,000 mg by mouth daily (with breakfast)       pramipexole (MIRAPEX) 0.25 MG tablet Take 0.125 mg by mouth At Bedtime 0.5 with a 0.125  3     tirzepatide (MOUNJARO) 10 MG/0.5ML pen Inject 10 mg Subcutaneous every 7 days 2 mL 1       Allergies, family, and social history were reviewed and documented as needed in EHR.     REVIEW OF SYSTEMS  A focused ROS was performed, with pertinent positives and negatives as noted in the HPI.  Some lower extremity edema after hip  surgery.    PHYSICAL EXAM  /64 (BP Location: Right arm, Patient Position: Sitting, Cuff Size: Adult Regular)   Pulse 96   Wt 77.1 kg (170 lb)   BMI 28.29 kg/m    Body mass index is 28.29 kg/m .  Constitutional: Vital signs reviewed, as recorded above. Patient is alert, oriented and appears in no acute distress.  Eyes: PER, EOMI, no stare, lid lag, or retraction; no conjunctival injection.  Respiratory: Normal chest wall motion and respiratory effort.  MSK: No clubbing or cyanosis; normal muscle bulk and tone.  Skin: No lesions on visible skin,  Neurological: Alert and oriented times 3. No tremor.    Foot exam: DP and PT pulses present, although diminished.  Monofilament sensation partially impaired, vibratory sensation absent.    DATA REVIEW  Each of the following laboratory and/or imaging studies were reviewed.      Component      Latest Ref Rng 10/16/2023  11:33 AM 11/22/2023  10:07 AM   Sodium      135 - 145 mmol/L 140     Potassium      3.4 - 5.3 mmol/L 4.1     Carbon Dioxide (CO2)      22 - 29 mmol/L 29     Anion Gap      7 - 15 mmol/L 10     Urea Nitrogen      8.0 - 23.0 mg/dL 20.7     Creatinine      0.51 - 1.17 mg/dL 0.97     GFR Estimate      >60 mL/min/1.73m2 66     Calcium      8.8 - 10.2 mg/dL 9.7     Chloride      98 - 107 mmol/L 101     Glucose      70 - 99 mg/dL 216 (H)     Alkaline Phosphatase      35 - 129 U/L 88     AST      0 - 45 U/L 22     ALT      0 - 70 U/L 18     Protein Total      6.4 - 8.3 g/dL 7.1     Albumin      3.5 - 5.2 g/dL 4.1     Bilirubin Total      <=1.2 mg/dL 0.2     Creatinine Urine      mg/dL 49.0     Albumin Urine mg/L      mg/L 24.6     Albumin Urine mg/g Cr      0.00 - 25.00 mg/g Cr 50.20 (H)     Hemoglobin A1C      0.0 - 5.6 % 8.2 (H)  7.2 (H)       Legend:  (H) High    ASSESSMENT  1.  Diabetes mellitus, type 2.  Transitioned from Trulicity to Mounjaro in 8/2023.  Tolerating this without side effect, with weight loss on this new regimen in addition to significant  improvement in diabetes control.  Ms. Mendez was congratulated on her achievements.  We will maximize Mounjaro dose and reduce basal insulin.  We discussed how to prepare for upcoming hip revision surgery as below.    2.  Diabetes preventive care.  -History of diabetic retinopathy, I reviewed records of eye exam from 5/1/2023 ([facility] [place]): Proliferative diabetic retinopathy, status post PRP and Avastin (continues on Avastin, although needing less frequent treatment)  -CKD and microalbuminuria on urine microalbumin screen on 10/16/2023, on lisinopril and Jardiance; creatinine has continued to improve on follow-up testing  -Foot exam performed today: Footcare reviewed    3.  Dyslipidemia.  On statin therapy, optimal lipid profile on 8/21/2023.    4.  Goiter, with thyroid nodule.   Asymmetric thyroid, with prominence of the right lobe of the thyroid noted on exam in 7/2022; thyroid ultrasound revealed a right lobe nodule, which was aspirated on 7/20/2022.  Cytology was benign.  Normal TSH on 8/21/2023.  Follow-up thyroid ultrasound in 9/2023 shows no significant change in thyroid nodule.  Continue periodic monitoring.      PLAN  -Reduce Lantus to 30 units daily  -Increase Mounjaro to 12.5 mg weekly  -Continue remainder of medications without changes   Prior to surgery:  1. Hold metformin day of surgery  2. Hold Jardiance 3 days before surgery  3. Hold Mounjaro 1 week before surgery  4. Reduce Lantus to 24 units the night before surgery  -Check blood glucose twice daily, fasting and before evening meal; also check with any unusual symptoms  -Follow-up in March 2024 as scheduled, with labs before visit  -We will communicate results via [employer], or if needed by phone      Orders Placed This Encounter   Procedures     Hemoglobin A1c     Comprehensive metabolic panel       I spent a total of 30 minutes on the date of encounter reviewing medical records, evaluating the patient, coordinating care and  documenting in the EHR, as detailed above.      Jeffrey Lake MD   Division of Diabetes, Endocrinology and Metabolism  Department of Medicine          Again, thank you for allowing me to participate in the care of your patient.        Sincerely,        JEFFREY Lake MD

## 2023-11-29 NOTE — PROGRESS NOTES
ENDOCRINOLOGY FOLLOW-UP         HISTORY OF PRESENT ILLNESS    Gill Mendez is seen in follow-up for the issues outlined below.     1.  Diabetes mellitus.    We transitioned from Trulicity to Mounjaro in 8/2023.  We have titrated Mounjaro dose and that interim since last visit and she has been seen by diabetes care and .  Tolerating Mounjaro without side effects.    Current diabetes regimen: Lantus 35 units nightly, Mounjaro 10 mg weekly, Jardiance 25 mg daily, metformin extended release 2000 mg daily.    Has had skin reaction to adhesive from CGM in the past.    Using glucometer at home, although she does not have this with her today.  Recalls fasting glucose values are typically in the 110's, 130's at bedtime.  No hypoglycemia to less than 70.    Has lost 9 pounds since last visit in 8/2023.    Anticipating hip revision surgery on 12/14/2023.    2.  Nodular goiter. She has not noted a change in the feel of her neck.      Follow-up thyroid ultrasound performed on 9/5/2023 showed stable appearing right dominant nodule, measuring 3.9 x 3.2 x 2.5 cm (previously 3.7 x 3 x 1.8 cm), mixed cystic and solid with macrocalcifications.    Pertinent endocrine and related history:  1.  Diabetes mellitus, type 2. Has previously followed with Dr. Quinteros in the [facility] system.  -Diabetes was diagnosed in 2005.  Has been complicated by diabetic retinopathy, neuropathy and nephropathy.  -Previously on prandial insulin: Found it difficult to take consistently.  Also prescribed Rybelsus in the past: This caused nausea.  Was on glipizide, this was discontinued due to escalating insulin requirement.  We discontinued Actos in 9/2022.  -Previously used freestyle lilia but had reaction to sensor adhesive.  2.  History of foot ulcer; has history of 2 toe amputations.  3. Thyroid nodule. Goiter palpated on exam in 7/2022.  -No  history of excessive head or neck radiation exposure.  Family history is notable for mother  who had thyroid nodules removed surgically: No thyroid cancer.  2 sisters also have thyroid nodules, no thyroid cancer.  -Thyroid US performed 7/13/2022 and showed a heterogenous gland with a 3.7 x 3 x 1.8 cm mixed cystic and solid nodule in the right lobe of the thyroid.  -Ultrasound-guided FNA of thyroid nodule on 7/20/2022, with benign cytology findings.    Pertinent Social History: , has 2 sons and a daughter.  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    PAST MEDICAL HISTORY  Past Medical History:   Diagnosis Date    Anxiety     Asthma     Cellulitis     Diabetes mellitus (H)     Essential hypertension     Created by Conversion  Replacement Utility updated for latest IMO load    Gastroparesis     Hyperlipidemia     Hypertension     Other and unspecified hyperlipidemia     Created by Conversion     PONV (postoperative nausea and vomiting)     Shortness of breath     Type II or unspecified type diabetes mellitus without mention of complication, not stated as uncontrolled     Created by Conversion        MEDICATIONS  Current Outpatient Medications   Medication Sig Dispense Refill    albuterol (PROVENTIL HFA;VENTOLIN HFA) 90 mcg/actuation inhaler [ALBUTEROL (PROVENTIL HFA;VENTOLIN HFA) 90 MCG/ACTUATION INHALER] Inhale 2 puffs every 6 (six) hours as needed for wheezing.      amLODIPine (NORVASC) 5 MG tablet Take 1 tablet by mouth daily      ammonium lactate (LAC-HYDRIN) 12 % external lotion Apply topically 2 times daily 225 g 3    atorvastatin (LIPITOR) 40 MG tablet Take 40 mg by mouth daily      blood glucose (NO BRAND SPECIFIED) test strip Use to test blood sugar 2 times daily as directed. 200 strip 3    blood glucose monitoring (NO BRAND SPECIFIED) meter device kit Use to test blood sugar two times daily or as directed. 1 kit 0    chlorthalidone (HYGROTON) 25 MG tablet Take 1 tablet by mouth daily       citalopram (CELEXA) 20 MG tablet [CITALOPRAM (CELEXA) 20 MG TABLET] Take 20 mg by mouth every morning.   0    empagliflozin (JARDIANCE) 25 MG TABS tablet Take 1 tablet (25 mg) by mouth daily 90 tablet 1    fluticasone (FLOVENT HFA) 110 MCG/ACT inhaler 2 Puffs inhaled each morning. Rinse mouth with water after use to reduce aftertaste and incidence of candidiasis. Do not swallow.      insulin aspart (NOVOLOG PEN) 100 UNIT/ML pen Do Not give Correction Insulin if Pre-Meal BG less than 150.   For Pre-Meal  - 199 give 1 unit.   For Pre-Meal  - 249 give 2 units.   For Pre-Meal  - 299 give 3 units.   For Pre-Meal  - 349 give 4 units.   For Pre-Meal - 399 give 5 units.   For Pre-Meal -449 give 6 units  For Pre-Meal BG greater than or equal to 450 give 7 units. (Patient not taking: Reported on 10/6/2023) 15 mL 0    insulin glargine (LANTUS SOLOSTAR) 100 UNIT/ML pen Inject 40 Units Subcutaneous At Bedtime (Patient taking differently: Inject 25 Units Subcutaneous at bedtime) 45 mL 1    lisinopriL (PRINIVIL,ZESTRIL) 10 MG tablet [LISINOPRIL (PRINIVIL,ZESTRIL) 10 MG TABLET] Take 10 mg by mouth daily.      metFORMIN (GLUCOPHAGE XR) 500 MG 24 hr tablet Take 2,000 mg by mouth daily (with breakfast)      pramipexole (MIRAPEX) 0.25 MG tablet Take 0.125 mg by mouth At Bedtime 0.5 with a 0.125  3    tirzepatide (MOUNJARO) 10 MG/0.5ML pen Inject 10 mg Subcutaneous every 7 days 2 mL 1       Allergies, family, and social history were reviewed and documented as needed in EHR.     REVIEW OF SYSTEMS  A focused ROS was performed, with pertinent positives and negatives as noted in the HPI.  Some lower extremity edema after hip surgery.    PHYSICAL EXAM  /64 (BP Location: Right arm, Patient Position: Sitting, Cuff Size: Adult Regular)   Pulse 96   Wt 77.1 kg (170 lb)   BMI 28.29 kg/m    Body mass index is 28.29 kg/m .  Constitutional: Vital signs reviewed, as recorded above. Patient is alert,  oriented and appears in no acute distress.  Eyes: PER, EOMI, no stare, lid lag, or retraction; no conjunctival injection.  Respiratory: Normal chest wall motion and respiratory effort.  MSK: No clubbing or cyanosis; normal muscle bulk and tone.  Skin: No lesions on visible skin,  Neurological: Alert and oriented times 3. No tremor.    Foot exam: DP and PT pulses present, although diminished.  Monofilament sensation partially impaired, vibratory sensation absent.    DATA REVIEW  Each of the following laboratory and/or imaging studies were reviewed.      Component      Latest Ref Rng 10/16/2023  11:33 AM 11/22/2023  10:07 AM   Sodium      135 - 145 mmol/L 140     Potassium      3.4 - 5.3 mmol/L 4.1     Carbon Dioxide (CO2)      22 - 29 mmol/L 29     Anion Gap      7 - 15 mmol/L 10     Urea Nitrogen      8.0 - 23.0 mg/dL 20.7     Creatinine      0.51 - 1.17 mg/dL 0.97     GFR Estimate      >60 mL/min/1.73m2 66     Calcium      8.8 - 10.2 mg/dL 9.7     Chloride      98 - 107 mmol/L 101     Glucose      70 - 99 mg/dL 216 (H)     Alkaline Phosphatase      35 - 129 U/L 88     AST      0 - 45 U/L 22     ALT      0 - 70 U/L 18     Protein Total      6.4 - 8.3 g/dL 7.1     Albumin      3.5 - 5.2 g/dL 4.1     Bilirubin Total      <=1.2 mg/dL 0.2     Creatinine Urine      mg/dL 49.0     Albumin Urine mg/L      mg/L 24.6     Albumin Urine mg/g Cr      0.00 - 25.00 mg/g Cr 50.20 (H)     Hemoglobin A1C      0.0 - 5.6 % 8.2 (H)  7.2 (H)       Legend:  (H) High    ASSESSMENT  1.  Diabetes mellitus, type 2.  Transitioned from Trulicity to Mounjaro in 8/2023.  Tolerating this without side effect, with weight loss on this new regimen in addition to significant improvement in diabetes control.  Ms. Mendez was congratulated on her achievements.  We will maximize Mounjaro dose and reduce basal insulin.  We discussed how to prepare for upcoming hip revision surgery as below.    2.  Diabetes preventive care.  -History of diabetic  retinopathy, I reviewed records of eye exam from 5/1/2023 ([facility] [place]): Proliferative diabetic retinopathy, status post PRP and Avastin (continues on Avastin, although needing less frequent treatment)  -CKD and microalbuminuria on urine microalbumin screen on 10/16/2023, on lisinopril and Jardiance; creatinine has continued to improve on follow-up testing  -Foot exam performed today: Footcare reviewed    3.  Dyslipidemia.  On statin therapy, optimal lipid profile on 8/21/2023.    4.  Goiter, with thyroid nodule.   Asymmetric thyroid, with prominence of the right lobe of the thyroid noted on exam in 7/2022; thyroid ultrasound revealed a right lobe nodule, which was aspirated on 7/20/2022.  Cytology was benign.  Normal TSH on 8/21/2023.  Follow-up thyroid ultrasound in 9/2023 shows no significant change in thyroid nodule.  Continue periodic monitoring.      PLAN  -Reduce Lantus to 30 units daily  -Increase Mounjaro to 12.5 mg weekly  -Continue remainder of medications without changes   Prior to surgery:  1. Hold metformin day of surgery  2. Hold Jardiance 3 days before surgery  3. Hold Mounjaro 1 week before surgery  4. Reduce Lantus to 24 units the night before surgery  -Check blood glucose twice daily, fasting and before evening meal; also check with any unusual symptoms  -Follow-up in March 2024 as scheduled, with labs before visit  -We will communicate results via [employer], or if needed by phone      Orders Placed This Encounter   Procedures    Hemoglobin A1c    Comprehensive metabolic panel       I spent a total of 30 minutes on the date of encounter reviewing medical records, evaluating the patient, coordinating care and documenting in the EHR, as detailed above.      Levy Lake MD   Division of Diabetes, Endocrinology and Metabolism  Department of Medicine

## 2023-11-30 NOTE — TELEPHONE ENCOUNTER
Levy Lake MD   to Me   NT      11/29/23  5:21 PM  Dose adjusted today endocrinology visit, this dose will not be refilled.    Pharm called.

## 2024-01-16 ENCOUNTER — MYC REFILL (OUTPATIENT)
Dept: ENDOCRINOLOGY | Facility: CLINIC | Age: 64
End: 2024-01-16
Payer: COMMERCIAL

## 2024-01-16 DIAGNOSIS — L97.509 TYPE 2 DIABETES MELLITUS WITH FOOT ULCER, WITHOUT LONG-TERM CURRENT USE OF INSULIN (H): ICD-10-CM

## 2024-01-16 DIAGNOSIS — E11.621 TYPE 2 DIABETES MELLITUS WITH FOOT ULCER, WITHOUT LONG-TERM CURRENT USE OF INSULIN (H): ICD-10-CM

## 2024-01-16 NOTE — TELEPHONE ENCOUNTER
"    Requested Prescriptions   Pending Prescriptions Disp Refills    blood glucose (NO BRAND SPECIFIED) test strip 200 strip 3     Sig: Use to test blood sugar 2 times daily as directed.       Diabetic Supplies Protocol Passed - 1/16/2024  8:31 AM        Passed - Medication is active on med list        Passed - Patient is 18 years of age or older        Passed - Recent (6 mo) or future (30 days) visit within the authorizing provider's specialty     Patient had office visit in the last 6 months or has a visit in the next 30 days with authorizing provider.  See \"Patient Info\" tab in inbasket, or \"Choose Columns\" in Meds & Orders section of the refill encounter.                 "
full range of motion in all extremities

## 2024-03-06 ENCOUNTER — LAB (OUTPATIENT)
Dept: LAB | Facility: CLINIC | Age: 64
End: 2024-03-06
Payer: COMMERCIAL

## 2024-03-06 DIAGNOSIS — E11.65 TYPE 2 DIABETES MELLITUS WITH HYPERGLYCEMIA, WITH LONG-TERM CURRENT USE OF INSULIN (H): ICD-10-CM

## 2024-03-06 DIAGNOSIS — Z79.4 TYPE 2 DIABETES MELLITUS WITH HYPERGLYCEMIA, WITH LONG-TERM CURRENT USE OF INSULIN (H): ICD-10-CM

## 2024-03-06 LAB
ALBUMIN SERPL BCG-MCNC: 4.2 G/DL (ref 3.5–5.2)
ALP SERPL-CCNC: 79 U/L (ref 40–150)
ALT SERPL W P-5'-P-CCNC: 14 U/L (ref 0–70)
ANION GAP SERPL CALCULATED.3IONS-SCNC: 7 MMOL/L (ref 7–15)
AST SERPL W P-5'-P-CCNC: 21 U/L (ref 0–45)
BILIRUB SERPL-MCNC: 0.3 MG/DL
BUN SERPL-MCNC: 23.7 MG/DL (ref 8–23)
CALCIUM SERPL-MCNC: 9.7 MG/DL (ref 8.8–10.2)
CHLORIDE SERPL-SCNC: 101 MMOL/L (ref 98–107)
CREAT SERPL-MCNC: 0.97 MG/DL (ref 0.51–1.17)
DEPRECATED HCO3 PLAS-SCNC: 32 MMOL/L (ref 22–29)
EGFRCR SERPLBLD CKD-EPI 2021: 66 ML/MIN/1.73M2
GLUCOSE SERPL-MCNC: 114 MG/DL (ref 70–99)
HBA1C MFR BLD: 7.4 % (ref 0–5.6)
POTASSIUM SERPL-SCNC: 4.4 MMOL/L (ref 3.4–5.3)
PROT SERPL-MCNC: 7.2 G/DL (ref 6.4–8.3)
SODIUM SERPL-SCNC: 140 MMOL/L (ref 135–145)

## 2024-03-06 PROCEDURE — 80053 COMPREHEN METABOLIC PANEL: CPT

## 2024-03-06 PROCEDURE — 83036 HEMOGLOBIN GLYCOSYLATED A1C: CPT

## 2024-03-06 PROCEDURE — 36415 COLL VENOUS BLD VENIPUNCTURE: CPT

## 2024-03-13 ENCOUNTER — OFFICE VISIT (OUTPATIENT)
Dept: ENDOCRINOLOGY | Facility: CLINIC | Age: 64
End: 2024-03-13
Payer: COMMERCIAL

## 2024-03-13 VITALS
HEART RATE: 88 BPM | SYSTOLIC BLOOD PRESSURE: 120 MMHG | DIASTOLIC BLOOD PRESSURE: 68 MMHG | BODY MASS INDEX: 26.96 KG/M2 | WEIGHT: 162 LBS

## 2024-03-13 DIAGNOSIS — L97.509 TYPE 2 DIABETES MELLITUS WITH FOOT ULCER, WITHOUT LONG-TERM CURRENT USE OF INSULIN (H): Primary | ICD-10-CM

## 2024-03-13 DIAGNOSIS — E11.621 TYPE 2 DIABETES MELLITUS WITH FOOT ULCER, WITHOUT LONG-TERM CURRENT USE OF INSULIN (H): Primary | ICD-10-CM

## 2024-03-13 DIAGNOSIS — E04.9 GOITER: ICD-10-CM

## 2024-03-13 DIAGNOSIS — E04.1 THYROID NODULE: ICD-10-CM

## 2024-03-13 PROCEDURE — 99214 OFFICE O/P EST MOD 30 MIN: CPT | Performed by: INTERNAL MEDICINE

## 2024-03-13 RX ORDER — TIRZEPATIDE 15 MG/.5ML
15 INJECTION, SOLUTION SUBCUTANEOUS
Qty: 2 ML | Refills: 5 | Status: SHIPPED | OUTPATIENT
Start: 2024-03-13 | End: 2024-05-08

## 2024-03-13 NOTE — PATIENT INSTRUCTIONS
-Remain off Lantus  -Increase Mounjaro to 15 mg weekly  -Continue remainder of medications without changes   -Check blood glucose twice daily, fasting and before evening meal; also check with any unusual symptoms  -Eye exam as scheduled next week--update ophthalmologist on treatment with Mounjaro  -Follow-up in June 2024 as scheduled, with labs before visit  -We will communicate results via [employer], or if needed by phone

## 2024-03-13 NOTE — PROGRESS NOTES
ENDOCRINOLOGY FOLLOW-UP         HISTORY OF PRESENT ILLNESS    Gill Mendez is seen in follow-up for the issues outlined below.     1.  Diabetes mellitus.    We transitioned from Trulicity to Mounjaro in 8/2023.  Continues on Mounjaro 12.5 mg weekly: Tolerating without side effects.    Had left hip revision surgery on 12/14/2023: Has recovered well.  Her sensation in the left lower extremity has improved and she is ambulating better.    Her blood sugars have been quite well-controlled and she has not needed to take Lantus for the past week.  Despite being off Lantus, her fingerstick glucose values have been optimal as below.      Current diabetes regimen: Mounjaro 12.5 mg weekly, Jardiance 25 mg daily, metformin extended release 2000 mg daily.    Has had skin reaction to adhesive from CGM in the past.    Using glucometer at home, typically checks glucose 1-2 times per day.  Average glucose in the past 2 weeks has been 110 mg/dL, highest 154 mg/dL.    Has lost an additional 8 pounds in the interim since last visit in 11/2023; at last visit, she had lost 9 pounds since 8/2023.    2.  Nodular goiter.     Follow-up thyroid ultrasound performed on 9/5/2023 showed stable appearing right dominant nodule, measuring 3.9 x 3.2 x 2.5 cm (previously 3.7 x 3 x 1.8 cm), mixed cystic and solid with macrocalcifications.    Pertinent endocrine and related history:  1.  Diabetes mellitus, type 2. Has previously followed with Dr. Quinteros in the [facility] system.  -Diabetes was diagnosed in 2005.  Has been complicated by diabetic retinopathy, neuropathy and nephropathy.  -Previously on prandial insulin: Found it difficult to take consistently.  Also prescribed Rybelsus in the past: This caused nausea.  Was on glipizide, this was discontinued due to escalating insulin requirement.  We discontinued Actos in 9/2022.  -Previously used freestyle lilia but had reaction to sensor adhesive.  2.  History of foot ulcer; has history of 2 toe  amputations.  3. Thyroid nodule. Goiter palpated on exam in 7/2022.  -No  history of excessive head or neck radiation exposure.  Family history is notable for mother who had thyroid nodules removed surgically: No thyroid cancer.  2 sisters also have thyroid nodules, no thyroid cancer.  -Thyroid US performed 7/13/2022 and showed a heterogenous gland with a 3.7 x 3 x 1.8 cm mixed cystic and solid nodule in the right lobe of the thyroid.  -Ultrasound-guided FNA of thyroid nodule on 7/20/2022, with benign cytology findings.    Pertinent Social History: , has 2 sons and a daughter.  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    PAST MEDICAL HISTORY  Past Medical History:   Diagnosis Date    Anxiety     Asthma     Cellulitis     Diabetes mellitus (H)     Essential hypertension     Created by Conversion  Replacement Utility updated for latest IMO load    Gastroparesis     Hyperlipidemia     Hypertension     Other and unspecified hyperlipidemia     Created by Conversion     PONV (postoperative nausea and vomiting)     Shortness of breath     Type II or unspecified type diabetes mellitus without mention of complication, not stated as uncontrolled     Created by Conversion        MEDICATIONS  Current Outpatient Medications   Medication Sig Dispense Refill    albuterol (PROVENTIL HFA;VENTOLIN HFA) 90 mcg/actuation inhaler Inhale 2 puffs into the lungs every 6 hours as needed      amLODIPine (NORVASC) 5 MG tablet Take 1 tablet by mouth daily      ammonium lactate (LAC-HYDRIN) 12 % external lotion Apply topically 2 times daily 225 g 3    atorvastatin (LIPITOR) 40 MG tablet Take 40 mg by mouth daily      chlorthalidone (HYGROTON) 25 MG tablet Take 1 tablet by mouth daily      citalopram (CELEXA) 20 MG tablet [CITALOPRAM (CELEXA) 20 MG TABLET] Take 20 mg by mouth every morning.   0    empagliflozin (JARDIANCE) 25 MG TABS tablet  Take 1 tablet (25 mg) by mouth daily 90 tablet 1    fluticasone (FLOVENT HFA) 110 MCG/ACT inhaler 2 Puffs inhaled each morning. Rinse mouth with water after use to reduce aftertaste and incidence of candidiasis. Do not swallow.      lisinopriL (PRINIVIL,ZESTRIL) 10 MG tablet [LISINOPRIL (PRINIVIL,ZESTRIL) 10 MG TABLET] Take 10 mg by mouth daily.      metFORMIN (GLUCOPHAGE XR) 500 MG 24 hr tablet Take 2,000 mg by mouth daily (with breakfast)      pramipexole (MIRAPEX) 0.25 MG tablet Take 0.125 mg by mouth At Bedtime 0.5 with a 0.125  3    tirzepatide (MOUNJARO) 15 MG/0.5ML pen Inject 15 mg Subcutaneous every 7 days 2 mL 5    traZODone (DESYREL) 50 MG tablet Take 50 mg by mouth at bedtime      blood glucose (NO BRAND SPECIFIED) test strip Use to test blood sugar 2 times daily as directed. 200 strip 3    blood glucose monitoring (NO BRAND SPECIFIED) meter device kit Use to test blood sugar two times daily or as directed. 1 kit 0    magnesium 250 MG tablet Take 1 tablet by mouth daily         Allergies, family, and social history were reviewed and documented as needed in EHR.     REVIEW OF SYSTEMS  A focused ROS was performed, with pertinent positives and negatives as noted in the HPI.      PHYSICAL EXAM  /68 (BP Location: Right arm, Patient Position: Sitting, Cuff Size: Adult Regular)   Pulse 88   Wt 73.5 kg (162 lb)   BMI 26.96 kg/m    Body mass index is 26.96 kg/m .  Constitutional: Vital signs reviewed, as recorded above. Patient is alert, oriented and appears in no acute distress.  Eyes: PER, EOMI, no stare, lid lag, or retraction; no conjunctival injection.  Respiratory: Normal chest wall motion and respiratory effort.  MSK: No clubbing or cyanosis; normal muscle bulk and tone.  Skin: No lesions on visible skin,  Neurological: Alert and oriented times 3. No tremor.    DATA REVIEW  Each of the following laboratory and/or imaging studies were reviewed.          ASSESSMENT  1.  Diabetes mellitus, type 2.   Transitioned from Trulicity to Mounjaro in 8/2023.  Tolerating Mounjaro without side effect, has been off insulin for the past week with excellent glycemic control.  Her hemoglobin A1c is higher than I would expect, although I wonder how much may be related to anemia and/or a reflection of previous glycemic control.  Would maximize Mounjaro dose and remain off insulin.  Continue metformin and Jardiance without changes.      2.  Diabetes preventive care.  -History of diabetic retinopathy, I reviewed records of eye exam from 5/1/2023 ([facility] [place]): Proliferative diabetic retinopathy, status post PRP and Avastin (continues on Avastin, although needing less frequent treatment); following up every 6 months, anticipating visit on 3/18/2024 (have asked her to update her ophthalmologist about treatment with Mounjaro)  -CKD and microalbuminuria on urine microalbumin screen on 10/16/2023, on lisinopril and Jardiance  -Foot exam performed on 11/29/2023: Footcare reviewed at that visit    3.  Dyslipidemia.  On statin therapy, optimal lipid profile on 8/21/2023.    4.  Goiter, with thyroid nodule.   Asymmetric thyroid, with prominence of the right lobe of the thyroid noted on exam in 7/2022; thyroid ultrasound revealed a right lobe nodule, which was aspirated on 7/20/2022.  Cytology was benign.  Normal TSH on 8/21/2023.  Follow-up thyroid ultrasound in 9/2023 shows no significant change in thyroid nodule.  Continue periodic monitoring.      PLAN  -Remain off Lantus  -Increase Mounjaro to 15 mg weekly  -Continue remainder of medications without changes   -Check blood glucose twice daily, fasting and before evening meal; also check with any unusual symptoms  -Eye exam as scheduled next week--update ophthalmologist on treatment with Mounjaro  -Follow-up in June 2024 as scheduled, with labs before visit  -We will communicate results via [employer], or if needed by phone      Orders Placed This Encounter   Procedures     Basic metabolic panel    Hemoglobin A1c    CBC with platelets       Levy Lake MD   Division of Diabetes, Endocrinology and Metabolism  Department of Medicine

## 2024-06-14 ENCOUNTER — LAB (OUTPATIENT)
Dept: LAB | Facility: CLINIC | Age: 64
End: 2024-06-14
Payer: COMMERCIAL

## 2024-06-14 DIAGNOSIS — L97.509 TYPE 2 DIABETES MELLITUS WITH FOOT ULCER, WITHOUT LONG-TERM CURRENT USE OF INSULIN (H): ICD-10-CM

## 2024-06-14 DIAGNOSIS — E11.621 TYPE 2 DIABETES MELLITUS WITH FOOT ULCER, WITHOUT LONG-TERM CURRENT USE OF INSULIN (H): ICD-10-CM

## 2024-06-14 LAB
ANION GAP SERPL CALCULATED.3IONS-SCNC: 8 MMOL/L (ref 7–15)
BUN SERPL-MCNC: 22 MG/DL (ref 8–23)
CALCIUM SERPL-MCNC: 9.3 MG/DL (ref 8.8–10.2)
CHLORIDE SERPL-SCNC: 100 MMOL/L (ref 98–107)
CREAT SERPL-MCNC: 1.04 MG/DL (ref 0.51–1.17)
DEPRECATED HCO3 PLAS-SCNC: 31 MMOL/L (ref 22–29)
EGFRCR SERPLBLD CKD-EPI 2021: 60 ML/MIN/1.73M2
ERYTHROCYTE [DISTWIDTH] IN BLOOD BY AUTOMATED COUNT: 16.6 % (ref 10–15)
GLUCOSE SERPL-MCNC: 93 MG/DL (ref 70–99)
HBA1C MFR BLD: 6.5 % (ref 0–5.6)
HCT VFR BLD AUTO: 37.1 % (ref 35–53)
HGB BLD-MCNC: 11.4 G/DL (ref 11.7–17.7)
MCH RBC QN AUTO: 24.5 PG (ref 26.5–33)
MCHC RBC AUTO-ENTMCNC: 30.7 G/DL (ref 31.5–36.5)
MCV RBC AUTO: 80 FL (ref 78–100)
PLATELET # BLD AUTO: 268 10E3/UL (ref 150–450)
POTASSIUM SERPL-SCNC: 4.7 MMOL/L (ref 3.4–5.3)
RBC # BLD AUTO: 4.65 10E6/UL (ref 3.8–5.9)
SODIUM SERPL-SCNC: 139 MMOL/L (ref 135–145)
WBC # BLD AUTO: 8.6 10E3/UL (ref 4–11)

## 2024-06-14 PROCEDURE — 83036 HEMOGLOBIN GLYCOSYLATED A1C: CPT

## 2024-06-14 PROCEDURE — 36415 COLL VENOUS BLD VENIPUNCTURE: CPT

## 2024-06-14 PROCEDURE — 80048 BASIC METABOLIC PNL TOTAL CA: CPT

## 2024-06-14 PROCEDURE — 85027 COMPLETE CBC AUTOMATED: CPT

## 2024-06-21 ENCOUNTER — OFFICE VISIT (OUTPATIENT)
Dept: ENDOCRINOLOGY | Facility: CLINIC | Age: 64
End: 2024-06-21
Payer: COMMERCIAL

## 2024-06-21 VITALS
HEART RATE: 84 BPM | SYSTOLIC BLOOD PRESSURE: 108 MMHG | DIASTOLIC BLOOD PRESSURE: 60 MMHG | WEIGHT: 154 LBS | BODY MASS INDEX: 25.63 KG/M2

## 2024-06-21 DIAGNOSIS — E11.621 TYPE 2 DIABETES MELLITUS WITH FOOT ULCER, WITHOUT LONG-TERM CURRENT USE OF INSULIN (H): ICD-10-CM

## 2024-06-21 DIAGNOSIS — L97.509 TYPE 2 DIABETES MELLITUS WITH FOOT ULCER, WITHOUT LONG-TERM CURRENT USE OF INSULIN (H): ICD-10-CM

## 2024-06-21 DIAGNOSIS — E04.1 THYROID NODULE: Primary | ICD-10-CM

## 2024-06-21 PROCEDURE — 99214 OFFICE O/P EST MOD 30 MIN: CPT | Performed by: INTERNAL MEDICINE

## 2024-06-21 NOTE — PATIENT INSTRUCTIONS
-Increase Mounjaro to 15 mg weekly  -With increase in Mounjaro, can reduce metformin to 1000 mg (two of 500 mg tablets) once daily  -If side effects with higher Mounjaro dose, we can reduce back to 125 mg weekly and increase metformin to  2000 mg daily again  -Continue remainder of medications without changes   -Check blood glucose twice daily, fasting and before evening meal; also check with any unusual symptoms  -Schedule thyroid ultrasound at convenience  -Follow-up in September 2024 as scheduled, with fasting labs before visit  -We will communicate results via [employer], or if needed by phone

## 2024-06-21 NOTE — PROGRESS NOTES
ENDOCRINOLOGY FOLLOW-UP         HISTORY OF PRESENT ILLNESS    Gill Mendez is seen in follow-up for the issues outlined below.     1.  Diabetes mellitus.    We transitioned from Trulicity to Mounjaro in 8/2023.  We adjusted Mounjaro to 15 mg weekly at the time of our last visit in 3/2024: She developed nausea and GI intolerance so we reduced back to 12.5 mg dose.    Current diabetes regimen: Mounjaro 12.5 mg weekly, Jardiance 25 mg daily, metformin extended release 2000 mg daily.    Using glucometer at home, typically checks glucose 1-2 times per day.  I reviewed downloaded glucose meter data: Average glucose over the past 2 weeks has been 155 mg/dL.    Has had more social stressors recently: Helping care for both of her parents who have chronic medical conditions.    Has lost an additional 8 pounds in the interim since last visit: Weighed 162 pounds in 3/2024, 154 pounds today.    2.  Nodular goiter.  She has noted that her right thyroid seems more prominent, uncertain if this is because she has been losing weight.  No dysphagia.    She did not have a chance to complete previsit thyroid ultrasound.    Follow-up thyroid ultrasound performed on 9/5/2023 showed stable appearing right dominant nodule, measuring 3.9 x 3.2 x 2.5 cm (previously 3.7 x 3 x 1.8 cm), mixed cystic and solid with macrocalcifications.    Pertinent endocrine and related history:  1.  Diabetes mellitus, type 2. Has previously followed with Dr. Quinteros in the [facility] system.  -Diabetes was diagnosed in 2005.  Has been complicated by diabetic retinopathy, neuropathy and nephropathy.  -Previously on prandial insulin: Found it difficult to take consistently.  Also prescribed Rybelsus in the past: This caused nausea.  Was on glipizide, this was discontinued due to escalating insulin requirement.  We discontinued Actos in 9/2022.  -Previously used freestyle lilia but had reaction to sensor adhesive.  2.  History of foot ulcer; has history of 2 toe  amputations.  3. Thyroid nodule. Goiter palpated on exam in 7/2022.  -No  history of excessive head or neck radiation exposure.  Family history is notable for mother who had thyroid nodules removed surgically: No thyroid cancer.  2 sisters also have thyroid nodules, no thyroid cancer.  -Thyroid US performed 7/13/2022 and showed a heterogenous gland with a 3.7 x 3 x 1.8 cm mixed cystic and solid nodule in the right lobe of the thyroid.  -Ultrasound-guided FNA of thyroid nodule on 7/20/2022, with benign cytology findings.    Pertinent Social History: , has 2 sons and a daughter.  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    PAST MEDICAL HISTORY  Past Medical History:   Diagnosis Date    Anxiety     Asthma     Cellulitis     Diabetes mellitus (H)     Essential hypertension     Created by Conversion  Replacement Utility updated for latest IMO load    Gastroparesis     Hyperlipidemia     Hypertension     Other and unspecified hyperlipidemia     Created by Conversion     PONV (postoperative nausea and vomiting)     Shortness of breath     Type II or unspecified type diabetes mellitus without mention of complication, not stated as uncontrolled     Created by Conversion        MEDICATIONS  Current Outpatient Medications   Medication Sig Dispense Refill    albuterol (PROVENTIL HFA;VENTOLIN HFA) 90 mcg/actuation inhaler Inhale 2 puffs into the lungs every 6 hours as needed      amLODIPine (NORVASC) 5 MG tablet Take 1 tablet by mouth daily      ammonium lactate (LAC-HYDRIN) 12 % external lotion Apply topically 2 times daily 225 g 3    atorvastatin (LIPITOR) 40 MG tablet Take 40 mg by mouth daily      blood glucose (NO BRAND SPECIFIED) test strip Use to test blood sugar 2 times daily as directed. 200 strip 3    blood glucose monitoring (NO BRAND SPECIFIED) meter device kit Use to test blood sugar two times daily or as  directed. 1 kit 0    chlorthalidone (HYGROTON) 25 MG tablet Take 1 tablet by mouth daily      citalopram (CELEXA) 20 MG tablet [CITALOPRAM (CELEXA) 20 MG TABLET] Take 20 mg by mouth every morning.   0    empagliflozin (JARDIANCE) 25 MG TABS tablet Take 1 tablet (25 mg) by mouth daily 90 tablet 1    fluticasone (FLOVENT HFA) 110 MCG/ACT inhaler 2 Puffs inhaled each morning. Rinse mouth with water after use to reduce aftertaste and incidence of candidiasis. Do not swallow.      lisinopriL (PRINIVIL,ZESTRIL) 10 MG tablet [LISINOPRIL (PRINIVIL,ZESTRIL) 10 MG TABLET] Take 10 mg by mouth daily.      magnesium 250 MG tablet Take 1 tablet by mouth daily      metFORMIN (GLUCOPHAGE XR) 500 MG 24 hr tablet Take 1,000 mg by mouth daily (with breakfast) Changed on 6/21/24, new prescription not yet sent      pramipexole (MIRAPEX) 0.25 MG tablet Take 0.125 mg by mouth At Bedtime 0.5 with a 0.125  3    tirzepatide (MOUNJARO) 12.5 MG/0.5ML pen Inject 12.5 mg Subcutaneous every 7 days 2 mL 1    tirzepatide (MOUNJARO) 15 MG/0.5ML pen Inject 15 mg Subcutaneous every 7 days 2 mL 3    traZODone (DESYREL) 50 MG tablet Take 50 mg by mouth at bedtime         Allergies, family, and social history were reviewed and documented as needed in EHR.     REVIEW OF SYSTEMS  A focused ROS was performed, with pertinent positives and negatives as noted in the HPI.      PHYSICAL EXAM  /60 (BP Location: Right arm, Patient Position: Sitting, Cuff Size: Adult Regular)   Pulse 84   Wt 69.9 kg (154 lb)   BMI 25.63 kg/m    Body mass index is 25.63 kg/m .  Constitutional: Vital signs reviewed, as recorded above. Patient is alert, oriented and appears in no acute distress.  Eyes: PER, EOMI, no stare, lid lag, or retraction; no conjunctival injection.  Neck: Thyroid is readily palpable, with of right lobe of the thyroid measuring 1.5 times the size of normal.  No tenderness on exam.  Cardiovascular: Regular rate and rhythm with normal S1 and  S2.  Respiratory: Normal chest wall motion and respiratory effort.  MSK: No clubbing or cyanosis; normal muscle bulk and tone.  Skin: No lesions on visible skin,  Neurological: Alert and oriented times 3. No tremor.    DATA REVIEW  Each of the following laboratory and/or imaging studies were reviewed.          ASSESSMENT  1.  Diabetes mellitus, type 2.  Transitioned from Trulicity to Mounjaro in 8/2023.  Tolerating Mounjaro without side effect, has been off with excellent glycemic control based on fingerstick glucose data and previsit A1c.  ~She is interested in rechallenging with higher dose of Mounjaro: We can make this change and reduce metformin dose slightly downward.  If she does not tolerate Mounjaro dose change, we can resume her current regimen.  ~Congratulated on the excellent changes she has made to control diabetes.    2.  Diabetes preventive care.  -History of diabetic retinopathy, I reviewed records of eye exam from 3/18/2024 ([facility]): Proliferative diabetic retinopathy, status post PRP and Avastin (continues on Avastin, although needing less frequent treatment).  As we discussed at last visit, the patient has updated her ophthalmologist that she is now on Mounjaro and not Trulicity.  -CKD and microalbuminuria on urine microalbumin screen on 10/16/2023, on lisinopril and Jardiance.  Check urine microalbumin screen prior to next visit.  -Foot exam performed on 11/29/2023: Footcare reviewed at that visit    3.  Dyslipidemia.  On statin therapy, optimal lipid profile on 8/21/2023.  Recheck before next visit.    4.  Goiter, with thyroid nodule.   Asymmetric thyroid, with prominence of the right lobe of the thyroid noted on exam in 7/2022; thyroid ultrasound revealed a right lobe nodule, which was aspirated on 7/20/2022.  Cytology was benign.  Normal TSH on 8/21/2023.  Follow-up thyroid ultrasound in 9/2023 shows no significant change in thyroid nodule.  Given that she has noted  more prominent thyroid, we can perform thyroid ultrasound slightly earlier.  Check thyroid function test with next lab draw.    PLAN  -Increase Mounjaro to 15 mg weekly  -With increase in Mounjaro, can reduce metformin to 1000 mg (two of 500 mg tablets) once daily  -If side effects with higher Mounjaro dose, we can reduce back to 125 mg weekly and increase metformin to  2000 mg daily again  -Continue remainder of medications without changes   -Check blood glucose twice daily, fasting and before evening meal; also check with any unusual symptoms  -Schedule thyroid ultrasound at convenience  -Follow-up in September 2024 as scheduled, with fasting labs before visit  -We will communicate results via [employer], or if needed by phone      Orders Placed This Encounter   Procedures    US Thyroid    TSH with free T4 reflex    Comprehensive metabolic panel    Hemoglobin A1c    Lipid Profile    Albumin Random Urine Quantitative with Creat Ratio         Levy Lake MD   Division of Diabetes, Endocrinology and Metabolism  Department of Medicine

## 2024-06-30 DIAGNOSIS — L97.509 TYPE 2 DIABETES MELLITUS WITH FOOT ULCER, WITHOUT LONG-TERM CURRENT USE OF INSULIN (H): ICD-10-CM

## 2024-06-30 DIAGNOSIS — E11.621 TYPE 2 DIABETES MELLITUS WITH FOOT ULCER, WITHOUT LONG-TERM CURRENT USE OF INSULIN (H): ICD-10-CM

## 2024-07-03 NOTE — TELEPHONE ENCOUNTER
Requested Prescriptions   Pending Prescriptions Disp Refills    empagliflozin (JARDIANCE) 25 MG TABS tablet 90 tablet 1     Sig: Take 1 tablet (25 mg) by mouth daily       Sodium Glucose Co-Transport Inhibitor Agents Failed - 7/3/2024  5:19 AM        Failed - Has GFR on file in past 12 months and most recent value is normal        Passed - Patient has documented A1c within the specified period of time.     If HgbA1C is 8 or greater, it needs to be on file within the past 3 months.  If less than 8, must be on file within the past 6 months.     Recent Labs   Lab Test 06/14/24  0750 07/13/22  1059 04/20/22  0822   A1C 6.5*   < >  --    [id number]  --   --  9.8*    < > = values in this interval not displayed.             Passed - Medication is active on med list        Passed - Recent (6 mo) or future (90 days) visit within the authorizing provider's specialty     The patient must have completed an in-person or virtual visit within the past 6 months or has a future visit scheduled within the next 90 days with the authorizing provider s specialty.  Urgent care and e-visits do not quality as an office visit for this protocol.          Passed - Medication indicated for associated diagnosis     Medication is associated with one or more of the following diagnoses:     Diabetic nephropathy, With Albuminuria - Type 2 diabetes mellitus     Disorder of cardiovascular system; Prophylaxis - Type 2 diabetes mellitus     Type 2 diabetes mellitus    Disorder of cardiovascular system; Prophylaxis - Heart failure   Chronic kidney disease, (At risk of progression) to reduce the risk of sustained   estimated GFR decline, end-stage kidney disease, cardiovascular death,   and hospitalization for heart failure     Heart failure, (NYHA class II to IV, reduced ejection fraction) to reduce risk of  cardiovascular death and hospitalization           Passed - Patient is age 18 or older        Passed - Patient is not pregnant        Passed - Patient  has documented normal Potassium within the last 12 mos.     Recent Labs   Lab Test 06/14/24  0750 07/13/22  1059 04/20/22  0822   POTASSIUM 4.7   < >  --    [id number]  --   --  5.1    < > = values in this interval not displayed.             Passed - Patient has no positive pregnancy test within the past 12 mos.

## 2024-07-14 ENCOUNTER — HEALTH MAINTENANCE LETTER (OUTPATIENT)
Age: 64
End: 2024-07-14

## 2024-07-24 ENCOUNTER — HOSPITAL ENCOUNTER (OUTPATIENT)
Dept: ULTRASOUND IMAGING | Facility: CLINIC | Age: 64
Discharge: HOME | End: 2024-07-24
Attending: INTERNAL MEDICINE | Admitting: INTERNAL MEDICINE
Payer: COMMERCIAL

## 2024-07-24 DIAGNOSIS — E04.1 THYROID NODULE: ICD-10-CM

## 2024-07-24 PROCEDURE — 76536 US EXAM OF HEAD AND NECK: CPT

## 2024-09-20 ENCOUNTER — LAB (OUTPATIENT)
Dept: LAB | Facility: CLINIC | Age: 64
End: 2024-09-20
Payer: COMMERCIAL

## 2024-09-20 DIAGNOSIS — L97.509 TYPE 2 DIABETES MELLITUS WITH FOOT ULCER, WITHOUT LONG-TERM CURRENT USE OF INSULIN (H): ICD-10-CM

## 2024-09-20 DIAGNOSIS — E11.621 TYPE 2 DIABETES MELLITUS WITH FOOT ULCER, WITHOUT LONG-TERM CURRENT USE OF INSULIN (H): ICD-10-CM

## 2024-09-20 LAB
ALBUMIN SERPL BCG-MCNC: 4 G/DL (ref 3.5–5.2)
ALP SERPL-CCNC: 78 U/L (ref 40–150)
ALT SERPL W P-5'-P-CCNC: 9 U/L (ref 0–70)
ANION GAP SERPL CALCULATED.3IONS-SCNC: 7 MMOL/L (ref 7–15)
AST SERPL W P-5'-P-CCNC: 25 U/L (ref 0–45)
BILIRUB SERPL-MCNC: 0.3 MG/DL
BUN SERPL-MCNC: 15.6 MG/DL (ref 8–23)
CALCIUM SERPL-MCNC: 9.2 MG/DL (ref 8.8–10.4)
CHLORIDE SERPL-SCNC: 104 MMOL/L (ref 98–107)
CHOLEST SERPL-MCNC: 134 MG/DL
CREAT SERPL-MCNC: 0.84 MG/DL (ref 0.51–1.17)
CREAT UR-MCNC: 65.9 MG/DL
EGFRCR SERPLBLD CKD-EPI 2021: 78 ML/MIN/1.73M2
EST. AVERAGE GLUCOSE BLD GHB EST-MCNC: 134 MG/DL
FASTING STATUS PATIENT QL REPORTED: YES
FASTING STATUS PATIENT QL REPORTED: YES
GLUCOSE SERPL-MCNC: 98 MG/DL (ref 70–99)
HBA1C MFR BLD: 6.3 % (ref 0–5.6)
HCO3 SERPL-SCNC: 30 MMOL/L (ref 22–29)
HDLC SERPL-MCNC: 67 MG/DL
LDLC SERPL CALC-MCNC: 58 MG/DL
MICROALBUMIN UR-MCNC: 37.2 MG/L
MICROALBUMIN/CREAT UR: 56.45 MG/G CR (ref 0–25)
NONHDLC SERPL-MCNC: 67 MG/DL
POTASSIUM SERPL-SCNC: 4.7 MMOL/L (ref 3.4–5.3)
PROT SERPL-MCNC: 6.8 G/DL (ref 6.4–8.3)
SODIUM SERPL-SCNC: 141 MMOL/L (ref 135–145)
TRIGL SERPL-MCNC: 45 MG/DL
TSH SERPL DL<=0.005 MIU/L-ACNC: 1.06 UIU/ML (ref 0.3–4.2)

## 2024-09-20 PROCEDURE — 84443 ASSAY THYROID STIM HORMONE: CPT

## 2024-09-20 PROCEDURE — 82043 UR ALBUMIN QUANTITATIVE: CPT

## 2024-09-20 PROCEDURE — 82570 ASSAY OF URINE CREATININE: CPT

## 2024-09-20 PROCEDURE — 80061 LIPID PANEL: CPT

## 2024-09-20 PROCEDURE — 36415 COLL VENOUS BLD VENIPUNCTURE: CPT

## 2024-09-20 PROCEDURE — 83036 HEMOGLOBIN GLYCOSYLATED A1C: CPT

## 2024-09-20 PROCEDURE — 80053 COMPREHEN METABOLIC PANEL: CPT

## 2024-09-27 ENCOUNTER — VIRTUAL VISIT (OUTPATIENT)
Dept: ENDOCRINOLOGY | Facility: CLINIC | Age: 64
End: 2024-09-27
Payer: COMMERCIAL

## 2024-09-27 DIAGNOSIS — E04.1 THYROID NODULE: ICD-10-CM

## 2024-09-27 DIAGNOSIS — R80.9 TYPE 2 DIABETES MELLITUS WITH DIABETIC MICROALBUMINURIA, WITHOUT LONG-TERM CURRENT USE OF INSULIN (H): Primary | ICD-10-CM

## 2024-09-27 DIAGNOSIS — E11.29 TYPE 2 DIABETES MELLITUS WITH DIABETIC MICROALBUMINURIA, WITHOUT LONG-TERM CURRENT USE OF INSULIN (H): Primary | ICD-10-CM

## 2024-09-27 PROCEDURE — 99214 OFFICE O/P EST MOD 30 MIN: CPT | Mod: 95 | Performed by: INTERNAL MEDICINE

## 2024-09-27 RX ORDER — FLUTICASONE FUROATE 100 UG/1
1 POWDER RESPIRATORY (INHALATION) DAILY
COMMUNITY
Start: 2024-08-30

## 2024-09-27 RX ORDER — METFORMIN HCL 500 MG
1000 TABLET, EXTENDED RELEASE 24 HR ORAL
Qty: 180 TABLET | Refills: 1 | Status: SHIPPED | OUTPATIENT
Start: 2024-09-27

## 2024-09-27 NOTE — PROGRESS NOTES
ENDOCRINOLOGY VIDEO FOLLOW-UP         HISTORY OF PRESENT ILLNESS    Gill Mendez is seen in follow-up via billable video visit.    Her  has been diagnosed with cirrhosis in the interim since last visit: Anticipating multiple appointments to workup this new diagnosis.    1.  Diabetes mellitus.  We adjusted Mounjaro dose up to 15 mg weekly at our last visit in 6/2024.  Ms. Mendez initially had some GI side effects but these abated and she is tolerating the medication without side effect at present.    We also reduced metformin dose at our last visit.    Current diabetes regimen: Mounjaro 15 mg weekly,Jardiance 25 mg daily, metformin extended release 1000 mg daily.    Monitoring glucose 1-2 times per day.  Patient sent [employer] update with glucose data.    5:33 am-92  6:22 am-104  4:40 pm-98  8:58 pm-123  7:57 am-80  10:24 pm-124  7:55 am-101    7 day average -123  30 day average -111      Weight has stabilized: Weighed 162 pounds in 3/2024, 154 pounds in 6/2024, notes that she weighs 150 to 152 pounds currently.    2.  Nodular goiter.  She has not noted a change in the feel of her neck since last visit.    Ms. Mendez had follow-up thyroid ultrasound performed on 7/24/2024: This showed that mixed cystic and solid right mid thyroid lobe nodule measured 4.4 x 3.2 x 2.6 cm (previously 3.9 x 3.2 x 2.5 cm).  This represents 17% increase in nodule size.    Pertinent endocrine and related history:  1.  Diabetes mellitus, type 2. Has previously followed with Dr. Quinteros in the [facility] system.  -Diabetes was diagnosed in 2005.  Has been complicated by diabetic retinopathy, neuropathy and nephropathy.  -Previously on prandial insulin: Found it difficult to take consistently.  Also prescribed Rybelsus in the past: This caused nausea.  Was on glipizide, this was discontinued due to escalating insulin requirement.  We discontinued Actos in 9/2022. Transitioned from Trulicity to Mounjaro in 8/2023.  -Previously used  freestyle lilia but had reaction to sensor adhesive.    2.  History of foot ulcer; has history of 2 toe amputations.  3. Thyroid nodule. Goiter palpated on exam in 7/2022.  -No  history of excessive head or neck radiation exposure.  Family history is notable for mother who had thyroid nodules removed surgically: No thyroid cancer.  2 sisters also have thyroid nodules, no thyroid cancer.  -Thyroid US performed 7/13/2022 and showed a heterogenous gland with a 3.7 x 3 x 1.8 cm mixed cystic and solid nodule in the right lobe of the thyroid.  -Ultrasound-guided FNA of thyroid nodule on 7/20/2022, with benign cytology findings.    Pertinent Social History: , has 2 sons and a daughter.  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    PAST MEDICAL HISTORY  Past Medical History:   Diagnosis Date    Anxiety     Asthma     Cellulitis     Diabetes mellitus (H)     Essential hypertension     Created by Conversion  Replacement Utility updated for latest IMO load    Gastroparesis     Hyperlipidemia     Hypertension     Other and unspecified hyperlipidemia     Created by Conversion     PONV (postoperative nausea and vomiting)     Shortness of breath     Type II or unspecified type diabetes mellitus without mention of complication, not stated as uncontrolled     Created by Conversion        MEDICATIONS  Current Outpatient Medications   Medication Sig Dispense Refill    albuterol (PROVENTIL HFA;VENTOLIN HFA) 90 mcg/actuation inhaler Inhale 2 puffs into the lungs every 6 hours as needed      amLODIPine (NORVASC) 5 MG tablet Take 1 tablet by mouth daily      ammonium lactate (LAC-HYDRIN) 12 % external lotion Apply topically 2 times daily 225 g 3    ARNUITY ELLIPTA 100 MCG/ACT inhaler Inhale 1 puff into the lungs daily.      atorvastatin (LIPITOR) 40 MG tablet Take 40 mg by mouth daily      chlorthalidone (HYGROTON) 25 MG tablet Take 1  tablet by mouth daily      citalopram (CELEXA) 20 MG tablet [CITALOPRAM (CELEXA) 20 MG TABLET] Take 20 mg by mouth every morning.   0    empagliflozin (JARDIANCE) 25 MG TABS tablet Take 1 tablet (25 mg) by mouth daily. 90 tablet 1    lisinopriL (PRINIVIL,ZESTRIL) 10 MG tablet [LISINOPRIL (PRINIVIL,ZESTRIL) 10 MG TABLET] Take 10 mg by mouth daily.      magnesium 250 MG tablet Take 1 tablet by mouth daily      metFORMIN (GLUCOPHAGE XR) 500 MG 24 hr tablet Take 2 tablets (1,000 mg) by mouth daily (with breakfast). Changed on 6/21/24, new prescription not yet sent 180 tablet 1    pramipexole (MIRAPEX) 0.25 MG tablet Take 0.125 mg by mouth At Bedtime 0.5 with a 0.125  3    tirzepatide (MOUNJARO) 15 MG/0.5ML pen Inject 15 mg subcutaneously every 7 days. 6 mL 1    traZODone (DESYREL) 50 MG tablet Take 50 mg by mouth at bedtime      blood glucose (NO BRAND SPECIFIED) test strip Use to test blood sugar 2 times daily as directed. 200 strip 3    blood glucose monitoring (NO BRAND SPECIFIED) meter device kit Use to test blood sugar two times daily or as directed. 1 kit 0       Allergies, family, and social history were reviewed and documented as needed in EHR.     REVIEW OF SYSTEMS  A focused ROS was performed, with pertinent positives and negatives as noted in the HPI.      PHYSICAL EXAM  There were no vitals taken for this visit.  There is no height or weight on file to calculate BMI.  Constitutional: Patient is alert, oriented and appears in no acute distress.  Eyes: Eyes grossly normal to inspection, EOMI, no stare, lid lag, or retraction; no conjunctival injection.  ENMT: Lips are without lesions.   Neck: No visible goiter or neck mass.  Respiratory: No audible wheeze or cough. No visible cyanosis. No visible increased work of breathing.  Neurological: Alert and oriented times 3.  Cranial nerves grossly intact.        DATA REVIEW  Each of the following laboratory and/or imaging studies were reviewed.          EXAM: US  THYROID  LOCATION: [facility]  DATE: 7/24/2024     INDICATION: Follow up thyroid nodule  COMPARISON: 9/5/2023 and 7/20/2022  TECHNIQUE: Thyroid ultrasound.      FINDINGS:  RIGHT lobe: 5.7 x 2.1 x 2.3 cm. Homogeneous echotexture.  Isthmus: 3 mm.  LEFT lobe: 4.7 x 1.7 x 1.4 cm. Homogeneous echotexture.     NECK: No cervical lymphadenopathy.     NODULES:  Nodule 1: A right mid thyroid nodule is 4.4 x 3.2 x 2.6 cm (previously 3.9 x 3.2 x 2.5 cm).   Composition: Mixed cystic and solid, 1 point   Echogenicity: Hyperechoic or isoechoic, 1 point   Shape: Wider-than-tall, 0 points   Margin: Smooth, 0 points   Echogenic Foci: None, or large comet-tail artifacts, 0 points   Point Total: 1-2 points. TI-RADS 2. No FNA.                                                                          IMPRESSION:  1.  A right thyroid nodule has slightly increased in size. This was previously biopsied.    ASSESSMENT  1.  Diabetes mellitus, type 2.  Excellent glycemic control based on hemoglobin A1c and fingerstick glucose data.  No change to current medication regimen.  ~Congratulated on the excellent changes she has made to control diabetes.    2.  Diabetes preventive care.  -History of diabetic retinopathy, records of eye exam from 3/18/2024 ([facility] [place]) indicate proliferative diabetic retinopathy, status post PRP and Avastin (continues on Avastin, although needing less frequent treatment).    -CKD and microalbuminuria on urine microalbumin screen in 9/2024, continue lisinopril and Jardiance.   -Foot exam performed on 11/29/2023: Footcare reviewed at that visit    3.  Dyslipidemia.  On statin therapy, optimal lipid profile on 9/28/2024.      4.  Goiter, with thyroid nodule.   Asymmetric thyroid, with prominence of the right lobe of the thyroid noted on exam in 7/2022; thyroid ultrasound revealed a right lobe nodule, which was aspirated on 7/20/2022.  Cytology was benign.  Has had normal  thyroid function testing, most recently in 9/2024.  Follow-up thyroid ultrasound in 7/2024 showed modest change in right thyroid nodule (17% increase) which I would not consider significant, especially given context of benign cytology on prior aspiration.  Continue careful follow-up (anticipate ultrasound around 7/2025).    PLAN  -Continue current medications without changes   -Can reduce frequency of blood glucose checks: check blood glucose once daily, alternating between fasting, before evening meal or at bedtime; also check with any unusual symptoms  -Follow-up in February 2025 as scheduled, with labs before visit  -We will communicate results via [employer], or if needed by phone      Orders Placed This Encounter   Procedures    Basic metabolic panel    Hemoglobin A1c     Video start time: 12:02 PM  Video end time: 12:11 PM    Physician location: On site    Video platform: Carolyne Lake MD   Division of Diabetes, Endocrinology and Metabolism  Department of Medicine

## 2024-09-27 NOTE — LETTER
9/27/2024      Gill Mendez  [address] [place] [id number]      Dear Colleague,    Thank you for referring your patient, Gill Mendez, to the [facility]. Please see a copy of my visit note below.      ENDOCRINOLOGY VIDEO FOLLOW-UP         HISTORY OF PRESENT ILLNESS    Gill Mendez is seen in follow-up via billable video visit.    Her  has been diagnosed with cirrhosis in the interim since last visit: Anticipating multiple appointments to workup this new diagnosis.    1.  Diabetes mellitus.  We adjusted Mounjaro dose up to 15 mg weekly at our last visit in 6/2024.  Ms. Mendez initially had some GI side effects but these abated and she is tolerating the medication without side effect at present.    We also reduced metformin dose at our last visit.    Current diabetes regimen: Mounjaro 15 mg weekly,Jardiance 25 mg daily, metformin extended release 1000 mg daily.    Monitoring glucose 1-2 times per day.  Patient sent [email or web address] update with glucose data.    5:33 am-92  6:22 am-104  4:40 pm-98  8:58 pm-123  7:57 am-80  10:24 pm-124  7:55 am-101    7 day average -123  30 day average -111      Weight has stabilized: Weighed 162 pounds in 3/2024, 154 pounds in 6/2024, notes that she weighs 150 to 152 pounds currently.    2.  Nodular goiter.  She has not noted a change in the feel of her neck since last visit.    Ms. Mendez had follow-up thyroid ultrasound performed on 7/24/2024: This showed that mixed cystic and solid right mid thyroid lobe nodule measured 4.4 x 3.2 x 2.6 cm (previously 3.9 x 3.2 x 2.5 cm).  This represents 17% increase in nodule size.    Pertinent endocrine and related history:  1.  Diabetes mellitus, type 2. Has previously followed with Dr. Quinteros in the [facility] system.  -Diabetes was diagnosed in 2005.  Has been complicated by diabetic retinopathy, neuropathy and nephropathy.  -Previously on prandial insulin: Found it difficult to take consistently.  Also  prescribed Rybelsus in the past: This caused nausea.  Was on glipizide, this was discontinued due to escalating insulin requirement.  We discontinued Actos in 9/2022. Transitioned from Trulicity to Mounjaro in 8/2023.  -Previously used freestyle lilia but had reaction to sensor adhesive.    2.  History of foot ulcer; has history of 2 toe amputations.  3. Thyroid nodule. Goiter palpated on exam in 7/2022.  -No  history of excessive head or neck radiation exposure.  Family history is notable for mother who had thyroid nodules removed surgically: No thyroid cancer.  2 sisters also have thyroid nodules, no thyroid cancer.  -Thyroid US performed 7/13/2022 and showed a heterogenous gland with a 3.7 x 3 x 1.8 cm mixed cystic and solid nodule in the right lobe of the thyroid.  -Ultrasound-guided FNA of thyroid nodule on 7/20/2022, with benign cytology findings.    Pertinent Social History: , has 2 sons and a daughter.  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    PAST MEDICAL HISTORY  Past Medical History:   Diagnosis Date     Anxiety      Asthma      Cellulitis      Diabetes mellitus (H)      Essential hypertension     Created by Conversion  Replacement Utility updated for latest IMO load     Gastroparesis      Hyperlipidemia      Hypertension      Other and unspecified hyperlipidemia     Created by Conversion      PONV (postoperative nausea and vomiting)      Shortness of breath      Type II or unspecified type diabetes mellitus without mention of complication, not stated as uncontrolled     Created by Conversion        MEDICATIONS  Current Outpatient Medications   Medication Sig Dispense Refill     albuterol (PROVENTIL HFA;VENTOLIN HFA) 90 mcg/actuation inhaler Inhale 2 puffs into the lungs every 6 hours as needed       amLODIPine (NORVASC) 5 MG tablet Take 1 tablet by mouth daily       ammonium lactate (LAC-HYDRIN) 12  % external lotion Apply topically 2 times daily 225 g 3     ARNUITY ELLIPTA 100 MCG/ACT inhaler Inhale 1 puff into the lungs daily.       atorvastatin (LIPITOR) 40 MG tablet Take 40 mg by mouth daily       chlorthalidone (HYGROTON) 25 MG tablet Take 1 tablet by mouth daily       citalopram (CELEXA) 20 MG tablet [CITALOPRAM (CELEXA) 20 MG TABLET] Take 20 mg by mouth every morning.   0     empagliflozin (JARDIANCE) 25 MG TABS tablet Take 1 tablet (25 mg) by mouth daily. 90 tablet 1     lisinopriL (PRINIVIL,ZESTRIL) 10 MG tablet [LISINOPRIL (PRINIVIL,ZESTRIL) 10 MG TABLET] Take 10 mg by mouth daily.       magnesium 250 MG tablet Take 1 tablet by mouth daily       metFORMIN (GLUCOPHAGE XR) 500 MG 24 hr tablet Take 2 tablets (1,000 mg) by mouth daily (with breakfast). Changed on 6/21/24, new prescription not yet sent 180 tablet 1     pramipexole (MIRAPEX) 0.25 MG tablet Take 0.125 mg by mouth At Bedtime 0.5 with a 0.125  3     tirzepatide (MOUNJARO) 15 MG/0.5ML pen Inject 15 mg subcutaneously every 7 days. 6 mL 1     traZODone (DESYREL) 50 MG tablet Take 50 mg by mouth at bedtime       blood glucose (NO BRAND SPECIFIED) test strip Use to test blood sugar 2 times daily as directed. 200 strip 3     blood glucose monitoring (NO BRAND SPECIFIED) meter device kit Use to test blood sugar two times daily or as directed. 1 kit 0       Allergies, family, and social history were reviewed and documented as needed in EHR.     REVIEW OF SYSTEMS  A focused ROS was performed, with pertinent positives and negatives as noted in the HPI.      PHYSICAL EXAM  There were no vitals taken for this visit.  There is no height or weight on file to calculate BMI.  Constitutional: Patient is alert, oriented and appears in no acute distress.  Eyes: Eyes grossly normal to inspection, EOMI, no stare, lid lag, or retraction; no conjunctival injection.  ENMT: Lips are without lesions.   Neck: No visible goiter or neck mass.  Respiratory: No audible  wheeze or cough. No visible cyanosis. No visible increased work of breathing.  Neurological: Alert and oriented times 3.  Cranial nerves grossly intact.        DATA REVIEW  Each of the following laboratory and/or imaging studies were reviewed.          EXAM: US THYROID  LOCATION: [facility]  DATE: 7/24/2024     INDICATION: Follow up thyroid nodule  COMPARISON: 9/5/2023 and 7/20/2022  TECHNIQUE: Thyroid ultrasound.      FINDINGS:  RIGHT lobe: 5.7 x 2.1 x 2.3 cm. Homogeneous echotexture.  Isthmus: 3 mm.  LEFT lobe: 4.7 x 1.7 x 1.4 cm. Homogeneous echotexture.     NECK: No cervical lymphadenopathy.     NODULES:  Nodule 1: A right mid thyroid nodule is 4.4 x 3.2 x 2.6 cm (previously 3.9 x 3.2 x 2.5 cm).   Composition: Mixed cystic and solid, 1 point   Echogenicity: Hyperechoic or isoechoic, 1 point   Shape: Wider-than-tall, 0 points   Margin: Smooth, 0 points   Echogenic Foci: None, or large comet-tail artifacts, 0 points   Point Total: 1-2 points. TI-RADS 2. No FNA.                                                                          IMPRESSION:  1.  A right thyroid nodule has slightly increased in size. This was previously biopsied.    ASSESSMENT  1.  Diabetes mellitus, type 2.  Excellent glycemic control based on hemoglobin A1c and fingerstick glucose data.  No change to current medication regimen.  ~Congratulated on the excellent changes she has made to control diabetes.    2.  Diabetes preventive care.  -History of diabetic retinopathy, records of eye exam from 3/18/2024 ([facility]) indicate proliferative diabetic retinopathy, status post PRP and Avastin (continues on Avastin, although needing less frequent treatment).    -CKD and microalbuminuria on urine microalbumin screen in 9/2024, continue lisinopril and Jardiance.   -Foot exam performed on 11/29/2023: Footcare reviewed at that visit    3.  Dyslipidemia.  On statin therapy, optimal lipid profile on  9/28/2024.      4.  Goiter, with thyroid nodule.   Asymmetric thyroid, with prominence of the right lobe of the thyroid noted on exam in 7/2022; thyroid ultrasound revealed a right lobe nodule, which was aspirated on 7/20/2022.  Cytology was benign.  Has had normal thyroid function testing, most recently in 9/2024.  Follow-up thyroid ultrasound in 7/2024 showed modest change in right thyroid nodule (17% increase) which I would not consider significant, especially given context of benign cytology on prior aspiration.  Continue careful follow-up (anticipate ultrasound around 7/2025).    PLAN  -Continue current medications without changes   -Can reduce frequency of blood glucose checks: check blood glucose once daily, alternating between fasting, before evening meal or at bedtime; also check with any unusual symptoms  -Follow-up in February 2025 as scheduled, with labs before visit  -We will communicate results via [employer], or if needed by phone      Orders Placed This Encounter   Procedures     Basic metabolic panel     Hemoglobin A1c     Video start time: 12:02 PM  Video end time: 12:11 PM    Physician location: On site    Video platform: Carolyne Lake MD   Division of Diabetes, Endocrinology and Metabolism  Department of Medicine          Again, thank you for allowing me to participate in the care of your patient.        Sincerely,        Levy Lake MD

## 2024-09-27 NOTE — PATIENT INSTRUCTIONS
-Continue current medications without changes   -Can reduce frequency of blood glucose checks: check blood glucose once daily, alternating between fasting, before evening meal or at bedtime; also check with any unusual symptoms  -Follow-up in February 2025 as scheduled, with labs before visit  -We will communicate results via [employer], or if needed by phone

## 2024-11-22 ENCOUNTER — APPOINTMENT (OUTPATIENT)
Dept: RADIOLOGY | Facility: CLINIC | Age: 64
End: 2024-11-22
Attending: EMERGENCY MEDICINE
Payer: COMMERCIAL

## 2024-11-22 ENCOUNTER — HOSPITAL ENCOUNTER (EMERGENCY)
Facility: CLINIC | Age: 64
Discharge: ANOTHER HOSPITAL | End: 2024-11-23
Attending: EMERGENCY MEDICINE | Admitting: EMERGENCY MEDICINE
Payer: COMMERCIAL

## 2024-11-22 VITALS
WEIGHT: 161.7 LBS | TEMPERATURE: 99 F | HEIGHT: 65 IN | SYSTOLIC BLOOD PRESSURE: 101 MMHG | OXYGEN SATURATION: 96 % | BODY MASS INDEX: 26.94 KG/M2 | HEART RATE: 106 BPM | DIASTOLIC BLOOD PRESSURE: 55 MMHG | RESPIRATION RATE: 30 BRPM

## 2024-11-22 DIAGNOSIS — J81.0 ACUTE PULMONARY EDEMA (H): ICD-10-CM

## 2024-11-22 DIAGNOSIS — I21.4 NSTEMI (NON-ST ELEVATED MYOCARDIAL INFARCTION) (H): ICD-10-CM

## 2024-11-22 PROBLEM — G25.81 RESTLESS LEG SYNDROME: Status: ACTIVE | Noted: 2023-01-31

## 2024-11-22 PROBLEM — E78.5 HYPERLIPIDEMIA: Status: ACTIVE | Noted: 2020-06-18

## 2024-11-22 PROBLEM — I10 ESSENTIAL HYPERTENSION: Status: ACTIVE | Noted: 2020-06-18

## 2024-11-22 LAB
ANION GAP SERPL CALCULATED.3IONS-SCNC: 12 MMOL/L (ref 7–15)
ATRIAL RATE - MUSE: 104 BPM
ATRIAL RATE - MUSE: 122 BPM
BASOPHILS # BLD AUTO: 0 10E3/UL (ref 0–0.2)
BASOPHILS NFR BLD AUTO: 0 %
BUN SERPL-MCNC: 11.9 MG/DL (ref 8–23)
CALCIUM SERPL-MCNC: 9.4 MG/DL (ref 8.8–10.4)
CHLORIDE SERPL-SCNC: 104 MMOL/L (ref 98–107)
CREAT SERPL-MCNC: 0.83 MG/DL (ref 0.51–0.95)
D DIMER PPP FEU-MCNC: 0.83 UG/ML FEU (ref 0–0.5)
DIASTOLIC BLOOD PRESSURE - MUSE: 73 MMHG
DIASTOLIC BLOOD PRESSURE - MUSE: 82 MMHG
EGFRCR SERPLBLD CKD-EPI 2021: 79 ML/MIN/1.73M2
EOSINOPHIL # BLD AUTO: 0 10E3/UL (ref 0–0.7)
EOSINOPHIL NFR BLD AUTO: 0 %
ERYTHROCYTE [DISTWIDTH] IN BLOOD BY AUTOMATED COUNT: 17.3 % (ref 10–15)
FLUAV RNA SPEC QL NAA+PROBE: NEGATIVE
FLUBV RNA RESP QL NAA+PROBE: NEGATIVE
GLUCOSE SERPL-MCNC: 200 MG/DL (ref 70–99)
HCO3 SERPL-SCNC: 26 MMOL/L (ref 22–29)
HCT VFR BLD AUTO: 40.4 % (ref 35–47)
HGB BLD-MCNC: 12.9 G/DL (ref 11.7–15.7)
HOLD SPECIMEN: NORMAL
IMM GRANULOCYTES # BLD: 0.1 10E3/UL
IMM GRANULOCYTES NFR BLD: 1 %
INTERPRETATION ECG - MUSE: NORMAL
INTERPRETATION ECG - MUSE: NORMAL
LACTATE SERPL-SCNC: 1.8 MMOL/L (ref 0.7–2)
LACTATE SERPL-SCNC: 2.5 MMOL/L (ref 0.7–2)
LYMPHOCYTES # BLD AUTO: 1 10E3/UL (ref 0.8–5.3)
LYMPHOCYTES NFR BLD AUTO: 10 %
MCH RBC QN AUTO: 26 PG (ref 26.5–33)
MCHC RBC AUTO-ENTMCNC: 31.9 G/DL (ref 31.5–36.5)
MCV RBC AUTO: 81 FL (ref 78–100)
MONOCYTES # BLD AUTO: 0.5 10E3/UL (ref 0–1.3)
MONOCYTES NFR BLD AUTO: 5 %
NEUTROPHILS # BLD AUTO: 8.4 10E3/UL (ref 1.6–8.3)
NEUTROPHILS NFR BLD AUTO: 84 %
NRBC # BLD AUTO: 0 10E3/UL
NRBC BLD AUTO-RTO: 0 /100
NT-PROBNP SERPL-MCNC: 6095 PG/ML (ref 0–900)
P AXIS - MUSE: 63 DEGREES
P AXIS - MUSE: 69 DEGREES
PLATELET # BLD AUTO: 286 10E3/UL (ref 150–450)
POTASSIUM SERPL-SCNC: 3.5 MMOL/L (ref 3.4–5.3)
PR INTERVAL - MUSE: 160 MS
PR INTERVAL - MUSE: 172 MS
PROCALCITONIN SERPL IA-MCNC: 0.05 NG/ML
QRS DURATION - MUSE: 98 MS
QRS DURATION - MUSE: 98 MS
QT - MUSE: 330 MS
QT - MUSE: 368 MS
QTC - MUSE: 470 MS
QTC - MUSE: 483 MS
R AXIS - MUSE: -19 DEGREES
R AXIS - MUSE: -36 DEGREES
RBC # BLD AUTO: 4.97 10E6/UL (ref 3.8–5.2)
RSV RNA SPEC NAA+PROBE: NEGATIVE
SARS-COV-2 RNA RESP QL NAA+PROBE: NEGATIVE
SODIUM SERPL-SCNC: 142 MMOL/L (ref 135–145)
SYSTOLIC BLOOD PRESSURE - MUSE: 132 MMHG
SYSTOLIC BLOOD PRESSURE - MUSE: 132 MMHG
T AXIS - MUSE: 140 DEGREES
T AXIS - MUSE: 146 DEGREES
TROPONIN T SERPL HS-MCNC: 513 NG/L
TROPONIN T SERPL HS-MCNC: 562 NG/L
VENTRICULAR RATE- MUSE: 104 BPM
VENTRICULAR RATE- MUSE: 122 BPM
WBC # BLD AUTO: 10 10E3/UL (ref 4–11)

## 2024-11-22 PROCEDURE — 93005 ELECTROCARDIOGRAM TRACING: CPT | Performed by: EMERGENCY MEDICINE

## 2024-11-22 PROCEDURE — 250N000013 HC RX MED GY IP 250 OP 250 PS 637: Performed by: EMERGENCY MEDICINE

## 2024-11-22 PROCEDURE — 250N000011 HC RX IP 250 OP 636: Mod: JZ | Performed by: EMERGENCY MEDICINE

## 2024-11-22 PROCEDURE — 99292 CRITICAL CARE ADDL 30 MIN: CPT

## 2024-11-22 PROCEDURE — 83605 ASSAY OF LACTIC ACID: CPT | Performed by: EMERGENCY MEDICINE

## 2024-11-22 PROCEDURE — 96376 TX/PRO/DX INJ SAME DRUG ADON: CPT

## 2024-11-22 PROCEDURE — 96366 THER/PROPH/DIAG IV INF ADDON: CPT

## 2024-11-22 PROCEDURE — 99291 CRITICAL CARE FIRST HOUR: CPT | Mod: 25

## 2024-11-22 PROCEDURE — 96367 TX/PROPH/DG ADDL SEQ IV INF: CPT

## 2024-11-22 PROCEDURE — 82565 ASSAY OF CREATININE: CPT | Performed by: EMERGENCY MEDICINE

## 2024-11-22 PROCEDURE — 84484 ASSAY OF TROPONIN QUANT: CPT | Performed by: EMERGENCY MEDICINE

## 2024-11-22 PROCEDURE — 80048 BASIC METABOLIC PNL TOTAL CA: CPT | Performed by: EMERGENCY MEDICINE

## 2024-11-22 PROCEDURE — 96375 TX/PRO/DX INJ NEW DRUG ADDON: CPT

## 2024-11-22 PROCEDURE — 120N000001 HC R&B MED SURG/OB

## 2024-11-22 PROCEDURE — 71045 X-RAY EXAM CHEST 1 VIEW: CPT

## 2024-11-22 PROCEDURE — 96374 THER/PROPH/DIAG INJ IV PUSH: CPT | Mod: 59

## 2024-11-22 PROCEDURE — 83880 ASSAY OF NATRIURETIC PEPTIDE: CPT | Performed by: EMERGENCY MEDICINE

## 2024-11-22 PROCEDURE — 36415 COLL VENOUS BLD VENIPUNCTURE: CPT | Performed by: EMERGENCY MEDICINE

## 2024-11-22 PROCEDURE — 85379 FIBRIN DEGRADATION QUANT: CPT | Performed by: EMERGENCY MEDICINE

## 2024-11-22 PROCEDURE — 250N000009 HC RX 250: Performed by: EMERGENCY MEDICINE

## 2024-11-22 PROCEDURE — 85004 AUTOMATED DIFF WBC COUNT: CPT | Performed by: EMERGENCY MEDICINE

## 2024-11-22 PROCEDURE — 84145 PROCALCITONIN (PCT): CPT | Performed by: EMERGENCY MEDICINE

## 2024-11-22 PROCEDURE — 96365 THER/PROPH/DIAG IV INF INIT: CPT

## 2024-11-22 PROCEDURE — 87637 SARSCOV2&INF A&B&RSV AMP PRB: CPT | Performed by: EMERGENCY MEDICINE

## 2024-11-22 PROCEDURE — 93005 ELECTROCARDIOGRAM TRACING: CPT | Performed by: INTERNAL MEDICINE

## 2024-11-22 RX ORDER — MORPHINE SULFATE 4 MG/ML
4 INJECTION, SOLUTION INTRAMUSCULAR; INTRAVENOUS ONCE
Status: COMPLETED | OUTPATIENT
Start: 2024-11-22 | End: 2024-11-22

## 2024-11-22 RX ORDER — KETOROLAC TROMETHAMINE 30 MG/ML
30 INJECTION, SOLUTION INTRAMUSCULAR; INTRAVENOUS ONCE
Status: DISCONTINUED | OUTPATIENT
Start: 2024-11-22 | End: 2024-11-22

## 2024-11-22 RX ORDER — PRAMIPEXOLE DIHYDROCHLORIDE 0.5 MG/1
0.5 TABLET ORAL AT BEDTIME
COMMUNITY

## 2024-11-22 RX ORDER — PRAMIPEXOLE DIHYDROCHLORIDE 0.5 MG/1
0.5 TABLET ORAL ONCE
Status: COMPLETED | OUTPATIENT
Start: 2024-11-22 | End: 2024-11-22

## 2024-11-22 RX ORDER — METOPROLOL TARTRATE 1 MG/ML
5 INJECTION, SOLUTION INTRAVENOUS ONCE
Status: COMPLETED | OUTPATIENT
Start: 2024-11-22 | End: 2024-11-22

## 2024-11-22 RX ORDER — BENZONATATE 100 MG/1
100 CAPSULE ORAL 3 TIMES DAILY PRN
COMMUNITY
End: 2024-12-12

## 2024-11-22 RX ORDER — ASPIRIN 81 MG/1
162 TABLET, CHEWABLE ORAL ONCE
Status: COMPLETED | OUTPATIENT
Start: 2024-11-22 | End: 2024-11-22

## 2024-11-22 RX ORDER — FUROSEMIDE 10 MG/ML
40 INJECTION INTRAMUSCULAR; INTRAVENOUS ONCE
Status: COMPLETED | OUTPATIENT
Start: 2024-11-22 | End: 2024-11-22

## 2024-11-22 RX ORDER — LORAZEPAM 1 MG/1
1 TABLET ORAL ONCE
Status: DISCONTINUED | OUTPATIENT
Start: 2024-11-22 | End: 2024-11-22

## 2024-11-22 RX ORDER — NITROGLYCERIN 20 MG/100ML
10-200 INJECTION INTRAVENOUS CONTINUOUS
Status: DISCONTINUED | OUTPATIENT
Start: 2024-11-22 | End: 2024-11-23 | Stop reason: HOSPADM

## 2024-11-22 RX ORDER — NALOXONE HYDROCHLORIDE 0.4 MG/ML
0.4 INJECTION, SOLUTION INTRAMUSCULAR; INTRAVENOUS; SUBCUTANEOUS
Status: DISCONTINUED | OUTPATIENT
Start: 2024-11-22 | End: 2024-11-23 | Stop reason: HOSPADM

## 2024-11-22 RX ORDER — NITROGLYCERIN 0.4 MG/1
0.4 TABLET SUBLINGUAL EVERY 5 MIN PRN
Status: DISCONTINUED | OUTPATIENT
Start: 2024-11-22 | End: 2024-11-23 | Stop reason: HOSPADM

## 2024-11-22 RX ORDER — NALOXONE HYDROCHLORIDE 0.4 MG/ML
0.2 INJECTION, SOLUTION INTRAMUSCULAR; INTRAVENOUS; SUBCUTANEOUS
Status: DISCONTINUED | OUTPATIENT
Start: 2024-11-22 | End: 2024-11-23 | Stop reason: HOSPADM

## 2024-11-22 RX ORDER — HEPARIN SODIUM 10000 [USP'U]/100ML
0-5000 INJECTION, SOLUTION INTRAVENOUS CONTINUOUS
Status: DISCONTINUED | OUTPATIENT
Start: 2024-11-22 | End: 2024-11-23 | Stop reason: HOSPADM

## 2024-11-22 RX ADMIN — METOPROLOL TARTRATE 5 MG: 5 INJECTION INTRAVENOUS at 19:41

## 2024-11-22 RX ADMIN — NITROGLYCERIN 0.4 MG: 0.4 TABLET SUBLINGUAL at 18:29

## 2024-11-22 RX ADMIN — MORPHINE SULFATE 4 MG: 4 INJECTION, SOLUTION INTRAMUSCULAR; INTRAVENOUS at 21:39

## 2024-11-22 RX ADMIN — NITROGLYCERIN 0.4 MG: 0.4 TABLET SUBLINGUAL at 18:10

## 2024-11-22 RX ADMIN — HEPARIN SODIUM 900 UNITS/HR: 10000 INJECTION, SOLUTION INTRAVENOUS at 18:32

## 2024-11-22 RX ADMIN — NITROGLYCERIN 10 MCG/MIN: 20 INJECTION INTRAVENOUS at 20:16

## 2024-11-22 RX ADMIN — METOPROLOL TARTRATE 5 MG: 5 INJECTION INTRAVENOUS at 18:54

## 2024-11-22 RX ADMIN — FUROSEMIDE 40 MG: 10 INJECTION, SOLUTION INTRAMUSCULAR; INTRAVENOUS at 18:50

## 2024-11-22 RX ADMIN — PRAMIPEXOLE DIHYDROCHLORIDE 0.5 MG: 0.5 TABLET ORAL at 23:29

## 2024-11-22 RX ADMIN — ASPIRIN 81 MG CHEWABLE TABLET 162 MG: 81 TABLET CHEWABLE at 17:52

## 2024-11-22 RX ADMIN — NITROGLYCERIN 10 MCG/MIN: 20 INJECTION INTRAVENOUS at 20:17

## 2024-11-22 ASSESSMENT — ACTIVITIES OF DAILY LIVING (ADL)
ADLS_ACUITY_SCORE: 0

## 2024-11-22 ASSESSMENT — COLUMBIA-SUICIDE SEVERITY RATING SCALE - C-SSRS
1. IN THE PAST MONTH, HAVE YOU WISHED YOU WERE DEAD OR WISHED YOU COULD GO TO SLEEP AND NOT WAKE UP?: NO
2. HAVE YOU ACTUALLY HAD ANY THOUGHTS OF KILLING YOURSELF IN THE PAST MONTH?: NO
6. HAVE YOU EVER DONE ANYTHING, STARTED TO DO ANYTHING, OR PREPARED TO DO ANYTHING TO END YOUR LIFE?: NO

## 2024-11-22 ASSESSMENT — ENCOUNTER SYMPTOMS
CHEST TIGHTNESS: 1
COUGH: 1
SHORTNESS OF BREATH: 1
FEVER: 0

## 2024-11-22 NOTE — ED TRIAGE NOTES
"Arrives to ED from  with c/o SOB. Seen at [facility] on 11/19 and today. \"They said it was my asthma\". +chest tightness. Denies hx of blood clots. +cough.      Triage Assessment (Adult)       Row Name 11/22/24 1728          Triage Assessment    Airway WDL WDL        Respiratory WDL    Respiratory WDL X;cough;rhythm/pattern     Rhythm/Pattern, Respiratory tachypneic;shortness of breath        Skin Circulation/Temperature WDL    Skin Circulation/Temperature WDL WDL        Cardiac WDL    Cardiac WDL X;chest pain        Peripheral/Neurovascular WDL    Peripheral Neurovascular WDL WDL        Cognitive/Neuro/Behavioral WDL    Cognitive/Neuro/Behavioral WDL WDL                     "

## 2024-11-22 NOTE — ED PROVIDER NOTES
Emergency Department Encounter      NAME: Gill Mendez  AGE: 64 year old female  YOB: 1960  MRN: [id number]  EVALUATION DATE & TIME: 2024  5:24 PM    PCP: Saúl Hunt    ED PROVIDER: Marcial Rajan M.D.      Chief Complaint   Patient presents with    Shortness of Breath         FINAL IMPRESSION:  1. NSTEMI (non-ST elevated myocardial infarction) (H)    2. Acute pulmonary edema (H)        MEDICAL DECISION MAKIN:47 PM I met with the patient, obtained history, performed an initial exam, and discussed options and plan for diagnostics and treatment here in the ED.   6:18 PM I rechecked and updated patient on results.   7:26 PM I spoke with Dr. Faulkner from Cardiology.   8:51 PM I spoke with Dr. Kam, the intensivist.  He had some concerns about keeping the patient at [facility].  8:55 PM I spoke to cardiology again and we reviewed the events.  He was still okay with her staying at [facility] for now.  9:19 PM I spoke with the hospitalist, Dr. Mcghee. Made plans for admission.   9:45 PM the hospitalist was uncomfortable keeping the patient at this hospital as she told him that she still had 7 out of 10 chest pain while she  she was on a nitroglycerin infusion.  She is also receiving 4 mg of IV morphine.  Our charge nurse is arranging a ICU bed at [facility] currently.  We have not heard if 1 is available at the moment.  I spoke with Dr. Moise as we will need to talk to the intensivist and hospitalist at [facility] prior to her transfer there.  The patient and family are comfortable with her being transferred to [facility] as well.    This patient is a 62-year-old female with a history of diabetes and hypertension who presents with shortness of breath.  She also has been having chest tightness for the past 4 days as well as orthopnea and dyspnea on exertion.  On exam she had JVD, pedal edema and bibasilar rales on exam.  An EKG done in triage revealed ST depression in the  lateral leads.  She was given sublingual nitroglycerin and aspirin.  Chest x-ray was done which showed interstitial edema.  She received 40 mg IV Lasix.  Because of the concern for NSTEMI and unstable angina the patient was started on heparin infusion.  I spoke to cardiology and had them review the EKG as well.  He requested a repeat EKG which was done.  We reviewed these together and discussed her disposition here at [facility] to the ICU.  She had been started on a nitroglycerin infusion which necessitated an ICU admission.  The intensivist had some concerns about keeping her at [facility] but cardiology was okay with her here.  The hospitalist though when he interviewed the patient noted that she was still in 7 at chest pain and was uncomfortable keeping her here.  We are going to transfer to [facility] ICU where there is a Cath Lab available.    Pertinent Labs & Imaging studies reviewed. (See chart for details)    Critical care time 95 minutes not including procedures    Medical Decision Making     History:  Supplemental history from: Documented in chart, if applicable  External Record(s) reviewed: Documented in chart, if applicable.     Work Up:  Chart documentation includes differential considered and any EKGs or imaging independently interpreted by provider, where specified.  In additional to work up documented, I considered the following work up: Documented in chart, if applicable.     External consultation:  Discussion of management with another provider: Documented in chart, if applicable     Complicating factors:  Care impacted by chronic illness: diabetes mellitus, hypertension, asthma  Care affected by social determinants of health: Access to Medical Care     Disposition considerations: Admit    Not Applicable      MEDICATIONS GIVEN IN THE EMERGENCY:  Medications   nitroGLYcerin (NITROSTAT) sublingual tablet 0.4 mg (0.4 mg Sublingual $Given 11/22/24 5765)   heparin 25,000 units in 0.45% NaCl 250  mL ANTICOAGULANT infusion (900 Units/hr Intravenous Rate/Dose Verify 11/22/24 2115)   nitroGLYcerin 50 mg in D5W 250 mL (adult std) infusion CENTRAL (40 mcg/min Intravenous Rate/Dose Change 11/22/24 2111)   morphine (PF) injection 4 mg (has no administration in time range)   aspirin (ASA) chewable tablet 162 mg (162 mg Oral $Given 11/22/24 1752)   heparin loading dose for LOW INTENSITY TREATMENT * Give BEFORE starting heparin infusion (4,400 Units Intravenous $Given 11/22/24 1830)   furosemide (LASIX) injection 40 mg (40 mg Intravenous $Given 11/22/24 1850)   metoprolol (LOPRESSOR) injection 5 mg (5 mg Intravenous $Given 11/22/24 1854)   metoprolol (LOPRESSOR) injection 5 mg (5 mg Intravenous $Given 11/22/24 1941)       NEW PRESCRIPTIONS STARTED AT TODAY'S ER VISIT:  New Prescriptions    No medications on file          =================================================================    HPI    Patient information was obtained from: patient     Use of : N/A         Gill Mendez is a 64 year old female with a past medical history of diabetes mellitus, hypertension, shortness of breath, asthma, who presents for shortness of breath.     The patient reports ongoing chest tightness for the past four days. The pain/tightness is localized in the center of her chest. She feels short of breath upon any type of exertion and is unable to lay flat without being short of breath. She said she has to prop herself up at home in bed. She has a non productive cough as well. The patient takes metoformin, monjaro, and jardiance for her diabetes. She reports visiting the urgency room on 11/19 and 11/22 for these symptoms.     11/22/2024- Urgency Room visit for cough and shortness of breath. Imaging XR chest showed increased interstitial lung markings. No consolidation, mild basilar effusions are new. No signs of pneumothorax. No EKG or labs obtained. Discharged with ceftin and zithromax.     11/19/2024- Urgency Room visit for  shortness of breath. Imaging chest xray unremarkable. Discharged with refill for albuterol and prescription for tessalon perles.     REVIEW OF SYSTEMS   Review of Systems   Constitutional:  Negative for fever.   Respiratory:  Positive for cough, chest tightness and shortness of breath.         PAST MEDICAL HISTORY:  Past Medical History:   Diagnosis Date    Anxiety     Asthma     Cellulitis     Diabetes mellitus (H)     Essential hypertension     Created by Conversion  Replacement Utility updated for latest IMO load    Gastroparesis     Hyperlipidemia     Hypertension     Other and unspecified hyperlipidemia     Created by Conversion     PONV (postoperative nausea and vomiting)     Shortness of breath     Type II or unspecified type diabetes mellitus without mention of complication, not stated as uncontrolled     Created by Conversion        PAST SURGICAL HISTORY:  Past Surgical History:   Procedure Laterality Date    AMPUTATE TOE(S) Right 7/31/2020    Procedure: AMPUTATION, third digit right foot;  Surgeon: Chris Vega DPM;  Location: [facility];  Service: Podiatry    ENDOMETRIAL BIOPSY      FOOT ARTHROPLASTY Right 09/24/2020    Fourth and fifth toe by Dr. Vega    HC REPAIR OF HAMMERTOE,ONE Left 12/7/2020    Procedure: ARTHROPLASTY, digits two, three, four and five left foot;  Surgeon: Chris Vega DPM;  Location: [facility];  Service: Podiatry    HERNIA REPAIR      HYSTERECTOMY      IR LUMBAR PUNCTURE  7/20/2023    REPAIR TENDON ACHILLES Bilateral     Left was plate  , right was torn    TONSILLECTOMY      ZZC REMOVAL OF OVARY(S)      Description: Oophorectomy - Bilateral (Removal Of Both Ovaries);  Recorded: 10/09/2008;       CURRENT MEDICATIONS:      Current Facility-Administered Medications:     heparin 25,000 units in 0.45% NaCl 250 mL ANTICOAGULANT infusion, 0-5,000 Units/hr, Intravenous, Continuous, Marcial Rajan MD, Last Rate: 9 mL/hr at 11/22/24 2115, 900 Units/hr at  11/22/24 2115    morphine (PF) injection 4 mg, 4 mg, Intravenous, Once, Marcial Rajan MD    nitroGLYcerin (NITROSTAT) sublingual tablet 0.4 mg, 0.4 mg, Sublingual, Q5 Min PRN, Marcial Rajan MD, 0.4 mg at 11/22/24 1829    nitroGLYcerin 50 mg in D5W 250 mL (adult std) infusion CENTRAL,  mcg/min, Intravenous, Continuous, Marcial Rajan MD, Last Rate: 12 mL/hr at 11/22/24 2111, 40 mcg/min at 11/22/24 2111    Current Outpatient Medications:     albuterol (PROVENTIL HFA;VENTOLIN HFA) 90 mcg/actuation inhaler, Inhale 2 puffs into the lungs every 6 hours as needed, Disp: , Rfl:     ARNUITY ELLIPTA 100 MCG/ACT inhaler, Inhale 1 puff into the lungs daily., Disp: , Rfl:     atorvastatin (LIPITOR) 40 MG tablet, Take 40 mg by mouth daily, Disp: , Rfl:     benzonatate (TESSALON) 100 MG capsule, Take 100 mg by mouth 3 times daily as needed for cough., Disp: , Rfl:     chlorthalidone (HYGROTON) 25 MG tablet, Take 1 tablet by mouth daily, Disp: , Rfl:     citalopram (CELEXA) 20 MG tablet, [CITALOPRAM (CELEXA) 20 MG TABLET] Take 20 mg by mouth every morning. , Disp: , Rfl: 0    empagliflozin (JARDIANCE) 25 MG TABS tablet, Take 1 tablet (25 mg) by mouth daily., Disp: 90 tablet, Rfl: 1    lisinopriL (PRINIVIL,ZESTRIL) 10 MG tablet, [LISINOPRIL (PRINIVIL,ZESTRIL) 10 MG TABLET] Take 10 mg by mouth daily., Disp: , Rfl:     magnesium 250 MG tablet, Take 2 tablets by mouth daily., Disp: , Rfl:     metFORMIN (GLUCOPHAGE XR) 500 MG 24 hr tablet, Take 2 tablets (1,000 mg) by mouth daily (with breakfast). Changed on 6/21/24, new prescription not yet sent, Disp: 180 tablet, Rfl: 1    pramipexole (MIRAPEX) 0.125 MG tablet, Take 0.125 mg by mouth daily. At 3pm, Disp: , Rfl: 3    pramipexole (MIRAPEX) 0.5 MG tablet, Take 0.5 mg by mouth at bedtime., Disp: , Rfl:     tirzepatide (MOUNJARO) 15 MG/0.5ML pen, Inject 15 mg subcutaneously every 7 days., Disp: 6 mL, Rfl: 1    amLODIPine (NORVASC) 5 MG tablet, Take 1 tablet by mouth  "daily (Patient not taking: Reported on 11/22/2024), Disp: , Rfl:     ALLERGIES:  Allergies   Allergen Reactions    Codeine Unknown     Patient states possibly caused N/V but can't remember for sure    Semaglutide Nausea and Vomiting    Acetaminophen-Codeine Rash       FAMILY HISTORY:  Family History   Problem Relation Age of Onset    Diabetes Mother     Hypertension Mother     Acute Myocardial Infarction No family hx of        SOCIAL HISTORY:   Social History     Socioeconomic History    Marital status:    Tobacco Use    Smoking status: Never    Smokeless tobacco: Never   Substance and Sexual Activity    Alcohol use: No    Drug use: No     Social Drivers of Health      [facility] [employer] [employer]    Financial Resource Strain   Food Insecurity: No Food Insecurity (12/14/2023)    Received from [employer] Vital Sign     Worried About Running Out of Food in the Last Year: Never true     Ran Out of Food in the Last Year: Never true    Received from [employer] [employer]    Social Connections       PHYSICAL EXAM:    Vitals: /71   Pulse 110   Temp 99  F (37.2  C) (Oral)   Resp 25   Ht 1.651 m (5' 5\")   Wt 73.3 kg (161 lb 11.2 oz)   SpO2 96%   BMI 26.91 kg/m     Constitutional: Well developed, well nourished. Comfortable appearing.  HEAD:Normocephalic, atraumatic,   Eyes: PERRLA, EOM intact, conjunctiva clear, no discharge  ENT: mucous membranes moist, nose normal.   Neck- Supple, gross ROM intact. 3 cm JVD with exhaling.  No palpable nodes.  Pulmonary: fine rales over lower lung fields, no respiratory distress, no wheezing, speaks full sentences easily.  Chest: No chest wall tenderness  Cardiovascular: Tachycardia, regular rhythm, no murmurs.  2+ DP pulses.   GI: Soft, no tenderness to deep palpation in all quadrants, not distended, no masses.  " No hepatosplenomegaly.  Musculoskeletal: Moving all 4 extremities intentionally and without pain. No obvious deformity. 1+ non pitting edema in lower legs, no calf tenderness.   Back: No CVA tenderness  Skin: Warm, dry, no rash.  Neurologic: Alert & oriented x 3, speech clear, moving all extremities spontaneously   Psychiatric: Affect normal, cooperative.     LAB:  All pertinent labs reviewed and interpreted.  Labs Ordered and Resulted from Time of ED Arrival to Time of ED Departure   D DIMER QUANTITATIVE - Abnormal       Result Value    D-Dimer Quantitative 0.83 (*)    BASIC METABOLIC PANEL - Abnormal    Sodium 142      Potassium 3.5      Chloride 104      Carbon Dioxide (CO2) 26      Anion Gap 12      Urea Nitrogen 11.9      Creatinine 0.83      GFR Estimate 79      Calcium 9.4      Glucose 200 (*)    TROPONIN T, HIGH SENSITIVITY - Abnormal    Troponin T, High Sensitivity 513 (*)    CBC WITH PLATELETS AND DIFFERENTIAL - Abnormal    WBC Count 10.0      RBC Count 4.97      Hemoglobin 12.9      Hematocrit 40.4      MCV 81      MCH 26.0 (*)     MCHC 31.9      RDW 17.3 (*)     Platelet Count 286      % Neutrophils 84      % Lymphocytes 10      % Monocytes 5      % Eosinophils 0      % Basophils 0      % Immature Granulocytes 1      NRBCs per 100 WBC 0      Absolute Neutrophils 8.4 (*)     Absolute Lymphocytes 1.0      Absolute Monocytes 0.5      Absolute Eosinophils 0.0      Absolute Basophils 0.0      Absolute Immature Granulocytes 0.1      Absolute NRBCs 0.0     LACTIC ACID WHOLE BLOOD WITH 1X REPEAT IN 2 HR WHEN >2 - Abnormal    Lactic Acid, Initial 2.5 (*)    NT PROBNP INPATIENT - Abnormal    N terminal Pro BNP Inpatient 6,095 (*)    TROPONIN T, HIGH SENSITIVITY - Abnormal    Troponin T, High Sensitivity 562 (*)    INFLUENZA A/B, RSV AND SARS-COV2 PCR - Normal    Influenza A PCR Negative      Influenza B PCR Negative      RSV PCR Negative      SARS CoV2 PCR Negative     PROCALCITONIN - Normal    Procalcitonin 0.05      LACTIC ACID WHOLE BLOOD - Normal    Lactic Acid 1.8     HEPARIN UNFRACTIONATED ANTI XA LEVEL       RADIOLOGY:  XR Chest Port 1 View   Final Result   IMPRESSION: Similar appearing chest x-ray from earlier today with diffuse interstitial prominence throughout both lungs. This is more prominent when compared to 11/19/2024. Favor edema however pneumonia could give this appearance as well.          EKG:   Performed at: 22-NOV-2024 at 17:31  Impression: Sinus tachycardia, left axis deviation, anterior infarct, age undetermined. ST & T wave abnormality, consider lateral ischemia, abnormal ECG.   Rate: 122 bpm  Rhythm: sinus  QRS Interval: 98 ms  QTc Interval: 470 ms  Comparison: Compared with EKG from 01-JAN-2023    EKG #2  Performed at: 22-NOV-2024 at 19:52  Impression: Sinus tachycardia, septal infarct, ST & T wave abnormality, consider lateral ischemia. Abnormal ECG.   Rate: 104 bpm  Rhythm: sinus  QRS Interval:98  ms  QTc Interval:  483 ms  Comparison: Compared with EKG from 22-NOV-2024 at 17:31    I have independently reviewed and interpreted the EKG(s) documented above.           I, Kailyn Dunbar am serving as a scribe to document services personally performed by Dr. Marcial Rajan based on my observation and the provider's statements to me. I, Marcial Rajan M.D. attest that Kailyn Dunbar is acting in a scribe capacity, has observed my performance of the services and has documented them in accordance with my direction.      Marcial Rajan M.D.  Emergency Medicine  [facility] EMERGENCY ROOM  [address] [id number] [phone]  Dept: [phone]     Marcial Rajan MD  11/22/24 2349

## 2024-11-23 ENCOUNTER — HOSPITAL ENCOUNTER (INPATIENT)
Facility: HOSPITAL | Age: 64
DRG: 233 | End: 2024-11-23
Attending: INTERNAL MEDICINE | Admitting: INTERNAL MEDICINE
Payer: COMMERCIAL

## 2024-11-23 ENCOUNTER — APPOINTMENT (OUTPATIENT)
Dept: CARDIOLOGY | Facility: HOSPITAL | Age: 64
DRG: 233 | End: 2024-11-23
Attending: INTERNAL MEDICINE
Payer: COMMERCIAL

## 2024-11-23 ENCOUNTER — APPOINTMENT (OUTPATIENT)
Dept: ULTRASOUND IMAGING | Facility: HOSPITAL | Age: 64
DRG: 233 | End: 2024-11-23
Attending: PHYSICIAN ASSISTANT
Payer: COMMERCIAL

## 2024-11-23 DIAGNOSIS — I21.4 NSTEMI (NON-ST ELEVATED MYOCARDIAL INFARCTION) (H): ICD-10-CM

## 2024-11-23 DIAGNOSIS — R07.9 CHEST PAIN: Primary | ICD-10-CM

## 2024-11-23 DIAGNOSIS — I50.20 SYSTOLIC HEART FAILURE, UNSPECIFIED HF CHRONICITY (H): ICD-10-CM

## 2024-11-23 DIAGNOSIS — Z95.1 S/P CABG (CORONARY ARTERY BYPASS GRAFT): ICD-10-CM

## 2024-11-23 LAB
ALBUMIN SERPL BCG-MCNC: 3.4 G/DL (ref 3.5–5.2)
ALP SERPL-CCNC: 64 U/L (ref 40–150)
ALT SERPL W P-5'-P-CCNC: 30 U/L (ref 0–50)
ANION GAP SERPL CALCULATED.3IONS-SCNC: 11 MMOL/L (ref 7–15)
AST SERPL W P-5'-P-CCNC: 29 U/L (ref 0–45)
ATRIAL RATE - MUSE: 108 BPM
BASOPHILS # BLD AUTO: 0 10E3/UL (ref 0–0.2)
BASOPHILS NFR BLD AUTO: 0 %
BILIRUB SERPL-MCNC: 0.3 MG/DL
BUN SERPL-MCNC: 16.6 MG/DL (ref 8–23)
CALCIUM SERPL-MCNC: 8.6 MG/DL (ref 8.8–10.4)
CHLORIDE SERPL-SCNC: 104 MMOL/L (ref 98–107)
CHOLEST SERPL-MCNC: 148 MG/DL
CREAT SERPL-MCNC: 0.9 MG/DL (ref 0.51–0.95)
DIASTOLIC BLOOD PRESSURE - MUSE: 69 MMHG
EGFRCR SERPLBLD CKD-EPI 2021: 72 ML/MIN/1.73M2
EOSINOPHIL # BLD AUTO: 0 10E3/UL (ref 0–0.7)
EOSINOPHIL NFR BLD AUTO: 0 %
ERYTHROCYTE [DISTWIDTH] IN BLOOD BY AUTOMATED COUNT: 17.1 % (ref 10–15)
ERYTHROCYTE [DISTWIDTH] IN BLOOD BY AUTOMATED COUNT: 17.2 % (ref 10–15)
EST. AVERAGE GLUCOSE BLD GHB EST-MCNC: 128 MG/DL
GLUCOSE BLDC GLUCOMTR-MCNC: 136 MG/DL (ref 70–99)
GLUCOSE BLDC GLUCOMTR-MCNC: 139 MG/DL (ref 70–99)
GLUCOSE BLDC GLUCOMTR-MCNC: 175 MG/DL (ref 70–99)
GLUCOSE BLDC GLUCOMTR-MCNC: 230 MG/DL (ref 70–99)
GLUCOSE BLDC GLUCOMTR-MCNC: 255 MG/DL (ref 70–99)
GLUCOSE SERPL-MCNC: 151 MG/DL (ref 70–99)
HBA1C MFR BLD: 6.1 %
HCO3 SERPL-SCNC: 27 MMOL/L (ref 22–29)
HCT VFR BLD AUTO: 34.4 % (ref 35–47)
HCT VFR BLD AUTO: 35.6 % (ref 35–47)
HDLC SERPL-MCNC: 65 MG/DL
HGB BLD-MCNC: 10.8 G/DL (ref 11.7–15.7)
HGB BLD-MCNC: 11.1 G/DL (ref 11.7–15.7)
IMM GRANULOCYTES # BLD: 0 10E3/UL
IMM GRANULOCYTES NFR BLD: 0 %
INTERPRETATION ECG - MUSE: NORMAL
LDLC SERPL CALC-MCNC: 70 MG/DL
LVEF ECHO: NORMAL
LYMPHOCYTES # BLD AUTO: 1.8 10E3/UL (ref 0.8–5.3)
LYMPHOCYTES NFR BLD AUTO: 20 %
MCH RBC QN AUTO: 25.3 PG (ref 26.5–33)
MCH RBC QN AUTO: 25.5 PG (ref 26.5–33)
MCHC RBC AUTO-ENTMCNC: 31.2 G/DL (ref 31.5–36.5)
MCHC RBC AUTO-ENTMCNC: 31.4 G/DL (ref 31.5–36.5)
MCV RBC AUTO: 81 FL (ref 78–100)
MCV RBC AUTO: 81 FL (ref 78–100)
MONOCYTES # BLD AUTO: 0.7 10E3/UL (ref 0–1.3)
MONOCYTES NFR BLD AUTO: 8 %
NEUTROPHILS # BLD AUTO: 6.4 10E3/UL (ref 1.6–8.3)
NEUTROPHILS NFR BLD AUTO: 72 %
NONHDLC SERPL-MCNC: 83 MG/DL
NRBC # BLD AUTO: 0 10E3/UL
NRBC BLD AUTO-RTO: 0 /100
P AXIS - MUSE: 68 DEGREES
PLATELET # BLD AUTO: 266 10E3/UL (ref 150–450)
PLATELET # BLD AUTO: 267 10E3/UL (ref 150–450)
POTASSIUM SERPL-SCNC: 3.8 MMOL/L (ref 3.4–5.3)
POTASSIUM SERPL-SCNC: 4 MMOL/L (ref 3.4–5.3)
PR INTERVAL - MUSE: 166 MS
PROT SERPL-MCNC: 6.2 G/DL (ref 6.4–8.3)
QRS DURATION - MUSE: 98 MS
QT - MUSE: 362 MS
QTC - MUSE: 485 MS
R AXIS - MUSE: -7 DEGREES
RBC # BLD AUTO: 4.23 10E6/UL (ref 3.8–5.2)
RBC # BLD AUTO: 4.39 10E6/UL (ref 3.8–5.2)
SODIUM SERPL-SCNC: 142 MMOL/L (ref 135–145)
SYSTOLIC BLOOD PRESSURE - MUSE: 122 MMHG
T AXIS - MUSE: 162 DEGREES
TRIGL SERPL-MCNC: 65 MG/DL
TROPONIN T SERPL HS-MCNC: 551 NG/L
UFH PPP CHRO-ACNC: 0.28 IU/ML
UFH PPP CHRO-ACNC: 0.33 IU/ML
VENTRICULAR RATE- MUSE: 108 BPM
WBC # BLD AUTO: 7.6 10E3/UL (ref 4–11)
WBC # BLD AUTO: 9 10E3/UL (ref 4–11)

## 2024-11-23 PROCEDURE — 4A023N7 MEASUREMENT OF CARDIAC SAMPLING AND PRESSURE, LEFT HEART, PERCUTANEOUS APPROACH: ICD-10-PCS | Performed by: INTERNAL MEDICINE

## 2024-11-23 PROCEDURE — 85027 COMPLETE CBC AUTOMATED: CPT | Performed by: INTERNAL MEDICINE

## 2024-11-23 PROCEDURE — C1887 CATHETER, GUIDING: HCPCS | Performed by: INTERNAL MEDICINE

## 2024-11-23 PROCEDURE — 84132 ASSAY OF SERUM POTASSIUM: CPT | Performed by: INTERNAL MEDICINE

## 2024-11-23 PROCEDURE — 200N000001 HC R&B ICU

## 2024-11-23 PROCEDURE — 250N000013 HC RX MED GY IP 250 OP 250 PS 637: Performed by: INTERNAL MEDICINE

## 2024-11-23 PROCEDURE — 255N000002 HC RX 255 OP 636: Performed by: INTERNAL MEDICINE

## 2024-11-23 PROCEDURE — 250N000009 HC RX 250: Performed by: INTERNAL MEDICINE

## 2024-11-23 PROCEDURE — 82247 BILIRUBIN TOTAL: CPT | Performed by: INTERNAL MEDICINE

## 2024-11-23 PROCEDURE — 250N000012 HC RX MED GY IP 250 OP 636 PS 637: Performed by: INTERNAL MEDICINE

## 2024-11-23 PROCEDURE — B211YZZ FLUOROSCOPY OF MULTIPLE CORONARY ARTERIES USING OTHER CONTRAST: ICD-10-PCS | Performed by: INTERNAL MEDICINE

## 2024-11-23 PROCEDURE — 99223 1ST HOSP IP/OBS HIGH 75: CPT | Performed by: INTERNAL MEDICINE

## 2024-11-23 PROCEDURE — 93458 L HRT ARTERY/VENTRICLE ANGIO: CPT | Performed by: INTERNAL MEDICINE

## 2024-11-23 PROCEDURE — 93005 ELECTROCARDIOGRAM TRACING: CPT

## 2024-11-23 PROCEDURE — 93005 ELECTROCARDIOGRAM TRACING: CPT | Performed by: INTERNAL MEDICINE

## 2024-11-23 PROCEDURE — 85025 COMPLETE CBC W/AUTO DIFF WBC: CPT | Performed by: INTERNAL MEDICINE

## 2024-11-23 PROCEDURE — 94640 AIRWAY INHALATION TREATMENT: CPT

## 2024-11-23 PROCEDURE — 93306 TTE W/DOPPLER COMPLETE: CPT | Mod: 26 | Performed by: INTERNAL MEDICINE

## 2024-11-23 PROCEDURE — 93880 EXTRACRANIAL BILAT STUDY: CPT

## 2024-11-23 PROCEDURE — 93010 ELECTROCARDIOGRAM REPORT: CPT | Performed by: INTERNAL MEDICINE

## 2024-11-23 PROCEDURE — 82310 ASSAY OF CALCIUM: CPT | Performed by: INTERNAL MEDICINE

## 2024-11-23 PROCEDURE — 99223 1ST HOSP IP/OBS HIGH 75: CPT | Mod: FS | Performed by: THORACIC SURGERY (CARDIOTHORACIC VASCULAR SURGERY)

## 2024-11-23 PROCEDURE — 86923 COMPATIBILITY TEST ELECTRIC: CPT | Performed by: PHYSICIAN ASSISTANT

## 2024-11-23 PROCEDURE — 99222 1ST HOSP IP/OBS MODERATE 55: CPT | Performed by: INTERNAL MEDICINE

## 2024-11-23 PROCEDURE — 86850 RBC ANTIBODY SCREEN: CPT | Performed by: PHYSICIAN ASSISTANT

## 2024-11-23 PROCEDURE — 80061 LIPID PANEL: CPT | Performed by: INTERNAL MEDICINE

## 2024-11-23 PROCEDURE — 86900 BLOOD TYPING SEROLOGIC ABO: CPT | Performed by: PHYSICIAN ASSISTANT

## 2024-11-23 PROCEDURE — 84155 ASSAY OF PROTEIN SERUM: CPT | Performed by: INTERNAL MEDICINE

## 2024-11-23 PROCEDURE — 250N000011 HC RX IP 250 OP 636: Performed by: INTERNAL MEDICINE

## 2024-11-23 PROCEDURE — 82465 ASSAY BLD/SERUM CHOLESTEROL: CPT | Performed by: INTERNAL MEDICINE

## 2024-11-23 PROCEDURE — 272N000001 HC OR GENERAL SUPPLY STERILE: Performed by: INTERNAL MEDICINE

## 2024-11-23 PROCEDURE — 80053 COMPREHEN METABOLIC PANEL: CPT | Performed by: INTERNAL MEDICINE

## 2024-11-23 PROCEDURE — C8929 TTE W OR WO FOL WCON,DOPPLER: HCPCS

## 2024-11-23 PROCEDURE — C1894 INTRO/SHEATH, NON-LASER: HCPCS | Performed by: INTERNAL MEDICINE

## 2024-11-23 PROCEDURE — 83036 HEMOGLOBIN GLYCOSYLATED A1C: CPT | Performed by: INTERNAL MEDICINE

## 2024-11-23 PROCEDURE — 93458 L HRT ARTERY/VENTRICLE ANGIO: CPT | Mod: 26 | Performed by: INTERNAL MEDICINE

## 2024-11-23 PROCEDURE — 999N000208 ECHOCARDIOGRAM COMPLETE

## 2024-11-23 PROCEDURE — 999N000157 HC STATISTIC RCP TIME EA 10 MIN

## 2024-11-23 PROCEDURE — 36415 COLL VENOUS BLD VENIPUNCTURE: CPT | Performed by: INTERNAL MEDICINE

## 2024-11-23 PROCEDURE — 84484 ASSAY OF TROPONIN QUANT: CPT | Performed by: INTERNAL MEDICINE

## 2024-11-23 PROCEDURE — 99207 PR NO BILLABLE SERVICE THIS VISIT: CPT | Performed by: INTERNAL MEDICINE

## 2024-11-23 PROCEDURE — 85520 HEPARIN ASSAY: CPT | Performed by: INTERNAL MEDICINE

## 2024-11-23 PROCEDURE — 85041 AUTOMATED RBC COUNT: CPT | Performed by: INTERNAL MEDICINE

## 2024-11-23 RX ORDER — DEXTROSE MONOHYDRATE 25 G/50ML
25-50 INJECTION, SOLUTION INTRAVENOUS
Status: DISCONTINUED | OUTPATIENT
Start: 2024-11-23 | End: 2024-11-24

## 2024-11-23 RX ORDER — FENTANYL CITRATE 50 UG/ML
INJECTION, SOLUTION INTRAMUSCULAR; INTRAVENOUS
Status: DISCONTINUED | OUTPATIENT
Start: 2024-11-23 | End: 2024-11-23 | Stop reason: HOSPADM

## 2024-11-23 RX ORDER — OXYCODONE HYDROCHLORIDE 5 MG/1
10 TABLET ORAL EVERY 4 HOURS PRN
Status: DISCONTINUED | OUTPATIENT
Start: 2024-11-23 | End: 2024-11-24

## 2024-11-23 RX ORDER — PRAMIPEXOLE DIHYDROCHLORIDE 0.5 MG/1
0.5 TABLET ORAL AT BEDTIME
Status: DISCONTINUED | OUTPATIENT
Start: 2024-11-23 | End: 2024-11-24

## 2024-11-23 RX ORDER — PREDNISONE 20 MG/1
40 TABLET ORAL DAILY
Status: DISCONTINUED | OUTPATIENT
Start: 2024-11-23 | End: 2024-11-24

## 2024-11-23 RX ORDER — LIDOCAINE 40 MG/G
CREAM TOPICAL
Status: DISCONTINUED | OUTPATIENT
Start: 2024-11-23 | End: 2024-11-23 | Stop reason: HOSPADM

## 2024-11-23 RX ORDER — ALBUTEROL SULFATE 90 UG/1
2 INHALANT RESPIRATORY (INHALATION) EVERY 6 HOURS PRN
Status: DISCONTINUED | OUTPATIENT
Start: 2024-11-23 | End: 2024-11-24

## 2024-11-23 RX ORDER — MAGNESIUM OXIDE 400 MG/1
400 TABLET ORAL DAILY
Status: DISCONTINUED | OUTPATIENT
Start: 2024-11-23 | End: 2024-11-24

## 2024-11-23 RX ORDER — NALOXONE HYDROCHLORIDE 0.4 MG/ML
0.2 INJECTION, SOLUTION INTRAMUSCULAR; INTRAVENOUS; SUBCUTANEOUS
Status: ACTIVE | OUTPATIENT
Start: 2024-11-23 | End: 2024-11-23

## 2024-11-23 RX ORDER — NITROGLYCERIN 20 MG/100ML
INJECTION INTRAVENOUS CONTINUOUS PRN
Status: COMPLETED | OUTPATIENT
Start: 2024-11-23 | End: 2024-11-23

## 2024-11-23 RX ORDER — IPRATROPIUM BROMIDE AND ALBUTEROL SULFATE 2.5; .5 MG/3ML; MG/3ML
3 SOLUTION RESPIRATORY (INHALATION) EVERY 4 HOURS PRN
Status: DISCONTINUED | OUTPATIENT
Start: 2024-11-23 | End: 2024-11-24

## 2024-11-23 RX ORDER — NICOTINE POLACRILEX 4 MG
15-30 LOZENGE BUCCAL
Status: DISCONTINUED | OUTPATIENT
Start: 2024-11-23 | End: 2024-11-24

## 2024-11-23 RX ORDER — IODIXANOL 320 MG/ML
INJECTION, SOLUTION INTRAVASCULAR
Status: DISCONTINUED | OUTPATIENT
Start: 2024-11-23 | End: 2024-11-23 | Stop reason: HOSPADM

## 2024-11-23 RX ORDER — ATROPINE SULFATE 0.1 MG/ML
0.5 INJECTION INTRAVENOUS
Status: DISCONTINUED | OUTPATIENT
Start: 2024-11-23 | End: 2024-11-23

## 2024-11-23 RX ORDER — ONDANSETRON 4 MG/1
4 TABLET, ORALLY DISINTEGRATING ORAL EVERY 6 HOURS PRN
Status: DISCONTINUED | OUTPATIENT
Start: 2024-11-23 | End: 2024-11-24

## 2024-11-23 RX ORDER — LIDOCAINE 40 MG/G
CREAM TOPICAL
Status: DISCONTINUED | OUTPATIENT
Start: 2024-11-23 | End: 2024-11-24

## 2024-11-23 RX ORDER — FUROSEMIDE 10 MG/ML
40 INJECTION INTRAMUSCULAR; INTRAVENOUS ONCE
Status: COMPLETED | OUTPATIENT
Start: 2024-11-23 | End: 2024-11-23

## 2024-11-23 RX ORDER — ASPIRIN 81 MG/1
81 TABLET, CHEWABLE ORAL DAILY
Status: DISCONTINUED | OUTPATIENT
Start: 2024-11-23 | End: 2024-11-24

## 2024-11-23 RX ORDER — ATORVASTATIN CALCIUM 40 MG/1
40 TABLET, FILM COATED ORAL DAILY
Status: DISCONTINUED | OUTPATIENT
Start: 2024-11-23 | End: 2024-11-24

## 2024-11-23 RX ORDER — OXYCODONE HYDROCHLORIDE 5 MG/1
5 TABLET ORAL EVERY 4 HOURS PRN
Status: DISCONTINUED | OUTPATIENT
Start: 2024-11-23 | End: 2024-11-24

## 2024-11-23 RX ORDER — CALCIUM CARBONATE 500 MG/1
1000 TABLET, CHEWABLE ORAL 4 TIMES DAILY PRN
Status: DISCONTINUED | OUTPATIENT
Start: 2024-11-23 | End: 2024-11-24

## 2024-11-23 RX ORDER — NALOXONE HYDROCHLORIDE 0.4 MG/ML
0.4 INJECTION, SOLUTION INTRAMUSCULAR; INTRAVENOUS; SUBCUTANEOUS
Status: ACTIVE | OUTPATIENT
Start: 2024-11-23 | End: 2024-11-23

## 2024-11-23 RX ORDER — CARVEDILOL 3.12 MG/1
3.12 TABLET ORAL 2 TIMES DAILY WITH MEALS
Status: DISCONTINUED | OUTPATIENT
Start: 2024-11-23 | End: 2024-11-24

## 2024-11-23 RX ORDER — FLUMAZENIL 0.1 MG/ML
0.2 INJECTION, SOLUTION INTRAVENOUS
Status: ACTIVE | OUTPATIENT
Start: 2024-11-23 | End: 2024-11-23

## 2024-11-23 RX ORDER — GUAIFENESIN 200 MG/10ML
200 LIQUID ORAL EVERY 4 HOURS PRN
Status: DISCONTINUED | OUTPATIENT
Start: 2024-11-23 | End: 2024-11-24

## 2024-11-23 RX ORDER — ACETAMINOPHEN 325 MG/1
650 TABLET ORAL EVERY 4 HOURS PRN
Status: DISCONTINUED | OUTPATIENT
Start: 2024-11-23 | End: 2024-11-24

## 2024-11-23 RX ORDER — ASPIRIN 81 MG/1
243 TABLET, CHEWABLE ORAL ONCE
Status: COMPLETED | OUTPATIENT
Start: 2024-11-23 | End: 2024-11-23

## 2024-11-23 RX ORDER — FUROSEMIDE 10 MG/ML
40 INJECTION INTRAMUSCULAR; INTRAVENOUS EVERY 8 HOURS
Status: DISCONTINUED | OUTPATIENT
Start: 2024-11-23 | End: 2024-11-24

## 2024-11-23 RX ORDER — ONDANSETRON 2 MG/ML
4 INJECTION INTRAMUSCULAR; INTRAVENOUS EVERY 6 HOURS PRN
Status: DISCONTINUED | OUTPATIENT
Start: 2024-11-23 | End: 2024-11-24

## 2024-11-23 RX ORDER — AMOXICILLIN 250 MG
1 CAPSULE ORAL 2 TIMES DAILY PRN
Status: DISCONTINUED | OUTPATIENT
Start: 2024-11-23 | End: 2024-11-24

## 2024-11-23 RX ORDER — HEPARIN SODIUM 10000 [USP'U]/100ML
0-5000 INJECTION, SOLUTION INTRAVENOUS CONTINUOUS
Status: DISCONTINUED | OUTPATIENT
Start: 2024-11-23 | End: 2024-11-24

## 2024-11-23 RX ORDER — DIAZEPAM 5 MG/1
10 TABLET ORAL
Status: COMPLETED | OUTPATIENT
Start: 2024-11-23 | End: 2024-11-23

## 2024-11-23 RX ORDER — AMOXICILLIN 250 MG
2 CAPSULE ORAL 2 TIMES DAILY PRN
Status: DISCONTINUED | OUTPATIENT
Start: 2024-11-23 | End: 2024-11-24

## 2024-11-23 RX ORDER — DOCUSATE SODIUM 100 MG/1
100 CAPSULE, LIQUID FILLED ORAL 2 TIMES DAILY
Status: DISCONTINUED | OUTPATIENT
Start: 2024-11-23 | End: 2024-11-24

## 2024-11-23 RX ORDER — HYDRALAZINE HYDROCHLORIDE 20 MG/ML
10 INJECTION INTRAMUSCULAR; INTRAVENOUS
Status: DISCONTINUED | OUTPATIENT
Start: 2024-11-23 | End: 2024-11-24

## 2024-11-23 RX ORDER — ASPIRIN 325 MG
325 TABLET ORAL ONCE
Status: COMPLETED | OUTPATIENT
Start: 2024-11-23 | End: 2024-11-23

## 2024-11-23 RX ORDER — CITALOPRAM HYDROBROMIDE 20 MG/1
20 TABLET ORAL EVERY MORNING
Status: DISCONTINUED | OUTPATIENT
Start: 2024-11-23 | End: 2024-11-24

## 2024-11-23 RX ORDER — PRAMIPEXOLE DIHYDROCHLORIDE 0.12 MG/1
0.12 TABLET ORAL DAILY
Status: DISCONTINUED | OUTPATIENT
Start: 2024-11-23 | End: 2024-11-24

## 2024-11-23 RX ORDER — FENTANYL CITRATE 50 UG/ML
25 INJECTION, SOLUTION INTRAMUSCULAR; INTRAVENOUS
Status: DISCONTINUED | OUTPATIENT
Start: 2024-11-23 | End: 2024-11-23

## 2024-11-23 RX ADMIN — FUROSEMIDE 40 MG: 10 INJECTION, SOLUTION INTRAMUSCULAR; INTRAVENOUS at 12:04

## 2024-11-23 RX ADMIN — DOCUSATE SODIUM 100 MG: 100 CAPSULE, LIQUID FILLED ORAL at 08:20

## 2024-11-23 RX ADMIN — FUROSEMIDE 40 MG: 10 INJECTION, SOLUTION INTRAMUSCULAR; INTRAVENOUS at 09:56

## 2024-11-23 RX ADMIN — GUAIFENESIN 200 MG: 200 SOLUTION ORAL at 05:28

## 2024-11-23 RX ADMIN — NITROGLYCERIN 70 MCG/MIN: 20 INJECTION INTRAVENOUS at 09:19

## 2024-11-23 RX ADMIN — GUAIFENESIN 200 MG: 200 SOLUTION ORAL at 20:16

## 2024-11-23 RX ADMIN — HEPARIN SODIUM 900 UNITS/HR: 10000 INJECTION, SOLUTION INTRAVENOUS at 22:32

## 2024-11-23 RX ADMIN — CARVEDILOL 3.12 MG: 3.12 TABLET, FILM COATED ORAL at 17:48

## 2024-11-23 RX ADMIN — INSULIN ASPART 1 UNITS: 100 INJECTION, SOLUTION INTRAVENOUS; SUBCUTANEOUS at 02:54

## 2024-11-23 RX ADMIN — ASPIRIN 81 MG CHEWABLE TABLET 81 MG: 81 TABLET CHEWABLE at 08:20

## 2024-11-23 RX ADMIN — PRAMIPEXOLE DIHYDROCHLORIDE 0.5 MG: 0.5 TABLET ORAL at 20:15

## 2024-11-23 RX ADMIN — ATORVASTATIN CALCIUM 40 MG: 40 TABLET, FILM COATED ORAL at 08:20

## 2024-11-23 RX ADMIN — PRAMIPEXOLE DIHYDROCHLORIDE 0.12 MG: 0.12 TABLET ORAL at 15:51

## 2024-11-23 RX ADMIN — FLUTICASONE FUROATE 1 PUFF: 100 POWDER RESPIRATORY (INHALATION) at 08:20

## 2024-11-23 RX ADMIN — IPRATROPIUM BROMIDE AND ALBUTEROL SULFATE 3 ML: .5; 3 SOLUTION RESPIRATORY (INHALATION) at 05:33

## 2024-11-23 RX ADMIN — PREDNISONE 40 MG: 20 TABLET ORAL at 08:20

## 2024-11-23 RX ADMIN — INSULIN ASPART 1 UNITS: 100 INJECTION, SOLUTION INTRAVENOUS; SUBCUTANEOUS at 17:42

## 2024-11-23 RX ADMIN — ASPIRIN 81 MG CHEWABLE TABLET 243 MG: 81 TABLET CHEWABLE at 09:56

## 2024-11-23 RX ADMIN — CITALOPRAM HYDROBROMIDE 20 MG: 20 TABLET ORAL at 08:20

## 2024-11-23 RX ADMIN — MAGNESIUM OXIDE TAB 400 MG (241.3 MG ELEMENTAL MG) 400 MG: 400 (241.3 MG) TAB at 08:20

## 2024-11-23 RX ADMIN — FUROSEMIDE 40 MG: 10 INJECTION, SOLUTION INTRAMUSCULAR; INTRAVENOUS at 20:16

## 2024-11-23 RX ADMIN — PERFLUTREN 3 ML: 6.52 INJECTION, SUSPENSION INTRAVENOUS at 09:00

## 2024-11-23 RX ADMIN — DIAZEPAM 10 MG: 5 TABLET ORAL at 09:56

## 2024-11-23 ASSESSMENT — EJECTION FRACTION: EF_VALUE: .21

## 2024-11-23 ASSESSMENT — COLUMBIA-SUICIDE SEVERITY RATING SCALE - C-SSRS
6. HAVE YOU EVER DONE ANYTHING, STARTED TO DO ANYTHING, OR PREPARED TO DO ANYTHING TO END YOUR LIFE?: NO
2. HAVE YOU ACTUALLY HAD ANY THOUGHTS OF KILLING YOURSELF IN THE PAST MONTH?: NO
1. IN THE PAST MONTH, HAVE YOU WISHED YOU WERE DEAD OR WISHED YOU COULD GO TO SLEEP AND NOT WAKE UP?: NO

## 2024-11-23 NOTE — Clinical Note
IABP inserted in the right femoral artery. IABP inserted with 50 cc balloon volume Lot number is: [id number]

## 2024-11-23 NOTE — PHARMACY-ADMISSION MEDICATION HISTORY
Pharmacist Admission Medication History    Admission medication history is complete. The information provided in this note is only as accurate as the sources available at the time of the update.    Information Source(s): Patient and CareEverywhere/SureScripts via in-person    Pertinent Information: patient did not take any medications today     Patient recently got prescribed Azithromycin, cefuroxime, and prednisone 11/22 but she did not start taking.     Changes made to PTA medication list:  Added: None  Deleted: trazodone  Changed:   Pramipexole changed from 0.5mg to 0.125 mg at 3pm and 0.5mg HS     Allergies reviewed with patient and updates made in EHR: yes    Medication History Completed By: Nai Rivas RP 11/22/2024 8:50 PM    PTA Med List   Medication Sig Last Dose/Taking    albuterol (PROVENTIL HFA;VENTOLIN HFA) 90 mcg/actuation inhaler Inhale 2 puffs into the lungs every 6 hours as needed 11/22/2024    ARNUITY ELLIPTA 100 MCG/ACT inhaler Inhale 1 puff into the lungs daily. 11/21/2024 Morning    atorvastatin (LIPITOR) 40 MG tablet Take 40 mg by mouth daily 11/21/2024 Morning    benzonatate (TESSALON) 100 MG capsule Take 100 mg by mouth 3 times daily as needed for cough. 11/21/2024    chlorthalidone (HYGROTON) 25 MG tablet Take 1 tablet by mouth daily 11/21/2024 Morning    citalopram (CELEXA) 20 MG tablet [CITALOPRAM (CELEXA) 20 MG TABLET] Take 20 mg by mouth every morning.  11/21/2024 Morning    empagliflozin (JARDIANCE) 25 MG TABS tablet Take 1 tablet (25 mg) by mouth daily. 11/21/2024 Morning    lisinopriL (PRINIVIL,ZESTRIL) 10 MG tablet [LISINOPRIL (PRINIVIL,ZESTRIL) 10 MG TABLET] Take 10 mg by mouth daily. 11/21/2024 Morning    magnesium 250 MG tablet Take 2 tablets by mouth daily. 11/21/2024 Morning    metFORMIN (GLUCOPHAGE XR) 500 MG 24 hr tablet Take 2 tablets (1,000 mg) by mouth daily (with breakfast). Changed on 6/21/24, new prescription not yet sent 11/21/2024 Morning    pramipexole (MIRAPEX)  0.125 MG tablet Take 0.125 mg by mouth daily. At 3pm 11/21/2024 at  3:00 PM    pramipexole (MIRAPEX) 0.5 MG tablet Take 0.5 mg by mouth at bedtime. 11/21/2024 Evening    tirzepatide (MOUNJARO) 15 MG/0.5ML pen Inject 15 mg subcutaneously every 7 days. 11/15/2024

## 2024-11-23 NOTE — PROGRESS NOTES
Patient was admitted by my colleague earlier this morning.  Admission H&P reviewed. Agree with the assessments and plan.    Gill Mendez is a 64 year old female with a medical history significant for hypertension, hyperlipidemia, type II DM, asthma and anxiety disorder who is admitted with an NSTEMI and acute CHF decompensation.     1.Acute Non-STEMI   Patient presenting with non-ST elevation myocardial infarction (NSTEMI).  Plan:  Maintain systolic blood pressure >90 mmHg with current nitroglycerin drip.  Administer aspirin 81 mg daily. Hold ACE due to hypotension  Initiate high-intensity statin therapy  Consult cardiology to evaluate in the morning for further management and consideration of cardiac catheterization.     2. Acute CHF Decompensation    Likely secondary to volume overload and acute cardiac dysfunction.  Plan:  Continue with 40mg IV Lasix x1. Check TTE.   adjust dosing based on clinical response and urine output.  Monitor daily fluid intake and output.  Obtain daily weights to assess fluid status.  Optimize oxygenation and consider non-invasive ventilation if respiratory status worsens.     3. Hypertension    Blood pressure currently managed with nitroglycerin drip.  Plan:  Continue nitroglycerin drip with strict blood pressure monitoring and holding parameters.  Hold other antihypertensive medications until blood pressure stabilizes.     4. Type II Diabetes Mellitus    Blood sugar management is critical during acute illness.  Plan:  Perform frequent Accu-Cheks and manage with sliding scale insulin.  Maintain blood glucose between 100-140 mg/dL.  Hold tirzepatide, amlodipine, lisinopril, and chlorthalidone during acute hospitalization.  Reassess diabetes medications upon stabilization and discharge planning.     5. Asthma exacerbation    Acute on Chronic condition requiring continued management.  Plan:  Resume home albuterol as needed for shortness of breath.   Sheldon PRN, 3 day course of prednsione    possibly has bronchitis.  Consider CT chest without contrast -cancelled since CXR and clinically c/w pulm edema  Continue maintenance therapy with Breo Ellipta or equivalent inhaler.     6. Anxiety Disorder    Stable on Celexa at home.  Plan:  Continue Celexa at the current dose.  Monitor for any anxiety exacerbation during hospitalization.     7. Rest of Patient s Medical Problems    Management remains unchanged unless otherwise indicated.

## 2024-11-23 NOTE — H&P
[facility]    History and Physical - Hospitalist Service       Date of Admission:  11/23/2024    Assessment & Plan    Gill Mendez is a 64 year old female with a medical history significant for hypertension, hyperlipidemia, type II DM, asthma and anxiety disorder who is admitted with an NSTEMI and acute CHF decompensation.    Patient Active Problem List   Diagnosis    Trochanteric Bursitis    Essential hypertension    Gastroparesis    Type 2 Diabetes Mellitus    Asthma    Allergy To Aspirin    Hyperlipidemia    Cellulitis and abscess of foot    Cellulitis    Dyspnea on exertion    Cellulitis of fifth toe of left foot    Herpetic gingivostomatitis    Insomnia, unspecified    Hammer toe of left foot    Diabetic ulcer of toe of right foot associated with type 2 diabetes mellitus, with necrosis of muscle (H)    History of osteomyelitis    Chest pain    Toe infection    Type 2 diabetes mellitus with skin complication, without long-term current use of insulin (H)    Cellulitis of toe of right foot    Diabetic ulcer of left midfoot associated with diabetes mellitus due to underlying condition, limited to breakdown of skin (H)    Anhidrosis    Hypoxia    Exacerbation of intermittent asthma, unspecified asthma severity    Pneumonia due to 2019 novel coronavirus    Acute pulmonary edema (H)    NSTEMI (non-ST elevated myocardial infarction) (H)    Restless leg syndrome    Type II or unspecified type diabetes mellitus with neurological manifestations, uncontrolled(250.62) (H)    Hyperlipidemia    Type 2 diabetes mellitus with skin complication, with long-term current use of insulin (H)      1.Acute Non-STEMI   Patient presenting with non-ST elevation myocardial infarction (NSTEMI).  Plan:  Maintain systolic blood pressure >90 mmHg with current nitroglycerin drip.  Administer aspirin 81 mg daily. Hold ACE due to hypotension  Initiate high-intensity statin therapy  Consult cardiology to evaluate in the  morning for further management and consideration of cardiac catheterization.    2. Acute CHF Decompensation    Likely secondary to volume overload and acute cardiac dysfunction.  Plan:  Continue with 40mg IV Lasix x1. Check TTE.   adjust dosing based on clinical response and urine output.  Monitor daily fluid intake and output.  Obtain daily weights to assess fluid status.  Optimize oxygenation and consider non-invasive ventilation if respiratory status worsens.    3. Hypertension    Blood pressure currently managed with nitroglycerin drip.  Plan:  Continue nitroglycerin drip with strict blood pressure monitoring and holding parameters.  Hold other antihypertensive medications until blood pressure stabilizes.    4. Type II Diabetes Mellitus    Blood sugar management is critical during acute illness.  Plan:  Perform frequent Accu-Cheks and manage with sliding scale insulin.  Maintain blood glucose between 100-140 mg/dL.  Hold tirzepatide, amlodipine, lisinopril, and chlorthalidone during acute hospitalization.  Reassess diabetes medications upon stabilization and discharge planning.    5. Asthma exacerbation    Acute on Chronic condition requiring continued management.  Plan:  Resume home albuterol as needed for shortness of breath.   Duonebs PRN, 3 day course of prednsione   possibly has bronchitis.  Consider CT chest without contrast  Continue maintenance therapy with Breo Ellipta or equivalent inhaler.    6. Anxiety Disorder    Stable on Celexa at home.  Plan:  Continue Celexa at the current dose.  Monitor for any anxiety exacerbation during hospitalization.    7. Rest of Patient s Medical Problems    Management remains unchanged unless otherwise indicated.       Diet: NPO for Medical/Clinical Reasons Except for: Meds    DVT Prophylaxis: Pneumatic Compression Devices  Mcdaniels Catheter: Not present  Lines: None     Cardiac Monitoring: ACTIVE order. Indication: Tachyarrhythmias, acute (48 hours)  Code Status: Full  "Code      Clinically Significant Risk Factors Present on Admission                   # Hypertension: Noted on problem list           # Overweight: Estimated body mass index is 26.31 kg/m  as calculated from the following:    Height as of 11/22/24: 1.651 m (5' 5\").    Weight as of this encounter: 71.7 kg (158 lb 1.6 oz).       # Asthma: noted on problem list        Disposition Plan     Medically Ready for Discharge: Anticipated in 2-4 Days           Robyn Marin MD  Hospitalist Service  [facility]  Securely message with [employer] (more info)  Text page via [facility] Paging/Directory     ______________________________________________________________________    Chief Complaint   Chest pain         History of Present Illness   Gill Mendez is a 64 year old female with a medical history significant for hypertension, hyperlipidemia, type II DM, asthma and anxiety disorder who is admitted with an NSTEMI and acute CHF decompensation.    Per history, the patient presented to the emergency department with complaints of shortness of breath and ongoing chest tightness that had persisted for four days. The chest tightness was localized to the center of her chest, and she experienced dyspnea on exertion and orthopnea, requiring her to sleep propped up at home. She also reported a non-productive cough. The patient had previously visited an urgency room on 11/19 and 11/22 for similar symptoms.    At her 11/22 visit, a chest X-ray showed increased interstitial lung markings with mild basilar effusions, consistent with early CHF. She was discharged with ceftin and zithromax. At the 11/19 visit, imaging was unremarkable, and she was discharged with prescriptions for albuterol and tessalon perles.    ER Course:  In the emergency department, the patient s exam was notable for jugular venous distension (JVD), pedal edema, and bibasilar rales. An EKG revealed ST depression in the lateral leads, and a chest X-ray " demonstrated interstitial edema. She received sublingual nitroglycerin, aspirin, and 40 mg IV Lasix. Due to concern for NSTEMI and unstable angina, she was started on a heparin infusion.        Patient is a full code.  Patient was seen with her daughter at bedside.  She did not have any concerns.     Past Medical History    Past Medical History:   Diagnosis Date    Anxiety     Asthma     Cellulitis     Diabetes mellitus (H)     Essential hypertension     Created by Conversion  Replacement Utility updated for latest IMO load    Gastroparesis     Hyperlipidemia     Hypertension     Other and unspecified hyperlipidemia     Created by Conversion     PONV (postoperative nausea and vomiting)     Shortness of breath     Type II or unspecified type diabetes mellitus without mention of complication, not stated as uncontrolled     Created by Conversion        Past Surgical History   Past Surgical History:   Procedure Laterality Date    AMPUTATE TOE(S) Right 7/31/2020    Procedure: AMPUTATION, third digit right foot;  Surgeon: Chris Vega DPM;  Location: [facility];  Service: Podiatry    ENDOMETRIAL BIOPSY      FOOT ARTHROPLASTY Right 09/24/2020    Fourth and fifth toe by Dr. Vega    HC REPAIR OF HAMMERTOE,ONE Left 12/7/2020    Procedure: ARTHROPLASTY, digits two, three, four and five left foot;  Surgeon: Chris Vega DPM;  Location: [facility];  Service: Podiatry    HERNIA REPAIR      HYSTERECTOMY      IR LUMBAR PUNCTURE  7/20/2023    REPAIR TENDON ACHILLES Bilateral     Left was plate  , right was torn    TONSILLECTOMY      ZZC REMOVAL OF OVARY(S)      Description: Oophorectomy - Bilateral (Removal Of Both Ovaries);  Recorded: 10/09/2008;       Prior to Admission Medications   Prior to Admission Medications   Prescriptions Last Dose Informant Patient Reported? Taking?   ARNUITY ELLIPTA 100 MCG/ACT inhaler   Yes No   Sig: Inhale 1 puff into the lungs daily.   albuterol (PROVENTIL HFA;VENTOLIN  HFA) 90 mcg/actuation inhaler   Yes No   Sig: Inhale 2 puffs into the lungs every 6 hours as needed   amLODIPine (NORVASC) 5 MG tablet   Yes No   Sig: Take 1 tablet by mouth daily   Patient not taking: Reported on 11/22/2024   atorvastatin (LIPITOR) 40 MG tablet   Yes No   Sig: Take 40 mg by mouth daily   benzonatate (TESSALON) 100 MG capsule   Yes No   Sig: Take 100 mg by mouth 3 times daily as needed for cough.   chlorthalidone (HYGROTON) 25 MG tablet   Yes No   Sig: Take 1 tablet by mouth daily   citalopram (CELEXA) 20 MG tablet   Yes No   Sig: [CITALOPRAM (CELEXA) 20 MG TABLET] Take 20 mg by mouth every morning.    empagliflozin (JARDIANCE) 25 MG TABS tablet   No No   Sig: Take 1 tablet (25 mg) by mouth daily.   lisinopriL (PRINIVIL,ZESTRIL) 10 MG tablet   Yes No   Sig: [LISINOPRIL (PRINIVIL,ZESTRIL) 10 MG TABLET] Take 10 mg by mouth daily.   magnesium 250 MG tablet   Yes No   Sig: Take 2 tablets by mouth daily.   metFORMIN (GLUCOPHAGE XR) 500 MG 24 hr tablet   No No   Sig: Take 2 tablets (1,000 mg) by mouth daily (with breakfast). Changed on 6/21/24, new prescription not yet sent   pramipexole (MIRAPEX) 0.125 MG tablet   Yes No   Sig: Take 0.125 mg by mouth daily. At 3pm   pramipexole (MIRAPEX) 0.5 MG tablet   Yes No   Sig: Take 0.5 mg by mouth at bedtime.   tirzepatide (MOUNJARO) 15 MG/0.5ML pen   No No   Sig: Inject 15 mg subcutaneously every 7 days.      Facility-Administered Medications: None        Review of Systems    The 10 point Review of Systems is negative other than noted in the HPI or here.     Social History   I have reviewed this patient's social history and updated it with pertinent information if needed.  Social History     Tobacco Use    Smoking status: Never    Smokeless tobacco: Never   Substance Use Topics    Alcohol use: No    Drug use: No         Family History   I have reviewed this patient's family history and updated it with pertinent information if needed.  Family History   Problem  Relation Age of Onset    Diabetes Mother     Hypertension Mother     Acute Myocardial Infarction No family hx of          Allergies   Allergies   Allergen Reactions    Codeine Unknown     Patient states possibly caused N/V but can't remember for sure    Semaglutide Nausea and Vomiting    Acetaminophen-Codeine Rash        Physical Exam   Vital Signs: Temp: 98.8  F (37.1  C) Temp src: Oral BP: (!) 89/53 Pulse: 104   Resp: 22 SpO2: 96 % O2 Device: Nasal cannula Oxygen Delivery: 2 LPM  Weight: 158 lbs 1.6 oz    She was on 2 L when seen with ongoing cough and wheezing General Aox3, appropriate affect, NAD, on RA  HEENT  MMM, EOMI, PERRL  Chest Adeq E b/l, bronchial breath sounds, +wheezing  Heart RRR, No M/R/G  Abd- Soft, NT, BS+  - Deferred,   Extremity- Moving all extremities, No digital clubbing,   No edema  Neuro- Aox3, grossly non focal,  gait not checked  Skin  Has no tattoo, No skin rash     Medical Decision Making       85 MINUTES SPENT BY ME on the date of service doing chart review, history, exam, documentation & further activities per the note.      ------------------ MEDICAL DECISION MAKING ------------------------------------------------------------------------------------------------------  MANAGEMENT DISCUSSED with the following over the past 24 hours: patient and care team       Data   ------------------------- PAST 24 HR DATA REVIEWED -----------------------------------------------    I have personally reviewed the following data over the past 24 hrs:    10.0  \   12.9   / 286     142 104 11.9 /  200 (H)   3.5 26 0.83 \     Trop: 562 (HH) BNP: 6,095 (H)     Procal: 0.05 CRP: N/A Lactic Acid: 1.8       INR:  N/A PTT:  N/A   D-dimer:  0.83 (H) Fibrinogen:  N/A       Imaging results reviewed over the past 24 hrs:   Recent Results (from the past 24 hours)   XR Chest B Read 2 views    Narrative    For Patients: As a result of the 21st Century Cures Act, medical imaging exams and procedure reports are  released immediately into your electronic medical record. You may view this report before your referring provider. If you have questions, please contact your health care provider.    EXAM: XR CHEST 2 VIEWS PA AND LATERAL  LOCATION: [facility]  DATE: 11/22/2024    INDICATION: SOB Cough  COMPARISON: 11/19/2024    Impression    There are increased interstitial lung markings. No consolidation. Mild basilar effusions are new from prior. No pneumothorax. Unchanged cardiac size.   XR Chest Port 1 View    Narrative    EXAM: XR CHEST PORT 1 VIEW  LOCATION: [facility]  DATE: 11/22/2024    INDICATION: MCWILLIAMS, orthopnea, mild leg edema, chest tightness  COMPARISON: 11/22/2024 urgency room      Impression    IMPRESSION: Similar appearing chest x-ray from earlier today with diffuse interstitial prominence throughout both lungs. This is more prominent when compared to 11/19/2024. Favor edema however pneumonia could give this appearance as well.

## 2024-11-23 NOTE — PROGRESS NOTES
Patient is alert/oriented. Respiratory distress not noted. Maintains with 2 lpm O2 by nasal cannula. Prn Duoneb given. BS crackles pre/post treatment. X-ray reveals edema.     Carlos Somers, RT

## 2024-11-23 NOTE — CONSULTS
Cardiothoracic Surgery Consult Note    Consult Reason: NSTEMI with severe LM/3v disease with LV dysfunction    HPI: Mayelin Mendez is a 64 year old female with PMH of HTN, HLD, DMT2, asthma, anxiety presented to ED with SOB and chest tightness. Found to be in acute HF with reduced EF of 25%. Coronary angio completed and found to have 90% LM disease, RCA at least 50% occulusion and 70%circ occulusions      Imaging:  Coronary angiogram 11/23/24  1.  Severely calcified distal left main stenosis extending to ostium of LAD and proximal segment of left circumflex  2.  99% ostial LAD and heavily calcified segment.  MYNOR grade II-III flow distally  3.  Heavily calcified left circumflex ostium with 50 to 70% narrowing proximal segment with focal 80% stenosis of the vessel as it exits the AV groove into a large marginal branch.  4.  Heavily calcified ostial RCA.  Only mild to moderate angiographic stenosis but recurrent catheter damping upon entering vessel suggesting more severe stenosis due to eccentric calcification.  40 to 50% narrowing mid segment.  PDA and JULEE branches appear normal.  5.  Elevated LVEDP equals 28 to 29 mmHg post A wave.  No LV-AO gradient.    ECHO : Reduced LVEF of 25 to 30% with apical akinesia no significant valvular abnormalities     A/P: Patient is a 64 year old female with PMH of HTN, HLD, DMT2, asthma, anxiety presented to ED with SOB and chest tightness. Found to be in acute HF with reduced EF of 25%. Coronary angio completed and found to have 90% LM disease,       - Patient with worsening CP this morning. Dr Carter notified, IABP placed and plan to go to OR this afternoon for CABG x2-3  - Orders placed. Keep NPO. Stop heparin on way to OR  - Appreciate cardiology's recs and patient optimization  - CABG Imaging: carotid US un concerning  - Thank you for the opportunity to participate in the care of this patient.    Patient and plan discussed with attending, Dr. Raul Shepard PA-C    Cardiothoracic Surgery   November 23, 2024 at 1:22 PM  Please reach me on [employer] or secure chat       PMH:  Past Medical History:   Diagnosis Date    Anxiety     Asthma     Cellulitis     Diabetes mellitus (H)     Essential hypertension     Created by Conversion  Replacement Utility updated for latest IMO load    Gastroparesis     Hyperlipidemia     Hypertension     Other and unspecified hyperlipidemia     Created by Conversion     PONV (postoperative nausea and vomiting)     Shortness of breath     Type II or unspecified type diabetes mellitus without mention of complication, not stated as uncontrolled     Created by Conversion      PSH:  Past Surgical History:   Procedure Laterality Date    AMPUTATE TOE(S) Right 7/31/2020    Procedure: AMPUTATION, third digit right foot;  Surgeon: Chris Vega DPM;  Location: [facility];  Service: Podiatry    ENDOMETRIAL BIOPSY      FOOT ARTHROPLASTY Right 09/24/2020    Fourth and fifth toe by Dr. Gary ROSE REPAIR OF HAMMERTOE,ONE Left 12/7/2020    Procedure: ARTHROPLASTY, digits two, three, four and five left foot;  Surgeon: Chris Vega DPM;  [facility];  Service: Podiatry    HERNIA REPAIR      HYSTERECTOMY      IR LUMBAR PUNCTURE  7/20/2023    REPAIR TENDON ACHILLES Bilateral     Left was plate  , right was torn    TONSILLECTOMY      ZZC REMOVAL OF OVARY(S)      Description: Oophorectomy - Bilateral (Removal Of Both Ovaries);  Recorded: 10/09/2008;     Past Surgical History:   Procedure Laterality Date    AMPUTATE TOE(S) Right 7/31/2020    Procedure: AMPUTATION, third digit right foot;  Surgeon: Chris Vega DPM;  Location: [facility];  Service: Podiatry    ENDOMETRIAL BIOPSY      FOOT ARTHROPLASTY Right 09/24/2020    Fourth and fifth toe by Dr. Gary ROSE REPAIR OF HAMMERTOE,ONE Left 12/7/2020    Procedure: ARTHROPLASTY, digits two, three, four and five left foot;  Surgeon: Chris Vega DPM;  Location: [facility];  Service: Podiatry    HERNIA REPAIR      HYSTERECTOMY      IR LUMBAR PUNCTURE  2023    REPAIR TENDON ACHILLES Bilateral     Left was plate  , right was torn    TONSILLECTOMY      ZZC REMOVAL OF OVARY(S)      Description: Oophorectomy - Bilateral (Removal Of Both Ovaries);  Recorded: 10/09/2008;     FH:  Family History   Problem Relation Age of Onset    Diabetes Mother     Hypertension Mother     Acute Myocardial Infarction No family hx of      SH:  Social History     Socioeconomic History    Marital status: Single   Tobacco Use    Smoking status: Former     Current packs/day: 0.00     Average packs/day: 0.5 packs/day for 10.0 years (5.0 ttl pk-yrs)     Types: Cigars, Cigarettes     Start date: 2010     Quit date: 2020     Years since quittin.7    Smokeless tobacco: Former    Tobacco comments:     Cigar every once in a while per pt   Substance and Sexual Activity    Alcohol use: Yes     Comment: 1 glass of wide a night    Drug use: No    Sexual activity: Yes     Partners: Female     Social Determinants of Health      [facility]    Financial Resource Strain    [facility]    Social Connections     Home Meds:  Medications Prior to Admission   Medication Sig Dispense Refill Last Dose/Taking    albuterol (PROVENTIL HFA;VENTOLIN HFA) 90 mcg/actuation inhaler Inhale 2 puffs into the lungs every 6 hours as needed       amLODIPine (NORVASC) 5 MG tablet Take 1 tablet by mouth daily (Patient not taking: Reported on 2024)       ARNUITY ELLIPTA 100 MCG/ACT inhaler Inhale 1 puff into the lungs daily.       atorvastatin (LIPITOR) 40 MG tablet Take 40 mg by mouth daily       benzonatate (TESSALON) 100 MG capsule Take 100 mg by mouth 3 times daily as needed for cough.       chlorthalidone (HYGROTON) 25 MG tablet Take  1 tablet by mouth daily       citalopram (CELEXA) 20 MG tablet [CITALOPRAM (CELEXA) 20 MG TABLET] Take 20 mg by mouth every morning.   0     empagliflozin (JARDIANCE) 25 MG TABS tablet Take 1 tablet (25 mg) by mouth daily. 90 tablet 1     lisinopriL (PRINIVIL,ZESTRIL) 10 MG tablet [LISINOPRIL (PRINIVIL,ZESTRIL) 10 MG TABLET] Take 10 mg by mouth daily.       magnesium 250 MG tablet Take 2 tablets by mouth daily.       metFORMIN (GLUCOPHAGE XR) 500 MG 24 hr tablet Take 2 tablets (1,000 mg) by mouth daily (with breakfast). Changed on 6/21/24, new prescription not yet sent 180 tablet 1     pramipexole (MIRAPEX) 0.125 MG tablet Take 0.125 mg by mouth daily. At 3pm  3     pramipexole (MIRAPEX) 0.5 MG tablet Take 0.5 mg by mouth at bedtime.       tirzepatide (MOUNJARO) 15 MG/0.5ML pen Inject 15 mg subcutaneously every 7 days. 6 mL 1      Allergies:  Allergies   Allergen Reactions    Codeine Unknown     Patient states possibly caused N/V but can't remember for sure    Semaglutide Nausea and Vomiting    Acetaminophen-Codeine Rash       ROS: Negative unless noted in HPI    Physical Exam:  Temp:  [98.1  F (36.7  C)-99  F (37.2  C)] 98.3  F (36.8  C)  Pulse:  [] 107  Resp:  [10-39] 31  BP: ()/(50-82) 100/59  SpO2:  [90 %-99 %] 95 %  Gen: NAD, resting in bed, uncomfortable, conversational  Lungs: course breath sounds  CV: RRR, S1S2 normal, no murmur. Mild edema.   Abd:  overall soft and non distended, nontender, no masses/guarding/rebound tenderness.   Musculoskeletal: grossly intact  Neuro: AOx3, CN II-VII grossly intact  Mental: normal mood and affect, regular rate of speech    Labs:  ABG   No lab results found in last 7 days.  CBC  Recent Labs   Lab 11/23/24  0736 11/23/24  0149 11/22/24  1733   WBC 9.0 7.6 10.0   HGB 10.8* 11.1* 12.9    267 286     BMP  Recent Labs   Lab 11/23/24  0802 11/23/24  0736 11/23/24  0251 11/23/24  0149 11/22/24  1733   NA  --  142  --   --  142   POTASSIUM  --  3.8  --  4.0 3.5    CHLORIDE  --  104  --   --  104   CO2  --  27  --   --  26   BUN  --  16.6  --   --  11.9   CR  --  0.90  --   --  0.83   * 151* 230*  --  200*     LFT  Recent Labs   Lab 11/23/24  0736   AST 29   ALT 30   ALKPHOS 64   BILITOTAL 0.3   ALBUMIN 3.4*     PancreasNo lab results found in last 7 days.

## 2024-11-23 NOTE — ED NOTES
Pt co feeling short of breath, O2 sats 96% RA, breathing a little labored, MD notified and in to see pt

## 2024-11-23 NOTE — ED NOTES
EMERGENCY DEPARTMENT SIGN OUT NOTE        ED COURSE AND MEDICAL DECISION MAKING  Patient was signed out to me by Dr Marcial Rajan at 10:22 PM.  10:46 PM I spoke with Hospitalist at [facility].    In brief, Gill Mendez is a 64 year old female who initially presented with shortness of breath.     At time of sign out, disposition was pending transfer.  Patient transferred to [facility].      FINAL IMPRESSION    1. NSTEMI (non-ST elevated myocardial infarction) (H)    2. Acute pulmonary edema (H)        ED MEDS  Medications   nitroGLYcerin (NITROSTAT) sublingual tablet 0.4 mg (0.4 mg Sublingual $Given 11/22/24 1829)   heparin 25,000 units in 0.45% NaCl 250 mL ANTICOAGULANT infusion (900 Units/hr Intravenous Rate/Dose Verify 11/22/24 2115)   nitroGLYcerin 50 mg in D5W 250 mL (adult std) infusion CENTRAL (50 mcg/min Intravenous Rate/Dose Change 11/22/24 2142)   naloxone (NARCAN) injection 0.2 mg (has no administration in time range)     Or   naloxone (NARCAN) injection 0.4 mg (has no administration in time range)     Or   naloxone (NARCAN) injection 0.2 mg (has no administration in time range)     Or   naloxone (NARCAN) injection 0.4 mg (has no administration in time range)   aspirin (ASA) chewable tablet 162 mg (162 mg Oral $Given 11/22/24 1752)   heparin loading dose for LOW INTENSITY TREATMENT * Give BEFORE starting heparin infusion (4,400 Units Intravenous $Given 11/22/24 1830)   furosemide (LASIX) injection 40 mg (40 mg Intravenous $Given 11/22/24 1850)   metoprolol (LOPRESSOR) injection 5 mg (5 mg Intravenous $Given 11/22/24 1854)   metoprolol (LOPRESSOR) injection 5 mg (5 mg Intravenous $Given 11/22/24 1941)   morphine (PF) injection 4 mg (4 mg Intravenous Not Given 11/22/24 2129)   morphine (PF) injection 4 mg (4 mg Intravenous $Given 11/22/24 2139)       LAB  Labs Ordered and Resulted from Time of ED Arrival to Time of ED Departure   D DIMER QUANTITATIVE - Abnormal       Result Value    D-Dimer Quantitative  0.83 (*)    BASIC METABOLIC PANEL - Abnormal    Sodium 142      Potassium 3.5      Chloride 104      Carbon Dioxide (CO2) 26      Anion Gap 12      Urea Nitrogen 11.9      Creatinine 0.83      GFR Estimate 79      Calcium 9.4      Glucose 200 (*)    TROPONIN T, HIGH SENSITIVITY - Abnormal    Troponin T, High Sensitivity 513 (*)    CBC WITH PLATELETS AND DIFFERENTIAL - Abnormal    WBC Count 10.0      RBC Count 4.97      Hemoglobin 12.9      Hematocrit 40.4      MCV 81      MCH 26.0 (*)     MCHC 31.9      RDW 17.3 (*)     Platelet Count 286      % Neutrophils 84      % Lymphocytes 10      % Monocytes 5      % Eosinophils 0      % Basophils 0      % Immature Granulocytes 1      NRBCs per 100 WBC 0      Absolute Neutrophils 8.4 (*)     Absolute Lymphocytes 1.0      Absolute Monocytes 0.5      Absolute Eosinophils 0.0      Absolute Basophils 0.0      Absolute Immature Granulocytes 0.1      Absolute NRBCs 0.0     LACTIC ACID WHOLE BLOOD WITH 1X REPEAT IN 2 HR WHEN >2 - Abnormal    Lactic Acid, Initial 2.5 (*)    NT PROBNP INPATIENT - Abnormal    N terminal Pro BNP Inpatient 6,095 (*)    TROPONIN T, HIGH SENSITIVITY - Abnormal    Troponin T, High Sensitivity 562 (*)    INFLUENZA A/B, RSV AND SARS-COV2 PCR - Normal    Influenza A PCR Negative      Influenza B PCR Negative      RSV PCR Negative      SARS CoV2 PCR Negative     PROCALCITONIN - Normal    Procalcitonin 0.05     LACTIC ACID WHOLE BLOOD - Normal    Lactic Acid 1.8     HEPARIN UNFRACTIONATED ANTI XA LEVEL       EKG      RADIOLOGY    XR Chest Port 1 View   Final Result   IMPRESSION: Similar appearing chest x-ray from earlier today with diffuse interstitial prominence throughout both lungs. This is more prominent when compared to 11/19/2024. Favor edema however pneumonia could give this appearance as well.          DISCHARGE MEDS  New Prescriptions    No medications on file       [facility] EMERGENCY ROOM  [address]  [place] [facility] [id number]  [phone]         Amilcar Ferguson MD  11/24/24 1638

## 2024-11-23 NOTE — PROGRESS NOTES
Patient is a 64-year-old with a medical history significant for hypertension, type II DM, hyperlipidemia, asthma and anxiety disorder who is being admitted for further management of non-ST elevation MI and acute CHF decompensation.  Patient was given Lasix 40 mg IV Lasix, started on heparin and nitroglycerin drip and has been admitted to the ICU for further cares.  Call hospitalist service when patient arrives to the unit.

## 2024-11-23 NOTE — PHARMACY-ADMISSION MEDICATION HISTORY
Admission medication history completed at [facility]. Please see Pharmacist Admission Medication History note from 11/22/2024.

## 2024-11-23 NOTE — CONSULTS
"  Thank you, Dr. Ferguson, for asking the [facility] Care team to see Gill Mendez to evaluate chest pain.      Assessment/Recommendations   Assessment:    Acute heart failure with reduced ejection fraction  Acute cardiomyopathy with severely depressed LV systolic function unclear whether related to stress cardiomyopathy or ischemic heart disease  Elevated troponin consistent with myocardial injury  Diabetes mellitus  Hypertension    Plan:  IV diuretics  Heparin until coronary  anatomy assessed  Discussed with interventional cardiologist Dr. Eisenberg.  Will go forward with coronary angiogram today to rule out coronary artery disease    Further recommendations pending results of the angiogram      Clinically Significant Risk Factors Present on Admission           # Hypocalcemia: Lowest Ca = 8.6 mg/dL in last 2 days, will monitor and replace as appropriate     # Hypoalbuminemia: Lowest albumin = 3.4 g/dL at 11/23/2024  7:36 AM, will monitor as appropriate       # Hypertension: Noted on problem list          # Anemia: based on hgb <11         # Overweight: Estimated body mass index is 26.31 kg/m  as calculated from the following:    Height as of 11/22/24: 1.651 m (5' 5\").    Weight as of this encounter: 71.7 kg (158 lb 1.6 oz).       # Asthma: noted on problem list                 History of Present Illness/Subjective    Ms. Gill Mendez is a 64 year old female who came to the emergency with complaints of shortness of breath and chest tightness.  She reports that her symptoms have been going on for about a week.  She went to  urgent care last week with similar complaints.  She was treated for asthma.  She denies weight gain, she has not had PND orthopnea.  1 week ago she burried her father.  She reports that she took it very hard.  She was very close to him.  She has no history of known coronary artery disease.  She has never had a  coronary angiogram.  She has no history of valvular heart disease.  She denies heart " palpitations or syncope.      ECG: Personally reviewed.  Normal sinus rhythm with diffuse ST-T abnormalities.    ECHO (personnaly Reviewed): Reduced LVEF of 25 to 30% with apical akinesia no significant valvular abnormalities    Chest X-Ray: Interstitial pulmonary edema     Physical Examination Review of Systems   BP 93/52   Pulse 118   Temp 98.1  F (36.7  C) (Oral)   Resp 30   Wt 71.7 kg (158 lb 1.6 oz)   SpO2 96%   BMI 26.31 kg/m    Body mass index is 26.31 kg/m .  Wt Readings from Last 3 Encounters:   11/23/24 71.7 kg (158 lb 1.6 oz)   11/22/24 73.3 kg (161 lb 11.2 oz)   06/21/24 69.9 kg (154 lb)       Intake/Output Summary (Last 24 hours) at 11/23/2024 0914  Last data filed at 11/23/2024 0600  Gross per 24 hour   Intake 123.5 ml   Output --   Net 123.5 ml     General Appearance:   Alert, cooperative, mildly short of breath   Head/ENT: Normocephalic, without obvious abnormality. Membranes moist.      EYES:  No scleral icterus   Neck: Supple, symmetrical, trachea midline, no adenopathy, thyroid: not enlarged, symmetric, no carotid bruit, mildly elevated JVD   Chest/Lungs:   Crackles at the bases   Cardiovascular:   Regular rhythm, S1, S2 normal, no murmur, rub or gallop.   Abdomen:  Soft, non-tender, bowel sounds active all four quadrants,  no masses, no organomegaly   Extremities: no cyanosis or clubbing. No edema   Skin: Skin color, texture, turgor normal, no rashes or lesions.    Neurologic: Alert and oriented x 3, moving all four extremities.    Psychiatric: Normal affect.      10 system review of systems completed see history of present illness and inpatient H&P (reviewed) for further details.          Lab Results    Chemistry/lipid CBC Cardiac Enzymes/BNP/TSH/INR   Recent Labs   Lab Test 11/23/24  0736   CHOL 148   HDL 65   LDL 70   TRIG 65     Recent Labs   Lab Test 11/23/24  0736 09/20/24  0810 08/21/23  0821   LDL 70 58 78     Recent Labs   Lab Test 11/23/24  0802 11/23/24  0736   NA  --  142    POTASSIUM  --  3.8   CHLORIDE  --  104   CO2  --  27   * 151*   BUN  --  16.6   CR  --  0.90   GFRESTIMATED  --  72   VALENTE  --  8.6*     Recent Labs   Lab Test 11/23/24  0736 11/22/24  1733 09/20/24  0810   CR 0.90 0.83 0.84     Recent Labs   Lab Test 11/23/24  0736 09/20/24  0810 06/14/24  0750   A1C 6.1* 6.3* 6.5*          Recent Labs   Lab Test 11/23/24  0736   WBC 9.0   HGB 10.8*   HCT 34.4*   MCV 81        Recent Labs   Lab Test 11/23/24  0736 11/23/24  0149 11/22/24  1733   HGB 10.8* 11.1* 12.9    Recent Labs   Lab Test 01/01/23  1303 09/25/20  0803 09/25/20  0157   TROPONINI <0.01 0.03 0.02     Recent Labs   Lab Test 11/22/24  1733   NTBNPI 6,095*     Recent Labs   Lab Test 09/20/24  0810   TSH 1.06     Recent Labs   Lab Test 03/22/21  1937   INR 0.98        Medical History  Surgical History Family History Social History   Past Medical History:   Diagnosis Date    Anxiety     Asthma     Cellulitis     Diabetes mellitus (H)     Essential hypertension     Created by Conversion  Replacement Utility updated for latest IMO load    Gastroparesis     Hyperlipidemia     Hypertension     Other and unspecified hyperlipidemia     Created by Conversion     PONV (postoperative nausea and vomiting)     Shortness of breath     Type II or unspecified type diabetes mellitus without mention of complication, not stated as uncontrolled     Created by Conversion      Past Surgical History:   Procedure Laterality Date    AMPUTATE TOE(S) Right 7/31/2020    Procedure: AMPUTATION, third digit right foot;  Surgeon: Chris Vega DPM;  Location: [facility];  Service: Podiatry    ENDOMETRIAL BIOPSY      FOOT ARTHROPLASTY Right 09/24/2020    Fourth and fifth toe by Dr. Vega    HC REPAIR OF HAMMERTOE,ONE Left 12/7/2020    Procedure: ARTHROPLASTY, digits two, three, four and five left foot;  Surgeon: Chris Vega DPM;  Location: [facility];  Service: Podiatry    HERNIA REPAIR      HYSTERECTOMY       IR LUMBAR PUNCTURE  7/20/2023    REPAIR TENDON ACHILLES Bilateral     Left was plate  , right was torn    TONSILLECTOMY      ZZC REMOVAL OF OVARY(S)      Description: Oophorectomy - Bilateral (Removal Of Both Ovaries);  Recorded: 10/09/2008;     No premature CAD, SCD,cardiomyopathy   Social History     Socioeconomic History    Marital status:      Spouse name: Not on file    Number of children: Not on file    Years of education: Not on file    Highest education level: Not on file   Occupational History    Not on file   Tobacco Use    Smoking status: Never    Smokeless tobacco: Never   Substance and Sexual Activity    Alcohol use: No    Drug use: No    Sexual activity: Not on file   Other Topics Concern    Not on file   Social History Narrative    Not on file     Social Drivers of Health     Financial Resource Strain: High Risk (12/28/2021)    Received from [employer] [facility], [employer] [facility]    Financial Resource Strain     Difficulty of Paying Living Expenses: Not on file     Difficulty of Paying Living Expenses: Not on file   Food Insecurity: No Food Insecurity (12/14/2023)    Received from [facility]    Hunger Vital Sign     Worried About Running Out of Food in the Last Year: Never true     Ran Out of Food in the Last Year: Never true   Transportation Needs: Not on file   Physical Activity: Not on file   Stress: Not on file (11/6/2024)   Social Connections: Unknown (12/28/2021)    Received from [employer] [facility], [employer] [facility]    Social Connections     Frequency of Communication with Friends and Family: Not on file   Interpersonal Safety: High Risk (11/23/2024)    Interpersonal Safety     Do you feel physically and emotionally safe where you currently live?: No     Within the past 12 months, have you been hit, slapped, kicked or otherwise physically hurt by someone?: No     Within the  past 12 months, have you been humiliated or emotionally abused in other ways by your partner or ex-partner?: No   Housing Stability: Not on file         Medications  Allergies   Current Facility-Administered Medications Ordered in Epic   Medication Dose Route Frequency Provider Last Rate Last Admin    albuterol (PROVENTIL HFA/VENTOLIN HFA) inhaler  2 puff Inhalation Q6H PRN Robyn Marin MD        aspirin (ASA) chewable tablet 81 mg  81 mg Oral Daily Robyn Marin MD   81 mg at 11/23/24 0820    atorvastatin (LIPITOR) tablet 40 mg  40 mg Oral Daily Robyn Marin MD   40 mg at 11/23/24 0820    calcium carbonate (TUMS) chewable tablet 1,000 mg  1,000 mg Oral 4x Daily PRN Robyn Marin MD        citalopram (celeXA) tablet 20 mg  20 mg Oral QAM Robyn Marin MD   20 mg at 11/23/24 0820    glucose gel 15-30 g  15-30 g Oral Q15 Min PRN Robyn Marin MD        Or    dextrose 50 % injection 25-50 mL  25-50 mL Intravenous Q15 Min PRN Robyn Marin MD        Or    glucagon injection 1 mg  1 mg Subcutaneous Q15 Min PRN Robyn Marin MD        docusate sodium (COLACE) capsule 100 mg  100 mg Oral BID Robyn Marin MD   100 mg at 11/23/24 0820    fluticasone (ARNUITY ELLIPTA) 100 MCG/ACT inhaler 1 puff  1 puff Inhalation Daily Robyn Marin MD   1 puff at 11/23/24 0820    guaiFENesin (ROBITUSSIN) 20 mg/mL solution 200 mg  200 mg Oral Q4H PRN Robyn Marin MD   200 mg at 11/23/24 0528    heparin 25,000 units in 0.45% NaCl 250 mL ANTICOAGULANT infusion  0-5,000 Units/hr Intravenous Continuous Robyn Marin MD 9 mL/hr at 11/23/24 0151 900 Units/hr at 11/23/24 0151    insulin aspart (NovoLOG) injection (RAPID ACTING)  1-4 Units Subcutaneous Q4H Robyn Marin MD   1 Units at 11/23/24 0254    ipratropium - albuterol 0.5 mg/2.5 mg/3 mL (DUONEB) neb solution 3 mL  3 mL Nebulization Q4H PRN Robyn Marin MD   3 mL at 11/23/24 0533    lidocaine (LMX4) cream   Topical Q1H PRN Robyn Marin MD        lidocaine 1  % 0.1-1 mL  0.1-1 mL Other Q1H PRN Robyn Marin MD        magnesium oxide (MAG-OX) tablet 400 mg  400 mg Oral Daily Robyn Marin MD   400 mg at 11/23/24 0820    nitroGLYcerin 50 mg in D5W 250 mL PERIPHERAL IV infusion   mcg/min Intravenous Continuous Robyn Marin MD 21 mL/hr at 11/23/24 0345 70 mcg/min at 11/23/24 0345    ondansetron (ZOFRAN ODT) ODT tab 4 mg  4 mg Oral Q6H PRN Robyn Marin MD        Or    ondansetron (ZOFRAN) injection 4 mg  4 mg Intravenous Q6H PRN Robyn Marin MD        pramipexole (MIRAPEX) tablet 0.125 mg  0.125 mg Oral Daily Robyn Marin MD        pramipexole (MIRAPEX) tablet 0.5 mg  0.5 mg Oral At Bedtime Robyn Marin MD        predniSONE (DELTASONE) tablet 40 mg  40 mg Oral Daily Robyn Marin MD   40 mg at 11/23/24 0820    senna-docusate (SENOKOT-S/PERICOLACE) 8.6-50 MG per tablet 1 tablet  1 tablet Oral BID PRN Robyn Marin MD        Or    senna-docusate (SENOKOT-S/PERICOLACE) 8.6-50 MG per tablet 2 tablet  2 tablet Oral BID PRN Robyn Marin MD        sodium chloride (PF) 0.9% PF flush 3 mL  3 mL Intracatheter Q8H Robyn Marin MD        sodium chloride (PF) 0.9% PF flush 3 mL  3 mL Intracatheter q1 min prn Robyn Marin MD        sodium chloride 0.9% BOLUS 250 mL  250 mL Intravenous Once Robyn Marin MD   Held at 11/23/24 0256     No current Epic-ordered outpatient medications on file.       Allergies   Allergen Reactions    Codeine Unknown     Patient states possibly caused N/V but can't remember for sure    Semaglutide Nausea and Vomiting    Acetaminophen-Codeine Rash

## 2024-11-23 NOTE — PLAN OF CARE
[facility] - ICU    RN Progress Note:            Pertinent Assessments:      Please refer to flowsheet rows for full assessment     Patient is an admit from  ED that came around 0040 via ambulance with heparin and nitroglycerin gtts running. Patient's chest pain/ tightness has been mostly a 1/10 rate.Tried titrating down nitroglycerin gtt to as low as 50 mcg but patient complained of increased chest tightness of 4/10, difficulty breathing and diaphoresis. Nitroglycerin gtt back up to 70 mcg and making sure SBP not going <90. O2 2L/NC. Patient seen and examined by Dr. Marin, ordered neb for wheezing and robitussin for cough, both given. Patient has congested non productive cough. Cardiologist to see this am. Patient has not voided since arrival.          Key Events - This Shift:     See above notes.            Barriers to Discharge / Downgrade:   Nitrogylcerin gtt, chest tightness not resolved, probably going to cath lab today.

## 2024-11-24 ENCOUNTER — APPOINTMENT (OUTPATIENT)
Dept: RADIOLOGY | Facility: HOSPITAL | Age: 64
DRG: 233 | End: 2024-11-24
Attending: PHYSICIAN ASSISTANT
Payer: COMMERCIAL

## 2024-11-24 LAB
ABO/RH(D): NORMAL
ALBUMIN SERPL BCG-MCNC: 3.3 G/DL (ref 3.5–5.2)
ALP SERPL-CCNC: 58 U/L (ref 40–150)
ALT SERPL W P-5'-P-CCNC: 24 U/L (ref 0–50)
ANION GAP SERPL CALCULATED.3IONS-SCNC: 14 MMOL/L (ref 7–15)
ANTIBODY SCREEN: NEGATIVE
APTT PPP: 33 SECONDS (ref 22–38)
APTT PPP: 34 SECONDS (ref 22–38)
AST SERPL W P-5'-P-CCNC: 44 U/L (ref 0–45)
ATRIAL RATE - MUSE: 104 BPM
ATRIAL RATE - MUSE: 106 BPM
BASE EXCESS BLDA CALC-SCNC: 0.2 MMOL/L (ref -3–3)
BASE EXCESS BLDA CALC-SCNC: 1 MMOL/L (ref -3–3)
BASE EXCESS BLDA CALC-SCNC: 3.6 MMOL/L (ref -3–3)
BASE EXCESS BLDA CALC-SCNC: 5.4 MMOL/L (ref -3–3)
BASE EXCESS BLDA CALC-SCNC: 5.8 MMOL/L (ref -3–3)
BASE EXCESS BLDV CALC-SCNC: 6.8 MMOL/L (ref -3–3)
BILIRUB SERPL-MCNC: 0.6 MG/DL
BLD PROD TYP BPU: NORMAL
BLD PROD TYP BPU: NORMAL
BLOOD COMPONENT TYPE: NORMAL
BLOOD COMPONENT TYPE: NORMAL
BUN SERPL-MCNC: 22.9 MG/DL (ref 8–23)
CA-I BLD-MCNC: 4.2 MG/DL (ref 4.4–5.2)
CA-I BLD-MCNC: 4.2 MG/DL (ref 4.4–5.2)
CA-I BLD-MCNC: 4.5 MG/DL (ref 4.4–5.2)
CA-I BLD-MCNC: 4.6 MG/DL (ref 4.4–5.2)
CA-I BLD-MCNC: 4.6 MG/DL (ref 4.4–5.2)
CA-I BLD-MCNC: 4.7 MG/DL (ref 4.4–5.2)
CALCIUM SERPL-MCNC: 8.8 MG/DL (ref 8.8–10.4)
CHLORIDE SERPL-SCNC: 102 MMOL/L (ref 98–107)
CODING SYSTEM: NORMAL
CODING SYSTEM: NORMAL
COHGB MFR BLD: 98 % (ref 96–97)
CPB POCT: NO
CREAT SERPL-MCNC: 0.89 MG/DL (ref 0.51–0.95)
CROSSMATCH: NORMAL
CROSSMATCH: NORMAL
DIASTOLIC BLOOD PRESSURE - MUSE: NORMAL MMHG
DIASTOLIC BLOOD PRESSURE - MUSE: NORMAL MMHG
EGFRCR SERPLBLD CKD-EPI 2021: 73 ML/MIN/1.73M2
ERYTHROCYTE [DISTWIDTH] IN BLOOD BY AUTOMATED COUNT: 17.1 % (ref 10–15)
ERYTHROCYTE [DISTWIDTH] IN BLOOD BY AUTOMATED COUNT: 17.2 % (ref 10–15)
FIBRINOGEN PPP-MCNC: 302 MG/DL (ref 170–510)
GLUCOSE BLD-MCNC: 105 MG/DL (ref 70–99)
GLUCOSE BLD-MCNC: 125 MG/DL (ref 70–99)
GLUCOSE BLD-MCNC: 127 MG/DL (ref 70–99)
GLUCOSE BLD-MCNC: 81 MG/DL (ref 70–99)
GLUCOSE BLDC GLUCOMTR-MCNC: 102 MG/DL (ref 70–99)
GLUCOSE BLDC GLUCOMTR-MCNC: 115 MG/DL (ref 70–99)
GLUCOSE BLDC GLUCOMTR-MCNC: 121 MG/DL (ref 70–99)
GLUCOSE BLDC GLUCOMTR-MCNC: 138 MG/DL (ref 70–99)
GLUCOSE BLDC GLUCOMTR-MCNC: 144 MG/DL (ref 70–99)
GLUCOSE BLDC GLUCOMTR-MCNC: 157 MG/DL (ref 70–99)
GLUCOSE BLDC GLUCOMTR-MCNC: 172 MG/DL (ref 70–99)
GLUCOSE BLDC GLUCOMTR-MCNC: 174 MG/DL (ref 70–99)
GLUCOSE BLDC GLUCOMTR-MCNC: 197 MG/DL (ref 70–99)
GLUCOSE SERPL-MCNC: 178 MG/DL (ref 70–99)
HCO3 BLD-SCNC: 26 MMOL/L (ref 21–28)
HCO3 BLDA-SCNC: 24 MMOL/L (ref 21–28)
HCO3 BLDA-SCNC: 25 MMOL/L (ref 21–28)
HCO3 BLDA-SCNC: 29 MMOL/L (ref 21–28)
HCO3 BLDA-SCNC: 30 MMOL/L (ref 21–28)
HCO3 BLDV-SCNC: 30 MMOL/L (ref 21–28)
HCO3 SERPL-SCNC: 22 MMOL/L (ref 22–29)
HCT VFR BLD AUTO: 29 % (ref 35–47)
HCT VFR BLD AUTO: 32.4 % (ref 35–47)
HCT VFR BLD CALC: 31 % (ref 35–47)
HGB BLD-MCNC: 10.1 G/DL (ref 11.7–15.7)
HGB BLD-MCNC: 10.5 G/DL (ref 11.7–15.7)
HGB BLD-MCNC: 10.7 G/DL (ref 11.7–15.7)
HGB BLD-MCNC: 8.7 G/DL (ref 11.7–15.7)
HGB BLD-MCNC: 9 G/DL (ref 11.7–15.7)
HGB BLD-MCNC: 9.1 G/DL (ref 11.7–15.7)
HGB BLD-MCNC: 9.3 G/DL (ref 11.7–15.7)
INR PPP: 1.14 (ref 0.85–1.15)
INR PPP: 1.36 (ref 0.85–1.15)
INTERPRETATION ECG - MUSE: NORMAL
INTERPRETATION ECG - MUSE: NORMAL
ISSUE DATE AND TIME: NORMAL
ISSUE DATE AND TIME: NORMAL
LACTATE BLD-SCNC: 0.6 MMOL/L (ref 0.7–2)
LACTATE BLD-SCNC: 0.6 MMOL/L (ref 0.7–2)
LACTATE BLD-SCNC: 0.8 MMOL/L (ref 0.7–2)
LACTATE BLD-SCNC: 1.2 MMOL/L (ref 0.7–2)
LACTATE SERPL-SCNC: 1.4 MMOL/L (ref 0.7–2)
MAGNESIUM SERPL-MCNC: 2.2 MG/DL (ref 1.7–2.3)
MAGNESIUM SERPL-MCNC: 2.6 MG/DL (ref 1.7–2.3)
MCH RBC QN AUTO: 25.3 PG (ref 26.5–33)
MCH RBC QN AUTO: 25.3 PG (ref 26.5–33)
MCHC RBC AUTO-ENTMCNC: 31.2 G/DL (ref 31.5–36.5)
MCHC RBC AUTO-ENTMCNC: 31.4 G/DL (ref 31.5–36.5)
MCV RBC AUTO: 81 FL (ref 78–100)
MCV RBC AUTO: 81 FL (ref 78–100)
O2/TOTAL GAS SETTING VFR VENT: 50 %
OXYHGB MFR BLDA: 100 % (ref 92–100)
OXYHGB MFR BLDA: 99 % (ref 92–100)
OXYHGB MFR BLDA: 99 % (ref 92–100)
OXYHGB MFR BLDV: 79 % (ref 70–75)
P AXIS - MUSE: 59 DEGREES
P AXIS - MUSE: 98 DEGREES
PCO2 BLD: 30 MM HG (ref 35–45)
PCO2 BLDA: 30 MM HG (ref 35–45)
PCO2 BLDA: 35 MM HG (ref 35–45)
PCO2 BLDA: 37 MM HG (ref 35–45)
PCO2 BLDA: 38 MM HG (ref 35–45)
PCO2 BLDV: 43 MM HG (ref 40–50)
PEEP: 5 CM H2O
PH BLD: 7.54 [PH] (ref 7.35–7.45)
PH BLDA: 7.45 [PH] (ref 7.35–7.45)
PH BLDA: 7.5 [PH] (ref 7.35–7.45)
PH BLDV: 7.47 [PH] (ref 7.32–7.43)
PHOSPHATE SERPL-MCNC: 2.4 MG/DL (ref 2.5–4.5)
PLATELET # BLD AUTO: 205 10E3/UL (ref 150–450)
PLATELET # BLD AUTO: 243 10E3/UL (ref 150–450)
PO2 BLD: 84 MM HG (ref 80–105)
PO2 BLDA: 140 MM HG (ref 80–105)
PO2 BLDA: 160 MM HG (ref 80–105)
PO2 BLDA: 366 MM HG (ref 80–105)
PO2 BLDA: 418 MM HG (ref 80–105)
PO2 BLDV: 42 MM HG (ref 25–47)
POTASSIUM BLD-SCNC: 3.3 MMOL/L (ref 3.4–5.3)
POTASSIUM BLD-SCNC: 3.5 MMOL/L (ref 3.4–5.3)
POTASSIUM BLD-SCNC: 4 MMOL/L (ref 3.4–5.3)
POTASSIUM BLD-SCNC: 4.3 MMOL/L (ref 3.4–5.3)
POTASSIUM BLD-SCNC: 4.4 MMOL/L (ref 3.4–5.3)
POTASSIUM SERPL-SCNC: 3.3 MMOL/L (ref 3.4–5.3)
POTASSIUM SERPL-SCNC: 4 MMOL/L (ref 3.4–5.3)
POTASSIUM SERPL-SCNC: 4.1 MMOL/L (ref 3.4–5.3)
PR INTERVAL - MUSE: 140 MS
PR INTERVAL - MUSE: 156 MS
PROT SERPL-MCNC: 5.7 G/DL (ref 6.4–8.3)
QRS DURATION - MUSE: 92 MS
QRS DURATION - MUSE: 96 MS
QT - MUSE: 350 MS
QT - MUSE: 370 MS
QTC - MUSE: 464 MS
QTC - MUSE: 486 MS
R AXIS - MUSE: -19 DEGREES
R AXIS - MUSE: -24 DEGREES
RBC # BLD AUTO: 3.59 10E6/UL (ref 3.8–5.2)
RBC # BLD AUTO: 3.99 10E6/UL (ref 3.8–5.2)
SAO2 % BLDA: 100 % (ref 92–100)
SAO2 % BLDA: 100 % (ref 96–97)
SAO2 % BLDA: 97 % (ref 92–100)
SAO2 % BLDV: 80 % (ref 70–75)
SODIUM BLD-SCNC: 140 MMOL/L (ref 135–145)
SODIUM BLD-SCNC: 141 MMOL/L (ref 135–145)
SODIUM BLD-SCNC: 142 MMOL/L (ref 135–145)
SODIUM SERPL-SCNC: 138 MMOL/L (ref 135–145)
SPECIMEN EXPIRATION DATE: NORMAL
SYSTOLIC BLOOD PRESSURE - MUSE: NORMAL MMHG
SYSTOLIC BLOOD PRESSURE - MUSE: NORMAL MMHG
T AXIS - MUSE: 150 DEGREES
T AXIS - MUSE: 172 DEGREES
UFH PPP CHRO-ACNC: 0.19 IU/ML
UFH PPP CHRO-ACNC: 0.31 IU/ML
UFH PPP CHRO-ACNC: <0.1 IU/ML
UNIT ABO/RH: NORMAL
UNIT ABO/RH: NORMAL
UNIT NUMBER: NORMAL
UNIT NUMBER: NORMAL
UNIT STATUS: NORMAL
UNIT STATUS: NORMAL
UNIT TYPE ISBT: 5100
UNIT TYPE ISBT: 5100
VENTRICULAR RATE- MUSE: 104 BPM
VENTRICULAR RATE- MUSE: 106 BPM
WBC # BLD AUTO: 12.5 10E3/UL (ref 4–11)
WBC # BLD AUTO: 18.4 10E3/UL (ref 4–11)

## 2024-11-24 PROCEDURE — 99152 MOD SED SAME PHYS/QHP 5/>YRS: CPT | Performed by: INTERNAL MEDICINE

## 2024-11-24 PROCEDURE — 99291 CRITICAL CARE FIRST HOUR: CPT | Performed by: INTERNAL MEDICINE

## 2024-11-24 PROCEDURE — 250N000011 HC RX IP 250 OP 636: Performed by: INTERNAL MEDICINE

## 2024-11-24 PROCEDURE — 33967 INSERT I-AORT PERCUT DEVICE: CPT | Performed by: INTERNAL MEDICINE

## 2024-11-24 PROCEDURE — 84132 ASSAY OF SERUM POTASSIUM: CPT | Performed by: THORACIC SURGERY (CARDIOTHORACIC VASCULAR SURGERY)

## 2024-11-24 PROCEDURE — 5A1221Z PERFORMANCE OF CARDIAC OUTPUT, CONTINUOUS: ICD-10-PCS | Performed by: THORACIC SURGERY (CARDIOTHORACIC VASCULAR SURGERY)

## 2024-11-24 PROCEDURE — 84132 ASSAY OF SERUM POTASSIUM: CPT

## 2024-11-24 PROCEDURE — 360N000079 HC SURGERY LEVEL 6, PER MIN: Performed by: THORACIC SURGERY (CARDIOTHORACIC VASCULAR SURGERY)

## 2024-11-24 PROCEDURE — C1894 INTRO/SHEATH, NON-LASER: HCPCS | Performed by: INTERNAL MEDICINE

## 2024-11-24 PROCEDURE — 82040 ASSAY OF SERUM ALBUMIN: CPT | Performed by: PHYSICIAN ASSISTANT

## 2024-11-24 PROCEDURE — 410N000004: Performed by: THORACIC SURGERY (CARDIOTHORACIC VASCULAR SURGERY)

## 2024-11-24 PROCEDURE — 93005 ELECTROCARDIOGRAM TRACING: CPT | Performed by: INTERNAL MEDICINE

## 2024-11-24 PROCEDURE — 258N000003 HC RX IP 258 OP 636: Performed by: PHYSICIAN ASSISTANT

## 2024-11-24 PROCEDURE — 82805 BLOOD GASES W/O2 SATURATION: CPT | Performed by: THORACIC SURGERY (CARDIOTHORACIC VASCULAR SURGERY)

## 2024-11-24 PROCEDURE — 94002 VENT MGMT INPAT INIT DAY: CPT

## 2024-11-24 PROCEDURE — 5A02210 ASSISTANCE WITH CARDIAC OUTPUT USING BALLOON PUMP, CONTINUOUS: ICD-10-PCS | Performed by: THORACIC SURGERY (CARDIOTHORACIC VASCULAR SURGERY)

## 2024-11-24 PROCEDURE — 3E043XZ INTRODUCTION OF VASOPRESSOR INTO CENTRAL VEIN, PERCUTANEOUS APPROACH: ICD-10-PCS | Performed by: STUDENT IN AN ORGANIZED HEALTH CARE EDUCATION/TRAINING PROGRAM

## 2024-11-24 PROCEDURE — 250N000013 HC RX MED GY IP 250 OP 250 PS 637: Performed by: PHYSICIAN ASSISTANT

## 2024-11-24 PROCEDURE — 250N000009 HC RX 250: Performed by: PHYSICIAN ASSISTANT

## 2024-11-24 PROCEDURE — 250N000013 HC RX MED GY IP 250 OP 250 PS 637: Performed by: INTERNAL MEDICINE

## 2024-11-24 PROCEDURE — 84132 ASSAY OF SERUM POTASSIUM: CPT | Performed by: PHYSICIAN ASSISTANT

## 2024-11-24 PROCEDURE — 93010 ELECTROCARDIOGRAM REPORT: CPT | Performed by: INTERNAL MEDICINE

## 2024-11-24 PROCEDURE — 36415 COLL VENOUS BLD VENIPUNCTURE: CPT | Performed by: INTERNAL MEDICINE

## 2024-11-24 PROCEDURE — 85610 PROTHROMBIN TIME: CPT | Performed by: PHYSICIAN ASSISTANT

## 2024-11-24 PROCEDURE — 84100 ASSAY OF PHOSPHORUS: CPT | Performed by: PHYSICIAN ASSISTANT

## 2024-11-24 PROCEDURE — 250N000009 HC RX 250: Performed by: INTERNAL MEDICINE

## 2024-11-24 PROCEDURE — C1760 CLOSURE DEV, VASC: HCPCS | Performed by: THORACIC SURGERY (CARDIOTHORACIC VASCULAR SURGERY)

## 2024-11-24 PROCEDURE — 82330 ASSAY OF CALCIUM: CPT | Performed by: THORACIC SURGERY (CARDIOTHORACIC VASCULAR SURGERY)

## 2024-11-24 PROCEDURE — 84132 ASSAY OF SERUM POTASSIUM: CPT | Performed by: INTERNAL MEDICINE

## 2024-11-24 PROCEDURE — 83735 ASSAY OF MAGNESIUM: CPT | Performed by: PHYSICIAN ASSISTANT

## 2024-11-24 PROCEDURE — 85520 HEPARIN ASSAY: CPT | Performed by: INTERNAL MEDICINE

## 2024-11-24 PROCEDURE — 82805 BLOOD GASES W/O2 SATURATION: CPT

## 2024-11-24 PROCEDURE — 85730 THROMBOPLASTIN TIME PARTIAL: CPT | Performed by: PHYSICIAN ASSISTANT

## 2024-11-24 PROCEDURE — 02HQ32Z INSERTION OF MONITORING DEVICE INTO RIGHT PULMONARY ARTERY, PERCUTANEOUS APPROACH: ICD-10-PCS | Performed by: THORACIC SURGERY (CARDIOTHORACIC VASCULAR SURGERY)

## 2024-11-24 PROCEDURE — 93005 ELECTROCARDIOGRAM TRACING: CPT

## 2024-11-24 PROCEDURE — 4A1239Z MONITORING OF CARDIAC OUTPUT, PERCUTANEOUS APPROACH: ICD-10-PCS | Performed by: THORACIC SURGERY (CARDIOTHORACIC VASCULAR SURGERY)

## 2024-11-24 PROCEDURE — 250N000011 HC RX IP 250 OP 636: Performed by: THORACIC SURGERY (CARDIOTHORACIC VASCULAR SURGERY)

## 2024-11-24 PROCEDURE — C1898 LEAD, PMKR, OTHER THAN TRANS: HCPCS | Performed by: THORACIC SURGERY (CARDIOTHORACIC VASCULAR SURGERY)

## 2024-11-24 PROCEDURE — 250N000025 HC SEVOFLURANE, PER MIN: Performed by: THORACIC SURGERY (CARDIOTHORACIC VASCULAR SURGERY)

## 2024-11-24 PROCEDURE — 410N000003 HC PER-PERFUSION 1ST 30 MIN: Performed by: THORACIC SURGERY (CARDIOTHORACIC VASCULAR SURGERY)

## 2024-11-24 PROCEDURE — 200N000001 HC R&B ICU

## 2024-11-24 PROCEDURE — 250N000011 HC RX IP 250 OP 636: Mod: JW | Performed by: INTERNAL MEDICINE

## 2024-11-24 PROCEDURE — 999N000157 HC STATISTIC RCP TIME EA 10 MIN

## 2024-11-24 PROCEDURE — P9016 RBC LEUKOCYTES REDUCED: HCPCS | Performed by: PHYSICIAN ASSISTANT

## 2024-11-24 PROCEDURE — 99233 SBSQ HOSP IP/OBS HIGH 50: CPT | Performed by: INTERNAL MEDICINE

## 2024-11-24 PROCEDURE — 82803 BLOOD GASES ANY COMBINATION: CPT

## 2024-11-24 PROCEDURE — 999N000065 XR CHEST PORT 1 VIEW

## 2024-11-24 PROCEDURE — 272N000001 HC OR GENERAL SUPPLY STERILE: Performed by: THORACIC SURGERY (CARDIOTHORACIC VASCULAR SURGERY)

## 2024-11-24 PROCEDURE — 85730 THROMBOPLASTIN TIME PARTIAL: CPT

## 2024-11-24 PROCEDURE — 33508 ENDOSCOPIC VEIN HARVEST: CPT | Mod: 59 | Performed by: THORACIC SURGERY (CARDIOTHORACIC VASCULAR SURGERY)

## 2024-11-24 PROCEDURE — 5A1945Z RESPIRATORY VENTILATION, 24-96 CONSECUTIVE HOURS: ICD-10-PCS | Performed by: STUDENT IN AN ORGANIZED HEALTH CARE EDUCATION/TRAINING PROGRAM

## 2024-11-24 PROCEDURE — 82330 ASSAY OF CALCIUM: CPT

## 2024-11-24 PROCEDURE — 370N000017 HC ANESTHESIA TECHNICAL FEE, PER MIN: Performed by: THORACIC SURGERY (CARDIOTHORACIC VASCULAR SURGERY)

## 2024-11-24 PROCEDURE — 99232 SBSQ HOSP IP/OBS MODERATE 35: CPT | Performed by: INTERNAL MEDICINE

## 2024-11-24 PROCEDURE — 84295 ASSAY OF SERUM SODIUM: CPT

## 2024-11-24 PROCEDURE — 250N000011 HC RX IP 250 OP 636: Performed by: PHYSICIAN ASSISTANT

## 2024-11-24 PROCEDURE — 250N000009 HC RX 250: Performed by: THORACIC SURGERY (CARDIOTHORACIC VASCULAR SURGERY)

## 2024-11-24 PROCEDURE — 85610 PROTHROMBIN TIME: CPT

## 2024-11-24 PROCEDURE — 4A133B3 MONITORING OF ARTERIAL PRESSURE, PULMONARY, PERCUTANEOUS APPROACH: ICD-10-PCS | Performed by: THORACIC SURGERY (CARDIOTHORACIC VASCULAR SURGERY)

## 2024-11-24 PROCEDURE — 272N000001 HC OR GENERAL SUPPLY STERILE: Performed by: INTERNAL MEDICINE

## 2024-11-24 PROCEDURE — 82330 ASSAY OF CALCIUM: CPT | Performed by: PHYSICIAN ASSISTANT

## 2024-11-24 PROCEDURE — C1725 CATH, TRANSLUMIN NON-LASER: HCPCS | Performed by: INTERNAL MEDICINE

## 2024-11-24 PROCEDURE — 999N000009 HC STATISTIC AIRWAY CARE

## 2024-11-24 PROCEDURE — 83605 ASSAY OF LACTIC ACID: CPT | Performed by: PHYSICIAN ASSISTANT

## 2024-11-24 PROCEDURE — 258N000003 HC RX IP 258 OP 636: Performed by: THORACIC SURGERY (CARDIOTHORACIC VASCULAR SURGERY)

## 2024-11-24 PROCEDURE — 272N000004 HC RX 272: Performed by: THORACIC SURGERY (CARDIOTHORACIC VASCULAR SURGERY)

## 2024-11-24 PROCEDURE — 83735 ASSAY OF MAGNESIUM: CPT | Performed by: THORACIC SURGERY (CARDIOTHORACIC VASCULAR SURGERY)

## 2024-11-24 PROCEDURE — 85384 FIBRINOGEN ACTIVITY: CPT

## 2024-11-24 PROCEDURE — 5A1223Z PERFORMANCE OF CARDIAC PACING, CONTINUOUS: ICD-10-PCS | Performed by: THORACIC SURGERY (CARDIOTHORACIC VASCULAR SURGERY)

## 2024-11-24 PROCEDURE — 02100Z9 BYPASS CORONARY ARTERY, ONE ARTERY FROM LEFT INTERNAL MAMMARY, OPEN APPROACH: ICD-10-PCS | Performed by: THORACIC SURGERY (CARDIOTHORACIC VASCULAR SURGERY)

## 2024-11-24 PROCEDURE — 021109W BYPASS CORONARY ARTERY, TWO ARTERIES FROM AORTA WITH AUTOLOGOUS VENOUS TISSUE, OPEN APPROACH: ICD-10-PCS | Performed by: THORACIC SURGERY (CARDIOTHORACIC VASCULAR SURGERY)

## 2024-11-24 PROCEDURE — 85014 HEMATOCRIT: CPT

## 2024-11-24 PROCEDURE — 93010 ELECTROCARDIOGRAM REPORT: CPT | Mod: 77 | Performed by: STUDENT IN AN ORGANIZED HEALTH CARE EDUCATION/TRAINING PROGRAM

## 2024-11-24 PROCEDURE — 80053 COMPREHEN METABOLIC PANEL: CPT | Performed by: INTERNAL MEDICINE

## 2024-11-24 PROCEDURE — 33518 CABG ARTERY-VEIN TWO: CPT | Performed by: THORACIC SURGERY (CARDIOTHORACIC VASCULAR SURGERY)

## 2024-11-24 PROCEDURE — 06BQ4ZZ EXCISION OF LEFT SAPHENOUS VEIN, PERCUTANEOUS ENDOSCOPIC APPROACH: ICD-10-PCS | Performed by: THORACIC SURGERY (CARDIOTHORACIC VASCULAR SURGERY)

## 2024-11-24 PROCEDURE — 85027 COMPLETE CBC AUTOMATED: CPT | Performed by: PHYSICIAN ASSISTANT

## 2024-11-24 PROCEDURE — 33533 CABG ARTERIAL SINGLE: CPT | Performed by: THORACIC SURGERY (CARDIOTHORACIC VASCULAR SURGERY)

## 2024-11-24 PROCEDURE — 250N000012 HC RX MED GY IP 250 OP 636 PS 637: Performed by: INTERNAL MEDICINE

## 2024-11-24 RX ORDER — METHADONE HYDROCHLORIDE 10 MG/ML
INJECTION, SOLUTION INTRAMUSCULAR; INTRAVENOUS; SUBCUTANEOUS
Status: DISCONTINUED
Start: 2024-11-24 | End: 2024-11-24 | Stop reason: HOSPADM

## 2024-11-24 RX ORDER — ASPIRIN 300 MG/1
300 SUPPOSITORY RECTAL DAILY
Status: DISCONTINUED | OUTPATIENT
Start: 2024-11-25 | End: 2024-11-27

## 2024-11-24 RX ORDER — HYDROMORPHONE HYDROCHLORIDE 1 MG/ML
INJECTION, SOLUTION INTRAMUSCULAR; INTRAVENOUS; SUBCUTANEOUS
Status: COMPLETED
Start: 2024-11-24 | End: 2024-11-24

## 2024-11-24 RX ORDER — ASPIRIN 300 MG/1
300 SUPPOSITORY RECTAL ONCE
Status: COMPLETED | OUTPATIENT
Start: 2024-11-24 | End: 2024-11-24

## 2024-11-24 RX ORDER — DEXMEDETOMIDINE HYDROCHLORIDE 4 UG/ML
.1-1.2 INJECTION, SOLUTION INTRAVENOUS CONTINUOUS
Status: DISCONTINUED | OUTPATIENT
Start: 2024-11-24 | End: 2024-11-26

## 2024-11-24 RX ORDER — VANCOMYCIN HYDROCHLORIDE 1 G/20ML
INJECTION, POWDER, LYOPHILIZED, FOR SOLUTION INTRAVENOUS PRN
Status: DISCONTINUED | OUTPATIENT
Start: 2024-11-24 | End: 2024-11-25

## 2024-11-24 RX ORDER — MAGNESIUM SULFATE 4 G/50ML
4 INJECTION INTRAVENOUS ONCE
Status: DISCONTINUED | OUTPATIENT
Start: 2024-11-24 | End: 2024-11-24 | Stop reason: HOSPADM

## 2024-11-24 RX ORDER — NICOTINE POLACRILEX 4 MG
15-30 LOZENGE BUCCAL
Status: DISCONTINUED | OUTPATIENT
Start: 2024-11-24 | End: 2024-12-06 | Stop reason: HOSPADM

## 2024-11-24 RX ORDER — SODIUM CHLORIDE, SODIUM LACTATE, POTASSIUM CHLORIDE, CALCIUM CHLORIDE 600; 310; 30; 20 MG/100ML; MG/100ML; MG/100ML; MG/100ML
INJECTION, SOLUTION INTRAVENOUS CONTINUOUS
Status: DISCONTINUED | OUTPATIENT
Start: 2024-11-24 | End: 2024-11-24 | Stop reason: HOSPADM

## 2024-11-24 RX ORDER — HEPARIN SOD,PORCINE/0.9 % NACL 30K/1000ML
INTRAVENOUS SOLUTION INTRAVENOUS
Status: DISCONTINUED | OUTPATIENT
Start: 2024-11-24 | End: 2024-11-24 | Stop reason: HOSPADM

## 2024-11-24 RX ORDER — AMOXICILLIN 250 MG
1 CAPSULE ORAL 2 TIMES DAILY
Status: DISCONTINUED | OUTPATIENT
Start: 2024-11-24 | End: 2024-12-06 | Stop reason: HOSPADM

## 2024-11-24 RX ORDER — CEFAZOLIN SODIUM/WATER 2 G/20 ML
2 SYRINGE (ML) INTRAVENOUS
Status: DISCONTINUED | OUTPATIENT
Start: 2024-11-24 | End: 2024-11-24 | Stop reason: HOSPADM

## 2024-11-24 RX ORDER — FAMOTIDINE 20 MG/1
20 TABLET, FILM COATED ORAL
Status: DISCONTINUED | OUTPATIENT
Start: 2024-11-24 | End: 2024-11-24 | Stop reason: HOSPADM

## 2024-11-24 RX ORDER — LIDOCAINE 40 MG/G
CREAM TOPICAL
Status: DISCONTINUED | OUTPATIENT
Start: 2024-11-24 | End: 2024-11-29

## 2024-11-24 RX ORDER — POTASSIUM CHLORIDE 29.8 MG/ML
20 INJECTION INTRAVENOUS
Status: COMPLETED | OUTPATIENT
Start: 2024-11-24 | End: 2024-11-24

## 2024-11-24 RX ORDER — ACETAMINOPHEN 325 MG/1
975 TABLET ORAL EVERY 8 HOURS
Status: DISCONTINUED | OUTPATIENT
Start: 2024-11-24 | End: 2024-11-24

## 2024-11-24 RX ORDER — LIDOCAINE 40 MG/G
CREAM TOPICAL
Status: DISCONTINUED | OUTPATIENT
Start: 2024-11-24 | End: 2024-11-24

## 2024-11-24 RX ORDER — ACETAMINOPHEN 650 MG/1
650 SUPPOSITORY RECTAL EVERY 8 HOURS
Status: COMPLETED | OUTPATIENT
Start: 2024-11-24 | End: 2024-11-27

## 2024-11-24 RX ORDER — NALOXONE HYDROCHLORIDE 0.4 MG/ML
0.2 INJECTION, SOLUTION INTRAMUSCULAR; INTRAVENOUS; SUBCUTANEOUS
Status: DISCONTINUED | OUTPATIENT
Start: 2024-11-24 | End: 2024-11-24

## 2024-11-24 RX ORDER — CEFAZOLIN SODIUM 1 G/3ML
1 INJECTION, POWDER, FOR SOLUTION INTRAMUSCULAR; INTRAVENOUS EVERY 8 HOURS
Status: COMPLETED | OUTPATIENT
Start: 2024-11-24 | End: 2024-11-25

## 2024-11-24 RX ORDER — POLYETHYLENE GLYCOL 3350 17 G/17G
17 POWDER, FOR SOLUTION ORAL DAILY
Status: DISCONTINUED | OUTPATIENT
Start: 2024-11-25 | End: 2024-12-06 | Stop reason: HOSPADM

## 2024-11-24 RX ORDER — ASPIRIN 81 MG/1
162 TABLET, CHEWABLE ORAL
Status: DISCONTINUED | OUTPATIENT
Start: 2024-11-24 | End: 2024-11-24 | Stop reason: HOSPADM

## 2024-11-24 RX ORDER — DEXMEDETOMIDINE HYDROCHLORIDE 4 UG/ML
.1-1.2 INJECTION, SOLUTION INTRAVENOUS CONTINUOUS
Status: DISCONTINUED | OUTPATIENT
Start: 2024-11-24 | End: 2024-11-24 | Stop reason: HOSPADM

## 2024-11-24 RX ORDER — CHLORHEXIDINE GLUCONATE ORAL RINSE 1.2 MG/ML
10 SOLUTION DENTAL ONCE
Status: DISCONTINUED | OUTPATIENT
Start: 2024-11-24 | End: 2024-11-24 | Stop reason: HOSPADM

## 2024-11-24 RX ORDER — CALCIUM GLUCONATE 20 MG/ML
1 INJECTION, SOLUTION INTRAVENOUS
Status: DISPENSED | OUTPATIENT
Start: 2024-11-24 | End: 2024-11-30

## 2024-11-24 RX ORDER — ONDANSETRON 2 MG/ML
4 INJECTION INTRAMUSCULAR; INTRAVENOUS EVERY 6 HOURS PRN
Status: DISCONTINUED | OUTPATIENT
Start: 2024-11-24 | End: 2024-12-06 | Stop reason: HOSPADM

## 2024-11-24 RX ORDER — DEXMEDETOMIDINE HYDROCHLORIDE 4 UG/ML
INJECTION, SOLUTION INTRAVENOUS
Status: DISCONTINUED
Start: 2024-11-24 | End: 2024-11-24 | Stop reason: HOSPADM

## 2024-11-24 RX ORDER — CEFAZOLIN SODIUM/WATER 2 G/20 ML
2 SYRINGE (ML) INTRAVENOUS SEE ADMIN INSTRUCTIONS
Status: DISCONTINUED | OUTPATIENT
Start: 2024-11-24 | End: 2024-11-24 | Stop reason: HOSPADM

## 2024-11-24 RX ORDER — MORPHINE SULFATE 2 MG/ML
2 INJECTION, SOLUTION INTRAMUSCULAR; INTRAVENOUS
Status: DISCONTINUED | OUTPATIENT
Start: 2024-11-24 | End: 2024-11-24

## 2024-11-24 RX ORDER — PHENYLEPHRINE HCL IN 0.9% NACL 50MG/250ML
PLASTIC BAG, INJECTION (ML) INTRAVENOUS
Status: DISCONTINUED
Start: 2024-11-24 | End: 2024-11-24 | Stop reason: HOSPADM

## 2024-11-24 RX ORDER — PANTOPRAZOLE SODIUM 40 MG/1
40 TABLET, DELAYED RELEASE ORAL DAILY
Status: DISCONTINUED | OUTPATIENT
Start: 2024-11-24 | End: 2024-11-28

## 2024-11-24 RX ORDER — GABAPENTIN 300 MG/1
300 CAPSULE ORAL
Status: DISCONTINUED | OUTPATIENT
Start: 2024-11-24 | End: 2024-11-24 | Stop reason: HOSPADM

## 2024-11-24 RX ORDER — HEPARIN SODIUM 5000 [USP'U]/.5ML
5000 INJECTION, SOLUTION INTRAVENOUS; SUBCUTANEOUS EVERY 8 HOURS
Status: DISCONTINUED | OUTPATIENT
Start: 2024-11-25 | End: 2024-12-06 | Stop reason: HOSPADM

## 2024-11-24 RX ORDER — NOREPINEPHRINE BITARTRATE 0.02 MG/ML
.01-.1 INJECTION, SOLUTION INTRAVENOUS CONTINUOUS
Status: DISCONTINUED | OUTPATIENT
Start: 2024-11-24 | End: 2024-11-24 | Stop reason: HOSPADM

## 2024-11-24 RX ORDER — NALOXONE HYDROCHLORIDE 0.4 MG/ML
0.4 INJECTION, SOLUTION INTRAMUSCULAR; INTRAVENOUS; SUBCUTANEOUS
Status: DISCONTINUED | OUTPATIENT
Start: 2024-11-24 | End: 2024-11-24

## 2024-11-24 RX ORDER — PROCHLORPERAZINE MALEATE 10 MG
10 TABLET ORAL EVERY 6 HOURS PRN
Status: DISCONTINUED | OUTPATIENT
Start: 2024-11-24 | End: 2024-11-28

## 2024-11-24 RX ORDER — ACETAMINOPHEN 325 MG/1
650 TABLET ORAL EVERY 4 HOURS PRN
Status: DISCONTINUED | OUTPATIENT
Start: 2024-11-27 | End: 2024-12-06 | Stop reason: HOSPADM

## 2024-11-24 RX ORDER — OXYCODONE HYDROCHLORIDE 5 MG/1
10 TABLET ORAL EVERY 4 HOURS PRN
Status: DISCONTINUED | OUTPATIENT
Start: 2024-11-24 | End: 2024-12-02

## 2024-11-24 RX ORDER — SODIUM CHLORIDE, SODIUM GLUCONATE, SODIUM ACETATE, POTASSIUM CHLORIDE AND MAGNESIUM CHLORIDE 526; 502; 368; 37; 30 MG/100ML; MG/100ML; MG/100ML; MG/100ML; MG/100ML
1000 INJECTION, SOLUTION INTRAVENOUS
Status: DISCONTINUED | OUTPATIENT
Start: 2024-11-24 | End: 2024-11-24

## 2024-11-24 RX ORDER — ACETAMINOPHEN 325 MG/1
975 TABLET ORAL EVERY 8 HOURS
Status: COMPLETED | OUTPATIENT
Start: 2024-11-24 | End: 2024-11-27

## 2024-11-24 RX ORDER — ONDANSETRON 4 MG/1
4 TABLET, ORALLY DISINTEGRATING ORAL EVERY 6 HOURS PRN
Status: DISCONTINUED | OUTPATIENT
Start: 2024-11-24 | End: 2024-12-06 | Stop reason: HOSPADM

## 2024-11-24 RX ORDER — CALCIUM GLUCONATE 20 MG/ML
2 INJECTION, SOLUTION INTRAVENOUS
Status: ACTIVE | OUTPATIENT
Start: 2024-11-24 | End: 2024-11-30

## 2024-11-24 RX ORDER — DEXTROSE MONOHYDRATE 25 G/50ML
25-50 INJECTION, SOLUTION INTRAVENOUS
Status: DISCONTINUED | OUTPATIENT
Start: 2024-11-24 | End: 2024-12-06 | Stop reason: HOSPADM

## 2024-11-24 RX ORDER — ASPIRIN 81 MG/1
162 TABLET, CHEWABLE ORAL ONCE
Status: COMPLETED | OUTPATIENT
Start: 2024-11-24 | End: 2024-11-24

## 2024-11-24 RX ORDER — OXYCODONE HYDROCHLORIDE 5 MG/1
5 TABLET ORAL EVERY 4 HOURS PRN
Status: DISCONTINUED | OUTPATIENT
Start: 2024-11-24 | End: 2024-12-02

## 2024-11-24 RX ORDER — NOREPINEPHRINE BITARTRATE 0.02 MG/ML
.01-.15 INJECTION, SOLUTION INTRAVENOUS CONTINUOUS PRN
Status: DISCONTINUED | OUTPATIENT
Start: 2024-11-24 | End: 2024-11-28

## 2024-11-24 RX ORDER — ASPIRIN 81 MG/1
81 TABLET, CHEWABLE ORAL
Status: DISCONTINUED | OUTPATIENT
Start: 2024-11-24 | End: 2024-11-24 | Stop reason: HOSPADM

## 2024-11-24 RX ORDER — FENTANYL CITRATE 50 UG/ML
INJECTION, SOLUTION INTRAMUSCULAR; INTRAVENOUS
Status: DISCONTINUED | OUTPATIENT
Start: 2024-11-24 | End: 2024-11-24 | Stop reason: HOSPADM

## 2024-11-24 RX ORDER — ASPIRIN 81 MG/1
162 TABLET, CHEWABLE ORAL DAILY
Status: DISCONTINUED | OUTPATIENT
Start: 2024-11-25 | End: 2024-12-06 | Stop reason: HOSPADM

## 2024-11-24 RX ORDER — MORPHINE SULFATE 2 MG/ML
2 INJECTION, SOLUTION INTRAMUSCULAR; INTRAVENOUS ONCE
Status: COMPLETED | OUTPATIENT
Start: 2024-11-24 | End: 2024-11-24

## 2024-11-24 RX ORDER — HYDRALAZINE HYDROCHLORIDE 20 MG/ML
10 INJECTION INTRAMUSCULAR; INTRAVENOUS EVERY 30 MIN PRN
Status: DISCONTINUED | OUTPATIENT
Start: 2024-11-24 | End: 2024-11-28

## 2024-11-24 RX ORDER — MAGNESIUM HYDROXIDE 1200 MG/15ML
LIQUID ORAL PRN
Status: DISCONTINUED | OUTPATIENT
Start: 2024-11-24 | End: 2024-11-24 | Stop reason: HOSPADM

## 2024-11-24 RX ORDER — BISACODYL 10 MG
10 SUPPOSITORY, RECTAL RECTAL DAILY PRN
Status: DISCONTINUED | OUTPATIENT
Start: 2024-11-27 | End: 2024-12-06 | Stop reason: HOSPADM

## 2024-11-24 RX ORDER — PHENYLEPHRINE HCL IN 0.9% NACL 50MG/250ML
.1-6 PLASTIC BAG, INJECTION (ML) INTRAVENOUS CONTINUOUS
Status: DISCONTINUED | OUTPATIENT
Start: 2024-11-24 | End: 2024-11-24 | Stop reason: HOSPADM

## 2024-11-24 RX ADMIN — HYDROMORPHONE HYDROCHLORIDE 0.4 MG: 1 INJECTION, SOLUTION INTRAMUSCULAR; INTRAVENOUS; SUBCUTANEOUS at 19:28

## 2024-11-24 RX ADMIN — ATORVASTATIN CALCIUM 40 MG: 40 TABLET, FILM COATED ORAL at 10:34

## 2024-11-24 RX ADMIN — PREDNISONE 40 MG: 20 TABLET ORAL at 10:34

## 2024-11-24 RX ADMIN — CARVEDILOL 3.12 MG: 3.12 TABLET, FILM COATED ORAL at 10:35

## 2024-11-24 RX ADMIN — HYDROMORPHONE HYDROCHLORIDE 0.5 MG: 1 INJECTION, SOLUTION INTRAMUSCULAR; INTRAVENOUS; SUBCUTANEOUS at 08:58

## 2024-11-24 RX ADMIN — POTASSIUM CHLORIDE 20 MEQ: 29.8 INJECTION, SOLUTION INTRAVENOUS at 21:51

## 2024-11-24 RX ADMIN — NICARDIPINE HYDROCHLORIDE 2.5 MG/HR: 0.2 INJECTION, SOLUTION INTRAVENOUS at 18:16

## 2024-11-24 RX ADMIN — SODIUM CHLORIDE, POTASSIUM CHLORIDE, SODIUM LACTATE AND CALCIUM CHLORIDE 250 ML: 600; 310; 30; 20 INJECTION, SOLUTION INTRAVENOUS at 22:18

## 2024-11-24 RX ADMIN — GUAIFENESIN 200 MG: 200 SOLUTION ORAL at 00:58

## 2024-11-24 RX ADMIN — CITALOPRAM HYDROBROMIDE 20 MG: 20 TABLET ORAL at 10:34

## 2024-11-24 RX ADMIN — HYDROMORPHONE HYDROCHLORIDE 0.4 MG: 1 INJECTION, SOLUTION INTRAMUSCULAR; INTRAVENOUS; SUBCUTANEOUS at 18:18

## 2024-11-24 RX ADMIN — HYDROMORPHONE HYDROCHLORIDE 0.4 MG: 1 INJECTION, SOLUTION INTRAMUSCULAR; INTRAVENOUS; SUBCUTANEOUS at 23:51

## 2024-11-24 RX ADMIN — DEXMEDETOMIDINE HYDROCHLORIDE 1.2 MCG/KG/HR: 400 INJECTION INTRAVENOUS at 20:35

## 2024-11-24 RX ADMIN — SODIUM CHLORIDE, POTASSIUM CHLORIDE, SODIUM LACTATE AND CALCIUM CHLORIDE 250 ML: 600; 310; 30; 20 INJECTION, SOLUTION INTRAVENOUS at 20:09

## 2024-11-24 RX ADMIN — FUROSEMIDE 40 MG: 10 INJECTION, SOLUTION INTRAMUSCULAR; INTRAVENOUS at 12:32

## 2024-11-24 RX ADMIN — MORPHINE SULFATE 2 MG: 2 INJECTION, SOLUTION INTRAMUSCULAR; INTRAVENOUS at 08:30

## 2024-11-24 RX ADMIN — FUROSEMIDE 40 MG: 10 INJECTION, SOLUTION INTRAMUSCULAR; INTRAVENOUS at 03:44

## 2024-11-24 RX ADMIN — ASPIRIN 300 MG: 300 SUPPOSITORY RECTAL at 20:14

## 2024-11-24 RX ADMIN — CEFAZOLIN 1 G: 1 INJECTION, POWDER, FOR SOLUTION INTRAMUSCULAR; INTRAVENOUS at 21:31

## 2024-11-24 RX ADMIN — DEXMEDETOMIDINE HYDROCHLORIDE 1.2 MCG/KG/HR: 400 INJECTION INTRAVENOUS at 23:56

## 2024-11-24 RX ADMIN — MAGNESIUM OXIDE TAB 400 MG (241.3 MG ELEMENTAL MG) 400 MG: 400 (241.3 MG) TAB at 10:33

## 2024-11-24 RX ADMIN — SODIUM CHLORIDE, POTASSIUM CHLORIDE, SODIUM LACTATE AND CALCIUM CHLORIDE 250 ML: 600; 310; 30; 20 INJECTION, SOLUTION INTRAVENOUS at 20:37

## 2024-11-24 RX ADMIN — ACETAMINOPHEN 650 MG: 650 SUPPOSITORY RECTAL at 20:14

## 2024-11-24 RX ADMIN — SODIUM PHOSPHATE, MONOBASIC, MONOHYDRATE AND SODIUM PHOSPHATE, DIBASIC, ANHYDROUS 9 MMOL: 142; 276 INJECTION, SOLUTION INTRAVENOUS at 20:15

## 2024-11-24 RX ADMIN — INSULIN HUMAN 1 UNITS/HR: 1 INJECTION, SOLUTION INTRAVENOUS at 18:12

## 2024-11-24 RX ADMIN — SODIUM CHLORIDE, POTASSIUM CHLORIDE, SODIUM LACTATE AND CALCIUM CHLORIDE 250 ML: 600; 310; 30; 20 INJECTION, SOLUTION INTRAVENOUS at 19:02

## 2024-11-24 RX ADMIN — POTASSIUM CHLORIDE 20 MEQ: 29.8 INJECTION, SOLUTION INTRAVENOUS at 22:57

## 2024-11-24 RX ADMIN — ASPIRIN 81 MG CHEWABLE TABLET 81 MG: 81 TABLET CHEWABLE at 10:33

## 2024-11-24 NOTE — PLAN OF CARE
Goal Outcome Evaluation:      Plan of Care Reviewed With: patient    Overall Patient Progress: improvingOverall Patient Progress: improving    Outcome Evaluation: remains on nitro drip, heparin drip restarted per orders.  No pain    [facility] - ICU    RN Progress Note:            Pertinent Assessments:      Please refer to flowsheet rows for full assessment     Afebrile.  Remains pain free.  Up to commode to void.  Good appetite.           Key Events - This Shift:       Angiogram this morning which shows multivessel disease.  CV surgery to see tomorrow for surgical planning.  Remains on nitroglycerin drip.  TR band bled with first attempt to remove air, ended up requiring more than previously filled but eventually able to remove successfully.  Bruising but no hematoma noted.  Restarted heparin drip at 1800 per MD orders- will need anti-xa draw at midnight.                 Barriers to Discharge / Downgrade:     Drips-open heart to be scheduled soon        Point of Contact Update: YES-OR-NO: Yes  If No, reason:   Name:Gabe  Phone Number:see chart   Summary of Conversation: updated at bedside and spoke with MD today about plan of care/further surgical plans.

## 2024-11-24 NOTE — PROGRESS NOTES
Patient up to commode every few hours to void. Continues on nitroglycerine drip and heparin drip. Remains on 2L NC. Did have some shortness of breath after getting back to bed from commode but was able to catch breath without intervention. PRN robitussin given for cough per patient request.

## 2024-11-24 NOTE — PROVIDER NOTIFICATION
Paged Cardiology regarding return of sternal chest pain/pressure radiating to left arm 5/10. Increasing nitroglycerin gtt. NPO and on bedrest. No order currently for morphine. On 2L NC with adequate sats. BP stable.     Cardiology and CV surgery came to room and discussed. Plan to get IABP and have emergent CABG today. Transferred to cath lab. Discussed plan with Mayelin and daughter. Handed off care to Landon MENDEZ RN while pt at cath lab.

## 2024-11-24 NOTE — PROGRESS NOTES
Cardiology Progress Note    Assessment:  Coronary artery disease, multivessel including left main, non ST segment elevation myocardial infarction, recurrent prolonged chest pain while on IV nitro  Ischemic cardiomyopathy with moderately to severely depressed LV systolic function   Acute heart failure, improved volume status  Diabetes mellitus    Plan:  Continue IV heparin and IV nitroglycerin  Low-dose carvedilol  Discussed with CV surgeon and interventional cardiologist.  Will proceed with emergent intra-aortic balloon insertion and CABG      Subjective:   Redeveloped chest pain earlier this morning.  Chest pain continue on in spite of increased dose of nitroglycerin.    Objective:   /75   Pulse 107   Temp 98.6  F (37  C) (Oral)   Resp (!) 32   Wt 71.3 kg (157 lb 3.2 oz)   SpO2 96%   BMI 26.16 kg/m      Intake/Output Summary (Last 24 hours) at 11/24/2024 0841  Last data filed at 11/24/2024 0600  Gross per 24 hour   Intake 1217.48 ml   Output 3200 ml   Net -1982.52 ml         Physical Exam:  GENERAL: no distress  NECK: No JVD  LUNGS: Clear to auscultation.  CARDIAC: regular  rhythm, S1 & S2 normal.  No heaves, thrills, gallops or murmurs.  ABDOMEN: flat, negative hepatosplenomegaly, soft and non-tender.  EXTREMITIES: No evidence of cyanosis, clubbing or edema.    Current Facility-Administered Medications Ordered in Epic   Medication Dose Route Frequency Provider Last Rate Last Admin    acetaminophen (TYLENOL) tablet 650 mg  650 mg Oral Q4H PRN Roque Eisenberg MD        albuterol (PROVENTIL HFA/VENTOLIN HFA) inhaler  2 puff Inhalation Q6H PRN Roque Eisenberg MD        aspirin (ASA) chewable tablet 81 mg  81 mg Oral Daily Roque Eisenberg MD   81 mg at 11/23/24 0820    atorvastatin (LIPITOR) tablet 40 mg  40 mg Oral Daily Roque Eisenberg MD   40 mg at 11/23/24 0820    calcium carbonate (TUMS) chewable tablet 1,000 mg  1,000 mg Oral 4x Daily PRN Roque Eisenberg MD        carvedilol (COREG)  tablet 3.125 mg  3.125 mg Oral BID w/meals Raul Bragg MD   3.125 mg at 11/23/24 1748    citalopram (celeXA) tablet 20 mg  20 mg Oral QAM Roque Eisenberg MD   20 mg at 11/23/24 0820    glucose gel 15-30 g  15-30 g Oral Q15 Min PRN Roque Eisenberg MD        Or    dextrose 50 % injection 25-50 mL  25-50 mL Intravenous Q15 Min PRN Roque Eisenberg MD        Or    glucagon injection 1 mg  1 mg Subcutaneous Q15 Min PRN Roque Eisenberg MD        docusate sodium (COLACE) capsule 100 mg  100 mg Oral BID Roque Eisenberg MD   100 mg at 11/23/24 0820    fluticasone (ARNUITY ELLIPTA) 100 MCG/ACT inhaler 1 puff  1 puff Inhalation Daily Roque Eisenberg MD   1 puff at 11/23/24 0820    furosemide (LASIX) injection 40 mg  40 mg Intravenous Q8H Raul Bragg MD   40 mg at 11/24/24 0344    guaiFENesin (ROBITUSSIN) 20 mg/mL solution 200 mg  200 mg Oral Q4H PRN Roque Eisenberg MD   200 mg at 11/24/24 0058    heparin 25,000 units in 0.45% NaCl 250 mL ANTICOAGULANT infusion  0-5,000 Units/hr Intravenous Continuous Roque Eisenberg MD 10.5 mL/hr at 11/24/24 0600 1,050 Units/hr at 11/24/24 0600    HOLD:  Metformin and metformin containing medications if patient received IV contrast with acute kidney injury or severe chronic kidney disease (stage IV or stage V; i.e., eGFR less than 30)   Does not apply HOLD Roque Eisenberg MD        hydrALAZINE (APRESOLINE) injection 10 mg  10 mg Intravenous Once PRN Roque Eisenberg MD        insulin aspart (NovoLOG) injection (RAPID ACTING)  1-7 Units Subcutaneous TID AC Siddhartha Villalpando MD        insulin aspart (NovoLOG) injection (RAPID ACTING)  1-5 Units Subcutaneous At Bedtime Siddhartha Villalpando MD        ipratropium - albuterol 0.5 mg/2.5 mg/3 mL (DUONEB) neb solution 3 mL  3 mL Nebulization Q4H PRN Roque Eisenberg MD   3 mL at 11/23/24 0533    lidocaine (LMX4) cream   Topical Q1H PRN Roque Eisenberg MD        lidocaine 1 % 0.1-1 mL  0.1-1 mL Other Q1H PRN Roque Eisenberg  MD JAYLON        magnesium oxide (MAG-OX) tablet 400 mg  400 mg Oral Daily Roque Eisenberg MD   400 mg at 11/23/24 0820    naloxone (NARCAN) injection 0.2 mg  0.2 mg Intravenous Q2 Min PRN Yuly Hinson MD        Or    naloxone (NARCAN) injection 0.4 mg  0.4 mg Intravenous Q2 Min PRN Yuly Hinson MD        Or    naloxone (NARCAN) injection 0.2 mg  0.2 mg Intramuscular Q2 Min PRN Yuly Hinson MD        Or    naloxone (NARCAN) injection 0.4 mg  0.4 mg Intramuscular Q2 Min PRN Yuly Hinson MD        nitroGLYcerin 50 mg in D5W 250 mL PERIPHERAL IV infusion   mcg/min Intravenous Continuous Raul Bragg MD 27 mL/hr at 11/24/24 0827 90 mcg/min at 11/24/24 0827    ondansetron (ZOFRAN ODT) ODT tab 4 mg  4 mg Oral Q6H PRN Roque Eisenberg MD        Or    ondansetron (ZOFRAN) injection 4 mg  4 mg Intravenous Q6H PRN Roque Eisenberg MD        oxyCODONE (ROXICODONE) tablet 5 mg  5 mg Oral Q4H PRN Roque Eisenberg MD        Or    oxyCODONE (ROXICODONE) tablet 10 mg  10 mg Oral Q4H PRN Roque Eisenberg MD        pramipexole (MIRAPEX) tablet 0.125 mg  0.125 mg Oral Daily Roque Eisenberg MD   0.125 mg at 11/23/24 1551    pramipexole (MIRAPEX) tablet 0.5 mg  0.5 mg Oral At Bedtime Roque Eisenberg MD   0.5 mg at 11/23/24 2015    predniSONE (DELTASONE) tablet 40 mg  40 mg Oral Daily Roque Eisenberg MD   40 mg at 11/23/24 0820    senna-docusate (SENOKOT-S/PERICOLACE) 8.6-50 MG per tablet 1 tablet  1 tablet Oral BID PRN Roque Eisenberg MD        Or    senna-docusate (SENOKOT-S/PERICOLACE) 8.6-50 MG per tablet 2 tablet  2 tablet Oral BID PRN Roque Eisenberg MD        sodium chloride (PF) 0.9% PF flush 3 mL  3 mL Intracatheter Q8H Roque Eisenberg MD   3 mL at 11/24/24 0102    sodium chloride (PF) 0.9% PF flush 3 mL  3 mL Intracatheter q1 min prn Roque Eisenberg MD        sodium chloride 0.9% BOLUS 250 mL  250 mL Intravenous Once Roque Eisenberg MD   Held at 11/23/24 0256     No current  [facility]-ordered outpatient medications on file.       Cardiographics:    Telemetry: Normal sinus rhythm    Echocardiogram:   Left ventricular function is decreased. The ejection fraction is 25-30%  (severely reduced).  There is severe anterior, septal, and apical wall hypokinesis.  Apical lateral akinesis.  Normal right ventricle size and systolic function.  Right ventricular systolic pressure is elevated, consistent with mild  pulmonary hypertension.  IVC diameter >2.1 cm collapsing <50% with sniff suggests a high RA pressure  estimated at 15 mmHg or greater.  Coronary angio:  1.  Severely calcified distal left main stenosis extending to ostium of LAD and proximal segment of left circumflex  2.  99% ostial LAD and heavily calcified segment.  MYNOR grade II-III flow distally  3.  Heavily calcified left circumflex ostium with 50 to 70% narrowing proximal segment with focal 80% stenosis of the vessel as it exits the AV groove into a large marginal branch.  4.  Heavily calcified ostial RCA.  Only mild to moderate angiographic stenosis but recurrent catheter damping upon entering vessel suggesting more severe stenosis due to eccentric calcification.  40 to 50% narrowing mid segment.  PDA and JULEE branches appear normal.  5.  Elevated LVEDP equals 28 to 29 mmHg post A wave.  No LV-AO gradient.   Lab Results    Chemistry/lipid CBC Cardiac Enzymes/BNP/TSH/INR   Recent Labs   Lab Test 11/23/24  0736   CHOL 148   HDL 65   LDL 70   TRIG 65     Recent Labs   Lab Test 11/23/24  0736 09/20/24  0810 08/21/23  0821   LDL 70 58 78     Recent Labs   Lab Test 11/24/24  0744 11/24/24  0621 11/23/24  0802 11/23/24  0736   NA  --   --   --  142   POTASSIUM  --  4.1  --  3.8   CHLORIDE  --   --   --  104   CO2  --   --   --  27   *  --    < > 151*   BUN  --   --   --  16.6   CR  --   --   --  0.90   GFRESTIMATED  --   --   --  72   VALENTE  --   --   --  8.6*    < > = values in this interval not displayed.     Recent Labs   Lab Test  11/23/24  0736 11/22/24  1733 09/20/24  0810   CR 0.90 0.83 0.84     Recent Labs   Lab Test 11/23/24  0736 09/20/24  0810 06/14/24  0750   A1C 6.1* 6.3* 6.5*          Recent Labs   Lab Test 11/23/24  0736   WBC 9.0   HGB 10.8*   HCT 34.4*   MCV 81        Recent Labs   Lab Test 11/23/24  0736 11/23/24  0149 11/22/24  1733   HGB 10.8* 11.1* 12.9    Recent Labs   Lab Test 01/01/23  1303 09/25/20  0803 09/25/20  0157   TROPONINI <0.01 0.03 0.02     Recent Labs   Lab Test 11/22/24  1733   NTBNPI 6,095*     Recent Labs   Lab Test 09/20/24  0810   TSH 1.06     Recent Labs   Lab Test 03/22/21  1937   INR 0.98

## 2024-11-24 NOTE — PROGRESS NOTES
[facility]    Medicine Progress Note - Hospitalist Service    Date of Admission:  11/23/2024    Assessment & Plan   Gill Mendez is a 64 year old female with a medical history significant for hypertension, hyperlipidemia, type II DM, asthma and anxiety disorder who is admitted with an NSTEMI and acute CHF decompensation. Cardiac cath showing multivessel CAD. Cardiovascular surgeon planned for CABG.      Acute Non-STEMI  Multivessel CAD   - non-ST elevation myocardial infarction (NSTEMI).  -Maintain systolic blood pressure >90 mmHg with current nitroglycerin drip.  -Administer aspirin 81 mg daily. Hold ACE due to hypotension  -Initiate high-intensity statin therapy  -Heparin gtt  -Consult cardiology: cardiac cath showing multivessel CAD on 11/23  -consult CVS: urgent CABG and IABP in cath lab      Acute CHF Decompensation, pulm edema  -Likely secondary to volume overload and acute cardiac dysfunction.  -cardiologist consulted  -Lasix 40 mg iv q8h     Hypertension  - nitroglycerin drip.  -Hold other antihypertensive medications until blood pressure stabilizes.     Type II Diabetes Mellitus  -frequent Accu-Cheks and manage with sliding scale insulin.  -Maintain blood glucose between 100-140 mg/dL.  -Hold tirzepatide, amlodipine, lisinopril, and chlorthalidone during acute hospitalization.  -Reassess diabetes medications upon stabilization and discharge planning.      Asthma   Not in exacerbation  Resume home albuterol as needed for shortness of breath.   Duonebs PRN, 3 day course of prednsione   Continue maintenance therapy with Breo Ellipta or equivalent inhaler.     Anxiety Disorder  Severe avani phobia   -Continue Celexa at the current dose.  -Monitor for any anxiety exacerbation during hospitalization.     Rest of Patient s Medical Problems  Management remains unchanged unless otherwise indicated.           Diet: Combination Diet Moderate Consistent Carb (60 g CHO per Meal) Diet; Low  "Saturated Fat Na <2400mg Diet    DVT Prophylaxis: Heparin gtt  Mcdaniels Catheter: Not present  Lines: None     Cardiac Monitoring: ACTIVE order. Indication: Post- PCI/Angiogram (24 hours)  Code Status: Full Code      Clinically Significant Risk Factors           # Hypocalcemia: Lowest Ca = 8.6 mg/dL in last 2 days, will monitor and replace as appropriate     # Hypoalbuminemia: Lowest albumin = 3.4 g/dL at 11/23/2024  7:36 AM, will monitor as appropriate     # Hypertension: Noted on problem list  # Acute heart failure with reduced ejection fraction: last echo with EF <40% and receiving IV diuretics           # Overweight: Estimated body mass index is 26.16 kg/m  as calculated from the following:    Height as of 11/22/24: 1.651 m (5' 5\").    Weight as of this encounter: 71.3 kg (157 lb 3.2 oz)., PRESENT ON ADMISSION     # Asthma: noted on problem list        Social Drivers of Health    Alcohol Use: Medium Risk (5/25/2023)    [facility]    Alcohol Use     Alcohol Use Status: Yes   Interpersonal Safety: High Risk (11/23/2024)    Interpersonal Safety     Do you feel physically and emotionally safe where you currently live?: No     Within the past 12 months, have you been hit, slapped, kicked or otherwise physically hurt by someone?: No     Within the past 12 months, have you been humiliated or emotionally abused in other ways by your partner or ex-partner?: No    [facility], [facility] & [facility]    Social Connections          Disposition Plan     Medically Ready for Discharge: Anticipated in 2-4 Days             Yuly Hinson MD  Hospitalist Service  [facility]  Securely message with [employer] (more info)  Text page via [employer] Paging/Directory   ______________________________________________________________________    Interval History   In chest pain, was seen card just a moment ago, plan for " urgent CABG. No cough, no f/c.     Physical Exam   Vital Signs: Temp: 97.1  F (36.2  C) Temp src: Axillary BP: 131/79 Pulse: 104   Resp: 30 SpO2: 99 % O2 Device: Nasal cannula Oxygen Delivery: 2 LPM  Weight: 157 lbs 3.2 oz    General.  Awake alert oriented not in acute distress.  HEENT.  Pupils equal round react to light, anicteric, EOM intact.  Neck supple no JVD.  CVS regular rhythm no murmur gallops.   Lungs.  Clear to auscultation bilateral no wheezing or rales.  Abdomen.  Soft nontender bowel sounds present.  Extremities.  No edema no calf tenderness.  Neurological.  Awake and alert. No focal deficit.  Skin no rash. No pallor.  Psych. Anxious mood.      Medical Decision Making       52 MINUTES SPENT BY ME on the date of service doing chart review, history, exam, documentation & further activities per the note.      Data     I have personally reviewed the following data over the past 24 hrs:    N/A  \   N/A   / N/A     N/A N/A N/A /  102 (H)   4.1 N/A N/A \       Imaging results reviewed over the past 24 hrs:   Recent Results (from the past 24 hours)   Cardiac Catheterization    Narrative      Mid LM to Dist LM lesion is 90% stenosed.    Ost LAD to Prox LAD lesion is 99% stenosed.    Ost Cx to Prox Cx lesion is 70% stenosed.    1st Mrg lesion is 80% stenosed.    Ost RCA to Prox RCA lesion is 50% stenosed.    Prox RCA to Mid RCA lesion is 50% stenosed.    Left ventricular filling pressures are severely elevated.    1.  Severely calcified distal left main stenosis extending to ostium of   LAD and proximal segment of left circumflex  2.  99% ostial LAD and heavily calcified segment.  MYNOR grade II-III flow   distally  3.  Heavily calcified left circumflex ostium with 50 to 70% narrowing   proximal segment with focal 80% stenosis of the vessel as it exits the AV   groove into a large marginal branch.  4.  Heavily calcified ostial RCA.  Only mild to moderate angiographic   stenosis but recurrent catheter  damping upon entering vessel suggesting   more severe stenosis due to eccentric calcification.  40 to 50% narrowing   mid segment.  PDA and JULEE branches appear normal.  5.  Elevated LVEDP equals 28 to 29 mmHg post A wave.  No LV-AO gradient.     US Carotid Bilateral    Narrative    EXAM: US CAROTID BILATERAL  LOCATION: [facility]  DATE: 11/23/2024    INDICATION: Pre op imaging, carotid bruit.  COMPARISON: None.  TECHNIQUE: Duplex exam performed utilizing 2D gray-scale imaging, Doppler interrogation with color-flow and spectral waveform analysis. The percent diameter stenosis is determined using Updated Recommendations for Carotid Stenosis Interpretation Criteria   from [employer] [facility].    FINDINGS:    RIGHT: Mild plaque at the bifurcation. The peak systolic velocity in the ICA is less than 180 cm/sec, consistent with less than 50% stenosis. Normal velocities in the ECA. Antegrade flow within the vertebral artery.     LEFT: Mild plaque at the bifurcation. The peak systolic velocity in the ICA is less than 180 cm/sec, consistent with less than 50% stenosis. Normal velocities in the ECA. Antegrade flow within the vertebral artery.    VELOCITY CHART:  RIGHT cm/s  CCA Prox: 81/22  CCA Mid: 69/21  CCA Dist: 59/16  Bulb: 65/21  ICA Prox: 93/25  ICA Mid: 76/29  ICA Dist: 48/22  ECA: 66/13  ICA/CCA Ratio: 1.6    LEFT cm/s  CCA Prox: 89/22  CCA Mid: 88/21  CCA Dist: 60/17  Bulb: 52/22  ICA Prox: 56/17  ICA Mid: 59/19  ICA Dist: 60/22  ECA: 85/11  ICA/CCA Ratio: 1.0      Impression    IMPRESSION:  1.  Mild plaque formation, velocities consistent with less than 50% stenosis in the right internal carotid artery.  2.  Mild plaque formation, velocities consistent with less than 50% stenosis in the left internal carotid artery.  3.  Flow within the vertebral arteries is antegrade.

## 2024-11-24 NOTE — PROGRESS NOTES
PT arrived to ICU from OR intubated with a 7.0 ETT approx 23T. Placed on initial vent settings of CMV 18 460 +5 100%.  Continuous end-tidal CO2 monitoring started.ETCO2 26. SpO2 100% RT will coordinate with intensivist and RN to wean to extubate in the AM.

## 2024-11-24 NOTE — PLAN OF CARE
Goal Outcome Evaluation:      Plan of Care Reviewed With: patient    Overall Patient Progress: improving    Outcome Evaluation: Remains on nitro drip overnight, Heparin drip titrated up per protocol. Denies chest pain throughout the night.    [facility] - ICU    RN Progress Note:            Pertinent Assessments:      Please refer to flowsheet rows for full assessment     Patient slept well overnight, sleeps sitting fully upright. Nitroglycerin ran at 10 mcg/min throughout the night. Patient denies chest pain throughout the night. Continuing lasix overnight with good urine output. Up to commode with assist of one. NPO since midnight.           Key Events - This Shift:                       Barriers to Discharge / Downgrade:     Multivessel disease, likely need to CAB         Point of Contact Update: YES-OR-NO: Yes  If No, reason:   Name:  Phone Number:  Summary of Conversation:

## 2024-11-24 NOTE — OP NOTE
OPERATIVE DATE: 11/24/2024      PRE-OPERATIVE DIAGNOSIS:  1) Coronary artery disease  2) Acute coronary syndrome  Patient Active Problem List   Diagnosis    Trochanteric Bursitis    Essential hypertension    Gastroparesis    Type 2 Diabetes Mellitus    Asthma    Allergy To Aspirin    Hyperlipidemia    Cellulitis and abscess of foot    Cellulitis    Dyspnea on exertion    Cellulitis of fifth toe of left foot    Herpetic gingivostomatitis    Insomnia, unspecified    Hammer toe of left foot    Diabetic ulcer of toe of right foot associated with type 2 diabetes mellitus, with necrosis of muscle (H)    History of osteomyelitis    Chest pain    Toe infection    Type 2 diabetes mellitus with skin complication, without long-term current use of insulin (H)    Cellulitis of toe of right foot    Diabetic ulcer of left midfoot associated with diabetes mellitus due to underlying condition, limited to breakdown of skin (H)    Anhidrosis    Hypoxia    Exacerbation of intermittent asthma, unspecified asthma severity    Pneumonia due to 2019 novel coronavirus    Acute pulmonary edema (H)    NSTEMI (non-ST elevated myocardial infarction) (H)    Restless leg syndrome    Type II or unspecified type diabetes mellitus with neurological manifestations, uncontrolled(250.62) (H)    Hyperlipidemia    Type 2 diabetes mellitus with skin complication, with long-term current use of insulin (H)         POST-OPERATIVE DIAGNOSIS:  1) Coronary artery disease  2) Acute coronary syndrome  Patient Active Problem List   Diagnosis    Trochanteric Bursitis    Essential hypertension    Gastroparesis    Type 2 Diabetes Mellitus    Asthma    Allergy To Aspirin    Hyperlipidemia    Cellulitis and abscess of foot    Cellulitis    Dyspnea on exertion    Cellulitis of fifth toe of left foot    Herpetic gingivostomatitis    Insomnia, unspecified    Hammer toe of left foot    Diabetic ulcer of toe of right foot associated with type 2 diabetes mellitus, with  necrosis of muscle (H)    History of osteomyelitis    Chest pain    Toe infection    Type 2 diabetes mellitus with skin complication, without long-term current use of insulin (H)    Cellulitis of toe of right foot    Diabetic ulcer of left midfoot associated with diabetes mellitus due to underlying condition, limited to breakdown of skin (H)    Anhidrosis    Hypoxia    Exacerbation of intermittent asthma, unspecified asthma severity    Pneumonia due to 2019 novel coronavirus    Acute pulmonary edema (H)    NSTEMI (non-ST elevated myocardial infarction) (H)    Restless leg syndrome    Type II or unspecified type diabetes mellitus with neurological manifestations, uncontrolled(250.62) (H)    Hyperlipidemia    Type 2 diabetes mellitus with skin complication, with long-term current use of insulin (H)         PROCEDURE:  PROCEDURE PERFORMED:  1.  Coronary artery bypass grafting x 3    - reversed saphenous vein graft to the right posterior descending coronary artery   - reversed saphenous vein graft to the obtuse marginal branch of the left circumflex coronary artery   - pedicled left internal mammary artery to left anterior descending coronary artery  2.  Endoscopic vein harvest of the greater saphenous vein from the left lower extremity.  3. Transesophageal echocardiogram    SURGEON: Zhen Carter MD    ASSISTANT: KRISHNA Logan    ANESTHESIA: GETA    ESTIMATED BLOOD LOSS: 1000cc    OPERATIVE FINDINGS:    1) The left internal mammary artery was 3 mm in diameter and had excellent flow.    2) The greater saphenous vein from the left lower extremity was 4-5 mm in diameter and suitable for conduit.    3) The ascending aorta was free of calcified plaque.    4) The right posterior descending coronary artery was 1.5 mm in diameter and free of disease at the site of anastomosis.   5) The obtuse marginal branch of the left circumflex coronary artery was 1.5 mm in diameter and free of disease at the site of anastomosis.    6) The left anterior descending coronary artery 1.5 mm in diameter and free of disease at the site of anastomosis.    7) After reperfusion sinus rhythm resumed.    9) Left ventricular function was 25% preoperatively and improved to 45% after bypass on low-dose inotropic support.      INDICATIONS:  KATIUSKA JUSTIN is a 64 year old female admitted with acute coronary syndrome.  We were asked to evaluate for urgent CABG.  Risks and benefits of the operation were explained to the patient and their family including, but not limited to, bleeding, infection, stroke and even death.  They understood these risks and agreed to proceed electively.    OPERATIVE REPORT:  The patient was transferred to the operating room and positioned supine on the OR table.  General anesthesia was initiated by the anesthesia team.  Endotracheal intubation and central venous access was performed by anesthesia.  The patients neck, chest, abdomen and bilateral lower extremities were clipped, prepped and draped in sterile fashion.  A pre-procedure time-out was performed confirming the correct patient, correct site and correct procedure.    Median sternotomy and lower leg incisions were made.  The left internal mammary artery and left greater saphenous vein were harvested for conduit.   The patient was heparinized with 400mg/kg IV heparin.  Ascending aorta and right atrial appendage were cannulated for bypass.  Cardiopulmonary bypass was initiated with good flows.  Antegrade and retrograde cardioplegia catheters were placed.  The ascending aorta was cross clamped.  The heart was arrested with 1000cc of antegrade cold blood cardioplegia.  An additional 300cc of retrograde cardioplegia was administered.    The following grafts were constructed in end-to-side fashion using running 7-0 Prolene:    - A reversed saphenous vein graft was sewn to the proximal right posterior descending coronary artery  - A reversed saphenous vein graft was sewn to the mid  obtuse marginal branch of the left circumflex coronary artery    The pedicled left internal mammary artery was sewn to the mid left anterior descending coronary artery in end-to-side fashion using running 7-0 Prolene.      The proximal anastomoses of the 2 vein grafts were constructed on the ascending aorta in end-to-side fashion using running 6-0 Prolene under a single crossclamp.      Retrograde hot shot was administered.  Aortic cross clamp was removed.  The heart was resuscitated.  IV calcium was administered.  Lungs were ventilated bilaterally.  The  patient was weaned from cardipulmonary bypass.  Remaining pump blood volume was returned via the arterial cannula.  Cannulas were removed and sites were made hemostatic with prolene sutures.  Heparin was reversed with protamine.  Mediastinal and pleural chest tubes were placed.  The sternum was approximated with stainless steel sternal wires.  The wound was closed using stratafix sutures.    The patient was then transferred from the operating bed to an ICU bed and transferred to the ICU in critical, but stable, condition.    All needle, sponge and instrument counts were correct at the end of the case.    Zhen Carter  Cardiothoracic Surgery  Pager: [phone]

## 2024-11-24 NOTE — H&P
[facility] CARDIOTHORACIC SURGERY CONSULT  Patient Name: Gill Mendez  Medical Record Number: [id number]  YOB: 1960  Room Number: /-36  Referring Physician: Dr. Bragg    CC: Coronary artery disease, acute coronary syndrome    History of Present Illness: Gill Mendez is a 64 year old female admitted with NSTEMI. Having recurrent chest pain despite nitroglycerin, morphine and heparin. Getting IABP this morning.    Assessment and Plan:  Gill Mendez is a 64 year old female with ACS  1) Plan for urgent CABG  2) IABP in cath lab   3) Please call with questions or concerns.     Thank you for the opportunity to participate in the care of this patient.    Zhen Crater MD  Cardiothoracic Surgery  [phone]    Past Medical History:  Past Medical History:   Diagnosis Date    Anxiety     Asthma     Cellulitis     Diabetes mellitus (H)     Essential hypertension     Created by Conversion  Replacement Utility updated for latest IMO load    Gastroparesis     Hyperlipidemia     Hypertension     Other and unspecified hyperlipidemia     Created by Conversion     PONV (postoperative nausea and vomiting)     Shortness of breath     Type II or unspecified type diabetes mellitus without mention of complication, not stated as uncontrolled     Created by Conversion        Past Surgical History:  Past Surgical History:   Procedure Laterality Date    AMPUTATE TOE(S) Right 7/31/2020    Procedure: AMPUTATION, third digit right foot;  Surgeon: Chris Vega DPM;  Location: [facility];  Service: Podiatry    ENDOMETRIAL BIOPSY      FOOT ARTHROPLASTY Right 09/24/2020    Fourth and fifth toe by Dr. Vega    HC REPAIR OF HAMMERTOE,ONE Left 12/7/2020    Procedure: ARTHROPLASTY, digits two, three, four and five left foot;  Surgeon: Chris Vega DPM;  Location: [facility];  Service: Podiatry    HERNIA REPAIR      HYSTERECTOMY      IR LUMBAR PUNCTURE  7/20/2023    REPAIR TENDON ACHILLES Bilateral      Left was plate  , right was torn    TONSILLECTOMY      ZZC REMOVAL OF OVARY(S)      Description: Oophorectomy - Bilateral (Removal Of Both Ovaries);  Recorded: 10/09/2008;        Family History:   Family History   Problem Relation Age of Onset    Diabetes Mother     Hypertension Mother     Acute Myocardial Infarction No family hx of        Social History:  Social History     Socioeconomic History    Marital status:      Spouse name: Not on file    Number of children: Not on file    Years of education: Not on file    Highest education level: Not on file   Occupational History    Not on file   Tobacco Use    Smoking status: Never    Smokeless tobacco: Never   Substance and Sexual Activity    Alcohol use: No    Drug use: No    Sexual activity: Not on file   Other Topics Concern    Not on file   Social History Narrative    Not on file     Social Drivers of Health     Financial Resource Strain: Low Risk  (11/23/2024)    Financial Resource Strain     Within the past 12 months, have you or your family members you live with been unable to get utilities (heat, electricity) when it was really needed?: No   Food Insecurity: Low Risk  (11/23/2024)    Food Insecurity     Within the past 12 months, did you worry that your food would run out before you got money to buy more?: No     Within the past 12 months, did the food you bought just not last and you didn t have money to get more?: No   Transportation Needs: Low Risk  (11/23/2024)    Transportation Needs     Within the past 12 months, has lack of transportation kept you from medical appointments, getting your medicines, non-medical meetings or appointments, work, or from getting things that you need?: No   Physical Activity: Not on file   Stress: Not on file (11/6/2024)   Social Connections: Unknown (12/28/2021)    Received from [facility], [facility]    Social Connections     Frequency of  Communication with Friends and Family: Not on file   Interpersonal Safety: High Risk (11/23/2024)    Interpersonal Safety     Do you feel physically and emotionally safe where you currently live?: No     Within the past 12 months, have you been hit, slapped, kicked or otherwise physically hurt by someone?: No     Within the past 12 months, have you been humiliated or emotionally abused in other ways by your partner or ex-partner?: No   Housing Stability: Low Risk  (11/23/2024)    Housing Stability     Do you have housing? : Yes     Are you worried about losing your housing?: No       Allergies:   Allergies   Allergen Reactions    Codeine Unknown     Patient states possibly caused N/V but can't remember for sure    Semaglutide Nausea and Vomiting    Acetaminophen-Codeine Rash       Medications:  Current Facility-Administered Medications   Medication Dose Route Frequency Provider Last Rate Last Admin    acetaminophen (TYLENOL) tablet 650 mg  650 mg Oral Q4H PRN Roque Eisenberg MD        albuterol (PROVENTIL HFA/VENTOLIN HFA) inhaler  2 puff Inhalation Q6H PRN Roque Eisenberg MD        aspirin (ASA) chewable tablet 81 mg  81 mg Oral Daily Roque Eisenberg MD   81 mg at 11/23/24 0820    atorvastatin (LIPITOR) tablet 40 mg  40 mg Oral Daily Roque Eisenberg MD   40 mg at 11/23/24 0820    calcium carbonate (TUMS) chewable tablet 1,000 mg  1,000 mg Oral 4x Daily PRN Roque Eisenberg MD        carvedilol (COREG) tablet 3.125 mg  3.125 mg Oral BID w/meals Raul Bragg MD   3.125 mg at 11/23/24 1748    citalopram (celeXA) tablet 20 mg  20 mg Oral QAM Roque Eisenberg MD   20 mg at 11/23/24 0820    glucose gel 15-30 g  15-30 g Oral Q15 Min PRN Roque Eisenberg MD        Or    dextrose 50 % injection 25-50 mL  25-50 mL Intravenous Q15 Min PRN Roque Eisenberg MD        Or    glucagon injection 1 mg  1 mg Subcutaneous Q15 Min PRN Roque Eisenberg MD        docusate sodium (COLACE) capsule 100 mg  100 mg Oral  BID Roque Eisenberg MD   100 mg at 11/23/24 0820    fluticasone (ARNUITY ELLIPTA) 100 MCG/ACT inhaler 1 puff  1 puff Inhalation Daily Roque Eisenberg MD   1 puff at 11/23/24 0820    furosemide (LASIX) injection 40 mg  40 mg Intravenous Q8H Raul Bragg MD   40 mg at 11/24/24 0344    guaiFENesin (ROBITUSSIN) 20 mg/mL solution 200 mg  200 mg Oral Q4H PRN Roque Eisenberg MD   200 mg at 11/24/24 0058    heparin 25,000 units in 0.45% NaCl 250 mL ANTICOAGULANT infusion  0-5,000 Units/hr Intravenous Continuous Roque Eisenberg MD 10.5 mL/hr at 11/24/24 0600 1,050 Units/hr at 11/24/24 0600    HOLD:  Metformin and metformin containing medications if patient received IV contrast with acute kidney injury or severe chronic kidney disease (stage IV or stage V; i.e., eGFR less than 30)   Does not apply HOLD Roque Eisenberg MD        hydrALAZINE (APRESOLINE) injection 10 mg  10 mg Intravenous Once PRN Roque Eisenberg MD        insulin aspart (NovoLOG) injection (RAPID ACTING)  1-7 Units Subcutaneous TID AC Siddhartha Villalpando MD        insulin aspart (NovoLOG) injection (RAPID ACTING)  1-5 Units Subcutaneous At Bedtime Siddhartha Villalpando MD        ipratropium - albuterol 0.5 mg/2.5 mg/3 mL (DUONEB) neb solution 3 mL  3 mL Nebulization Q4H PRN Roque Eisenberg MD   3 mL at 11/23/24 0533    lidocaine (LMX4) cream   Topical Q1H PRN Roque Eisenberg MD        lidocaine 1 % 0.1-1 mL  0.1-1 mL Other Q1H PRN Roque Eisenberg MD        magnesium oxide (MAG-OX) tablet 400 mg  400 mg Oral Daily Roque Eisenberg MD   400 mg at 11/23/24 0820    naloxone (NARCAN) injection 0.2 mg  0.2 mg Intravenous Q2 Min PRN Yuly Hinson MD        Or    naloxone (NARCAN) injection 0.4 mg  0.4 mg Intravenous Q2 Min PRN Yuly Hinson MD        Or    naloxone (NARCAN) injection 0.2 mg  0.2 mg Intramuscular Q2 Min PRN Yuly Hinson MD        Or    naloxone (NARCAN) injection 0.4 mg  0.4 mg Intramuscular Q2 Min PRN Yuly Hinson MD         nitroGLYcerin 50 mg in D5W 250 mL PERIPHERAL IV infusion   mcg/min Intravenous Continuous Raul Bragg MD 27 mL/hr at 11/24/24 0827 90 mcg/min at 11/24/24 0827    ondansetron (ZOFRAN ODT) ODT tab 4 mg  4 mg Oral Q6H PRN Roque Eisenberg MD        Or    ondansetron (ZOFRAN) injection 4 mg  4 mg Intravenous Q6H PRN Roque Eisenberg MD        oxyCODONE (ROXICODONE) tablet 5 mg  5 mg Oral Q4H PRN Roque Eisenberg MD        Or    oxyCODONE (ROXICODONE) tablet 10 mg  10 mg Oral Q4H PRN Roque Eisenberg MD        pramipexole (MIRAPEX) tablet 0.125 mg  0.125 mg Oral Daily Roque Eisenberg MD   0.125 mg at 11/23/24 1551    pramipexole (MIRAPEX) tablet 0.5 mg  0.5 mg Oral At Bedtime Roque Eisenberg MD   0.5 mg at 11/23/24 2015    predniSONE (DELTASONE) tablet 40 mg  40 mg Oral Daily Roque Eisenberg MD   40 mg at 11/23/24 0820    senna-docusate (SENOKOT-S/PERICOLACE) 8.6-50 MG per tablet 1 tablet  1 tablet Oral BID PRN Roque Eisenberg MD        Or    senna-docusate (SENOKOT-S/PERICOLACE) 8.6-50 MG per tablet 2 tablet  2 tablet Oral BID PRN Roque Eisenberg MD        sodium chloride (PF) 0.9% PF flush 3 mL  3 mL Intracatheter Q8H Roque Eisenberg MD   3 mL at 11/24/24 0102    sodium chloride (PF) 0.9% PF flush 3 mL  3 mL Intracatheter q1 min prn Roque Eisenberg MD        sodium chloride 0.9% BOLUS 250 mL  250 mL Intravenous Once Roque Eisenberg MD   Held at 11/23/24 0256       Review of Systems:   A 10 point ROS was performed and is negative other than HPI.    Physical Exam:  Temp:  [97.1  F (36.2  C)-99.1  F (37.3  C)] 98.6  F (37  C)  Pulse:  [] 107  Resp:  [10-47] 32  BP: ()/(55-83) 122/75  SpO2:  [93 %-100 %] 96 %    Gen: NAD, resting comfortably in bed  Lungs: CTAB, non-labored breathing   CV: regular rhythm, normal rate   Abd: Soft, not tender, not distended   Ext: Motor, sensation, pulses intact   Neuro: AOx3    Labs:  ABG No lab results found in last 7 days.  CBC  Recent Labs    Lab 11/23/24  0736 11/23/24  0149 11/22/24  1733   WBC 9.0 7.6 10.0   HGB 10.8* 11.1* 12.9    267 286     BMP  Recent Labs   Lab 11/24/24  0744 11/24/24  0621 11/23/24  2146 11/23/24 2018 11/23/24  1632 11/23/24  0802 11/23/24  0736 11/23/24  0251 11/23/24  0149 11/22/24  1733   NA  --   --   --   --   --   --  142  --   --  142   POTASSIUM  --  4.1  --   --   --   --  3.8  --  4.0 3.5   CHLORIDE  --   --   --   --   --   --  104  --   --  104   CO2  --   --   --   --   --   --  27  --   --  26   BUN  --   --   --   --   --   --  16.6  --   --  11.9   CR  --   --   --   --   --   --  0.90  --   --  0.83   *  --  175* 255* 197*   < > 151*   < >  --  200*    < > = values in this interval not displayed.     LFT  Recent Labs   Lab 11/23/24  0736   AST 29   ALT 30   ALKPHOS 64   BILITOTAL 0.3   ALBUMIN 3.4*     PancreasNo lab results found in last 7 days.    Imaging:  Transthoracic echocardiogram (11/23/24):  Interpretation Summary     Left ventricular function is decreased. The ejection fraction is 25-30%  (severely reduced).  There is severe anterior, septal, and apical wall hypokinesis.  Apical lateral akinesis.  Normal right ventricle size and systolic function.  Right ventricular systolic pressure is elevated, consistent with mild  pulmonary hypertension.  IVC diameter >2.1 cm collapsing <50% with sniff suggests a high RA pressure  estimated at 15 mmHg or greater.    Coronary angiogram (11/23/24):  Conclusion         Mid LM to Dist LM lesion is 90% stenosed.    Ost LAD to Prox LAD lesion is 99% stenosed.    Ost Cx to Prox Cx lesion is 70% stenosed.    1st Mrg lesion is 80% stenosed.    Ost RCA to Prox RCA lesion is 50% stenosed.    Prox RCA to Mid RCA lesion is 50% stenosed.    Left ventricular filling pressures are severely elevated.     1.  Severely calcified distal left main stenosis extending to ostium of LAD and proximal segment of left circumflex  2.  99% ostial LAD and heavily calcified  segment.  MYNOR grade II-III flow distally  3.  Heavily calcified left circumflex ostium with 50 to 70% narrowing proximal segment with focal 80% stenosis of the vessel as it exits the AV groove into a large marginal branch.  4.  Heavily calcified ostial RCA.  Only mild to moderate angiographic stenosis but recurrent catheter damping upon entering vessel suggesting more severe stenosis due to eccentric calcification.  40 to 50% narrowing mid segment.  PDA and JULEE branches appear normal.  5.  Elevated LVEDP equals 28 to 29 mmHg post A wave.  No LV-AO gradient.

## 2024-11-25 ENCOUNTER — APPOINTMENT (OUTPATIENT)
Dept: RADIOLOGY | Facility: HOSPITAL | Age: 64
DRG: 233 | End: 2024-11-25
Attending: PHYSICIAN ASSISTANT
Payer: COMMERCIAL

## 2024-11-25 LAB
ALBUMIN SERPL BCG-MCNC: 3.1 G/DL (ref 3.5–5.2)
ALP SERPL-CCNC: 58 U/L (ref 40–150)
ALT SERPL W P-5'-P-CCNC: 20 U/L (ref 0–50)
ANION GAP SERPL CALCULATED.3IONS-SCNC: 11 MMOL/L (ref 7–15)
APTT PPP: 27 SECONDS (ref 22–38)
AST SERPL W P-5'-P-CCNC: 40 U/L (ref 0–45)
ATRIAL RATE - MUSE: 92 BPM
ATRIAL RATE - MUSE: 97 BPM
BASE EXCESS BLDA CALC-SCNC: 0.7 MMOL/L (ref -3–3)
BASE EXCESS BLDA CALC-SCNC: 1.5 MMOL/L (ref -3–3)
BASE EXCESS BLDV CALC-SCNC: 1.7 MMOL/L (ref -3–3)
BILIRUB SERPL-MCNC: 0.5 MG/DL
BUN SERPL-MCNC: 24.2 MG/DL (ref 8–23)
CA-I BLD-MCNC: 4.6 MG/DL (ref 4.4–5.2)
CALCIUM SERPL-MCNC: 8.1 MG/DL (ref 8.8–10.4)
CHLORIDE SERPL-SCNC: 109 MMOL/L (ref 98–107)
COHGB MFR BLD: 97.6 % (ref 96–97)
COHGB MFR BLD: 97.6 % (ref 96–97)
CREAT SERPL-MCNC: 0.91 MG/DL (ref 0.51–0.95)
CREAT SERPL-MCNC: 0.99 MG/DL (ref 0.51–0.95)
DIASTOLIC BLOOD PRESSURE - MUSE: NORMAL MMHG
DIASTOLIC BLOOD PRESSURE - MUSE: NORMAL MMHG
EGFRCR SERPLBLD CKD-EPI 2021: 64 ML/MIN/1.73M2
EGFRCR SERPLBLD CKD-EPI 2021: 71 ML/MIN/1.73M2
ERYTHROCYTE [DISTWIDTH] IN BLOOD BY AUTOMATED COUNT: 17.1 % (ref 10–15)
ERYTHROCYTE [DISTWIDTH] IN BLOOD BY AUTOMATED COUNT: 17.2 % (ref 10–15)
FIBRINOGEN PPP-MCNC: 375 MG/DL (ref 170–510)
GLUCOSE BLDC GLUCOMTR-MCNC: 100 MG/DL (ref 70–99)
GLUCOSE BLDC GLUCOMTR-MCNC: 100 MG/DL (ref 70–99)
GLUCOSE BLDC GLUCOMTR-MCNC: 105 MG/DL (ref 70–99)
GLUCOSE BLDC GLUCOMTR-MCNC: 106 MG/DL (ref 70–99)
GLUCOSE BLDC GLUCOMTR-MCNC: 110 MG/DL (ref 70–99)
GLUCOSE BLDC GLUCOMTR-MCNC: 110 MG/DL (ref 70–99)
GLUCOSE BLDC GLUCOMTR-MCNC: 113 MG/DL (ref 70–99)
GLUCOSE BLDC GLUCOMTR-MCNC: 114 MG/DL (ref 70–99)
GLUCOSE BLDC GLUCOMTR-MCNC: 127 MG/DL (ref 70–99)
GLUCOSE BLDC GLUCOMTR-MCNC: 79 MG/DL (ref 70–99)
GLUCOSE SERPL-MCNC: 96 MG/DL (ref 70–99)
HCO3 BLD-SCNC: 24 MMOL/L (ref 21–28)
HCO3 BLD-SCNC: 25 MMOL/L (ref 21–28)
HCO3 BLDV-SCNC: 26 MMOL/L (ref 21–28)
HCO3 SERPL-SCNC: 22 MMOL/L (ref 22–29)
HCT VFR BLD AUTO: 28.4 % (ref 35–47)
HCT VFR BLD AUTO: 30.9 % (ref 35–47)
HGB BLD-MCNC: 9.1 G/DL (ref 11.7–15.7)
HGB BLD-MCNC: 9.9 G/DL (ref 11.7–15.7)
INR PPP: 1.05 (ref 0.85–1.15)
INTERPRETATION ECG - MUSE: NORMAL
INTERPRETATION ECG - MUSE: NORMAL
MAGNESIUM SERPL-MCNC: 2.1 MG/DL (ref 1.7–2.3)
MCH RBC QN AUTO: 25.5 PG (ref 26.5–33)
MCH RBC QN AUTO: 26.1 PG (ref 26.5–33)
MCHC RBC AUTO-ENTMCNC: 32 G/DL (ref 31.5–36.5)
MCHC RBC AUTO-ENTMCNC: 32 G/DL (ref 31.5–36.5)
MCV RBC AUTO: 80 FL (ref 78–100)
MCV RBC AUTO: 81 FL (ref 78–100)
O2/TOTAL GAS SETTING VFR VENT: 50 %
OXYHGB MFR BLDV: 59 % (ref 70–75)
P AXIS - MUSE: 67 DEGREES
P AXIS - MUSE: 77 DEGREES
PCO2 BLD: 33 MM HG (ref 35–45)
PCO2 BLD: 34 MM HG (ref 35–45)
PCO2 BLDV: 40 MM HG (ref 40–50)
PEEP: 5 CM H2O
PEEP: 5 CM H2O
PH BLD: 7.46 [PH] (ref 7.35–7.45)
PH BLD: 7.48 [PH] (ref 7.35–7.45)
PH BLDV: 7.42 [PH] (ref 7.32–7.43)
PHOSPHATE SERPL-MCNC: 3.8 MG/DL (ref 2.5–4.5)
PLATELET # BLD AUTO: 227 10E3/UL (ref 150–450)
PLATELET # BLD AUTO: 298 10E3/UL (ref 150–450)
PO2 BLD: 85 MM HG (ref 80–105)
PO2 BLD: 88 MM HG (ref 80–105)
PO2 BLDV: 34 MM HG (ref 25–47)
POTASSIUM SERPL-SCNC: 3.9 MMOL/L (ref 3.4–5.3)
POTASSIUM SERPL-SCNC: 4 MMOL/L (ref 3.4–5.3)
PR INTERVAL - MUSE: 152 MS
PR INTERVAL - MUSE: 172 MS
PROT SERPL-MCNC: 5.5 G/DL (ref 6.4–8.3)
QRS DURATION - MUSE: 86 MS
QRS DURATION - MUSE: 98 MS
QT - MUSE: 396 MS
QT - MUSE: 398 MS
QTC - MUSE: 489 MS
QTC - MUSE: 505 MS
R AXIS - MUSE: -33 DEGREES
R AXIS - MUSE: -7 DEGREES
RBC # BLD AUTO: 3.49 10E6/UL (ref 3.8–5.2)
RBC # BLD AUTO: 3.88 10E6/UL (ref 3.8–5.2)
SAO2 % BLDA: 96 % (ref 92–100)
SAO2 % BLDA: 96 % (ref 92–100)
SAO2 % BLDV: 60.1 % (ref 70–75)
SODIUM SERPL-SCNC: 142 MMOL/L (ref 135–145)
SYSTOLIC BLOOD PRESSURE - MUSE: NORMAL MMHG
SYSTOLIC BLOOD PRESSURE - MUSE: NORMAL MMHG
T AXIS - MUSE: 209 DEGREES
T AXIS - MUSE: 237 DEGREES
VENTRICULAR RATE- MUSE: 92 BPM
VENTRICULAR RATE- MUSE: 97 BPM
WBC # BLD AUTO: 13.4 10E3/UL (ref 4–11)
WBC # BLD AUTO: 17.2 10E3/UL (ref 4–11)

## 2024-11-25 PROCEDURE — 85014 HEMATOCRIT: CPT

## 2024-11-25 PROCEDURE — 84132 ASSAY OF SERUM POTASSIUM: CPT | Performed by: THORACIC SURGERY (CARDIOTHORACIC VASCULAR SURGERY)

## 2024-11-25 PROCEDURE — 93010 ELECTROCARDIOGRAM REPORT: CPT | Performed by: STUDENT IN AN ORGANIZED HEALTH CARE EDUCATION/TRAINING PROGRAM

## 2024-11-25 PROCEDURE — 85041 AUTOMATED RBC COUNT: CPT

## 2024-11-25 PROCEDURE — 250N000013 HC RX MED GY IP 250 OP 250 PS 637

## 2024-11-25 PROCEDURE — 85610 PROTHROMBIN TIME: CPT

## 2024-11-25 PROCEDURE — 250N000013 HC RX MED GY IP 250 OP 250 PS 637: Performed by: INTERNAL MEDICINE

## 2024-11-25 PROCEDURE — 94799 UNLISTED PULMONARY SVC/PX: CPT

## 2024-11-25 PROCEDURE — 85048 AUTOMATED LEUKOCYTE COUNT: CPT | Performed by: PHYSICIAN ASSISTANT

## 2024-11-25 PROCEDURE — 258N000003 HC RX IP 258 OP 636: Performed by: THORACIC SURGERY (CARDIOTHORACIC VASCULAR SURGERY)

## 2024-11-25 PROCEDURE — 33968 REMOVE AORTIC ASSIST DEVICE: CPT | Mod: 79

## 2024-11-25 PROCEDURE — 999N000009 HC STATISTIC AIRWAY CARE

## 2024-11-25 PROCEDURE — 250N000009 HC RX 250: Performed by: INTERNAL MEDICINE

## 2024-11-25 PROCEDURE — 93005 ELECTROCARDIOGRAM TRACING: CPT

## 2024-11-25 PROCEDURE — 250N000011 HC RX IP 250 OP 636: Performed by: PHYSICIAN ASSISTANT

## 2024-11-25 PROCEDURE — P9045 ALBUMIN (HUMAN), 5%, 250 ML: HCPCS

## 2024-11-25 PROCEDURE — 82040 ASSAY OF SERUM ALBUMIN: CPT | Performed by: PHYSICIAN ASSISTANT

## 2024-11-25 PROCEDURE — 71045 X-RAY EXAM CHEST 1 VIEW: CPT

## 2024-11-25 PROCEDURE — 82565 ASSAY OF CREATININE: CPT

## 2024-11-25 PROCEDURE — 85014 HEMATOCRIT: CPT | Performed by: PHYSICIAN ASSISTANT

## 2024-11-25 PROCEDURE — 200N000001 HC R&B ICU

## 2024-11-25 PROCEDURE — 99207 PR NO BILLABLE SERVICE THIS VISIT: CPT | Performed by: INTERNAL MEDICINE

## 2024-11-25 PROCEDURE — 85730 THROMBOPLASTIN TIME PARTIAL: CPT

## 2024-11-25 PROCEDURE — 84100 ASSAY OF PHOSPHORUS: CPT | Performed by: PHYSICIAN ASSISTANT

## 2024-11-25 PROCEDURE — 84155 ASSAY OF PROTEIN SERUM: CPT | Performed by: PHYSICIAN ASSISTANT

## 2024-11-25 PROCEDURE — 82805 BLOOD GASES W/O2 SATURATION: CPT | Performed by: INTERNAL MEDICINE

## 2024-11-25 PROCEDURE — 80053 COMPREHEN METABOLIC PANEL: CPT | Performed by: THORACIC SURGERY (CARDIOTHORACIC VASCULAR SURGERY)

## 2024-11-25 PROCEDURE — 99291 CRITICAL CARE FIRST HOUR: CPT | Mod: 24 | Performed by: INTERNAL MEDICINE

## 2024-11-25 PROCEDURE — 82805 BLOOD GASES W/O2 SATURATION: CPT | Performed by: THORACIC SURGERY (CARDIOTHORACIC VASCULAR SURGERY)

## 2024-11-25 PROCEDURE — 82330 ASSAY OF CALCIUM: CPT | Performed by: PHYSICIAN ASSISTANT

## 2024-11-25 PROCEDURE — 82805 BLOOD GASES W/O2 SATURATION: CPT | Performed by: PHYSICIAN ASSISTANT

## 2024-11-25 PROCEDURE — 250N000011 HC RX IP 250 OP 636

## 2024-11-25 PROCEDURE — 94003 VENT MGMT INPAT SUBQ DAY: CPT

## 2024-11-25 PROCEDURE — 85384 FIBRINOGEN ACTIVITY: CPT

## 2024-11-25 PROCEDURE — 83735 ASSAY OF MAGNESIUM: CPT | Performed by: PHYSICIAN ASSISTANT

## 2024-11-25 PROCEDURE — 999N000055 HC STATISTIC END TITIAL CO2 MONITORING

## 2024-11-25 PROCEDURE — 999N000157 HC STATISTIC RCP TIME EA 10 MIN

## 2024-11-25 PROCEDURE — 250N000013 HC RX MED GY IP 250 OP 250 PS 637: Performed by: PHYSICIAN ASSISTANT

## 2024-11-25 RX ORDER — PRAMIPEXOLE DIHYDROCHLORIDE 0.5 MG/1
0.5 TABLET ORAL AT BEDTIME
Status: DISCONTINUED | OUTPATIENT
Start: 2024-11-25 | End: 2024-12-06 | Stop reason: HOSPADM

## 2024-11-25 RX ORDER — NALOXONE HYDROCHLORIDE 0.4 MG/ML
0.4 INJECTION, SOLUTION INTRAMUSCULAR; INTRAVENOUS; SUBCUTANEOUS
Status: DISCONTINUED | OUTPATIENT
Start: 2024-11-25 | End: 2024-12-06 | Stop reason: HOSPADM

## 2024-11-25 RX ORDER — FUROSEMIDE 10 MG/ML
20 INJECTION INTRAMUSCULAR; INTRAVENOUS ONCE
Status: COMPLETED | OUTPATIENT
Start: 2024-11-25 | End: 2024-11-25

## 2024-11-25 RX ORDER — HYDROXYZINE HYDROCHLORIDE 25 MG/1
25 TABLET, FILM COATED ORAL 3 TIMES DAILY PRN
Status: DISCONTINUED | OUTPATIENT
Start: 2024-11-25 | End: 2024-11-27

## 2024-11-25 RX ORDER — NALOXONE HYDROCHLORIDE 0.4 MG/ML
0.2 INJECTION, SOLUTION INTRAMUSCULAR; INTRAVENOUS; SUBCUTANEOUS
Status: DISCONTINUED | OUTPATIENT
Start: 2024-11-25 | End: 2024-12-06 | Stop reason: HOSPADM

## 2024-11-25 RX ORDER — CITALOPRAM HYDROBROMIDE 10 MG/1
20 TABLET ORAL EVERY MORNING
Status: DISCONTINUED | OUTPATIENT
Start: 2024-11-26 | End: 2024-12-06 | Stop reason: HOSPADM

## 2024-11-25 RX ORDER — ATORVASTATIN CALCIUM 40 MG/1
80 TABLET, FILM COATED ORAL EVERY EVENING
Status: DISCONTINUED | OUTPATIENT
Start: 2024-11-25 | End: 2024-12-06 | Stop reason: HOSPADM

## 2024-11-25 RX ORDER — ATORVASTATIN CALCIUM 10 MG/1
20 TABLET, FILM COATED ORAL EVERY EVENING
Status: DISCONTINUED | OUTPATIENT
Start: 2024-11-25 | End: 2024-11-25

## 2024-11-25 RX ORDER — ALBUTEROL SULFATE 90 UG/1
2 INHALANT RESPIRATORY (INHALATION) EVERY 6 HOURS PRN
Status: DISCONTINUED | OUTPATIENT
Start: 2024-11-25 | End: 2024-12-06 | Stop reason: HOSPADM

## 2024-11-25 RX ORDER — IPRATROPIUM BROMIDE AND ALBUTEROL SULFATE 2.5; .5 MG/3ML; MG/3ML
3 SOLUTION RESPIRATORY (INHALATION) EVERY 4 HOURS PRN
Status: DISCONTINUED | OUTPATIENT
Start: 2024-11-25 | End: 2024-12-01

## 2024-11-25 RX ORDER — METFORMIN HYDROCHLORIDE 500 MG/1
1000 TABLET, EXTENDED RELEASE ORAL
Status: DISCONTINUED | OUTPATIENT
Start: 2024-11-26 | End: 2024-12-06 | Stop reason: HOSPADM

## 2024-11-25 RX ADMIN — HYDROMORPHONE HYDROCHLORIDE 0.4 MG: 1 INJECTION, SOLUTION INTRAMUSCULAR; INTRAVENOUS; SUBCUTANEOUS at 09:58

## 2024-11-25 RX ADMIN — FUROSEMIDE 20 MG: 10 INJECTION, SOLUTION INTRAMUSCULAR; INTRAVENOUS at 11:59

## 2024-11-25 RX ADMIN — HEPARIN SODIUM 5000 UNITS: 5000 INJECTION, SOLUTION INTRAVENOUS; SUBCUTANEOUS at 19:46

## 2024-11-25 RX ADMIN — ALBUMIN HUMAN 12.5 G: 0.05 INJECTION, SOLUTION INTRAVENOUS at 12:40

## 2024-11-25 RX ADMIN — OXYCODONE HYDROCHLORIDE 10 MG: 5 TABLET ORAL at 22:11

## 2024-11-25 RX ADMIN — SENNOSIDES AND DOCUSATE SODIUM 1 TABLET: 8.6; 5 TABLET ORAL at 19:46

## 2024-11-25 RX ADMIN — PRAMIPEXOLE DIHYDROCHLORIDE 0.5 MG: 0.5 TABLET ORAL at 22:11

## 2024-11-25 RX ADMIN — SODIUM CHLORIDE, POTASSIUM CHLORIDE, SODIUM LACTATE AND CALCIUM CHLORIDE 250 ML: 600; 310; 30; 20 INJECTION, SOLUTION INTRAVENOUS at 06:44

## 2024-11-25 RX ADMIN — HEPARIN SODIUM 5000 UNITS: 5000 INJECTION, SOLUTION INTRAVENOUS; SUBCUTANEOUS at 12:02

## 2024-11-25 RX ADMIN — HYDROMORPHONE HYDROCHLORIDE 0.4 MG: 1 INJECTION, SOLUTION INTRAMUSCULAR; INTRAVENOUS; SUBCUTANEOUS at 06:29

## 2024-11-25 RX ADMIN — HYDROMORPHONE HYDROCHLORIDE 0.2 MG: 1 INJECTION, SOLUTION INTRAMUSCULAR; INTRAVENOUS; SUBCUTANEOUS at 23:11

## 2024-11-25 RX ADMIN — HYDROMORPHONE HYDROCHLORIDE 0.4 MG: 1 INJECTION, SOLUTION INTRAMUSCULAR; INTRAVENOUS; SUBCUTANEOUS at 03:10

## 2024-11-25 RX ADMIN — ACETAMINOPHEN 975 MG: 325 TABLET ORAL at 19:46

## 2024-11-25 RX ADMIN — ATORVASTATIN CALCIUM 80 MG: 40 TABLET, FILM COATED ORAL at 20:02

## 2024-11-25 RX ADMIN — FUROSEMIDE 20 MG: 10 INJECTION, SOLUTION INTRAMUSCULAR; INTRAVENOUS at 19:46

## 2024-11-25 RX ADMIN — HYDROMORPHONE HYDROCHLORIDE 0.4 MG: 1 INJECTION, SOLUTION INTRAMUSCULAR; INTRAVENOUS; SUBCUTANEOUS at 17:32

## 2024-11-25 RX ADMIN — OXYCODONE HYDROCHLORIDE 10 MG: 5 TABLET ORAL at 16:09

## 2024-11-25 RX ADMIN — ACETAMINOPHEN 650 MG: 650 SUPPOSITORY RECTAL at 12:33

## 2024-11-25 RX ADMIN — PANTOPRAZOLE SODIUM 40 MG: 40 TABLET, DELAYED RELEASE ORAL at 20:02

## 2024-11-25 RX ADMIN — ACETAMINOPHEN 650 MG: 650 SUPPOSITORY RECTAL at 03:49

## 2024-11-25 RX ADMIN — CEFAZOLIN 1 G: 1 INJECTION, POWDER, FOR SOLUTION INTRAMUSCULAR; INTRAVENOUS at 05:17

## 2024-11-25 RX ADMIN — DEXMEDETOMIDINE HYDROCHLORIDE 1.2 MCG/KG/HR: 400 INJECTION INTRAVENOUS at 03:47

## 2024-11-25 RX ADMIN — ASPIRIN 300 MG: 300 SUPPOSITORY RECTAL at 09:03

## 2024-11-25 RX ADMIN — CEFAZOLIN 1 G: 1 INJECTION, POWDER, FOR SOLUTION INTRAMUSCULAR; INTRAVENOUS at 14:00

## 2024-11-25 RX ADMIN — SODIUM CHLORIDE, POTASSIUM CHLORIDE, SODIUM LACTATE AND CALCIUM CHLORIDE 250 ML: 600; 310; 30; 20 INJECTION, SOLUTION INTRAVENOUS at 00:20

## 2024-11-25 RX ADMIN — DEXMEDETOMIDINE HYDROCHLORIDE 0.8 MCG/KG/HR: 400 INJECTION INTRAVENOUS at 09:13

## 2024-11-25 ASSESSMENT — ACTIVITIES OF DAILY LIVING (ADL)
ADLS_ACUITY_SCORE: 0
ADLS_ACUITY_SCORE: 53
ADLS_ACUITY_SCORE: 0
ADLS_ACUITY_SCORE: 53
ADLS_ACUITY_SCORE: 0
ADLS_ACUITY_SCORE: 48
ADLS_ACUITY_SCORE: 0
ADLS_ACUITY_SCORE: 53
ADLS_ACUITY_SCORE: 0
DEPENDENT_IADLS:: INDEPENDENT
ADLS_ACUITY_SCORE: 53
ADLS_ACUITY_SCORE: 0
ADLS_ACUITY_SCORE: 53
ADLS_ACUITY_SCORE: 0
ADLS_ACUITY_SCORE: 53
ADLS_ACUITY_SCORE: 53
ADLS_ACUITY_SCORE: 48
ADLS_ACUITY_SCORE: 53
ADLS_ACUITY_SCORE: 0

## 2024-11-25 NOTE — CONSULTS
NUTRITION EDUCATION    Received standing order to educate patient.     Will follow and complete diet education after patient is extubated and/or is transferred to medical unit.

## 2024-11-25 NOTE — PROGRESS NOTES
Patient is in ICU, and in critical condition.Intubated s/p CABG.  Intensivist is actively managing.  Our team will follow peripherally, and will take over the care when patient is transferred out of ICU.    Please call our team when patient is ready to be out of ICU.    [facility]  Yuly Hinson

## 2024-11-25 NOTE — PROCEDURES
Brief CV Surgery Note        IABP removed at 1000. Confirmed helium tubing was disconnected prior to removal per IABP tech. Brief free bleeding allowed, followed by 20 minutes of manual pressure. No significant increase in pressor needs following removal. Posterior tibial and dorsalis pedis pulses intact by doppler and obliterated w/ pressure. Femstop applied w/ 140 mmHg pressure and should be allowed to deflate on its own. Flat bedrest x6 hours post removal (approx 1600), discussed w/ RN. CBC ordered for this PM. No immediate complications.     Amy Mitchell PA-C  [facility] Cardiothoracic Surgery  Pager: [phone]  November 25, 2024

## 2024-11-25 NOTE — PLAN OF CARE
[facility] - ICU    RN Progress Note:            Pertinent Assessments:      Please refer to flowsheet rows for full assessment     Pt remains intubated and sedated with precedex as ordered. Pt is able to follow simple commands and nods head for yes/no question/s. PRN pain med administered as ordered see MAR. Continues to be on Nicardipine drip as ordered to keep SVR and BP within acceptable limit see MAR for details. Hemodynamically supported with LR bolus and low dose of Epi as ordered. Insulin drip titrated per BG result. K and Phos given per ordered protocol. Dr. Carter updated  via phone call at around 8:00 pm about pt status including pt's -110's and other hemodynamic numbers. IABP site oozed scan amount of blood drainage and pressure dressing reapplied during handoff report. Chest tube output within acceptable parameter. Adequate uop. Bedside handoff report given to receiving RN including to continue to monitor IABP site.            Key Events - This Shift:       -see above                Barriers to Discharge / Downgrade:     -intubated and sedated  -IABP  -Epi drip  _ nicardipine drip  -Insulin drip         Point of Contact Update: YES-OR-NO: Yes  Pt's daughter and sister were at the bedside and updated about pt status by pt's primary RN.         Problem: Adult Inpatient Plan of Care  Goal: Absence of Hospital-Acquired Illness or Injury  Intervention: Identify and Manage Fall Risk  Recent Flowsheet Documentation  Taken 11/24/2024 2000 by Sean Morales RN  Safety Promotion/Fall Prevention: (1:1 RN)   other (see comments)   lighting adjusted  Taken 11/24/2024 1800 by Sean Morales RN  Safety Promotion/Fall Prevention: (1:1 RN)   other (see comments)   lighting adjusted  Intervention: Prevent Skin Injury  Recent Flowsheet Documentation  Taken 11/24/2024 2000 by Sean Morales RN  Body Position: supine  Taken 11/24/2024 1800 by Sean Morales RN  Body  Position: supine  Intervention: Prevent and Manage VTE (Venous Thromboembolism) Risk  Recent Flowsheet Documentation  Taken 11/24/2024 2000 by Sean Morales RN  VTE Prevention/Management: (right leg) compression stockings on  Taken 11/24/2024 1800 by Sean Morales RN  VTE Prevention/Management: (right leg) compression stockings on  Goal: Optimal Comfort and Wellbeing  Intervention: Monitor Pain and Promote Comfort  Recent Flowsheet Documentation  Taken 11/24/2024 1818 by Sean Morales RN  Pain Management Interventions: medication (see MAR)  Intervention: Provide Person-Centered Care  Recent Flowsheet Documentation  Taken 11/24/2024 2000 by Sean Morales RN  Trust Relationship/Rapport:   care explained   choices provided   emotional support provided   questions encouraged   questions answered  Taken 11/24/2024 1800 by Sean Morales RN  Trust Relationship/Rapport:   care explained   choices provided   emotional support provided   questions encouraged   questions answered   Goal Outcome Evaluation:

## 2024-11-25 NOTE — PROGRESS NOTES
"PULMONARY/CRITICAL CARE CONSULT NOTE    Date / Time of Admission:  11/23/2024 12:31 AM  Assessment:   64yoF with history of DMII, asthma presented with NSTEMI causing pulmonary edema with persistent chest pain after catheterization found severe triple vessel disease so patient taken for CABGx3 after aortic balloon pump placed.     Clinically Significant Risk Factors        # Hypokalemia: Lowest K = 3.3 mmol/L in last 2 days, will replace as needed   # Hyperchloremia: Highest Cl = 109 mmol/L in last 2 days, will monitor as appropriate      # Hypocalcemia: Lowest iCa = 4.2 mg/dL in last 2 days, will monitor and replace as appropriate     # Hypoalbuminemia: Lowest albumin = 3.1 g/dL at 11/25/2024  4:26 AM, will monitor as appropriate     # Hypertension: Noted on problem list    # Acute heart failure with reduced ejection fraction: last echo with EF <40% and receiving IV diuretics           # Overweight: Estimated body mass index is 27.01 kg/m  as calculated from the following:    Height as of 11/22/24: 1.651 m (5' 5\").    Weight as of this encounter: 73.6 kg (162 lb 4.8 oz)., PRESENT ON ADMISSION     # Asthma: noted on problem list             Active Problems:    NSTEMI (non-ST elevated myocardial infarction) (H)    Advance Directives:  full code  Critical Care Time greater than: 30 minutes, critically ill due to hypoxia requiring mechanical ventilation      Plan:   C/V:  1) Cardiogenic shock  2) NSTEMI  3) ischemic cardiomyopathy  -s/p CABGx3  -Balloon pump to stay in overnight.   -vasoactive infusions titrated to CI  -V-wires in place, pacer set at 80 and HR currently above this    Pulmonary:  1) Acute hypoxic respiratory failure  -SBT pending plan for IABP. Discussing timing with [facility].    Renal:  No issues    GI:  NPO    ID:  Prophylactic antibiotics administered post-CABG    Neuro:  1) therapeutic sedation.  Target goal sedation score      Subjective:  No events overnight.  Awakens to gentle stimulation.  Tolerated " trial of balloon pump off per RN.    cc: chest pain    HPI: 64 year old female with history of DMII, Asthma, hypertension presented with chest pain on 11/23/24. ST depression in inferior leads. Cardiac catheterization found severe stenosis of left pain, LAD and circumflex. Placed on heparin and IV nitroglycerine. EF found to be 30-35%. Referral made for CV surgery but chest pain escalated overnight so IABP placed 11/24 with plans for urgent transfer to OR.     CABGx3 performed with improvement in anterior wall post-bylass and EF increased to 45%. No complications during the case.  Crystalloid 1500cc  Cellsaver 150cc      Past Medical History:   Diagnosis Date    Anxiety     Asthma     Cellulitis     Diabetes mellitus (H)     Essential hypertension     Created by Conversion  Replacement Utility updated for latest IMO load    Gastroparesis     Hyperlipidemia     Hypertension     Other and unspecified hyperlipidemia     Created by Conversion     PONV (postoperative nausea and vomiting)     Shortness of breath     Type II or unspecified type diabetes mellitus without mention of complication, not stated as uncontrolled     Created by Conversion        Social History     Tobacco Use    Smoking status: Never    Smokeless tobacco: Never   Substance Use Topics    Alcohol use: No       Family History   Problem Relation Age of Onset    Diabetes Mother     Hypertension Mother     Acute Myocardial Infarction No family hx of        Current Facility-Administered Medications   Medication Dose Route Frequency Provider Last Rate Last Admin    acetaminophen (TYLENOL) tablet 975 mg  975 mg Oral Q8H Yuly Hinson MD        Or    acetaminophen (TYLENOL) Suppository 650 mg  650 mg Rectal Q8H Yuly Hinson MD   650 mg at 11/25/24 0349    [START ON 11/27/2024] acetaminophen (TYLENOL) tablet 650 mg  650 mg Oral Q4H PRN Sybil Shepard PA-C        aspirin (ASA) chewable tablet 162 mg  162 mg Oral or NG Tube Daily Sybil Shepard PA-C         Or    aspirin (ASA) Suppository 300 mg  300 mg Rectal Daily Brong, Sybil, PA-C        [START ON 11/27/2024] bisacodyl (DULCOLAX) suppository 10 mg  10 mg Rectal Daily PRN Brong, Sybil, PA-C        calcium gluconate 1 g in 50 mL in sodium chloride intermittent infusion  1 g Intravenous Once PRN Brong, Sybil, PA-C        calcium gluconate 2 g in  mL intermittent infusion  2 g Intravenous Once PRN Brong, Sybil, PA-C        calcium gluconate 3 g in sodium chloride 0.9 % 100 mL intermittent infusion  3 g Intravenous Once PRN Brong, Sybil, PA-C        ceFAZolin (ANCEF) 1 g vial to attach to  ml bag for ADULT or 50 ml bag for PEDS  1 g Intravenous Q8H Brong, Sybil, PA-C   1 g at 11/25/24 0517    dexmedeTOMIDine (PRECEDEX) 4 mcg/mL in sodium chloride 0.9 % 100 mL infusion  0.1-1.2 mcg/kg/hr Intravenous Continuous Padma Yip MD 16 mL/hr at 11/25/24 0737 0.9 mcg/kg/hr at 11/25/24 0737    glucose gel 15-30 g  15-30 g Oral Q15 Min PRN Brong, Sybil, PA-C        Or    dextrose 50 % injection 25-50 mL  25-50 mL Intravenous Q15 Min PRN Brong, Sybil, PA-C        Or    glucagon injection 1 mg  1 mg Subcutaneous Q15 Min PRN Brong, Sybil, PA-C        EPINEPHrine (ADRENALIN) 5 mg in sodium chloride 0.9 % 250 mL infusion CENTRAL  0.01-0.1 mcg/kg/min Intravenous Continuous PRN Brong, Sybil, PA-C 2.1 mL/hr at 11/25/24 0715 0.01 mcg/kg/min at 11/25/24 0715    heparin ANTICOAGULANT injection 5,000 Units  5,000 Units Subcutaneous Q8H Brong, Sybil, PA-C        hydrALAZINE (APRESOLINE) injection 10 mg  10 mg Intravenous Q30 Min PRN Brong, Sybil, PA-C        HYDROmorphone (DILAUDID) injection 0.2 mg  0.2 mg Intravenous Q2H PRN Brong, Sybil, PA-C        Or    HYDROmorphone (DILAUDID) injection 0.4 mg  0.4 mg Intravenous Q2H PRN Sybil Shepard PA-JESE   0.4 mg at 11/25/24 0629    insulin regular (MYXREDLIN) 1 unit/mL infusion  0-24 Units/hr Intravenous Continuous Sybil Shepard PA-C   Paused at 11/25/24 0758    ipratropium  - albuterol 0.5 mg/2.5 mg/3 mL (DUONEB) neb solution 3 mL  3 mL Nebulization Q4H PRN Luís Howard MD        lactated ringers BOLUS 250 mL  250 mL Intravenous Q1H PRN Zhen Carter MD   250 mL at 11/25/24 0644    lidocaine (LMX4) cream   Topical Q1H PRN Brong, Sybil, PA-C        lidocaine 1 % 0.1-1 mL  0.1-1 mL Other Q1H PRN Brong, Sybil, PA-C        [START ON 11/26/2024] magnesium hydroxide (MILK OF MAGNESIA) suspension 30 mL  30 mL Oral Daily PRN Brong, Sybil, PA-C        niCARdipine 40 mg in 200 mL NS (CARDENE) infusion  0.5-15 mg/hr Intravenous Continuous PRN Brong, Sybil, PA-C 5 mL/hr at 11/25/24 0720 1 mg/hr at 11/25/24 0720    norepinephrine (LEVOPHED) 4 mg in  mL infusion PREMIX  0.01-0.15 mcg/kg/min Intravenous Continuous PRN Brong, Sybil, PA-C        ondansetron (ZOFRAN ODT) ODT tab 4 mg  4 mg Oral Q6H PRN Brong, Sybil, PA-C        Or    ondansetron (ZOFRAN) injection 4 mg  4 mg Intravenous Q6H PRN Brong, Sybil, PA-C        oxyCODONE (ROXICODONE) tablet 5 mg  5 mg Oral Q4H PRN Brong, Sybil, PA-C        Or    oxyCODONE (ROXICODONE) tablet 10 mg  10 mg Oral Q4H PRN Brong, Sybil, PA-C        pantoprazole (PROTONIX) 2 mg/mL suspension 40 mg  40 mg Oral or NG Tube Daily Brong, Sybil, PA-C        Or    pantoprazole (PROTONIX) EC tablet 40 mg  40 mg Oral Daily Brong, Sybil, PA-C        phenylephrine (JOSLYN-SYNEPHRINE) 50 mg in sodium chloride 0.9 % 250 mL infusion  0.1-4 mcg/kg/min Intravenous Continuous PRN Brong, Sybil, PA-C        polyethylene glycol (MIRALAX) Packet 17 g  17 g Oral Daily Brong, Sybil, PA-C        prochlorperazine (COMPAZINE) injection 10 mg  10 mg Intravenous Q6H PRN Sybil Shepard PA-C        Or    prochlorperazine (COMPAZINE) tablet 10 mg  10 mg Oral Q6H PRN Sybil Shepard PA-C        Reason beta blocker order not selected   Does not apply DOES NOT GO TO Sybil Gallego PA-C        senna-docusate (SENOKOT-S/PERICOLACE) 8.6-50 MG per tablet 1 tablet  1 tablet  Oral BID Sybil Shepard PA-C        sodium chloride (PF) 0.9% PF flush 3 mL  3 mL Intracatheter Q8H Sybil Shepard PA-C   3 mL at 11/25/24 0146    sodium chloride (PF) 0.9% PF flush 3 mL  3 mL Intracatheter q1 min prn Sybil Shepard PA-C        vancomycin (VANCOCIN) topical powder    PRN Zhen Carter MD   3 g at 11/24/24 1705    vasopressin (VASOSTRICT) 20 Units in sodium chloride 0.9 % 100 mL standard conc infusion  0.5-4 Units/hr Intravenous Continuous PRN Sybil Shepard PA-C             Review of Systems: unable to obtain due to sedation.     Objective:    Vital signs:  BP (!) 175/90   Pulse 92   Temp (!) 101.1  F (38.4  C) (Pulmonary Artery)   Resp 16   Wt 73.6 kg (162 lb 4.8 oz)   SpO2 100%   BMI 27.01 kg/m      GENERAL APPEARANCE: appears stated age, sedated and intubated     EYES:  PERRL     NECK: no adenopathy, no asymmetry, masses, or scars and thyroid normal to palpation     RESP: lungs clear to auscultation - no rales, rhonchi or wheezes     CV: regular rates and rhythm, normal S1 S2, no S3 or S4 and no murmur, click or rub -     ABDOMEN:  soft, nontender, no HSM or masses and bowel sounds normal     SKIN: no suspicious lesions or rashes     NEURO: moving all 4s, following commands, sedated.   Lower extremities with pulses intact per monitoring.      Laboratory:  Results for orders placed or performed during the hospital encounter of 11/22/24   Basic metabolic panel    Collection Time: 11/22/24  5:33 PM   Result Value Ref Range    Sodium 142 135 - 145 mmol/L    Potassium 3.5 3.4 - 5.3 mmol/L    Chloride 104 98 - 107 mmol/L    Carbon Dioxide (CO2) 26 22 - 29 mmol/L    Anion Gap 12 7 - 15 mmol/L    Urea Nitrogen 11.9 8.0 - 23.0 mg/dL    Creatinine 0.83 0.51 - 0.95 mg/dL    GFR Estimate 79 >60 mL/min/1.73m2    Calcium 9.4 8.8 - 10.4 mg/dL    Glucose 200 (H) 70 - 99 mg/dL     Lab Results   Component Value Date    WBC 17.2 (H) 11/25/2024    HGB 9.9 (L) 11/25/2024    HCT 30.9 (L) 11/25/2024     MCV 80 11/25/2024     11/25/2024

## 2024-11-25 NOTE — CONSULTS
Care Management Initial Consult    General Information  Assessment completed with: Spouse or significant otherFaraz  Type of CM/SW Visit: Initial Assessment    Primary Care Provider verified and updated as needed: Yes   Readmission within the last 30 days: no previous admission in last 30 days      Reason for Consult: discharge planning  Advance Care Planning: Advance Care Planning Reviewed: no concerns identified          Communication Assessment  Patient's communication style: spoken language (English or Bilingual)    Hearing Difficulty or Deaf: no   Wear Glasses or Blind: yes    Cognitive  Cognitive/Neuro/Behavioral: .WDL except  Level of Consciousness: sedated  Arousal Level: arouses to voice  Orientation: other (see comments) (sedated and intubated)  Mood/Behavior: calm, cooperative     Speech: endotracheal tube    Living Environment:   People in home: spouse     Current living Arrangements: house      Able to return to prior arrangements: yes       Family/Social Support:  Care provided by: self  Provides care for: no one  Marital Status:   Support system: , Children  Faraz       Description of Support System: Supportive, Involved    Support Assessment: Adequate family and caregiver support, Adequate social supports    Current Resources:   Patient receiving home care services: No        Community Resources: None  Equipment currently used at home: cane, straight (occasionally)  Supplies currently used at home: None    Employment/Financial:  Employment Status: retired     Employment/ Comments: no  hx/benefits  Financial Concerns: none   Referral to Financial Worker: No       Does the patient's insurance plan have a 3 day qualifying hospital stay waiver?  No    Lifestyle & Psychosocial Needs:  Social Drivers of Health     Food Insecurity: Low Risk  (11/23/2024)    Food Insecurity     Within the past 12 months, did you worry that your food would run out before you got money to buy  more?: No     Within the past 12 months, did the food you bought just not last and you didn t have money to get more?: No   Depression: Not at risk (6/21/2024)    PHQ-2     PHQ-2 Score: 0   Housing Stability: Low Risk  (11/23/2024)    Housing Stability     Do you have housing? : Yes     Are you worried about losing your housing?: No   Tobacco Use: Low Risk  (11/22/2024)    Patient History     Smoking Tobacco Use: Never     Smokeless Tobacco Use: Never     Passive Exposure: Not on file   Financial Resource Strain: Low Risk  (11/23/2024)    Financial Resource Strain     Within the past 12 months, have you or your family members you live with been unable to get utilities (heat, electricity) when it was really needed?: No   Alcohol Use: Medium Risk (5/25/2023)    [facility]    Alcohol Use     Alcohol Use Status: Yes   Transportation Needs: Low Risk  (11/23/2024)    Transportation Needs     Within the past 12 months, has lack of transportation kept you from medical appointments, getting your medicines, non-medical meetings or appointments, work, or from getting things that you need?: No   Physical Activity: Not on file   Interpersonal Safety: Low Risk  (11/25/2024)    Interpersonal Safety     Do you feel physically and emotionally safe where you currently live?: Patient unable to answer     Within the past 12 months, have you been hit, slapped, kicked or otherwise physically hurt by someone?: No     Within the past 12 months, have you been humiliated or emotionally abused in other ways by your partner or ex-partner?: No   Recent Concern: Interpersonal Safety - High Risk (11/23/2024)    Interpersonal Safety     Do you feel physically and emotionally safe where you currently live?: No     Within the past 12 months, have you been hit, slapped, kicked or otherwise physically hurt by someone?: No     Within the past 12 months, have you been humiliated or emotionally abused in other ways by  your partner or ex-partner?: No   Stress: Not on file (11/6/2024)   Social Connections: Unknown (12/28/2021)    [facility]    Social Connections     Frequency of Communication with Friends and Family: Not on file   Health Literacy: Not on file       Functional Status:  Prior to admission patient needed assistance:   Dependent ADLs:: Ambulation-cane (occasionally)  Dependent IADLs:: Independent       Mental Health Status:  Mental Health Status: No Current Concerns       Chemical Dependency Status:  Chemical Dependency Status: No Current Concerns         Discussed  Partnership in Safe Discharge Planning  document with patient/family: No    Additional Information:  Patient intubated.  CM met with patient's  in patient's room.  Verified address, updated his contact information.     Patient lives in a house with her spouse, Faraz.  There are a couple of steps to enter home.  At baseline, patient in independent with I/ADLs.  She occasionally uses a cane for ambulation (has had toes amputated in the past).  Patient is retired.  No home care or community services.     Next Steps: Monitor medical progression, follow treatment team recommendations an aid in discharge planning.    Soraida Mcgarry RN

## 2024-11-25 NOTE — PLAN OF CARE
Goal Outcome Evaluation:      Plan of Care Reviewed With: patient    Overall Patient Progress: no changeOverall Patient Progress: no change    Outcome Evaluation: Patient remains intubated with IABP in place.  [facility] - ICU    RN Progress Note:            Pertinent Assessments:      Please refer to flowsheet rows for full assessment     Patient opens eyes to voice, nods head approprietly to yes/no questions, follows commands and SPARKS.            Key Events - This Shift:     IABP in place, right groin site with small amount oozing from insertions site, remains on 1:1 setting on balloon pump  Remained intubated and sedated overnight. IV Dilaudid for pain, precedex for sedation.   Chest tubes to suction. Urine output adequate.   Titrated Nicardipine and epi to achieve SBP MAP and CI. LR bolus given per order for low CI/ MAP                    Barriers to Discharge / Downgrade:     Vent, Invasive monitoring, CT, [place],

## 2024-11-25 NOTE — PROGRESS NOTES
"CVTS Daily Progress Note   POD# 1  s/p CABG x3 (LIMA>LAD, rSVG>RPDA, rSVG>OM) and LLE EVH  Attending:  Raul  LOS: 2    SUBJECTIVE/INTERVAL EVENTS:    Patient arrived to ICU from OR yesterday afternoon. She remains intubated w/ IABP in place and is weaning from pressors. CVP 10, CI 2.7, SVR 1100. No acute events overnight.. Patient progressing well. Maintaining oxygen saturations on CMV 50% FiO2. Normotensive  on 0.02 epi,  . Ambulating with therapy deferred d/t above. Pain well controlled. - BM / - flatus. NPO d/t above. UOP  downtrending . Chest tube output appropriate. Hgb and coags appropriate.  Patient denies new chest pain, shortness of breath, abdominal pain, calf pain, nausea.  Patient unable to fully participate in rounds d/t above but does nod/shake head appropriately and follow commands .     OBJECTIVE:  Temp:  [98  F (36.7  C)-101.1  F (38.4  C)] 100.9  F (38.3  C)  Pulse:  [] 107  Resp:  [14-18] 16  BP: (118-175)/(56-90) 175/90  MAP:  [60 mmHg-204 mmHg] 71 mmHg  Arterial Line BP: (103-162)/() 116/50  FiO2 (%):  [40 %-100 %] 40 %  SpO2:  [95 %-100 %] 100 %  Vitals:    11/23/24 0100 11/24/24 0500 11/25/24 0400   Weight: 71.7 kg (158 lb 1.6 oz) 71.3 kg (157 lb 3.2 oz) 73.6 kg (162 lb 4.8 oz)       Clinically Significant Risk Factors        # Hypokalemia: Lowest K = 3.3 mmol/L in last 2 days, will replace as needed   # Hyperchloremia: Highest Cl = 109 mmol/L in last 2 days, will monitor as appropriate      # Hypocalcemia: Lowest iCa = 4.2 mg/dL in last 2 days, will monitor and replace as appropriate     # Hypoalbuminemia: Lowest albumin = 3.1 g/dL at 11/25/2024  4:26 AM, will monitor as appropriate     # Hypertension: Noted on problem list  # Acute heart failure with reduced ejection fraction: last echo with EF <40% and receiving IV diuretics           # Overweight: Estimated body mass index is 27.01 kg/m  as calculated from the following:    Height as of 11/22/24: 1.651 m (5' 5\").    Weight " as of this encounter: 73.6 kg (162 lb 4.8 oz)., PRESENT ON ADMISSION     # Asthma: noted on problem list  # History of CABG: noted on surgical history               Current Medications:    Scheduled Meds:  Current Facility-Administered Medications   Medication Dose Route Frequency Provider Last Rate Last Admin    acetaminophen (TYLENOL) tablet 975 mg  975 mg Oral Q8H Yuly Hinson MD        Or    acetaminophen (TYLENOL) Suppository 650 mg  650 mg Rectal Q8H Yuly Hinson MD   650 mg at 11/25/24 0349    aspirin (ASA) chewable tablet 162 mg  162 mg Oral or NG Tube Daily Brong, Sybil, PA-C        Or    aspirin (ASA) Suppository 300 mg  300 mg Rectal Daily Brong, Sybil, PA-C   300 mg at 11/25/24 0903    ceFAZolin (ANCEF) 1 g vial to attach to  ml bag for ADULT or 50 ml bag for PEDS  1 g Intravenous Q8H Brong, Sybil, PA-C   1 g at 11/25/24 0517    heparin ANTICOAGULANT injection 5,000 Units  5,000 Units Subcutaneous Q8H Brong, Sybil, PA-C        pantoprazole (PROTONIX) 2 mg/mL suspension 40 mg  40 mg Oral or NG Tube Daily Brong, Sybil, PA-C        Or    pantoprazole (PROTONIX) EC tablet 40 mg  40 mg Oral Daily Brong, Sybil, PA-C        polyethylene glycol (MIRALAX) Packet 17 g  17 g Oral Daily Brong, Sybil, PA-C        senna-docusate (SENOKOT-S/PERICOLACE) 8.6-50 MG per tablet 1 tablet  1 tablet Oral BID Brong, Sybil, PA-C        sodium chloride (PF) 0.9% PF flush 3 mL  3 mL Intracatheter Q8H Brong, Sybil, PA-C   3 mL at 11/25/24 0146     Continuous Infusions:  Current Facility-Administered Medications   Medication Dose Route Frequency Provider Last Rate Last Admin    dexmedeTOMIDine (PRECEDEX) 4 mcg/mL in sodium chloride 0.9 % 100 mL infusion  0.1-1.2 mcg/kg/hr Intravenous Continuous Padma Yip MD 14.3 mL/hr at 11/25/24 0913 0.8 mcg/kg/hr at 11/25/24 0913    EPINEPHrine (ADRENALIN) 5 mg in sodium chloride 0.9 % 250 mL infusion CENTRAL  0.01-0.1 mcg/kg/min Intravenous Continuous PRN Sybil Shepard,  PA-C 4.3 mL/hr at 11/25/24 1041 0.02 mcg/kg/min at 11/25/24 1041    niCARdipine 40 mg in 200 mL NS (CARDENE) infusion  0.5-15 mg/hr Intravenous Continuous PRN Sybil Shepard, PA-C   Paused at 11/25/24 0915    norepinephrine (LEVOPHED) 4 mg in  mL infusion PREMIX  0.01-0.15 mcg/kg/min Intravenous Continuous PRN Sybil Shepard PA-C        phenylephrine (JOSLYN-SYNEPHRINE) 50 mg in sodium chloride 0.9 % 250 mL infusion  0.1-4 mcg/kg/min Intravenous Continuous PRN Sybil Shepard PA-C 4.3 mL/hr at 11/25/24 1014 0.2 mcg/kg/min at 11/25/24 1014    Reason beta blocker order not selected   Does not apply DOES NOT GO TO Sybil Gallego PA-C        vasopressin (VASOSTRICT) 20 Units in sodium chloride 0.9 % 100 mL standard conc infusion  0.5-4 Units/hr Intravenous Continuous PRN Sybil Shepard, PA-C         PRN Meds:.  Current Facility-Administered Medications   Medication Dose Route Frequency Provider Last Rate Last Admin    [START ON 11/27/2024] acetaminophen (TYLENOL) tablet 650 mg  650 mg Oral Q4H PRN Sybil Shepard PA-C        [START ON 11/27/2024] bisacodyl (DULCOLAX) suppository 10 mg  10 mg Rectal Daily PRN Sybil Shepard PA-C        calcium gluconate 1 g in 50 mL in sodium chloride intermittent infusion  1 g Intravenous Once PRN Devyn Sybil, PA-C        calcium gluconate 2 g in  mL intermittent infusion  2 g Intravenous Once PRN Blossom Shepardsa, PA-C        calcium gluconate 3 g in sodium chloride 0.9 % 100 mL intermittent infusion  3 g Intravenous Once PRN Devyn Sybil, PA-C        glucose gel 15-30 g  15-30 g Oral Q15 Min PRN Brong Sybil, PA-C        Or    dextrose 50 % injection 25-50 mL  25-50 mL Intravenous Q15 Min PRN Blossom Shepardsa, PA-C        Or    glucagon injection 1 mg  1 mg Subcutaneous Q15 Min PRN Sybil Shepard PA-C        EPINEPHrine (ADRENALIN) 5 mg in sodium chloride 0.9 % 250 mL infusion CENTRAL  0.01-0.1 mcg/kg/min Intravenous Continuous PRN Sybil Shepard PA-C 4.3 mL/hr at 11/25/24 1042  0.02 mcg/kg/min at 11/25/24 1041    hydrALAZINE (APRESOLINE) injection 10 mg  10 mg Intravenous Q30 Min PRN Sybil Shepard PA-C        HYDROmorphone (DILAUDID) injection 0.2 mg  0.2 mg Intravenous Q2H PRN Sybil Shepard PA-C        Or    HYDROmorphone (DILAUDID) injection 0.4 mg  0.4 mg Intravenous Q2H PRN Sybil Shepard PA-C   0.4 mg at 11/25/24 0958    hydrOXYzine HCl (ATARAX) tablet 25 mg  25 mg Oral TID PRN Beena Mitchell PA-C        ipratropium - albuterol 0.5 mg/2.5 mg/3 mL (DUONEB) neb solution 3 mL  3 mL Nebulization Q4H PRN Luís Howard MD        lactated ringers BOLUS 250 mL  250 mL Intravenous Q1H PRN Zhen Carter MD   250 mL at 11/25/24 0644    lidocaine (LMX4) cream   Topical Q1H PRN Sybil Shepard PA-C        [START ON 11/26/2024] magnesium hydroxide (MILK OF MAGNESIA) suspension 30 mL  30 mL Oral Daily PRN Sybil Shepard PA-C        naloxone (NARCAN) injection 0.2 mg  0.2 mg Intravenous Q2 Min PRN Yuly Hinson MD        Or    naloxone (NARCAN) injection 0.4 mg  0.4 mg Intravenous Q2 Min PRN Yuly Hinson MD        Or    naloxone (NARCAN) injection 0.2 mg  0.2 mg Intramuscular Q2 Min PRN Yuly Hinson MD        Or    naloxone (NARCAN) injection 0.4 mg  0.4 mg Intramuscular Q2 Min PRN Yuly Hinson MD        niCARdipine 40 mg in 200 mL NS (CARDENE) infusion  0.5-15 mg/hr Intravenous Continuous PRN Sybil Shepard PA-C   Paused at 11/25/24 0915    norepinephrine (LEVOPHED) 4 mg in  mL infusion PREMIX  0.01-0.15 mcg/kg/min Intravenous Continuous PRN Sybil Shepard PA-C        ondansetron (ZOFRAN ODT) ODT tab 4 mg  4 mg Oral Q6H PRN Sybil Shepard PA-C        Or    ondansetron (ZOFRAN) injection 4 mg  4 mg Intravenous Q6H PRN Sybil Shepard PA-C        oxyCODONE (ROXICODONE) tablet 5 mg  5 mg Oral Q4H PRN Sybil Shepard PA-C        Or    oxyCODONE (ROXICODONE) tablet 10 mg  10 mg Oral Q4H PRN Sybil Shepard PA-C        phenylephrine (JOSLYN-SYNEPHRINE) 50 mg in sodium chloride  0.9 % 250 mL infusion  0.1-4 mcg/kg/min Intravenous Continuous PRN Brong, Sybil, PA-C 4.3 mL/hr at 11/25/24 1014 0.2 mcg/kg/min at 11/25/24 1014    prochlorperazine (COMPAZINE) injection 10 mg  10 mg Intravenous Q6H PRN Brong, Sybil, PA-C        Or    prochlorperazine (COMPAZINE) tablet 10 mg  10 mg Oral Q6H PRN Brong, Sybil, PA-C        Reason beta blocker order not selected   Does not apply DOES NOT GO TO MAR Devyn Sybil, PA-C        sodium chloride (PF) 0.9% PF flush 3 mL  3 mL Intracatheter q1 min prn Brong, Sybil, PA-C        vasopressin (VASOSTRICT) 20 Units in sodium chloride 0.9 % 100 mL standard conc infusion  0.5-4 Units/hr Intravenous Continuous PRN Brong, Sybil, PA-C           Cardiographics:    Telemetry monitoring demonstrates sinus tach with rates in the 100s per my personal review.    Imaging:  Results for orders placed or performed during the hospital encounter of 11/23/24   US Carotid Bilateral    Impression    IMPRESSION:  1.  Mild plaque formation, velocities consistent with less than 50% stenosis in the right internal carotid artery.  2.  Mild plaque formation, velocities consistent with less than 50% stenosis in the left internal carotid artery.  3.  Flow within the vertebral arteries is antegrade.   XR Chest Port 1 View    Impression    IMPRESSION: tube tip projects 1.5 cm above mason. Suggest pulling the ET tube back 1 cm.    [place] catheter in good position. Mediastinal and bilateral pleural drains present. Metallic density may represent marker related to aortic balloon pump, which would be in good position. There also new mediastinal clips and sternal wires related to   recent surgery.  Mild atelectasis left lung base. No pneumothorax or significant pleural effusion. Borderline cardiomegaly.   XR Chest Port 1 View    Impression    IMPRESSION: Endotracheal tube tip 27 mm above the mason. Pulmonary arterial catheter with its tip in the pulmonary outflow tract, mediastinal and  bilateral chest drains are again noted. Vascular volume is normal. Left retrocardiac atelectasis; the lungs   are otherwise clear. No pneumothorax.    Echocardiogram Complete   Result Value Ref Range    LVEF  25-30% (severely reduced)        Labs, personally reviewed.  Hemoglobin   Date Value Ref Range Status   11/25/2024 9.9 (L) 11.7 - 15.7 g/dL Final   11/24/2024 10.1 (L) 11.7 - 15.7 g/dL Final   11/24/2024 9.1 (L) 11.7 - 15.7 g/dL Final     Hemoglobin POCT   Date Value Ref Range Status   11/24/2024 10.5 (L) 11.7 - 15.7 g/dL Final   11/24/2024 8.7 (L) 11.7 - 15.7 g/dL Final   11/24/2024 9.0 (L) 11.7 - 15.7 g/dL Final     WBC Count   Date Value Ref Range Status   11/25/2024 17.2 (H) 4.0 - 11.0 10e3/uL Final   11/24/2024 12.5 (H) 4.0 - 11.0 10e3/uL Final   11/24/2024 18.4 (H) 4.0 - 11.0 10e3/uL Final     Platelet Count   Date Value Ref Range Status   11/25/2024 298 150 - 450 10e3/uL Final   11/24/2024 205 150 - 450 10e3/uL Final   11/24/2024 243 150 - 450 10e3/uL Final     Creatinine   Date Value Ref Range Status   11/25/2024 0.91 0.51 - 0.95 mg/dL Final   11/24/2024 0.89 0.51 - 0.95 mg/dL Final   11/23/2024 0.90 0.51 - 0.95 mg/dL Final     Potassium   Date Value Ref Range Status   11/25/2024 3.9 3.4 - 5.3 mmol/L Final   11/25/2024 4.0 3.4 - 5.3 mmol/L Final   11/24/2024 3.3 (L) 3.4 - 5.3 mmol/L Final   01/02/2023 4.0 3.5 - 5.0 mmol/L Final   01/01/2023 4.1 3.5 - 5.0 mmol/L Final   03/22/2021 4.4 3.5 - 5.0 mmol/L Final     Potassium POCT   Date Value Ref Range Status   11/24/2024 4.0 3.4 - 5.3 mmol/L Final   11/24/2024 3.5 3.4 - 5.3 mmol/L Final   11/24/2024 4.4 3.4 - 5.3 mmol/L Final     Magnesium   Date Value Ref Range Status   11/25/2024 2.1 1.7 - 2.3 mg/dL Final   11/24/2024 2.2 1.7 - 2.3 mg/dL Final   11/24/2024 2.6 (H) 1.7 - 2.3 mg/dL Final          I/O:  I/O last 3 completed shifts:  In: 5094.89 [I.V.:3584.89; Other:150; IV Piggyback:1360]  Out: 4070 [Urine:3300; Blood:200; Chest Tube:570]       Physical  Exam:    General: Patient seen in bed intubated/sedated  HEENT: KALEB, no sclera icterus, moist mucosa, ETT in place, no tracheal deviation  CV: RRR on monitor. 2+ peripheral pulses in all extremities. Mild edema.   Pulm: Non-labored effort on CMV 50% FiO2. Chest tubes in place, no air leak. Incision C/D/I.  Abd: Soft, NT, ND  : Mcdaniels with dre urine  Ext: Mild pedal edema, SCDs in place, warm, distal pulses intact  Neuro: Sedated, Unable to fully eval, Nods/shakes head appropriately, and Follows commands      ASSESSMENT/PLAN:    Gill Mendez is a 64 year old female with a history of asthma and DM2 who is s/p CABG x3    w/ preop IABP placement in s/o NSTEMI.    Active Problems:    NSTEMI (non-ST elevated myocardial infarction) (H)        NEURO:   - Scheduled Tylenol/lidocaine patches and PRN Tylenol/oxycodone/dilaudid for pain  - PTA celexa    CV:   - Pre-op EF 25-30%  - Normotensive  - Patient  weaning from pressors, currently on 0.02 epi and cardene for high SVR, IABP in place    - Beta blocker pending weaning from pressors   -  mg  -  Atorvastatin 80mg daily - LDL 70 on 11/23   - Chest tubes/TPW to remain today  - IABP to be removed this AM  - New baseline echo after CT/TPW removed and prior to discharge.  - Consider HF consult    PULM:   - Vent weaning as able  - Maintaining oxygen saturations on CMV 50% FiO2 - wean as able  - Encourage pulmonary toilet    FEN/GI:  - Diet: NPO/MIVF while intubated  - Continue electrolyte replacement protocol  - Bowel regimen, LBM preop    RENAL:  - adequate UOP/hr. Continue to monitor closely.  - Cr 0.91 (baseline ~ 0.9)  - Mcdaniels to remain in for close monitoring of I/O and during period of diuresis/relative immobility - plan for removal POD2  - Diuresis with 20 mg IV lasix once given low UOP, reevaluate PRN.    HEME:  - Acute blood loss anemia post-op.   - Hgb 9.9, no bleeding concerns. Hep SQ, ASA  - Recheck CBC this PM after IABP removal    ID:  - Yecenia op ppx  complete, mild fever, likely reactive. No concerns for infection  - Reactive leukocytosis, monitor    ENDO:   - HbA1c 6.1%  - BG goal < 180 to promote optimal healing  - PTA on jardiance and metformin - HELD until POD#3 pending renal function  - Transition to sliding scale insulin    PPx:   - DVT: SCDs, SQ heparin TID, ambulation   - GI: Protonix 40mg PO daily    DISPO:   - Continue critical care in ICU  - PT/OT recs at discharge: pending  - Medically Ready for Discharge: Anticipated in 2-4 Days        Patient discussed with Dr. Carter .        Amy Mitchell PA-C  [facility] Cardiothoracic Surgery  Vocera or Secure Chat  November 25, 2024

## 2024-11-26 ENCOUNTER — APPOINTMENT (OUTPATIENT)
Dept: OCCUPATIONAL THERAPY | Facility: HOSPITAL | Age: 64
DRG: 233 | End: 2024-11-26
Attending: PHYSICIAN ASSISTANT
Payer: COMMERCIAL

## 2024-11-26 LAB
ANION GAP SERPL CALCULATED.3IONS-SCNC: 18 MMOL/L (ref 7–15)
BASE EXCESS BLDV CALC-SCNC: -7.7 MMOL/L (ref -3–3)
BASE EXCESS BLDV CALC-SCNC: -8.1 MMOL/L (ref -3–3)
BUN SERPL-MCNC: 36.4 MG/DL (ref 8–23)
CA-I BLD-MCNC: 4.6 MG/DL (ref 4.4–5.2)
CALCIUM SERPL-MCNC: 8.2 MG/DL (ref 8.8–10.4)
CHLORIDE SERPL-SCNC: 112 MMOL/L (ref 98–107)
CREAT SERPL-MCNC: 1.2 MG/DL (ref 0.51–0.95)
EGFRCR SERPLBLD CKD-EPI 2021: 51 ML/MIN/1.73M2
ERYTHROCYTE [DISTWIDTH] IN BLOOD BY AUTOMATED COUNT: 17.8 % (ref 10–15)
GLUCOSE BLDC GLUCOMTR-MCNC: 139 MG/DL (ref 70–99)
GLUCOSE BLDC GLUCOMTR-MCNC: 212 MG/DL (ref 70–99)
GLUCOSE BLDC GLUCOMTR-MCNC: 248 MG/DL (ref 70–99)
GLUCOSE BLDC GLUCOMTR-MCNC: 251 MG/DL (ref 70–99)
GLUCOSE SERPL-MCNC: 134 MG/DL (ref 70–99)
HCO3 BLDV-SCNC: 19 MMOL/L (ref 21–28)
HCO3 BLDV-SCNC: 19 MMOL/L (ref 21–28)
HCO3 SERPL-SCNC: 16 MMOL/L (ref 22–29)
HCT VFR BLD AUTO: 29.6 % (ref 35–47)
HGB BLD-MCNC: 9 G/DL (ref 11.7–15.7)
LACTATE SERPL-SCNC: 1 MMOL/L (ref 0.7–2)
MAGNESIUM SERPL-MCNC: 2.3 MG/DL (ref 1.7–2.3)
MCH RBC QN AUTO: 25.6 PG (ref 26.5–33)
MCHC RBC AUTO-ENTMCNC: 30.4 G/DL (ref 31.5–36.5)
MCV RBC AUTO: 84 FL (ref 78–100)
O2/TOTAL GAS SETTING VFR VENT: 23 %
O2/TOTAL GAS SETTING VFR VENT: 23 %
OXYHGB MFR BLDV: 58 % (ref 70–75)
OXYHGB MFR BLDV: 62 % (ref 70–75)
PCO2 BLDV: 44 MM HG (ref 40–50)
PCO2 BLDV: 44 MM HG (ref 40–50)
PH BLDV: 7.24 [PH] (ref 7.32–7.43)
PH BLDV: 7.25 [PH] (ref 7.32–7.43)
PHOSPHATE SERPL-MCNC: 5.5 MG/DL (ref 2.5–4.5)
PLATELET # BLD AUTO: 297 10E3/UL (ref 150–450)
PO2 BLDV: 36 MM HG (ref 25–47)
PO2 BLDV: 38 MM HG (ref 25–47)
POTASSIUM SERPL-SCNC: 4 MMOL/L (ref 3.4–5.3)
PROCALCITONIN SERPL IA-MCNC: 0.57 NG/ML
RBC # BLD AUTO: 3.51 10E6/UL (ref 3.8–5.2)
SAO2 % BLDV: 59.5 % (ref 70–75)
SAO2 % BLDV: 62.7 % (ref 70–75)
SODIUM SERPL-SCNC: 146 MMOL/L (ref 135–145)
WBC # BLD AUTO: 18.7 10E3/UL (ref 4–11)

## 2024-11-26 PROCEDURE — 200N000001 HC R&B ICU

## 2024-11-26 PROCEDURE — 97166 OT EVAL MOD COMPLEX 45 MIN: CPT | Mod: GO

## 2024-11-26 PROCEDURE — 99233 SBSQ HOSP IP/OBS HIGH 50: CPT | Performed by: INTERNAL MEDICINE

## 2024-11-26 PROCEDURE — 82565 ASSAY OF CREATININE: CPT

## 2024-11-26 PROCEDURE — 85041 AUTOMATED RBC COUNT: CPT

## 2024-11-26 PROCEDURE — 84100 ASSAY OF PHOSPHORUS: CPT | Performed by: INTERNAL MEDICINE

## 2024-11-26 PROCEDURE — 94799 UNLISTED PULMONARY SVC/PX: CPT

## 2024-11-26 PROCEDURE — 82330 ASSAY OF CALCIUM: CPT | Performed by: PHYSICIAN ASSISTANT

## 2024-11-26 PROCEDURE — 82310 ASSAY OF CALCIUM: CPT

## 2024-11-26 PROCEDURE — 83735 ASSAY OF MAGNESIUM: CPT | Performed by: INTERNAL MEDICINE

## 2024-11-26 PROCEDURE — 85018 HEMOGLOBIN: CPT

## 2024-11-26 PROCEDURE — 250N000013 HC RX MED GY IP 250 OP 250 PS 637

## 2024-11-26 PROCEDURE — 999N000156 HC STATISTIC RCP CONSULT EA 30 MIN

## 2024-11-26 PROCEDURE — 80048 BASIC METABOLIC PNL TOTAL CA: CPT

## 2024-11-26 PROCEDURE — 250N000011 HC RX IP 250 OP 636

## 2024-11-26 PROCEDURE — P9045 ALBUMIN (HUMAN), 5%, 250 ML: HCPCS

## 2024-11-26 PROCEDURE — 258N000003 HC RX IP 258 OP 636: Performed by: PHYSICIAN ASSISTANT

## 2024-11-26 PROCEDURE — 97535 SELF CARE MNGMENT TRAINING: CPT | Mod: GO

## 2024-11-26 PROCEDURE — 250N000013 HC RX MED GY IP 250 OP 250 PS 637: Performed by: INTERNAL MEDICINE

## 2024-11-26 PROCEDURE — 250N000011 HC RX IP 250 OP 636: Mod: JW | Performed by: PHYSICIAN ASSISTANT

## 2024-11-26 PROCEDURE — 999N000157 HC STATISTIC RCP TIME EA 10 MIN

## 2024-11-26 PROCEDURE — 82805 BLOOD GASES W/O2 SATURATION: CPT | Performed by: INTERNAL MEDICINE

## 2024-11-26 PROCEDURE — 83605 ASSAY OF LACTIC ACID: CPT

## 2024-11-26 PROCEDURE — 250N000013 HC RX MED GY IP 250 OP 250 PS 637: Performed by: PHYSICIAN ASSISTANT

## 2024-11-26 PROCEDURE — 250N000012 HC RX MED GY IP 250 OP 636 PS 637

## 2024-11-26 PROCEDURE — P9047 ALBUMIN (HUMAN), 25%, 50ML: HCPCS | Mod: JZ

## 2024-11-26 PROCEDURE — 84145 PROCALCITONIN (PCT): CPT

## 2024-11-26 RX ORDER — ALBUMIN (HUMAN) 12.5 G/50ML
25 SOLUTION INTRAVENOUS ONCE
Status: COMPLETED | OUTPATIENT
Start: 2024-11-26 | End: 2024-11-26

## 2024-11-26 RX ORDER — FUROSEMIDE 10 MG/ML
20 INJECTION INTRAMUSCULAR; INTRAVENOUS ONCE
Status: COMPLETED | OUTPATIENT
Start: 2024-11-26 | End: 2024-11-26

## 2024-11-26 RX ADMIN — SENNOSIDES AND DOCUSATE SODIUM 1 TABLET: 8.6; 5 TABLET ORAL at 08:10

## 2024-11-26 RX ADMIN — HYDROXYZINE HYDROCHLORIDE 25 MG: 25 TABLET, FILM COATED ORAL at 17:03

## 2024-11-26 RX ADMIN — HEPARIN SODIUM 5000 UNITS: 5000 INJECTION, SOLUTION INTRAVENOUS; SUBCUTANEOUS at 11:36

## 2024-11-26 RX ADMIN — SENNOSIDES AND DOCUSATE SODIUM 1 TABLET: 8.6; 5 TABLET ORAL at 21:08

## 2024-11-26 RX ADMIN — INSULIN ASPART 3 UNITS: 100 INJECTION, SOLUTION INTRAVENOUS; SUBCUTANEOUS at 15:28

## 2024-11-26 RX ADMIN — FUROSEMIDE 20 MG: 10 INJECTION, SOLUTION INTRAMUSCULAR; INTRAVENOUS at 11:37

## 2024-11-26 RX ADMIN — HYDROXYZINE HYDROCHLORIDE 25 MG: 25 TABLET, FILM COATED ORAL at 10:38

## 2024-11-26 RX ADMIN — PANTOPRAZOLE SODIUM 40 MG: 40 TABLET, DELAYED RELEASE ORAL at 21:09

## 2024-11-26 RX ADMIN — ASPIRIN 81 MG CHEWABLE TABLET 162 MG: 81 TABLET CHEWABLE at 08:10

## 2024-11-26 RX ADMIN — INSULIN ASPART 2 UNITS: 100 INJECTION, SOLUTION INTRAVENOUS; SUBCUTANEOUS at 11:37

## 2024-11-26 RX ADMIN — ALBUMIN HUMAN 25 G: 0.25 SOLUTION INTRAVENOUS at 18:21

## 2024-11-26 RX ADMIN — PRAMIPEXOLE DIHYDROCHLORIDE 0.5 MG: 0.5 TABLET ORAL at 21:09

## 2024-11-26 RX ADMIN — HEPARIN SODIUM 5000 UNITS: 5000 INJECTION, SOLUTION INTRAVENOUS; SUBCUTANEOUS at 21:09

## 2024-11-26 RX ADMIN — ALBUMIN HUMAN 12.5 G: 0.05 INJECTION, SOLUTION INTRAVENOUS at 08:10

## 2024-11-26 RX ADMIN — POLYETHYLENE GLYCOL 3350 17 G: 17 POWDER, FOR SOLUTION ORAL at 08:10

## 2024-11-26 RX ADMIN — ACETAMINOPHEN 975 MG: 325 TABLET ORAL at 03:36

## 2024-11-26 RX ADMIN — PHENYLEPHRINE HYDROCHLORIDE 1.2 MCG/KG/MIN: 50 INJECTION INTRAVENOUS at 15:24

## 2024-11-26 RX ADMIN — ACETAMINOPHEN 975 MG: 325 TABLET ORAL at 21:08

## 2024-11-26 RX ADMIN — CITALOPRAM HYDROBROMIDE 20 MG: 20 TABLET ORAL at 08:10

## 2024-11-26 RX ADMIN — PHENYLEPHRINE HYDROCHLORIDE 1.8 MCG/KG/MIN: 50 INJECTION INTRAVENOUS at 06:38

## 2024-11-26 RX ADMIN — OXYCODONE HYDROCHLORIDE 10 MG: 5 TABLET ORAL at 17:03

## 2024-11-26 RX ADMIN — ACETAMINOPHEN 975 MG: 325 TABLET ORAL at 11:37

## 2024-11-26 RX ADMIN — PHENYLEPHRINE HYDROCHLORIDE 1.8 MCG/KG/MIN: 50 INJECTION INTRAVENOUS at 00:07

## 2024-11-26 RX ADMIN — ATORVASTATIN CALCIUM 80 MG: 40 TABLET, FILM COATED ORAL at 21:08

## 2024-11-26 RX ADMIN — HYDROMORPHONE HYDROCHLORIDE 0.2 MG: 1 INJECTION, SOLUTION INTRAMUSCULAR; INTRAVENOUS; SUBCUTANEOUS at 09:00

## 2024-11-26 RX ADMIN — HEPARIN SODIUM 5000 UNITS: 5000 INJECTION, SOLUTION INTRAVENOUS; SUBCUTANEOUS at 03:36

## 2024-11-26 ASSESSMENT — ACTIVITIES OF DAILY LIVING (ADL)
ADLS_ACUITY_SCORE: 44
ADLS_ACUITY_SCORE: 44
ADLS_ACUITY_SCORE: 48
ADLS_ACUITY_SCORE: 48
ADLS_ACUITY_SCORE: 44
ADLS_ACUITY_SCORE: 48
ADLS_ACUITY_SCORE: 44
ADLS_ACUITY_SCORE: 48
ADLS_ACUITY_SCORE: 44
ADLS_ACUITY_SCORE: 48
ADLS_ACUITY_SCORE: 44
ADLS_ACUITY_SCORE: 48
ADLS_ACUITY_SCORE: 44
ADLS_ACUITY_SCORE: 44

## 2024-11-26 NOTE — PROGRESS NOTES
Care Management Follow Up    Length of Stay (days): 3    Expected Discharge Date: 11/30/2024    Anticipated Discharge Plan:   TBD    Transportation: TBD    PT Recommendations:    OT Recommendations:  cardiac rehab, eval and recs pending      Barriers to Discharge: medical stability    Prior Living Situation:  Patient lives in a house with her spouse, Faraz. There are a couple of steps to enter home. At baseline, patient in independent with I/ADLs. She occasionally uses a cane for ambulation (has had toes amputated in the past). Patient is retired. No home care or community services.  Spouse is primary family contact.    Discussed  Partnership in Safe Discharge Planning  document with patient/family: No     Handoff Completed: No, handoff not indicated or clinically appropriate    Patient/Spokesperson Updated: No    Additional Information:  Medical:  Patient with a medical history significant for hypertension, hyperlipidemia, type II DM, asthma and anxiety disorder who is admitted with an NSTEMI and acute CHF decompensation. Cardiac cath showing multivessel CAD. Cardiovascular surgeon did CABG on 11/24. Extubated on 11/25.     CT Surgery following.      11/26/24:  Patient is not medically stable to initiate discharge planning.         Kenia Strong RN

## 2024-11-26 NOTE — TREATMENT PLAN
RCAT Treatment Plan    Patient Score: 9  Patient Acuity: 4    Clinical Indication for Therapy: prevent atelectasis    Therapy Ordered: Flutter valve and IS QID until able to perform herself.    Assessment Summary: Patient had a cab x 3 and is doing the FV and IS to keep lungs healthy. RT will reassess dailey. Kenneth H. Kjellberg  11/25/2024

## 2024-11-26 NOTE — PLAN OF CARE
Goal Outcome Evaluation:           Overall Patient Progress: improvingOverall Patient Progress: improving    Outcome Evaluation: Patient extubated on 2 Liters oxygen sats 99 to 100% was able to sit and stand at side of bed,    [facility] - ICU    RN Progress Note:            Pertinent Assessments:      Please refer to flowsheet rows for full assessment     CV, Respiratory, Pain           Key Events - This Shift:       Patient did go into A-fib for 1 to 2  minutes then  back to sinus rhythm.  Still on pressor was not able to wean down.  Patient's BP did drop with turns at the start of shift but was able to maintain BP with sitting and standing at the side of the bed, did had some nausea and dizziness.  Respiratory patient 2 liters nasal cannula sat 99 to 100% lung sound diminished coarse with expiratory wheezes.  Patient had adequate pain coverage with PRN medications.                 Barriers to Discharge / Downgrade:     Pressors

## 2024-11-26 NOTE — PROGRESS NOTES
[facility]    Medicine Progress Note - Hospitalist Service    Date of Admission:  11/23/2024    Assessment & Plan   Gill Mendez is a 64 year old female with a medical history significant for hypertension, hyperlipidemia, type II DM, asthma and anxiety disorder who is admitted with an NSTEMI and acute CHF decompensation. Cardiac cath showing multivessel CAD. Cardiovascular surgeon did CABG on 11/24. Extubated on 11/25.      Acute Non-STEMI  Multivessel CAD   - non-ST elevation myocardial infarction (NSTEMI).  -aspirin daily. Hold ACE due to hypotension  -Initiate high-intensity statin therapy  -Heparin gtt was initiated in ER, stopped before CABG  -Consult cardiology: cardiac cath showing multivessel CAD on 11/23  -consult [facility]: urgent CABG and IABP in cath lab on 11/24  -intubated and in ICU s/p CABG. Extubated on 11/25  -On Dexmedetomipine gtt; Levophed gtt and vasopressin prn     Acute CHF Decompensation, pulm edema  -Likely secondary to volume overload and acute cardiac dysfunction.  -cardiologist consulted  -Lasix iv was initiated. On hold now due to low bp.      Hypertension  - was on nitroglycerin drip.  -low bp s/p CABG, on vasopressors      Type II Diabetes Mellitus  -frequent Accu-Cheks and manage with sliding scale insulin.  -Maintain blood glucose between 100-140 mg/dL.  -Hold tirzepatide, amlodipine, lisinopril, and chlorthalidone during acute hospitalization.  -Reassess diabetes medications upon stabilization and discharge planning.      Asthma   Not in exacerbation  Resume home albuterol as needed for shortness of breath.   Duonebs PRN, 3 day course of prednsione   Continue maintenance therapy with Breo Ellipta or equivalent inhaler.     Anxiety Disorder  Severe avani phobia   -Continue Celexa at the current dose.  -Monitor for any anxiety exacerbation during hospitalization.     Rest of Patient s Medical Problems  Management remains unchanged unless otherwise indicated.        "          Diet: Advance Diet as Tolerated: Clear Liquid Diet    DVT Prophylaxis: Heparin SQ  Mcdaniels Catheter: PRESENT, indication: ?  (Error. Value could not be saved.), ICU only: hourly urine output needed for patient care  Lines: PRESENT      CVC Triple Lumen Right Internal jugular-Site Assessment: WDL  Arterial Line 11/24/24 Brachial-Site Assessment: WDL      Cardiac Monitoring: ACTIVE order. Indication: Open heart surgery (7 days)  Code Status: Full Code      Clinically Significant Risk Factors        # Hypokalemia: Lowest K = 3.3 mmol/L in last 2 days, will replace as needed  # Hypernatremia: Highest Na = 146 mmol/L in last 2 days, will monitor as appropriate  # Hyperchloremia: Highest Cl = 112 mmol/L in last 2 days, will monitor as appropriate      # Hypocalcemia: Lowest iCa = 4.2 mg/dL in last 2 days, will monitor and replace as appropriate     # Hypoalbuminemia: Lowest albumin = 3.1 g/dL at 11/25/2024  4:26 AM, will monitor as appropriate     # Hypertension: Noted on problem list  # Acute heart failure with reduced ejection fraction: last echo with EF <40% and receiving IV diuretics           # Overweight: Estimated body mass index is 26.64 kg/m  as calculated from the following:    Height as of this encounter: 1.651 m (5' 5\").    Weight as of this encounter: 72.6 kg (160 lb 1.6 oz)., PRESENT ON ADMISSION     # Financial/Environmental Concerns: none  # Asthma: noted on problem list  # History of CABG: noted on surgical history       Social Drivers of Health    Alcohol Use: Medium Risk (5/25/2023)    [facility] [employer] [employer]    Alcohol Use     Alcohol Use Status: Yes   Interpersonal Safety: Low Risk  (11/25/2024)    Interpersonal Safety     Do you feel physically and emotionally safe where you currently live?: Patient unable to answer     Within the past 12 months, have you been hit, slapped, kicked or otherwise physically hurt by someone?: No     Within the past 12 months, have you been " humiliated or emotionally abused in other ways by your partner or ex-partner?: No   Recent Concern: Interpersonal Safety - High Risk (11/23/2024)    Interpersonal Safety     Do you feel physically and emotionally safe where you currently live?: No     Within the past 12 months, have you been hit, slapped, kicked or otherwise physically hurt by someone?: No     Within the past 12 months, have you been humiliated or emotionally abused in other ways by your partner or ex-partner?: No    [facility]    Social Connections          Disposition Plan     Medically Ready for Discharge: Anticipated in 2-4 Days    S/p CABG in ICU         Yuly Hinson MD  Hospitalist Service  [facility]  Securely message with [employer] (more info)  Text page via [employer] Paging/Directory   ______________________________________________________________________    Interval History   Postop chest pain, in control with meds; no sob, no f/c, no n/v    Physical Exam   Vital Signs: Temp: 97.7  F (36.5  C) Temp src: Oral   Pulse: 97   Resp: 13 SpO2: 100 % O2 Device: Nasal cannula Oxygen Delivery: 2 LPM  Weight: 160 lbs 1.6 oz    General.  Awake alert oriented not in acute distress.  HEENT.  Pupils equal round react to light, anicteric, EOM intact.  Neck supple no JVD.  CVS regular rhythm no murmur gallops. S/p CABG   Lungs.  Clear to auscultation bilateral no wheezing or rales.  Abdomen.  Soft nontender bowel sounds present.  Extremities.  No edema no calf tenderness.  Neurological.  Awake and alert. No focal deficit.  Skin no rash. No pallor.  Psych. Normal mood.      Medical Decision Making       53 MINUTES SPENT BY ME on the date of service doing chart review, history, exam, documentation & further activities per the note.      Data     I have personally reviewed the following data over the past 24 hrs:    18.7 (H)  \   9.0 (L)   / 297      146 (H) 112 (H) 36.4 (H) /  139 (H)   4.0 16 (L) 1.20 (H) \       Imaging results reviewed over the past 24 hrs:   No results found for this or any previous visit (from the past 24 hours).

## 2024-11-26 NOTE — PLAN OF CARE
[facility] - ICU    RN Progress Note:            Pertinent Assessments:      Please refer to flowsheet rows for full assessment     Hemodynamic monitoring; Cardiovascular; Respiratory           Key Events - This Shift:       IABP was weaned and discontinued at 10:00 a.m.; patient tolerated procedure and bedrest period well. Sedation was lightened and SBT was performed, patient extubated to nasal cannula at 15:36.                 Barriers to Discharge / Downgrade:     Use of vasoactive drips to achieve hemodynamic goals         Point of Contact Update: YES-OR-NO: Yes; sister and children           Olya Donaldson, RN

## 2024-11-26 NOTE — PROGRESS NOTES
RCAT Treatment Plan     Patient Score: 11  Patient Acuity: 3    Clinical Indication for Therapy: productive cough, rhonchi on auscultation, and prevent atelectasis    Therapy Ordered: IS and Flutter valve    Assessment Summary: Patient using IS obtaining 750-1000 mL, flutter valve with good technique. 1 LPM nasal cannula. RT will continue to follow and work with patient on pulmonary hygiene therapy per RCAT protocol.     Debbie Lane, RT  11/26/2024

## 2024-11-26 NOTE — PROGRESS NOTES
"CVTS Daily Progress Note   POD# 2  s/p CABG x3 (LIMA>LAD, rSVG>RPDA, rSVG>OM) and LLE EVH  Attending:  Raul  LOS: 3    SUBJECTIVE/INTERVAL EVENTS:    No acute events overnight. IABP removed yesterday AM, extubated yesterday PM. Patient progressing well. Maintaining oxygen saturations on nasal cannula. Normotensive  on 1.8 lidia this AM . Ambulating with therapy to chair. Pain well controlled. Did have one brief (< 2 min) episode of a fib w/ RVR.  - BM / - flatus. Minimal nausea this AM. UOP adequate. Chest tube output appropriate. Hgb and coags appropriate.  Patient denies new chest pain, shortness of breath, abdominal pain, calf pain, nausea. All questions answered to patient's satisfaction..     OBJECTIVE:  Temp:  [97.7  F (36.5  C)-101.3  F (38.5  C)] 98.9  F (37.2  C)  Pulse:  [] 96  Resp:  [11-52] 26  BP: (114)/(57) 114/57  MAP:  [51 mmHg-78 mmHg] 75 mmHg  Arterial Line BP: ()/(34-54) 120/50  FiO2 (%):  [30 %] 30 %  SpO2:  [91 %-100 %] 100 %  Vitals:    11/23/24 0100 11/24/24 0500 11/25/24 0400 11/26/24 0400   Weight: 71.7 kg (158 lb 1.6 oz) 71.3 kg (157 lb 3.2 oz) 73.6 kg (162 lb 4.8 oz) 72.6 kg (160 lb 1.6 oz)       Clinically Significant Risk Factors        # Hypokalemia: Lowest K = 3.3 mmol/L in last 2 days, will replace as needed  # Hypernatremia: Highest Na = 146 mmol/L in last 2 days, will monitor as appropriate  # Hyperchloremia: Highest Cl = 112 mmol/L in last 2 days, will monitor as appropriate      # Hypocalcemia: Lowest iCa = 4.2 mg/dL in last 2 days, will monitor and replace as appropriate     # Hypoalbuminemia: Lowest albumin = 3.1 g/dL at 11/25/2024  4:26 AM, will monitor as appropriate     # Hypertension: Noted on problem list    # Acute heart failure with reduced ejection fraction: last echo with EF <40% and receiving IV diuretics           # Overweight: Estimated body mass index is 26.64 kg/m  as calculated from the following:    Height as of this encounter: 1.651 m (5' 5\").    " Weight as of this encounter: 72.6 kg (160 lb 1.6 oz)., PRESENT ON ADMISSION     # Financial/Environmental Concerns: none  # Asthma: noted on problem list    # History of CABG: noted on surgical history               Current Medications:    Scheduled Meds:  Current Facility-Administered Medications   Medication Dose Route Frequency Provider Last Rate Last Admin    acetaminophen (TYLENOL) tablet 975 mg  975 mg Oral Q8H Yuly Hinson MD   975 mg at 11/26/24 0336    Or    acetaminophen (TYLENOL) Suppository 650 mg  650 mg Rectal Q8H Yuly Hinson MD   650 mg at 11/25/24 1233    aspirin (ASA) chewable tablet 162 mg  162 mg Oral or NG Tube Daily Sybil Shepard PA-C   162 mg at 11/26/24 0810    Or    aspirin (ASA) Suppository 300 mg  300 mg Rectal Daily Sybil Shepard PA-C   300 mg at 11/25/24 0903    atorvastatin (LIPITOR) tablet 80 mg  80 mg Oral or Feeding Tube QPM Beena Mitchell PA-C   80 mg at 11/25/24 2002    citalopram (celeXA) tablet 20 mg  20 mg Oral or Feeding Tube QAM Beena Mitchell PA-C   20 mg at 11/26/24 0810    [Held by provider] empagliflozin (JARDIANCE) tablet 25 mg  25 mg Oral Daily Beena Mitchell PA-C        fluticasone (ARNUITY ELLIPTA) 100 MCG/ACT inhaler 1 puff  1 puff Inhalation Daily Beena Mitchell PA-C   1 puff at 11/26/24 0811    heparin ANTICOAGULANT injection 5,000 Units  5,000 Units Subcutaneous Q8H Sybil Shepard PA-C   5,000 Units at 11/26/24 0336    [START ON 11/27/2024] influenza trivalent vaccine for ages 50-64 years (PF) (FLUBLOK) injection 0.5 mL  0.5 mL Intramuscular Prior to discharge Aftab Fortune MD        insulin aspart (NovoLOG) injection (RAPID ACTING)  1-7 Units Subcutaneous TID AC Beena Mitchell PA-C        insulin aspart (NovoLOG) injection (RAPID ACTING)  1-5 Units Subcutaneous At Bedtime Beena Mitchell PA-C        [Held by provider] metFORMIN (GLUCOPHAGE XR) 24 hr tablet 1,000 mg  1,000 mg Oral Daily with breakfast Stephen  Beena STEVENSON PA-C        pantoprazole (PROTONIX) 2 mg/mL suspension 40 mg  40 mg Oral or NG Tube Daily Sybil Shepard PA-C        Or    pantoprazole (PROTONIX) EC tablet 40 mg  40 mg Oral Daily Sybil Shepard PA-C   40 mg at 11/25/24 2002    polyethylene glycol (MIRALAX) Packet 17 g  17 g Oral Daily Sybil Shepard PA-C   17 g at 11/26/24 0810    pramipexole (MIRAPEX) tablet 0.5 mg  0.5 mg Oral At Bedtime Padma Yip MD   0.5 mg at 11/25/24 2211    senna-docusate (SENOKOT-S/PERICOLACE) 8.6-50 MG per tablet 1 tablet  1 tablet Oral BID Sybil Shepard PA-C   1 tablet at 11/26/24 0810    sodium chloride (PF) 0.9% PF flush 3 mL  3 mL Intracatheter Q8H Sybil Shepard PA-C   3 mL at 11/26/24 0154     Continuous Infusions:  Current Facility-Administered Medications   Medication Dose Route Frequency Provider Last Rate Last Admin    dexmedeTOMIDine (PRECEDEX) 4 mcg/mL in sodium chloride 0.9 % 100 mL infusion  0.1-1.2 mcg/kg/hr Intravenous Continuous Padma Yip MD   Stopped at 11/25/24 1523    EPINEPHrine (ADRENALIN) 5 mg in sodium chloride 0.9 % 250 mL infusion CENTRAL  0.01-0.1 mcg/kg/min Intravenous Continuous PRN Sybil Shepard PA-C   Paused at 11/25/24 1303    niCARdipine 40 mg in 200 mL NS (CARDENE) infusion  0.5-15 mg/hr Intravenous Continuous PRN Sybil Shepard PA-C   Paused at 11/25/24 0915    norepinephrine (LEVOPHED) 4 mg in  mL infusion PREMIX  0.01-0.15 mcg/kg/min Intravenous Continuous PRN Sybil Shepard PA-C        phenylephrine (JOSLYN-SYNEPHRINE) 50 mg in sodium chloride 0.9 % 250 mL infusion  0.1-4 mcg/kg/min Intravenous Continuous PRN Sybil Shepard PA-C 38.5 mL/hr at 11/26/24 0840 1.8 mcg/kg/min at 11/26/24 0840    Reason beta blocker order not selected   Does not apply DOES NOT GO TO Sybil Gallego PA-C        vasopressin (VASOSTRICT) 20 Units in sodium chloride 0.9 % 100 mL standard conc infusion  0.5-4 Units/hr Intravenous Continuous PRN Sybil Shepard PA-C         PRN Meds:.  Current  Facility-Administered Medications   Medication Dose Route Frequency Provider Last Rate Last Admin    [START ON 11/27/2024] acetaminophen (TYLENOL) tablet 650 mg  650 mg Oral Q4H PRN Sybil Shepard PA-C        albuterol (PROVENTIL HFA/VENTOLIN HFA) inhaler  2 puff Inhalation Q6H PRN Beena Mitchell PA-C        [START ON 11/27/2024] bisacodyl (DULCOLAX) suppository 10 mg  10 mg Rectal Daily PRN Sybil Shepard, PA-C        calcium gluconate 1 g in 50 mL in sodium chloride intermittent infusion  1 g Intravenous Once PRN Brong, Sybil, PA-C        calcium gluconate 2 g in  mL intermittent infusion  2 g Intravenous Once PRN Brong, Sybil, PA-C        calcium gluconate 3 g in sodium chloride 0.9 % 100 mL intermittent infusion  3 g Intravenous Once PRN Yimig, Sybil, PA-C        glucose gel 15-30 g  15-30 g Oral Q15 Min PRN Brong Sybil, PA-C        Or    dextrose 50 % injection 25-50 mL  25-50 mL Intravenous Q15 Min PRN Brong, Sybil, PA-C        Or    glucagon injection 1 mg  1 mg Subcutaneous Q15 Min PRN Brong, Sybil, PA-C        EPINEPHrine (ADRENALIN) 5 mg in sodium chloride 0.9 % 250 mL infusion CENTRAL  0.01-0.1 mcg/kg/min Intravenous Continuous PRN Brong, Sybil, PA-C   Paused at 11/25/24 1303    hydrALAZINE (APRESOLINE) injection 10 mg  10 mg Intravenous Q30 Min PRN Bronche Sybil, PA-C        HYDROmorphone (DILAUDID) injection 0.2 mg  0.2 mg Intravenous Q2H PRN Brong, Sybil, PA-C   0.2 mg at 11/26/24 0900    Or    HYDROmorphone (DILAUDID) injection 0.4 mg  0.4 mg Intravenous Q2H PRN Bronche, Sybil, PA-C   0.4 mg at 11/25/24 1732    hydrOXYzine HCl (ATARAX) tablet 25 mg  25 mg Oral or Feeding Tube TID PRN Henshue, Beena K, PA-C        ipratropium - albuterol 0.5 mg/2.5 mg/3 mL (DUONEB) neb solution 3 mL  3 mL Nebulization Q4H PRN Luís Howard MD        lactated ringers BOLUS 250 mL  250 mL Intravenous Q1H PRN Zhen Carter MD   250 mL at 11/25/24 0644    lidocaine (LMX4) cream   Topical  Q1H PRN Brong, Sybil, PA-C        magnesium hydroxide (MILK OF MAGNESIA) suspension 30 mL  30 mL Oral Daily PRN Brong, Sybil, PA-C        naloxone (NARCAN) injection 0.2 mg  0.2 mg Intravenous Q2 Min PRN Yuly Hinson MD        Or    naloxone (NARCAN) injection 0.4 mg  0.4 mg Intravenous Q2 Min PRN Yuly Hinson MD        Or    naloxone (NARCAN) injection 0.2 mg  0.2 mg Intramuscular Q2 Min PRN Yuly Hinson MD        Or    naloxone (NARCAN) injection 0.4 mg  0.4 mg Intramuscular Q2 Min PRN Yuly Hinson MD        niCARdipine 40 mg in 200 mL NS (CARDENE) infusion  0.5-15 mg/hr Intravenous Continuous PRN Brong, Sybil, PA-C   Paused at 11/25/24 0915    norepinephrine (LEVOPHED) 4 mg in  mL infusion PREMIX  0.01-0.15 mcg/kg/min Intravenous Continuous PRN Brong, Sybil, PA-C        ondansetron (ZOFRAN ODT) ODT tab 4 mg  4 mg Oral Q6H PRN Brong, Sybil, PA-C        Or    ondansetron (ZOFRAN) injection 4 mg  4 mg Intravenous Q6H PRN Brong, Sybil, PA-C        oxyCODONE (ROXICODONE) tablet 5 mg  5 mg Oral Q4H PRN Brong, Sybil, PA-C        Or    oxyCODONE (ROXICODONE) tablet 10 mg  10 mg Oral Q4H PRN Brong, Sybil, PA-C   10 mg at 11/25/24 2211    phenylephrine (JOSLYN-SYNEPHRINE) 50 mg in sodium chloride 0.9 % 250 mL infusion  0.1-4 mcg/kg/min Intravenous Continuous PRN Brong, Sybil, PA-C 38.5 mL/hr at 11/26/24 0840 1.8 mcg/kg/min at 11/26/24 0840    prochlorperazine (COMPAZINE) injection 10 mg  10 mg Intravenous Q6H PRN Brong, Sybil, PA-C        Or    prochlorperazine (COMPAZINE) tablet 10 mg  10 mg Oral Q6H PRN Brong, Sybil, PA-C        Reason beta blocker order not selected   Does not apply DOES NOT GO TO Sybil Gallego PA-C        sodium chloride (PF) 0.9% PF flush 3 mL  3 mL Intracatheter q1 min prn Sybil Shepard PA-C   3 mL at 11/26/24 0810    vasopressin (VASOSTRICT) 20 Units in sodium chloride 0.9 % 100 mL standard conc infusion  0.5-4 Units/hr Intravenous Continuous PRN ySbil Shepard PA-C            Cardiographics:    Telemetry monitoring demonstrates sinus tach with rates in the 100s per my personal review.    Imaging:  Results for orders placed or performed during the hospital encounter of 11/23/24   US Carotid Bilateral    Impression    IMPRESSION:  1.  Mild plaque formation, velocities consistent with less than 50% stenosis in the right internal carotid artery.  2.  Mild plaque formation, velocities consistent with less than 50% stenosis in the left internal carotid artery.  3.  Flow within the vertebral arteries is antegrade.   XR Chest Port 1 View    Impression    IMPRESSION: tube tip projects 1.5 cm above mason. Suggest pulling the ET tube back 1 cm.    Cameron-Mary catheter in good position. Mediastinal and bilateral pleural drains present. Metallic density may represent marker related to aortic balloon pump, which would be in good position. There also new mediastinal clips and sternal wires related to   recent surgery.  Mild atelectasis left lung base. No pneumothorax or significant pleural effusion. Borderline cardiomegaly.   XR Chest Port 1 View    Impression    IMPRESSION: Endotracheal tube tip 27 mm above the mason. Pulmonary arterial catheter with its tip in the pulmonary outflow tract, mediastinal and bilateral chest drains are again noted. Vascular volume is normal. Left retrocardiac atelectasis; the lungs   are otherwise clear. No pneumothorax.    Echocardiogram Complete   Result Value Ref Range    LVEF  25-30% (severely reduced)        Labs, personally reviewed.  Hemoglobin   Date Value Ref Range Status   11/26/2024 9.0 (L) 11.7 - 15.7 g/dL Final   11/25/2024 9.1 (L) 11.7 - 15.7 g/dL Final   11/25/2024 9.9 (L) 11.7 - 15.7 g/dL Final     WBC Count   Date Value Ref Range Status   11/26/2024 18.7 (H) 4.0 - 11.0 10e3/uL Final   11/25/2024 13.4 (H) 4.0 - 11.0 10e3/uL Final   11/25/2024 17.2 (H) 4.0 - 11.0 10e3/uL Final     Platelet Count   Date Value Ref Range Status   11/26/2024 297 150 -  450 10e3/uL Final   11/25/2024 227 150 - 450 10e3/uL Final   11/25/2024 298 150 - 450 10e3/uL Final     Creatinine   Date Value Ref Range Status   11/26/2024 1.20 (H) 0.51 - 0.95 mg/dL Final   11/25/2024 0.99 (H) 0.51 - 0.95 mg/dL Final   11/25/2024 0.91 0.51 - 0.95 mg/dL Final     Potassium   Date Value Ref Range Status   11/26/2024 4.0 3.4 - 5.3 mmol/L Final   11/25/2024 3.9 3.4 - 5.3 mmol/L Final   11/25/2024 4.0 3.4 - 5.3 mmol/L Final   01/02/2023 4.0 3.5 - 5.0 mmol/L Final   01/01/2023 4.1 3.5 - 5.0 mmol/L Final   03/22/2021 4.4 3.5 - 5.0 mmol/L Final     Potassium POCT   Date Value Ref Range Status   11/24/2024 4.0 3.4 - 5.3 mmol/L Final   11/24/2024 3.5 3.4 - 5.3 mmol/L Final   11/24/2024 4.4 3.4 - 5.3 mmol/L Final     Magnesium   Date Value Ref Range Status   11/26/2024 2.3 1.7 - 2.3 mg/dL Final   11/25/2024 2.1 1.7 - 2.3 mg/dL Final   11/24/2024 2.2 1.7 - 2.3 mg/dL Final          I/O:  I/O last 3 completed shifts:  In: 2657 [P.O.:420; I.V.:1987]  Out: 2045 [Urine:1475; Chest Tube:570]       Physical Exam:    General: Patient seen in bed conversant/pleasant and NAD  HEENT: KALEB, no sclera icterus, moist mucosa, nasal cannula in place  CV: RRR on monitor. 2+ peripheral pulses in all extremities. Mild edema.   Pulm: Non-labored effort on nasal cannula. Chest tubes in place, no air leak. Incision C/D/I.  Abd: Soft, NT, ND  : Mcdaniels with dre urine  Ext: Mild pedal edema, SCDs in place, warm, distal pulses intact  Neuro: A&Ox3, SPARKS, and CN grossly intact      ASSESSMENT/PLAN:    Gill Mendez is a 64 year old female with a history of asthma and DM2 who is s/p CABG x3  w/ preop IABP placement in s/o NSTEMI.    Active Problems:    NSTEMI (non-ST elevated myocardial infarction) (H)        NEURO:   - Scheduled Tylenol/lidocaine patches and PRN Tylenol/oxycodone/dilaudid for pain  - PTA celexa    CV:   - Pre-op EF 25-30%  - IABP removed POD#1  - Normotensive  - Patient  weaning from pressors, currently on 1.8 lidia,  ok to wean lidia for SBP >90, MAP > 65    - Beta blocker pending weaning from pressors   -  mg  -  Atorvastatin 80mg daily - LDL 70 on 11/23   - Chest tubes/TPW to remain today  - New baseline echo after CT/TPW removed and prior to discharge.  - Consider HF consult    PULM:   - Extubated POD#1  - Maintaining oxygen saturations on nasal cannula - wean as able  - Encourage pulmonary toilet    FEN/GI:  - Diet: Clears, ADAT to Cardiac  - Continue electrolyte replacement protocol  - Bowel regimen, LBM preop    RENAL:  - adequate UOP/hr. Continue to monitor closely.  - Cr 1.20 (baseline ~ 0.9)  - Mcdaniels to be removed today, discussed w/ RN  - Diuresis with 20 mg IV lasix once today given low EF but only +1kg from preop    HEME:  - Acute blood loss anemia post-op.   - Hgb 9.0, no bleeding concerns. Hep SQ, ASA    ID:  - Yecenia op ppx complete, mild fever, likely reactive. No concerns for infection  - Reactive leukocytosis, monitor - check lactic and procal for completeness.   - Consider more infectious workup if pressor needs do not decrease but low suspicion for clinical infection at this time    ENDO:   - HbA1c 6.1%  - BG goal < 180 to promote optimal healing  - PTA on jardiance and metformin - HELD until POD#3 pending renal function  - Transition to sliding scale insulin    PPx:   - DVT: SCDs, SQ heparin TID, ambulation   - GI: Protonix 40mg PO daily    DISPO:   - Continue critical care in ICU - pressor support  - PT/OT recs at discharge: pending  - Medically Ready for Discharge: Anticipated in 2-4 Days        Patient discussed with Dr. Carter and Winston.        Amy Mitchell PA-C  [facility] Cardiothoracic Surgery  Vocera or Secure Chat  November 26, 2024

## 2024-11-26 NOTE — PROGRESS NOTES
11/26/24 1330   Appointment Info   Signing Clinician's Name / Credentials (OT) Carmenza Tyler BUSTOS OTR/L CLT   Living Environment   People in Home spouse   Current Living Arrangements house   Home Accessibility stairs to enter home;stairs within home   Number of Stairs, Main Entrance 2   Stair Railings, Main Entrance railings safe and in good condition   Number of Stairs, Within Home, Primary none   Self-Care   Equipment Currently Used at Home cane, straight   Activity/Exercise/Self-Care Comment I with ADLs at baseline, occasionally uses a cane when ambulating longer distance, 2 hip surgeries-- last one being within the last year   General Information   Onset of Illness/Injury or Date of Surgery 11/23/24   Referring Physician    Additional Occupational Profile Info/Pertinent History of Current Problem CABG x3   Existing Precautions/Restrictions cardiac;sternal   Cognitive Status Examination   Affect/Mental Status (Cognitive) WNL   Follows Commands WNL   Bed Mobility   Bed Mobility supine-sit   Supine-Sit [place] (Bed Mobility) minimum assist (75% patient effort);2 person assist;moderate assist (50% patient effort)   Transfers   Transfers sit-stand transfer   Sit-Stand Transfer   Sit-Stand [place] (Transfers) minimum assist (75% patient effort);2 person assist   Balance   Balance Assessment sitting static balance   Sitting Balance: Static supervision   Activities of Daily Living   BADL Assessment/Intervention lower body dressing   Lower Body Dressing Assessment/Training   [place] Level (Lower Body Dressing) socks;maximum assist (25% patient effort)   Clinical Impression   Criteria for Skilled Therapeutic Interventions Met (OT) Yes, treatment indicated   OT Diagnosis decreased activity tolerance   OT Problem List-Impairments impacting ADL activity tolerance impaired;problems related to;post-surgical precautions   Assessment of Occupational Performance 1-3 Performance Deficits    Identified Performance Deficits mobility, activity tolerance, post op ed   Planned Therapy Interventions (OT) ADL retraining;home program guidelines;progressive activity/exercise   Clinical Decision Making Complexity (OT) detailed assessment/moderate complexity   Risk & Benefits of therapy have been explained care plan/treatment goals reviewed   OT Total Evaluation Time   OT Eval, Moderate Complexity Minutes (38025) 8   OT Goals   Therapy Frequency (OT) 2 times/day   OT Predicted Duration/Target Date for Goal Attainment 12/03/24   OT Goals Cardiac Phase 1   OT: Understanding of cardiac education to maximize quality of life, condition management, and health outcomes Patient;Caregiver;Verbalize   OT: Perform aerobic activity with stable cardiovascular response 10 minutes;NuStep   OT: Functional/aerobic ambulation tolerance with stable cardiovascular response in order to return to home and community environment Supervision/SBA;Greater than 300 feet   OT: Navigation of stairs simulating home set up with stable cardiovascular response in order to return to home and community environment 4 stairs;Supervision/SBA   Interventions   Interventions Quick Adds Self-Care/Home Management   Self-Care/Home Management   Self-Care/Home Mgmt/ADL, Compensatory, Meal Prep Minutes (58320) 8   Treatment Detail/Skilled Intervention Patient able to stand with min A of 2 before taking a few steps over to chair with min a of 2/arm and arm. Chair trsf- CGA. Eduation on inpt CR and sternal precautions. Hypotensive during trsf- RN in room and adjusted meds. Patient able to rebound to WNL for BPs with medication change. Symptomatic with hypotension- dizzy, dyaphoretic   OT Discharge Planning   OT Plan on pressor for BP, Trsf to chair, standing- advance amb as able, ex, ed   OT Discharge Recommendation (DC Rec) home with assist   OT Rationale for DC Rec Anticipate patient will be able to return home with spouse   OT Brief overview of current  status min A of   Total Session Time   Timed Code Treatment Minutes 8   Total Session Time (sum of timed and untimed services) 16

## 2024-11-26 NOTE — PLAN OF CARE
Goal Outcome Evaluation:      Plan of Care Reviewed With: patient, spouse    Overall Patient Progress: improvingOverall Patient Progress: improving    [facility] - ICU    RN Progress Note:            Pertinent Assessments:      Please refer to flowsheet rows for full assessment     Afebrile.  Urine output- 320 total. Lasix x1.  Poor appetite.  No nausea. Lungs diminished, strong productive cough.  Remains of 1.5 L oxygen.             Key Events - This Shift:       Up to chair with RN/cardiac rehab.  Did experience some dizziness and dropped her pressures slightly but rebounded well.  Continuing to titrate lidia down to meet MAP and SBP goals.  Pain controlled; did get a dose of IV dilaudid x1.                  Barriers to Discharge / Downgrade:     pressors        Point of Contact Update: YES-OR-NO: Yes  If No, reason:   Name:Gabe  Phone Number:see chart   Summary of Conversation: updated at bedside

## 2024-11-27 ENCOUNTER — APPOINTMENT (OUTPATIENT)
Dept: OCCUPATIONAL THERAPY | Facility: HOSPITAL | Age: 64
DRG: 233 | End: 2024-11-27
Attending: INTERNAL MEDICINE
Payer: COMMERCIAL

## 2024-11-27 LAB
ALBUMIN UR-MCNC: 20 MG/DL
ANION GAP SERPL CALCULATED.3IONS-SCNC: 8 MMOL/L (ref 7–15)
APPEARANCE UR: CLEAR
BACTERIA #/AREA URNS HPF: ABNORMAL /HPF
BILIRUB UR QL STRIP: NEGATIVE
BUN SERPL-MCNC: 38.6 MG/DL (ref 8–23)
CA-I BLD-MCNC: 4.4 MG/DL (ref 4.4–5.2)
CA-I BLD-MCNC: 5.1 MG/DL (ref 4.4–5.2)
CALCIUM SERPL-MCNC: 8 MG/DL (ref 8.8–10.4)
CHLORIDE SERPL-SCNC: 112 MMOL/L (ref 98–107)
COLOR UR AUTO: YELLOW
CREAT SERPL-MCNC: 1.04 MG/DL (ref 0.51–0.95)
EGFRCR SERPLBLD CKD-EPI 2021: 60 ML/MIN/1.73M2
ERYTHROCYTE [DISTWIDTH] IN BLOOD BY AUTOMATED COUNT: 17.5 % (ref 10–15)
GLUCOSE BLDC GLUCOMTR-MCNC: 184 MG/DL (ref 70–99)
GLUCOSE BLDC GLUCOMTR-MCNC: 185 MG/DL (ref 70–99)
GLUCOSE BLDC GLUCOMTR-MCNC: 209 MG/DL (ref 70–99)
GLUCOSE BLDC GLUCOMTR-MCNC: 239 MG/DL (ref 70–99)
GLUCOSE SERPL-MCNC: 205 MG/DL (ref 70–99)
GLUCOSE UR STRIP-MCNC: 150 MG/DL
HCO3 SERPL-SCNC: 24 MMOL/L (ref 22–29)
HCT VFR BLD AUTO: 25 % (ref 35–47)
HGB BLD-MCNC: 7.9 G/DL (ref 11.7–15.7)
HGB UR QL STRIP: NEGATIVE
HYALINE CASTS: 13 /LPF
KETONES UR STRIP-MCNC: ABNORMAL MG/DL
LEUKOCYTE ESTERASE UR QL STRIP: ABNORMAL
MAGNESIUM SERPL-MCNC: 2.5 MG/DL (ref 1.7–2.3)
MCH RBC QN AUTO: 26.4 PG (ref 26.5–33)
MCHC RBC AUTO-ENTMCNC: 31.6 G/DL (ref 31.5–36.5)
MCV RBC AUTO: 84 FL (ref 78–100)
MUCOUS THREADS #/AREA URNS LPF: PRESENT /LPF
NITRATE UR QL: NEGATIVE
PH UR STRIP: 5.5 [PH] (ref 5–7)
PHOSPHATE SERPL-MCNC: 2.4 MG/DL (ref 2.5–4.5)
PLATELET # BLD AUTO: 184 10E3/UL (ref 150–450)
POTASSIUM SERPL-SCNC: 4.1 MMOL/L (ref 3.4–5.3)
RBC # BLD AUTO: 2.99 10E6/UL (ref 3.8–5.2)
RBC URINE: 5 /HPF
SODIUM SERPL-SCNC: 144 MMOL/L (ref 135–145)
SP GR UR STRIP: 1.02 (ref 1–1.03)
SQUAMOUS EPITHELIAL: 3 /HPF
UROBILINOGEN UR STRIP-MCNC: <2 MG/DL
WBC # BLD AUTO: 11.8 10E3/UL (ref 4–11)
WBC URINE: 56 /HPF

## 2024-11-27 PROCEDURE — 97110 THERAPEUTIC EXERCISES: CPT | Mod: GO

## 2024-11-27 PROCEDURE — 999N000157 HC STATISTIC RCP TIME EA 10 MIN

## 2024-11-27 PROCEDURE — 85027 COMPLETE CBC AUTOMATED: CPT

## 2024-11-27 PROCEDURE — 250N000013 HC RX MED GY IP 250 OP 250 PS 637: Performed by: INTERNAL MEDICINE

## 2024-11-27 PROCEDURE — 258N000003 HC RX IP 258 OP 636: Performed by: PHYSICIAN ASSISTANT

## 2024-11-27 PROCEDURE — 250N000013 HC RX MED GY IP 250 OP 250 PS 637: Performed by: PHYSICIAN ASSISTANT

## 2024-11-27 PROCEDURE — 250N000013 HC RX MED GY IP 250 OP 250 PS 637

## 2024-11-27 PROCEDURE — 82330 ASSAY OF CALCIUM: CPT | Performed by: PHYSICIAN ASSISTANT

## 2024-11-27 PROCEDURE — 250N000011 HC RX IP 250 OP 636: Mod: JZ | Performed by: PHYSICIAN ASSISTANT

## 2024-11-27 PROCEDURE — 94799 UNLISTED PULMONARY SVC/PX: CPT

## 2024-11-27 PROCEDURE — 250N000012 HC RX MED GY IP 250 OP 636 PS 637: Performed by: SURGERY

## 2024-11-27 PROCEDURE — 97535 SELF CARE MNGMENT TRAINING: CPT | Mod: GO

## 2024-11-27 PROCEDURE — 250N000011 HC RX IP 250 OP 636: Performed by: THORACIC SURGERY (CARDIOTHORACIC VASCULAR SURGERY)

## 2024-11-27 PROCEDURE — 84100 ASSAY OF PHOSPHORUS: CPT | Performed by: INTERNAL MEDICINE

## 2024-11-27 PROCEDURE — 84295 ASSAY OF SERUM SODIUM: CPT

## 2024-11-27 PROCEDURE — 87088 URINE BACTERIA CULTURE: CPT | Performed by: SURGERY

## 2024-11-27 PROCEDURE — 81003 URINALYSIS AUTO W/O SCOPE: CPT | Performed by: SURGERY

## 2024-11-27 PROCEDURE — 200N000001 HC R&B ICU

## 2024-11-27 PROCEDURE — 99233 SBSQ HOSP IP/OBS HIGH 50: CPT | Performed by: INTERNAL MEDICINE

## 2024-11-27 PROCEDURE — 250N000011 HC RX IP 250 OP 636: Mod: JZ | Performed by: SURGERY

## 2024-11-27 PROCEDURE — P9045 ALBUMIN (HUMAN), 5%, 250 ML: HCPCS | Performed by: THORACIC SURGERY (CARDIOTHORACIC VASCULAR SURGERY)

## 2024-11-27 PROCEDURE — 87186 SC STD MICRODIL/AGAR DIL: CPT | Performed by: SURGERY

## 2024-11-27 PROCEDURE — 250N000013 HC RX MED GY IP 250 OP 250 PS 637: Performed by: THORACIC SURGERY (CARDIOTHORACIC VASCULAR SURGERY)

## 2024-11-27 PROCEDURE — 80048 BASIC METABOLIC PNL TOTAL CA: CPT

## 2024-11-27 PROCEDURE — 83735 ASSAY OF MAGNESIUM: CPT | Performed by: INTERNAL MEDICINE

## 2024-11-27 PROCEDURE — 82330 ASSAY OF CALCIUM: CPT | Performed by: INTERNAL MEDICINE

## 2024-11-27 RX ORDER — CALCIUM GLUCONATE 20 MG/ML
2 INJECTION, SOLUTION INTRAVENOUS ONCE
Status: COMPLETED | OUTPATIENT
Start: 2024-11-27 | End: 2024-11-27

## 2024-11-27 RX ORDER — HYDROXYZINE HYDROCHLORIDE 10 MG/1
10 TABLET, FILM COATED ORAL 3 TIMES DAILY PRN
Status: DISCONTINUED | OUTPATIENT
Start: 2024-11-27 | End: 2024-12-01

## 2024-11-27 RX ORDER — CEFTRIAXONE 1 G/1
1 INJECTION, POWDER, FOR SOLUTION INTRAMUSCULAR; INTRAVENOUS EVERY 24 HOURS
Status: DISCONTINUED | OUTPATIENT
Start: 2024-11-27 | End: 2024-11-29

## 2024-11-27 RX ADMIN — SENNOSIDES AND DOCUSATE SODIUM 1 TABLET: 8.6; 5 TABLET ORAL at 21:14

## 2024-11-27 RX ADMIN — ALBUMIN HUMAN 12.5 G: 0.05 INJECTION, SOLUTION INTRAVENOUS at 08:17

## 2024-11-27 RX ADMIN — CITALOPRAM HYDROBROMIDE 20 MG: 20 TABLET ORAL at 08:17

## 2024-11-27 RX ADMIN — ONDANSETRON 4 MG: 2 INJECTION INTRAMUSCULAR; INTRAVENOUS at 11:20

## 2024-11-27 RX ADMIN — CALCIUM GLUCONATE 1 G: 20 INJECTION, SOLUTION INTRAVENOUS at 06:33

## 2024-11-27 RX ADMIN — CEFTRIAXONE SODIUM 1 G: 1 INJECTION, POWDER, FOR SOLUTION INTRAMUSCULAR; INTRAVENOUS at 21:27

## 2024-11-27 RX ADMIN — HEPARIN SODIUM 5000 UNITS: 5000 INJECTION, SOLUTION INTRAVENOUS; SUBCUTANEOUS at 04:07

## 2024-11-27 RX ADMIN — INSULIN ASPART 1 UNITS: 100 INJECTION, SOLUTION INTRAVENOUS; SUBCUTANEOUS at 16:21

## 2024-11-27 RX ADMIN — CALCIUM GLUCONATE 2 G: 20 INJECTION, SOLUTION INTRAVENOUS at 10:34

## 2024-11-27 RX ADMIN — INSULIN ASPART 1 UNITS: 100 INJECTION, SOLUTION INTRAVENOUS; SUBCUTANEOUS at 08:16

## 2024-11-27 RX ADMIN — POTASSIUM & SODIUM PHOSPHATES POWDER PACK 280-160-250 MG 1 PACKET: 280-160-250 PACK at 06:33

## 2024-11-27 RX ADMIN — POTASSIUM & SODIUM PHOSPHATES POWDER PACK 280-160-250 MG 1 PACKET: 280-160-250 PACK at 15:13

## 2024-11-27 RX ADMIN — INSULIN GLARGINE 6 UNITS: 100 INJECTION, SOLUTION SUBCUTANEOUS at 12:28

## 2024-11-27 RX ADMIN — INSULIN ASPART 2 UNITS: 100 INJECTION, SOLUTION INTRAVENOUS; SUBCUTANEOUS at 12:28

## 2024-11-27 RX ADMIN — PANTOPRAZOLE SODIUM 40 MG: 40 TABLET, DELAYED RELEASE ORAL at 21:14

## 2024-11-27 RX ADMIN — OXYCODONE HYDROCHLORIDE 5 MG: 5 TABLET ORAL at 08:16

## 2024-11-27 RX ADMIN — POLYETHYLENE GLYCOL 3350 17 G: 17 POWDER, FOR SOLUTION ORAL at 08:17

## 2024-11-27 RX ADMIN — ACETAMINOPHEN 975 MG: 325 TABLET ORAL at 04:07

## 2024-11-27 RX ADMIN — HEPARIN SODIUM 5000 UNITS: 5000 INJECTION, SOLUTION INTRAVENOUS; SUBCUTANEOUS at 21:17

## 2024-11-27 RX ADMIN — PRAMIPEXOLE DIHYDROCHLORIDE 0.5 MG: 0.5 TABLET ORAL at 21:14

## 2024-11-27 RX ADMIN — SENNOSIDES AND DOCUSATE SODIUM 1 TABLET: 8.6; 5 TABLET ORAL at 08:17

## 2024-11-27 RX ADMIN — HEPARIN SODIUM 5000 UNITS: 5000 INJECTION, SOLUTION INTRAVENOUS; SUBCUTANEOUS at 11:20

## 2024-11-27 RX ADMIN — OXYCODONE HYDROCHLORIDE 5 MG: 5 TABLET ORAL at 01:21

## 2024-11-27 RX ADMIN — PHENYLEPHRINE HYDROCHLORIDE 1 MCG/KG/MIN: 50 INJECTION INTRAVENOUS at 02:13

## 2024-11-27 RX ADMIN — ASPIRIN 81 MG CHEWABLE TABLET 162 MG: 81 TABLET CHEWABLE at 08:17

## 2024-11-27 RX ADMIN — POTASSIUM & SODIUM PHOSPHATES POWDER PACK 280-160-250 MG 1 PACKET: 280-160-250 PACK at 10:35

## 2024-11-27 RX ADMIN — ATORVASTATIN CALCIUM 80 MG: 40 TABLET, FILM COATED ORAL at 21:14

## 2024-11-27 RX ADMIN — ACETAMINOPHEN 975 MG: 325 TABLET ORAL at 11:20

## 2024-11-27 ASSESSMENT — ACTIVITIES OF DAILY LIVING (ADL)
ADLS_ACUITY_SCORE: 42

## 2024-11-27 NOTE — PROGRESS NOTES
[facility]    Medicine Progress Note - Hospitalist Service    Date of Admission:  11/23/2024    Assessment & Plan   Gill Mendez is a 64 year old female with a medical history significant for hypertension, hyperlipidemia, type II DM, asthma and anxiety disorder who is admitted with an NSTEMI and acute CHF decompensation. Cardiac cath showing multivessel CAD. Cardiovascular surgeon did CABG on 11/24. Extubated on 11/25.      Acute Non-STEMI  Multivessel CAD   - non-ST elevation myocardial infarction (NSTEMI).  -aspirin daily. Hold ACE due to hypotension  -Initiate high-intensity statin therapy  -Heparin gtt was initiated in ER, stopped before CABG  -Consult cardiology: cardiac cath showing multivessel CAD on 11/23  -consult CVS: urgent CABG and IABP in cath lab on 11/24  -intubated and in ICU s/p CABG. Extubated on 11/25  - Levophed gtt and vasopressin prn. Wean off when able.      Acute CHF Decompensation, pulm edema  -Likely secondary to volume overload and acute cardiac dysfunction.  -cardiologist consulted  -Lasix iv was initiated. On hold due to soft BP.      Hypertension  - was on nitroglycerin drip. Off.   -low bp s/p CABG, on vasopressors      Type II Diabetes Mellitus  -frequent Accu-Cheks and manage with sliding scale insulin.  -Maintain blood glucose between 100-140 mg/dL.  -Hold tirzepatide, amlodipine, lisinopril, and chlorthalidone during acute hospitalization.  -Reassess diabetes medications upon stabilization and discharge planning.      Asthma   Not in exacerbation  Resume home albuterol as needed for shortness of breath.   Duonebs PRN, 3 day course of prednsione   Continue maintenance therapy with Breo Ellipta or equivalent inhaler.     Anxiety Disorder  Severe avani phobia   -Continue Celexa at the current dose.  -Monitor for any anxiety exacerbation during hospitalization.     Rest of Patient s Medical Problems  Management remains unchanged unless otherwise indicated.     "             Diet: Combination Diet Moderate Consistent Carb (60 g CHO per Meal) Diet; Low Saturated Fat Na <2400mg Diet    DVT Prophylaxis: Heparin SQ  Mcdaniels Catheter: Not present  Lines: PRESENT      CVC Triple Lumen Right Internal jugular-Site Assessment: WDL  Arterial Line 11/24/24 Brachial-Site Assessment: WDL      Cardiac Monitoring: ACTIVE order. Indication: Open heart surgery (7 days)  Code Status: Full Code      Clinically Significant Risk Factors         # Hypernatremia: Highest Na = 146 mmol/L in last 2 days, will monitor as appropriate  # Hyperchloremia: Highest Cl = 112 mmol/L in last 2 days, will monitor as appropriate          # Hypoalbuminemia: Lowest albumin = 3.1 g/dL at 11/25/2024  4:26 AM, will monitor as appropriate     # Hypertension: Noted on problem list  # Acute heart failure with reduced ejection fraction: last echo with EF <40% and receiving IV diuretics           # Overweight: Estimated body mass index is 27.52 kg/m  as calculated from the following:    Height as of this encounter: 1.651 m (5' 5\").    Weight as of this encounter: 75 kg (165 lb 6.4 oz).      # Financial/Environmental Concerns: none  # Asthma: noted on problem list  # History of CABG: noted on surgical history       Social Drivers of Health    Alcohol Use: Medium Risk (5/25/2023)    [facility] [employer] [employer]    Alcohol Use     Alcohol Use Status: Yes   Interpersonal Safety: Low Risk  (11/25/2024)    Interpersonal Safety     Do you feel physically and emotionally safe where you currently live?: Patient unable to answer     Within the past 12 months, have you been hit, slapped, kicked or otherwise physically hurt by someone?: No     Within the past 12 months, have you been humiliated or emotionally abused in other ways by your partner or ex-partner?: No   Recent Concern: Interpersonal Safety - High Risk (11/23/2024)    Interpersonal Safety     Do you feel physically and emotionally safe where you currently " live?: No     Within the past 12 months, have you been hit, slapped, kicked or otherwise physically hurt by someone?: No     Within the past 12 months, have you been humiliated or emotionally abused in other ways by your partner or ex-partner?: No    Received from [facility], [facility] [facility]    Social Connections          Disposition Plan     Medically Ready for Discharge: Anticipated in 2-4 Days    Barrier: recover from CABG         Yuly Hinson MD  Hospitalist Service  [facility]  Securely message with [employer] (more info)  Text page via [facility] Paging/Directory   ______________________________________________________________________    Interval History   5/10 chest pain, s/p CABG. No sob, no f/c, no n/v.     Physical Exam   Vital Signs: Temp: 98.8  F (37.1  C) Temp src: Oral BP: 111/58 Pulse: 86   Resp: 15 SpO2: 99 % O2 Device: Nasal cannula Oxygen Delivery: 1 LPM  Weight: 165 lbs 6.4 oz    General.  Awake alert oriented not in acute distress.  HEENT.  Pupils equal round react to light, anicteric, EOM intact.  Neck supple no JVD.  CVS regular rhythm no murmur gallops. S/p CABG. Incision healing.   Lungs.  Clear to auscultation bilateral no wheezing or rales.  Abdomen.  Soft nontender bowel sounds present.  Extremities.  No edema no calf tenderness.  Neurological.  Awake and alert. No focal deficit.  Skin no rash. No pallor.  Psych. Normal mood.      Medical Decision Making       47 MINUTES SPENT BY ME on the date of service doing chart review, history, exam, documentation & further activities per the note.      Data     I have personally reviewed the following data over the past 24 hrs:    11.8 (H)  \   7.9 (L)   / 184     144 112 (H) 38.6 (H) /  184 (H)   4.1 24 1.04 (H) \     Procal: N/A CRP: N/A Lactic Acid: 1.0         Imaging results reviewed over the past 24 hrs:   No results found for this or any previous visit (from the  past 24 hours).

## 2024-11-27 NOTE — PROGRESS NOTES
Up in chair from 1pm-630pm and tolerated well.  NSR with PAC's- rates in the 80-90's.  Atarax given x2 today.  Able to pivot and take a few steps from chair to bed but did drop blood pressures and became dizzy.  Once back in bed able to recover quickly.  Titrated pressors in half today but remains on lidia.  Albumin given x2.  Mcdaniels removed and purewick placed.

## 2024-11-27 NOTE — PROGRESS NOTES
Care Management Follow Up    Length of Stay (days): 4    Expected Discharge Date: 11/30/2024    Anticipated Discharge Plan:   home    Transportation:  family    PT Recommendations:    OT Recommendations:  home with assist       Barriers to Discharge: medical stability     Prior Living Situation:  Patient lives in a house with her spouse, Faraz. There are a couple of steps to enter home. At baseline, patient in independent with I/ADLs. She occasionally uses a cane for ambulation (has had toes amputated in the past). Patient is retired. No home care or community services.  Spouse is primary family contact.     Discussed  Partnership in Safe Discharge Planning  document with patient/family: No      Handoff Completed: No, handoff not indicated or clinically appropriate     Patient/Spokesperson Updated: No     Additional Information:  Medical:  Patient with a medical history significant for hypertension, hyperlipidemia, type II DM, asthma and anxiety disorder who is admitted with an NSTEMI and acute CHF decompensation. Cardiac cath showing multivessel CAD. Cardiovascular surgeon did CABG on 11/24. Extubated on 11/25.      CT Surgery following.        11/27/24:  Current recommendation is for home with assist. Will plan for return home with support from spouse.        Kenia Strong RN

## 2024-11-27 NOTE — PROGRESS NOTES
NUTRITION EDUCATION      REASON :  Provider order for diet education after cardiac surgery.      Patient is s/p CABG 11/24/24.  History of DM, HgbA1c 6.1.    NUTRITION HISTORY:  Met patient and patient's spouse.  Patient has intentionally lost 40 lbs with Mounjaro.      NUTRITION DIAGNOSIS:  Food- and nutrition-related knowledge deficit R/t heart disease    INTERVENTIONS:    Nutrition Prescription:  Consistent Carb, low saturated fat, <2400 mg sodium.    Implementation:      *  Nutrition Education (Content):   A)  Provided handout Heart healthy consistent carbohydrate diet and discussed diet.              *  Anticipate good compliance      *  Diet Education - refer to Education Flowsheet    Start Glucerna daily for additional protein.    Goals:      *  Patient participated in diet education.       *  All of the above goals met during the education session    Follow Up/Monitoring:      *  Provided RD contact information for future questions      *  Recommended Out-Patient Nutrition Referral, if further diet instructions are needed

## 2024-11-27 NOTE — PLAN OF CARE
[facility] - ICU    RN Progress Note:            Pertinent Assessments:      Please refer to flowsheet rows for full assessment     Alert and oriented, pain manageable with schedule tylenol and prn oxycodone. On Phenylephrine for pressors, tried titrating it down wasn't success.until 3 am. Able to titrate down to 0.4, but patient becomes hypotensive during activities. Patient unable to void since francis was taken out,bladder scan for 274 ml, patient asymptomatic, urine output is trending down encouraged fluid intake.  Straight cath for 320 ml around 4;30 am. Incision site clean,dry and intact. Patient slightly drowsy encouraged to stay away from narcotics.           Key Events - This Shift:       See above                Barriers to Discharge / Downgrade:     Pressors need, cardiac rehab, chest tube             Goal Outcome Evaluation:      Plan of Care Reviewed With: patient    Overall Patient Progress: improvingOverall Patient Progress: improving    Outcome Evaluation: vital stable, no new concern still on Asher

## 2024-11-27 NOTE — PROGRESS NOTES
"CVTS Daily Progress Note   POD 3 s/p CABG x3 (LIMA-LAD, rSVG-PDA, rSVG-OM)  Attending: Raul  LOS: 4    SUBJECTIVE/INTERVAL EVENTS:    NAEO, some lower BP and tachycardia with OOB activity. Patient otherwise progressing well. Maintaining oxygen saturations on supplemental oxygen via NC. Normotensive on small amount of Asher support. Feeling weak when up on feet but doing well in chair. Pain controlled overall. - BM / + flatus. Tolerating PO intake. Marginal UOP. Chest tube output appropriate. Hgb dipped to 7.9 from 9 yesterday. Patient denies new chest pain, shortness of breath, abdominal pain, and nausea. Asks when she will be able to have her invasive lines removed.    OBJECTIVE:  Temp:  [98.1  F (36.7  C)-99.1  F (37.3  C)] 98.8  F (37.1  C)  Pulse:  [82-95] 91  Resp:  [11-31] 19  BP: ()/(51-69) 117/64  MAP:  [57 mmHg-82 mmHg] 75 mmHg  Arterial Line BP: ()/(39-57) 118/52  SpO2:  [91 %-100 %] 98 %  Vitals:    11/23/24 0100 11/24/24 0500 11/25/24 0400 11/26/24 0400   Weight: 71.7 kg (158 lb 1.6 oz) 71.3 kg (157 lb 3.2 oz) 73.6 kg (162 lb 4.8 oz) 72.6 kg (160 lb 1.6 oz)    11/27/24 0600   Weight: 75 kg (165 lb 6.4 oz)       Clinically Significant Risk Factors         # Hypernatremia: Highest Na = 146 mmol/L in last 2 days, will monitor as appropriate  # Hyperchloremia: Highest Cl = 112 mmol/L in last 2 days, will monitor as appropriate          # Hypoalbuminemia: Lowest albumin = 3.1 g/dL at 11/25/2024  4:26 AM, will monitor as appropriate     # Hypertension: Noted on problem list  # Acute heart failure with reduced ejection fraction: last echo with EF <40% and receiving IV diuretics           # Overweight: Estimated body mass index is 27.52 kg/m  as calculated from the following:    Height as of this encounter: 1.651 m (5' 5\").    Weight as of this encounter: 75 kg (165 lb 6.4 oz).      # Financial/Environmental Concerns: none  # Asthma: noted on problem list  # History of CABG: noted on surgical " history            Current Medications:    Scheduled Meds:  Current Facility-Administered Medications   Medication Dose Route Frequency Provider Last Rate Last Admin    acetaminophen (TYLENOL) tablet 975 mg  975 mg Oral Q8H Yuly Hinson MD   975 mg at 11/27/24 0407    Or    acetaminophen (TYLENOL) Suppository 650 mg  650 mg Rectal Q8H Yuly Hinson MD   650 mg at 11/25/24 1233    aspirin (ASA) chewable tablet 162 mg  162 mg Oral or NG Tube Daily Sybil Shepard PA-C   162 mg at 11/27/24 0817    atorvastatin (LIPITOR) tablet 80 mg  80 mg Oral or Feeding Tube QPM Beena Mitchell PA-C   80 mg at 11/26/24 2108    calcium gluconate 2 g in  mL intermittent infusion  2 g Intravenous Once Barbara Boggs NP        citalopram (celeXA) tablet 20 mg  20 mg Oral or Feeding Tube QAM Beena Mitchell PA-C   20 mg at 11/27/24 0817    [Held by provider] empagliflozin (JARDIANCE) tablet 25 mg  25 mg Oral Daily Beena Mitchell PA-C        fluticasone (ARNUITY ELLIPTA) 100 MCG/ACT inhaler 1 puff  1 puff Inhalation Daily Beena Mitchell PA-C   1 puff at 11/27/24 0818    heparin ANTICOAGULANT injection 5,000 Units  5,000 Units Subcutaneous Q8H Sybil Shepard PA-C   5,000 Units at 11/27/24 0407    influenza trivalent vaccine for ages 50-64 years (PF) (FLUBLOK) injection 0.5 mL  0.5 mL Intramuscular Prior to discharge Aftab Fortune MD        insulin aspart (NovoLOG) injection (RAPID ACTING)  1-7 Units Subcutaneous TID AC Beena Mitchell PA-C   1 Units at 11/27/24 0816    insulin aspart (NovoLOG) injection (RAPID ACTING)  1-5 Units Subcutaneous At Bedtime Beena Mitchell PA-C   2 Units at 11/26/24 2108    insulin glargine (LANTUS PEN) injection 6 Units  6 Units Subcutaneous QAM AC Barbara Boggs NP        [Held by provider] metFORMIN (GLUCOPHAGE XR) 24 hr tablet 1,000 mg  1,000 mg Oral Daily with breakfast Beena Mitchell PA-C        pantoprazole (PROTONIX) 2 mg/mL suspension 40  mg  40 mg Oral or NG Tube Daily Sybil Shepard PA-C        Or    pantoprazole (PROTONIX) EC tablet 40 mg  40 mg Oral Daily Sybil Shepard, PA-C   40 mg at 11/26/24 2109    polyethylene glycol (MIRALAX) Packet 17 g  17 g Oral Daily Sybil Shepard, PA-C   17 g at 11/27/24 0817    potassium & sodium phosphates (NEUTRA-PHOS) Packet 1 packet  1 packet Oral or Feeding Tube Q4H Zhen Carter MD   1 packet at 11/27/24 0633    pramipexole (MIRAPEX) tablet 0.5 mg  0.5 mg Oral At Bedtime Padma Yip MD   0.5 mg at 11/26/24 2109    senna-docusate (SENOKOT-S/PERICOLACE) 8.6-50 MG per tablet 1 tablet  1 tablet Oral BID Sybil Shepard PA-C   1 tablet at 11/27/24 0817    sodium chloride (PF) 0.9% PF flush 3 mL  3 mL Intracatheter Q8H Sybil Shepard PA-C   3 mL at 11/27/24 0121     Continuous Infusions:  Current Facility-Administered Medications   Medication Dose Route Frequency Provider Last Rate Last Admin    EPINEPHrine (ADRENALIN) 5 mg in sodium chloride 0.9 % 250 mL infusion CENTRAL  0.01-0.1 mcg/kg/min Intravenous Continuous PRN Sybil Shepard PA-C   Paused at 11/25/24 1303    norepinephrine (LEVOPHED) 4 mg in  mL infusion PREMIX  0.01-0.15 mcg/kg/min Intravenous Continuous PRN Sybil Shepard PA-C        phenylephrine (JOSLYN-SYNEPHRINE) 50 mg in sodium chloride 0.9 % 250 mL infusion  0.1-4 mcg/kg/min Intravenous Continuous PRN Sybil Shepard PA-C 8.6 mL/hr at 11/27/24 0800 0.4 mcg/kg/min at 11/27/24 0800    Reason beta blocker order not selected   Does not apply DOES NOT GO TO MAR Sybil Shepard PA-C         PRN Meds:  Current Facility-Administered Medications   Medication Dose Route Frequency Provider Last Rate Last Admin    acetaminophen (TYLENOL) tablet 650 mg  650 mg Oral Q4H PRN Sybil Shepard PA-C        albuterol (PROVENTIL HFA/VENTOLIN HFA) inhaler  2 puff Inhalation Q6H PRN Beena Mitchell PA-C        bisacodyl (DULCOLAX) suppository 10 mg  10 mg Rectal Daily PRN Sybil Shepard PA-C        calcium  gluconate 1 g in 50 mL in sodium chloride intermittent infusion  1 g Intravenous Once PRN Brong, Sybil, PA-C   1 g at 11/27/24 0633    calcium gluconate 2 g in  mL intermittent infusion  2 g Intravenous Once PRN Brong, Sybil, PA-C        calcium gluconate 3 g in sodium chloride 0.9 % 100 mL intermittent infusion  3 g Intravenous Once PRN Brong, Sybil, PA-C        glucose gel 15-30 g  15-30 g Oral Q15 Min PRN Brong, Sybil, PA-C        Or    dextrose 50 % injection 25-50 mL  25-50 mL Intravenous Q15 Min PRN Brong, Sybil, PA-C        Or    glucagon injection 1 mg  1 mg Subcutaneous Q15 Min PRN Brong, Sybil, PA-C        EPINEPHrine (ADRENALIN) 5 mg in sodium chloride 0.9 % 250 mL infusion CENTRAL  0.01-0.1 mcg/kg/min Intravenous Continuous PRN Brong, Sybil, PA-C   Paused at 11/25/24 1303    hydrALAZINE (APRESOLINE) injection 10 mg  10 mg Intravenous Q30 Min PRN Brong Sybil, PA-C        HYDROmorphone (DILAUDID) injection 0.2 mg  0.2 mg Intravenous Q2H PRN Bronche, Sybil, PA-C   0.2 mg at 11/26/24 0900    hydrOXYzine HCl (ATARAX) tablet 10 mg  10 mg Oral or Feeding Tube TID PRN Barbara Boggs, MARITZA        ipratropium - albuterol 0.5 mg/2.5 mg/3 mL (DUONEB) neb solution 3 mL  3 mL Nebulization Q4H PRN Luís Howard MD        lactated ringers BOLUS 250 mL  250 mL Intravenous Q1H PRN Zhen Carter MD   250 mL at 11/25/24 0644    lidocaine (LMX4) cream   Topical Q1H PRN Devyn, Sybil, PA-C        magnesium hydroxide (MILK OF MAGNESIA) suspension 30 mL  30 mL Oral Daily PRN Brong, Sybil, PA-C        naloxone (NARCAN) injection 0.2 mg  0.2 mg Intravenous Q2 Min PRN Yuly Hinson MD        Or    naloxone (NARCAN) injection 0.4 mg  0.4 mg Intravenous Q2 Min PRN Yuly Hinson MD        Or    naloxone (NARCAN) injection 0.2 mg  0.2 mg Intramuscular Q2 Min PRYuly Vicente MD        Or    naloxone (NARCAN) injection 0.4 mg  0.4 mg Intramuscular Q2 Min PRN Yuly Hinson MD        norepinephrine  (LEVOPHED) 4 mg in  mL infusion PREMIX  0.01-0.15 mcg/kg/min Intravenous Continuous PRN Brong, Sybil, PA-C        ondansetron (ZOFRAN ODT) ODT tab 4 mg  4 mg Oral Q6H PRN Brong, Sybil, PA-C        Or    ondansetron (ZOFRAN) injection 4 mg  4 mg Intravenous Q6H PRN Brong, Sybil, PA-C        oxyCODONE (ROXICODONE) tablet 5 mg  5 mg Oral Q4H PRN Brong, Sybil, PA-C   5 mg at 11/27/24 0816    Or    oxyCODONE (ROXICODONE) tablet 10 mg  10 mg Oral Q4H PRN Brong, Sybil, PA-C   10 mg at 11/26/24 1703    phenylephrine (JOSLYN-SYNEPHRINE) 50 mg in sodium chloride 0.9 % 250 mL infusion  0.1-4 mcg/kg/min Intravenous Continuous PRN Brong, Sybil, PA-C 8.6 mL/hr at 11/27/24 0800 0.4 mcg/kg/min at 11/27/24 0800    prochlorperazine (COMPAZINE) injection 10 mg  10 mg Intravenous Q6H PRN Brong, Sybil, PA-C        Or    prochlorperazine (COMPAZINE) tablet 10 mg  10 mg Oral Q6H PRN Brong, Sybil, PA-C        Reason beta blocker order not selected   Does not apply DOES NOT GO TO Sybil Gallego PA-C        sodium chloride (PF) 0.9% PF flush 3 mL  3 mL Intracatheter q1 min prn Devyn Sybil, PA-C   3 mL at 11/26/24 0810       Cardiographics:    Telemetry monitoring demonstrates SR with rates in the 80s per my personal review.    Imaging:  No results found for this or any previous visit (from the past 24 hours).    Labs, personally reviewed.  Hemoglobin   Date Value Ref Range Status   11/27/2024 7.9 (L) 11.7 - 15.7 g/dL Final   11/26/2024 9.0 (L) 11.7 - 15.7 g/dL Final   11/25/2024 9.1 (L) 11.7 - 15.7 g/dL Final     WBC Count   Date Value Ref Range Status   11/27/2024 11.8 (H) 4.0 - 11.0 10e3/uL Final   11/26/2024 18.7 (H) 4.0 - 11.0 10e3/uL Final   11/25/2024 13.4 (H) 4.0 - 11.0 10e3/uL Final     Platelet Count   Date Value Ref Range Status   11/27/2024 184 150 - 450 10e3/uL Final   11/26/2024 297 150 - 450 10e3/uL Final   11/25/2024 227 150 - 450 10e3/uL Final     Creatinine   Date Value Ref Range Status   11/27/2024 1.04 (H)  0.51 - 0.95 mg/dL Final   11/26/2024 1.20 (H) 0.51 - 0.95 mg/dL Final   11/25/2024 0.99 (H) 0.51 - 0.95 mg/dL Final     Potassium   Date Value Ref Range Status   11/27/2024 4.1 3.4 - 5.3 mmol/L Final   11/26/2024 4.0 3.4 - 5.3 mmol/L Final   11/25/2024 3.9 3.4 - 5.3 mmol/L Final   01/02/2023 4.0 3.5 - 5.0 mmol/L Final   01/01/2023 4.1 3.5 - 5.0 mmol/L Final   03/22/2021 4.4 3.5 - 5.0 mmol/L Final     Potassium POCT   Date Value Ref Range Status   11/24/2024 4.0 3.4 - 5.3 mmol/L Final   11/24/2024 3.5 3.4 - 5.3 mmol/L Final   11/24/2024 4.4 3.4 - 5.3 mmol/L Final     Magnesium   Date Value Ref Range Status   11/27/2024 2.5 (H) 1.7 - 2.3 mg/dL Final   11/26/2024 2.3 1.7 - 2.3 mg/dL Final   11/25/2024 2.1 1.7 - 2.3 mg/dL Final          I/O:  I/O last 3 completed shifts:  In: 4173.12 [P.O.:2160; I.V.:1663.12]  Out: 1385 [Urine:805; Chest Tube:580]       Physical Exam:    General: Patient seen up in chair, NAD. Conversant, pleasant, calm, cooperative on exam.  HEENT: no sclera icterus, moist mucosa, no tracheal deviation  CV: RRR on monitor, WWP. Mild LE edema.  Incision C/D/I, no erythema or induration  Pulm: Unlabored breathing on supplemental oxygen via NC. Chest tubes in place, serosanguinous output, no air leak  Abd: SNTND  Ext: Mild pedal edema, SCDs in place  Neuro: A&O, CNs grossly intact, face symmetric, speech clear, SPARKS      ASSESSMENT/PLAN:    Gill Mendez is a 64 year old female who is POD 3 s/p CABG x3.    Active Problems:    NSTEMI (non-ST elevated myocardial infarction) (H)        NEURO  PSYCH:  Acute postop pain  ANX  RLS  - Scheduled Tylenol/lidocaine patches and PRN Tylenol/oxycodone/dilaudid for pain  - PTA Celexa resumed  - PRN Atarax available  - PTA Mirapex resumed    CV:  ICM/HFrEF  NSTEMI/CAD s/p CABG x3  HTN  HLD  Hypervolemia  - Preop echo LVEF 25-30% (no prior echos pre-dating NSTEMI); post CPB LVEF 35%  - Plan to repeat echo prior to discharge, after chest tubes removed  - IABP removed POD  1  - Normotensive  - Weaning pressors as hemodynamics allow, currently on phenylephrine  - Will give additional albumin as well as empiric 2g calcium gluc  - Beta blocker deferred while continuing to requiring pressor support  - Hold PTA amlodipine and lisinopril  - ASA 162mg  - Atorvastatin 80mg daily  - Cardiology consulted earlier this admission, will look to re-engage in coming days for GDMT optimization; will need CORE consult at discharge  - Hold PTA Jardiance for now  - Chest tubes to remain today    PULM:  Acute hypoxic respiratory insufficiency  Asthma/COPD  - Extubated POD 1  - Maintaining oxygen saturations on supplemental oxygen via NC, wean as tolerated  - PTA Arnuity Ellipta & albuterol inhalers resumed  - Duonebs available prn  - Encourage pulmonary toilet/OOB/activity    GI  NUTRITION:  - Diet: Cardiac/consistent carb  - Bowel regimen    RENAL  FE:  CARMEN, improviong  Urinary retention  - Marginal UOP  - Cr improving - BL ~0.9-1.1  - Diuresis pending weaning from pressors, consider dose this afternoon if able to be liberated from pressor support  - Hold PTA chlorthalidone  - Continue electrolyte replacement protocol  - Check UA    HEME:  Acute blood loss anemia  - Hgb drifting, no bleeding concerns. Hep SQ, ASA    ID:  Stress-induced leukocytosis, improving  - Yecenia op ppx complete, afebrile  - Follow for UA results, as above  - Procal indeterminate, particularly given reduced renal function - repeat in AM, lactate WNL    ENDO:  DM2 - Hgb A1c 6.1  - Sliding scale insulin  - Add Lantus 6 units daily   - Hold PTA Jardiance, metformin, and tirzepatide  - BG goal <180 to promote optimal wound healing    PPx:  - DVT: SCDs, SQ heparin TID, OOB/ambulation   - GI: Protonix 40mg PO daily    DISPO:  - Continue critical care in ICU pending weaning of pressor support  - Medically Ready for Discharge: Anticipated in 2-4 Days      Dr. Carter updated.      Barbara Boggs, CNP, ACNPC-AG  Cardiothoracic  Surgery  [employer] Pager [phone]

## 2024-11-28 VITALS
HEART RATE: 88 BPM | TEMPERATURE: 98.8 F | OXYGEN SATURATION: 96 % | WEIGHT: 170.8 LBS | BODY MASS INDEX: 28.46 KG/M2 | DIASTOLIC BLOOD PRESSURE: 63 MMHG | SYSTOLIC BLOOD PRESSURE: 115 MMHG | RESPIRATION RATE: 18 BRPM | HEIGHT: 65 IN

## 2024-11-28 LAB
ANION GAP SERPL CALCULATED.3IONS-SCNC: 7 MMOL/L (ref 7–15)
BACTERIA UR CULT: ABNORMAL
BUN SERPL-MCNC: 34.7 MG/DL (ref 8–23)
CA-I BLD-MCNC: 4.8 MG/DL (ref 4.4–5.2)
CALCIUM SERPL-MCNC: 8.2 MG/DL (ref 8.8–10.4)
CHLORIDE SERPL-SCNC: 110 MMOL/L (ref 98–107)
CREAT SERPL-MCNC: 0.86 MG/DL (ref 0.51–0.95)
EGFRCR SERPLBLD CKD-EPI 2021: 76 ML/MIN/1.73M2
ERYTHROCYTE [DISTWIDTH] IN BLOOD BY AUTOMATED COUNT: 17.4 % (ref 10–15)
GLUCOSE BLDC GLUCOMTR-MCNC: 156 MG/DL (ref 70–99)
GLUCOSE BLDC GLUCOMTR-MCNC: 185 MG/DL (ref 70–99)
GLUCOSE BLDC GLUCOMTR-MCNC: 195 MG/DL (ref 70–99)
GLUCOSE BLDC GLUCOMTR-MCNC: 246 MG/DL (ref 70–99)
GLUCOSE SERPL-MCNC: 142 MG/DL (ref 70–99)
HCO3 SERPL-SCNC: 25 MMOL/L (ref 22–29)
HCT VFR BLD AUTO: 23.7 % (ref 35–47)
HGB BLD-MCNC: 7.4 G/DL (ref 11.7–15.7)
MAGNESIUM SERPL-MCNC: 2.2 MG/DL (ref 1.7–2.3)
MCH RBC QN AUTO: 25.9 PG (ref 26.5–33)
MCHC RBC AUTO-ENTMCNC: 31.2 G/DL (ref 31.5–36.5)
MCV RBC AUTO: 83 FL (ref 78–100)
PHOSPHATE SERPL-MCNC: 1.8 MG/DL (ref 2.5–4.5)
PHOSPHATE SERPL-MCNC: 2.1 MG/DL (ref 2.5–4.5)
PLATELET # BLD AUTO: 181 10E3/UL (ref 150–450)
POTASSIUM SERPL-SCNC: 4.3 MMOL/L (ref 3.4–5.3)
PROCALCITONIN SERPL IA-MCNC: 0.4 NG/ML
RBC # BLD AUTO: 2.86 10E6/UL (ref 3.8–5.2)
SODIUM SERPL-SCNC: 142 MMOL/L (ref 135–145)
WBC # BLD AUTO: 8.3 10E3/UL (ref 4–11)

## 2024-11-28 PROCEDURE — 83735 ASSAY OF MAGNESIUM: CPT | Performed by: THORACIC SURGERY (CARDIOTHORACIC VASCULAR SURGERY)

## 2024-11-28 PROCEDURE — 82330 ASSAY OF CALCIUM: CPT | Performed by: PHYSICIAN ASSISTANT

## 2024-11-28 PROCEDURE — 36415 COLL VENOUS BLD VENIPUNCTURE: CPT | Performed by: INTERNAL MEDICINE

## 2024-11-28 PROCEDURE — 250N000013 HC RX MED GY IP 250 OP 250 PS 637: Performed by: SURGERY

## 2024-11-28 PROCEDURE — 250N000011 HC RX IP 250 OP 636: Performed by: SURGERY

## 2024-11-28 PROCEDURE — 84100 ASSAY OF PHOSPHORUS: CPT | Performed by: INTERNAL MEDICINE

## 2024-11-28 PROCEDURE — 250N000013 HC RX MED GY IP 250 OP 250 PS 637: Performed by: PHYSICIAN ASSISTANT

## 2024-11-28 PROCEDURE — 250N000011 HC RX IP 250 OP 636: Performed by: PHYSICIAN ASSISTANT

## 2024-11-28 PROCEDURE — 84145 PROCALCITONIN (PCT): CPT | Performed by: SURGERY

## 2024-11-28 PROCEDURE — 999N000157 HC STATISTIC RCP TIME EA 10 MIN

## 2024-11-28 PROCEDURE — 250N000013 HC RX MED GY IP 250 OP 250 PS 637: Performed by: INTERNAL MEDICINE

## 2024-11-28 PROCEDURE — 120N000013 HC R&B IMCU

## 2024-11-28 PROCEDURE — 80048 BASIC METABOLIC PNL TOTAL CA: CPT | Performed by: SURGERY

## 2024-11-28 PROCEDURE — 250N000013 HC RX MED GY IP 250 OP 250 PS 637

## 2024-11-28 PROCEDURE — 99232 SBSQ HOSP IP/OBS MODERATE 35: CPT | Performed by: INTERNAL MEDICINE

## 2024-11-28 PROCEDURE — 85018 HEMOGLOBIN: CPT | Performed by: SURGERY

## 2024-11-28 PROCEDURE — 85041 AUTOMATED RBC COUNT: CPT | Performed by: SURGERY

## 2024-11-28 PROCEDURE — 94799 UNLISTED PULMONARY SVC/PX: CPT

## 2024-11-28 RX ORDER — FUROSEMIDE 10 MG/ML
20 INJECTION INTRAMUSCULAR; INTRAVENOUS
Status: DISCONTINUED | OUTPATIENT
Start: 2024-11-28 | End: 2024-11-29

## 2024-11-28 RX ORDER — CARVEDILOL 3.12 MG/1
6.25 TABLET ORAL 2 TIMES DAILY WITH MEALS
Status: DISCONTINUED | OUTPATIENT
Start: 2024-11-28 | End: 2024-12-02

## 2024-11-28 RX ORDER — HYDRALAZINE HYDROCHLORIDE 20 MG/ML
10 INJECTION INTRAMUSCULAR; INTRAVENOUS EVERY 6 HOURS PRN
Status: DISCONTINUED | OUTPATIENT
Start: 2024-11-28 | End: 2024-12-06 | Stop reason: HOSPADM

## 2024-11-28 RX ORDER — HYDROMORPHONE HCL IN WATER/PF 6 MG/30 ML
0.2 PATIENT CONTROLLED ANALGESIA SYRINGE INTRAVENOUS EVERY 4 HOURS PRN
Status: DISCONTINUED | OUTPATIENT
Start: 2024-11-28 | End: 2024-11-29

## 2024-11-28 RX ORDER — LIDOCAINE HYDROCHLORIDE 20 MG/ML
JELLY TOPICAL
Status: DISCONTINUED | OUTPATIENT
Start: 2024-11-28 | End: 2024-11-29

## 2024-11-28 RX ADMIN — ASPIRIN 81 MG CHEWABLE TABLET 162 MG: 81 TABLET CHEWABLE at 08:05

## 2024-11-28 RX ADMIN — PRAMIPEXOLE DIHYDROCHLORIDE 0.5 MG: 0.5 TABLET ORAL at 20:19

## 2024-11-28 RX ADMIN — CARVEDILOL 6.25 MG: 3.12 TABLET, FILM COATED ORAL at 20:18

## 2024-11-28 RX ADMIN — POTASSIUM & SODIUM PHOSPHATES POWDER PACK 280-160-250 MG 2 PACKET: 280-160-250 PACK at 07:11

## 2024-11-28 RX ADMIN — HYDROMORPHONE HYDROCHLORIDE 0.2 MG: 1 INJECTION, SOLUTION INTRAMUSCULAR; INTRAVENOUS; SUBCUTANEOUS at 00:38

## 2024-11-28 RX ADMIN — POTASSIUM & SODIUM PHOSPHATES POWDER PACK 280-160-250 MG 2 PACKET: 280-160-250 PACK at 10:25

## 2024-11-28 RX ADMIN — INSULIN ASPART 1 UNITS: 100 INJECTION, SOLUTION INTRAVENOUS; SUBCUTANEOUS at 16:14

## 2024-11-28 RX ADMIN — CEFTRIAXONE SODIUM 1 G: 1 INJECTION, POWDER, FOR SOLUTION INTRAMUSCULAR; INTRAVENOUS at 18:43

## 2024-11-28 RX ADMIN — OXYCODONE HYDROCHLORIDE 10 MG: 5 TABLET ORAL at 10:25

## 2024-11-28 RX ADMIN — INSULIN ASPART 3 UNITS: 100 INJECTION, SOLUTION INTRAVENOUS; SUBCUTANEOUS at 12:41

## 2024-11-28 RX ADMIN — FUROSEMIDE 20 MG: 10 INJECTION, SOLUTION INTRAMUSCULAR; INTRAVENOUS at 10:30

## 2024-11-28 RX ADMIN — POLYETHYLENE GLYCOL 3350 17 G: 17 POWDER, FOR SOLUTION ORAL at 08:05

## 2024-11-28 RX ADMIN — FUROSEMIDE 20 MG: 10 INJECTION, SOLUTION INTRAMUSCULAR; INTRAVENOUS at 17:43

## 2024-11-28 RX ADMIN — ATORVASTATIN CALCIUM 80 MG: 40 TABLET, FILM COATED ORAL at 20:16

## 2024-11-28 RX ADMIN — CARVEDILOL 6.25 MG: 3.12 TABLET, FILM COATED ORAL at 10:30

## 2024-11-28 RX ADMIN — ACETAMINOPHEN 650 MG: 325 TABLET ORAL at 00:38

## 2024-11-28 RX ADMIN — CITALOPRAM HYDROBROMIDE 20 MG: 20 TABLET ORAL at 08:05

## 2024-11-28 RX ADMIN — HYDROXYZINE HYDROCHLORIDE 10 MG: 10 TABLET ORAL at 20:17

## 2024-11-28 RX ADMIN — OXYCODONE HYDROCHLORIDE 10 MG: 5 TABLET ORAL at 20:16

## 2024-11-28 RX ADMIN — INSULIN GLARGINE 6 UNITS: 100 INJECTION, SOLUTION SUBCUTANEOUS at 08:05

## 2024-11-28 RX ADMIN — POTASSIUM & SODIUM PHOSPHATES POWDER PACK 280-160-250 MG 1 PACKET: 280-160-250 PACK at 22:33

## 2024-11-28 RX ADMIN — HYDROXYZINE HYDROCHLORIDE 10 MG: 10 TABLET ORAL at 00:38

## 2024-11-28 RX ADMIN — SENNOSIDES AND DOCUSATE SODIUM 1 TABLET: 8.6; 5 TABLET ORAL at 20:17

## 2024-11-28 RX ADMIN — HEPARIN SODIUM 5000 UNITS: 5000 INJECTION, SOLUTION INTRAVENOUS; SUBCUTANEOUS at 14:23

## 2024-11-28 RX ADMIN — HEPARIN SODIUM 5000 UNITS: 5000 INJECTION, SOLUTION INTRAVENOUS; SUBCUTANEOUS at 20:20

## 2024-11-28 RX ADMIN — POTASSIUM & SODIUM PHOSPHATES POWDER PACK 280-160-250 MG 1 PACKET: 280-160-250 PACK at 20:12

## 2024-11-28 RX ADMIN — HYDROXYZINE HYDROCHLORIDE 10 MG: 10 TABLET ORAL at 08:05

## 2024-11-28 RX ADMIN — INSULIN ASPART 1 UNITS: 100 INJECTION, SOLUTION INTRAVENOUS; SUBCUTANEOUS at 08:06

## 2024-11-28 RX ADMIN — POTASSIUM & SODIUM PHOSPHATES POWDER PACK 280-160-250 MG 2 PACKET: 280-160-250 PACK at 14:23

## 2024-11-28 RX ADMIN — HEPARIN SODIUM 5000 UNITS: 5000 INJECTION, SOLUTION INTRAVENOUS; SUBCUTANEOUS at 04:03

## 2024-11-28 RX ADMIN — SENNOSIDES AND DOCUSATE SODIUM 1 TABLET: 8.6; 5 TABLET ORAL at 08:05

## 2024-11-28 ASSESSMENT — ACTIVITIES OF DAILY LIVING (ADL)
ADLS_ACUITY_SCORE: 42
ADLS_ACUITY_SCORE: 44
ADLS_ACUITY_SCORE: 42
ADLS_ACUITY_SCORE: 44

## 2024-11-28 NOTE — PLAN OF CARE
[facility] - ICU    RN Progress Note:            Pertinent Assessments:      Please refer to flowsheet rows for full assessment     VSS. Maintaining MAP goal >65 without pressors.     Incisions are dry, intact, and approximated. Chest tubes x4 patent and intact. Monitoring electrolytes.           Key Events - This Shift:       Patient reported sternal pain and anxiety, given PRNs.    Attempted to wean patient off of oxygen but unable to tolerate room air. On 1L of O2 nasal cannula.    No urge to void. Straight cath x1.     Up to the chair this morning. Steady gait.                Barriers to Discharge / Downgrade:     Potential to downgrade today

## 2024-11-28 NOTE — PROGRESS NOTES
[facility]    Medicine Progress Note - Hospitalist Service    Date of Admission:  11/23/2024    Assessment & Plan   Gill Mendez is a 64 year old female with a medical history significant for hypertension, hyperlipidemia, type II DM, asthma and anxiety disorder who is admitted with an NSTEMI and acute CHF decompensation. Cardiac cath showing multivessel CAD. Cardiovascular surgeon did CABG on 11/24. Extubated on 11/25.  Urine culture grew gram-negative bacilli for which she was receiving IV ceftriaxone.     Acute Non-STEMI  Multivessel CAD   - non-ST elevation myocardial infarction (NSTEMI).  -aspirin daily. Hold ACE due to hypotension  -Initiate high-intensity statin therapy  -Heparin gtt was initiated in ER, stopped before CABG  -Consult cardiology: cardiac cath showing multivessel CAD on 11/23  -consult [facility]: urgent CABG and IABP in cath lab on 11/24  -intubated and in ICU s/p CABG. Extubated on 11/25  - Levophed gtt and vasopressin prn. Wean off when able.      Acute CHF Decompensation, pulm edema  -Likely secondary to volume overload and acute cardiac dysfunction.  -cardiologist consulted  -Lasix iv was initiated. On hold due to soft BP.      Hypertension  - was on nitroglycerin drip. Off.   -low bp s/p CABG, on vasopressors      Type II Diabetes Mellitus  -frequent Accu-Cheks and manage with sliding scale insulin.  -Maintain blood glucose between 100-140 mg/dL.  -Hold tirzepatide, amlodipine, lisinopril, and chlorthalidone during acute hospitalization.  -Reassess diabetes medications upon stabilization and discharge planning.      Asthma   Not in exacerbation  Resume home albuterol as needed for shortness of breath.   Duonebs PRN, 3 day course of prednsione   Continue maintenance therapy with Breo Ellipta or equivalent inhaler.     Anxiety Disorder  Severe avani phobia   -Continue Celexa at the current dose.  -Monitor for any anxiety exacerbation during hospitalization.    Suspected  "UTI  Urine culture grew gram-negative bacilli  Continue IV ceftriaxone for now     Rest of Patient s Medical Problems  Management remains unchanged unless otherwise indicated.                 Diet: Combination Diet Moderate Consistent Carb (60 g CHO per Meal) Diet; Low Saturated Fat Na <2400mg Diet  Snacks/Supplements Adult: Glucerna; With Meals    DVT Prophylaxis: Heparin SQ  Mcdaniels Catheter: Not present  Lines: None       Cardiac Monitoring: ACTIVE order. Indication: Open heart surgery (7 days)  Code Status: Full Code      Clinically Significant Risk Factors          # Hyperchloremia: Highest Cl = 112 mmol/L in last 2 days, will monitor as appropriate          # Hypoalbuminemia: Lowest albumin = 3.1 g/dL at 11/25/2024  4:26 AM, will monitor as appropriate     # Hypertension: Noted on problem list    # Acute heart failure with reduced ejection fraction: last echo with EF <40% and receiving IV diuretics           # Overweight: Estimated body mass index is 28.42 kg/m  as calculated from the following:    Height as of this encounter: 1.651 m (5' 5\").    Weight as of this encounter: 77.5 kg (170 lb 12.8 oz).        # Financial/Environmental Concerns: none  # Asthma: noted on problem list  # History of CABG: noted on surgical history       Social Drivers of Health    Alcohol Use: Medium Risk (5/25/2023)    [facility]    Alcohol Use     Alcohol Use Status: Yes   Interpersonal Safety: Low Risk  (11/25/2024)    Interpersonal Safety     Do you feel physically and emotionally safe where you currently live?: Patient unable to answer     Within the past 12 months, have you been hit, slapped, kicked or otherwise physically hurt by someone?: No     Within the past 12 months, have you been humiliated or emotionally abused in other ways by your partner or ex-partner?: No   Recent Concern: Interpersonal Safety - High Risk (11/23/2024)    Interpersonal Safety     Do you feel physically and " emotionally safe where you currently live?: No     Within the past 12 months, have you been hit, slapped, kicked or otherwise physically hurt by someone?: No     Within the past 12 months, have you been humiliated or emotionally abused in other ways by your partner or ex-partner?: No    [facility], [facility]    Social Connections          Disposition Plan     Medically Ready for Discharge: Anticipated in 2-4 Days    Barrier: recover from CABG         Sudarshan Jimenez MD  Hospitalist Service  [facility]  Securely message with [employer] (more info)  Text page via [facility] Paging/Directory   ______________________________________________________________________    Interval History   No new complaints today and no acute events overnight.  She states her breathing has improved and pain is well-controlled. She states she is yet to ambulate. Daughter, Liberty is visiting.     Physical Exam   Vital Signs: Temp: 98.8  F (37.1  C) Temp src: Oral BP: 111/61 Pulse: 97   Resp: 16 SpO2: 93 % O2 Device: None (Room air) Oxygen Delivery: 1 LPM  Weight: 170 lbs 12.8 oz    General.  Awake alert oriented not in acute distress.  HEENT.  Pupils equal round react to light, anicteric, EOM intact.  Neck supple no JVD.  CVS regular rhythm no murmur gallops. S/p CABG. Incision healing.   Lungs.  Chest tubes in place draining serosanguineous fluid.  Diminished air entry bilaterally with significant crackles.   Abdomen.  Soft nontender bowel sounds present.  Extremities.  No edema no calf tenderness.  Neurological.  Awake and alert. No focal deficit.  Skin no rash. No pallor.  Psych. Normal mood.      Medical Decision Making       47 MINUTES SPENT BY ME on the date of service doing chart review, history, exam, documentation & further activities per the note.      Data     I have personally reviewed the following data over the past 24  hrs:    8.3  \   7.4 (L)   / 181     142 110 (H) 34.7 (H) /  246 (H)   4.3 25 0.86 \     Procal: 0.40 CRP: N/A Lactic Acid: N/A         Imaging results reviewed over the past 24 hrs:   No results found for this or any previous visit (from the past 24 hours).

## 2024-11-28 NOTE — PROGRESS NOTES
"  CVTS Daily Progress Note   POD 4 s/p CABG x3 (LIMA-LAD, rSVG-PDA, rSVG-OM)  Attending: Raul  LOS: 5    SUBJECTIVE/INTERVAL EVENTS:    Urinary retention overnight requiring multiple straight caths. Patient otherwise progressing well. Maintaining oxygen saturations on RA. Normotensive, now off Asher. Pain controlled overall. - BM / + flatus. Tolerating PO intake. Marginal UOP. Chest tube output appropriate. Hgb stable. Patient denies new chest pain, shortness of breath, abdominal pain, and nausea.     OBJECTIVE:  Temp:  [98.6  F (37  C)-99.1  F (37.3  C)] 98.7  F (37.1  C)  Pulse:  [] 92  Resp:  [14-31] 28  BP: (106-151)/(55-79) 117/60  SpO2:  [89 %-100 %] 95 %  Vitals:    11/24/24 0500 11/25/24 0400 11/26/24 0400 11/27/24 0600   Weight: 71.3 kg (157 lb 3.2 oz) 73.6 kg (162 lb 4.8 oz) 72.6 kg (160 lb 1.6 oz) 75 kg (165 lb 6.4 oz)    11/28/24 0700   Weight: 77.5 kg (170 lb 12.8 oz)       Clinically Significant Risk Factors          # Hyperchloremia: Highest Cl = 112 mmol/L in last 2 days, will monitor as appropriate          # Hypoalbuminemia: Lowest albumin = 3.1 g/dL at 11/25/2024  4:26 AM, will monitor as appropriate     # Hypertension: Noted on problem list    # Acute heart failure with reduced ejection fraction: last echo with EF <40% and receiving IV diuretics           # Overweight: Estimated body mass index is 28.42 kg/m  as calculated from the following:    Height as of this encounter: 1.651 m (5' 5\").    Weight as of this encounter: 77.5 kg (170 lb 12.8 oz).        # Financial/Environmental Concerns: none  # Asthma: noted on problem list  # History of CABG: noted on surgical history            Current Medications:    Scheduled Meds:  Current Facility-Administered Medications   Medication Dose Route Frequency Provider Last Rate Last Admin    aspirin (ASA) chewable tablet 162 mg  162 mg Oral or NG Tube Daily Sybil Shepard PA-C   162 mg at 11/28/24 0805    atorvastatin (LIPITOR) tablet 80 mg  80 mg " Oral or Feeding Tube QPM Beena Mitchell PA-C   80 mg at 11/27/24 2114    carvedilol (COREG) tablet 6.25 mg  6.25 mg Oral BID w/meals Barbara Boggs NP   6.25 mg at 11/28/24 1030    cefTRIAXone (ROCEPHIN) 1 g vial to attach to  mL bag for ADULTS or NS 50 mL bag for PEDS  1 g Intravenous Q24H Barbara Boggs NP   1 g at 11/27/24 2127    citalopram (celeXA) tablet 20 mg  20 mg Oral or Feeding Tube QAM Beena Mitchell PA-C   20 mg at 11/28/24 0805    [Held by provider] empagliflozin (JARDIANCE) tablet 25 mg  25 mg Oral Daily Beena Mitchell PA-C        fluticasone (ARNUITY ELLIPTA) 100 MCG/ACT inhaler 1 puff  1 puff Inhalation Daily Beena Mitchell PA-C   1 puff at 11/28/24 0808    furosemide (LASIX) injection 20 mg  20 mg Intravenous bid 08 & 14 Barbara Boggs NP   20 mg at 11/28/24 1030    heparin ANTICOAGULANT injection 5,000 Units  5,000 Units Subcutaneous Q8H Sybil Shepard PA-C   5,000 Units at 11/28/24 1423    influenza trivalent vaccine for ages 50-64 years (PF) (FLUBLOK) injection 0.5 mL  0.5 mL Intramuscular Prior to discharge Aftab Fortune MD        insulin aspart (NovoLOG) injection (RAPID ACTING)  1-7 Units Subcutaneous TID AC Beena Mitchell PA-C   1 Units at 11/28/24 1614    insulin aspart (NovoLOG) injection (RAPID ACTING)  1-5 Units Subcutaneous At Bedtime Beena Mitchell PA-C   1 Units at 11/27/24 2117    [START ON 11/29/2024] insulin glargine (LANTUS PEN) injection 8 Units  8 Units Subcutaneous QAM AC Barbara Boggs NP        [Held by provider] metFORMIN (GLUCOPHAGE XR) 24 hr tablet 1,000 mg  1,000 mg Oral Daily with breakfast Beena Mitchell PA-C        pantoprazole (PROTONIX) 2 mg/mL suspension 40 mg  40 mg Oral or NG Tube Daily Sybil Shepard PA-C        Or    pantoprazole (PROTONIX) EC tablet 40 mg  40 mg Oral Daily Sybil Shepard PA-C   40 mg at 11/27/24 2114    polyethylene glycol (MIRALAX) Packet 17 g  17 g Oral Daily Devyn,  YARELIS Devine   17 g at 11/28/24 0805    pramipexole (MIRAPEX) tablet 0.5 mg  0.5 mg Oral At Bedtime Padma Yip MD   0.5 mg at 11/27/24 2114    senna-docusate (SENOKOT-S/PERICOLACE) 8.6-50 MG per tablet 1 tablet  1 tablet Oral BID Sybil Shepard PA-C   1 tablet at 11/28/24 0805    sodium chloride (PF) 0.9% PF flush 3 mL  3 mL Intracatheter Q8H Sybil Shepard PA-C   3 mL at 11/28/24 1031     Continuous Infusions:  Current Facility-Administered Medications   Medication Dose Route Frequency Provider Last Rate Last Admin    Reason beta blocker order not selected   Does not apply DOES NOT GO TO Sybil Gallego PA-C         PRN Meds:  Current Facility-Administered Medications   Medication Dose Route Frequency Provider Last Rate Last Admin    acetaminophen (TYLENOL) tablet 650 mg  650 mg Oral Q4H PRN Sybil Shepard PA-C   650 mg at 11/28/24 0038    albuterol (PROVENTIL HFA/VENTOLIN HFA) inhaler  2 puff Inhalation Q6H PRN Beena Mitchell PA-C        bisacodyl (DULCOLAX) suppository 10 mg  10 mg Rectal Daily PRN Sybil Shepard PA-C        calcium gluconate 1 g in 50 mL in sodium chloride intermittent infusion  1 g Intravenous Once PRN Sybil Shepard PA-C   1 g at 11/27/24 0633    calcium gluconate 2 g in  mL intermittent infusion  2 g Intravenous Once PRN Sybil Shepard PA-C        glucose gel 15-30 g  15-30 g Oral Q15 Min PRN Sybil Shepard PA-C        Or    dextrose 50 % injection 25-50 mL  25-50 mL Intravenous Q15 Min PRN Sybil Shepard PA-C        Or    glucagon injection 1 mg  1 mg Subcutaneous Q15 Min PRN Sybil Shepard PA-C        hydrALAZINE (APRESOLINE) injection 10 mg  10 mg Intravenous Q6H PRN Barbara Boggs, NP        HYDROmorphone (DILAUDID) injection 0.2 mg  0.2 mg Intravenous Q4H PRN Barbara Boggs NP        hydrOXYzine HCl (ATARAX) tablet 10 mg  10 mg Oral or Feeding Tube TID PRN Barbara Boggs NP   10 mg at 11/28/24 0805    ipratropium - albuterol 0.5 mg/2.5 mg/3 mL (DUONEB) neb  solution 3 mL  3 mL Nebulization Q4H PRN Luís Howard MD        lidocaine (LMX4) cream   Topical Q1H PRN Sybil Shepard PA-C        lidocaine (XYLOCAINE) 2 % external gel   Urethral Once PRN Phoebe Bond MD        magnesium hydroxide (MILK OF MAGNESIA) suspension 30 mL  30 mL Oral Daily PRN Sybil Sheprad PA-C        naloxone (NARCAN) injection 0.2 mg  0.2 mg Intravenous Q2 Min PRN Yuly Hinson MD        Or    naloxone (NARCAN) injection 0.4 mg  0.4 mg Intravenous Q2 Min PRN Yuly Hinson MD        Or    naloxone (NARCAN) injection 0.2 mg  0.2 mg Intramuscular Q2 Min PRN Yuly Hinson MD        Or    naloxone (NARCAN) injection 0.4 mg  0.4 mg Intramuscular Q2 Min PRN Yuly Hinson MD        ondansetron (ZOFRAN ODT) ODT tab 4 mg  4 mg Oral Q6H PRN Sybil Shepard PA-C        Or    ondansetron (ZOFRAN) injection 4 mg  4 mg Intravenous Q6H PRN Sybil Shepard PA-C   4 mg at 11/27/24 1120    oxyCODONE (ROXICODONE) tablet 5 mg  5 mg Oral Q4H PRN Sybil Shepard, PA-C   5 mg at 11/27/24 0816    Or    oxyCODONE (ROXICODONE) tablet 10 mg  10 mg Oral Q4H PRN Blossom Shepardsa, PA-C   10 mg at 11/28/24 1025    prochlorperazine (COMPAZINE) injection 10 mg  10 mg Intravenous Q6H PRN Sybil Shepard, PA-C        Or    prochlorperazine (COMPAZINE) tablet 10 mg  10 mg Oral Q6H PRN Loki Shepardyssa, PA-C        Reason beta blocker order not selected   Does not apply DOES NOT GO TO Sybil Gallego PA-C        sodium chloride (PF) 0.9% PF flush 3 mL  3 mL Intracatheter q1 min prn Sybil Shepard PA-C   3 mL at 11/26/24 0810       Cardiographics:    Telemetry monitoring demonstrates SR with rates in the 90s per my personal review.    Imaging:  No results found for this or any previous visit (from the past 24 hours).    Labs, personally reviewed.  Hemoglobin   Date Value Ref Range Status   11/28/2024 7.4 (L) 11.7 - 15.7 g/dL Final   11/27/2024 7.9 (L) 11.7 - 15.7 g/dL Final   11/26/2024 9.0 (L) 11.7 - 15.7 g/dL Final     WBC Count    Date Value Ref Range Status   11/28/2024 8.3 4.0 - 11.0 10e3/uL Final   11/27/2024 11.8 (H) 4.0 - 11.0 10e3/uL Final   11/26/2024 18.7 (H) 4.0 - 11.0 10e3/uL Final     Platelet Count   Date Value Ref Range Status   11/28/2024 181 150 - 450 10e3/uL Final   11/27/2024 184 150 - 450 10e3/uL Final   11/26/2024 297 150 - 450 10e3/uL Final     Creatinine   Date Value Ref Range Status   11/28/2024 0.86 0.51 - 0.95 mg/dL Final   11/27/2024 1.04 (H) 0.51 - 0.95 mg/dL Final   11/26/2024 1.20 (H) 0.51 - 0.95 mg/dL Final     Potassium   Date Value Ref Range Status   11/28/2024 4.3 3.4 - 5.3 mmol/L Final   11/27/2024 4.1 3.4 - 5.3 mmol/L Final   11/26/2024 4.0 3.4 - 5.3 mmol/L Final   01/02/2023 4.0 3.5 - 5.0 mmol/L Final   01/01/2023 4.1 3.5 - 5.0 mmol/L Final   03/22/2021 4.4 3.5 - 5.0 mmol/L Final     Potassium POCT   Date Value Ref Range Status   11/24/2024 4.0 3.4 - 5.3 mmol/L Final   11/24/2024 3.5 3.4 - 5.3 mmol/L Final   11/24/2024 4.4 3.4 - 5.3 mmol/L Final     Magnesium   Date Value Ref Range Status   11/28/2024 2.2 1.7 - 2.3 mg/dL Final   11/27/2024 2.5 (H) 1.7 - 2.3 mg/dL Final   11/26/2024 2.3 1.7 - 2.3 mg/dL Final          I/O:  I/O last 3 completed shifts:  In: 1962.96 [P.O.:1610; I.V.:352.96]  Out: 2050 [Urine:1260; Chest Tube:790]       Physical Exam:    General: Patient seen up in chair, NAD. Conversant, pleasant, calm, cooperative on exam.  HEENT: no sclera icterus, moist mucosa, no tracheal deviation  CV: RRR on monitor, WWP. Mild LE edema.  Incision C/D/I, no erythema or induration  Pulm: Unlabored breathing on RA. Chest tubes in place, serous output, no air leak  Abd: SNTND, no guarding or peritoneal signs  Ext: Mild pedal edema, SCDs in place  Neuro: A&O, CNs grossly intact, face symmetric, speech clear, SPARKS      ASSESSMENT/PLAN:    Gill Mendez is a 64 year old female who is POD 3 s/p CABG x3.    Active Problems:    NSTEMI (non-ST elevated myocardial infarction) (H)        NEURO  PSYCH:  Acute postop  pain  ANX  RLS  - Scheduled Tylenol/lidocaine patches and PRN Tylenol/oxycodone/dilaudid for pain  - PTA Celexa resumed  - PRN Atarax available  - PTA Mirapex resumed    CV:  ICM/HFrEF  NSTEMI/CAD s/p CABG x3  HTN  HLD  Hypervolemia  - Preop echo LVEF 25-30% (no prior echos pre-dating NSTEMI); post CPB LVEF 35%  - Plan to repeat echo prior to discharge, after chest tubes removed  - IABP removed POD 1  - Normotensive  - Start Coreg 6.25 mg BID with hold parameters  - Hold PTA amlodipine and lisinopril  - ASA 162mg  - Atorvastatin 80mg daily  - Cardiology consulted earlier this admission, will look to re-engage in coming days for GDMT optimization; will need CORE consult at discharge  - Hold PTA Jardiance for now  - Chest tubes to remain today    PULM:  Acute hypoxic respiratory insufficiency  Asthma/COPD  - Extubated POD 1  - Maintaining oxygen saturations on RA  - PTA Arnuity Ellipta & albuterol inhalers resumed  - Duonebs available prn  - Encourage pulmonary toilet/OOB/activity    GI  NUTRITION:  - Diet: Cardiac/consistent carb  - Bowel regimen    RENAL  FE:  CARMEN, improviong  Urinary retention 2/2 suspected CAUTI  - Marginal UOP  - Cr WNL  - Diuresis:  20mg IV Lasix BID  - Hold PTA chlorthalidone  - Continue electrolyte replacement protocol  - Check UA appears infected, UC pending; started on Rocephin 11/27    HEME:  Acute blood loss anemia  - Hgb stable, no bleeding concerns. Hep SQ, ASA    ID:  Stress-induced leukocytosis, resolved  Suspected CAUTI  - Yecenai op ppx complete, afebrile  - UA dirty, UC pending; empiric Rocephin started 11/27  - Procal indeterminate, downtrending on repeat    ENDO:  DM2 - Hgb A1c 6.1  - Sliding scale insulin  - Increase Lantus to 8 units daily beginning tomorrow   - Hold PTA Jardiance, metformin, and tirzepatide  - BG goal <180 to promote optimal wound healing    PPx:  - DVT: SCDs, SQ heparin TID, OOB/ambulation   - GI: Protonix 40mg PO daily    DISPO:  - Transfer to general  telemetry  - Medically Ready for Discharge: Anticipated in 2-4 Days      Pt seen and discussed with Dr. Irwin.      Barbara Boggs CNP, [facility]-  Cardiothoracic Surgery  [facility] Pager [phone]

## 2024-11-28 NOTE — PLAN OF CARE
[facility] - ICU    RN Progress Note:            Pertinent Assessments:      Please refer to flowsheet rows for full assessment     Afebrile.  Poor appetite.  Up to chair with RN/cardiac rehab.            Key Events - This Shift:       Able to wean off lidia and maintain adequate blood pressures.  No urge to void- straight cathed x1.  Pain meds x1.                 Barriers to Discharge / Downgrade:     Could potentially downgrade tomorrow.         Point of Contact Update: YES-OR-NO: Yes  If No, reason:   Name:Gabe  Phone Number:see chart  Summary of Conversation: updated all family members throughout the day while they were visiting.

## 2024-11-28 NOTE — PLAN OF CARE
Goal Outcome Evaluation:      Plan of Care Reviewed With: patient    Overall Patient Progress: improvingOverall Patient Progress: improving    Outcome Evaluation: lines out and patient able to void.    [facility] - ICU    RN Progress Note:            Pertinent Assessments:      Please refer to flowsheet rows for full assessment     Afebrile.  IS to 1000 and using flutter valve.  Strong, congested productive cough. Passing flatus but no BM.  Bowel regimen administered per orders.            Key Events - This Shift:       Has remained in the chair throughout the day.  Finally had the urge to void and was able to go 400ml.  PVR 50.  Pain meds x1 for incisional pain.  Arterial and central lines removed.  Chest tubes remain in with                Barriers to Discharge / Downgrade:     ***         Point of Contact Update: YES-OR-NO: Yes  If No, reason: ***  Name:***  Phone Number:***  Summary of Conversation: ***

## 2024-11-29 ENCOUNTER — APPOINTMENT (OUTPATIENT)
Dept: OCCUPATIONAL THERAPY | Facility: HOSPITAL | Age: 64
DRG: 233 | End: 2024-11-29
Attending: INTERNAL MEDICINE
Payer: COMMERCIAL

## 2024-11-29 ENCOUNTER — APPOINTMENT (OUTPATIENT)
Dept: RADIOLOGY | Facility: HOSPITAL | Age: 64
DRG: 233 | End: 2024-11-29
Payer: COMMERCIAL

## 2024-11-29 LAB
ANION GAP SERPL CALCULATED.3IONS-SCNC: 5 MMOL/L (ref 7–15)
BACTERIA UR CULT: ABNORMAL
BUN SERPL-MCNC: 25.4 MG/DL (ref 8–23)
CA-I BLD-MCNC: 4.5 MG/DL (ref 4.4–5.2)
CALCIUM SERPL-MCNC: 7.8 MG/DL (ref 8.8–10.4)
CHLORIDE SERPL-SCNC: 106 MMOL/L (ref 98–107)
CREAT SERPL-MCNC: 0.81 MG/DL (ref 0.51–0.95)
EGFRCR SERPLBLD CKD-EPI 2021: 82 ML/MIN/1.73M2
ERYTHROCYTE [DISTWIDTH] IN BLOOD BY AUTOMATED COUNT: 17.4 % (ref 10–15)
GLUCOSE BLDC GLUCOMTR-MCNC: 129 MG/DL (ref 70–99)
GLUCOSE BLDC GLUCOMTR-MCNC: 164 MG/DL (ref 70–99)
GLUCOSE BLDC GLUCOMTR-MCNC: 166 MG/DL (ref 70–99)
GLUCOSE BLDC GLUCOMTR-MCNC: 176 MG/DL (ref 70–99)
GLUCOSE BLDC GLUCOMTR-MCNC: 239 MG/DL (ref 70–99)
GLUCOSE SERPL-MCNC: 157 MG/DL (ref 70–99)
HCO3 SERPL-SCNC: 30 MMOL/L (ref 22–29)
HCT VFR BLD AUTO: 24.9 % (ref 35–47)
HGB BLD-MCNC: 7.6 G/DL (ref 11.7–15.7)
MAGNESIUM SERPL-MCNC: 1.9 MG/DL (ref 1.7–2.3)
MCH RBC QN AUTO: 25.5 PG (ref 26.5–33)
MCHC RBC AUTO-ENTMCNC: 30.5 G/DL (ref 31.5–36.5)
MCV RBC AUTO: 84 FL (ref 78–100)
PHOSPHATE SERPL-MCNC: 2.7 MG/DL (ref 2.5–4.5)
PLATELET # BLD AUTO: 235 10E3/UL (ref 150–450)
POTASSIUM SERPL-SCNC: 4.5 MMOL/L (ref 3.4–5.3)
RBC # BLD AUTO: 2.98 10E6/UL (ref 3.8–5.2)
SODIUM SERPL-SCNC: 141 MMOL/L (ref 135–145)
WBC # BLD AUTO: 8.2 10E3/UL (ref 4–11)

## 2024-11-29 PROCEDURE — G0008 ADMIN INFLUENZA VIRUS VAC: HCPCS | Performed by: INTERNAL MEDICINE

## 2024-11-29 PROCEDURE — 999N000156 HC STATISTIC RCP CONSULT EA 30 MIN

## 2024-11-29 PROCEDURE — 250N000013 HC RX MED GY IP 250 OP 250 PS 637: Performed by: SURGERY

## 2024-11-29 PROCEDURE — 999N000157 HC STATISTIC RCP TIME EA 10 MIN

## 2024-11-29 PROCEDURE — 83735 ASSAY OF MAGNESIUM: CPT | Performed by: SURGERY

## 2024-11-29 PROCEDURE — 84100 ASSAY OF PHOSPHORUS: CPT | Performed by: INTERNAL MEDICINE

## 2024-11-29 PROCEDURE — 97110 THERAPEUTIC EXERCISES: CPT | Mod: GO

## 2024-11-29 PROCEDURE — 82310 ASSAY OF CALCIUM: CPT | Performed by: SURGERY

## 2024-11-29 PROCEDURE — 71046 X-RAY EXAM CHEST 2 VIEWS: CPT

## 2024-11-29 PROCEDURE — 36415 COLL VENOUS BLD VENIPUNCTURE: CPT | Performed by: INTERNAL MEDICINE

## 2024-11-29 PROCEDURE — 120N000013 HC R&B IMCU

## 2024-11-29 PROCEDURE — 250N000011 HC RX IP 250 OP 636: Performed by: INTERNAL MEDICINE

## 2024-11-29 PROCEDURE — 250N000011 HC RX IP 250 OP 636

## 2024-11-29 PROCEDURE — 250N000013 HC RX MED GY IP 250 OP 250 PS 637

## 2024-11-29 PROCEDURE — 85014 HEMATOCRIT: CPT | Performed by: SURGERY

## 2024-11-29 PROCEDURE — 94799 UNLISTED PULMONARY SVC/PX: CPT

## 2024-11-29 PROCEDURE — 250N000013 HC RX MED GY IP 250 OP 250 PS 637: Performed by: INTERNAL MEDICINE

## 2024-11-29 PROCEDURE — 250N000011 HC RX IP 250 OP 636: Performed by: SURGERY

## 2024-11-29 PROCEDURE — 80048 BASIC METABOLIC PNL TOTAL CA: CPT | Performed by: SURGERY

## 2024-11-29 PROCEDURE — 90673 RIV3 VACCINE NO PRESERV IM: CPT | Performed by: INTERNAL MEDICINE

## 2024-11-29 PROCEDURE — 82330 ASSAY OF CALCIUM: CPT | Performed by: INTERNAL MEDICINE

## 2024-11-29 PROCEDURE — 97535 SELF CARE MNGMENT TRAINING: CPT | Mod: GO

## 2024-11-29 PROCEDURE — 99232 SBSQ HOSP IP/OBS MODERATE 35: CPT | Performed by: INTERNAL MEDICINE

## 2024-11-29 PROCEDURE — 85041 AUTOMATED RBC COUNT: CPT | Performed by: SURGERY

## 2024-11-29 RX ORDER — MAGNESIUM OXIDE 400 MG/1
400 TABLET ORAL EVERY 4 HOURS
Status: COMPLETED | OUTPATIENT
Start: 2024-11-29 | End: 2024-11-29

## 2024-11-29 RX ORDER — NITROFURANTOIN 25; 75 MG/1; MG/1
100 CAPSULE ORAL EVERY 12 HOURS SCHEDULED
Status: DISCONTINUED | OUTPATIENT
Start: 2024-11-29 | End: 2024-12-01

## 2024-11-29 RX ORDER — FUROSEMIDE 10 MG/ML
20 INJECTION INTRAMUSCULAR; INTRAVENOUS
Status: DISCONTINUED | OUTPATIENT
Start: 2024-11-29 | End: 2024-12-01

## 2024-11-29 RX ORDER — TAMSULOSIN HYDROCHLORIDE 0.4 MG/1
0.4 CAPSULE ORAL DAILY
Status: DISCONTINUED | OUTPATIENT
Start: 2024-11-29 | End: 2024-12-03

## 2024-11-29 RX ORDER — CEFTRIAXONE 1 G/1
1 INJECTION, POWDER, FOR SOLUTION INTRAMUSCULAR; INTRAVENOUS EVERY 24 HOURS
Status: DISCONTINUED | OUTPATIENT
Start: 2024-11-29 | End: 2024-11-29

## 2024-11-29 RX ORDER — GUAIFENESIN 600 MG/1
600 TABLET, EXTENDED RELEASE ORAL 2 TIMES DAILY
Status: DISCONTINUED | OUTPATIENT
Start: 2024-11-29 | End: 2024-12-06 | Stop reason: HOSPADM

## 2024-11-29 RX ADMIN — INSULIN ASPART 2 UNITS: 100 INJECTION, SOLUTION INTRAVENOUS; SUBCUTANEOUS at 12:06

## 2024-11-29 RX ADMIN — HYDROXYZINE HYDROCHLORIDE 10 MG: 10 TABLET ORAL at 19:02

## 2024-11-29 RX ADMIN — HEPARIN SODIUM 5000 UNITS: 5000 INJECTION, SOLUTION INTRAVENOUS; SUBCUTANEOUS at 03:34

## 2024-11-29 RX ADMIN — POTASSIUM & SODIUM PHOSPHATES POWDER PACK 280-160-250 MG 1 PACKET: 280-160-250 PACK at 03:34

## 2024-11-29 RX ADMIN — OXYCODONE HYDROCHLORIDE 10 MG: 5 TABLET ORAL at 04:34

## 2024-11-29 RX ADMIN — PRAMIPEXOLE DIHYDROCHLORIDE 0.5 MG: 0.5 TABLET ORAL at 20:46

## 2024-11-29 RX ADMIN — OXYCODONE HYDROCHLORIDE 10 MG: 5 TABLET ORAL at 23:46

## 2024-11-29 RX ADMIN — MAGNESIUM OXIDE TAB 400 MG (241.3 MG ELEMENTAL MG) 400 MG: 400 (241.3 MG) TAB at 07:59

## 2024-11-29 RX ADMIN — NITROFURANTOIN MONOHYDRATE/MACROCRYSTALS 100 MG: 75; 25 CAPSULE ORAL at 10:01

## 2024-11-29 RX ADMIN — OXYCODONE HYDROCHLORIDE 5 MG: 5 TABLET ORAL at 14:19

## 2024-11-29 RX ADMIN — ATORVASTATIN CALCIUM 80 MG: 40 TABLET, FILM COATED ORAL at 20:42

## 2024-11-29 RX ADMIN — HEPARIN SODIUM 5000 UNITS: 5000 INJECTION, SOLUTION INTRAVENOUS; SUBCUTANEOUS at 20:42

## 2024-11-29 RX ADMIN — EMPAGLIFLOZIN 25 MG: 25 TABLET, FILM COATED ORAL at 08:00

## 2024-11-29 RX ADMIN — INFLUENZA A VIRUS A/WEST VIRGINIA/30/2022 (H1N1) RECOMBINANT HEMAGGLUTININ ANTIGEN, INFLUENZA A VIRUS A/MASSACHUSETTS/18/2022 (H3N2) RECOMBINANT HEMAGGLUTININ ANTIGEN, AND INFLUENZA B VIRUS B/AUSTRIA/1359417/2021 RECOMBINANT HEMAGGLUTININ ANTIGEN 0.5 ML: 45; 45; 45 INJECTION INTRAMUSCULAR at 10:05

## 2024-11-29 RX ADMIN — FUROSEMIDE 20 MG: 10 INJECTION, SOLUTION INTRAMUSCULAR; INTRAVENOUS at 17:19

## 2024-11-29 RX ADMIN — SENNOSIDES AND DOCUSATE SODIUM 1 TABLET: 8.6; 5 TABLET ORAL at 07:58

## 2024-11-29 RX ADMIN — ASPIRIN 81 MG CHEWABLE TABLET 162 MG: 81 TABLET CHEWABLE at 07:58

## 2024-11-29 RX ADMIN — INSULIN ASPART 1 UNITS: 100 INJECTION, SOLUTION INTRAVENOUS; SUBCUTANEOUS at 07:59

## 2024-11-29 RX ADMIN — FUROSEMIDE 20 MG: 10 INJECTION, SOLUTION INTRAMUSCULAR; INTRAVENOUS at 08:00

## 2024-11-29 RX ADMIN — SENNOSIDES AND DOCUSATE SODIUM 1 TABLET: 8.6; 5 TABLET ORAL at 20:42

## 2024-11-29 RX ADMIN — GUAIFENESIN 600 MG: 600 TABLET ORAL at 20:43

## 2024-11-29 RX ADMIN — ACETAMINOPHEN 650 MG: 325 TABLET ORAL at 03:34

## 2024-11-29 RX ADMIN — CARVEDILOL 6.25 MG: 3.12 TABLET, FILM COATED ORAL at 07:58

## 2024-11-29 RX ADMIN — METFORMIN ER 500 MG 1000 MG: 500 TABLET ORAL at 07:57

## 2024-11-29 RX ADMIN — FUROSEMIDE 20 MG: 10 INJECTION, SOLUTION INTRAMUSCULAR; INTRAVENOUS at 12:10

## 2024-11-29 RX ADMIN — POTASSIUM & SODIUM PHOSPHATES POWDER PACK 280-160-250 MG 1 PACKET: 280-160-250 PACK at 12:10

## 2024-11-29 RX ADMIN — GUAIFENESIN 600 MG: 600 TABLET ORAL at 10:02

## 2024-11-29 RX ADMIN — HEPARIN SODIUM 5000 UNITS: 5000 INJECTION, SOLUTION INTRAVENOUS; SUBCUTANEOUS at 12:10

## 2024-11-29 RX ADMIN — MAGNESIUM OXIDE TAB 400 MG (241.3 MG ELEMENTAL MG) 400 MG: 400 (241.3 MG) TAB at 12:10

## 2024-11-29 RX ADMIN — NITROFURANTOIN MONOHYDRATE/MACROCRYSTALS 100 MG: 75; 25 CAPSULE ORAL at 20:45

## 2024-11-29 RX ADMIN — POTASSIUM & SODIUM PHOSPHATES POWDER PACK 280-160-250 MG 1 PACKET: 280-160-250 PACK at 07:57

## 2024-11-29 RX ADMIN — OXYCODONE HYDROCHLORIDE 5 MG: 5 TABLET ORAL at 00:26

## 2024-11-29 RX ADMIN — CITALOPRAM HYDROBROMIDE 20 MG: 20 TABLET ORAL at 07:58

## 2024-11-29 RX ADMIN — ACETAMINOPHEN 650 MG: 325 TABLET ORAL at 14:20

## 2024-11-29 RX ADMIN — ACETAMINOPHEN 650 MG: 325 TABLET ORAL at 23:46

## 2024-11-29 RX ADMIN — TAMSULOSIN HYDROCHLORIDE 0.4 MG: 0.4 CAPSULE ORAL at 10:02

## 2024-11-29 ASSESSMENT — ACTIVITIES OF DAILY LIVING (ADL)
ADLS_ACUITY_SCORE: 46
ADLS_ACUITY_SCORE: 46
ADLS_ACUITY_SCORE: 44
ADLS_ACUITY_SCORE: 46
ADLS_ACUITY_SCORE: 46
ADLS_ACUITY_SCORE: 44
ADLS_ACUITY_SCORE: 44
ADLS_ACUITY_SCORE: 46
ADLS_ACUITY_SCORE: 50
ADLS_ACUITY_SCORE: 46
ADLS_ACUITY_SCORE: 46
ADLS_ACUITY_SCORE: 44
ADLS_ACUITY_SCORE: 46
ADLS_ACUITY_SCORE: 46
ADLS_ACUITY_SCORE: 44
ADLS_ACUITY_SCORE: 46
ADLS_ACUITY_SCORE: 44
ADLS_ACUITY_SCORE: 50
ADLS_ACUITY_SCORE: 46
ADLS_ACUITY_SCORE: 46

## 2024-11-29 NOTE — TREATMENT PLAN
RCAT Treatment Plan    Patient Score: 11  Patient Acuity: 4    Clinical Indication for Therapy: prevent atelectasis    Therapy Ordered: FV and IS QID     Assessment Summary: Pt remains on RA, BS coarse/diminished. Strong productive cough. TA=085 and FV completed with good effort. RT to re-assess in 72 hours per RT protocol.    Alexia Guerra, RT  11/29/2024

## 2024-11-29 NOTE — PLAN OF CARE
Problem: Cardiovascular Surgery  Goal: Effective Bowel Elimination  Outcome: Not Progressing     Problem: Comorbidity Management  Goal: Maintenance of Asthma Control  Outcome: Progressing     Problem: Comorbidity Management  Goal: Blood Glucose Levels Within Targeted Range  Outcome: Progressing     Problem: Comorbidity Management  Goal: Maintenance of Heart Failure Symptom Control  Outcome: Progressing     Problem: Comorbidity Management  Goal: Blood Pressure in Desired Range  Outcome: Progressing     Problem: Cardiovascular Surgery  Goal: Optimal Coping with Heart Surgery  Outcome: Progressing     Problem: Cardiovascular Surgery  Goal: Absence of Bleeding  Outcome: Progressing     Problem: Cardiovascular Surgery  Goal: Effective Cardiac Function  Outcome: Progressing     Problem: Cardiovascular Surgery  Goal: Effective Oxygenation and Ventilation  Outcome: Progressing   Goal Outcome Evaluation:    Vitally stable.  Unable to wean oxygen. Desats intermittently in the high 80s on room air. Has frequent congested cough. Uses heart pillow to splint when coughing. Encouraged IS use. Incisional pain managed with prn oxycodone and tylenol. Normal sinus rhythm on cardiac monitor. Pacer wires capped.Mcdaniels patent. Had x1 large loose BM. X4 chest tube draining to x2 atrium. Changed atrium draining pleural chest tubes. K, mag, phos and ionized calcium protocols. Will receive Mag and phos replacement today. All protocols due for recheck tomorrow. Up in chair with assist of 2.

## 2024-11-29 NOTE — PROGRESS NOTES
"  CVTS Daily Progress Note   POD 4 s/p CABG x3 (LIMA-LAD, rSVG-PDA, rSVG-OM)  Attending: Raul  LOS: 6    SUBJECTIVE/INTERVAL EVENTS:    Mcdaniels replaced overnight for acute retention. Patient otherwise progressing well. Maintaining oxygen saturations on RA. Normotensive. Pain controlled overall.+BM. Tolerating PO intake. UOP appropriate on lasix. Chest tube output appropriate. Hgb stable. Patient denies new chest pain, shortness of breath, abdominal pain, and nausea.     OBJECTIVE:  Temp:  [97.7  F (36.5  C)-99.5  F (37.5  C)] 97.9  F (36.6  C)  Pulse:  [] 86  Resp:  [14-31] 18  BP: (107-133)/(58-69) 107/64  SpO2:  [89 %-100 %] 94 %  Vitals:    11/25/24 0400 11/26/24 0400 11/27/24 0600 11/28/24 0700   Weight: 73.6 kg (162 lb 4.8 oz) 72.6 kg (160 lb 1.6 oz) 75 kg (165 lb 6.4 oz) 77.5 kg (170 lb 12.8 oz)    11/29/24 0600   Weight: 76.6 kg (168 lb 12.8 oz)       Clinically Significant Risk Factors          # Hyperchloremia: Highest Cl = 110 mmol/L in last 2 days, will monitor as appropriate          # Hypoalbuminemia: Lowest albumin = 3.1 g/dL at 11/25/2024  4:26 AM, will monitor as appropriate     # Hypertension: Noted on problem list    # Acute heart failure with reduced ejection fraction: last echo with EF <40% and receiving IV diuretics           # Overweight: Estimated body mass index is 28.09 kg/m  as calculated from the following:    Height as of this encounter: 1.651 m (5' 5\").    Weight as of this encounter: 76.6 kg (168 lb 12.8 oz).        # Financial/Environmental Concerns: none  # Asthma: noted on problem list  # History of CABG: noted on surgical history            Current Medications:    Scheduled Meds:  Current Facility-Administered Medications   Medication Dose Route Frequency Provider Last Rate Last Admin    aspirin (ASA) chewable tablet 162 mg  162 mg Oral or NG Tube Daily Barbara Boggs NP   162 mg at 11/29/24 0758    atorvastatin (LIPITOR) tablet 80 mg  80 mg Oral or Feeding Tube QPM Flakita, " Barbara LAIRD NP   80 mg at 11/28/24 2016    carvedilol (COREG) tablet 6.25 mg  6.25 mg Oral BID w/meals Barbara Boggs NP   6.25 mg at 11/29/24 0758    cefTRIAXone (ROCEPHIN) 1 g vial to attach to  mL bag for ADULTS or NS 50 mL bag for PEDS  1 g Intravenous Q24H Beena Mitchell PA-C        citalopram (celeXA) tablet 20 mg  20 mg Oral or Feeding Tube QAM Barbara Boggs NP   20 mg at 11/29/24 0758    empagliflozin (JARDIANCE) tablet 25 mg  25 mg Oral Daily Beena Mitchell PA-C   25 mg at 11/29/24 0800    fluticasone (ARNUITY ELLIPTA) 100 MCG/ACT inhaler 1 puff  1 puff Inhalation Daily Barbara Boggs NP   1 puff at 11/29/24 0800    furosemide (LASIX) injection 20 mg  20 mg Intravenous TID Beena Mitchell PA-C   20 mg at 11/29/24 0800    heparin ANTICOAGULANT injection 5,000 Units  5,000 Units Subcutaneous Q8H Barbara Boggs NP   5,000 Units at 11/29/24 0334    influenza trivalent vaccine for ages 50-64 years (PF) (FLUBLOK) injection 0.5 mL  0.5 mL Intramuscular Prior to discharge Aftab Fortune MD        insulin aspart (NovoLOG) injection (RAPID ACTING)  1-7 Units Subcutaneous TID AC Barbara Boggs NP   1 Units at 11/29/24 0759    insulin aspart (NovoLOG) injection (RAPID ACTING)  1-5 Units Subcutaneous At Bedtime Barbara Boggs NP   1 Units at 11/27/24 2117    insulin glargine (LANTUS PEN) injection 6 Units  6 Units Subcutaneous QA Beena Royal PA-C   6 Units at 11/29/24 0758    magnesium oxide (MAG-OX) tablet 400 mg  400 mg Oral Q4H Sudarshan Jimenez MD   400 mg at 11/29/24 0759    metFORMIN (GLUCOPHAGE XR) 24 hr tablet 1,000 mg  1,000 mg Oral Daily with breakfast Beena Mitchell PA-C   1,000 mg at 11/29/24 0757    polyethylene glycol (MIRALAX) Packet 17 g  17 g Oral Daily Barbara Boggs NP   17 g at 11/28/24 0805    potassium & sodium phosphates (NEUTRA-PHOS) Packet 1 packet  1 packet Oral or Feeding Tube Q4H Sudarshan Jimenez MD   1 packet  at 11/29/24 0757    pramipexole (MIRAPEX) tablet 0.5 mg  0.5 mg Oral At Bedtime Barbara Boggs NP   0.5 mg at 11/28/24 2019    senna-docusate (SENOKOT-S/PERICOLACE) 8.6-50 MG per tablet 1 tablet  1 tablet Oral BID Barbara Boggs NP   1 tablet at 11/29/24 0758    sodium chloride (PF) 0.9% PF flush 3 mL  3 mL Intracatheter Q8H Barbara Boggs NP   3 mL at 11/29/24 0221    tamsulosin (FLOMAX) capsule 0.4 mg  0.4 mg Oral Daily Beena Mitchell, PA-C         Continuous Infusions:  Current Facility-Administered Medications   Medication Dose Route Frequency Provider Last Rate Last Admin     PRN Meds:  Current Facility-Administered Medications   Medication Dose Route Frequency Provider Last Rate Last Admin    acetaminophen (TYLENOL) tablet 650 mg  650 mg Oral Q4H PRN Barbara Boggs NP   650 mg at 11/29/24 0334    albuterol (PROVENTIL HFA/VENTOLIN HFA) inhaler  2 puff Inhalation Q6H PRN Barbara Boggs NP        bisacodyl (DULCOLAX) suppository 10 mg  10 mg Rectal Daily PRN Barbara Boggs NP        calcium gluconate 1 g in 50 mL in sodium chloride intermittent infusion  1 g Intravenous Once PRN Barbara Boggs NP   1 g at 11/27/24 0633    calcium gluconate 2 g in  mL intermittent infusion  2 g Intravenous Once PRN Barbara Boggs NP        glucose gel 15-30 g  15-30 g Oral Q15 Min PRN Barbara Boggs NP        Or    dextrose 50 % injection 25-50 mL  25-50 mL Intravenous Q15 Min PRN Barbara Boggs NP        Or    glucagon injection 1 mg  1 mg Subcutaneous Q15 Min PRN Barbara Boggs NP        hydrALAZINE (APRESOLINE) injection 10 mg  10 mg Intravenous Q6H PRN Barbara Boggs NP        hydrOXYzine HCl (ATARAX) tablet 10 mg  10 mg Oral or Feeding Tube TID PRN Barbara Boggs NP   10 mg at 11/28/24 2017    ipratropium - albuterol 0.5 mg/2.5 mg/3 mL (DUONEB) neb solution 3 mL  3 mL Nebulization Q4H PRN Barbara Boggs NP        magnesium hydroxide (MILK OF MAGNESIA) suspension 30 mL  30  mL Oral Daily PRN Barbara Boggs NP        naloxone (NARCAN) injection 0.2 mg  0.2 mg Intravenous Q2 Min PRN Barbara Boggs NP        Or    naloxone (NARCAN) injection 0.4 mg  0.4 mg Intravenous Q2 Min PRN Barbara Boggs NP        Or    naloxone (NARCAN) injection 0.2 mg  0.2 mg Intramuscular Q2 Min PRN Barbara Boggs NP        Or    naloxone (NARCAN) injection 0.4 mg  0.4 mg Intramuscular Q2 Min PRN Barbara Boggs, NP        ondansetron (ZOFRAN ODT) ODT tab 4 mg  4 mg Oral Q6H PRN Barbara Boggs NP        Or    ondansetron (ZOFRAN) injection 4 mg  4 mg Intravenous Q6H PRN Barbara Boggs NP   4 mg at 11/27/24 1120    oxyCODONE (ROXICODONE) tablet 5 mg  5 mg Oral Q4H PRN Barbara Boggs NP   5 mg at 11/29/24 0026    Or    oxyCODONE (ROXICODONE) tablet 10 mg  10 mg Oral Q4H PRBarbara Jimenez NP   10 mg at 11/29/24 0434    sodium chloride (PF) 0.9% PF flush 3 mL  3 mL Intracatheter q1 min prBarbara Jimenez NP   3 mL at 11/26/24 0810       Cardiographics:    Telemetry monitoring demonstrates SR with rates in the 80-90s per my personal review.    Imaging:  No results found for this or any previous visit (from the past 24 hours).    Labs, personally reviewed.  Hemoglobin   Date Value Ref Range Status   11/29/2024 7.6 (L) 11.7 - 15.7 g/dL Final   11/28/2024 7.4 (L) 11.7 - 15.7 g/dL Final   11/27/2024 7.9 (L) 11.7 - 15.7 g/dL Final     WBC Count   Date Value Ref Range Status   11/29/2024 8.2 4.0 - 11.0 10e3/uL Final   11/28/2024 8.3 4.0 - 11.0 10e3/uL Final   11/27/2024 11.8 (H) 4.0 - 11.0 10e3/uL Final     Platelet Count   Date Value Ref Range Status   11/29/2024 235 150 - 450 10e3/uL Final   11/28/2024 181 150 - 450 10e3/uL Final   11/27/2024 184 150 - 450 10e3/uL Final     Creatinine   Date Value Ref Range Status   11/29/2024 0.81 0.51 - 0.95 mg/dL Final   11/28/2024 0.86 0.51 - 0.95 mg/dL Final   11/27/2024 1.04 (H) 0.51 - 0.95 mg/dL Final     Potassium   Date Value Ref Range Status    11/29/2024 4.5 3.4 - 5.3 mmol/L Final   11/28/2024 4.3 3.4 - 5.3 mmol/L Final   11/27/2024 4.1 3.4 - 5.3 mmol/L Final   01/02/2023 4.0 3.5 - 5.0 mmol/L Final   01/01/2023 4.1 3.5 - 5.0 mmol/L Final   03/22/2021 4.4 3.5 - 5.0 mmol/L Final     Potassium POCT   Date Value Ref Range Status   11/24/2024 4.0 3.4 - 5.3 mmol/L Final   11/24/2024 3.5 3.4 - 5.3 mmol/L Final   11/24/2024 4.4 3.4 - 5.3 mmol/L Final     Magnesium   Date Value Ref Range Status   11/29/2024 1.9 1.7 - 2.3 mg/dL Final   11/28/2024 2.2 1.7 - 2.3 mg/dL Final   11/27/2024 2.5 (H) 1.7 - 2.3 mg/dL Final          I/O:  I/O last 3 completed shifts:  In: 1205 [P.O.:1190; I.V.:15]  Out: 2330 [Urine:1600; Chest Tube:730]       Physical Exam:    General: Patient seen up in chair, NAD. Conversant, pleasant, calm, cooperative on exam.  HEENT: no sclera icterus, moist mucosa, no tracheal deviation  CV: RRR on monitor, WWP. Mild LE edema.  Incision C/D/I, no erythema or induration  Pulm: Unlabored breathing on 1L NC. Chest tubes in place, serous output, no air leak  Abd: SNTND, no guarding or peritoneal signs  Ext: Mild pedal edema, SCDs in place  Neuro: A&O, CNs grossly intact, face symmetric, speech clear, SPARKS      ASSESSMENT/PLAN:    Gill Mendez is a 64 year old female who is POD 3 s/p CABG x3.    Active Problems:    NSTEMI (non-ST elevated myocardial infarction) (H)        NEURO  PSYCH:  Acute postop pain  ANX  RLS  - Scheduled Tylenol/lidocaine patches and PRN Tylenol/oxycodone for pain  - PTA Celexa and mirapex  - PRN Atarax available    CV:  ICM/HFrEF  NSTEMI/CAD s/p CABG x3  HTN  HLD  Hypervolemia  - Preop echo LVEF 25-30% (no prior echos pre-dating NSTEMI); post CPB LVEF 35%  - Plan to repeat echo prior to discharge, after chest tubes removed  - IABP removed POD 1  - Normotensive  - Start Coreg 6.25 mg BID with hold parameters  - Hold PTA amlodipine and lisinopril  - ASA 162mg  - Atorvastatin 80mg daily  - Cardiology consulted earlier this admission,  will look to re-engage in coming days for GDMT optimization; will need CORE consult at discharge  - Hold PTA Jardiance for now  - Mediastinal chest tubes and TPW removed today (see procedure note), pleural chest tubes to remain today    PULM:  - Extubated POD 1  - Maintaining oxygen saturations on RA  - PTA Arnuity Ellipta & albuterol inhalers resumed  - Duonebs available prn  - Encourage pulmonary toilet/OOB/activity    GI  NUTRITION:  - Diet: Cardiac/consistent carb  - Bowel regimen, LBM 11/28    RENAL  FE:  CARMEN, improviong  Urinary retention 2/2 CAUTI  - Marginal UOP  - Cr WNL  - Diuresis:  20mg IV Lasix TID  - Hold PTA chlorthalidone  - Continue electrolyte replacement protocol  - Urine cx +klebsiella, rocephin switched to macrobid per susceptibilities plan for 5 day course    HEME:  Acute blood loss anemia  - Hgb stable, no bleeding concerns. Hep SQ, ASA    ID:  Stress-induced leukocytosis, resolved  CAUTI  - Yecenia op ppx complete, afebrile  - Urine cx w/ +klebsiella as above, rocephin 11/27-11/28, macrobid 11/29 plan to complete 5 day course w/ last dose 12/2    ENDO:  DM2   - Hgb A1c 6.1%  - Sliding scale insulin  - Lantus 6u daily  - Restart PTA metformin and jardiance  - Hold PTA tirzepatide  - BG goal <180 to promote optimal wound healing    PPx:  - DVT: SCDs, SQ heparin TID, OOB/ambulation   - GI: Protonix 40mg PO daily    DISPO:  - Continue general tele care (Transferred POD#4)  - PT/OT recs at discharge: home w/ outpt cardiac rehab  - Medically Ready for Discharge: Anticipated in 2-4 Days pending euglycemica, urinary retention, UTI, chest tubes      Pt seen and discussed with Dr. Irwin.      Amy Mitchell PA-C  [facility] Cardiothoracic Surgery  Vocera or Secure Chat  November 29, 2024

## 2024-11-29 NOTE — PROGRESS NOTES
[facility]    Medicine Progress Note - Hospitalist Service    Date of Admission:  11/23/2024    Assessment & Plan   Gill Mendez is a 64 year old female with a medical history significant for hypertension, hyperlipidemia, type II DM, asthma and anxiety disorder who is admitted with an NSTEMI and acute CHF decompensation. Cardiac cath showing multivessel CAD. Cardiovascular surgeon did CABG on 11/24. Extubated on 11/25.  Urine culture grew gram-negative bacilli for which she was receiving IV ceftriaxone.     Acute Non-STEMI  Multivessel CAD   - non-ST elevation myocardial infarction (NSTEMI).  -aspirin daily. Hold ACE due to hypotension  -Initiate high-intensity statin therapy  -Heparin gtt was initiated in ER, stopped before CABG  -Consult cardiology: cardiac cath showing multivessel CAD on 11/23  -consult CVS: urgent CABG and IABP in cath lab on 11/24  -intubated and in ICU s/p CABG. Extubated on 11/25  - Levophed gtt and vasopressin prn. Wean off when able.      Acute CHF Decompensation, pulm edema  -Likely secondary to volume overload and acute cardiac dysfunction.  -cardiologist consulted  -Lasix iv was initiated. On hold due to soft BP.      Hypertension  - was on nitroglycerin drip. Off.   -low bp s/p CABG, on vasopressors      Type II Diabetes Mellitus  -frequent Accu-Cheks and manage with sliding scale insulin.  -Maintain blood glucose between 100-140 mg/dL.  -Hold tirzepatide, amlodipine, lisinopril, and chlorthalidone during acute hospitalization.  -Reassess diabetes medications upon stabilization and discharge planning.      Asthma   Not in exacerbation  Resume home albuterol as needed for shortness of breath.   Duonebs PRN, 3 day course of prednsione   Continue maintenance therapy with Breo Ellipta or equivalent inhaler.     Anxiety Disorder  Severe avani phobia   -Continue Celexa at the current dose.  -Monitor for any anxiety exacerbation during hospitalization.    Suspected  "UTI  Urine culture grew gram-negative bacilli  Continue IV ceftriaxone for now     Rest of Patient s Medical Problems  Management remains unchanged unless otherwise indicated.                 Diet: Combination Diet Moderate Consistent Carb (60 g CHO per Meal) Diet; Low Saturated Fat Na <2400mg Diet  Snacks/Supplements Adult: Glucerna; With Meals    DVT Prophylaxis: Heparin SQ  Mcdaniels Catheter: PRESENT, indication: ?  (Error. Value could not be saved.), ICU only: hourly urine output needed for patient care;Surgical procedure, Acute retention or obstruction  Lines: None       Cardiac Monitoring: ACTIVE order. Indication: Open heart surgery (7 days)  Code Status: Full Code      Clinically Significant Risk Factors          # Hyperchloremia: Highest Cl = 110 mmol/L in last 2 days, will monitor as appropriate          # Hypoalbuminemia: Lowest albumin = 3.1 g/dL at 11/25/2024  4:26 AM, will monitor as appropriate     # Hypertension: Noted on problem list    # Acute heart failure with reduced ejection fraction: last echo with EF <40% and receiving IV diuretics           # Overweight: Estimated body mass index is 28.09 kg/m  as calculated from the following:    Height as of this encounter: 1.651 m (5' 5\").    Weight as of this encounter: 76.6 kg (168 lb 12.8 oz).        # Financial/Environmental Concerns: none  # Asthma: noted on problem list  # History of CABG: noted on surgical history       Social Drivers of Health    Alcohol Use: Medium Risk (5/25/2023)    Received from [employer] [employer]    Alcohol Use     Alcohol Use Status: Yes   Interpersonal Safety: Low Risk  (11/25/2024)    Interpersonal Safety     Do you feel physically and emotionally safe where you currently live?: Patient unable to answer     Within the past 12 months, have you been hit, slapped, kicked or otherwise physically hurt by someone?: No     Within the past 12 months, have you been humiliated or emotionally abused in other ways by your " partner or ex-partner?: No   Recent Concern: Interpersonal Safety - High Risk (11/23/2024)    Interpersonal Safety     Do you feel physically and emotionally safe where you currently live?: No     Within the past 12 months, have you been hit, slapped, kicked or otherwise physically hurt by someone?: No     Within the past 12 months, have you been humiliated or emotionally abused in other ways by your partner or ex-partner?: No    [facility]    Social Connections          Disposition Plan     Medically Ready for Discharge: Anticipated in 2-4 Days    Barrier: recover from CABG         Sudarshan Jimenez MD  Hospitalist Service  [facility]  Securely message with [employer] (more info)  Text page via [employer] Paging/Directory   ______________________________________________________________________    Interval History   No new complaints today and no acute events overnight.  2 chest tubes were removed earlier today.  1 chest tube still in place.  She is breathing comfortably on room air.       Physical Exam   Vital Signs: Temp: 97.8  F (36.6  C) Temp src: Oral BP: 107/64 Pulse: 86   Resp: 18 SpO2: 94 % O2 Device: None (Room air) Oxygen Delivery: 1 LPM  Weight: 168 lbs 12.8 oz    General.  Awake alert oriented not in acute distress.  HEENT.  Pupils equal round react to light, anicteric, EOM intact.  Neck supple no JVD.  CVS regular rhythm no murmur gallops. S/p CABG. Incision healing.   Lungs.  Chest tubes in place draining serosanguineous fluid.  Diminished air entry bilaterally with significant crackles.   Abdomen.  Soft nontender bowel sounds present.  Extremities.  No edema no calf tenderness.  Neurological.  Awake and alert. No focal deficit.  Skin no rash. No pallor.  Psych. Normal mood.      Medical Decision Making       47 MINUTES SPENT BY ME on the date of service doing chart review, history, exam,  documentation & further activities per the note.      Data     I have personally reviewed the following data over the past 24 hrs:    8.2  \   7.6 (L)   / 235     141 106 25.4 (H) /  239 (H)   4.5 30 (H) 0.81 \       Imaging results reviewed over the past 24 hrs:   No results found for this or any previous visit (from the past 24 hours).

## 2024-11-29 NOTE — PROCEDURES
CVTS BRIEF PROGRESS NOTE    Mediastinal chest tubes and TPW pulled without immediate complication. Pleural chest tubes left in place.  Occlusive dressing must remain in place for 24 hrs; reinforce prn.      Amy Mitchell PA-C  [facility] Cardiothoracic Surgery  Vocera or Secure Chat  November 29, 2024

## 2024-11-30 ENCOUNTER — APPOINTMENT (OUTPATIENT)
Dept: RADIOLOGY | Facility: HOSPITAL | Age: 64
DRG: 233 | End: 2024-11-30
Attending: SURGERY
Payer: COMMERCIAL

## 2024-11-30 ENCOUNTER — APPOINTMENT (OUTPATIENT)
Dept: OCCUPATIONAL THERAPY | Facility: HOSPITAL | Age: 64
DRG: 233 | End: 2024-11-30
Attending: INTERNAL MEDICINE
Payer: COMMERCIAL

## 2024-11-30 LAB
ANION GAP SERPL CALCULATED.3IONS-SCNC: 8 MMOL/L (ref 7–15)
BUN SERPL-MCNC: 19.9 MG/DL (ref 8–23)
CALCIUM SERPL-MCNC: 7.9 MG/DL (ref 8.8–10.4)
CHLORIDE SERPL-SCNC: 103 MMOL/L (ref 98–107)
CREAT SERPL-MCNC: 0.83 MG/DL (ref 0.51–0.95)
EGFRCR SERPLBLD CKD-EPI 2021: 79 ML/MIN/1.73M2
ERYTHROCYTE [DISTWIDTH] IN BLOOD BY AUTOMATED COUNT: 16.9 % (ref 10–15)
GLUCOSE BLDC GLUCOMTR-MCNC: 109 MG/DL (ref 70–99)
GLUCOSE BLDC GLUCOMTR-MCNC: 164 MG/DL (ref 70–99)
GLUCOSE BLDC GLUCOMTR-MCNC: 170 MG/DL (ref 70–99)
GLUCOSE BLDC GLUCOMTR-MCNC: 178 MG/DL (ref 70–99)
GLUCOSE BLDC GLUCOMTR-MCNC: 214 MG/DL (ref 70–99)
GLUCOSE SERPL-MCNC: 109 MG/DL (ref 70–99)
HCO3 SERPL-SCNC: 29 MMOL/L (ref 22–29)
HCT VFR BLD AUTO: 26 % (ref 35–47)
HGB BLD-MCNC: 8.3 G/DL (ref 11.7–15.7)
MAGNESIUM SERPL-MCNC: 1.8 MG/DL (ref 1.7–2.3)
MCH RBC QN AUTO: 26.1 PG (ref 26.5–33)
MCHC RBC AUTO-ENTMCNC: 31.9 G/DL (ref 31.5–36.5)
MCV RBC AUTO: 82 FL (ref 78–100)
PHOSPHATE SERPL-MCNC: 3.1 MG/DL (ref 2.5–4.5)
PLATELET # BLD AUTO: 251 10E3/UL (ref 150–450)
POTASSIUM SERPL-SCNC: 3.9 MMOL/L (ref 3.4–5.3)
RBC # BLD AUTO: 3.18 10E6/UL (ref 3.8–5.2)
SODIUM SERPL-SCNC: 140 MMOL/L (ref 135–145)
WBC # BLD AUTO: 9.1 10E3/UL (ref 4–11)

## 2024-11-30 PROCEDURE — 80048 BASIC METABOLIC PNL TOTAL CA: CPT | Performed by: SURGERY

## 2024-11-30 PROCEDURE — 99232 SBSQ HOSP IP/OBS MODERATE 35: CPT | Performed by: INTERNAL MEDICINE

## 2024-11-30 PROCEDURE — 84100 ASSAY OF PHOSPHORUS: CPT | Performed by: INTERNAL MEDICINE

## 2024-11-30 PROCEDURE — 71045 X-RAY EXAM CHEST 1 VIEW: CPT

## 2024-11-30 PROCEDURE — 250N000011 HC RX IP 250 OP 636: Performed by: SURGERY

## 2024-11-30 PROCEDURE — 999N000157 HC STATISTIC RCP TIME EA 10 MIN

## 2024-11-30 PROCEDURE — 250N000013 HC RX MED GY IP 250 OP 250 PS 637

## 2024-11-30 PROCEDURE — 250N000013 HC RX MED GY IP 250 OP 250 PS 637: Performed by: SURGERY

## 2024-11-30 PROCEDURE — 250N000011 HC RX IP 250 OP 636

## 2024-11-30 PROCEDURE — 97535 SELF CARE MNGMENT TRAINING: CPT | Mod: GO

## 2024-11-30 PROCEDURE — 82664 ELECTROPHORETIC TEST: CPT | Performed by: SURGERY

## 2024-11-30 PROCEDURE — 83735 ASSAY OF MAGNESIUM: CPT | Performed by: INTERNAL MEDICINE

## 2024-11-30 PROCEDURE — 210N000001 HC R&B IMCU HEART CARE

## 2024-11-30 PROCEDURE — 36415 COLL VENOUS BLD VENIPUNCTURE: CPT | Performed by: SURGERY

## 2024-11-30 PROCEDURE — 84478 ASSAY OF TRIGLYCERIDES: CPT | Performed by: SURGERY

## 2024-11-30 PROCEDURE — 85048 AUTOMATED LEUKOCYTE COUNT: CPT

## 2024-11-30 PROCEDURE — 94799 UNLISTED PULMONARY SVC/PX: CPT

## 2024-11-30 PROCEDURE — 250N000013 HC RX MED GY IP 250 OP 250 PS 637: Performed by: INTERNAL MEDICINE

## 2024-11-30 PROCEDURE — 85018 HEMOGLOBIN: CPT

## 2024-11-30 PROCEDURE — 97110 THERAPEUTIC EXERCISES: CPT | Mod: GO

## 2024-11-30 RX ORDER — PRAMIPEXOLE DIHYDROCHLORIDE 0.12 MG/1
0.12 TABLET ORAL DAILY
Status: DISCONTINUED | OUTPATIENT
Start: 2024-11-30 | End: 2024-12-06 | Stop reason: HOSPADM

## 2024-11-30 RX ORDER — MAGNESIUM OXIDE 400 MG/1
400 TABLET ORAL EVERY 4 HOURS
Status: COMPLETED | OUTPATIENT
Start: 2024-11-30 | End: 2024-11-30

## 2024-11-30 RX ADMIN — HEPARIN SODIUM 5000 UNITS: 5000 INJECTION, SOLUTION INTRAVENOUS; SUBCUTANEOUS at 21:23

## 2024-11-30 RX ADMIN — INSULIN ASPART 1 UNITS: 100 INJECTION, SOLUTION INTRAVENOUS; SUBCUTANEOUS at 15:53

## 2024-11-30 RX ADMIN — SENNOSIDES AND DOCUSATE SODIUM 1 TABLET: 8.6; 5 TABLET ORAL at 08:05

## 2024-11-30 RX ADMIN — CITALOPRAM HYDROBROMIDE 20 MG: 20 TABLET ORAL at 08:05

## 2024-11-30 RX ADMIN — PRAMIPEXOLE DIHYDROCHLORIDE 0.12 MG: 0.12 TABLET ORAL at 17:54

## 2024-11-30 RX ADMIN — TAMSULOSIN HYDROCHLORIDE 0.4 MG: 0.4 CAPSULE ORAL at 08:05

## 2024-11-30 RX ADMIN — METFORMIN ER 500 MG 1000 MG: 500 TABLET ORAL at 08:05

## 2024-11-30 RX ADMIN — HYDROXYZINE HYDROCHLORIDE 10 MG: 10 TABLET ORAL at 10:02

## 2024-11-30 RX ADMIN — FUROSEMIDE 20 MG: 10 INJECTION, SOLUTION INTRAMUSCULAR; INTRAVENOUS at 08:02

## 2024-11-30 RX ADMIN — GUAIFENESIN 600 MG: 600 TABLET ORAL at 08:05

## 2024-11-30 RX ADMIN — FUROSEMIDE 20 MG: 10 INJECTION, SOLUTION INTRAMUSCULAR; INTRAVENOUS at 16:54

## 2024-11-30 RX ADMIN — MAGNESIUM OXIDE TAB 400 MG (241.3 MG ELEMENTAL MG) 400 MG: 400 (241.3 MG) TAB at 08:05

## 2024-11-30 RX ADMIN — MAGNESIUM OXIDE TAB 400 MG (241.3 MG ELEMENTAL MG) 400 MG: 400 (241.3 MG) TAB at 11:39

## 2024-11-30 RX ADMIN — CARVEDILOL 6.25 MG: 3.12 TABLET, FILM COATED ORAL at 08:05

## 2024-11-30 RX ADMIN — ATORVASTATIN CALCIUM 80 MG: 40 TABLET, FILM COATED ORAL at 21:24

## 2024-11-30 RX ADMIN — INSULIN ASPART 2 UNITS: 100 INJECTION, SOLUTION INTRAVENOUS; SUBCUTANEOUS at 11:39

## 2024-11-30 RX ADMIN — HYDROXYZINE HYDROCHLORIDE 10 MG: 10 TABLET ORAL at 14:55

## 2024-11-30 RX ADMIN — FUROSEMIDE 20 MG: 10 INJECTION, SOLUTION INTRAMUSCULAR; INTRAVENOUS at 11:39

## 2024-11-30 RX ADMIN — HEPARIN SODIUM 5000 UNITS: 5000 INJECTION, SOLUTION INTRAVENOUS; SUBCUTANEOUS at 03:16

## 2024-11-30 RX ADMIN — GUAIFENESIN 600 MG: 600 TABLET ORAL at 21:24

## 2024-11-30 RX ADMIN — ONDANSETRON 4 MG: 2 INJECTION INTRAMUSCULAR; INTRAVENOUS at 20:18

## 2024-11-30 RX ADMIN — HEPARIN SODIUM 5000 UNITS: 5000 INJECTION, SOLUTION INTRAVENOUS; SUBCUTANEOUS at 11:39

## 2024-11-30 RX ADMIN — CARVEDILOL 6.25 MG: 3.12 TABLET, FILM COATED ORAL at 17:55

## 2024-11-30 RX ADMIN — EMPAGLIFLOZIN 25 MG: 25 TABLET, FILM COATED ORAL at 08:05

## 2024-11-30 RX ADMIN — ASPIRIN 81 MG CHEWABLE TABLET 162 MG: 81 TABLET CHEWABLE at 08:05

## 2024-11-30 RX ADMIN — NITROFURANTOIN MONOHYDRATE/MACROCRYSTALS 100 MG: 75; 25 CAPSULE ORAL at 08:05

## 2024-11-30 RX ADMIN — NITROFURANTOIN MONOHYDRATE/MACROCRYSTALS 100 MG: 75; 25 CAPSULE ORAL at 21:23

## 2024-11-30 ASSESSMENT — ACTIVITIES OF DAILY LIVING (ADL)
ADLS_ACUITY_SCORE: 50
ADLS_ACUITY_SCORE: 48
ADLS_ACUITY_SCORE: 50
ADLS_ACUITY_SCORE: 48
ADLS_ACUITY_SCORE: 50
ADLS_ACUITY_SCORE: 50
ADLS_ACUITY_SCORE: 48
ADLS_ACUITY_SCORE: 50
ADLS_ACUITY_SCORE: 48
ADLS_ACUITY_SCORE: 48
ADLS_ACUITY_SCORE: 50
ADLS_ACUITY_SCORE: 50
ADLS_ACUITY_SCORE: 48
ADLS_ACUITY_SCORE: 50
ADLS_ACUITY_SCORE: 50
ADLS_ACUITY_SCORE: 48
ADLS_ACUITY_SCORE: 48
ADLS_ACUITY_SCORE: 50

## 2024-11-30 NOTE — PLAN OF CARE
Pt denies new pain, current pain managed with prn oxycodone/tylenol, denies nausea and shortness of breath. Dizzy when standing, resolves quickly. Oxygenation stable on room air.  Daughter visiting, updated. Pt pleasant and cooperative with care team.

## 2024-11-30 NOTE — PLAN OF CARE
Pt denies pain, nausea, shortness of breath. Oxygenation stable on room air. Dip in blood pressure with ambulation, recovers quickly. Atarax prn twice for anxiety today. Pleasant and cooperative with care team. Daughter visiting, updated. Transferring to P3, RN report called. All belongings sent with patient and daughter.

## 2024-11-30 NOTE — PLAN OF CARE
"  Problem: Adult Inpatient Plan of Care  Goal: Plan of Care Review  Description: The Plan of Care Review/Shift note should be completed every shift.  The Outcome Evaluation is a brief statement about your assessment that the patient is improving, declining, or no change.  This information will be displayed automatically on your shift  note.  Outcome: Progressing     Problem: Adult Inpatient Plan of Care  Goal: Patient-Specific Goal (Individualized)  Description: You can add care plan individualizations to a care plan. Examples of Individualization might be:  \"Parent requests to be called daily at 9am for status\", \"I have a hard time hearing out of my right ear\", or \"Do not touch me to wake me up as it startles  me\".  Outcome: Progressing     Problem: Adult Inpatient Plan of Care  Goal: Absence of Hospital-Acquired Illness or Injury  Outcome: Progressing  Intervention: Identify and Manage Fall Risk  Recent Flowsheet Documentation  Taken 11/30/2024 0343 by Josie Peoples RN  Safety Promotion/Fall Prevention:   treat underlying cause   safety round/check completed   room near nurse's station   room door open   patient and family education   nonskid shoes/slippers when out of bed   clutter free environment maintained  Taken 11/29/2024 2350 by Josie Peoples RN  Safety Promotion/Fall Prevention:   treat underlying cause   safety round/check completed   room near nurse's station   room door open   patient and family education   nonskid shoes/slippers when out of bed   clutter free environment maintained  Intervention: Prevent Skin Injury  Recent Flowsheet Documentation  Taken 11/30/2024 0343 by Josie Peoples RN  Body Position: right  Taken 11/30/2024 0326 by Josie Peoples RN  Body Position:   turned   heels elevated  Taken 11/30/2024 0126 by Josie Peoples RN  Body Position: turned  Taken 11/29/2024 2350 by Josie Peoples RN  Body Position: right  Taken 11/29/2024 2326 by Josie Peoples, " RN  Body Position: right  Intervention: Prevent and Manage VTE (Venous Thromboembolism) Risk  Recent Flowsheet Documentation  Taken 11/30/2024 0343 by Josie Peoples RN  VTE Prevention/Management: SCDs on (sequential compression devices)  Taken 11/29/2024 2350 by Josie Peoples RN  VTE Prevention/Management: SCDs on (sequential compression devices)  Intervention: Prevent Infection  Recent Flowsheet Documentation  Taken 11/30/2024 0343 by Josie Peoples RN  Infection Prevention:   single patient room provided   rest/sleep promoted   personal protective equipment utilized   hand hygiene promoted   equipment surfaces disinfected   environmental surveillance performed  Taken 11/29/2024 2350 by Josie Peoples RN  Infection Prevention:   single patient room provided   rest/sleep promoted   personal protective equipment utilized   hand hygiene promoted   equipment surfaces disinfected   environmental surveillance performed     Problem: Adult Inpatient Plan of Care  Goal: Absence of Hospital-Acquired Illness or Injury  Intervention: Identify and Manage Fall Risk  Recent Flowsheet Documentation  Taken 11/30/2024 0343 by Josie Peoples RN  Safety Promotion/Fall Prevention:   treat underlying cause   safety round/check completed   room near nurse's station   room door open   patient and family education   nonskid shoes/slippers when out of bed   clutter free environment maintained  Taken 11/29/2024 2350 by Josie Peoples RN  Safety Promotion/Fall Prevention:   treat underlying cause   safety round/check completed   room near nurse's station   room door open   patient and family education   nonskid shoes/slippers when out of bed   clutter free environment maintained     Problem: Adult Inpatient Plan of Care  Goal: Absence of Hospital-Acquired Illness or Injury  Intervention: Prevent Skin Injury  Recent Flowsheet Documentation  Taken 11/30/2024 0343 by Joise Peoples RN  Body Position: right  Taken  11/30/2024 0326 by Josie Peoples RN  Body Position:   turned   heels elevated  Taken 11/30/2024 0126 by Josie Peoples RN  Body Position: turned  Taken 11/29/2024 2350 by Josie Peoples RN  Body Position: right  Taken 11/29/2024 2326 by Josie Peoples RN  Body Position: right     Problem: Adult Inpatient Plan of Care  Goal: Absence of Hospital-Acquired Illness or Injury  Intervention: Prevent and Manage VTE (Venous Thromboembolism) Risk  Recent Flowsheet Documentation  Taken 11/30/2024 0343 by Josie Peoples RN  VTE Prevention/Management: SCDs on (sequential compression devices)  Taken 11/29/2024 2350 by Josie Peoples RN  VTE Prevention/Management: SCDs on (sequential compression devices)     Problem: Adult Inpatient Plan of Care  Goal: Absence of Hospital-Acquired Illness or Injury  Intervention: Prevent Infection  Recent Flowsheet Documentation  Taken 11/30/2024 0343 by Josie Peoples RN  Infection Prevention:   single patient room provided   rest/sleep promoted   personal protective equipment utilized   hand hygiene promoted   equipment surfaces disinfected   environmental surveillance performed  Taken 11/29/2024 2350 by Josie Peoples RN  Infection Prevention:   single patient room provided   rest/sleep promoted   personal protective equipment utilized   hand hygiene promoted   equipment surfaces disinfected   environmental surveillance performed     Problem: Risk for Delirium  Goal: Optimal Coping  Outcome: Progressing  Intervention: Optimize Psychosocial Adjustment to Delirium  Recent Flowsheet Documentation  Taken 11/30/2024 0343 by Josie Peoples RN  Supportive Measures:   goal-setting facilitated   active listening utilized  Family/Support System Care: support provided  Taken 11/29/2024 2350 by Josie Peoples RN  Supportive Measures:   goal-setting facilitated   active listening utilized  Family/Support System Care: support provided     Problem: Risk for  Delirium  Goal: Improved Sleep  Outcome: Progressing  Intervention: Promote Sleep  Recent Flowsheet Documentation  Taken 11/30/2024 0343 by Josie Peoples RN  Sleep/Rest Enhancement:   room darkened   noise level reduced   awakenings minimized   comfort measures  Taken 11/29/2024 2350 by Josie Peoples RN  Sleep/Rest Enhancement:   room darkened   noise level reduced   awakenings minimized   comfort measures     Problem: Risk for Delirium  Goal: Improved Sleep  Intervention: Promote Sleep  Recent Flowsheet Documentation  Taken 11/30/2024 0343 by Josie Peoples RN  Sleep/Rest Enhancement:   room darkened   noise level reduced   awakenings minimized   comfort measures  Taken 11/29/2024 2350 by Josie Peoples RN  Sleep/Rest Enhancement:   room darkened   noise level reduced   awakenings minimized   comfort measures     Problem: Comorbidity Management  Goal: Maintenance of Asthma Control  Outcome: Progressing  Intervention: Maintain Asthma Symptom Control  Recent Flowsheet Documentation  Taken 11/30/2024 0343 by Josie Peoples RN  Medication Review/Management: medications reviewed  Taken 11/29/2024 2350 by Josie Peoples RN  Medication Review/Management: medications reviewed     Problem: Comorbidity Management  Goal: Maintenance of Asthma Control  Intervention: Maintain Asthma Symptom Control  Recent Flowsheet Documentation  Taken 11/30/2024 0343 by oJsie Peoples RN  Medication Review/Management: medications reviewed  Taken 11/29/2024 2350 by Josie Peoples RN  Medication Review/Management: medications reviewed     Problem: Comorbidity Management  Goal: Blood Glucose Levels Within Targeted Range  Outcome: Progressing  Intervention: Monitor and Manage Glycemia  Recent Flowsheet Documentation  Taken 11/30/2024 0343 by Josie Peoples RN  Medication Review/Management: medications reviewed  Taken 11/29/2024 2350 by Josie Peoples RN  Medication Review/Management: medications  reviewed     Problem: Comorbidity Management  Goal: Blood Glucose Levels Within Targeted Range  Intervention: Monitor and Manage Glycemia  Recent Flowsheet Documentation  Taken 11/30/2024 0343 by Josie Peoples RN  Medication Review/Management: medications reviewed  Taken 11/29/2024 2350 by Josie Peoples RN  Medication Review/Management: medications reviewed     Problem: Cardiovascular Surgery  Goal: Effective Oxygenation and Ventilation  Outcome: Progressing  Intervention: Promote Airway Secretion Clearance  Recent Flowsheet Documentation  Taken 11/30/2024 0343 by Josie Peoples RN  Cough And Deep Breathing: done independently per patient  Taken 11/29/2024 2350 by Josie Peoples RN  Cough And Deep Breathing: done independently per patient  Intervention: Optimize Oxygenation and Ventilation  Recent Flowsheet Documentation  Taken 11/30/2024 0343 by Josie Peoples RN  Chest Tube Safety: all connections secured  Taken 11/29/2024 2350 by Josie Peoples RN  Chest Tube Safety: all connections secured     Problem: Cardiovascular Surgery  Goal: Effective Oxygenation and Ventilation  Intervention: Promote Airway Secretion Clearance  Recent Flowsheet Documentation  Taken 11/30/2024 0343 by Josie Peoples RN  Cough And Deep Breathing: done independently per patient  Taken 11/29/2024 2350 by Josie Peoples RN  Cough And Deep Breathing: done independently per patient     Problem: Cardiovascular Surgery  Goal: Effective Oxygenation and Ventilation  Intervention: Optimize Oxygenation and Ventilation  Recent Flowsheet Documentation  Taken 11/30/2024 0343 by Josie Peoples RN  Chest Tube Safety: all connections secured  Taken 11/29/2024 2350 by Josie Peoples RN  Chest Tube Safety: all connections secured     Problem: Cardiovascular Surgery  Goal: Effective Oxygenation and Ventilation  Intervention: Optimize Oxygenation and Ventilation  Recent Flowsheet Documentation  Taken 11/30/2024 0343 by  Josie Peoples RN  Chest Tube Safety: all connections secured  Taken 11/29/2024 2350 by Josie Peoples RN  Chest Tube Safety: all connections secured     Problem: Fall Injury Risk  Goal: Absence of Fall and Fall-Related Injury  Outcome: Progressing  Intervention: Identify and Manage Contributors  Recent Flowsheet Documentation  Taken 11/30/2024 0343 by Josie Peoples RN  Medication Review/Management: medications reviewed  Taken 11/29/2024 2350 by Josie Peoples RN  Medication Review/Management: medications reviewed  Intervention: Promote Injury-Free Environment  Recent Flowsheet Documentation  Taken 11/30/2024 0343 by Josie Peoples RN  Safety Promotion/Fall Prevention:   treat underlying cause   safety round/check completed   room near nurse's station   room door open   patient and family education   nonskid shoes/slippers when out of bed   clutter free environment maintained  Taken 11/29/2024 2350 by Josie Peoples RN  Safety Promotion/Fall Prevention:   treat underlying cause   safety round/check completed   room near nurse's station   room door open   patient and family education   nonskid shoes/slippers when out of bed   clutter free environment maintained     Problem: Fall Injury Risk  Goal: Absence of Fall and Fall-Related Injury  Intervention: Identify and Manage Contributors  Recent Flowsheet Documentation  Taken 11/30/2024 0343 by Josie Peoples RN  Medication Review/Management: medications reviewed  Taken 11/29/2024 2350 by Josie Peoples RN  Medication Review/Management: medications reviewed     Problem: Fall Injury Risk  Goal: Absence of Fall and Fall-Related Injury  Intervention: Promote Injury-Free Environment  Recent Flowsheet Documentation  Taken 11/30/2024 0343 by Josie Peoples RN  Safety Promotion/Fall Prevention:   treat underlying cause   safety round/check completed   room near nurse's station   room door open   patient and family education   nonskid  shoes/slippers when out of bed   clutter free environment maintained  Taken 11/29/2024 2350 by Josie Peoples, RN  Safety Promotion/Fall Prevention:   treat underlying cause   safety round/check completed   room near nurse's station   room door open   patient and family education   nonskid shoes/slippers when out of bed   clutter free environment maintained     Problem: Pain Acute  Goal: Optimal Pain Control and Function  Outcome: Progressing  Intervention: Optimize Psychosocial Wellbeing  Recent Flowsheet Documentation  Taken 11/30/2024 0343 by Josie Peoples RN  Supportive Measures:   goal-setting facilitated   active listening utilized  Taken 11/29/2024 2350 by Josie Peoples RN  Supportive Measures:   goal-setting facilitated   active listening utilized  Intervention: Develop Pain Management Plan  Recent Flowsheet Documentation  Taken 11/30/2024 0324 by Josie Peoples RN  Pain Management Interventions:   distraction   declines  Intervention: Prevent or Manage Pain  Recent Flowsheet Documentation  Taken 11/30/2024 0343 by Josie Peoples RN  Sensory Stimulation Regulation:   lighting decreased   care clustered  Sleep/Rest Enhancement:   room darkened   noise level reduced   awakenings minimized   comfort measures  Bowel Elimination Promotion: adequate fluid intake promoted  Medication Review/Management: medications reviewed  Taken 11/29/2024 2350 by Josie Peoples RN  Sensory Stimulation Regulation:   lighting decreased   care clustered  Sleep/Rest Enhancement:   room darkened   noise level reduced   awakenings minimized   comfort measures  Bowel Elimination Promotion: adequate fluid intake promoted  Medication Review/Management: medications reviewed     Problem: Pain Acute  Goal: Optimal Pain Control and Function  Intervention: Optimize Psychosocial Wellbeing  Recent Flowsheet Documentation  Taken 11/30/2024 0343 by Josie Peoples, RN  Supportive Measures:   goal-setting facilitated    active listening utilized  Taken 11/29/2024 2350 by Josie Peoples RN  Supportive Measures:   goal-setting facilitated   active listening utilized     Problem: Pain Acute  Goal: Optimal Pain Control and Function  Intervention: Develop Pain Management Plan  Recent Flowsheet Documentation  Taken 11/30/2024 0324 by Josie Peoples RN  Pain Management Interventions:   distraction   declines     Problem: Pain Acute  Goal: Optimal Pain Control and Function  Intervention: Prevent or Manage Pain  Recent Flowsheet Documentation  Taken 11/30/2024 0343 by Josie Peoples RN  Sensory Stimulation Regulation:   lighting decreased   care clustered  Sleep/Rest Enhancement:   room darkened   noise level reduced   awakenings minimized   comfort measures  Bowel Elimination Promotion: adequate fluid intake promoted  Medication Review/Management: medications reviewed  Taken 11/29/2024 2350 by Josie Peoples RN  Sensory Stimulation Regulation:   lighting decreased   care clustered  Sleep/Rest Enhancement:   room darkened   noise level reduced   awakenings minimized   comfort measures  Bowel Elimination Promotion: adequate fluid intake promoted  Medication Review/Management: medications reviewed     Problem: Gas Exchange Impaired  Goal: Optimal Gas Exchange  Outcome: Progressing  Intervention: Optimize Oxygenation and Ventilation  Recent Flowsheet Documentation  Taken 11/30/2024 0343 by Josie Peoples RN  Head of Bed (HOB) Positioning: HOB at 20-30 degrees  Taken 11/30/2024 0326 by Josie Peoples RN  Head of Bed (HOB) Positioning: HOB at 30 degrees  Taken 11/30/2024 0126 by Josie Peoples RN  Head of Bed (HOB) Positioning: HOB at 20-30 degrees  Taken 11/29/2024 2350 by Josie Peoples RN  Head of Bed (HOB) Positioning: HOB at 20-30 degrees  Taken 11/29/2024 2326 by Josie Peoples RN  Head of Bed (HOB) Positioning: HOB at 30 degrees     Problem: Gas Exchange Impaired  Goal: Optimal Gas  Exchange  Intervention: Optimize Oxygenation and Ventilation  Recent Flowsheet Documentation  Taken 11/30/2024 0343 by Josie Peoples RN  Head of Bed ([facility]) Positioning: HOB at 20-30 degrees  Taken 11/30/2024 0326 by Josie Peoples RN  Head of Bed ([facility]) Positioning: HOB at 30 degrees  Taken 11/30/2024 0126 by Josie Peoples RN  Head of Bed ([facility]) Positioning: HOB at 20-30 degrees  Taken 11/29/2024 2350 by Josie Peoples RN  Head of Bed ([facility]) Positioning: HOB at 20-30 degrees  Taken 11/29/2024 2326 by Josie Peoples RN  Head of Bed ([facility]) Positioning: HOB at 30 degrees   Goal Outcome Evaluation:       Patient is going into 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 Patient has 2 chest tubes to 1 atrium with 150 of serous out put. Francis replaced on 11/28/24 for retention, out put at 1100 pm=900 clear yellow.francis to be removed at 0900 today. Rhythm=NSR, 02 sat's 100% on 1.5 liter's nasal cannula, weaned to room air at 0330 and sating 92-93%, lungs coarse with a weak congested cough nonproductive. Insentive spirometer=750. Protocol for K+, Mag and Phos, magnesium to be replaced. Plan up at 0630 in chair for breakfast.

## 2024-11-30 NOTE — PROGRESS NOTES
CVTS BRIEF PROGRESS NOTE    Bilateral pleural chest tubes pulled without immediate complication.    Occlusive dressing must remain in place for 24 hrs; reinforce prn.    Barbara Boggs CNP, [facility]-  Cardiothoracic Surgery  Pager [phone]

## 2024-11-30 NOTE — PROGRESS NOTES
"CVTS Daily Progress Note   POD 6 s/p CABG x3 (LIMA-LAD, rSVG-PDA, rSVG-OM)  Attending: Raul  LOS: 7    SUBJECTIVE/INTERVAL EVENTS:    NAEO. Patient progressing well. Maintaining oxygen saturations on RA. Normotensive. Pain controlled overall. + BM. Tolerating PO intake. Good UOP. Chest tube output appropriate. Hgb stable. Patient denies new chest pain, shortness of breath, abdominal pain, and nausea.     OBJECTIVE:  Temp:  [97.7  F (36.5  C)-99  F (37.2  C)] 97.7  F (36.5  C)  Pulse:  [83-95] 93  Resp:  [17-19] 18  BP: ()/(51-68) 122/68  SpO2:  [92 %-100 %] 92 %  Vitals:    11/25/24 0400 11/26/24 0400 11/27/24 0600 11/28/24 0700   Weight: 73.6 kg (162 lb 4.8 oz) 72.6 kg (160 lb 1.6 oz) 75 kg (165 lb 6.4 oz) 77.5 kg (170 lb 12.8 oz)    11/29/24 0600   Weight: 76.6 kg (168 lb 12.8 oz)       Clinically Significant Risk Factors               # Hypoalbuminemia: Lowest albumin = 3.1 g/dL at 11/25/2024  4:26 AM, will monitor as appropriate     # Hypertension: Noted on problem list    # Acute heart failure with reduced ejection fraction: last echo with EF <40% and receiving IV diuretics           # Overweight: Estimated body mass index is 28.09 kg/m  as calculated from the following:    Height as of this encounter: 1.651 m (5' 5\").    Weight as of this encounter: 76.6 kg (168 lb 12.8 oz).        # Financial/Environmental Concerns: none  # Asthma: noted on problem list  # History of CABG: noted on surgical history            Current Medications:    Scheduled Meds:  Current Facility-Administered Medications   Medication Dose Route Frequency Provider Last Rate Last Admin    aspirin (ASA) chewable tablet 162 mg  162 mg Oral or NG Tube Daily Barbara Boggs NP   162 mg at 11/30/24 0805    atorvastatin (LIPITOR) tablet 80 mg  80 mg Oral or Feeding Tube QPM Barbara Boggs NP   80 mg at 11/29/24 2042    carvedilol (COREG) tablet 6.25 mg  6.25 mg Oral BID w/meals Barbara Boggs NP   6.25 mg at 11/30/24 0805    " citalopram (celeXA) tablet 20 mg  20 mg Oral or Feeding Tube QAM Barbara Boggs NP   20 mg at 11/30/24 0805    empagliflozin (JARDIANCE) tablet 25 mg  25 mg Oral Daily Beena Mitchell PA-C   25 mg at 11/30/24 0805    fluticasone (ARNUITY ELLIPTA) 100 MCG/ACT inhaler 1 puff  1 puff Inhalation Daily Barbara Boggs NP   1 puff at 11/30/24 0802    furosemide (LASIX) injection 20 mg  20 mg Intravenous TID Beena Mitchell PA-C   20 mg at 11/30/24 0802    guaiFENesin (MUCINEX) 12 hr tablet 600 mg  600 mg Oral BID Beena Mitchell PA-C   600 mg at 11/30/24 0805    heparin ANTICOAGULANT injection 5,000 Units  5,000 Units Subcutaneous Q8H Barbara Boggs NP   5,000 Units at 11/30/24 0316    insulin aspart (NovoLOG) injection (RAPID ACTING)  1-7 Units Subcutaneous TID AC Barbara Boggs NP   2 Units at 11/29/24 1206    insulin aspart (NovoLOG) injection (RAPID ACTING)  1-5 Units Subcutaneous At Bedtime Barbara Boggs NP   1 Units at 11/27/24 2117    magnesium oxide (MAG-OX) tablet 400 mg  400 mg Oral Q4H Barbara Boggs NP   400 mg at 11/30/24 0805    metFORMIN (GLUCOPHAGE XR) 24 hr tablet 1,000 mg  1,000 mg Oral Daily with breakfast Beena Mitchell PA-C   1,000 mg at 11/30/24 0805    nitroFURantoin macrocrystal-monohydrate (MACROBID) capsule 100 mg  100 mg Oral Q12H ANITHA (08/20) Beena Mitchell PA-C   100 mg at 11/30/24 0805    polyethylene glycol (MIRALAX) Packet 17 g  17 g Oral Daily Barbara Boggs NP   17 g at 11/28/24 0805    pramipexole (MIRAPEX) tablet 0.5 mg  0.5 mg Oral At Bedtime Barbara Boggs NP   0.5 mg at 11/29/24 2046    senna-docusate (SENOKOT-S/PERICOLACE) 8.6-50 MG per tablet 1 tablet  1 tablet Oral BID Barbara Boggs NP   1 tablet at 11/30/24 0805    sodium chloride (PF) 0.9% PF flush 3 mL  3 mL Intracatheter Q8H Barbara Boggs NP   3 mL at 11/30/24 0316    tamsulosin (FLOMAX) capsule 0.4 mg  0.4 mg Oral Daily Beena Mitchell, YARELIS   0.4 mg at  11/30/24 0805     Continuous Infusions:  Current Facility-Administered Medications   Medication Dose Route Frequency Provider Last Rate Last Admin     PRN Meds:  Current Facility-Administered Medications   Medication Dose Route Frequency Provider Last Rate Last Admin    acetaminophen (TYLENOL) tablet 650 mg  650 mg Oral Q4H PRBarbara Platt NP   650 mg at 11/29/24 2346    albuterol (PROVENTIL HFA/VENTOLIN HFA) inhaler  2 puff Inhalation Q6H PRBarbara Platt NP        bisacodyl (DULCOLAX) suppository 10 mg  10 mg Rectal Daily PRN Barbara Boggs NP        calcium gluconate 1 g in 50 mL in sodium chloride intermittent infusion  1 g Intravenous Once PRBarbara Platt NP   1 g at 11/27/24 0633    calcium gluconate 2 g in  mL intermittent infusion  2 g Intravenous Once PRBarbara Platt NP        glucose gel 15-30 g  15-30 g Oral Q15 Min PRBarbara Platt NP        Or    dextrose 50 % injection 25-50 mL  25-50 mL Intravenous Q15 Min PRBarbara Platt NP        Or    glucagon injection 1 mg  1 mg Subcutaneous Q15 Min PRBarbara Platt NP        hydrALAZINE (APRESOLINE) injection 10 mg  10 mg Intravenous Q6H PRBarbara Platt NP        hydrOXYzine HCl (ATARAX) tablet 10 mg  10 mg Oral or Feeding Tube TID PRBarbara Platt NP   10 mg at 11/30/24 1002    ipratropium - albuterol 0.5 mg/2.5 mg/3 mL (DUONEB) neb solution 3 mL  3 mL Nebulization Q4H PRN Barbara Boggs NP        magnesium hydroxide (MILK OF MAGNESIA) suspension 30 mL  30 mL Oral Daily PRN Barbara Boggs NP        naloxone (NARCAN) injection 0.2 mg  0.2 mg Intravenous Q2 Min PRBarbara Platt NP        Or    naloxone (NARCAN) injection 0.4 mg  0.4 mg Intravenous Q2 Min PRBarbara Platt NP        Or    naloxone (NARCAN) injection 0.2 mg  0.2 mg Intramuscular Q2 Min PRN Barbara Boggs NP        Or    naloxone (NARCAN) injection 0.4 mg  0.4 mg Intramuscular Q2 Min PRN Barbara Boggs, NP         ondansetron (ZOFRAN ODT) ODT tab 4 mg  4 mg Oral Q6H PRN Barbara Boggs NP        Or    ondansetron (ZOFRAN) injection 4 mg  4 mg Intravenous Q6H PRN Barbara Boggs, NP   4 mg at 11/27/24 1120    oxyCODONE (ROXICODONE) tablet 5 mg  5 mg Oral Q4H PRN Barbara Boggs NP   5 mg at 11/29/24 1419    Or    oxyCODONE (ROXICODONE) tablet 10 mg  10 mg Oral Q4H PRN Barbara Boggs, NP   10 mg at 11/29/24 2346    sodium chloride (PF) 0.9% PF flush 3 mL  3 mL Intracatheter q1 min prn Barbara Boggs NP   3 mL at 11/26/24 0810       Cardiographics:    Telemetry monitoring demonstrates SR with rates in the 90s per my personal review.    Imaging:  Recent Results (from the past 24 hours)   XR Chest 2 Views    Narrative    EXAM: XR CHEST 2 VIEWS  LOCATION: [facility]  DATE: 11/29/2024    INDICATION: s p CVTS  COMPARISON: 1/25/2024      Impression    IMPRESSION: Sternotomy redemonstrated Endotracheal tube, right IJ sheath, [place]-Mary catheter and mediastinal drains have been removed. Heart size magnified in AP projection with normal vascularity. Left basilar atelectasis and small effusion unchanged.   No pneumothorax.       Labs, personally reviewed.  Hemoglobin   Date Value Ref Range Status   11/30/2024 8.3 (L) 11.7 - 15.7 g/dL Final   11/29/2024 7.6 (L) 11.7 - 15.7 g/dL Final   11/28/2024 7.4 (L) 11.7 - 15.7 g/dL Final     WBC Count   Date Value Ref Range Status   11/30/2024 9.1 4.0 - 11.0 10e3/uL Final   11/29/2024 8.2 4.0 - 11.0 10e3/uL Final   11/28/2024 8.3 4.0 - 11.0 10e3/uL Final     Platelet Count   Date Value Ref Range Status   11/30/2024 251 150 - 450 10e3/uL Final   11/29/2024 235 150 - 450 10e3/uL Final   11/28/2024 181 150 - 450 10e3/uL Final     Creatinine   Date Value Ref Range Status   11/30/2024 0.83 0.51 - 0.95 mg/dL Final   11/29/2024 0.81 0.51 - 0.95 mg/dL Final   11/28/2024 0.86 0.51 - 0.95 mg/dL Final     Potassium   Date Value Ref Range Status   11/30/2024 3.9 3.4 - 5.3 mmol/L  Final   11/29/2024 4.5 3.4 - 5.3 mmol/L Final   11/28/2024 4.3 3.4 - 5.3 mmol/L Final   01/02/2023 4.0 3.5 - 5.0 mmol/L Final   01/01/2023 4.1 3.5 - 5.0 mmol/L Final   03/22/2021 4.4 3.5 - 5.0 mmol/L Final     Potassium POCT   Date Value Ref Range Status   11/24/2024 4.0 3.4 - 5.3 mmol/L Final   11/24/2024 3.5 3.4 - 5.3 mmol/L Final   11/24/2024 4.4 3.4 - 5.3 mmol/L Final     Magnesium   Date Value Ref Range Status   11/30/2024 1.8 1.7 - 2.3 mg/dL Final   11/29/2024 1.9 1.7 - 2.3 mg/dL Final   11/28/2024 2.2 1.7 - 2.3 mg/dL Final          I/O:  I/O last 3 completed shifts:  In: 550 [P.O.:550]  Out: 4040 [Urine:3350; Chest Tube:690]       Physical Exam:    General: Patient seen up in chair, NAD. Conversant, pleasant, calm, cooperative on exam.  HEENT: no sclera icterus, moist mucosa, no tracheal deviation  CV: RRR on monitor - SR, WWP. Minimal LE edema.  Incision C/D/I, no erythema or induration  Pulm: Unlabored breathing on RA. Chest tubes in place, serous output, no air leak  Abd: SNTND  Ext: Minimal pedal edema, SCDs in place  Neuro: A&O, CNs grossly intact, face symmetric, speech clear, SPARKS      ASSESSMENT/PLAN:    Gill Mendez is a 64 year old female who is s/p CABG x3.    Active Problems:    NSTEMI (non-ST elevated myocardial infarction) (H)        NEURO  PSYCH:  Acute postop pain  ANX  RLS  - Scheduled Tylenol/lidocaine patches and PRN Tylenol/oxycodone for pain  - PTA Celexa resumed  - PRN Atarax available  - PTA Mirapex resumed    CV:  ICM/HFrEF  NSTEMI/CAD s/p CABG x3  HTN  HLD  Hypervolemia, improving  - Preop echo LVEF 25-30% (no prior echos pre-dating NSTEMI); post CPB LVEF 35%  - Plan to repeat echo 12/1  - IABP removed POD 1  - Normotensive  - Coreg 6.25 mg BID with hold parameters  - Hold PTA amlodipine and lisinopril  - ASA 162mg  - Atorvastatin 80mg daily  - Cardiology consulted earlier this admission, will look to re-engage in coming days for GDMT optimization; will need CORE consult at  discharge  - PTA Jardiance resumed 11/29  - Mediastinal chest tubes & TPW removed 11/29; bilateral pleural chest tubes removed without immediate complication    PULM:  Acute hypoxic respiratory insufficiency  Asthma/COPD  Right PTX  - Extubated POD 1  - Maintaining oxygen saturations on RA  - PTA Arnuity Ellipta & albuterol inhalers resumed  - Duonebs available prn  - Encourage pulmonary toilet/OOB/activity  - PTX small and stable, asymptomatic; expectant management    GI  NUTRITION:  - Diet: Cardiac/consistent carb  - Bowel regimen    RENAL  FE:  CARMEN, resolved  Urinary retention 2/2 Klebsiella CAUTI  - Good UOP on Lasix  - Cr WNL  - Diuresis:  20mg IV Lasix TID  - Hold PTA chlorthalidone  - Continue electrolyte replacement protocol  - Rocephin 11/27-28, transitioned to Macrobid 11/29 with plan for 5-day total course  - Plan for voiding trial in AM, continue francis today    HEME:  Acute blood loss anemia  - Hgb stable, no bleeding concerns. Hep SQ, ASA    ID:  Stress-induced leukocytosis, resolved  Klebsiella CAUTI  - Yecenia op ppx complete, afebrile  - Rocephin 11/27-28, Macrobid started 11/29, as above  - Procal indeterminate, downtrending on repeat    ENDO:  DM2 - Hgb A1c 6.1  - Sliding scale insulin  - Lantus discontinued  - PTA Jardiance, metformin resumed  - Hold PTA tirzepatide  - BG goal <180 to promote optimal wound healing    PPx:  - DVT: SCDs, SQ heparin TID, OOB/ambulation   - GI: Protonix 40mg PO daily    DISPO:  - General telemetry status since POD 4  - Medically Ready for Discharge: Anticipated in 2-4 Days pending adequate voiding following francis removal, approaching euvolemia  - Therapies recommending discharge home with OP CR once medically stable      Pt seen and discussed with Dr. Irwin.      Barbara Boggs, CNP, ACNPC-AG  Cardiothoracic Surgery  [employer] Pager [phone]

## 2024-11-30 NOTE — PROGRESS NOTES
[facility] [place]    Medicine Progress Note - Hospitalist Service    Date of Admission:  11/23/2024    Assessment & Plan   Gill Mendez is a 64 year old female with a medical history significant for hypertension, hyperlipidemia, type II DM, asthma and anxiety disorder who is admitted with an NSTEMI and acute CHF decompensation. Cardiac cath showing multivessel CAD. Cardiovascular surgeon did CABG on 11/24. Extubated on 11/25.  Urine culture grew Klebsiella oxytoca for which she is receiving IV ceftriaxone.     Acute Non-STEMI  Multivessel CAD   - non-ST elevation myocardial infarction (NSTEMI).  -aspirin daily. Hold ACE due to hypotension  -Initiate high-intensity statin therapy  -Heparin gtt was initiated in ER, stopped before CABG  -Consult cardiology: cardiac cath showing multivessel CAD on 11/23  -consult [facility]: urgent CABG and IABP in cath lab on 11/24  -intubated and in ICU s/p CABG. Extubated on 11/25  - Levophed gtt and vasopressin prn. Wean off when able.      Acute CHF Decompensation, pulm edema  -Likely secondary to volume overload and acute cardiac dysfunction.  -cardiologist consulted  -Lasix iv was initiated. On hold due to soft BP.      Hypertension  - was on nitroglycerin drip. Off.   -low bp s/p CABG, on vasopressors      Type II Diabetes Mellitus  -frequent Accu-Cheks and manage with sliding scale insulin.  -Maintain blood glucose between 100-140 mg/dL.  -Hold tirzepatide, amlodipine, lisinopril, and chlorthalidone during acute hospitalization.  -Reassess diabetes medications upon stabilization and discharge planning.      Asthma   Not in exacerbation  Resume home albuterol as needed for shortness of breath.   Duonebs PRN, 3 day course of prednsione   Continue maintenance therapy with Breo Ellipta or equivalent inhaler.     Anxiety Disorder  Severe avani phobia   -Continue Celexa at the current dose.  -Monitor for any anxiety exacerbation during hospitalization.    Suspected  "UTI  Urine culture grew gram-negative bacilli  Continue IV ceftriaxone for now     Rest of Patient s Medical Problems  Management remains unchanged unless otherwise indicated.                 Diet: Combination Diet Moderate Consistent Carb (60 g CHO per Meal) Diet; Low Saturated Fat Na <2400mg Diet  Snacks/Supplements Adult: Glucerna; With Meals    DVT Prophylaxis: Heparin SQ  Mcdaniels Catheter: Not present  Lines: None       Cardiac Monitoring: ACTIVE order. Indication: Open heart surgery (7 days)  Code Status: Full Code      Clinically Significant Risk Factors               # Hypoalbuminemia: Lowest albumin = 3.1 g/dL at 11/25/2024  4:26 AM, will monitor as appropriate     # Hypertension: Noted on problem list    # Acute heart failure with reduced ejection fraction: last echo with EF <40% and receiving IV diuretics           # Overweight: Estimated body mass index is 28.09 kg/m  as calculated from the following:    Height as of this encounter: 1.651 m (5' 5\").    Weight as of this encounter: 76.6 kg (168 lb 12.8 oz).        # Financial/Environmental Concerns: none  # Asthma: noted on problem list  # History of CABG: noted on surgical history       Social Drivers of Health    Alcohol Use: Medium Risk (5/25/2023)    Received from [facility], [facility] [facility]    Alcohol Use     Alcohol Use Status: Yes   Interpersonal Safety: Low Risk  (11/25/2024)    Interpersonal Safety     Do you feel physically and emotionally safe where you currently live?: Patient unable to answer     Within the past 12 months, have you been hit, slapped, kicked or otherwise physically hurt by someone?: No     Within the past 12 months, have you been humiliated or emotionally abused in other ways by your partner or ex-partner?: No   Recent Concern: Interpersonal Safety - High Risk (11/23/2024)    Interpersonal Safety     Do you feel physically and emotionally safe where you currently live?: No     Within the past 12 months, have you been hit, " slapped, kicked or otherwise physically hurt by someone?: No     Within the past 12 months, have you been humiliated or emotionally abused in other ways by your partner or ex-partner?: No    [facility], [facility]    Social Connections          Disposition Plan     Medically Ready for Discharge: Anticipated in 2-4 Days    Barrier: recover from CABG         Sudarshan Jimenez MD  Hospitalist Service  [facility]  Securely message with [employer] (more info)  Text page via [employer] Paging/Directory   ______________________________________________________________________    Interval History   She states she feels anxious and requests for PTA citalopram.  PTA citalopram is currently scheduled and also has order for as needed hydroxyzine.  She is breathing comfortably on room air and pain is well-controlled. 1 chest tube still in place.  She is breathing comfortably on room air.  Daughter is visiting      Physical Exam   Vital Signs: Temp: 98.1  F (36.7  C) Temp src: Oral BP: 112/57 Pulse: 83   Resp: 18 SpO2: 92 % O2 Device: None (Room air) Oxygen Delivery: 1.5 LPM  Weight: 168 lbs 12.8 oz    General.  Awake alert oriented not in acute distress.  HEENT.  Pupils equal round react to light, anicteric, EOM intact.  Neck supple no JVD.  CVS regular rhythm no murmur gallops. S/p CABG. Incision healing.   Lungs.  Chest tubes in place draining serosanguineous fluid.  Diminished air entry bilaterally with significant crackles.   Abdomen.  Soft nontender bowel sounds present.  Extremities.  No edema no calf tenderness.  Neurological.  Awake and alert. No focal deficit.  Skin no rash. No pallor.  Psych. Normal mood.      Medical Decision Making       47 MINUTES SPENT BY ME on the date of service doing chart review, history, exam, documentation & further activities per the note.      Data     I have personally reviewed the following  data over the past 24 hrs:    9.1  \   8.3 (L)   / 251     140 103 19.9 /  214 (H)   3.9 29 0.83 \       Imaging results reviewed over the past 24 hrs:   Recent Results (from the past 24 hours)   XR Chest 2 Views    Narrative    EXAM: XR CHEST 2 VIEWS  LOCATION: [facility]  DATE: 11/29/2024    INDICATION: s p CVTS  COMPARISON: 1/25/2024      Impression    IMPRESSION: Sternotomy redemonstrated Endotracheal tube, right IJ sheath, [place]-Mary catheter and mediastinal drains have been removed. Heart size magnified in AP projection with normal vascularity. Left basilar atelectasis and small effusion unchanged.   No pneumothorax.

## 2024-12-01 ENCOUNTER — APPOINTMENT (OUTPATIENT)
Dept: RADIOLOGY | Facility: HOSPITAL | Age: 64
DRG: 233 | End: 2024-12-01
Attending: SURGERY
Payer: COMMERCIAL

## 2024-12-01 ENCOUNTER — APPOINTMENT (OUTPATIENT)
Dept: OCCUPATIONAL THERAPY | Facility: HOSPITAL | Age: 64
DRG: 233 | End: 2024-12-01
Attending: INTERNAL MEDICINE
Payer: COMMERCIAL

## 2024-12-01 ENCOUNTER — APPOINTMENT (OUTPATIENT)
Dept: CARDIOLOGY | Facility: HOSPITAL | Age: 64
DRG: 233 | End: 2024-12-01
Attending: SURGERY
Payer: COMMERCIAL

## 2024-12-01 LAB
ANION GAP SERPL CALCULATED.3IONS-SCNC: 5 MMOL/L (ref 7–15)
BUN SERPL-MCNC: 16.1 MG/DL (ref 8–23)
CALCIUM SERPL-MCNC: 8.1 MG/DL (ref 8.8–10.4)
CHLORIDE SERPL-SCNC: 101 MMOL/L (ref 98–107)
CREAT SERPL-MCNC: 0.83 MG/DL (ref 0.51–0.95)
EGFRCR SERPLBLD CKD-EPI 2021: 79 ML/MIN/1.73M2
GLUCOSE BLDC GLUCOMTR-MCNC: 136 MG/DL (ref 70–99)
GLUCOSE BLDC GLUCOMTR-MCNC: 158 MG/DL (ref 70–99)
GLUCOSE BLDC GLUCOMTR-MCNC: 196 MG/DL (ref 70–99)
GLUCOSE BLDC GLUCOMTR-MCNC: 93 MG/DL (ref 70–99)
GLUCOSE SERPL-MCNC: 102 MG/DL (ref 70–99)
HCO3 SERPL-SCNC: 35 MMOL/L (ref 22–29)
LVEF ECHO: NORMAL
MAGNESIUM SERPL-MCNC: 1.9 MG/DL (ref 1.7–2.3)
PHOSPHATE SERPL-MCNC: 2.6 MG/DL (ref 2.5–4.5)
PLATELET # BLD AUTO: 332 10E3/UL (ref 150–450)
POTASSIUM SERPL-SCNC: 4 MMOL/L (ref 3.4–5.3)
SODIUM SERPL-SCNC: 141 MMOL/L (ref 135–145)

## 2024-12-01 PROCEDURE — 999N000157 HC STATISTIC RCP TIME EA 10 MIN

## 2024-12-01 PROCEDURE — 250N000013 HC RX MED GY IP 250 OP 250 PS 637: Performed by: SURGERY

## 2024-12-01 PROCEDURE — 999N000156 HC STATISTIC RCP CONSULT EA 30 MIN

## 2024-12-01 PROCEDURE — 99233 SBSQ HOSP IP/OBS HIGH 50: CPT | Performed by: INTERNAL MEDICINE

## 2024-12-01 PROCEDURE — 94640 AIRWAY INHALATION TREATMENT: CPT | Mod: 76

## 2024-12-01 PROCEDURE — 250N000011 HC RX IP 250 OP 636: Performed by: SURGERY

## 2024-12-01 PROCEDURE — 94640 AIRWAY INHALATION TREATMENT: CPT

## 2024-12-01 PROCEDURE — 93306 TTE W/DOPPLER COMPLETE: CPT | Mod: 26 | Performed by: INTERNAL MEDICINE

## 2024-12-01 PROCEDURE — 250N000011 HC RX IP 250 OP 636

## 2024-12-01 PROCEDURE — 210N000001 HC R&B IMCU HEART CARE

## 2024-12-01 PROCEDURE — 80048 BASIC METABOLIC PNL TOTAL CA: CPT | Performed by: SURGERY

## 2024-12-01 PROCEDURE — 250N000009 HC RX 250: Performed by: SURGERY

## 2024-12-01 PROCEDURE — C8929 TTE W OR WO FOL WCON,DOPPLER: HCPCS

## 2024-12-01 PROCEDURE — 82435 ASSAY OF BLOOD CHLORIDE: CPT | Performed by: SURGERY

## 2024-12-01 PROCEDURE — 94660 CPAP INITIATION&MGMT: CPT

## 2024-12-01 PROCEDURE — P9047 ALBUMIN (HUMAN), 25%, 50ML: HCPCS | Mod: JZ | Performed by: INTERNAL MEDICINE

## 2024-12-01 PROCEDURE — 84100 ASSAY OF PHOSPHORUS: CPT | Performed by: INTERNAL MEDICINE

## 2024-12-01 PROCEDURE — 255N000002 HC RX 255 OP 636: Performed by: SURGERY

## 2024-12-01 PROCEDURE — 36415 COLL VENOUS BLD VENIPUNCTURE: CPT | Performed by: SURGERY

## 2024-12-01 PROCEDURE — 250N000011 HC RX IP 250 OP 636: Performed by: INTERNAL MEDICINE

## 2024-12-01 PROCEDURE — 71046 X-RAY EXAM CHEST 2 VIEWS: CPT

## 2024-12-01 PROCEDURE — 97110 THERAPEUTIC EXERCISES: CPT | Mod: GO

## 2024-12-01 PROCEDURE — 85049 AUTOMATED PLATELET COUNT: CPT | Performed by: SURGERY

## 2024-12-01 PROCEDURE — 94799 UNLISTED PULMONARY SVC/PX: CPT

## 2024-12-01 PROCEDURE — 250N000013 HC RX MED GY IP 250 OP 250 PS 637

## 2024-12-01 PROCEDURE — 272N000735 HC KIT, CIRCUIT WITH NEB, VOLERA

## 2024-12-01 PROCEDURE — 83735 ASSAY OF MAGNESIUM: CPT | Performed by: SURGERY

## 2024-12-01 PROCEDURE — 250N000013 HC RX MED GY IP 250 OP 250 PS 637: Performed by: INTERNAL MEDICINE

## 2024-12-01 PROCEDURE — 97535 SELF CARE MNGMENT TRAINING: CPT | Mod: GO

## 2024-12-01 RX ORDER — FUROSEMIDE 10 MG/ML
20 INJECTION INTRAMUSCULAR; INTRAVENOUS
Status: DISCONTINUED | OUTPATIENT
Start: 2024-12-01 | End: 2024-12-04

## 2024-12-01 RX ORDER — ALBUMIN (HUMAN) 12.5 G/50ML
12.5 SOLUTION INTRAVENOUS ONCE
Status: COMPLETED | OUTPATIENT
Start: 2024-12-01 | End: 2024-12-01

## 2024-12-01 RX ORDER — METOLAZONE 5 MG/1
5 TABLET ORAL ONCE
Status: COMPLETED | OUTPATIENT
Start: 2024-12-01 | End: 2024-12-01

## 2024-12-01 RX ORDER — MAGNESIUM OXIDE 400 MG/1
400 TABLET ORAL EVERY 4 HOURS
Status: COMPLETED | OUTPATIENT
Start: 2024-12-01 | End: 2024-12-01

## 2024-12-01 RX ORDER — IPRATROPIUM BROMIDE AND ALBUTEROL SULFATE 2.5; .5 MG/3ML; MG/3ML
3 SOLUTION RESPIRATORY (INHALATION)
Status: DISCONTINUED | OUTPATIENT
Start: 2024-12-01 | End: 2024-12-06 | Stop reason: HOSPADM

## 2024-12-01 RX ORDER — HYDROXYZINE HYDROCHLORIDE 10 MG/1
10 TABLET, FILM COATED ORAL 4 TIMES DAILY PRN
Status: DISCONTINUED | OUTPATIENT
Start: 2024-12-01 | End: 2024-12-05

## 2024-12-01 RX ORDER — CEFTRIAXONE 1 G/1
1 INJECTION, POWDER, FOR SOLUTION INTRAMUSCULAR; INTRAVENOUS EVERY 24 HOURS
Status: COMPLETED | OUTPATIENT
Start: 2024-12-01 | End: 2024-12-02

## 2024-12-01 RX ADMIN — METFORMIN ER 500 MG 1000 MG: 500 TABLET ORAL at 08:40

## 2024-12-01 RX ADMIN — ASPIRIN 81 MG CHEWABLE TABLET 162 MG: 81 TABLET CHEWABLE at 08:40

## 2024-12-01 RX ADMIN — PRAMIPEXOLE DIHYDROCHLORIDE 0.5 MG: 0.5 TABLET ORAL at 00:15

## 2024-12-01 RX ADMIN — IPRATROPIUM BROMIDE AND ALBUTEROL SULFATE 3 ML: .5; 3 SOLUTION RESPIRATORY (INHALATION) at 20:30

## 2024-12-01 RX ADMIN — TAMSULOSIN HYDROCHLORIDE 0.4 MG: 0.4 CAPSULE ORAL at 08:42

## 2024-12-01 RX ADMIN — CEFTRIAXONE SODIUM 1 G: 1 INJECTION, POWDER, FOR SOLUTION INTRAMUSCULAR; INTRAVENOUS at 16:23

## 2024-12-01 RX ADMIN — POTASSIUM & SODIUM PHOSPHATES POWDER PACK 280-160-250 MG 1 PACKET: 280-160-250 PACK at 11:36

## 2024-12-01 RX ADMIN — HEPARIN SODIUM 5000 UNITS: 5000 INJECTION, SOLUTION INTRAVENOUS; SUBCUTANEOUS at 11:36

## 2024-12-01 RX ADMIN — HEPARIN SODIUM 5000 UNITS: 5000 INJECTION, SOLUTION INTRAVENOUS; SUBCUTANEOUS at 05:17

## 2024-12-01 RX ADMIN — ACETAMINOPHEN 650 MG: 325 TABLET ORAL at 00:25

## 2024-12-01 RX ADMIN — PRAMIPEXOLE DIHYDROCHLORIDE 0.12 MG: 0.12 TABLET ORAL at 13:21

## 2024-12-01 RX ADMIN — POTASSIUM & SODIUM PHOSPHATES POWDER PACK 280-160-250 MG 1 PACKET: 280-160-250 PACK at 08:41

## 2024-12-01 RX ADMIN — PERFLUTREN 2 ML: 6.52 INJECTION, SUSPENSION INTRAVENOUS at 10:58

## 2024-12-01 RX ADMIN — INSULIN ASPART 1 UNITS: 100 INJECTION, SOLUTION INTRAVENOUS; SUBCUTANEOUS at 11:38

## 2024-12-01 RX ADMIN — ACETAMINOPHEN 650 MG: 325 TABLET ORAL at 08:56

## 2024-12-01 RX ADMIN — ATORVASTATIN CALCIUM 80 MG: 40 TABLET, FILM COATED ORAL at 20:07

## 2024-12-01 RX ADMIN — SENNOSIDES AND DOCUSATE SODIUM 1 TABLET: 8.6; 5 TABLET ORAL at 08:41

## 2024-12-01 RX ADMIN — PRAMIPEXOLE DIHYDROCHLORIDE 0.5 MG: 0.5 TABLET ORAL at 20:08

## 2024-12-01 RX ADMIN — HYDROXYZINE HYDROCHLORIDE 10 MG: 10 TABLET ORAL at 20:08

## 2024-12-01 RX ADMIN — ACETAMINOPHEN 650 MG: 325 TABLET ORAL at 20:08

## 2024-12-01 RX ADMIN — HEPARIN SODIUM 5000 UNITS: 5000 INJECTION, SOLUTION INTRAVENOUS; SUBCUTANEOUS at 20:12

## 2024-12-01 RX ADMIN — GUAIFENESIN 600 MG: 600 TABLET ORAL at 08:40

## 2024-12-01 RX ADMIN — CARVEDILOL 6.25 MG: 3.12 TABLET, FILM COATED ORAL at 18:12

## 2024-12-01 RX ADMIN — FUROSEMIDE 20 MG: 10 INJECTION, SOLUTION INTRAMUSCULAR; INTRAVENOUS at 13:16

## 2024-12-01 RX ADMIN — CITALOPRAM HYDROBROMIDE 20 MG: 20 TABLET ORAL at 08:40

## 2024-12-01 RX ADMIN — EMPAGLIFLOZIN 25 MG: 25 TABLET, FILM COATED ORAL at 08:40

## 2024-12-01 RX ADMIN — GUAIFENESIN 600 MG: 600 TABLET ORAL at 20:08

## 2024-12-01 RX ADMIN — ALBUMIN HUMAN 12.5 G: 0.25 SOLUTION INTRAVENOUS at 13:16

## 2024-12-01 RX ADMIN — METOLAZONE 5 MG: 5 TABLET ORAL at 10:11

## 2024-12-01 RX ADMIN — MAGNESIUM OXIDE TAB 400 MG (241.3 MG ELEMENTAL MG) 400 MG: 400 (241.3 MG) TAB at 11:36

## 2024-12-01 RX ADMIN — IPRATROPIUM BROMIDE AND ALBUTEROL SULFATE 3 ML: .5; 3 SOLUTION RESPIRATORY (INHALATION) at 13:30

## 2024-12-01 RX ADMIN — NITROFURANTOIN MONOHYDRATE/MACROCRYSTALS 100 MG: 75; 25 CAPSULE ORAL at 08:41

## 2024-12-01 RX ADMIN — MAGNESIUM OXIDE TAB 400 MG (241.3 MG ELEMENTAL MG) 400 MG: 400 (241.3 MG) TAB at 08:41

## 2024-12-01 RX ADMIN — FUROSEMIDE 20 MG: 10 INJECTION, SOLUTION INTRAMUSCULAR; INTRAVENOUS at 08:41

## 2024-12-01 ASSESSMENT — ACTIVITIES OF DAILY LIVING (ADL)
ADLS_ACUITY_SCORE: 47
ADLS_ACUITY_SCORE: 43
ADLS_ACUITY_SCORE: 47
ADLS_ACUITY_SCORE: 47
ADLS_ACUITY_SCORE: 43
ADLS_ACUITY_SCORE: 47
ADLS_ACUITY_SCORE: 43
ADLS_ACUITY_SCORE: 47
ADLS_ACUITY_SCORE: 47
ADLS_ACUITY_SCORE: 43
ADLS_ACUITY_SCORE: 47
ADLS_ACUITY_SCORE: 43

## 2024-12-01 NOTE — PROGRESS NOTES
"CVTS Daily Progress Note   POD 7 s/p CABG x3 (LIMA-LAD, rSVG-PDA, rSVG-OM)  Attending: Raul  LOS: 8    SUBJECTIVE/INTERVAL EVENTS:    Episode of anxiety overnight for which supplemental oxygen was placed by nursing for comfort. Symptomatic orthostasis per nursing.  Patient progressing well. Maintaining oxygen saturations on 0-1 lpm via NC. Normotensive. Pain controlled overall. + BM. Tolerating PO intake. Good UOP on Lasix.  Patient denies new chest pain, shortness of breath, abdominal pain, and nausea.     OBJECTIVE:  Temp:  [98  F (36.7  C)-99.2  F (37.3  C)] 98  F (36.7  C)  Pulse:  [78-99] 89  Resp:  [18-20] 20  BP: ()/(50-64) 120/64  Cuff Mean (mmHg):  [79] 79  SpO2:  [88 %-100 %] 98 %  Vitals:    11/28/24 0700 11/29/24 0600 11/30/24 1704 12/01/24 0410   Weight: 77.5 kg (170 lb 12.8 oz) 76.6 kg (168 lb 12.8 oz) 74.9 kg (165 lb 3.2 oz) 73.6 kg (162 lb 4.8 oz)    12/01/24 0525   Weight: 73.1 kg (161 lb 3.2 oz)       Clinically Significant Risk Factors               # Hypoalbuminemia: Lowest albumin = 3.1 g/dL at 11/25/2024  4:26 AM, will monitor as appropriate     # Hypertension: Noted on problem list    # Acute heart failure with reduced ejection fraction: last echo with EF <40% and receiving IV diuretics           # Overweight: Estimated body mass index is 26.83 kg/m  as calculated from the following:    Height as of this encounter: 1.651 m (5' 5\").    Weight as of this encounter: 73.1 kg (161 lb 3.2 oz).        # Financial/Environmental Concerns: none  # Asthma: noted on problem list  # History of CABG: noted on surgical history            Current Medications:    Scheduled Meds:  Current Facility-Administered Medications   Medication Dose Route Frequency Provider Last Rate Last Admin    aspirin (ASA) chewable tablet 162 mg  162 mg Oral or NG Tube Daily Barbara Boggs NP   162 mg at 12/01/24 0840    atorvastatin (LIPITOR) tablet 80 mg  80 mg Oral or Feeding Tube QPM Barbara Boggs NP   80 mg at " 11/30/24 2124    carvedilol (COREG) tablet 6.25 mg  6.25 mg Oral BID w/meals Barbara Boggs NP   6.25 mg at 11/30/24 1755    citalopram (celeXA) tablet 20 mg  20 mg Oral or Feeding Tube QAM Barbara Boggs NP   20 mg at 12/01/24 0840    empagliflozin (JARDIANCE) tablet 25 mg  25 mg Oral Daily Beena Mitchell PA-C   25 mg at 12/01/24 0840    fluticasone (ARNUITY ELLIPTA) 100 MCG/ACT inhaler 1 puff  1 puff Inhalation Daily Barbara Boggs NP   1 puff at 12/01/24 0842    furosemide (LASIX) injection 20 mg  20 mg Intravenous bid 08 & 14 Barbara Boggs NP        guaiFENesin (MUCINEX) 12 hr tablet 600 mg  600 mg Oral BID Beena Mitchell PA-C   600 mg at 12/01/24 0840    heparin ANTICOAGULANT injection 5,000 Units  5,000 Units Subcutaneous Q8H Barbara Boggs NP   5,000 Units at 12/01/24 0517    insulin aspart (NovoLOG) injection (RAPID ACTING)  1-7 Units Subcutaneous TID AC Barbara Boggs NP   1 Units at 11/30/24 1553    insulin aspart (NovoLOG) injection (RAPID ACTING)  1-5 Units Subcutaneous At Bedtime Barbara Boggs NP   1 Units at 11/27/24 2117    magnesium oxide (MAG-OX) tablet 400 mg  400 mg Oral Q4H Barbara Boggs NP   400 mg at 12/01/24 0841    metFORMIN (GLUCOPHAGE XR) 24 hr tablet 1,000 mg  1,000 mg Oral Daily with breakfast Beena Mitchell PA-C   1,000 mg at 12/01/24 0840    metolazone (ZAROXOLYN) tablet 5 mg  5 mg Oral Once Barbara Boggs NP        nitroFURantoin macrocrystal-monohydrate (MACROBID) capsule 100 mg  100 mg Oral Q12H ANITHA (08/20) Beena Mitchell PA-C   100 mg at 12/01/24 0841    polyethylene glycol (MIRALAX) Packet 17 g  17 g Oral Daily Barbara Boggs NP   17 g at 11/28/24 0805    potassium & sodium phosphates (NEUTRA-PHOS) Packet 1 packet  1 packet Oral or Feeding Tube Q4H Barbara Boggs NP   1 packet at 12/01/24 0841    pramipexole (MIRAPEX) tablet 0.125 mg  0.125 mg Oral Daily Sudarshan Jimenez MD   0.125 mg at 11/30/24 0952     pramipexole (MIRAPEX) tablet 0.5 mg  0.5 mg Oral At Bedtime Barbara Boggs NP   0.5 mg at 12/01/24 0015    senna-docusate (SENOKOT-S/PERICOLACE) 8.6-50 MG per tablet 1 tablet  1 tablet Oral BID Barbara Boggs NP   1 tablet at 12/01/24 0841    sodium chloride (PF) 0.9% PF flush 3 mL  3 mL Intracatheter Q8H Barbara Boggs NP   3 mL at 12/01/24 0015    tamsulosin (FLOMAX) capsule 0.4 mg  0.4 mg Oral Daily Beena Mitchell PA-C   0.4 mg at 12/01/24 0842     Continuous Infusions:  Current Facility-Administered Medications   Medication Dose Route Frequency Provider Last Rate Last Admin     PRN Meds:  Current Facility-Administered Medications   Medication Dose Route Frequency Provider Last Rate Last Admin    acetaminophen (TYLENOL) tablet 650 mg  650 mg Oral Q4H PRN Barbara Boggs NP   650 mg at 12/01/24 0856    albuterol (PROVENTIL HFA/VENTOLIN HFA) inhaler  2 puff Inhalation Q6H PRN Barbara Boggs NP        bisacodyl (DULCOLAX) suppository 10 mg  10 mg Rectal Daily PRN Barbara Boggs NP        glucose gel 15-30 g  15-30 g Oral Q15 Min PRN Barbara Boggs NP        Or    dextrose 50 % injection 25-50 mL  25-50 mL Intravenous Q15 Min PRN Barbara Boggs NP        Or    glucagon injection 1 mg  1 mg Subcutaneous Q15 Min PRN Barbara Boggs NP        hydrALAZINE (APRESOLINE) injection 10 mg  10 mg Intravenous Q6H PRN Barbara Boggs NP        hydrOXYzine HCl (ATARAX) tablet 10 mg  10 mg Oral or Feeding Tube TID PRN Barbara Boggs NP   10 mg at 11/30/24 1455    ipratropium - albuterol 0.5 mg/2.5 mg/3 mL (DUONEB) neb solution 3 mL  3 mL Nebulization Q4H PRN Barbara Boggs, NP        magnesium hydroxide (MILK OF MAGNESIA) suspension 30 mL  30 mL Oral Daily PRN Barbara Boggs, NP        naloxone (NARCAN) injection 0.2 mg  0.2 mg Intravenous Q2 Min PRN Barbara Boggs NP        Or    naloxone (NARCAN) injection 0.4 mg  0.4 mg Intravenous Q2 Min PRN Barbara Boggs NP        Or    naloxone  (NARCAN) injection 0.2 mg  0.2 mg Intramuscular Q2 Min PRN Barbara Boggs NP        Or    naloxone (NARCAN) injection 0.4 mg  0.4 mg Intramuscular Q2 Min PRN Barbara Boggs NP        ondansetron (ZOFRAN ODT) ODT tab 4 mg  4 mg Oral Q6H PRN Barbara Boggs NP        Or    ondansetron (ZOFRAN) injection 4 mg  4 mg Intravenous Q6H PRN Barbara Boggs NP   4 mg at 11/30/24 2018    oxyCODONE (ROXICODONE) tablet 5 mg  5 mg Oral Q4H PRN Barbara Boggs NP   5 mg at 11/29/24 1419    Or    oxyCODONE (ROXICODONE) tablet 10 mg  10 mg Oral Q4H PRN Barbara Boggs NP   10 mg at 11/29/24 2346    sodium chloride (PF) 0.9% PF flush 3 mL  3 mL Intracatheter q1 min prBarbara Jimenez NP   3 mL at 11/26/24 0810       Cardiographics:    Telemetry monitoring demonstrates SR with rates in the 70-80s per my personal review.    Imaging:  Recent Results (from the past 24 hours)   XR Chest Port 1 View    Narrative    EXAM: XR CHEST PORT 1 VIEW  LOCATION: [facility]  DATE: 11/30/2024    INDICATION: Status post cardiac surgery.  COMPARISON: Chest radiograph 11/29/2024.      Impression    IMPRESSION:     Unchanged bilateral pleural drains, median sternotomy wires, and mediastinal clips.    Small left pleural effusion and an adjacent airspace opacity which likely reflects atelectasis appears similar. No new airspace opacities. No right pleural effusion. No pneumothorax.    Stable cardiomegaly. Normal pulmonary vascularity.       Labs, personally reviewed.  Hemoglobin   Date Value Ref Range Status   11/30/2024 8.3 (L) 11.7 - 15.7 g/dL Final   11/29/2024 7.6 (L) 11.7 - 15.7 g/dL Final   11/28/2024 7.4 (L) 11.7 - 15.7 g/dL Final     WBC Count   Date Value Ref Range Status   11/30/2024 9.1 4.0 - 11.0 10e3/uL Final   11/29/2024 8.2 4.0 - 11.0 10e3/uL Final   11/28/2024 8.3 4.0 - 11.0 10e3/uL Final     Platelet Count   Date Value Ref Range Status   12/01/2024 332 150 - 450 10e3/uL Final   11/30/2024 251 150 -  450 10e3/uL Final   11/29/2024 235 150 - 450 10e3/uL Final     Creatinine   Date Value Ref Range Status   12/01/2024 0.83 0.51 - 0.95 mg/dL Final   11/30/2024 0.83 0.51 - 0.95 mg/dL Final   11/29/2024 0.81 0.51 - 0.95 mg/dL Final     Potassium   Date Value Ref Range Status   12/01/2024 4.0 3.4 - 5.3 mmol/L Final   11/30/2024 3.9 3.4 - 5.3 mmol/L Final   11/29/2024 4.5 3.4 - 5.3 mmol/L Final   01/02/2023 4.0 3.5 - 5.0 mmol/L Final   01/01/2023 4.1 3.5 - 5.0 mmol/L Final   03/22/2021 4.4 3.5 - 5.0 mmol/L Final     Potassium POCT   Date Value Ref Range Status   11/24/2024 4.0 3.4 - 5.3 mmol/L Final   11/24/2024 3.5 3.4 - 5.3 mmol/L Final   11/24/2024 4.4 3.4 - 5.3 mmol/L Final     Magnesium   Date Value Ref Range Status   12/01/2024 1.9 1.7 - 2.3 mg/dL Final   11/30/2024 1.8 1.7 - 2.3 mg/dL Final   11/29/2024 1.9 1.7 - 2.3 mg/dL Final          I/O:  I/O last 3 completed shifts:  In: 1276 [P.O.:1270; I.V.:6]  Out: 3460 [Urine:3400; Chest Tube:60]       Physical Exam:    General: Patient seen up in chair, NAD. Conversant, pleasant, calm, cooperative on exam.  HEENT: no scleral icterus, moist mucosa, no tracheal deviation  CV: RRR on monitor - SR, WWP. Negligible LE edema.  Incision C/D/I, no erythema or induration  Pulm: Unlabored breathing on RA, occasional loose cough per baseline  Abd: SNTND, no guarding or peritoneal signs  Ext: Negligible pedal edema, incision C/D/I without erythema or induration  Neuro: A&O, CNs grossly intact, face symmetric, speech clear, SPARKS      ASSESSMENT/PLAN:    Gill Mendez is a 64 year old female who is s/p CABG x3.    Active Problems:    NSTEMI (non-ST elevated myocardial infarction) (H)        NEURO  PSYCH:  Acute postop pain  ANX  RLS  - Scheduled Tylenol/lidocaine patches and PRN Tylenol/oxycodone for pain  - PTA Celexa resumed  - PRN Atarax available  - PTA Mirapex resumed    CV:  ICM/HFrEF  NSTEMI/CAD s/p CABG x3  HTN  HLD  Hypervolemia, improving  Orthostatic hypotension  - Preop  echo LVEF 25-30% (no prior echos pre-dating NSTEMI); post CPB LVEF 35%  - Repeat echo pending  - IABP removed POD 1  - Normotensive  - Coreg 6.25 mg BID with hold parameters  - Hold PTA amlodipine and lisinopril  - ASA 162mg  - Atorvastatin 80mg daily  - Cardiology consulted earlier this admission, will look to re-engage in coming days for GDMT optimization; will need CORE consult at discharge  - PTA Jardiance resumed 11/29  - Mediastinal chest tubes & TPW removed 11/29; bilateral pleural chest tubes removed 11/30  - Compression hose, albumin given per Hospitalist    PULM:  Acute hypoxic respiratory insufficiency, improving  Asthma/COPD  Right PTX  - Extubated POD 1  - Maintaining oxygen saturations on 0-1 lpm supplemental oxygen via NC  - PTA Arnuity Ellipta & albuterol inhalers resumed  - Duonebs QID & EZPap  - Encourage pulmonary toilet/OOB/activity  - PTX small and stable, asymptomatic; expectant management.  Follow up 2-view CXR pending    GI  NUTRITION:  - Diet: Cardiac/consistent carb  - Bowel regimen    RENAL  FE:  CARMEN, resolved  Urinary retention 2/2 Klebsiella CAUTI  - Good UOP on Lasix  - Cr WNL  - Diuresis:  20mg IV Lasix BID + once time 5mg metolazone today  - Hold PTA chlorthalidone  - Continue electrolyte replacement protocol  - Rocephin 11/27-28, transitioned to Macrobid 11/29 with plan for 5-day total course  - Mcdaniels removed 11/30 for voiding trial    HEME:  Acute blood loss anemia  - Hgb stable, no bleeding concerns. Hep SQ, ASA    ID:  Stress-induced leukocytosis, resolved  Klebsiella CAUTI  - Yecenia op ppx complete, afebrile  - Rocephin 11/27-28, Macrobid started 11/29, as above    ENDO:  DM2 - Hgb A1c 6.1  - Sliding scale insulin  - PTA Jardiance, metformin resumed  - Hold PTA tirzepatide  - BG goal <180 to promote optimal wound healing    PPx:  - DVT: SCDs, SQ heparin TID, OOB/ambulation   - GI: Protonix 40mg PO daily    DISPO:  - General telemetry status since POD 4  - Medically Ready for  Discharge: Anticipated in 2-4 Days pending echo and optimization of HF mgmt, approaching euvolemia, liberation from supplemental oxygen  - Therapies recommending discharge home with OP CR once medically stable      Pt seen and discussed with Dr. Irwin.      Barbara Boggs CNP, [facility]-  Cardiothoracic Surgery  [employer] Pager [phone]

## 2024-12-01 NOTE — PLAN OF CARE
Problem: Comorbidity Management  Goal: Blood Pressure in Desired Range  Outcome: Not Progressing  Intervention: Maintain Blood Pressure Management  Recent Flowsheet Documentation  Taken 12/1/2024 0900 by Elo Voss RN  Medication Review/Management: medications reviewed   Goal Outcome Evaluation:    Ortho BP  before meds.  Sitting 115/59, standing 95/52.  Held coreg.  1 hour after meds, sitting 106/58, standing 85/45.  4/5 pain incision site gave tylenol with good effect.  On RA 96%.  Had shower. Chest x-ray done.  Walking in unit and room with SBA.  At 1430 therapy tried to walk pt but she became very dizzy, room spinning.  BP 89/50.  Pt had to lay down.

## 2024-12-01 NOTE — PLAN OF CARE
Goal Outcome Evaluation:             sc  Patient Name: Gill Mendez   MRN: [id number]   Date of Admission: 11/23/2024    Procedure: Procedure(s):  CORONARY ARTERY BYPASS GRAFT TIMES THREE, LEFT INTERNAL MAMMARY ARTERY HARVEST, LEFT LEG ENDOSCOPIC VESSEL PROCUREMENT,  ECHOCARDIOGRAM, TRANSESOPHAGEAL, INTRA-OPERATIVE    Post Op day #:7    Subjective (Patient focus/Primary Problem for shift): sleep, pain control          Pain Goal2 Pain Rating4-1           Pain Medication/ Regime effective to reduce patient painprn tylenol    Objective (Physical assessment):           Rhythm: normal sinus rhythm            Bowel Activity: yes if Yes indicate when: 11/30          Bowel Medications: yes            Incision: healing well          Incentive Spirometry Q 1-2 hour when awake:  yes Volume: 750          Epicardial Pacing Wires:  no            Patient Activity:           Up to chair for meals: yes          Ambulation with RN x2 (Not including CR): yes           Shower Daily {YES/NO/NA:[id number]          Nasal  Nozin BID AM/PM x 10 days: yes              Chest Tubes   Pleural: no Draining: not applicable               Suction: not applicable              Mediastinal: no Draining: not applicable               Suction: not applicable   Dressing Change Daily:yes If No, why?n/a                     Urinary Catheter: no           Preventative WOC consult (need MD order): no       Assessment (Nursing primary shift focus): Patient is alert and oriented.  Prn tylenol given for pain from old chest tube site at midnight, pain adequately controlled.  Patient denied nausea and dizziness.  Able to sleep well between cares.  Stand by assist x1 to commode.  Patient required 1 L/min O2 via nasal cannula.  Lung sounds coarse, crackles. Has nonproductive congested cough, staff encouraged to keep using incentive spirometer and flutter valve.  Will continue to monitor.    Plan (Patient Care Plan/focus): Patient to get up in recliner for  breakfast.  Plan to have shower today, 24 hours after chest tubes removed.  Will continue to monitor.      Elizabeth Butt RN   12/1/2024   5:36 AM

## 2024-12-01 NOTE — PLAN OF CARE
Problem: Cardiovascular Surgery  Goal: Improved Activity Tolerance  11/30/2024 2210 by Radha Barnett RN  Outcome: Progressing  Intervention: Optimize Tolerance for Activity  Recent Flowsheet Documentation  Taken 11/30/2024 2030 by Radha Barnett RN  Environmental Support:   calm environment promoted   environmental consistency promoted  Goal: Optimal Coping with Heart Surgery  11/30/2024 2210 by Radha Barnett RN  Outcome: Progressing  Intervention: Support Psychosocial Response to Surgery  Recent Flowsheet Documentation  Taken 11/30/2024 2030 by Radha Barnett RN  Supportive Measures:   goal-setting facilitated   active listening utilized  Goal: Absence of Bleeding  11/30/2024 2210 by Radha Barnett RN  Outcome: Progressing  Goal: Effective Bowel Elimination  11/30/2024 2210 by Radha Barnett RN  Outcome: Progressing  Intervention: Enhance Bowel Motility and Elimination  Recent Flowsheet Documentation  Taken 11/30/2024 2030 by Radha Barnett RN  Bowel Motility Enhancement: fluid intake encouraged  Goal: Effective Cardiac Function  11/30/2024 2210 by Radha Barnett RN  Outcome: Progressing  Goal: Optimal Cerebral Tissue Perfusion  11/30/2024 2210 by Radha Barnett RN  Outcome: Progressing    Intervention: Protect and Optimize Cerebral Perfusion  Recent Flowsheet Documentation  Taken 11/30/2024 2030 by Radha Barnett RN  Sensory Stimulation Regulation:   lighting decreased   care clustered  Head of Bed (HOB) Positioning: HOB at 20-30 degrees  Goal: Fluid and Electrolyte Balance  11/30/2024 2210 by Radha Barnett RN  Outcome: Progressing  11/30/2024 2207 by Radha Barnett RN  Outcome: Progressing  Goal: Blood Glucose Level Within Targeted Range  11/30/2024 2210 by Radha Barnett RN  Outcome: Progressing    Goal: Absence of Infection Signs and Symptoms  11/30/2024 2210 by Radha Barnett  RN  Outcome: Progressing  Intervention: Prevent or Manage Infection  Recent Flowsheet Documentation  Taken 11/30/2024 2030 by Radha Barnett RN  Infection Prevention:   single patient room provided   rest/sleep promoted   personal protective equipment utilized   hand hygiene promoted   equipment surfaces disinfected   environmental surveillance performed  Goal: Anesthesia/Sedation Recovery  Outcome: Progressing  Intervention: Optimize Anesthesia Recovery  Recent Flowsheet Documentation  Taken 11/30/2024 2030 by Radha Barnett RN  Safety Promotion/Fall Prevention:   treat underlying cause   safety round/check completed   room near nurse's station   room door open   patient and family education   nonskid shoes/slippers when out of bed   clutter free environment maintained  Administration (IS): proper technique demonstrated  Reorientation Measures:   clock in view   calendar in view  Level Incentive Spirometer (mL): 750  Number of Repetitions (IS): 10  Patient Tolerance (IS): fair  Goal: Acceptable Pain Control  11/30/2024 2210 by Radha Barnett RN  Outcome: Progressing  Goal: Nausea and Vomiting Relief  11/30/2024 2210 by Radha Barnett RN  Outcome: Progressing  Goal: Effective Urinary Elimination  11/30/2024 2210 by Radha Barnett RN  Outcome: Progressing  11/30/2024 2207 by Radha Barnett RN  Outcome: Progressing  Goal: Effective Oxygenation and Ventilation  11/30/2024 2210 by Radha Barnett RN  Outcome: Progressing  Intervention: Promote Airway Secretion Clearance  Recent Flowsheet Documentation  Taken 11/30/2024 2030 by Radha Barnett RN  Administration (IS): proper technique demonstrated  Level Incentive Spirometer (mL): 750  Cough And Deep Breathing: done independently per patient  Number of Repetitions (IS): 10  Patient Tolerance (IS): fair  Intervention: Optimize Oxygenation and Ventilation  Recent Flowsheet Documentation  Taken 11/30/2024  2030 by Radha Barnett, RN  Chest Tube Safety: all connections secured   Goal Outcome Evaluation:       Pt is alert and oriented x 4, c/o lightheadedness and nausea around 1945. Skin was cold and clammy and pale looking while she was in the recliner. BP was 94/51. Pt also c/o pain in the left side rated 5-6/10 and shortness of breath, SpO2 was 92 to 93% on RA, O2 placed at 2 LPM per nasal cannula for comfort. SpO2 was 100%, decreased O2 to 1 LPM , SpO2 96 to 98%. Per pt's sister, pt is feeling claustrophobia and the sister was helping pt to feel relax. Atarax was just given around 1500 for anxiety. Zofran given for nausea, pt felt better and the pain is improved, rated 3/10. Pt transferred to the bed with 2 assist. Last BP checked was 103/55 at 2122, pt is more comfortable with the supplemental O2. SCD in place. Uses bedside commode for voiding.

## 2024-12-01 NOTE — CARE PLAN
sc  Patient Name: Gill Mendez   MRN: [id number]   Date of Admission: 11/23/2024    Procedure: Procedure(s):  CORONARY ARTERY BYPASS GRAFT TIMES THREE, LEFT INTERNAL MAMMARY ARTERY HARVEST, LEFT LEG ENDOSCOPIC VESSEL PROCUREMENT,  ECHOCARDIOGRAM, TRANSESOPHAGEAL, INTRA-OPERATIVE    Post Op day #:6    Subjective (Patient focus/Primary Problem for shift): lightheadedness and nausea          Pain Goal 0 Pain Rating 5-6           Pain Medication/ Regime effective to reduce patient pain None    Objective (Physical assessment):           Rhythm: normal sinus rhythm with inverted TW            Bowel Activity: yes if Yes indicate when: 11/30          Bowel Medications: held tonight            Incision: healing well          Incentive Spirometry Q 1-2 hour when awake:  yes Volume: 750 ml          Epicardial Pacing Wires:  no            Patient Activity:           Up to chair for meals: yes          Ambulation with RN x2 (Not including CR): no           Shower Daily NA          Nasal  Nozin BID AM/PM x 10 days: yes              Chest Tubes   Pleural: no Draining: no               Suction: no              Mediastinal: no Draining: no               Suction: no   Dressing Change Daily:no If No, why? Done this AM                     Urinary Catheter: no           Preventative WOC consult (need MD order): no       Assessment (Nursing primary shift focus): Pt did not ambulate this evening because of lightheadedness and nausea.Felt better after Zofran was given. Lung sounds coarse crackles, non productive cough, on Mucinex. Placed O2 at 1 LPM per nasal cannula for comfort.HS cares done.    Plan (Patient Care Plan/focus): continue use of IS and flutter valve q hour while awake, pain control, provide rest period in between cares.      Radha Barnett RN   11/30/2024   10:29 PM

## 2024-12-01 NOTE — PROGRESS NOTES
Care Management Follow Up    Length of Stay (days): 8    Expected Discharge Date: 12/02/2024     Concerns to be Addressed: Care progression - discharge planning     Patient plan of care discussed at interdisciplinary rounds: Yes    Anticipated Discharge Disposition:  home with assist, home with outpatient cardiac rehab    Anticipated Discharge Services:  outpatient cardiac rehab  Anticipated Discharge DME:  NA    Patient/family educated on Medicare website which has current facility and service quality ratings:  NA  Education Provided on the Discharge Plan:  Yes per team  Patient/Family in Agreement with the Plan:  NA    Referrals Placed by CM/SW:  NA  Private pay costs discussed: Not applicable    Discussed  Partnership in Safe Discharge Planning  document with patient/family: No     Handoff Completed: No, handoff not indicated or clinically appropriate    Additional Information:  Chart review.   - chest tube removed  - On RA    Social Hx:  Assessment: Follow. Live w/spouse in a house. Indep, has cane - use prn. Spouse is primary contact.  Last Note: 11/27/24  Plan: Home with assist, home with outpatient cardiac rehab  Needs: Medical stability  Hand off: NA  Transport: Family    Next Steps: RNCM will sign off. Currently no CM needs.    Vandana Nina RN

## 2024-12-01 NOTE — TREATMENT PLAN
Pt had separate therapies ordered for - Ezpap/secretion clearance/nebulized medication/IS.     Will combine all therapy with use of Volara therapy QID.

## 2024-12-01 NOTE — PROGRESS NOTES
[facility]    Medicine Progress Note - Hospitalist Service    Date of Admission:  11/23/2024    Assessment & Plan   Gill Mendez is a 64 year old female with a medical history significant for hypertension, hyperlipidemia, type II DM, asthma and anxiety disorder who is admitted with an NSTEMI and acute CHF decompensation. Cardiac cath showing multivessel CAD. Cardiovascular surgeon did CABG on 11/24. Extubated on 11/25.  Urine culture grew Klebsiella oxytoca for which she is receiving IV ceftriaxone.     Acute Non-STEMI  Multivessel CAD  S/p CABG  Coronary angiogram revealed multivessel CAD on 11/23/24.  She underwent CABG on 11/24/2024.  Remained intubated postprocedure and managed in ICU and subsequently extubated on 11/25/2024.  Required vasopressors briefly in the ICU.    -Aspirin daily.  -Atorvastatin 80 mg daily  -Carvedilol 6.25 mg twice daily  -Follow-up repeat report of echocardiogram 12/1/2024  -Cardiothoracic surgery following-appreciate assistance  -Postoperative surgical management per primary thoracic surgery service       Acute CHF Decompensation, pulm edema  -cardiologist consulted  -Received metolazone 5 mg once on 12/1/2024  -Continue empagliflozin  -Continue IV Lasix  -Monitor electrolytes and replace as needed  -Follow-up repeat report of echocardiogram 12/1/2024     Hypertension  Carvedilol 6.25 mg twice daily-held this morning due to soft BP  -Continue to hold PTA amlodipine, chlorthalidone, lisinopril,    Orthostatic hypotension  Blood pressure check revealed significant orthostatic changes on 12/1/2024  Ordered IV albumin x 1  Monitor blood pressure  Hold carvedilol for now    Suspected UTI  Urine culture grew Klebsiella oxytoca  Resume IV ceftriaxone x 2 doses     Type II Diabetes Mellitus  -Continue empagliflozin  -Insulin sliding scale  -Continue metformin  -Monitor for hypoglycemia current per protocol     Asthma   Not in exacerbation  Resume home albuterol as  "needed for shortness of breath.   Sheldon PRN, 3 day course of prednsione   Continue maintenance therapy with Breo Ellipta or equivalent inhaler.     Anxiety Disorder  Severe avani phobia   -Continue Celexa at the current dose.  -Monitor for any anxiety exacerbation during hospitalization.        Diet: Combination Diet Moderate Consistent Carb (60 g CHO per Meal) Diet; Low Saturated Fat Na <2400mg Diet  Snacks/Supplements Adult: Glucerna; With Meals    DVT Prophylaxis: Heparin SQ  Mcdaniels Catheter: Not present  Lines: None       Cardiac Monitoring: ACTIVE order. Indication: Open heart surgery (7 days)  Code Status: Full Code      Clinically Significant Risk Factors               # Hypoalbuminemia: Lowest albumin = 3.1 g/dL at 11/25/2024  4:26 AM, will monitor as appropriate     # Hypertension: Noted on problem list    # Acute heart failure with reduced ejection fraction: last echo with EF <40% and receiving IV diuretics           # Overweight: Estimated body mass index is 26.83 kg/m  as calculated from the following:    Height as of this encounter: 1.651 m (5' 5\").    Weight as of this encounter: 73.1 kg (161 lb 3.2 oz).        # Financial/Environmental Concerns: none  # Asthma: noted on problem list  # History of CABG: noted on surgical history       Social Drivers of Health    Alcohol Use: Medium Risk (5/25/2023)    [email or web address]    Alcohol Use     Alcohol Use Status: Yes   Interpersonal Safety: Low Risk  (11/25/2024)    Interpersonal Safety     Do you feel physically and emotionally safe where you currently live?: Patient unable to answer     Within the past 12 months, have you been hit, slapped, kicked or otherwise physically hurt by someone?: No     Within the past 12 months, have you been humiliated or emotionally abused in other ways by your partner or ex-partner?: No   Recent Concern: Interpersonal Safety - High Risk (11/23/2024)    Interpersonal Safety     Do you feel physically " and emotionally safe where you currently live?: No     Within the past 12 months, have you been hit, slapped, kicked or otherwise physically hurt by someone?: No     Within the past 12 months, have you been humiliated or emotionally abused in other ways by your partner or ex-partner?: No    [facility]    Social Connections          Disposition Plan     Medically Ready for Discharge: Anticipated in 2-4 Days    Barrier: recover from CABG     Sudarshan Jimenez MD  Hospitalist Service  [facility]  Securely message with [employer] (more info)  Text page via [facility] Paging/Directory   ______________________________________________________________________    Interval History   No new complaints today.  Blood pressure check was notable for significant orthostatic changes per bedside RN this morning.  She is breathing comfortably on room air.  She has bilateral lung crackles. Last chest tube was removed earlier today.   is at bedside. Reports of echocardiogram performed this morning is pending.      Physical Exam   Vital Signs: Temp: 98.7  F (37.1  C) Temp src: Oral BP: 95/50 Pulse: 88   Resp: 20 SpO2: 90 % O2 Device: None (Room air) Oxygen Delivery: 1 LPM  Weight: 161 lbs 3.2 oz    General.  Awake alert oriented not in acute distress.  HEENT.  Pupils equal round react to light, anicteric, EOM intact.  Neck supple no JVD.  CVS regular rhythm no murmur gallops. S/p CABG. Incision healing.   Lungs.  Chest tubes-removed.  Diminished air entry bilaterally with significant crackles.   Abdomen.  Soft nontender bowel sounds present.  Extremities.  No edema no calf tenderness.  Neurological.  Awake and alert. No focal deficit.  Skin no rash. No pallor.  Psych. Normal mood.      Medical Decision Making       47 MINUTES SPENT BY ME on the date of service doing chart review, history, exam, documentation & further  activities per the note.      Data     I have personally reviewed the following data over the past 24 hrs:    N/A  \   N/A   / 332     141 101 16.1 /  158 (H)   4.0 35 (H) 0.83 \       Imaging results reviewed over the past 24 hrs:   Recent Results (from the past 24 hours)   Echocardiogram Complete   Result Value    LVEF  35-40% (moderately reduced)    [facility]    [id number]  [id number]  [id number]  [id number]^JAROCHO^[place] [address]     Name: KATIUSKA JUSTIN  MRN: [id number]  : 1960  Study Date: 2024 10:41 AM  Age: 64 yrs  Gender: Female  Patient Location: [facility]  Reason For Study: Heart Failure, CABG  Ordering Physician: JAROCHO CROWLEY  Referring Physician: JENNIFER DODSON  Performed By: Ambika Randolph     BSA: 1.8 m2  Height: 65 in  Weight: 161 lb  HR: 87  BP: 120/64 mmHg  ______________________________________________________________________________  Procedure  Complete Portable Echo Adult. Definity (NDC #07240-356) given intravenously.  Compared to the prior study dated 24, there are changes as noted.  ______________________________________________________________________________  Interpretation Summary     The left ventricle is normal in size.  Left ventricular function is decreased. The ejection fraction is 35-40%  (moderately reduced). Anteroseptal, septal and apical wall hypokinesis.  Normal right ventricle size and systolic function.  The left atrium is mildly dilated.  Compared to the prior study dated 24, there are changes as noted. LVEF  has improved.  ______________________________________________________________________________  Left Ventricle  The left ventricle is normal in size. Left ventricular function is decreased.  The ejection fraction is 35-40% (moderately reduced). There is normal left  ventricular wall thickness. Left ventricular diastolic function is normal.  Anteroseptal, septal and apical wall hypokinesis.     Right  Ventricle  Normal right ventricle size and systolic function.     Atria  The left atrium is mildly dilated. Right atrial size is normal. There is no  color Doppler evidence of an atrial shunt.     Mitral Valve  Mitral valve leaflets appear normal. There is trace mitral regurgitation.     Tricuspid Valve  The tricuspid valve is not well visualized. This degree of valvular  regurgitation is within normal limits. Right ventricular systolic pressure  could not be approximated due to inadequate tricuspid regurgitation.     Aortic Valve  Aortic valve leaflets appear normal. There is no evidence of aortic stenosis  or clinically significant aortic regurgitation.     Pulmonic Valve  The pulmonic valve is not well visualized. This degree of valvular  regurgitation is within normal limits.     Vessels  The aorta root is normal. Normal size ascending aorta. IVC diameter <2.1 cm  collapsing >50% with sniff suggests a normal RA pressure of 3 mmHg.     Pericardium  There is no pericardial effusion.     ______________________________________________________________________________  MMode/2D Measurements & Calculations  IVSd: 0.91 cm  LVIDd: 5.0 cm  LVIDs: 3.7 cm  LVPWd: 1.1 cm  FS: 25.6 %  LV mass(C)d: 181.9 grams  LV mass(C)dI: 100.8 grams/m2     Ao root diam: 3.0 cm  asc Aorta Diam: 3.2 cm  LVOT diam: 2.1 cm  LVOT area: 3.5 cm2  Ao root diam index Ht(cm/m): 1.8  Ao root diam index BSA (cm/m2): 1.7  Asc Ao diam index BSA (cm/m2): 1.8  Asc Ao diam index Ht(cm/m): 1.9  EF Biplane: 48.6 %  LA Volume (BP): 57.1 ml  LA Volume Index (BP): 31.7 ml/m2     LA Volume Indexed (AL/bp): 33.8 ml/m2  RV Base: 2.5 cm  RWT: 0.44     Doppler Measurements & Calculations  MV E max sony: 79.1 cm/sec  MV A max sony: 80.2 cm/sec  MV E/A: 0.99  MV max P.2 mmHg  MV mean P.0 mmHg  MV V2 VTI: 21.5 cm  MVA(VTI): 2.5 cm2  MV dec slope: 421.0 cm/sec2  MV dec time: 0.19 sec  Ao V2 max: 133.0 cm/sec  Ao max P.0 mmHg  Ao V2 mean: 101.0 cm/sec  Ao mean  P.0 mmHg  Ao V2 VTI: 27.7 cm  RADHA(I,D): 2.0 cm2  RADHA(V,D): 2.5 cm2     LV V1 max PG: 3.8 mmHg  LV V1 max: 97.4 cm/sec  LV V1 VTI: 15.6 cm  SV(LVOT): 54.0 ml  SI(LVOT): 30.0 ml/m2  PA V2 max: 104.0 cm/sec  PA max P.3 mmHg  PA acc time: 0.11 sec  AV Colt Ratio (DI): 0.73  RADHA Index (cm2/m2): 1.1  E/E' av.4  Lateral E/e': 14.8  Medial E/e': 14.0  RV S Colt: 8.9 cm/sec     ______________________________________________________________________________  Report approved by: Pratik Wharton 2024 01:56 PM         XR Chest 2 Views    Narrative    EXAM: XR CHEST 2 VIEWS  LOCATION: [facility]  DATE: 2024    INDICATION: Shortness of breath, follow-up pneumothorax.  COMPARISON: Chest film 2024.      Impression    IMPRESSION: Interval removal of the bilateral chest tubes. No pneumothorax. Persistent opacification of the left lung base likely reflecting a combination of pleural fluid and atelectasis. This is not dramatically changed. Probable tiny right pleural   effusion. The right lung is otherwise clear. Cardiac enlargement. Normal pulmonary vascularity. Median sternotomy.

## 2024-12-02 ENCOUNTER — APPOINTMENT (OUTPATIENT)
Dept: RADIOLOGY | Facility: HOSPITAL | Age: 64
DRG: 233 | End: 2024-12-02
Payer: COMMERCIAL

## 2024-12-02 ENCOUNTER — APPOINTMENT (OUTPATIENT)
Dept: OCCUPATIONAL THERAPY | Facility: HOSPITAL | Age: 64
DRG: 233 | End: 2024-12-02
Attending: INTERNAL MEDICINE
Payer: COMMERCIAL

## 2024-12-02 PROBLEM — I21.4 NSTEMI (NON-ST ELEVATED MYOCARDIAL INFARCTION) (H): Status: RESOLVED | Noted: 2024-11-22 | Resolved: 2024-12-02

## 2024-12-02 LAB
ANION GAP SERPL CALCULATED.3IONS-SCNC: 9 MMOL/L (ref 7–15)
BUN SERPL-MCNC: 13.4 MG/DL (ref 8–23)
CALCIUM SERPL-MCNC: 8.2 MG/DL (ref 8.8–10.4)
CHLORIDE SERPL-SCNC: 96 MMOL/L (ref 98–107)
CREAT SERPL-MCNC: 0.81 MG/DL (ref 0.51–0.95)
EGFRCR SERPLBLD CKD-EPI 2021: 82 ML/MIN/1.73M2
ERYTHROCYTE [DISTWIDTH] IN BLOOD BY AUTOMATED COUNT: 16.9 % (ref 10–15)
GLUCOSE BLDC GLUCOMTR-MCNC: 119 MG/DL (ref 70–99)
GLUCOSE BLDC GLUCOMTR-MCNC: 123 MG/DL (ref 70–99)
GLUCOSE BLDC GLUCOMTR-MCNC: 167 MG/DL (ref 70–99)
GLUCOSE BLDC GLUCOMTR-MCNC: 179 MG/DL (ref 70–99)
GLUCOSE BLDC GLUCOMTR-MCNC: 220 MG/DL (ref 70–99)
GLUCOSE SERPL-MCNC: 117 MG/DL (ref 70–99)
HCO3 SERPL-SCNC: 32 MMOL/L (ref 22–29)
HCT VFR BLD AUTO: 25.8 % (ref 35–47)
HGB BLD-MCNC: 8.2 G/DL (ref 11.7–15.7)
MAGNESIUM SERPL-MCNC: 2 MG/DL (ref 1.7–2.3)
MCH RBC QN AUTO: 26 PG (ref 26.5–33)
MCHC RBC AUTO-ENTMCNC: 31.8 G/DL (ref 31.5–36.5)
MCV RBC AUTO: 82 FL (ref 78–100)
PHOSPHATE SERPL-MCNC: 2.9 MG/DL (ref 2.5–4.5)
PLATELET # BLD AUTO: 317 10E3/UL (ref 150–450)
POTASSIUM SERPL-SCNC: 3.9 MMOL/L (ref 3.4–5.3)
RBC # BLD AUTO: 3.15 10E6/UL (ref 3.8–5.2)
SODIUM SERPL-SCNC: 137 MMOL/L (ref 135–145)
TSH SERPL DL<=0.005 MIU/L-ACNC: 1.07 UIU/ML (ref 0.3–4.2)
WBC # BLD AUTO: 12.2 10E3/UL (ref 4–11)

## 2024-12-02 PROCEDURE — 250N000011 HC RX IP 250 OP 636: Mod: JZ

## 2024-12-02 PROCEDURE — 250N000013 HC RX MED GY IP 250 OP 250 PS 637: Performed by: SURGERY

## 2024-12-02 PROCEDURE — 71046 X-RAY EXAM CHEST 2 VIEWS: CPT

## 2024-12-02 PROCEDURE — 250N000013 HC RX MED GY IP 250 OP 250 PS 637

## 2024-12-02 PROCEDURE — 250N000011 HC RX IP 250 OP 636: Performed by: SURGERY

## 2024-12-02 PROCEDURE — 36415 COLL VENOUS BLD VENIPUNCTURE: CPT | Performed by: SURGERY

## 2024-12-02 PROCEDURE — 82435 ASSAY OF BLOOD CHLORIDE: CPT | Performed by: SURGERY

## 2024-12-02 PROCEDURE — 94640 AIRWAY INHALATION TREATMENT: CPT

## 2024-12-02 PROCEDURE — 97110 THERAPEUTIC EXERCISES: CPT | Mod: GO

## 2024-12-02 PROCEDURE — 250N000011 HC RX IP 250 OP 636: Mod: JZ | Performed by: INTERNAL MEDICINE

## 2024-12-02 PROCEDURE — 99232 SBSQ HOSP IP/OBS MODERATE 35: CPT | Performed by: INTERNAL MEDICINE

## 2024-12-02 PROCEDURE — 250N000013 HC RX MED GY IP 250 OP 250 PS 637: Performed by: INTERNAL MEDICINE

## 2024-12-02 PROCEDURE — 85048 AUTOMATED LEUKOCYTE COUNT: CPT | Performed by: INTERNAL MEDICINE

## 2024-12-02 PROCEDURE — 210N000001 HC R&B IMCU HEART CARE

## 2024-12-02 PROCEDURE — 84100 ASSAY OF PHOSPHORUS: CPT | Performed by: SURGERY

## 2024-12-02 PROCEDURE — 36415 COLL VENOUS BLD VENIPUNCTURE: CPT | Performed by: INTERNAL MEDICINE

## 2024-12-02 PROCEDURE — 250N000009 HC RX 250: Performed by: SURGERY

## 2024-12-02 PROCEDURE — 99223 1ST HOSP IP/OBS HIGH 75: CPT | Performed by: INTERNAL MEDICINE

## 2024-12-02 PROCEDURE — 82565 ASSAY OF CREATININE: CPT | Performed by: SURGERY

## 2024-12-02 PROCEDURE — 94799 UNLISTED PULMONARY SVC/PX: CPT

## 2024-12-02 PROCEDURE — 94660 CPAP INITIATION&MGMT: CPT

## 2024-12-02 PROCEDURE — P9047 ALBUMIN (HUMAN), 25%, 50ML: HCPCS | Mod: JZ

## 2024-12-02 PROCEDURE — 999N000157 HC STATISTIC RCP TIME EA 10 MIN

## 2024-12-02 PROCEDURE — 94640 AIRWAY INHALATION TREATMENT: CPT | Mod: 76

## 2024-12-02 PROCEDURE — 85014 HEMATOCRIT: CPT | Performed by: INTERNAL MEDICINE

## 2024-12-02 PROCEDURE — 80048 BASIC METABOLIC PNL TOTAL CA: CPT | Performed by: SURGERY

## 2024-12-02 PROCEDURE — 250N000011 HC RX IP 250 OP 636: Performed by: INTERNAL MEDICINE

## 2024-12-02 PROCEDURE — 83735 ASSAY OF MAGNESIUM: CPT | Performed by: SURGERY

## 2024-12-02 PROCEDURE — 84443 ASSAY THYROID STIM HORMONE: CPT | Performed by: INTERNAL MEDICINE

## 2024-12-02 PROCEDURE — P9047 ALBUMIN (HUMAN), 25%, 50ML: HCPCS | Mod: JZ | Performed by: INTERNAL MEDICINE

## 2024-12-02 RX ORDER — ASPIRIN 81 MG/1
162 TABLET, CHEWABLE ORAL DAILY
Qty: 180 TABLET | Refills: 3 | Status: SHIPPED | OUTPATIENT
Start: 2024-12-02

## 2024-12-02 RX ORDER — MAGNESIUM OXIDE 400 MG/1
400 TABLET ORAL EVERY 4 HOURS
Status: COMPLETED | OUTPATIENT
Start: 2024-12-02 | End: 2024-12-02

## 2024-12-02 RX ORDER — ATORVASTATIN CALCIUM 80 MG/1
80 TABLET, FILM COATED ORAL EVERY EVENING
Qty: 90 TABLET | Refills: 3 | Status: SHIPPED | OUTPATIENT
Start: 2024-12-02

## 2024-12-02 RX ORDER — ACETAMINOPHEN 325 MG/1
325-650 TABLET ORAL EVERY 4 HOURS PRN
Qty: 60 TABLET | Refills: 0 | Status: SHIPPED | OUTPATIENT
Start: 2024-12-02

## 2024-12-02 RX ORDER — ALBUMIN (HUMAN) 12.5 G/50ML
25 SOLUTION INTRAVENOUS ONCE
Status: COMPLETED | OUTPATIENT
Start: 2024-12-02 | End: 2024-12-02

## 2024-12-02 RX ORDER — OXYCODONE HYDROCHLORIDE 5 MG/1
5 TABLET ORAL EVERY 4 HOURS PRN
Status: DISCONTINUED | OUTPATIENT
Start: 2024-12-02 | End: 2024-12-06 | Stop reason: HOSPADM

## 2024-12-02 RX ORDER — METOPROLOL SUCCINATE 25 MG/1
25 TABLET, EXTENDED RELEASE ORAL DAILY
Status: DISCONTINUED | OUTPATIENT
Start: 2024-12-02 | End: 2024-12-02

## 2024-12-02 RX ORDER — METOLAZONE 5 MG/1
5 TABLET ORAL ONCE
Status: COMPLETED | OUTPATIENT
Start: 2024-12-02 | End: 2024-12-02

## 2024-12-02 RX ORDER — HYDROXYZINE HYDROCHLORIDE 10 MG/1
10 TABLET, FILM COATED ORAL EVERY 8 HOURS PRN
Qty: 30 TABLET | Refills: 0 | Status: SHIPPED | OUTPATIENT
Start: 2024-12-02

## 2024-12-02 RX ORDER — TAMSULOSIN HYDROCHLORIDE 0.4 MG/1
0.4 CAPSULE ORAL DAILY
Qty: 14 CAPSULE | Refills: 0 | Status: SHIPPED | OUTPATIENT
Start: 2024-12-02 | End: 2024-12-06

## 2024-12-02 RX ORDER — POLYETHYLENE GLYCOL 3350 17 G/17G
17 POWDER, FOR SOLUTION ORAL DAILY
Qty: 510 G | Refills: 0 | Status: SHIPPED | OUTPATIENT
Start: 2024-12-02

## 2024-12-02 RX ORDER — METOPROLOL SUCCINATE 25 MG/1
25 TABLET, EXTENDED RELEASE ORAL DAILY
Status: DISCONTINUED | OUTPATIENT
Start: 2024-12-02 | End: 2024-12-06 | Stop reason: HOSPADM

## 2024-12-02 RX ADMIN — IPRATROPIUM BROMIDE AND ALBUTEROL SULFATE 3 ML: .5; 3 SOLUTION RESPIRATORY (INHALATION) at 15:29

## 2024-12-02 RX ADMIN — ALBUMIN HUMAN 25 G: 0.25 SOLUTION INTRAVENOUS at 10:22

## 2024-12-02 RX ADMIN — ACETAMINOPHEN 650 MG: 325 TABLET ORAL at 02:19

## 2024-12-02 RX ADMIN — HEPARIN SODIUM 5000 UNITS: 5000 INJECTION, SOLUTION INTRAVENOUS; SUBCUTANEOUS at 20:17

## 2024-12-02 RX ADMIN — GUAIFENESIN 600 MG: 600 TABLET ORAL at 20:17

## 2024-12-02 RX ADMIN — MAGNESIUM OXIDE TAB 400 MG (241.3 MG ELEMENTAL MG) 400 MG: 400 (241.3 MG) TAB at 08:35

## 2024-12-02 RX ADMIN — ATORVASTATIN CALCIUM 80 MG: 40 TABLET, FILM COATED ORAL at 20:17

## 2024-12-02 RX ADMIN — PRAMIPEXOLE DIHYDROCHLORIDE 0.12 MG: 0.12 TABLET ORAL at 14:19

## 2024-12-02 RX ADMIN — INSULIN ASPART 1 UNITS: 100 INJECTION, SOLUTION INTRAVENOUS; SUBCUTANEOUS at 17:35

## 2024-12-02 RX ADMIN — METOLAZONE 5 MG: 5 TABLET ORAL at 08:52

## 2024-12-02 RX ADMIN — ALBUMIN HUMAN 25 G: 0.25 SOLUTION INTRAVENOUS at 08:53

## 2024-12-02 RX ADMIN — SENNOSIDES AND DOCUSATE SODIUM 1 TABLET: 8.6; 5 TABLET ORAL at 20:17

## 2024-12-02 RX ADMIN — HYDROXYZINE HYDROCHLORIDE 10 MG: 10 TABLET ORAL at 19:14

## 2024-12-02 RX ADMIN — TAMSULOSIN HYDROCHLORIDE 0.4 MG: 0.4 CAPSULE ORAL at 08:34

## 2024-12-02 RX ADMIN — IPRATROPIUM BROMIDE AND ALBUTEROL SULFATE 3 ML: .5; 3 SOLUTION RESPIRATORY (INHALATION) at 07:59

## 2024-12-02 RX ADMIN — SENNOSIDES AND DOCUSATE SODIUM 1 TABLET: 8.6; 5 TABLET ORAL at 08:35

## 2024-12-02 RX ADMIN — IPRATROPIUM BROMIDE AND ALBUTEROL SULFATE 3 ML: .5; 3 SOLUTION RESPIRATORY (INHALATION) at 19:42

## 2024-12-02 RX ADMIN — METFORMIN ER 500 MG 1000 MG: 500 TABLET ORAL at 08:35

## 2024-12-02 RX ADMIN — MAGNESIUM OXIDE TAB 400 MG (241.3 MG ELEMENTAL MG) 400 MG: 400 (241.3 MG) TAB at 12:22

## 2024-12-02 RX ADMIN — FUROSEMIDE 20 MG: 10 INJECTION, SOLUTION INTRAMUSCULAR; INTRAVENOUS at 08:35

## 2024-12-02 RX ADMIN — INSULIN ASPART 2 UNITS: 100 INJECTION, SOLUTION INTRAVENOUS; SUBCUTANEOUS at 12:22

## 2024-12-02 RX ADMIN — GUAIFENESIN 600 MG: 600 TABLET ORAL at 08:34

## 2024-12-02 RX ADMIN — CITALOPRAM HYDROBROMIDE 20 MG: 20 TABLET ORAL at 08:34

## 2024-12-02 RX ADMIN — HEPARIN SODIUM 5000 UNITS: 5000 INJECTION, SOLUTION INTRAVENOUS; SUBCUTANEOUS at 05:36

## 2024-12-02 RX ADMIN — CEFTRIAXONE SODIUM 1 G: 1 INJECTION, POWDER, FOR SOLUTION INTRAMUSCULAR; INTRAVENOUS at 14:20

## 2024-12-02 RX ADMIN — EMPAGLIFLOZIN 25 MG: 25 TABLET, FILM COATED ORAL at 08:35

## 2024-12-02 RX ADMIN — HEPARIN SODIUM 5000 UNITS: 5000 INJECTION, SOLUTION INTRAVENOUS; SUBCUTANEOUS at 12:22

## 2024-12-02 RX ADMIN — PRAMIPEXOLE DIHYDROCHLORIDE 0.5 MG: 0.5 TABLET ORAL at 20:25

## 2024-12-02 RX ADMIN — IPRATROPIUM BROMIDE AND ALBUTEROL SULFATE 3 ML: .5; 3 SOLUTION RESPIRATORY (INHALATION) at 11:15

## 2024-12-02 RX ADMIN — ACETAMINOPHEN 650 MG: 325 TABLET ORAL at 19:14

## 2024-12-02 RX ADMIN — ASPIRIN 81 MG CHEWABLE TABLET 162 MG: 81 TABLET CHEWABLE at 08:34

## 2024-12-02 ASSESSMENT — ACTIVITIES OF DAILY LIVING (ADL)
ADLS_ACUITY_SCORE: 43
ADLS_ACUITY_SCORE: 42
ADLS_ACUITY_SCORE: 43
ADLS_ACUITY_SCORE: 45
ADLS_ACUITY_SCORE: 43
ADLS_ACUITY_SCORE: 42
ADLS_ACUITY_SCORE: 44
ADLS_ACUITY_SCORE: 45
ADLS_ACUITY_SCORE: 44
ADLS_ACUITY_SCORE: 44
ADLS_ACUITY_SCORE: 43
ADLS_ACUITY_SCORE: 42
ADLS_ACUITY_SCORE: 44
ADLS_ACUITY_SCORE: 43
ADLS_ACUITY_SCORE: 42
ADLS_ACUITY_SCORE: 42
ADLS_ACUITY_SCORE: 43
ADLS_ACUITY_SCORE: 42
ADLS_ACUITY_SCORE: 45

## 2024-12-02 NOTE — PROGRESS NOTES
[facility]    Medicine Progress Note - Hospitalist Service    Date of Admission:  11/23/2024    Assessment & Plan   Gill Mendez is a 64 year old female with a medical history significant for hypertension, hyperlipidemia, type II DM, asthma and anxiety disorder who is admitted with NSTEMI and acute CHF decompensation. Cardiac cath showed multivessel CAD. Cardiovascular surgeon performed CABG on 11/24/24. Extubated on 11/25/24.  Urine culture grew Klebsiella oxytoca for which she received ceftriaxone. Recently lost her father and had episodes of anxiety during hospital stay.      Acute Non-STEMI  Multivessel CAD  S/p CABG  Coronary angiogram revealed multivessel CAD on 11/23/24.  She underwent CABG on 11/24/2024.  Remained intubated postprocedure and managed in ICU and subsequently extubated on 11/25/2024.  Required vasopressors briefly in the ICU.    -Aspirin daily.  -Atorvastatin 80 mg daily  -Carvedilol 6.25 mg twice daily  -Follow-up repeat report of echocardiogram 12/1/2024  -Cardiothoracic surgery following-appreciate assistance  -Postoperative surgical management per primary thoracic surgery service       Acute CHF Decompensation, pulm edema  Echo 12/2/24 revealed LVEF of 35-40% (moderately reduced) with anteroseptal, septal and apical wall hypokinesis.    -cardiologist consulted  -Received metolazone 5 mg once on 12/1/2024  -Continue empagliflozin  -Continue IV Lasix  -Monitor electrolytes and replace as needed     Hypertension  Carvedilol 6.25 mg twice daily-held this morning due to soft BP  -Continue to hold PTA amlodipine, chlorthalidone, lisinopril,    Orthostatic hypotension  Blood pressure check revealed significant orthostatic changes on 12/1/2024. TSH wnl  Received IV albumin  Monitor blood pressure  Hold carvedilol for now  Delvin stocking  Follow-up morning cortisol level      Suspected UTI  Urine culture grew Klebsiella oxytoca  Resume IV ceftriaxone x 2 doses     Type II  "Diabetes Mellitus  -Continue empagliflozin  -Insulin sliding scale  -Continue metformin  -Monitor for hypoglycemia current per protocol     Asthma   Not in exacerbation  Resume home albuterol as needed for shortness of breath.   Duonebs PRN, 3 day course of prednsione   Continue maintenance therapy with Breo Ellipta or equivalent inhaler.     Anxiety Disorder  Severe claustrophobia   -Continue Celexa at the current dose.  -Monitor for any anxiety exacerbation during hospitalization.        Diet: Combination Diet Moderate Consistent Carb (60 g CHO per Meal) Diet; Low Saturated Fat Na <2400mg Diet  Snacks/Supplements Adult: Glucerna; With Meals  Fluid restriction 1800 ML FLUID    DVT Prophylaxis: Heparin SQ  Mcdaniels Catheter: Not present  Lines: None       Cardiac Monitoring: ACTIVE order. Indication: Open heart surgery (7 days)  Code Status: Full Code      Clinically Significant Risk Factors          # Hypochloremia: Lowest Cl = 96 mmol/L in last 2 days, will monitor as appropriate      # Hypoalbuminemia: Lowest albumin = 3.1 g/dL at 11/25/2024  4:26 AM, will monitor as appropriate     # Hypertension: Noted on problem list    # Acute heart failure with reduced ejection fraction: last echo with EF <40% and receiving IV diuretics    # Acute Hypoxic Respiratory Failure: Documented O2 saturation < 90%. Continue supplemental oxygen as needed         # Overweight: Estimated body mass index is 26.96 kg/m  as calculated from the following:    Height as of this encounter: 1.651 m (5' 5\").    Weight as of this encounter: 73.5 kg (162 lb).        # Financial/Environmental Concerns: none  # Asthma: noted on problem list  # History of CABG: noted on surgical history       Social Drivers of Health    Alcohol Use: Medium Risk (5/25/2023)    [facility] [employer]    Alcohol Use     Alcohol Use Status: Yes   Interpersonal Safety: Low Risk  (11/25/2024)    Interpersonal Safety     Do you feel physically and " emotionally safe where you currently live?: Patient unable to answer     Within the past 12 months, have you been hit, slapped, kicked or otherwise physically hurt by someone?: No     Within the past 12 months, have you been humiliated or emotionally abused in other ways by your partner or ex-partner?: No   Recent Concern: Interpersonal Safety - High Risk (11/23/2024)    Interpersonal Safety     Do you feel physically and emotionally safe where you currently live?: No     Within the past 12 months, have you been hit, slapped, kicked or otherwise physically hurt by someone?: No     Within the past 12 months, have you been humiliated or emotionally abused in other ways by your partner or ex-partner?: No    [facility] [employer] [facility] [employer]    Social Connections          Disposition Plan     Medically Ready for Discharge: Anticipated in 2-4 Days    Barrier: recover from CABG     Sudarshan Jimenez MD  Hospitalist Service  [facility]  Securely message with [employer] (more info)  Text page via [facility] Paging/Directory   ______________________________________________________________________    Interval History   She continues to experience episodes of orthostatic hypotension.  She received 4 bags of IV albumin this morning.  Per bedside RN, standing BP improved to 103- best reading  so far.  Otherwise no acute issues.    Physical Exam   Vital Signs: Temp: 99.6  F (37.6  C) Temp src: Oral BP: 103/54 Pulse: 85   Resp: 20 SpO2: 95 % O2 Device: Nasal cannula Oxygen Delivery: 2 LPM  Weight: 162 lbs 0 oz    General.  Awake alert oriented not in acute distress.  HEENT.  Pupils equal round react to light, anicteric, EOM intact.  Neck supple no JVD.  CVS regular rhythm no murmur gallops. S/p CABG. Incision healing.   Lungs.  Chest tubes-removed.  Diminished air entry bilaterally with significant crackles.   Abdomen.  Soft nontender bowel  sounds present.  Extremities.  No edema no calf tenderness.  Neurological.  Awake and alert. No focal deficit.  Skin no rash. No pallor.  Psych. Normal mood.      Medical Decision Making       47 MINUTES SPENT BY ME on the date of service doing chart review, history, exam, documentation & further activities per the note.      Data     I have personally reviewed the following data over the past 24 hrs:    12.2 (H)  \   8.2 (L)   / 317     137 96 (L) 13.4 /  220 (H)   3.9 32 (H) 0.81 \       Imaging results reviewed over the past 24 hrs:   Recent Results (from the past 24 hours)   XR Chest 2 Views    Narrative    EXAM: XR CHEST 2 VIEWS  LOCATION: [facility]  DATE: 12/2/2024    INDICATION: s p CABG  COMPARISON: Frontal and lateral views of the chest 12/1/2024      Impression    IMPRESSION:     Continued silhouetting of the left hemidiaphragm due to adjacent opacity, mildly increased from one day earlier reflecting atelectasis and/or pleural fluid and could relate in part to a lesser degree of lung inflation.     New obscuration of the right posterior costophrenic sulcus also consistent with increased atelectasis or trace right pleural fluid. Bands of subsegmental atelectasis around the right hilum are unchanged. There are no airspace or interstitial lung   opacities.    Accounting for differences in inflation, no change in heart and mediastinal interfaces.

## 2024-12-02 NOTE — PLAN OF CARE
Problem: Comorbidity Management  Goal: Blood Glucose Levels Within Targeted Range  Outcome: Progressing  Intervention: Monitor and Manage Glycemia  Recent Flowsheet Documentation  Taken 12/2/2024 1302 by Elo Voss, RN  Medication Review/Management: medications reviewed  Taken 12/2/2024 0900 by Elo Voss, RN  Medication Review/Management: medications reviewed     Problem: Comorbidity Management  Goal: Blood Pressure in Desired Range  Outcome: Not Progressing  Intervention: Maintain Blood Pressure Management  Recent Flowsheet Documentation  Taken 12/2/2024 1302 by Elo Voss, RN  Medication Review/Management: medications reviewed  Taken 12/2/2024 0900 by Elo Voss, RN  Medication Review/Management: medications reviewed   Goal Outcome Evaluation:    NSR.  On RA sats can vary 86-98%.  On 1L NC as needed.  Right lung coarse sounding, congested cough.  Non productive.  Ortho BP causing pt to feel very dizzy, room spinning, nausea.  Gave 4 bags of albumin, held coreg.  Ortho at 1300 was better 107/58 sitting, 103/46 standing but pt still very dizzy, unable to stand and walk to the bathroom.  Used 2 people BSC.  No pain.

## 2024-12-02 NOTE — CONSULTS
"HEART CARE NOTE        Thank you, Dr. Ferguson, for asking the [facility] team to see Gill LAIRD Andrea to evaluate HFrEF.    Assessment/Recommendations     1. Severe HFrEF   Assessment / Plan  Near euvolemia on physical exam; hold further IV diuresis and thiazide diuretic (was given metolazone today) given orthostatic hypotension and concern for hypovolemia; continue to monitor UOP and renal function closely  Repeat albumin bolus today (total x 2)  GDMT as detailed below; will hold carvedilol given borderline BP and initiate metoprolol in it's place at a later date    Current Pharmacotherapy AHA Guideline-Directed Medical Therapy   Lisinopril - hold Lisinopril 20 mg twice daily   Metoprolol succinate 25 mg daily Carvedilol 25 mg twice daily   Spironolactone not started Spironolactone 25 mg once daily   Hydralazine NA Hydralazine 100 mg three times daily   Isosorbide dinitrate NA Isosorbide dinitrate 40 mg three times daily   SGLT2 inhibitor:Empagliflozin 25 mg daily Dapagliflozin or Empagliflozin 10 mg daily     2. Severe multi-vessel CAD  Assessment / Plan  S/p CABG; denies anginal equivalents   Continue ASA, high intensity atorvastatin, metoprolol    3. HTN  Assessment / Plan  Borderline BP with notable orthostatic hypotension  - hold afterload reduction    4. DM2  Assessment / Plan  Management per primary team - currently on ISS    Clinically Significant Risk Factors          # Hypochloremia: Lowest Cl = 96 mmol/L in last 2 days, will monitor as appropriate      # Hypoalbuminemia: Lowest albumin = 3.1 g/dL at 11/25/2024  4:26 AM, will monitor as appropriate     # Hypertension: Noted on problem list  # Acute heart failure with reduced ejection fraction: last echo with EF <40% and receiving IV diuretics           # Overweight: Estimated body mass index is 26.96 kg/m  as calculated from the following:    Height as of this encounter: 1.651 m (5' 5\").    Weight as of this encounter: 73.5 kg (162 lb).    " "  # Financial/Environmental Concerns: none  # Asthma: noted on problem list  # History of CABG: noted on surgical history      Native vessel CAD  Cardiomyopathy    Mixed disorder of acid-base balance and Other disorders of electrolyte and fluid balance, not elsewhere classified    85 minutes spent reviewing prior records (including documentation, laboratory studies, cardiac testing/imaging), history and physical exam, planning, and subsequent documentation.    History of Present Illness/Subjective    Ms. Gill Mendez is a 64 year old female with a PMHx significant for (per Epic notation) hypertension, hyperlipidemia, type II DM, asthma and anxiety disorder who is admitted with an NSTEMI and acute CHF decompensation.     Today, Mrs. Mendez denies acute cardiac events or complaints; Management plan as detailed above    ECG: Personally reviewed. normal sinus rhythm, nonspecific ST and T waves changes.    ECHO (personnaly Reviewed on 12/2/24):   The left ventricle is normal in size.  Left ventricular function is decreased. The ejection fraction is 35-40%  (moderately reduced). Anteroseptal, septal and apical wall hypokinesis.  Normal right ventricle size and systolic function.  The left atrium is mildly dilated.  Compared to the prior study dated 11/23/24, there are changes as noted. LVEF  has improved.    Telemetry: personally reviewed December 2, 2024; notable for sinus rhythm    Lab results: personally reviewed December 2, 2024; notable for renal function wnl    Medical history and pertinent documents reviewed in Care Everywhere please where applicable see details above          Physical Examination Review of Systems   BP 90/53   Pulse 81   Temp 98.6  F (37  C) (Oral)   Resp 20   Ht 1.651 m (5' 5\")   Wt 73.5 kg (162 lb)   SpO2 (!) 86%   BMI 26.96 kg/m    Body mass index is 26.96 kg/m .  Wt Readings from Last 3 Encounters:   12/02/24 73.5 kg (162 lb)   11/22/24 73.3 kg (161 lb 11.2 oz)   06/21/24 69.9 kg (154 " lb)     General Appearance:   no distress, normal body habitus   ENT/Mouth: membranes moist, no oral lesions or bleeding gums.      EYES:  no scleral icterus, normal conjunctivae   Neck: no carotid bruits or thyromegaly   Chest/Lungs:   lungs are clear to auscultation, no rales or wheezing, equal chest wall expansion    Cardiovascular:   Regular. Normal first and second heart sounds with no murmurs, rubs, or gallops; the carotid, radial and posterior tibial pulses are intact, no JVD or LE edema bilaterally    Abdomen:  no organomegaly, masses, bruits, or tenderness; bowel sounds are present   Extremities: no cyanosis or clubbing   Skin: no xanthelasma, warm.    Neurologic: NAD     Psychiatric: alert and oriented x3, calm     A complete 10 systems ROS was reviewed  And is negative except what is listed in the HPI.          Medical History  Surgical History Family History Social History   Past Medical History:   Diagnosis Date    Anxiety     Asthma     Cellulitis     Diabetes mellitus (H)     Essential hypertension     Created by Conversion  Replacement Utility updated for latest IMO load    Gastroparesis     Hyperlipidemia     Hypertension     Other and unspecified hyperlipidemia     Created by Conversion     PONV (postoperative nausea and vomiting)     Shortness of breath     Type II or unspecified type diabetes mellitus without mention of complication, not stated as uncontrolled     Created by Conversion     Past Surgical History:   Procedure Laterality Date    AMPUTATE TOE(S) Right 7/31/2020    Procedure: AMPUTATION, third digit right foot;  Surgeon: Chris Vega DPM;  Location: [facility];  Service: Podiatry    CORONARY ARTERY BYPASS GRAFT, WITH ENDOSCOPIC VESSEL PROCUREMENT N/A 11/24/2024    Procedure: CORONARY ARTERY BYPASS GRAFT TIMES THREE, LEFT INTERNAL MAMMARY ARTERY HARVEST, LEFT LEG ENDOSCOPIC VESSEL PROCUREMENT,;  Surgeon: Zhen Carter MD;  Location: [facility] [place]    CV  CORONARY ANGIOGRAM N/A 11/23/2024    Procedure: Coronary Angiogram;  Surgeon: Roque Eisenberg MD;  Location: [facility] LAB CV    CV INTRA AORTIC BALLOON N/A 11/24/2024    Procedure: Intra aortic Balloon Pump Insertion;  Surgeon: Roque Eisenberg MD;  Location: [facility] CATH LAB CV    CV LEFT HEART CATH N/A 11/23/2024    Procedure: Left Heart Catheterization;  Surgeon: Roque Eisenberg MD;  Location: [facility] CATH LAB CV    ENDOMETRIAL BIOPSY      FOOT ARTHROPLASTY Right 09/24/2020    Fourth and fifth toe by Dr. Vega    HC REPAIR OF HAMMERTOE,ONE Left 12/7/2020    Procedure: ARTHROPLASTY, digits two, three, four and five left foot;  Surgeon: Chris Vega DPM;  Location: [facility];  Service: Podiatry    HERNIA REPAIR      HYSTERECTOMY      IR LUMBAR PUNCTURE  7/20/2023    REPAIR TENDON ACHILLES Bilateral     Left was plate  , right was torn    TONSILLECTOMY      TRANSESOPHAGEAL ECHOCARDIOGRAM INTRAOPERATIVE  11/24/2024    Procedure: ECHOCARDIOGRAM, TRANSESOPHAGEAL, INTRA-OPERATIVE;  Surgeon: Zhen Carter MD;  Location: [facility] [place] REMOVAL OF OVARY(S)      Description: Oophorectomy - Bilateral (Removal Of Both Ovaries);  Recorded: 10/09/2008;    no family history of premature coronary artery disease Social History     Socioeconomic History    Marital status:      Spouse name: Not on file    Number of children: Not on file    Years of education: Not on file    Highest education level: Not on file   Occupational History    Not on file   Tobacco Use    Smoking status: Never    Smokeless tobacco: Never   Substance and Sexual Activity    Alcohol use: No    Drug use: No    Sexual activity: Not on file   Other Topics Concern    Not on file   Social History Narrative    Not on file     Social Drivers of Health     Financial Resource Strain: Low Risk  (11/23/2024)    Financial Resource Strain     Within the past 12 months, have you or your family members you live with been  unable to get utilities (heat, electricity) when it was really needed?: No   Food Insecurity: Low Risk  (11/23/2024)    Food Insecurity     Within the past 12 months, did you worry that your food would run out before you got money to buy more?: No     Within the past 12 months, did the food you bought just not last and you didn t have money to get more?: No   Transportation Needs: Low Risk  (11/23/2024)    Transportation Needs     Within the past 12 months, has lack of transportation kept you from medical appointments, getting your medicines, non-medical meetings or appointments, work, or from getting things that you need?: No   Physical Activity: Not on file   Stress: Not on file (11/6/2024)   Social Connections: Unknown (12/28/2021)    Received from [facility]    Social Connections     Frequency of Communication with Friends and Family: Not on file   Interpersonal Safety: Low Risk  (11/25/2024)    Interpersonal Safety     Do you feel physically and emotionally safe where you currently live?: Patient unable to answer     Within the past 12 months, have you been hit, slapped, kicked or otherwise physically hurt by someone?: No     Within the past 12 months, have you been humiliated or emotionally abused in other ways by your partner or ex-partner?: No   Recent Concern: Interpersonal Safety - High Risk (11/23/2024)    Interpersonal Safety     Do you feel physically and emotionally safe where you currently live?: No     Within the past 12 months, have you been hit, slapped, kicked or otherwise physically hurt by someone?: No     Within the past 12 months, have you been humiliated or emotionally abused in other ways by your partner or ex-partner?: No   Housing Stability: Low Risk  (11/23/2024)    Housing Stability     Do you have housing? : Yes     Are you worried about losing your housing?: No           Lab Results    Chemistry/lipid CBC  Cardiac Enzymes/BNP/TSH/INR   Lab Results   Component Value Date    CHOL 148 11/23/2024    HDL 65 11/23/2024    TRIG 65 11/23/2024    BUN 13.4 12/02/2024     12/02/2024    CO2 32 (H) 12/02/2024    Lab Results   Component Value Date    WBC 9.1 11/30/2024    HGB 8.3 (L) 11/30/2024    HCT 26.0 (L) 11/30/2024    MCV 82 11/30/2024     12/01/2024    Lab Results   Component Value Date    TROPONINI <0.01 01/01/2023    TSH 1.06 09/20/2024    INR 1.05 11/25/2024     Lab Results   Component Value Date    TROPONINI <0.01 01/01/2023          Weight:    Wt Readings from Last 3 Encounters:   12/02/24 73.5 kg (162 lb)   11/22/24 73.3 kg (161 lb 11.2 oz)   06/21/24 69.9 kg (154 lb)       Allergies  Allergies   Allergen Reactions    Codeine Unknown     Patient states possibly caused N/V but can't remember for sure    Semaglutide Nausea and Vomiting    Acetaminophen-Codeine Rash         Surgical History  Past Surgical History:   Procedure Laterality Date    AMPUTATE TOE(S) Right 7/31/2020    Procedure: AMPUTATION, third digit right foot;  Surgeon: Chris Vega DPM;  Location: [facility];  Service: Podiatry    CORONARY ARTERY BYPASS GRAFT, WITH ENDOSCOPIC VESSEL PROCUREMENT N/A 11/24/2024    Procedure: CORONARY ARTERY BYPASS GRAFT TIMES THREE, LEFT INTERNAL MAMMARY ARTERY HARVEST, LEFT LEG ENDOSCOPIC VESSEL PROCUREMENT,;  Surgeon: Zhen Carter MD;  Location: [facility] [place]    CV CORONARY ANGIOGRAM N/A 11/23/2024    Procedure: Coronary Angiogram;  Surgeon: Roque Eisenberg MD;  Location: [facility] CATH LAB CV    CV INTRA AORTIC BALLOON N/A 11/24/2024    Procedure: Intra aortic Balloon Pump Insertion;  Surgeon: Roque Eisenberg MD;  Location: [facility] LAB CV    CV LEFT HEART CATH N/A 11/23/2024    Procedure: Left Heart Catheterization;  Surgeon: Roque Eisenberg MD;  Location: [facility] CATH LAB CV    ENDOMETRIAL BIOPSY      FOOT ARTHROPLASTY Right 09/24/2020    Fourth and fifth toe by   Gary     REPAIR OF HAMMERTOE,ONE Left 12/7/2020    Procedure: ARTHROPLASTY, digits two, three, four and five left foot;  Surgeon: Chris Vega DPM;  Location: [facility];  Service: Podiatry    HERNIA REPAIR      HYSTERECTOMY      IR LUMBAR PUNCTURE  7/20/2023    REPAIR TENDON ACHILLES Bilateral     Left was plate  , right was torn    TONSILLECTOMY      TRANSESOPHAGEAL ECHOCARDIOGRAM INTRAOPERATIVE  11/24/2024    Procedure: ECHOCARDIOGRAM, TRANSESOPHAGEAL, INTRA-OPERATIVE;  Surgeon: Zhen Carter MD;  Location: [facility] [place] REMOVAL OF OVARY(S)      Description: Oophorectomy - Bilateral (Removal Of Both Ovaries);  Recorded: 10/09/2008;       Social History  Tobacco:   History   Smoking Status    Never   Smokeless Tobacco    Never    Alcohol:   Social History    Substance and Sexual Activity      Alcohol use: No   Illicit Drugs:   History   Drug Use No       Family History  Family History   Problem Relation Age of Onset    Diabetes Mother     Hypertension Mother     Acute Myocardial Infarction No family hx of           Namita Fitzgerald MD on 12/2/2024      cc: Saúl Hunt,

## 2024-12-02 NOTE — PROGRESS NOTES
"CVTS Daily Progress Note   POD 8 s/p CABG x3 (LIMA-LAD, rSVG-PDA, rSVG-OM)  Attending: Raul  LOS: 9    SUBJECTIVE/INTERVAL EVENTS:    No acute events overnight. O2 replaced overnight for comfort - SpO2 high 90s on RA. Symptomatic orthostasis per nursing, SBP high 80s with standing, recovers with rest.  Patient progressing overall well. Maintaining oxygen saturations on room air as above. Normotensive w/ orthostasis. Pain controlled overall. + BM. Tolerating PO intake. Good UOP on Lasix although continues with fluid retention.  Patient denies new chest pain, shortness of breath, abdominal pain, and nausea. Feels well this AM and asks when she can discharge to home. All questions answered to patient's satisfaction on rounds.    OBJECTIVE:  Temp:  [98.6  F (37  C)-99.6  F (37.6  C)] 98.6  F (37  C)  Pulse:  [78-89] 79  Resp:  [20] 20  BP: ()/(50-57) 106/57  SpO2:  [89 %-99 %] 94 %  Vitals:    11/29/24 0600 11/30/24 1704 12/01/24 0410 12/01/24 0525   Weight: 76.6 kg (168 lb 12.8 oz) 74.9 kg (165 lb 3.2 oz) 73.6 kg (162 lb 4.8 oz) 73.1 kg (161 lb 3.2 oz)    12/02/24 0440   Weight: 73.5 kg (162 lb)       Clinically Significant Risk Factors          # Hypochloremia: Lowest Cl = 96 mmol/L in last 2 days, will monitor as appropriate      # Hypoalbuminemia: Lowest albumin = 3.1 g/dL at 11/25/2024  4:26 AM, will monitor as appropriate     # Hypertension: Noted on problem list    # Acute heart failure with reduced ejection fraction: last echo with EF <40% and receiving IV diuretics           # Overweight: Estimated body mass index is 26.96 kg/m  as calculated from the following:    Height as of this encounter: 1.651 m (5' 5\").    Weight as of this encounter: 73.5 kg (162 lb).        # Financial/Environmental Concerns: none  # Asthma: noted on problem list  # History of CABG: noted on surgical history            Current Medications:    Scheduled Meds:  Current Facility-Administered Medications   Medication Dose Route " Frequency Provider Last Rate Last Admin    albumin human 25 % injection 25 g  25 g Intravenous Once Beena Mitchell PA-C        aspirin (ASA) chewable tablet 162 mg  162 mg Oral or NG Tube Daily Barbara Boggs NP   162 mg at 12/01/24 0840    atorvastatin (LIPITOR) tablet 80 mg  80 mg Oral or Feeding Tube QPM Barbara Boggs NP   80 mg at 12/01/24 2007    carvedilol (COREG) tablet 6.25 mg  6.25 mg Oral BID w/meals Barbara Boggs NP   6.25 mg at 12/01/24 1812    cefTRIAXone (ROCEPHIN) 1 g vial to attach to  mL bag for ADULTS or NS 50 mL bag for PEDS  1 g Intravenous Q24H Sudarshan Jimenez MD   1 g at 12/01/24 1623    citalopram (celeXA) tablet 20 mg  20 mg Oral or Feeding Tube QAM Barbara Boggs NP   20 mg at 12/01/24 0840    empagliflozin (JARDIANCE) tablet 25 mg  25 mg Oral Daily Beena Mitchell PA-C   25 mg at 12/01/24 0840    fluticasone (ARNUITY ELLIPTA) 100 MCG/ACT inhaler 1 puff  1 puff Inhalation Daily Barbara Boggs NP   1 puff at 12/01/24 0842    furosemide (LASIX) injection 20 mg  20 mg Intravenous bid 08 & 14 Barbara Boggs NP   20 mg at 12/01/24 1316    guaiFENesin (MUCINEX) 12 hr tablet 600 mg  600 mg Oral BID Beena Mitchell PA-C   600 mg at 12/01/24 2008    heparin ANTICOAGULANT injection 5,000 Units  5,000 Units Subcutaneous Q8H Barbara Boggs NP   5,000 Units at 12/02/24 0536    insulin aspart (NovoLOG) injection (RAPID ACTING)  1-7 Units Subcutaneous TID AC Barbara Boggs NP   1 Units at 12/01/24 1138    insulin aspart (NovoLOG) injection (RAPID ACTING)  1-5 Units Subcutaneous At Bedtime Barbara Boggs NP   1 Units at 11/27/24 2117    ipratropium - albuterol 0.5 mg/2.5 mg/3 mL (DUONEB) neb solution 3 mL  3 mL Nebulization 4x daily Barbara Boggs NP   3 mL at 12/02/24 0759    magnesium oxide (MAG-OX) tablet 400 mg  400 mg Oral Q4H Sudarshan Jimenez MD        metFORMIN (GLUCOPHAGE XR) 24 hr tablet 1,000 mg  1,000 mg Oral Daily with  breakfast Beena Mitchell PA-C   1,000 mg at 12/01/24 0840    metolazone (ZAROXOLYN) tablet 5 mg  5 mg Oral Once Beena Mitchell PA-C        polyethylene glycol (MIRALAX) Packet 17 g  17 g Oral Daily Barbara Boggs NP   17 g at 11/28/24 0805    pramipexole (MIRAPEX) tablet 0.125 mg  0.125 mg Oral Daily Sudarshan Jimenez MD   0.125 mg at 12/01/24 1321    pramipexole (MIRAPEX) tablet 0.5 mg  0.5 mg Oral At Bedtime Barbara Boggs NP   0.5 mg at 12/01/24 2008    senna-docusate (SENOKOT-S/PERICOLACE) 8.6-50 MG per tablet 1 tablet  1 tablet Oral BID Barbara Boggs NP   1 tablet at 12/01/24 0841    sodium chloride (PF) 0.9% PF flush 3 mL  3 mL Intracatheter Q8H Barbara Boggs NP   3 mL at 12/02/24 0219    tamsulosin (FLOMAX) capsule 0.4 mg  0.4 mg Oral Daily Beena Mitchell PA-C   0.4 mg at 12/01/24 0842     Continuous Infusions:  Current Facility-Administered Medications   Medication Dose Route Frequency Provider Last Rate Last Admin     PRN Meds:  Current Facility-Administered Medications   Medication Dose Route Frequency Provider Last Rate Last Admin    acetaminophen (TYLENOL) tablet 650 mg  650 mg Oral Q4H PRN Barbara Boggs NP   650 mg at 12/02/24 0219    albuterol (PROVENTIL HFA/VENTOLIN HFA) inhaler  2 puff Inhalation Q6H PRN Barbara Boggs NP        bisacodyl (DULCOLAX) suppository 10 mg  10 mg Rectal Daily PRN Barbara Boggs NP        glucose gel 15-30 g  15-30 g Oral Q15 Min PRN Barbara Boggs NP        Or    dextrose 50 % injection 25-50 mL  25-50 mL Intravenous Q15 Min PRN Flakita, Jennilyn M, NP        Or    glucagon injection 1 mg  1 mg Subcutaneous Q15 Min PRN Barbara Boggs NP        hydrALAZINE (APRESOLINE) injection 10 mg  10 mg Intravenous Q6H PRBarbara Platt NP        hydrOXYzine HCl (ATARAX) tablet 10 mg  10 mg Oral or Feeding Tube 4x Daily PRN Barbara Boggs NP   10 mg at 12/01/24 2008    magnesium hydroxide (MILK OF MAGNESIA) suspension 30 mL   30 mL Oral Daily PRN Barbara Crowley NP        naloxone (NARCAN) injection 0.2 mg  0.2 mg Intravenous Q2 Min PRN Barbara Crowley NP        Or    naloxone (NARCAN) injection 0.4 mg  0.4 mg Intravenous Q2 Min PRN Barbara Crowley NP        Or    naloxone (NARCAN) injection 0.2 mg  0.2 mg Intramuscular Q2 Min PRN Barbara Crowley NP        Or    naloxone (NARCAN) injection 0.4 mg  0.4 mg Intramuscular Q2 Min PRN Barbara Crowley NP        ondansetron (ZOFRAN ODT) ODT tab 4 mg  4 mg Oral Q6H PRN Barbara Crowley NP        Or    ondansetron (ZOFRAN) injection 4 mg  4 mg Intravenous Q6H PRN Barbara Crowley NP   4 mg at 24    oxyCODONE IR (ROXICODONE) half-tab 2.5 mg  2.5 mg Oral Q4H PRN Beena Mitchell PA-C        Or    oxyCODONE (ROXICODONE) tablet 5 mg  5 mg Oral Q4H PRN Beena Mitchell PA-C        sodium chloride (PF) 0.9% PF flush 3 mL  3 mL Intracatheter q1 min prn Barbara Crowley NP   3 mL at 24 0810       Cardiographics:    Telemetry monitoring demonstrates SR with rates in the 70-80s per my personal review.    Imaging:  Recent Results (from the past 24 hours)   Echocardiogram Complete   Result Value    LVEF  35-40% (moderately reduced)    [facility]    [id number]  [id number]  [id number]  [id number]^[id number]^BARBARA^[place] [address]     Name: KATIUSKA JUSTIN  MRN: [id number]  : 1960  Study Date: 2024 10:41 AM  Age: 64 yrs  Gender: Female  Patient Location: [facility]  Reason For Study: Heart Failure, CABG  Ordering Physician: BARBARA CROWLEY  Referring Physician: JENNIFER DODSON  Performed By: Ambika Randolph     BSA: 1.8 m2  Height: 65 in  Weight: 161 lb  HR: 87  BP: 120/64 mmHg  ______________________________________________________________________________  Procedure  Complete Portable Echo Adult. Definity (NDC #06388-253) given intravenously.  Compared to the prior study dated 24, there are changes as  noted.  ______________________________________________________________________________  Interpretation Summary     The left ventricle is normal in size.  Left ventricular function is decreased. The ejection fraction is 35-40%  (moderately reduced). Anteroseptal, septal and apical wall hypokinesis.  Normal right ventricle size and systolic function.  The left atrium is mildly dilated.  Compared to the prior study dated 11/23/24, there are changes as noted. LVEF  has improved.  ______________________________________________________________________________  Left Ventricle  The left ventricle is normal in size. Left ventricular function is decreased.  The ejection fraction is 35-40% (moderately reduced). There is normal left  ventricular wall thickness. Left ventricular diastolic function is normal.  Anteroseptal, septal and apical wall hypokinesis.     Right Ventricle  Normal right ventricle size and systolic function.     Atria  The left atrium is mildly dilated. Right atrial size is normal. There is no  color Doppler evidence of an atrial shunt.     Mitral Valve  Mitral valve leaflets appear normal. There is trace mitral regurgitation.     Tricuspid Valve  The tricuspid valve is not well visualized. This degree of valvular  regurgitation is within normal limits. Right ventricular systolic pressure  could not be approximated due to inadequate tricuspid regurgitation.     Aortic Valve  Aortic valve leaflets appear normal. There is no evidence of aortic stenosis  or clinically significant aortic regurgitation.     Pulmonic Valve  The pulmonic valve is not well visualized. This degree of valvular  regurgitation is within normal limits.     Vessels  The aorta root is normal. Normal size ascending aorta. IVC diameter <2.1 cm  collapsing >50% with sniff suggests a normal RA pressure of 3 mmHg.     Pericardium  There is no pericardial effusion.      ______________________________________________________________________________  MMode/2D Measurements & Calculations  IVSd: 0.91 cm  LVIDd: 5.0 cm  LVIDs: 3.7 cm  LVPWd: 1.1 cm  FS: 25.6 %  LV mass(C)d: 181.9 grams  LV mass(C)dI: 100.8 grams/m2     Ao root diam: 3.0 cm  asc Aorta Diam: 3.2 cm  LVOT diam: 2.1 cm  LVOT area: 3.5 cm2  Ao root diam index Ht(cm/m): 1.8  Ao root diam index BSA (cm/m2): 1.7  Asc Ao diam index BSA (cm/m2): 1.8  Asc Ao diam index Ht(cm/m): 1.9  EF Biplane: 48.6 %  LA Volume (BP): 57.1 ml  LA Volume Index (BP): 31.7 ml/m2     LA Volume Indexed (AL/bp): 33.8 ml/m2  RV Base: 2.5 cm  RWT: 0.44     Doppler Measurements & Calculations  MV E max colt: 79.1 cm/sec  MV A max colt: 80.2 cm/sec  MV E/A: 0.99  MV max P.2 mmHg  MV mean P.0 mmHg  MV V2 VTI: 21.5 cm  MVA(VTI): 2.5 cm2  MV dec slope: 421.0 cm/sec2  MV dec time: 0.19 sec  Ao V2 max: 133.0 cm/sec  Ao max P.0 mmHg  Ao V2 mean: 101.0 cm/sec  Ao mean P.0 mmHg  Ao V2 VTI: 27.7 cm  RADHA(I,D): 2.0 cm2  RADHA(V,D): 2.5 cm2     LV V1 max PG: 3.8 mmHg  LV V1 max: 97.4 cm/sec  LV V1 VTI: 15.6 cm  SV(LVOT): 54.0 ml  SI(LVOT): 30.0 ml/m2  PA V2 max: 104.0 cm/sec  PA max P.3 mmHg  PA acc time: 0.11 sec  AV Colt Ratio (DI): 0.73  RADHA Index (cm2/m2): 1.1  E/E' av.4  Lateral E/e': 14.8  Medial E/e': 14.0  RV S Colt: 8.9 cm/sec     ______________________________________________________________________________  Report approved by: Pratik Wharton 2024 01:56 PM         XR Chest 2 Views    Narrative    EXAM: XR CHEST 2 VIEWS  LOCATION: [facility]  DATE: 2024    INDICATION: Shortness of breath, follow-up pneumothorax.  COMPARISON: Chest film 2024.      Impression    IMPRESSION: Interval removal of the bilateral chest tubes. No pneumothorax. Persistent opacification of the left lung base likely reflecting a combination of pleural fluid and atelectasis. This is not dramatically changed.  Probable tiny right pleural   effusion. The right lung is otherwise clear. Cardiac enlargement. Normal pulmonary vascularity. Median sternotomy.         Labs, personally reviewed.  Hemoglobin   Date Value Ref Range Status   11/30/2024 8.3 (L) 11.7 - 15.7 g/dL Final   11/29/2024 7.6 (L) 11.7 - 15.7 g/dL Final   11/28/2024 7.4 (L) 11.7 - 15.7 g/dL Final     WBC Count   Date Value Ref Range Status   11/30/2024 9.1 4.0 - 11.0 10e3/uL Final   11/29/2024 8.2 4.0 - 11.0 10e3/uL Final   11/28/2024 8.3 4.0 - 11.0 10e3/uL Final     Platelet Count   Date Value Ref Range Status   12/01/2024 332 150 - 450 10e3/uL Final   11/30/2024 251 150 - 450 10e3/uL Final   11/29/2024 235 150 - 450 10e3/uL Final     Creatinine   Date Value Ref Range Status   12/02/2024 0.81 0.51 - 0.95 mg/dL Final   12/01/2024 0.83 0.51 - 0.95 mg/dL Final   11/30/2024 0.83 0.51 - 0.95 mg/dL Final     Potassium   Date Value Ref Range Status   12/02/2024 3.9 3.4 - 5.3 mmol/L Final   12/01/2024 4.0 3.4 - 5.3 mmol/L Final   11/30/2024 3.9 3.4 - 5.3 mmol/L Final   01/02/2023 4.0 3.5 - 5.0 mmol/L Final   01/01/2023 4.1 3.5 - 5.0 mmol/L Final   03/22/2021 4.4 3.5 - 5.0 mmol/L Final     Potassium POCT   Date Value Ref Range Status   11/24/2024 4.0 3.4 - 5.3 mmol/L Final   11/24/2024 3.5 3.4 - 5.3 mmol/L Final   11/24/2024 4.4 3.4 - 5.3 mmol/L Final     Magnesium   Date Value Ref Range Status   12/02/2024 2.0 1.7 - 2.3 mg/dL Final   12/01/2024 1.9 1.7 - 2.3 mg/dL Final   11/30/2024 1.8 1.7 - 2.3 mg/dL Final          I/O:  I/O last 3 completed shifts:  In: 860 [P.O.:810]  Out: -        Physical Exam:    General: Patient seen up in chair, NAD. Conversant, pleasant, calm, cooperative on exam.  HEENT: no scleral icterus, moist mucosa, no tracheal deviation  CV: RRR on monitor - SR, WWP. Negligible LE edema.  Incision C/D/I, no erythema or induration  Pulm: Unlabored breathing on RA, occasional loose cough per baseline  Abd: SNTND, no guarding or peritoneal signs  Ext:  Negligible pedal edema, incision C/D/I without erythema or induration  Neuro: A&O, CNs grossly intact, face symmetric, speech clear, SPARKS      ASSESSMENT/PLAN:    Gill Mendez is a 64 year old female who is s/p CABG x3.    Active Problems:    NSTEMI (non-ST elevated myocardial infarction) (H)        NEURO  PSYCH:  Acute postop pain  ANX  RLS  - Scheduled Tylenol/lidocaine patches and PRN Tylenol/oxycodone for pain  - PTA Celexa and mirapex resumed  - PRN Atarax available PRN for anxiety    CV:  ICM/HFmrEF  NSTEMI/CAD s/p CABG x3  HTN  HLD  Hypervolemia, improving  Orthostatic hypotension  - Preop echo LVEF 25-30%, 12/1 LVEF 35-40%  - IABP removed POD 1, med CT and TPW removed 11/29, pleurals removed 11/30  - Normotensive  - Coreg 6.25 mg BID with hold parameters  - Hold PTA amlodipine and lisinopril  - ASA 162mg  - Atorvastatin 80mg daily  - Cardiology involved preop, consult placed today for GDMT prior to discharge  - Will need HF follow-up at discharge   - PTA Jardiance  - Compression hose  - Conc albumin today to support BP w/ ongoing diuresis    PULM:  Acute hypoxic respiratory insufficiency, improving  Asthma/COPD  Right PTXm - resolved  - Extubated POD1  - Maintaining oxygen saturations on room air this AM  - PTA Arnuity Ellipta & albuterol inhalers resumed  - Duonebs QID & EZPap  - Encourage pulmonary toilet/OOB/activity  - No PTX on 2v CXR 12/1    GI  NUTRITION:  - Diet: Cardiac/consistent carb  - Bowel regimen, LBM 12/1    RENAL  FE:  CARMEN, resolved  Urinary retention 2/2 Klebsiella CAUTI - resolved  - Good UOP on diuretics  - Cr WNL  - Diuresis:  20mg IV Lasix BID + once time 5mg metolazone today w/ 25% albumin to support BP  - Hold PTA chlorthalidone  - Continue electrolyte replacement protocol  - Rocephin 11/27-28, transitioned to Macrobid 11/29 with plan for 5-day total course. Rocephin restarted per hospitalist x2 additional doses    HEME:  Acute blood loss anemia  - Hgb stable, no bleeding concerns.  Hep SQ, ASA    ID:  Stress-induced leukocytosis, resolved  Klebsiella CAUTI, resolved  - Yecenia op ppx complete, afebrile  - Rocephin 11/27-28, Macrobid started 11/29, as above, rocephin restarted per [facility] as above    ENDO:  DM2   - Hgb A1c 6.1%  - Sliding scale insulin - minimal need, anticipate discharge on PTA DM meds  - PTA Jardiance, metformin resumed  - Hold PTA tirzepatide  - BG goal <180 to promote optimal wound healing    PPx:  - DVT: SCDs, SQ heparin TID, OOB/ambulation   - GI: Protonix 40mg PO daily    DISPO:  - General telemetry status since POD 4  - Medically Ready for Discharge: Anticipated Tomorrow pending optimization of HF mgmt, approaching euvolemia, liberation from supplemental oxygen  - Therapies recommending discharge home with OP CR once medically stable      Pt seen and discussed with Dr. Montero. Dr. Carter updated.      Amy Mitchell PA-C  [facility] Cardiothoracic Surgery  Vocera or Secure Chat  December 2, 2024

## 2024-12-02 NOTE — DISCHARGE SUMMARY
Cardiovascular Surgery Discharge Summary  ***refresh***DOS  Primary Care Physician:  Saúl Hunt  Discharge Provider: Beena Mitchell PA-C   Admission Date: 11/23/2024   Admission Diagnoses: NSTEMI, acute pulmonary edema  NSTEMI (non-ST elevated myocardial infarction) (H)   Discharge Date: 12/***/2024  Disposition: Home   Condition at Discharge: Good  Code Status: Full Code     Principal Diagnosis:   NSTEMI (non-ST elevated myocardial infarction) (H) s/p CABG x3 w/ LLE EVH    Discharge Diagnoses:    Active Problems:  Patient Active Problem List   Diagnosis    Trochanteric Bursitis    Essential hypertension    Gastroparesis    Type 2 Diabetes Mellitus    Asthma    Allergy To Aspirin    Hyperlipidemia    Cellulitis and abscess of foot    Cellulitis    Dyspnea on exertion    Cellulitis of fifth toe of left foot    Herpetic gingivostomatitis    Insomnia, unspecified    Hammer toe of left foot    Diabetic ulcer of toe of right foot associated with type 2 diabetes mellitus, with necrosis of muscle (H)    History of osteomyelitis    Chest pain    Toe infection    Type 2 diabetes mellitus with skin complication, without long-term current use of insulin (H)    Cellulitis of toe of right foot    Diabetic ulcer of left midfoot associated with diabetes mellitus due to underlying condition, limited to breakdown of skin (H)    Anhidrosis    Hypoxia    Exacerbation of intermittent asthma, unspecified asthma severity    Pneumonia due to 2019 novel coronavirus    Acute pulmonary edema (H)    Restless leg syndrome    Type II or unspecified type diabetes mellitus with neurological manifestations, uncontrolled(250.62) (H)    Hyperlipidemia    Type 2 diabetes mellitus with skin complication, with long-term current use of insulin (H)          Consult/s: Dietary, critical care medicine, cardiology      Surgery:   11/24/2024 with Dr. Carter    PROCEDURE PERFORMED:  1.  Coronary artery bypass grafting x 3               -  reversed saphenous vein graft to the right posterior descending coronary artery              - reversed saphenous vein graft to the obtuse marginal branch of the left circumflex coronary artery              - pedicled left internal mammary artery to left anterior descending coronary artery  2.  Endoscopic vein harvest of the greater saphenous vein from the left lower extremity.  3. Transesophageal echocardiogram    OPERATIVE FINDINGS:    1) The left internal mammary artery was 3 mm in diameter and had excellent flow.    2) The greater saphenous vein from the left lower extremity was 4-5 mm in diameter and suitable for conduit.    3) The ascending aorta was free of calcified plaque.    4) The right posterior descending coronary artery was 1.5 mm in diameter and free of disease at the site of anastomosis.   5) The obtuse marginal branch of the left circumflex coronary artery was 1.5 mm in diameter and free of disease at the site of anastomosis.   6) The left anterior descending coronary artery 1.5 mm in diameter and free of disease at the site of anastomosis.    7) After reperfusion sinus rhythm resumed.    9) Left ventricular function was 25% preoperatively and improved to 45% after bypass on low-dose inotropic support.    Discharge Medications: ***       Review of your medicines        UNREVIEWED medicines. Ask your doctor about these medicines        Dose / Directions   chlorthalidone 25 MG tablet  Commonly known as: HYGROTON      Dose: 1 tablet  Take 1 tablet by mouth daily  Refills: 0     lisinopril 10 MG tablet  Commonly known as: ZESTRIL      Dose: 10 mg  [LISINOPRIL (PRINIVIL,ZESTRIL) 10 MG TABLET] Take 10 mg by mouth daily.  Refills: 0            START taking        Dose / Directions   acetaminophen 325 MG tablet  Commonly known as: TYLENOL  Used for: S/P CABG (coronary artery bypass graft)      Dose: 325-650 mg  Take 1-2 tablets (325-650 mg) by mouth every 4 hours as needed for mild pain.  Quantity: 60  tablet  Refills: 0     aspirin 81 MG chewable tablet  Commonly known as: ASA  Used for: S/P CABG (coronary artery bypass graft)      Dose: 162 mg  Take 2 tablets (162 mg) by mouth or NG Tube daily.  Quantity: 180 tablet  Refills: 3     hydrOXYzine HCl 10 MG tablet  Commonly known as: ATARAX  Used for: S/P CABG (coronary artery bypass graft)      Dose: 10 mg  Take 1 tablet (10 mg) by mouth or Feeding Tube every 8 hours as needed for anxiety.  Quantity: 30 tablet  Refills: 0     polyethylene glycol 17 GM/Dose powder  Commonly known as: MIRALAX  Used for: S/P CABG (coronary artery bypass graft)      Dose: 17 g  Take 17 g by mouth daily.  Quantity: 510 g  Refills: 0     tamsulosin 0.4 MG capsule  Commonly known as: FLOMAX  Used for: S/P CABG (coronary artery bypass graft)      Dose: 0.4 mg  Take 1 capsule (0.4 mg) by mouth daily.  Quantity: 14 capsule  Refills: 0            CONTINUE these medicines which may have CHANGED, or have new prescriptions. If we are uncertain of the size of tablets/capsules you have at home, strength may be listed as something that might have changed.        Dose / Directions   atorvastatin 80 MG tablet  Commonly known as: LIPITOR  This may have changed:   medication strength  how much to take  how to take this  when to take this  Used for: S/P CABG (coronary artery bypass graft)      Dose: 80 mg  Take 1 tablet (80 mg) by mouth or Feeding Tube every evening.  Quantity: 90 tablet  Refills: 3            CONTINUE these medicines which have NOT CHANGED        Dose / Directions   albuterol 108 (90 Base) MCG/ACT inhaler  Commonly known as: PROAIR HFA/PROVENTIL HFA/VENTOLIN HFA      Dose: 2 puff  Inhale 2 puffs into the lungs every 6 hours as needed  Refills: 0     Arnuity Ellipta 100 MCG/ACT inhaler  Generic drug: fluticasone      Dose: 1 puff  Inhale 1 puff into the lungs daily.  Refills: 0     benzonatate 100 MG capsule  Commonly known as: TESSALON      Dose: 100 mg  Take 100 mg by mouth 3 times  daily as needed for cough.  Refills: 0     citalopram 20 MG tablet  Commonly known as: celeXA      Dose: 20 mg  [CITALOPRAM (CELEXA) 20 MG TABLET] Take 20 mg by mouth every morning.  Refills: 0     empagliflozin 25 MG Tabs tablet  Commonly known as: JARDIANCE  Used for: Type 2 diabetes mellitus with diabetic microalbuminuria, without long-term current use of insulin (H)      Dose: 25 mg  Take 1 tablet (25 mg) by mouth daily.  Quantity: 90 tablet  Refills: 1     magnesium 250 MG tablet      Dose: 2 tablet  Take 2 tablets by mouth daily.  Refills: 0     metFORMIN 500 MG 24 hr tablet  Commonly known as: GLUCOPHAGE XR  Used for: Type 2 diabetes mellitus with diabetic microalbuminuria, without long-term current use of insulin (H)      Dose: 1,000 mg  Take 2 tablets (1,000 mg) by mouth daily (with breakfast). Changed on 6/21/24, new prescription not yet sent  Quantity: 180 tablet  Refills: 1     * pramipexole 0.125 MG tablet  Commonly known as: MIRAPEX      Dose: 0.125 mg  Take 0.125 mg by mouth daily. At 3pm  Refills: 3     * pramipexole 0.5 MG tablet  Commonly known as: MIRAPEX      Dose: 0.5 mg  Take 0.5 mg by mouth at bedtime.  Refills: 0     tirzepatide 15 MG/0.5ML pen  Commonly known as: MOUNJARO  Used for: Type 2 diabetes mellitus with diabetic microalbuminuria, without long-term current use of insulin (H)      Dose: 15 mg  Inject 15 mg subcutaneously every 7 days.  Quantity: 6 mL  Refills: 1           * This list has 2 medication(s) that are the same as other medications prescribed for you. Read the directions carefully, and ask your doctor or other care provider to review them with you.                STOP taking      amLODIPine 5 MG tablet  Commonly known as: NORVASC                  Where to get your medicines        Information about where to get these medications is not yet available    Ask your nurse or doctor about these medications  acetaminophen 325 MG tablet  aspirin 81 MG chewable tablet  atorvastatin  "80 MG tablet  hydrOXYzine HCl 10 MG tablet  polyethylene glycol 17 GM/Dose powder  tamsulosin 0.4 MG capsule           Discharge Instructions:    Follow up appointment with Primary Care Physician: Saúl Hunt within 7 days of discharge.  Follow up appointment with Specialist:    Follow with CV Surgery as scheduled.    Follow-up with cardiology as scheduled    Diet: Cardiac    Activity/Restrictions: As tolerated with sternal precautions in mind (see below). No driving for 4 weeks or while on pain medication.     - Shower and wash your incisions daily with soap and water. No tub baths/hot tubs for 4 weeks. An antibacterial soap such as Dial or Safeguard is recommended.    - Check your incisions every day. If you notice any redness, drainage, or anything unusual, please call the surgeons office.    - No driving for 4 weeks after surgery or while on pain medication     - Do not lift anything more than 10 pounds for 8 weeks after surgery. After 8 weeks, advance lifting gradually as tolerated.    - You may have watery drainage from your chest tube site for 2-3 weeks after surgery. Your may cover with a Band-Aid to protect your clothing. Remove the Band-Aid every day and wash the site.    - If you have a leg lesion, you may have swelling for 2-3 months. Elevate your leg any time you are not walking.    - If you feel any \"popping\" or \"clicking\" sensations in your chest, your arms are out too far or you are putting too much weight into arm movements. Do not reach over your head or out to the side to pull something. Do not do any arm exercises or use any exercise equipment that involves arm movement. If you feel your sternum moving, call the surgeon's office.    - Increase your daily activity as explained by Cardiac Rehab. You are encouraged to enroll in an Outpatient Cardiac Rehab Program.    - No active sports using your upper arms for 3 months. This includes fishing, hunting, bowling, swimming, tennis or golf.    - " "No physical activity such as cutting the grass, raking, vacuuming, changing sheets on your bed, snow shoveling, or using a  for 3 months.    - Use incentive spirometer 6-8 times per day for 2 weeks.       Hospital Summary:   Gill Mendez is a 64 year old female who was admitted to [facility] on 11/23/2024 for chest pain and found to have NSTEMI. She was referred to CV surgery for evaluation for possible surgical revascularization.     Patient was deemed an appropriate candidate for CABG, and was taken to the operating room on 11/24/2024 urgently after having recurrent chest pain and IABP placement where patient underwent triple vessel bypass grafting and endoscopic great saphenous vein harvest from the left lower extremity. Surgery was uneventful and patient was brought to the ICU post-operatively. She had her IABP removed and was extubated on POD#1 and weaned from pressors. Patient was awake and alert, afebrile, and with stable vitals. Insulin drip was discontinued and she was transitioned to a sliding scale. She was transferred to general telemetry status on POD#4 where patient has had return of bowel function, is maintaining oxygen saturations on room air, had their chest tubes removed, and has no complaints of chest pain or shortness of breath. On 12/***/24 (POD#***) patient was stable enough to be discharged to home.    Of note:  - Repeat echo on 12/1 demonstrated EF improved to 35-40%. Cardiology was reconsulted for GDMT prior to discharge and she has heart failure follow-up in place***.  - Patient {preopwt:700089}, so {dcdiuresis:592787} w/ additional diuresis and K+ supplementation. Patient should call clinic if they gain > 3lbs in one day or > 5lbs in one week.    ***message Dianna  Vital signs:  /57 (BP Location: Right arm)   Pulse 79   Temp 98.6  F (37  C) (Oral)   Resp 20   Ht 1.651 m (5' 5\")   Wt 73.5 kg (162 lb)   SpO2 94%   BMI 26.96 kg/m     162 lbs 0 oz   Body mass " index is 26.96 kg/m .       Physical Exam:    Pertinent exam findings on day of discharge include:  Gen: Seen up in chair. NAD. Pleasant and conversant.   CV: RRR on monitor. Mild edema.  Pulm: Non-labored breathing on room air.  Abd: Soft, non-tender, non-distended  Neuro: CNs grossly intact  Inc: C/D/I      ***     "exercise.      Vital signs:  /57 (BP Location: Right arm)   Pulse 86   Temp 98  F (36.7  C) (Oral)   Resp 20   Ht 1.615 m (5' 3.58\")   Wt 71.5 kg (157 lb 9.6 oz)   SpO2 98%   BMI 27.41 kg/m     157 lbs 9.6 oz   Body mass index is 27.41 kg/m .       Physical Exam:    Pertinent exam findings on day of discharge include:  Gen: Seen up in chair. NAD. Pleasant and conversant.   CV: RRR on monitor. Mild edema.  Pulm: Non-labored breathing on room air.  Abd: Soft, non-tender, non-distended  Neuro: CNs grossly intact  Inc: C/D/I      Amy Mitchell PA-C  [facility] Cardiothoracic Surgery  Vocera or Secure Chat  December 6, 2024      "

## 2024-12-02 NOTE — PLAN OF CARE
Problem: Chest Pain  Goal: Resolution of Chest Pain Symptoms  Outcome: Progressing     Problem: Cardiovascular Surgery  Goal: Improved Activity Tolerance  Outcome: Progressing  Intervention: Optimize Tolerance for Activity  Recent Flowsheet Documentation  Taken 12/2/2024 0440 by Concepción Mireles RN  Environmental Support: calm environment promoted  Taken 12/2/2024 0015 by Concepción Mireles RN  Environmental Support: calm environment promoted  Taken 12/1/2024 2008 by Concepción Mireles RN  Environmental Support: calm environment promoted  Goal: Optimal Coping with Heart Surgery  Outcome: Progressing     Problem: Gas Exchange Impaired  Goal: Optimal Gas Exchange  Outcome: Progressing  Intervention: Optimize Oxygenation and Ventilation     Goal Outcome Evaluation:  Pt endorses constant left side/ medial incisional pain, rated 4-5, tylenol given x2. Pt got dizzy while in chair, bp was checked and was 87/51, 15 minutes after rest, BP improved. BG check 196 & 119.     K+, Mg, Phos protocol ran, recheck tomorrow AM.

## 2024-12-03 ENCOUNTER — APPOINTMENT (OUTPATIENT)
Dept: OCCUPATIONAL THERAPY | Facility: HOSPITAL | Age: 64
DRG: 233 | End: 2024-12-03
Attending: INTERNAL MEDICINE
Payer: COMMERCIAL

## 2024-12-03 ENCOUNTER — APPOINTMENT (OUTPATIENT)
Dept: CT IMAGING | Facility: HOSPITAL | Age: 64
DRG: 233 | End: 2024-12-03
Attending: INTERNAL MEDICINE
Payer: COMMERCIAL

## 2024-12-03 LAB
ABO/RH(D): NORMAL
ANION GAP SERPL CALCULATED.3IONS-SCNC: 9 MMOL/L (ref 7–15)
ANTIBODY SCREEN: NEGATIVE
BLD PROD TYP BPU: NORMAL
BLOOD COMPONENT TYPE: NORMAL
BUN SERPL-MCNC: 14.7 MG/DL (ref 8–23)
CALCIUM SERPL-MCNC: 8.6 MG/DL (ref 8.8–10.4)
CHLORIDE SERPL-SCNC: 96 MMOL/L (ref 98–107)
CODING SYSTEM: NORMAL
CORTIS SERPL-MCNC: 9.3 UG/DL
CREAT SERPL-MCNC: 0.95 MG/DL (ref 0.51–0.95)
CROSSMATCH: NORMAL
EGFRCR SERPLBLD CKD-EPI 2021: 67 ML/MIN/1.73M2
ERYTHROCYTE [DISTWIDTH] IN BLOOD BY AUTOMATED COUNT: 17.2 % (ref 10–15)
GLUCOSE BLDC GLUCOMTR-MCNC: 129 MG/DL (ref 70–99)
GLUCOSE BLDC GLUCOMTR-MCNC: 134 MG/DL (ref 70–99)
GLUCOSE BLDC GLUCOMTR-MCNC: 207 MG/DL (ref 70–99)
GLUCOSE BLDC GLUCOMTR-MCNC: 218 MG/DL (ref 70–99)
GLUCOSE SERPL-MCNC: 120 MG/DL (ref 70–99)
HCO3 SERPL-SCNC: 33 MMOL/L (ref 22–29)
HCT VFR BLD AUTO: 22.9 % (ref 35–47)
HGB BLD-MCNC: 7.1 G/DL (ref 11.7–15.7)
HGB BLD-MCNC: 7.3 G/DL (ref 11.7–15.7)
ISSUE DATE AND TIME: NORMAL
MAGNESIUM SERPL-MCNC: 2.2 MG/DL (ref 1.7–2.3)
MCH RBC QN AUTO: 25.5 PG (ref 26.5–33)
MCHC RBC AUTO-ENTMCNC: 31 G/DL (ref 31.5–36.5)
MCV RBC AUTO: 82 FL (ref 78–100)
PHOSPHATE SERPL-MCNC: 3.2 MG/DL (ref 2.5–4.5)
PLATELET # BLD AUTO: 301 10E3/UL (ref 150–450)
POTASSIUM SERPL-SCNC: 4.2 MMOL/L (ref 3.4–5.3)
POTASSIUM SERPL-SCNC: 4.2 MMOL/L (ref 3.4–5.3)
RBC # BLD AUTO: 2.78 10E6/UL (ref 3.8–5.2)
SODIUM SERPL-SCNC: 138 MMOL/L (ref 135–145)
SPECIMEN EXPIRATION DATE: NORMAL
UNIT ABO/RH: NORMAL
UNIT NUMBER: NORMAL
UNIT STATUS: NORMAL
UNIT TYPE ISBT: 9500
WBC # BLD AUTO: 9.7 10E3/UL (ref 4–11)

## 2024-12-03 PROCEDURE — 99233 SBSQ HOSP IP/OBS HIGH 50: CPT | Performed by: INTERNAL MEDICINE

## 2024-12-03 PROCEDURE — 86923 COMPATIBILITY TEST ELECTRIC: CPT | Performed by: INTERNAL MEDICINE

## 2024-12-03 PROCEDURE — 83735 ASSAY OF MAGNESIUM: CPT | Performed by: INTERNAL MEDICINE

## 2024-12-03 PROCEDURE — 94640 AIRWAY INHALATION TREATMENT: CPT

## 2024-12-03 PROCEDURE — 85018 HEMOGLOBIN: CPT

## 2024-12-03 PROCEDURE — 74176 CT ABD & PELVIS W/O CONTRAST: CPT

## 2024-12-03 PROCEDURE — 250N000013 HC RX MED GY IP 250 OP 250 PS 637: Performed by: INTERNAL MEDICINE

## 2024-12-03 PROCEDURE — 250N000011 HC RX IP 250 OP 636: Performed by: SURGERY

## 2024-12-03 PROCEDURE — 80048 BASIC METABOLIC PNL TOTAL CA: CPT | Performed by: INTERNAL MEDICINE

## 2024-12-03 PROCEDURE — 36415 COLL VENOUS BLD VENIPUNCTURE: CPT

## 2024-12-03 PROCEDURE — 97535 SELF CARE MNGMENT TRAINING: CPT | Mod: GO

## 2024-12-03 PROCEDURE — 84100 ASSAY OF PHOSPHORUS: CPT | Performed by: INTERNAL MEDICINE

## 2024-12-03 PROCEDURE — 85048 AUTOMATED LEUKOCYTE COUNT: CPT

## 2024-12-03 PROCEDURE — 82533 TOTAL CORTISOL: CPT | Performed by: INTERNAL MEDICINE

## 2024-12-03 PROCEDURE — 210N000001 HC R&B IMCU HEART CARE

## 2024-12-03 PROCEDURE — 94799 UNLISTED PULMONARY SVC/PX: CPT

## 2024-12-03 PROCEDURE — 250N000013 HC RX MED GY IP 250 OP 250 PS 637

## 2024-12-03 PROCEDURE — 250N000013 HC RX MED GY IP 250 OP 250 PS 637: Performed by: SURGERY

## 2024-12-03 PROCEDURE — 250N000009 HC RX 250: Performed by: SURGERY

## 2024-12-03 PROCEDURE — 94640 AIRWAY INHALATION TREATMENT: CPT | Mod: 76

## 2024-12-03 PROCEDURE — 250N000011 HC RX IP 250 OP 636: Performed by: INTERNAL MEDICINE

## 2024-12-03 PROCEDURE — 250N000011 HC RX IP 250 OP 636: Mod: JZ | Performed by: INTERNAL MEDICINE

## 2024-12-03 PROCEDURE — 86900 BLOOD TYPING SEROLOGIC ABO: CPT | Performed by: INTERNAL MEDICINE

## 2024-12-03 PROCEDURE — 999N000157 HC STATISTIC RCP TIME EA 10 MIN

## 2024-12-03 PROCEDURE — P9047 ALBUMIN (HUMAN), 25%, 50ML: HCPCS | Mod: JZ | Performed by: INTERNAL MEDICINE

## 2024-12-03 PROCEDURE — 86850 RBC ANTIBODY SCREEN: CPT | Performed by: INTERNAL MEDICINE

## 2024-12-03 PROCEDURE — 99232 SBSQ HOSP IP/OBS MODERATE 35: CPT | Performed by: INTERNAL MEDICINE

## 2024-12-03 PROCEDURE — 97110 THERAPEUTIC EXERCISES: CPT | Mod: GO

## 2024-12-03 RX ORDER — ALBUMIN (HUMAN) 12.5 G/50ML
25 SOLUTION INTRAVENOUS ONCE
Status: COMPLETED | OUTPATIENT
Start: 2024-12-03 | End: 2024-12-03

## 2024-12-03 RX ORDER — LORAZEPAM 2 MG/ML
0.5 INJECTION INTRAMUSCULAR
Status: COMPLETED | OUTPATIENT
Start: 2024-12-03 | End: 2024-12-03

## 2024-12-03 RX ADMIN — IPRATROPIUM BROMIDE AND ALBUTEROL SULFATE 3 ML: .5; 3 SOLUTION RESPIRATORY (INHALATION) at 11:32

## 2024-12-03 RX ADMIN — OXYCODONE HYDROCHLORIDE 2.5 MG: 5 TABLET ORAL at 00:16

## 2024-12-03 RX ADMIN — ALBUMIN HUMAN 25 G: 0.25 SOLUTION INTRAVENOUS at 07:01

## 2024-12-03 RX ADMIN — LORAZEPAM 0.5 MG: 2 INJECTION INTRAMUSCULAR; INTRAVENOUS at 17:43

## 2024-12-03 RX ADMIN — EMPAGLIFLOZIN 25 MG: 25 TABLET, FILM COATED ORAL at 08:20

## 2024-12-03 RX ADMIN — HEPARIN SODIUM 5000 UNITS: 5000 INJECTION, SOLUTION INTRAVENOUS; SUBCUTANEOUS at 21:09

## 2024-12-03 RX ADMIN — METFORMIN ER 500 MG 1000 MG: 500 TABLET ORAL at 08:19

## 2024-12-03 RX ADMIN — SENNOSIDES AND DOCUSATE SODIUM 1 TABLET: 8.6; 5 TABLET ORAL at 21:09

## 2024-12-03 RX ADMIN — PRAMIPEXOLE DIHYDROCHLORIDE 0.12 MG: 0.12 TABLET ORAL at 14:38

## 2024-12-03 RX ADMIN — ACETAMINOPHEN 650 MG: 325 TABLET ORAL at 03:48

## 2024-12-03 RX ADMIN — IPRATROPIUM BROMIDE AND ALBUTEROL SULFATE 3 ML: .5; 3 SOLUTION RESPIRATORY (INHALATION) at 15:56

## 2024-12-03 RX ADMIN — IPRATROPIUM BROMIDE AND ALBUTEROL SULFATE 3 ML: .5; 3 SOLUTION RESPIRATORY (INHALATION) at 19:26

## 2024-12-03 RX ADMIN — ACETAMINOPHEN 650 MG: 325 TABLET ORAL at 16:23

## 2024-12-03 RX ADMIN — METOPROLOL SUCCINATE 25 MG: 25 TABLET, EXTENDED RELEASE ORAL at 08:18

## 2024-12-03 RX ADMIN — OXYCODONE HYDROCHLORIDE 2.5 MG: 5 TABLET ORAL at 23:55

## 2024-12-03 RX ADMIN — OXYCODONE HYDROCHLORIDE 2.5 MG: 5 TABLET ORAL at 16:23

## 2024-12-03 RX ADMIN — ACETAMINOPHEN 650 MG: 325 TABLET ORAL at 21:09

## 2024-12-03 RX ADMIN — IPRATROPIUM BROMIDE AND ALBUTEROL SULFATE 3 ML: .5; 3 SOLUTION RESPIRATORY (INHALATION) at 07:56

## 2024-12-03 RX ADMIN — ATORVASTATIN CALCIUM 80 MG: 40 TABLET, FILM COATED ORAL at 21:09

## 2024-12-03 RX ADMIN — ASPIRIN 81 MG CHEWABLE TABLET 162 MG: 81 TABLET CHEWABLE at 08:19

## 2024-12-03 RX ADMIN — PRAMIPEXOLE DIHYDROCHLORIDE 0.5 MG: 0.5 TABLET ORAL at 21:09

## 2024-12-03 RX ADMIN — CITALOPRAM HYDROBROMIDE 20 MG: 20 TABLET ORAL at 08:18

## 2024-12-03 RX ADMIN — HEPARIN SODIUM 5000 UNITS: 5000 INJECTION, SOLUTION INTRAVENOUS; SUBCUTANEOUS at 11:54

## 2024-12-03 RX ADMIN — GUAIFENESIN 600 MG: 600 TABLET ORAL at 21:09

## 2024-12-03 RX ADMIN — INSULIN ASPART 2 UNITS: 100 INJECTION, SOLUTION INTRAVENOUS; SUBCUTANEOUS at 11:53

## 2024-12-03 RX ADMIN — GUAIFENESIN 600 MG: 600 TABLET ORAL at 08:18

## 2024-12-03 RX ADMIN — HEPARIN SODIUM 5000 UNITS: 5000 INJECTION, SOLUTION INTRAVENOUS; SUBCUTANEOUS at 03:48

## 2024-12-03 ASSESSMENT — ACTIVITIES OF DAILY LIVING (ADL)
ADLS_ACUITY_SCORE: 44
ADLS_ACUITY_SCORE: 45
ADLS_ACUITY_SCORE: 44

## 2024-12-03 NOTE — PLAN OF CARE
"  Problem: Adult Inpatient Plan of Care  Goal: Plan of Care Review  Description: The Plan of Care Review/Shift note should be completed every shift.  The Outcome Evaluation is a brief statement about your assessment that the patient is improving, declining, or no change.  This information will be displayed automatically on your shift  note.  Outcome: Progressing  Goal: Patient-Specific Goal (Individualized)  Description: You can add care plan individualizations to a care plan. Examples of Individualization might be:  \"Parent requests to be called daily at 9am for status\", \"I have a hard time hearing out of my right ear\", or \"Do not touch me to wake me up as it startles  me\".  Outcome: Progressing  Goal: Absence of Hospital-Acquired Illness or Injury  Outcome: Progressing  Intervention: Identify and Manage Fall Risk  Recent Flowsheet Documentation  Taken 12/3/2024 0315 by Josie Peoples RN  Safety Promotion/Fall Prevention:   activity supervised   treat underlying cause   toileting scheduled   safety round/check completed   room organization consistent   room door open   patient and family education   nonskid shoes/slippers when out of bed   lighting adjusted   clutter free environment maintained  Taken 12/3/2024 0002 by Josie Peoples RN  Safety Promotion/Fall Prevention:   activity supervised   treat underlying cause   toileting scheduled   safety round/check completed   room organization consistent   room door open   patient and family education   nonskid shoes/slippers when out of bed   lighting adjusted   clutter free environment maintained  Intervention: Prevent Skin Injury  Recent Flowsheet Documentation  Taken 12/3/2024 0315 by Josie Peoples RN  Body Position: position changed independently  Taken 12/3/2024 0002 by Josie Peoples RN  Body Position: position changed independently  Intervention: Prevent Infection  Recent Flowsheet Documentation  Taken 12/3/2024 0315 by Josie Peoples, " RN  Infection Prevention:   visitors restricted/screened   single patient room provided   personal protective equipment utilized   rest/sleep promoted   hand hygiene promoted   equipment surfaces disinfected   environmental surveillance performed  Taken 12/3/2024 0002 by Josie Peoples RN  Infection Prevention:   visitors restricted/screened   single patient room provided   personal protective equipment utilized   rest/sleep promoted   hand hygiene promoted   equipment surfaces disinfected   environmental surveillance performed  Goal: Optimal Comfort and Wellbeing  Outcome: Progressing  Intervention: Monitor Pain and Promote Comfort  Recent Flowsheet Documentation  Taken 12/3/2024 0348 by Josie Peoples RN  Pain Management Interventions: medication (see MAR)  Taken 12/3/2024 0016 by Josie Peoples RN  Pain Management Interventions: medication (see MAR)  Intervention: Provide Person-Centered Care  Recent Flowsheet Documentation  Taken 12/3/2024 0315 by Josie Peoples RN  Trust Relationship/Rapport: care explained  Taken 12/3/2024 0002 by Josie Peoples RN  Trust Relationship/Rapport: care explained  Goal: Readiness for Transition of Care  Outcome: Progressing     Problem: Risk for Delirium  Goal: Optimal Coping  Outcome: Progressing  Intervention: Optimize Psychosocial Adjustment to Delirium  Recent Flowsheet Documentation  Taken 12/3/2024 0315 by Josie Peoples RN  Supportive Measures:   goal-setting facilitated   verbalization of feelings encouraged   active listening utilized  Family/Support System Care: support provided  Taken 12/3/2024 0002 by Josie Peoples RN  Supportive Measures:   goal-setting facilitated   verbalization of feelings encouraged   active listening utilized  Family/Support System Care: support provided  Goal: Improved Behavioral Control  Outcome: Progressing  Intervention: Prevent and Manage Agitation  Recent Flowsheet Documentation  Taken 12/3/2024 0315 by Shelton  ROBERT Galindo  Environment Familiarity/Consistency: daily routine followed  Taken 12/3/2024 0002 by Josie Peoples RN  Environment Familiarity/Consistency: daily routine followed  Intervention: Minimize Safety Risk  Recent Flowsheet Documentation  Taken 12/3/2024 0315 by Josie Peoples RN  Enhanced Safety Measures:   room near unit station   review medications for side effects with activity   patient/family teach back on injury risk   pain management  Trust Relationship/Rapport: care explained  Taken 12/3/2024 0002 by Josie Peoples RN  Enhanced Safety Measures:   room near unit station   review medications for side effects with activity   patient/family teach back on injury risk   pain management  Trust Relationship/Rapport: care explained  Goal: Improved Attention and Thought Clarity  Outcome: Progressing  Intervention: Maximize Cognitive Function  Recent Flowsheet Documentation  Taken 12/3/2024 0315 by Josie Peoples RN  Sensory Stimulation Regulation:   lighting decreased   quiet environment promoted  Reorientation Measures: clock in view  Taken 12/3/2024 0002 by Josie Peoples RN  Sensory Stimulation Regulation:   lighting decreased   quiet environment promoted  Reorientation Measures: clock in view  Goal: Improved Sleep  Outcome: Progressing  Intervention: Promote Sleep  Recent Flowsheet Documentation  Taken 12/3/2024 0315 by Josie Peoples RN  Sleep/Rest Enhancement:   room darkened   comfort measures  Taken 12/3/2024 0002 by Josie Peoples RN  Sleep/Rest Enhancement:   room darkened   comfort measures     Problem: Comorbidity Management  Goal: Maintenance of Asthma Control  Outcome: Progressing  Intervention: Maintain Asthma Symptom Control  Recent Flowsheet Documentation  Taken 12/3/2024 0315 by Josie Peoples RN  Medication Review/Management: medications reviewed  Taken 12/3/2024 0002 by Josie Peoples RN  Medication Review/Management: medications reviewed  Goal: Blood  Glucose Levels Within Targeted Range  Outcome: Progressing  Intervention: Monitor and Manage Glycemia  Recent Flowsheet Documentation  Taken 12/3/2024 0315 by Josie Peoples RN  Medication Review/Management: medications reviewed  Taken 12/3/2024 0002 by Josie Peoples RN  Medication Review/Management: medications reviewed  Goal: Maintenance of Heart Failure Symptom Control  Outcome: Progressing  Intervention: Maintain Heart Failure Management  Recent Flowsheet Documentation  Taken 12/3/2024 0315 by Josie Peoples RN  Medication Review/Management: medications reviewed  Taken 12/3/2024 0002 by Josie Peoples RN  Medication Review/Management: medications reviewed  Goal: Blood Pressure in Desired Range  Outcome: Progressing  Intervention: Maintain Blood Pressure Management  Recent Flowsheet Documentation  Taken 12/3/2024 0315 by Josie Peoples RN  Medication Review/Management: medications reviewed  Taken 12/3/2024 0002 by Josie Peoples RN  Medication Review/Management: medications reviewed     Problem: Chest Pain  Goal: Resolution of Chest Pain Symptoms  Outcome: Progressing     Problem: Cardiovascular Surgery  Goal: Improved Activity Tolerance  Outcome: Progressing  Intervention: Optimize Tolerance for Activity  Recent Flowsheet Documentation  Taken 12/3/2024 0315 by Josie Peoples RN  Environmental Support:   calm environment promoted   distractions minimized   personal routine supported  Taken 12/3/2024 0002 by Josie Peoples RN  Environmental Support:   calm environment promoted   distractions minimized   personal routine supported  Goal: Optimal Coping with Heart Surgery  Outcome: Progressing  Intervention: Support Psychosocial Response to Surgery  Recent Flowsheet Documentation  Taken 12/3/2024 0315 by Josie Peoples RN  Supportive Measures:   goal-setting facilitated   verbalization of feelings encouraged   active listening utilized  Family/Support System Care: support  provided  Taken 12/3/2024 0002 by Josie Peoples RN  Supportive Measures:   goal-setting facilitated   verbalization of feelings encouraged   active listening utilized  Family/Support System Care: support provided  Goal: Absence of Bleeding  Outcome: Progressing  Goal: Effective Bowel Elimination  Outcome: Progressing  Intervention: Enhance Bowel Motility and Elimination  Recent Flowsheet Documentation  Taken 12/3/2024 0315 by Josie Peoples RN  Bowel Motility Enhancement: fluid intake encouraged  Bowel Elimination Management: toileting offered  Taken 12/3/2024 0002 by Josie Peoples RN  Bowel Motility Enhancement: fluid intake encouraged  Bowel Elimination Management: toileting offered  Goal: Effective Cardiac Function  Outcome: Progressing  Goal: Optimal Cerebral Tissue Perfusion  Outcome: Progressing  Intervention: Protect and Optimize Cerebral Perfusion  Recent Flowsheet Documentation  Taken 12/3/2024 0315 by Josie Peoples RN  Sensory Stimulation Regulation:   lighting decreased   quiet environment promoted  Taken 12/3/2024 0002 by Josie Peoples RN  Sensory Stimulation Regulation:   lighting decreased   quiet environment promoted  Goal: Fluid and Electrolyte Balance  Outcome: Progressing  Intervention: Monitor and Manage Fluid and Electrolyte Balance  Recent Flowsheet Documentation  Taken 12/3/2024 0315 by Josie Peoples RN  Fluid/Electrolyte Management: fluids provided  Taken 12/3/2024 0002 by Josie Peoples RN  Fluid/Electrolyte Management: fluids provided  Goal: Blood Glucose Level Within Targeted Range  Outcome: Progressing  Goal: Absence of Infection Signs and Symptoms  Outcome: Progressing  Intervention: Prevent or Manage Infection  Recent Flowsheet Documentation  Taken 12/3/2024 0315 by Josie Peoples RN  Infection Prevention:   visitors restricted/screened   single patient room provided   personal protective equipment utilized   rest/sleep promoted   hand hygiene  promoted   equipment surfaces disinfected   environmental surveillance performed  Taken 12/3/2024 0002 by Josie Peoples, RN  Infection Prevention:   visitors restricted/screened   single patient room provided   personal protective equipment utilized   rest/sleep promoted   hand hygiene promoted   equipment surfaces disinfected   environmental surveillance performed  Goal: Anesthesia/Sedation Recovery  Outcome: Progressing  Intervention: Optimize Anesthesia Recovery  Recent Flowsheet Documentation  Taken 12/3/2024 0315 by Josie Peoples, RN  Safety Promotion/Fall Prevention:   activity supervised   treat underlying cause   toileting scheduled   safety round/check completed   room organization consistent   room door open   patient and family education   nonskid shoes/slippers when out of bed   lighting adjusted   clutter free environment maintained  Reorientation Measures: clock in view  Taken 12/3/2024 0002 by Josie Peoples, RN  Safety Promotion/Fall Prevention:   activity supervised   treat underlying cause   toileting scheduled   safety round/check completed   room organization consistent   room door open   patient and family education   nonskid shoes/slippers when out of bed   lighting adjusted   clutter free environment maintained  Reorientation Measures: clock in view  Goal: Acceptable Pain Control  Outcome: Progressing  Intervention: Prevent or Manage Pain  Recent Flowsheet Documentation  Taken 12/3/2024 0348 by Josie Peoples, RN  Pain Management Interventions: medication (see MAR)  Taken 12/3/2024 0016 by Josie Peoples, RN  Pain Management Interventions: medication (see MAR)  Goal: Nausea and Vomiting Relief  Outcome: Progressing  Goal: Effective Urinary Elimination  Outcome: Progressing  Goal: Effective Oxygenation and Ventilation  Outcome: Progressing  Intervention: Promote Airway Secretion Clearance  Recent Flowsheet Documentation  Taken 12/3/2024 0315 by Josie Peoples, RN  Cough And  Deep Breathing: done independently per patient  Taken 12/3/2024 0002 by Josie Peoples RN  Cough And Deep Breathing: done independently per patient     Problem: Fall Injury Risk  Goal: Absence of Fall and Fall-Related Injury  Outcome: Progressing  Intervention: Identify and Manage Contributors  Recent Flowsheet Documentation  Taken 12/3/2024 0315 by Josie Peoples RN  Medication Review/Management: medications reviewed  Taken 12/3/2024 0002 by Josie Peoples RN  Medication Review/Management: medications reviewed  Intervention: Promote Injury-Free Environment  Recent Flowsheet Documentation  Taken 12/3/2024 0315 by Josie Peoples RN  Safety Promotion/Fall Prevention:   activity supervised   treat underlying cause   toileting scheduled   safety round/check completed   room organization consistent   room door open   patient and family education   nonskid shoes/slippers when out of bed   lighting adjusted   clutter free environment maintained  Taken 12/3/2024 0002 by Josie Peoples RN  Safety Promotion/Fall Prevention:   activity supervised   treat underlying cause   toileting scheduled   safety round/check completed   room organization consistent   room door open   patient and family education   nonskid shoes/slippers when out of bed   lighting adjusted   clutter free environment maintained     Problem: Infection  Goal: Absence of Infection Signs and Symptoms  Outcome: Progressing  Intervention: Prevent or Manage Infection  Recent Flowsheet Documentation  Taken 12/3/2024 0315 by Josie Peoples RN  Infection Management: aseptic technique maintained  Taken 12/3/2024 0002 by Josie Peoples RN  Infection Management: aseptic technique maintained     Problem: Pain Acute  Goal: Optimal Pain Control and Function  Outcome: Progressing  Intervention: Optimize Psychosocial Wellbeing  Recent Flowsheet Documentation  Taken 12/3/2024 0315 by Josie Peoples RN  Supportive Measures:   goal-setting  facilitated   verbalization of feelings encouraged   active listening utilized  Taken 12/3/2024 0002 by Josie Peoples RN  Supportive Measures:   goal-setting facilitated   verbalization of feelings encouraged   active listening utilized  Intervention: Develop Pain Management Plan  Recent Flowsheet Documentation  Taken 12/3/2024 0348 by Josie Peoples RN  Pain Management Interventions: medication (see MAR)  Taken 12/3/2024 0016 by Josie Peoples RN  Pain Management Interventions: medication (see MAR)  Intervention: Prevent or Manage Pain  Recent Flowsheet Documentation  Taken 12/3/2024 0315 by Josie Peoples RN  Sensory Stimulation Regulation:   lighting decreased   quiet environment promoted  Sleep/Rest Enhancement:   room darkened   comfort measures  Bowel Elimination Promotion:   adequate fluid intake promoted   ambulation promoted   commode/bedpan at bedside  Medication Review/Management: medications reviewed  Taken 12/3/2024 0002 by Josie Peoples RN  Sensory Stimulation Regulation:   lighting decreased   quiet environment promoted  Sleep/Rest Enhancement:   room darkened   comfort measures  Bowel Elimination Promotion:   adequate fluid intake promoted   ambulation promoted   commode/bedpan at bedside  Medication Review/Management: medications reviewed     Problem: Gas Exchange Impaired  Goal: Optimal Gas Exchange  Outcome: Progressing   Goal Outcome Evaluation:

## 2024-12-03 NOTE — PLAN OF CARE
sc  Patient Name: Gill Mendez   MRN: [id number]   Date of Admission: 2024    Procedure: Procedure(s):  CORONARY ARTERY BYPASS GRAFT TIMES THREE, LEFT INTERNAL MAMMARY ARTERY HARVEST, LEFT LEG ENDOSCOPIC VESSEL PROCUREMENT,  ECHOCARDIOGRAM, TRANSESOPHAGEAL, INTRA-OPERATIVE    Post Op day #:9    Subjective (Patient focus/Primary Problem for shift): ambulation          Pain Goal 0 Pain Rating 0           Pain Medication/ Regime effective to reduce patient pain PRN tyl    Objective (Physical assessment):           Rhythm: normal sinus rhythm            Bowel Activity: yes if Yes indicate when: 12/3          Bowel Medications: no            Incision: healing well          Incentive Spirometry Q 1-2 hour when awake:  yes Volume: 750          Epicardial Pacing Wires:  no            Patient Activity:           Up to chair for meals: yes          Ambulation with RN x2 (Not including CR): yes           Shower Daily {YES/NO/NA:[id number]          Nasal  Nozin BID AM/PM x 10 days: yes              Chest Tubes   Pleural: no Draining: not applicable               Suction: not applicable              Mediastinal: no Draining: not applicable               Suction: not applicable   Dressing Change Daily:no If No, why? shower                     Urinary Catheter: no           Preventative WOC consult (need MD order): no  Problem: Adult Inpatient Plan of Care  Goal: Readiness for Transition of Care  Outcome: Progressing     Problem: Comorbidity Management  Goal: Blood Pressure in Desired Range  Outcome: Progressing   Goal Outcome Evaluation:      Plan of Care Reviewed With: patient    Overall Patient Progress: improvingOverall Patient Progress: improving       Pt is A&Ox4, on RA from 2L NC, NSR, and SBA with heart pillow. Mg, K, Ph protocol, recheck in the AM. 1800 ml fluid restriction in place. Denies pain. Stool sample needed, no BM this shift.     Plan for CT later today.     B, 207  Malena Ellington RN

## 2024-12-03 NOTE — PROGRESS NOTES
sc  Patient Name: Gill Mendez   MRN: [id number]   Date of Admission: 11/23/2024    Procedure: Procedure(s):  CORONARY ARTERY BYPASS GRAFT TIMES THREE, LEFT INTERNAL MAMMARY ARTERY HARVEST, LEFT LEG ENDOSCOPIC VESSEL PROCUREMENT,  ECHOCARDIOGRAM, TRANSESOPHAGEAL, INTRA-OPERATIVE    Post Op day #:9    Subjective (Patient focus/Primary Problem for shift): sleep/pain management.          Pain Goal0 Pain Rating5           Pain Medication/ Regime effective to reduce patient painOxycodone 2.5 mg's and Tylenol 650    Objective (Physical assessment):           Rhythm: normal sinus rhythm            Bowel Activity: yes if Yes indicate when: 12/2/24          Bowel Medications: yes            Incision: healing well          Incentive Spirometry Q 1-2 hour when awake:  yes Volume: 800          Epicardial Pacing Wires:  not applicable            Patient Activity:           Up to chair for meals: yes          Ambulation with RN x2 (Not including CR): yes           Shower Daily {YES/NO/NA:[id number]          Nasal  Nozin BID AM/PM x 10 days: yes              Chest Tubes   Pleural: not applicable Draining: not applicable               Suction: not applicable              Mediastinal: not applicable Draining: not applicable               Suction: not applicable   Dressing Change Daily:not applicable If No, why?na                       Urinary Catheter: not applicable           Preventative WOC consult (need MD order): no       Assessment (Nursing primary shift focus): sleep/pain management      Plan (Patient Care Plan/focus): abdominal pain control/sleep    Patient is A&Ox4, Oxycodone and Tylenol given at sperate times for right to mid abdominal pain 5/10, she says it hurts when she breaths at times. Patient up to bedside commode with SBA, no dizziness reported. 02 sat's 95% on 2 liters nasal cannula. Lungs slightly coarse and diminished in right posterior lower lobe. Patient use call light appropriatly and bed alarm turned  off after 0300.     Josie Peoples RN   12/3/2024   5:11 AM

## 2024-12-03 NOTE — PROGRESS NOTES
"CLINICAL NUTRITION SERVICES - ASSESSMENT NOTE     Nutrition Prescription    RECOMMENDATIONS FOR MDs/PROVIDERS TO ORDER:    Malnutrition Status:    Not noted    Recommendations already ordered by Registered Dietitian (RD):  Change glucerna to magic cup ( at HS)    Future/Additional Recommendations:  Monitor po intake, weight, labs     REASON FOR ASSESSMENT  Gill Mendez is a/an 64 year old female assessed by the dietitian for LOS    Pt with a past medical history of hypertension, hyperlipidemia, DM 2, asthma, anxiety disorder who was admitted with NSTEMI and acute CHF decompensation.  Cardiac cath showed multivessel CAD.  Cardiovascular surgeon performed CABG on 11/24/2024.  Extubated on 11/25/2024.  Urine culture grew Klebsiella oxytoca for which she received ceftriaxone.  Recently lost her father and had episodes of anxiety during hospital stay    NUTRITION HISTORY  Pt followed a diabetic diet at home. She lost weight taking Mounjaro PTA  Her appetite was fine PTA    CURRENT NUTRITION ORDERS  Diet: Consistent CHO ( 60 g CHO with each meal), low saturated fat Na <2400 mg with FR of 1800 ml/day  Supplement: Glucerna once daily with supper meal  Intake/Tolerance: consumed 100% breakfast and lunch today ( 710 Calories and 22 g protein so far) and 100% breakfast and lunch and 50% supper on 12/2 ( 907 Calories and 43 g protein): She is not taking the glucerna-her stomach doesn't tolerate.  She is open to trying a chocolate magic cup.  On 12/2 she met~60% energy needs and 70% protein needs    LABS  Labs reviewed: Ca 8.6 (L), Glu  120 (H)    MEDICATIONS  Medications reviewed: lipitor, celexa, jardiance, novolog, metformin, toprol XL, miralax, mirapex, senna-docusate    ANTHROPOMETRICS  Height: 165.1 cm (5' 5\")  Most Recent Weight: 72.7 kg (160 lb 4.8 oz)    IBW: 56.8 kg ( 125 lb)  BMI: Overweight BMI 25-29.9  Weight History:   Wt Readings from Last 10 Encounters:   12/03/24 72.7 kg (160 lb 4.8 oz)   11/22/24 73.3 kg (161 " lb 11.2 oz)   06/21/24 69.9 kg (154 lb)   03/13/24 73.5 kg (162 lb)   11/29/23 77.1 kg (170 lb)   10/06/23 78.5 kg (173 lb)   08/23/23 81.2 kg (179 lb)   05/12/23 83.5 kg (184 lb)   03/15/23 82.1 kg (181 lb)   01/02/23 79.9 kg (176 lb 2.4 oz)   Intentional weight loss    Dosing Weight: 60.8 kg/ adjusted weight    ASSESSED NUTRITION NEEDS  Estimated Energy Needs: 2497-6862 kcals/day (25 - 30 kcals/kg)  Justification: Maintenance  Estimated Protein Needs: 61-73 grams protein/day (1 - 1.2 grams of pro/kg)  Justification: Post-op  Estimated Fluid Needs: 1800 mL/day   Justification: On a fluid restriction    PHYSICAL FINDINGS  See mpoor appetitealnutrition section below.  Skin: Redness/blanchable Buttocks/IT  Incision  Wesley score=17, nutrition noted as probably inadequate  GI: WDL/ constipation  Last BM 12/2/2024    MALNUTRITION:  % Weight Loss:  Weight loss does not meet criteria for malnutrition   % Intake:  <75% for > 7 days (moderate malnutrition)  Subcutaneous Fat Loss:  None observed  Muscle Loss:  None observed  Fluid Retention:  Trace to mild 1+ to 2+    Malnutrition Diagnosis: Patient does not meet two of the above criteria necessary for diagnosing malnutrition    NUTRITION DIAGNOSIS  Inadequate oral intake related to poor appetite as evidenced by meeting < 75% estimated energy and protein needs      INTERVENTIONS  Implementation  Nutrition Education: Education was completed on 11/27/2024  Medical food supplement therapy -change glucerna to magic cup at 8 pm   (1 magic cup provides 290 Calories 9 grams of protein and 38-40 g CHO )  Monitor BG    Goals  Total avg nutritional intake to meet a minimum of 1520 kcal and 61 g PRO daily   BG < 180     Monitoring/Evaluation  Progress toward goals will be monitored and evaluated per protocol.

## 2024-12-03 NOTE — PROGRESS NOTES
"CVTS Daily Progress Note   POD 9 s/p CABG x3 (LIMA-LAD, rSVG-PDA, rSVG-OM)  Attending: Raul  LOS: 10    SUBJECTIVE/INTERVAL EVENTS:    No acute events overnight. O2 continues to be replaced overnight despite SpO2 100% on O2, 97% on RA. Symptomatic orthostasis per nursing yesterday, reports this has not recurred so far this AM. Patient progressing overall well. Maintaining oxygen saturations on room air as above. Normotensive. Pain well controlled. + BM. Tolerating PO intake. Adequate UOP. Patient denies new chest pain, shortness of breath, abdominal pain, and nausea. Feels well this AM and asks when she can discharge to home. All questions answered to patient's satisfaction on rounds.    OBJECTIVE:  Temp:  [98.1  F (36.7  C)-100.1  F (37.8  C)] 98.1  F (36.7  C)  Pulse:  [77-86] 84  Resp:  [18-21] 18  BP: ()/(52-77) 99/52  SpO2:  [84 %-100 %] 100 %  Vitals:    11/30/24 1704 12/01/24 0410 12/01/24 0525 12/02/24 0440   Weight: 74.9 kg (165 lb 3.2 oz) 73.6 kg (162 lb 4.8 oz) 73.1 kg (161 lb 3.2 oz) 73.5 kg (162 lb)    12/03/24 0325   Weight: 72.7 kg (160 lb 4.8 oz)       Clinically Significant Risk Factors          # Hypochloremia: Lowest Cl = 96 mmol/L in last 2 days, will monitor as appropriate      # Hypoalbuminemia: Lowest albumin = 3.1 g/dL at 11/25/2024  4:26 AM, will monitor as appropriate     # Hypertension: Noted on problem list    # Acute heart failure with reduced ejection fraction: last echo with EF <40% and receiving IV diuretics    # Acute Hypoxic Respiratory Failure: Documented O2 saturation < 90%. Continue supplemental oxygen as needed         # Overweight: Estimated body mass index is 26.68 kg/m  as calculated from the following:    Height as of this encounter: 1.651 m (5' 5\").    Weight as of this encounter: 72.7 kg (160 lb 4.8 oz).        # Financial/Environmental Concerns: none  # Asthma: noted on problem list  # History of CABG: noted on surgical history            Current " Medications:    Scheduled Meds:  Current Facility-Administered Medications   Medication Dose Route Frequency Provider Last Rate Last Admin    aspirin (ASA) chewable tablet 162 mg  162 mg Oral or NG Tube Daily Barbara Boggs NP   162 mg at 12/02/24 0834    atorvastatin (LIPITOR) tablet 80 mg  80 mg Oral or Feeding Tube QPM Barbara Boggs NP   80 mg at 12/02/24 2017    citalopram (celeXA) tablet 20 mg  20 mg Oral or Feeding Tube QAM Barbara Boggs NP   20 mg at 12/02/24 0834    empagliflozin (JARDIANCE) tablet 25 mg  25 mg Oral Daily Beena Mitchell PA-C   25 mg at 12/02/24 0835    fluticasone (ARNUITY ELLIPTA) 100 MCG/ACT inhaler 1 puff  1 puff Inhalation Daily Barbaar Boggs NP   1 puff at 12/02/24 0837    [Held by provider] furosemide (LASIX) injection 20 mg  20 mg Intravenous bid 08 & 14 Barbara Boggs NP   20 mg at 12/02/24 0835    guaiFENesin (MUCINEX) 12 hr tablet 600 mg  600 mg Oral BID Beena Mitchell PA-C   600 mg at 12/02/24 2017    heparin ANTICOAGULANT injection 5,000 Units  5,000 Units Subcutaneous Q8H Barbara Boggs NP   5,000 Units at 12/03/24 0348    insulin aspart (NovoLOG) injection (RAPID ACTING)  1-7 Units Subcutaneous TID AC Barbara Boggs NP   1 Units at 12/02/24 1735    ipratropium - albuterol 0.5 mg/2.5 mg/3 mL (DUONEB) neb solution 3 mL  3 mL Nebulization 4x daily Barbara Boggs NP   3 mL at 12/03/24 0756    metFORMIN (GLUCOPHAGE XR) 24 hr tablet 1,000 mg  1,000 mg Oral Daily with breakfast Beena Mitchell PA-C   1,000 mg at 12/02/24 0835    metoprolol succinate ER (TOPROL XL) 24 hr tablet 25 mg  25 mg Oral Daily Namita Fitzgerald MD        polyethylene glycol (MIRALAX) Packet 17 g  17 g Oral Daily Barbara Boggs NP   17 g at 11/28/24 0805    pramipexole (MIRAPEX) tablet 0.125 mg  0.125 mg Oral Daily Sudarshan Jimenez MD   0.125 mg at 12/02/24 1419    pramipexole (MIRAPEX) tablet 0.5 mg  0.5 mg Oral At Bedtime Barbara Boggs NP   0.5 mg  at 12/02/24 2025    senna-docusate (SENOKOT-S/PERICOLACE) 8.6-50 MG per tablet 1 tablet  1 tablet Oral BID Babrara Boggs NP   1 tablet at 12/02/24 2017    sodium chloride (PF) 0.9% PF flush 3 mL  3 mL Intracatheter Q8H Barbara Boggs NP   3 mL at 12/03/24 0347     Continuous Infusions:  Current Facility-Administered Medications   Medication Dose Route Frequency Provider Last Rate Last Admin     PRN Meds:  Current Facility-Administered Medications   Medication Dose Route Frequency Provider Last Rate Last Admin    acetaminophen (TYLENOL) tablet 650 mg  650 mg Oral Q4H PRN Barbara Boggs NP   650 mg at 12/03/24 0348    albuterol (PROVENTIL HFA/VENTOLIN HFA) inhaler  2 puff Inhalation Q6H PRN Barbara Boggs NP        bisacodyl (DULCOLAX) suppository 10 mg  10 mg Rectal Daily PRN Barbara Boggs NP        glucose gel 15-30 g  15-30 g Oral Q15 Min PRN Barbara Boggs NP        Or    dextrose 50 % injection 25-50 mL  25-50 mL Intravenous Q15 Min PRN Barbara Boggs NP        Or    glucagon injection 1 mg  1 mg Subcutaneous Q15 Min PRN Barbara Boggs NP        hydrALAZINE (APRESOLINE) injection 10 mg  10 mg Intravenous Q6H PRN Barbara Boggs NP        hydrOXYzine HCl (ATARAX) tablet 10 mg  10 mg Oral or Feeding Tube 4x Daily PRN Barbara Boggs NP   10 mg at 12/02/24 1914    magnesium hydroxide (MILK OF MAGNESIA) suspension 30 mL  30 mL Oral Daily PRN Barbara Boggs NP        naloxone (NARCAN) injection 0.2 mg  0.2 mg Intravenous Q2 Min PRBarbara Platt NP        Or    naloxone (NARCAN) injection 0.4 mg  0.4 mg Intravenous Q2 Min PRN Barbara Boggs NP        Or    naloxone (NARCAN) injection 0.2 mg  0.2 mg Intramuscular Q2 Min PRN Barbara Boggs NP        Or    naloxone (NARCAN) injection 0.4 mg  0.4 mg Intramuscular Q2 Min PRN Barbara Boggs NP        ondansetron (ZOFRAN ODT) ODT tab 4 mg  4 mg Oral Q6H PRN Barbara Boggs NP        Or    ondansetron (ZOFRAN) injection 4 mg  4  mg Intravenous Q6H PRN Barbara Boggs, MARITZA   4 mg at 11/30/24 2018    oxyCODONE IR (ROXICODONE) half-tab 2.5 mg  2.5 mg Oral Q4H PRN Beena Mitchell PA-C   2.5 mg at 12/03/24 0016    Or    oxyCODONE (ROXICODONE) tablet 5 mg  5 mg Oral Q4H PRN Beena Mitchell PA-C        sodium chloride (PF) 0.9% PF flush 3 mL  3 mL Intracatheter q1 min prn Barbara Boggs NP   3 mL at 12/03/24 0018       Cardiographics:    Telemetry monitoring demonstrates SR with rates in the 70-80s per my personal review.    Imaging:  Recent Results (from the past 24 hours)   XR Chest 2 Views    Narrative    EXAM: XR CHEST 2 VIEWS  LOCATION: [facility]  DATE: 12/2/2024    INDICATION: s p CABG  COMPARISON: Frontal and lateral views of the chest 12/1/2024      Impression    IMPRESSION:     Continued silhouetting of the left hemidiaphragm due to adjacent opacity, mildly increased from one day earlier reflecting atelectasis and/or pleural fluid and could relate in part to a lesser degree of lung inflation.     New obscuration of the right posterior costophrenic sulcus also consistent with increased atelectasis or trace right pleural fluid. Bands of subsegmental atelectasis around the right hilum are unchanged. There are no airspace or interstitial lung   opacities.    Accounting for differences in inflation, no change in heart and mediastinal interfaces.           Labs, personally reviewed.  Hemoglobin   Date Value Ref Range Status   12/03/2024 7.1 (L) 11.7 - 15.7 g/dL Final   12/02/2024 8.2 (L) 11.7 - 15.7 g/dL Final   11/30/2024 8.3 (L) 11.7 - 15.7 g/dL Final     WBC Count   Date Value Ref Range Status   12/03/2024 9.7 4.0 - 11.0 10e3/uL Final   12/02/2024 12.2 (H) 4.0 - 11.0 10e3/uL Final   11/30/2024 9.1 4.0 - 11.0 10e3/uL Final     Platelet Count   Date Value Ref Range Status   12/03/2024 301 150 - 450 10e3/uL Final   12/02/2024 317 150 - 450 10e3/uL Final   12/01/2024 332 150 - 450 10e3/uL Final      Creatinine   Date Value Ref Range Status   12/02/2024 0.81 0.51 - 0.95 mg/dL Final   12/01/2024 0.83 0.51 - 0.95 mg/dL Final   11/30/2024 0.83 0.51 - 0.95 mg/dL Final     Potassium   Date Value Ref Range Status   12/03/2024 4.2 3.4 - 5.3 mmol/L Final   12/02/2024 3.9 3.4 - 5.3 mmol/L Final   12/01/2024 4.0 3.4 - 5.3 mmol/L Final   01/02/2023 4.0 3.5 - 5.0 mmol/L Final   01/01/2023 4.1 3.5 - 5.0 mmol/L Final   03/22/2021 4.4 3.5 - 5.0 mmol/L Final     Potassium POCT   Date Value Ref Range Status   11/24/2024 4.0 3.4 - 5.3 mmol/L Final   11/24/2024 3.5 3.4 - 5.3 mmol/L Final   11/24/2024 4.4 3.4 - 5.3 mmol/L Final     Magnesium   Date Value Ref Range Status   12/03/2024 2.2 1.7 - 2.3 mg/dL Final   12/02/2024 2.0 1.7 - 2.3 mg/dL Final   12/01/2024 1.9 1.7 - 2.3 mg/dL Final          I/O:  I/O last 3 completed shifts:  In: 1253 [P.O.:1250; I.V.:3]  Out: 2225 [Urine:2225]       Physical Exam:    General: Patient seen up in chair, NAD. Conversant, pleasant, calm, cooperative on exam.  HEENT: no scleral icterus, moist mucosa, no tracheal deviation  CV: RRR on monitor - SR, WWP. Negligible LE edema.  Incision C/D/I, no erythema or induration  Pulm: Unlabored breathing on RA, occasional loose cough per baseline  Abd: SNTND, no guarding or peritoneal signs  Ext: Negligible pedal edema, incision C/D/I without erythema or induration  Neuro: A&O, CNs grossly intact, face symmetric, speech clear, SPARKS      ASSESSMENT/PLAN:    Gill Mendez is a 64 year old female who is s/p CABG x3.    Active Problems:    * No active hospital problems. *        NEURO  PSYCH:  Acute postop pain  ANX // RLS  - Scheduled Tylenol/lidocaine patches and PRN Tylenol/oxycodone for pain  - PTA Celexa and mirapex resumed  - PRN Atarax available PRN for anxiety    CV:  ICM/HFmrEF  NSTEMI/CAD s/p CABG x3  HTN // HLD  Hypervolemia, improving  Orthostatic hypotension  - Preop echo LVEF 25-30%, 12/1 LVEF 35-40%  - IABP removed POD 1, med CT and TPW removed  11/29, pleurals removed 11/30  - Normotensive  - Hold PTA amlodipine and lisinopril  - ASA 162mg  - Atorvastatin 80mg daily  - Cardiology consult for GDMT   - 25 mg metop XL qday  - Will need HF follow-up at discharge   - PTA Jardiance    PULM:  Acute hypoxic respiratory insufficiency, improving  Asthma/COPD  Right PTX - resolved  - Extubated POD1  - Maintaining oxygen saturations on room air this AM  - PTA Arnuity Ellipta & albuterol inhalers  - Duonebs QID & EZPap  - Encourage pulmonary toilet/OOB/activity  - No PTX on 2v CXR 12/1  - Please notify CV surgery prior to replacing O2. Once SpO2 monitor adjusted/patient's finger warms up/good waveform on monitor, SpO2 is in high 90s on RA.    GI  NUTRITION:  - Diet: Cardiac/consistent carb  - Bowel regimen, LBM 12/2    RENAL  FE:  CARMEN, resolved  Urinary retention 2/2 Klebsiella CAUTI - resolved  - Adequate UOP  - Cr WNL  - Diuresis per cards  - Hold PTA chlorthalidone  - Continue electrolyte replacement protocol  - Rocephin 11/27-28, transitioned to Macrobid 11/29 with plan for 5-day total course. Rocephin restarted per hospitalist x2 additional doses.    HEME:  Acute blood loss anemia  - Hgb dipped to 7.1, no bleeding concerns, plan to recheck Hgb at 1200  - Hep SQ, ASA    ID:  Stress-induced leukocytosis, resolved  Klebsiella CAUTI, resolved  - Yecenia op ppx complete, afebrile  - Rocephin 11/27-28, Macrobid started 11/29, as above, rocephin restarted per [facility] [place] as above    ENDO:  DM2   - Hgb A1c 6.1%  - Sliding scale insulin - minimal need, anticipate discharge on PTA DM meds  - PTA Jardiance, metformin resumed  - Hold PTA tirzepatide  - BG goal <180 to promote optimal wound healing    PPx:  - DVT: SCDs, SQ heparin TID, OOB/ambulation   - GI: Protonix 40mg PO daily    DISPO:  - General telemetry status since POD 4  - Medically Ready for Discharge: Anticipated Tomorrow pending optimization of HF mgmt and activity  - Therapies recommending discharge home with OP CR once  medically stable    Patient discussed w/ Dr. Carter.      Amy Mitchell PA-C  [facility] Cardiothoracic Surgery  Vocera or Secure Chat  December 3, 2024

## 2024-12-03 NOTE — PROGRESS NOTES
sc  Patient Name: Gill Mendez   MRN: [id number]   Date of Admission: 11/23/2024    Procedure: Procedure(s):  CORONARY ARTERY BYPASS GRAFT TIMES THREE, LEFT INTERNAL MAMMARY ARTERY HARVEST, LEFT LEG ENDOSCOPIC VESSEL PROCUREMENT,  ECHOCARDIOGRAM, TRANSESOPHAGEAL, INTRA-OPERATIVE    Post Op day #:8    Subjective (Patient focus/Primary Problem for shift): manage orthostatic BP and dizziness.          Pain Goal 0 Pain Rating2           Pain Medication/ Regime effective to reduce patient pain Prn tylenol and prn vistaril x one effective for discomfort and anxiety.     Objective (Physical assessment):           Rhythm: normal sinus rhythm            Bowel Activity: yes if Yes indicate when: 12/2/24          Bowel Medications: yes            Incision: healing well          Incentive Spirometry Q 1-2 hour when awake:  yes Volume: 500          Epicardial Pacing Wires:  not applicable            Patient Activity:           Up to chair for meals: not applicable          Ambulation with RN x2 (Not including CR): no           Shower Daily No, orthostatic BP too unstable          Nasal  Nozin BID AM/PM x 10 days: yes                         Urinary Catheter: not applicable           Preventative WOC consult (need MD order): not applicable       Assessment (Nursing primary shift focus): Mayelin had a good shift. Up in chair for much of the shift. Multiple visits with family. Complaints of some anxiety and discomfort this shift, PRN meds given and were effective. Able to get up to BSC without symptoms and walk back to bed. Unable to wean from oxygen this shift, still requiring 1-2 L by nasal cannula to keep sats about 90%. IS and flutter use encouraged. Metoprolol held this shift due to instructions of Dr. Fitzgerald. Lung sounds coarse on Rside, having daily chest xrays.    Plan (Patient Care Plan/focus): Continue to increase activity as able.       Courtney Pineda RN   12/2/2024   10:07 PM

## 2024-12-03 NOTE — PROGRESS NOTES
[facility]    Medicine Progress Note - Hospitalist Service    Date of Admission:  11/23/2024    Assessment & Plan   Gill Mendez is a 64 year old female with a medical history significant for hypertension, hyperlipidemia, type II DM, asthma and anxiety disorder who is admitted with NSTEMI and acute CHF decompensation. Cardiac cath showed multivessel CAD. Cardiovascular surgeon performed CABG on 11/24/24. Extubated on 11/25/24.  Urine culture grew Klebsiella oxytoca for which she received ceftriaxone. Recently lost her father and had episodes of anxiety during hospital stay.      Acute Non-STEMI  Multivessel CAD  S/p CABG  Coronary angiogram revealed multivessel CAD on 11/23/24.  She underwent CABG on 11/24/2024.  Remained intubated postprocedure and managed in ICU and subsequently extubated on 11/25/2024.  Required vasopressors briefly in the ICU.    -Aspirin daily.  -Atorvastatin 80 mg daily  -Carvedilol 6.25 mg twice daily  -Follow-up repeat report of echocardiogram 12/1/2024  -Cardiothoracic surgery following-appreciate assistance  -Postoperative surgical management per primary thoracic surgery service       Acute CHF Decompensation, pulm edema  Echo 12/2/24 revealed LVEF of 35-40% (moderately reduced) with anteroseptal, septal and apical wall hypokinesis.    -cardiologist consulted  -Received metolazone 5 mg once on 12/1/2024  -Continue empagliflozin  -Continue IV Lasix  -Monitor electrolytes and replace as needed     Hypertension  Carvedilol 6.25 mg twice daily-held this morning due to soft BP  -Continue to hold PTA amlodipine, chlorthalidone, lisinopril,    Acute blood loss anemia   Symptomatic anemia   Hgb dropped to 7.1   She experienced dizziness this morning and had multiple episodes of orthostatic hypotension in the last few days   Will recheck hgb and consider transfusion with one unit of prbc on 12/3/24  Follow-up CT scan of the abdomen    Orthostatic hypotension  Blood  "pressure check revealed significant orthostatic changes on 12/1/2024. TSH wnl  Received IV albumin  Monitor blood pressure  Hold carvedilol for now  Delvin valdovinos  Follow-up morning cortisol level      Suspected UTI  Urine culture grew Klebsiella oxytoca  Resume IV ceftriaxone x 2 doses     Type II Diabetes Mellitus  -Continue empagliflozin  -Insulin sliding scale  -Continue metformin  -Monitor for hypoglycemia current per protocol     Asthma   Not in exacerbation  Resume home albuterol as needed for shortness of breath.   Duonebs PRN, 3 day course of prednsione   Continue maintenance therapy with Breo Ellipta or equivalent inhaler.     Anxiety Disorder  Severe claustrophobia   -Continue Celexa at the current dose.  -Monitor for any anxiety exacerbation during hospitalization.        Diet: Combination Diet Moderate Consistent Carb (60 g CHO per Meal) Diet; Low Saturated Fat Na <2400mg Diet  Snacks/Supplements Adult: Glucerna; With Meals  Fluid restriction 1800 ML FLUID    DVT Prophylaxis: Heparin SQ  Mcdaniels Catheter: Not present  Lines: None       Cardiac Monitoring: ACTIVE order. Indication: Open heart surgery (7 days)  Code Status: Full Code      Clinically Significant Risk Factors          # Hypochloremia: Lowest Cl = 96 mmol/L in last 2 days, will monitor as appropriate      # Hypoalbuminemia: Lowest albumin = 3.1 g/dL at 11/25/2024  4:26 AM, will monitor as appropriate     # Hypertension: Noted on problem list    # Acute heart failure with reduced ejection fraction: last echo with EF <40% and receiving IV diuretics    # Acute Hypoxic Respiratory Failure: Documented O2 saturation < 90%. Continue supplemental oxygen as needed         # Overweight: Estimated body mass index is 26.68 kg/m  as calculated from the following:    Height as of this encounter: 1.651 m (5' 5\").    Weight as of this encounter: 72.7 kg (160 lb 4.8 oz).        # Financial/Environmental Concerns: none  # Asthma: noted on problem list  # History " of CABG: noted on surgical history       Social Drivers of Health    Alcohol Use: Medium Risk (5/25/2023)    Received from [facility]    Alcohol Use     Alcohol Use Status: Yes   Interpersonal Safety: Low Risk  (11/25/2024)    Interpersonal Safety     Do you feel physically and emotionally safe where you currently live?: Patient unable to answer     Within the past 12 months, have you been hit, slapped, kicked or otherwise physically hurt by someone?: No     Within the past 12 months, have you been humiliated or emotionally abused in other ways by your partner or ex-partner?: No   Recent Concern: Interpersonal Safety - High Risk (11/23/2024)    Interpersonal Safety     Do you feel physically and emotionally safe where you currently live?: No     Within the past 12 months, have you been hit, slapped, kicked or otherwise physically hurt by someone?: No     Within the past 12 months, have you been humiliated or emotionally abused in other ways by your partner or ex-partner?: No    Received from [facility]    Social Connections          Disposition Plan     Medically Ready for Discharge: Anticipated in 2-4 Days    Barrier: recover from CABG     Sudarshan Jimenez MD  Hospitalist Service  [facility]  Securely message with [employer] (more info)  Text page via [employer] Paging/Directory   ______________________________________________________________________    Interval History   She complains of left lower quadrant abdominal pain and bruises in the paraumbilical areas/ LLQ of the abdomen. She states she had an episode of dizziness earlier this morning. Sitting comfortably on the recliner now and breathing comfortably on room air. She states she develops claustrophobia even with CT scan and requests for antianxiety meds prior to imaging.      Physical Exam   Vital Signs: Temp: 98.1  F (36.7  C) Temp src:  Oral BP: 99/52 Pulse: 87   Resp: 18 SpO2: 100 % O2 Device: None (Room air) Oxygen Delivery: 2 LPM  Weight: 160 lbs 4.8 oz    General.  Awake alert oriented not in acute distress.  HEENT.  Pupils equal round react to light, anicteric, EOM intact.  Neck supple no JVD.  CVS regular rhythm no murmur gallops. S/p CABG. Incision healing.   Lungs.  Chest tubes-removed.  Diminished air entry bilaterally with significant crackles.   Abdomen.  Soft nontender bowel sounds present.  Extremities.  No edema no calf tenderness.  Neurological.  Awake and alert. No focal deficit.  Skin no rash. No pallor.  Psych. Normal mood.      Medical Decision Making       47 MINUTES SPENT BY ME on the date of service doing chart review, history, exam, documentation & further activities per the note.      Data     I have personally reviewed the following data over the past 24 hrs:    9.7  \   7.1 (L)   / 301     138 96 (L) 14.7 /  129 (H)   4.2; 4.2 33 (H) 0.95 \     TSH: N/A T4: N/A A1C: N/A       Imaging results reviewed over the past 24 hrs:   Recent Results (from the past 24 hours)   XR Chest 2 Views    Narrative    EXAM: XR CHEST 2 VIEWS  LOCATION: [facility]  DATE: 12/2/2024    INDICATION: s p CABG  COMPARISON: Frontal and lateral views of the chest 12/1/2024      Impression    IMPRESSION:     Continued silhouetting of the left hemidiaphragm due to adjacent opacity, mildly increased from one day earlier reflecting atelectasis and/or pleural fluid and could relate in part to a lesser degree of lung inflation.     New obscuration of the right posterior costophrenic sulcus also consistent with increased atelectasis or trace right pleural fluid. Bands of subsegmental atelectasis around the right hilum are unchanged. There are no airspace or interstitial lung   opacities.    Accounting for differences in inflation, no change in heart and mediastinal interfaces.

## 2024-12-03 NOTE — PROGRESS NOTES
HEART CARE NOTE          Assessment/Recommendations     1. Severe HFrEF   Assessment / Plan  Orthostatic hypotension appears to have resolved with hydration and holding diuretics; Repeat albumin bolus; hold further loop diuresis and thiazide diuretic; continue to monitor UOP and renal function closely  Repeat albumin bolus today   GDMT as detailed below     Current Pharmacotherapy AHA Guideline-Directed Medical Therapy   Lisinopril - hold Lisinopril 20 mg twice daily   Metoprolol succinate 25 mg daily Carvedilol 25 mg twice daily   Spironolactone not started Spironolactone 25 mg once daily   Hydralazine NA Hydralazine 100 mg three times daily   Isosorbide dinitrate NA Isosorbide dinitrate 40 mg three times daily   SGLT2 inhibitor:Empagliflozin 25 mg daily Dapagliflozin or Empagliflozin 10 mg daily      2. Severe multi-vessel CAD  Assessment / Plan  S/p CABG; denies anginal equivalents  Continue ASA, high intensity atorvastatin, metoprolol     3. HTN  Assessment / Plan  Borderline BP with notable orthostatic hypotension  - hold afterload reduction     4. DM2  Assessment / Plan  Management per primary team - currently on ISS       Plan of care discussed on December 3, 2024 with patient at bedside, and primary team overseeing patient's care      History of Present Illness/Subjective    Ms. Gill Mendez is a 64 year old female with a PMHx significant for (per Epic notation) hypertension, hyperlipidemia, type II DM, asthma and anxiety disorder who is admitted with an NSTEMI and acute CHF decompensation.      Today, Mrs. Mendez denies acute cardiac events or complaints; reports significant improvement and near resolution of profound orthostatic symptoms; Management plan as detailed above     ECG: Personally reviewed. normal sinus rhythm, nonspecific ST and T waves changes.     ECHO (personnaly Reviewed on 12/2/24):   The left ventricle is normal in size.  Left ventricular function is decreased. The ejection fraction is   confirmed by Shea   "35-40%  (moderately reduced). Anteroseptal, septal and apical wall hypokinesis.  Normal right ventricle size and systolic function.  The left atrium is mildly dilated.  Compared to the prior study dated 11/23/24, there are changes as noted. LVEF  has improved.    Telemetry: personally reviewed December 3, 2024; notable for sinus     Lab results: personally reviewed December 3, 2024; notable for renal function wnl; evolving contraction alkalosis     Medical history and pertinent documents reviewed in Care Everywhere please where applicable see details above        Physical Examination Review of Systems   /61 (BP Location: Right arm)   Pulse 82   Temp 98.6  F (37  C) (Oral)   Resp 19   Ht 1.651 m (5' 5\")   Wt 72.7 kg (160 lb 4.8 oz)   SpO2 96%   BMI 26.68 kg/m    Body mass index is 26.68 kg/m .  Wt Readings from Last 3 Encounters:   12/03/24 72.7 kg (160 lb 4.8 oz)   11/22/24 73.3 kg (161 lb 11.2 oz)   06/21/24 69.9 kg (154 lb)     General Appearance:   no distress, normal body habitus   ENT/Mouth: membranes moist, no oral lesions or bleeding gums.      EYES:  no scleral icterus, normal conjunctivae   Neck: no carotid bruits or thyromegaly   Chest/Lungs:   lungs are clear to auscultation, no rales or wheezing, equal chest wall expansion    Cardiovascular:   Regular. Normal first and second heart sounds with no murmurs, rubs, or gallops; the carotid, radial and posterior tibial pulses are intact, no JVD or LE edema bilaterally    Abdomen:  no organomegaly, masses, bruits, or tenderness; bowel sounds are present   Extremities: no cyanosis or clubbing   Skin: no xanthelasma, warm.    Neurologic: NAD     Psychiatric: alert and oriented x3, calm     A complete 10 systems ROS was reviewed  And is negative except what is listed in the HPI.          Medical History  Surgical History Family History Social History   Past Medical History:   Diagnosis Date    Anxiety     Asthma     Cellulitis     Diabetes mellitus " (H)     Essential hypertension     Created by Conversion  Replacement Utility updated for latest IMO load    Gastroparesis     Hyperlipidemia     Hypertension     Other and unspecified hyperlipidemia     Created by Conversion     PONV (postoperative nausea and vomiting)     Shortness of breath     Type II or unspecified type diabetes mellitus without mention of complication, not stated as uncontrolled     Created by Conversion     Past Surgical History:   Procedure Laterality Date    AMPUTATE TOE(S) Right 7/31/2020    Procedure: AMPUTATION, third digit right foot;  Surgeon: Chris Vega DPM;  Location: [facility];  Service: Podiatry    CORONARY ARTERY BYPASS GRAFT, WITH ENDOSCOPIC VESSEL PROCUREMENT N/A 11/24/2024    Procedure: CORONARY ARTERY BYPASS GRAFT TIMES THREE, LEFT INTERNAL MAMMARY ARTERY HARVEST, LEFT LEG ENDOSCOPIC VESSEL PROCUREMENT,;  Surgeon: Zhen Carter MD;  Location: [facility]    CV CORONARY ANGIOGRAM N/A 11/23/2024    Procedure: Coronary Angiogram;  Surgeon: Roque Eisenberg MD;  Location: [facility] CATH LAB CV    CV INTRA AORTIC BALLOON N/A 11/24/2024    Procedure: Intra aortic Balloon Pump Insertion;  Surgeon: Roque Eisenberg MD;  Location: [facility] LAB CV    CV LEFT HEART CATH N/A 11/23/2024    Procedure: Left Heart Catheterization;  Surgeon: Roque Eisenberg MD;  Location: [facility] CATH LAB CV    ENDOMETRIAL BIOPSY      FOOT ARTHROPLASTY Right 09/24/2020    Fourth and fifth toe by Dr. Vega    HC REPAIR OF HAMMERTOE,ONE Left 12/7/2020    Procedure: ARTHROPLASTY, digits two, three, four and five left foot;  Surgeon: Chris Vega DPM;  Location: [facility];  Service: Podiatry    HERNIA REPAIR      HYSTERECTOMY      IR LUMBAR PUNCTURE  7/20/2023    REPAIR TENDON ACHILLES Bilateral     Left was plate  , right was torn    TONSILLECTOMY      TRANSESOPHAGEAL ECHOCARDIOGRAM INTRAOPERATIVE  11/24/2024    Procedure: ECHOCARDIOGRAM, TRANSESOPHAGEAL,  INTRA-OPERATIVE;  Surgeon: Zhen Carter MD;  [place] [facility] [place]    [facility] REMOVAL OF OVARY(S)      Description: Oophorectomy - Bilateral (Removal Of Both Ovaries);  Recorded: 10/09/2008;    no family history of premature coronary artery disease Social History     Socioeconomic History    Marital status:      Spouse name: Not on file    Number of children: Not on file    Years of education: Not on file    Highest education level: Not on file   Occupational History    Not on file   Tobacco Use    Smoking status: Never    Smokeless tobacco: Never   Substance and Sexual Activity    Alcohol use: No    Drug use: No    Sexual activity: Not on file   Other Topics Concern    Not on file   Social History Narrative    Not on file     Social Drivers of Health     Financial Resource Strain: Low Risk  (11/23/2024)    Financial Resource Strain     Within the past 12 months, have you or your family members you live with been unable to get utilities (heat, electricity) when it was really needed?: No   Food Insecurity: Low Risk  (11/23/2024)    Food Insecurity     Within the past 12 months, did you worry that your food would run out before you got money to buy more?: No     Within the past 12 months, did the food you bought just not last and you didn t have money to get more?: No   Transportation Needs: Low Risk  (11/23/2024)    Transportation Needs     Within the past 12 months, has lack of transportation kept you from medical appointments, getting your medicines, non-medical meetings or appointments, work, or from getting things that you need?: No   Physical Activity: Not on file   Stress: Not on file (11/6/2024)   Social Connections: Unknown (12/28/2021)    Received from [facility]    Social Connections     Frequency of Communication with Friends and Family: Not on file   Interpersonal Safety: Low Risk  (11/25/2024)     Interpersonal Safety     Do you feel physically and emotionally safe where you currently live?: Patient unable to answer     Within the past 12 months, have you been hit, slapped, kicked or otherwise physically hurt by someone?: No     Within the past 12 months, have you been humiliated or emotionally abused in other ways by your partner or ex-partner?: No   Recent Concern: Interpersonal Safety - High Risk (11/23/2024)    Interpersonal Safety     Do you feel physically and emotionally safe where you currently live?: No     Within the past 12 months, have you been hit, slapped, kicked or otherwise physically hurt by someone?: No     Within the past 12 months, have you been humiliated or emotionally abused in other ways by your partner or ex-partner?: No   Housing Stability: Low Risk  (11/23/2024)    Housing Stability     Do you have housing? : Yes     Are you worried about losing your housing?: No           Lab Results    Chemistry/lipid CBC Cardiac Enzymes/BNP/TSH/INR   Lab Results   Component Value Date    CHOL 148 11/23/2024    HDL 65 11/23/2024    TRIG 65 11/23/2024    BUN 13.4 12/02/2024     12/02/2024    CO2 32 (H) 12/02/2024    Lab Results   Component Value Date    WBC 9.7 12/03/2024    HGB 7.1 (L) 12/03/2024    HCT 22.9 (L) 12/03/2024    MCV 82 12/03/2024     12/03/2024    Lab Results   Component Value Date    TROPONINI <0.01 01/01/2023    TSH 1.07 12/02/2024    INR 1.05 11/25/2024     Lab Results   Component Value Date    TROPONINI <0.01 01/01/2023          Weight:    Wt Readings from Last 3 Encounters:   12/03/24 72.7 kg (160 lb 4.8 oz)   11/22/24 73.3 kg (161 lb 11.2 oz)   06/21/24 69.9 kg (154 lb)       Allergies  Allergies   Allergen Reactions    Codeine Unknown     Patient states possibly caused N/V but can't remember for sure    Semaglutide Nausea and Vomiting    Acetaminophen-Codeine Rash         Surgical History  Past Surgical History:   Procedure Laterality Date    AMPUTATE TOE(S) Right  7/31/2020    Procedure: AMPUTATION, third digit right foot;  Surgeon: Chris Vega DPM;  Location: [facility];  Service: Podiatry    CORONARY ARTERY BYPASS GRAFT, WITH ENDOSCOPIC VESSEL PROCUREMENT N/A 11/24/2024    Procedure: CORONARY ARTERY BYPASS GRAFT TIMES THREE, LEFT INTERNAL MAMMARY ARTERY HARVEST, LEFT LEG ENDOSCOPIC VESSEL PROCUREMENT,;  Surgeon: Zhen Carter MD;  [place] [facility] [place]    CV CORONARY ANGIOGRAM N/A 11/23/2024    Procedure: Coronary Angiogram;  Surgeon: Roque Eisenberg MD;  Location: [facility] CATH LAB CV    CV INTRA AORTIC BALLOON N/A 11/24/2024    Procedure: Intra aortic Balloon Pump Insertion;  Surgeon: Roque Eisenberg MD;  Location: [facility] CATH LAB CV    CV LEFT HEART CATH N/A 11/23/2024    Procedure: Left Heart Catheterization;  Surgeon: Roque Eisenberg MD;  Location: [facility] CATH LAB CV    ENDOMETRIAL BIOPSY      FOOT ARTHROPLASTY Right 09/24/2020    Fourth and fifth toe by Dr. Vega    HC REPAIR OF HAMMERTOE,ONE Left 12/7/2020    Procedure: ARTHROPLASTY, digits two, three, four and five left foot;  Surgeon: Chris Vega DPM;  Location: [facility];  Service: Podiatry    HERNIA REPAIR      HYSTERECTOMY      IR LUMBAR PUNCTURE  7/20/2023    REPAIR TENDON ACHILLES Bilateral     Left was plate  , right was torn    TONSILLECTOMY      TRANSESOPHAGEAL ECHOCARDIOGRAM INTRAOPERATIVE  11/24/2024    Procedure: ECHOCARDIOGRAM, TRANSESOPHAGEAL, INTRA-OPERATIVE;  Surgeon: Zhen Carter MD;  Location: [facility] [place] REMOVAL OF OVARY(S)      Description: Oophorectomy - Bilateral (Removal Of Both Ovaries);  Recorded: 10/09/2008;       Social History  Tobacco:   History   Smoking Status    Never   Smokeless Tobacco    Never    Alcohol:   Social History    Substance and Sexual Activity      Alcohol use: No   Illicit Drugs:   History   Drug Use No       Family History  Family History   Problem Relation Age of Onset    Diabetes Mother      Hypertension Mother     Acute Myocardial Infarction No family hx of           Namita Fitzgerald MD on 12/3/2024      cc: Saúl Hunt

## 2024-12-04 ENCOUNTER — TELEPHONE (OUTPATIENT)
Dept: CARDIOLOGY | Facility: CLINIC | Age: 64
End: 2024-12-04
Payer: COMMERCIAL

## 2024-12-04 ENCOUNTER — APPOINTMENT (OUTPATIENT)
Dept: OCCUPATIONAL THERAPY | Facility: HOSPITAL | Age: 64
DRG: 233 | End: 2024-12-04
Attending: INTERNAL MEDICINE
Payer: COMMERCIAL

## 2024-12-04 ENCOUNTER — APPOINTMENT (OUTPATIENT)
Dept: ULTRASOUND IMAGING | Facility: HOSPITAL | Age: 64
DRG: 233 | End: 2024-12-04
Payer: COMMERCIAL

## 2024-12-04 ENCOUNTER — APPOINTMENT (OUTPATIENT)
Dept: CARDIOLOGY | Facility: HOSPITAL | Age: 64
DRG: 233 | End: 2024-12-04
Payer: COMMERCIAL

## 2024-12-04 DIAGNOSIS — I50.9 ACUTE DECOMPENSATED HEART FAILURE (H): Primary | ICD-10-CM

## 2024-12-04 LAB
ALBUMIN SERPL BCG-MCNC: 3.7 G/DL (ref 3.5–5.2)
ALP SERPL-CCNC: 70 U/L (ref 40–150)
ALT SERPL W P-5'-P-CCNC: 18 U/L (ref 0–50)
ANION GAP SERPL CALCULATED.3IONS-SCNC: 11 MMOL/L (ref 7–15)
AST SERPL W P-5'-P-CCNC: 29 U/L (ref 0–45)
BILIRUB DIRECT SERPL-MCNC: <0.2 MG/DL (ref 0–0.3)
BILIRUB SERPL-MCNC: 0.5 MG/DL
BUN SERPL-MCNC: 16.1 MG/DL (ref 8–23)
CALCIUM SERPL-MCNC: 8.7 MG/DL (ref 8.8–10.4)
CHLORIDE SERPL-SCNC: 97 MMOL/L (ref 98–107)
CHYLO FLD QL: NORMAL
CHYLOMICRON BILL: NORMAL
CREAT SERPL-MCNC: 0.94 MG/DL (ref 0.51–0.95)
CRP SERPL-MCNC: 90.9 MG/L
EGFRCR SERPLBLD CKD-EPI 2021: 68 ML/MIN/1.73M2
ERYTHROCYTE [DISTWIDTH] IN BLOOD BY AUTOMATED COUNT: 17.2 % (ref 10–15)
GLUCOSE BLDC GLUCOMTR-MCNC: 126 MG/DL (ref 70–99)
GLUCOSE BLDC GLUCOMTR-MCNC: 132 MG/DL (ref 70–99)
GLUCOSE BLDC GLUCOMTR-MCNC: 133 MG/DL (ref 70–99)
GLUCOSE BLDC GLUCOMTR-MCNC: 157 MG/DL (ref 70–99)
GLUCOSE BLDC GLUCOMTR-MCNC: 214 MG/DL (ref 70–99)
GLUCOSE SERPL-MCNC: 111 MG/DL (ref 70–99)
HCO3 SERPL-SCNC: 31 MMOL/L (ref 22–29)
HCT VFR BLD AUTO: 22 % (ref 35–47)
HGB BLD-MCNC: 7 G/DL (ref 11.7–15.7)
HGB BLD-MCNC: 8.4 G/DL (ref 11.7–15.7)
LVEF ECHO: NORMAL
MAGNESIUM SERPL-MCNC: 2.2 MG/DL (ref 1.7–2.3)
MCH RBC QN AUTO: 26.1 PG (ref 26.5–33)
MCHC RBC AUTO-ENTMCNC: 31.8 G/DL (ref 31.5–36.5)
MCV RBC AUTO: 82 FL (ref 78–100)
PHOSPHATE SERPL-MCNC: 3.5 MG/DL (ref 2.5–4.5)
PLATELET # BLD AUTO: 328 10E3/UL (ref 150–450)
POTASSIUM SERPL-SCNC: 4 MMOL/L (ref 3.4–5.3)
POTASSIUM SERPL-SCNC: 4.3 MMOL/L (ref 3.4–5.3)
PROT SERPL-MCNC: 6.1 G/DL (ref 6.4–8.3)
RBC # BLD AUTO: 2.68 10E6/UL (ref 3.8–5.2)
SODIUM SERPL-SCNC: 139 MMOL/L (ref 135–145)
TRIGL FLD-MCNC: 28 MG/DL
WBC # BLD AUTO: 10.5 10E3/UL (ref 4–11)

## 2024-12-04 PROCEDURE — P9016 RBC LEUKOCYTES REDUCED: HCPCS

## 2024-12-04 PROCEDURE — 36415 COLL VENOUS BLD VENIPUNCTURE: CPT

## 2024-12-04 PROCEDURE — 94660 CPAP INITIATION&MGMT: CPT

## 2024-12-04 PROCEDURE — 36415 COLL VENOUS BLD VENIPUNCTURE: CPT | Performed by: INTERNAL MEDICINE

## 2024-12-04 PROCEDURE — 250N000011 HC RX IP 250 OP 636: Mod: JZ | Performed by: INTERNAL MEDICINE

## 2024-12-04 PROCEDURE — 80048 BASIC METABOLIC PNL TOTAL CA: CPT | Performed by: INTERNAL MEDICINE

## 2024-12-04 PROCEDURE — P9047 ALBUMIN (HUMAN), 25%, 50ML: HCPCS | Mod: JZ | Performed by: INTERNAL MEDICINE

## 2024-12-04 PROCEDURE — 97110 THERAPEUTIC EXERCISES: CPT | Mod: GO

## 2024-12-04 PROCEDURE — 82248 BILIRUBIN DIRECT: CPT

## 2024-12-04 PROCEDURE — 86140 C-REACTIVE PROTEIN: CPT

## 2024-12-04 PROCEDURE — 94640 AIRWAY INHALATION TREATMENT: CPT

## 2024-12-04 PROCEDURE — 93325 DOPPLER ECHO COLOR FLOW MAPG: CPT

## 2024-12-04 PROCEDURE — 85027 COMPLETE CBC AUTOMATED: CPT

## 2024-12-04 PROCEDURE — 99233 SBSQ HOSP IP/OBS HIGH 50: CPT | Performed by: INTERNAL MEDICINE

## 2024-12-04 PROCEDURE — 93321 DOPPLER ECHO F-UP/LMTD STD: CPT | Mod: 26 | Performed by: INTERNAL MEDICINE

## 2024-12-04 PROCEDURE — 94799 UNLISTED PULMONARY SVC/PX: CPT

## 2024-12-04 PROCEDURE — 99233 SBSQ HOSP IP/OBS HIGH 50: CPT | Performed by: STUDENT IN AN ORGANIZED HEALTH CARE EDUCATION/TRAINING PROGRAM

## 2024-12-04 PROCEDURE — 76705 ECHO EXAM OF ABDOMEN: CPT

## 2024-12-04 PROCEDURE — 93308 TTE F-UP OR LMTD: CPT | Mod: 26 | Performed by: INTERNAL MEDICINE

## 2024-12-04 PROCEDURE — 250N000011 HC RX IP 250 OP 636: Performed by: SURGERY

## 2024-12-04 PROCEDURE — 93325 DOPPLER ECHO COLOR FLOW MAPG: CPT | Mod: 26 | Performed by: INTERNAL MEDICINE

## 2024-12-04 PROCEDURE — 99418 PROLNG IP/OBS E/M EA 15 MIN: CPT | Performed by: STUDENT IN AN ORGANIZED HEALTH CARE EDUCATION/TRAINING PROGRAM

## 2024-12-04 PROCEDURE — 250N000009 HC RX 250: Performed by: SURGERY

## 2024-12-04 PROCEDURE — 999N000157 HC STATISTIC RCP TIME EA 10 MIN

## 2024-12-04 PROCEDURE — 250N000013 HC RX MED GY IP 250 OP 250 PS 637

## 2024-12-04 PROCEDURE — 999N000156 HC STATISTIC RCP CONSULT EA 30 MIN

## 2024-12-04 PROCEDURE — 250N000013 HC RX MED GY IP 250 OP 250 PS 637: Performed by: INTERNAL MEDICINE

## 2024-12-04 PROCEDURE — 250N000013 HC RX MED GY IP 250 OP 250 PS 637: Performed by: SURGERY

## 2024-12-04 PROCEDURE — 210N000001 HC R&B IMCU HEART CARE

## 2024-12-04 PROCEDURE — 85018 HEMOGLOBIN: CPT

## 2024-12-04 PROCEDURE — 84100 ASSAY OF PHOSPHORUS: CPT | Performed by: INTERNAL MEDICINE

## 2024-12-04 PROCEDURE — 80048 BASIC METABOLIC PNL TOTAL CA: CPT | Performed by: STUDENT IN AN ORGANIZED HEALTH CARE EDUCATION/TRAINING PROGRAM

## 2024-12-04 PROCEDURE — 83735 ASSAY OF MAGNESIUM: CPT | Performed by: INTERNAL MEDICINE

## 2024-12-04 PROCEDURE — P9040 RBC LEUKOREDUCED IRRADIATED: HCPCS | Performed by: INTERNAL MEDICINE

## 2024-12-04 PROCEDURE — 80053 COMPREHEN METABOLIC PANEL: CPT | Performed by: STUDENT IN AN ORGANIZED HEALTH CARE EDUCATION/TRAINING PROGRAM

## 2024-12-04 PROCEDURE — 94640 AIRWAY INHALATION TREATMENT: CPT | Mod: 76

## 2024-12-04 PROCEDURE — 80069 RENAL FUNCTION PANEL: CPT | Performed by: INTERNAL MEDICINE

## 2024-12-04 PROCEDURE — 97535 SELF CARE MNGMENT TRAINING: CPT | Mod: GO

## 2024-12-04 RX ORDER — FUROSEMIDE 20 MG/1
20 TABLET ORAL DAILY
Status: DISCONTINUED | OUTPATIENT
Start: 2024-12-04 | End: 2024-12-05

## 2024-12-04 RX ORDER — ALBUMIN (HUMAN) 12.5 G/50ML
50 SOLUTION INTRAVENOUS ONCE
Status: COMPLETED | OUTPATIENT
Start: 2024-12-04 | End: 2024-12-04

## 2024-12-04 RX ADMIN — AMOXICILLIN AND CLAVULANATE POTASSIUM 1 TABLET: 875; 125 TABLET, FILM COATED ORAL at 12:08

## 2024-12-04 RX ADMIN — IPRATROPIUM BROMIDE AND ALBUTEROL SULFATE 3 ML: .5; 3 SOLUTION RESPIRATORY (INHALATION) at 08:04

## 2024-12-04 RX ADMIN — HEPARIN SODIUM 5000 UNITS: 5000 INJECTION, SOLUTION INTRAVENOUS; SUBCUTANEOUS at 03:41

## 2024-12-04 RX ADMIN — ASPIRIN 81 MG CHEWABLE TABLET 162 MG: 81 TABLET CHEWABLE at 07:59

## 2024-12-04 RX ADMIN — ALBUMIN HUMAN 50 G: 0.25 SOLUTION INTRAVENOUS at 12:10

## 2024-12-04 RX ADMIN — ONDANSETRON 4 MG: 4 TABLET, ORALLY DISINTEGRATING ORAL at 06:51

## 2024-12-04 RX ADMIN — IPRATROPIUM BROMIDE AND ALBUTEROL SULFATE 3 ML: .5; 3 SOLUTION RESPIRATORY (INHALATION) at 19:48

## 2024-12-04 RX ADMIN — HYDROXYZINE HYDROCHLORIDE 10 MG: 10 TABLET ORAL at 17:46

## 2024-12-04 RX ADMIN — GUAIFENESIN 600 MG: 600 TABLET ORAL at 08:00

## 2024-12-04 RX ADMIN — METOPROLOL SUCCINATE 25 MG: 25 TABLET, EXTENDED RELEASE ORAL at 08:01

## 2024-12-04 RX ADMIN — IPRATROPIUM BROMIDE AND ALBUTEROL SULFATE 3 ML: .5; 3 SOLUTION RESPIRATORY (INHALATION) at 12:24

## 2024-12-04 RX ADMIN — EMPAGLIFLOZIN 25 MG: 25 TABLET, FILM COATED ORAL at 08:00

## 2024-12-04 RX ADMIN — SENNOSIDES AND DOCUSATE SODIUM 1 TABLET: 8.6; 5 TABLET ORAL at 07:59

## 2024-12-04 RX ADMIN — HYDROXYZINE HYDROCHLORIDE 10 MG: 10 TABLET ORAL at 06:51

## 2024-12-04 RX ADMIN — INSULIN ASPART 1 UNITS: 100 INJECTION, SOLUTION INTRAVENOUS; SUBCUTANEOUS at 12:15

## 2024-12-04 RX ADMIN — ACETAMINOPHEN 650 MG: 325 TABLET ORAL at 06:51

## 2024-12-04 RX ADMIN — ATORVASTATIN CALCIUM 80 MG: 40 TABLET, FILM COATED ORAL at 21:10

## 2024-12-04 RX ADMIN — AMOXICILLIN AND CLAVULANATE POTASSIUM 1 TABLET: 875; 125 TABLET, FILM COATED ORAL at 21:10

## 2024-12-04 RX ADMIN — GUAIFENESIN 600 MG: 600 TABLET ORAL at 21:10

## 2024-12-04 RX ADMIN — METFORMIN ER 500 MG 1000 MG: 500 TABLET ORAL at 07:59

## 2024-12-04 RX ADMIN — ACETAMINOPHEN 650 MG: 325 TABLET ORAL at 17:45

## 2024-12-04 RX ADMIN — PRAMIPEXOLE DIHYDROCHLORIDE 0.12 MG: 0.12 TABLET ORAL at 13:47

## 2024-12-04 RX ADMIN — IPRATROPIUM BROMIDE AND ALBUTEROL SULFATE 3 ML: .5; 3 SOLUTION RESPIRATORY (INHALATION) at 16:01

## 2024-12-04 RX ADMIN — PRAMIPEXOLE DIHYDROCHLORIDE 0.5 MG: 0.5 TABLET ORAL at 21:13

## 2024-12-04 RX ADMIN — CITALOPRAM HYDROBROMIDE 20 MG: 20 TABLET ORAL at 07:59

## 2024-12-04 RX ADMIN — SENNOSIDES AND DOCUSATE SODIUM 1 TABLET: 8.6; 5 TABLET ORAL at 21:10

## 2024-12-04 RX ADMIN — HEPARIN SODIUM 5000 UNITS: 5000 INJECTION, SOLUTION INTRAVENOUS; SUBCUTANEOUS at 21:10

## 2024-12-04 RX ADMIN — HEPARIN SODIUM 5000 UNITS: 5000 INJECTION, SOLUTION INTRAVENOUS; SUBCUTANEOUS at 12:09

## 2024-12-04 RX ADMIN — OXYCODONE HYDROCHLORIDE 2.5 MG: 5 TABLET ORAL at 04:01

## 2024-12-04 RX ADMIN — FUROSEMIDE 20 MG: 20 TABLET ORAL at 08:00

## 2024-12-04 ASSESSMENT — ACTIVITIES OF DAILY LIVING (ADL)
ADLS_ACUITY_SCORE: 44
ADLS_ACUITY_SCORE: 42
ADLS_ACUITY_SCORE: 44

## 2024-12-04 NOTE — PROGRESS NOTES
Care Management Follow Up    Length of Stay (days): 11    Expected Discharge Date: 12/06/2024     Concerns to be Addressed: Care progression - discharge planning     Patient plan of care discussed at interdisciplinary rounds: Yes    Anticipated Discharge Disposition:  OT rec home with home care OT    Anticipated Discharge Services:  OT rec home with home care OT  Anticipated Discharge DME:  NA    Patient/family educated on Medicare website which has current facility and service quality ratings:  NA  Education Provided on the Discharge Plan:  Yes per team  Patient/Family in Agreement with the Plan:  Yes    Referrals Placed by CM/SW:  Yes, home care  Private pay costs discussed: Not applicable    Discussed  Partnership in Safe Discharge Planning  document with patient/family: No     Handoff Completed: No, handoff not indicated or clinically appropriate    Additional Information:  Met with patient at bedside to discuss OT discharge rec for home care OT.   Patient agreed  Patient states spouse help with medications  Referral sent    Social Hx:  Assessment: Follow. Live w/spouse in a house. Indep, has cane - use prn. Spouse is primary contact.  Last Note: 12/4/24  Plan: OT rec home care, referral sent, pending  Needs: Medical stability, home care  Hand off: NA  Transport: Family    Next Steps: RNCM to follow for medical progression, recommendations, and final discharge plan.     Vandana Nina RN     Per provider, Dr. Lennon, patient is not ready. Maybe here for a few more days.    Per EVELYN Isidro, patient declined TCU. Will need home care and maybe outpatient rehab later.

## 2024-12-04 NOTE — PROGRESS NOTES
HEART CARE NOTE          Assessment/Recommendations   1. Severe HFrEF   Assessment / Plan  Patient reports return of orthostatic symptoms - repeat albumin bolus; plan to attempt to introduce oral diuretic regimen based on yesterday's improvement - but will hold further doses; continue to monitor UOP and renal function closely  GDMT as detailed below     Current Pharmacotherapy AHA Guideline-Directed Medical Therapy   Lisinopril - hold Lisinopril 20 mg twice daily   Metoprolol succinate 25 mg daily Carvedilol 25 mg twice daily   Spironolactone not started Spironolactone 25 mg once daily   Hydralazine NA Hydralazine 100 mg three times daily   Isosorbide dinitrate NA Isosorbide dinitrate 40 mg three times daily   SGLT2 inhibitor:Empagliflozin 25 mg daily Dapagliflozin or Empagliflozin 10 mg daily      2. Severe multi-vessel CAD  Assessment / Plan  S/p CABG; denies anginal equivalents  Continue ASA, high intensity atorvastatin, metoprolol     3. HTN  Assessment / Plan  Borderline BP with notable orthostatic hypotension  - hold afterload reduction     4. DM2  Assessment / Plan  Management per primary team - currently on ISS       Plan of care discussed on December 4, 2024 with patient at bedside, and primary team overseeing patient's care    History of Present Illness/Subjective    Ms. Gill Mendez is a 64 year old female with a PMHx significant for (per Epic notation) hypertension, hyperlipidemia, type II DM, asthma and anxiety disorder who is admitted with an NSTEMI and acute CHF decompensation.      Today, Mrs. Mendez denies acute cardiac events or complaints; Management plan as detailed above     ECG: Personally reviewed. normal sinus rhythm, nonspecific ST and T waves changes.     ECHO (personnaly Reviewed on 12/2/24):   The left ventricle is normal in size.  Left ventricular function is decreased. The ejection fraction is 35-40%  (moderately reduced). Anteroseptal, septal and apical wall hypokinesis.  Normal  "right ventricle size and systolic function.  The left atrium is mildly dilated.  Compared to the prior study dated 11/23/24, there are changes as noted. LVEF  has improved.       Telemetry: personally reviewed December 4, 2024; notable for sinus rhythm     Lab results: personally reviewed December 4, 2024; notable for renal function wnl    Medical history and pertinent documents reviewed in Care Everywhere please where applicable see details above        Physical Examination Review of Systems   /59 (BP Location: Right arm)   Pulse 95   Temp 97.8  F (36.6  C) (Oral)   Resp 18   Ht 1.651 m (5' 5\")   Wt 72.7 kg (160 lb 4.8 oz)   SpO2 91%   BMI 26.68 kg/m    Body mass index is 26.68 kg/m .  Wt Readings from Last 3 Encounters:   12/03/24 72.7 kg (160 lb 4.8 oz)   11/22/24 73.3 kg (161 lb 11.2 oz)   06/21/24 69.9 kg (154 lb)     General Appearance:   no distress, normal body habitus   ENT/Mouth: membranes moist, no oral lesions or bleeding gums.      EYES:  no scleral icterus, normal conjunctivae   Neck: no carotid bruits or thyromegaly   Chest/Lungs:   lungs are clear to auscultation, no rales or wheezing, equal chest wall expansion    Cardiovascular:   Regular. Normal first and second heart sounds with no murmurs, rubs, or gallops; the carotid, radial and posterior tibial pulses are intact, no JVD or LE edema bilaterally    Abdomen:  no organomegaly, masses, bruits, or tenderness; bowel sounds are present   Extremities: no cyanosis or clubbing   Skin: no xanthelasma, warm.    Neurologic: NAD     Psychiatric: alert and oriented x3, calm     A complete 10 systems ROS was reviewed  And is negative except what is listed in the HPI.          Medical History  Surgical History Family History Social History   Past Medical History:   Diagnosis Date    Anxiety     Asthma     Cellulitis     Diabetes mellitus (H)     Essential hypertension     Created by Conversion  Replacement Utility updated for latest IMO load    " Gastroparesis     Hyperlipidemia     Hypertension     Other and unspecified hyperlipidemia     Created by Conversion     PONV (postoperative nausea and vomiting)     Shortness of breath     Type II or unspecified type diabetes mellitus without mention of complication, not stated as uncontrolled     Created by Conversion     Past Surgical History:   Procedure Laterality Date    AMPUTATE TOE(S) Right 7/31/2020    Procedure: AMPUTATION, third digit right foot;  Surgeon: Chris Vega DPM;  Location: [facility];  Service: Podiatry    CORONARY ARTERY BYPASS GRAFT, WITH ENDOSCOPIC VESSEL PROCUREMENT N/A 11/24/2024    Procedure: CORONARY ARTERY BYPASS GRAFT TIMES THREE, LEFT INTERNAL MAMMARY ARTERY HARVEST, LEFT LEG ENDOSCOPIC VESSEL PROCUREMENT,;  Surgeon: Zhen Carter MD;  Location: [facility]    CV CORONARY ANGIOGRAM N/A 11/23/2024    Procedure: Coronary Angiogram;  Surgeon: Roque Eisenberg MD;  Location: [facility] CATH LAB CV    CV INTRA AORTIC BALLOON N/A 11/24/2024    Procedure: Intra aortic Balloon Pump Insertion;  Surgeon: Roque Eisenbegr MD;  Location: [facility] CATH LAB CV    CV LEFT HEART CATH N/A 11/23/2024    Procedure: Left Heart Catheterization;  Surgeon: Roque Eisenberg MD;  Location: [facility] CATH LAB CV    ENDOMETRIAL BIOPSY      FOOT ARTHROPLASTY Right 09/24/2020    Fourth and fifth toe by Dr. Vega    HC REPAIR OF HAMMERTOE,ONE Left 12/7/2020    Procedure: ARTHROPLASTY, digits two, three, four and five left foot;  Surgeon: Chris Vega DPM;  Location: [facility];  Service: Podiatry    HERNIA REPAIR      HYSTERECTOMY      IR LUMBAR PUNCTURE  7/20/2023    REPAIR TENDON ACHILLES Bilateral     Left was plate  , right was torn    TONSILLECTOMY      TRANSESOPHAGEAL ECHOCARDIOGRAM INTRAOPERATIVE  11/24/2024    Procedure: ECHOCARDIOGRAM, TRANSESOPHAGEAL, INTRA-OPERATIVE;  Surgeon: Zhen Carter MD;  Location: [facility] REMOVAL OF OVARY(S)       Description: Oophorectomy - Bilateral (Removal Of Both Ovaries);  Recorded: 10/09/2008;    no family history of premature coronary artery disease Social History     Socioeconomic History    Marital status:      Spouse name: Not on file    Number of children: Not on file    Years of education: Not on file    Highest education level: Not on file   Occupational History    Not on file   Tobacco Use    Smoking status: Never    Smokeless tobacco: Never   Substance and Sexual Activity    Alcohol use: No    Drug use: No    Sexual activity: Not on file   Other Topics Concern    Not on file   Social History Narrative    Not on file     Social Drivers of Health     Financial Resource Strain: Low Risk  (11/23/2024)    Financial Resource Strain     Within the past 12 months, have you or your family members you live with been unable to get utilities (heat, electricity) when it was really needed?: No   Food Insecurity: Low Risk  (11/23/2024)    Food Insecurity     Within the past 12 months, did you worry that your food would run out before you got money to buy more?: No     Within the past 12 months, did the food you bought just not last and you didn t have money to get more?: No   Transportation Needs: Low Risk  (11/23/2024)    Transportation Needs     Within the past 12 months, has lack of transportation kept you from medical appointments, getting your medicines, non-medical meetings or appointments, work, or from getting things that you need?: No   Physical Activity: Not on file   Stress: Not on file (11/6/2024)   Social Connections: Unknown (12/28/2021)    [facility], [facility]    Social Connections     Frequency of Communication with Friends and Family: Not on file   Interpersonal Safety: Low Risk  (11/25/2024)    Interpersonal Safety     Do you feel physically and emotionally safe where you currently live?: Patient unable to answer      Within the past 12 months, have you been hit, slapped, kicked or otherwise physically hurt by someone?: No     Within the past 12 months, have you been humiliated or emotionally abused in other ways by your partner or ex-partner?: No   Recent Concern: Interpersonal Safety - High Risk (11/23/2024)    Interpersonal Safety     Do you feel physically and emotionally safe where you currently live?: No     Within the past 12 months, have you been hit, slapped, kicked or otherwise physically hurt by someone?: No     Within the past 12 months, have you been humiliated or emotionally abused in other ways by your partner or ex-partner?: No   Housing Stability: Low Risk  (11/23/2024)    Housing Stability     Do you have housing? : Yes     Are you worried about losing your housing?: No           Lab Results    Chemistry/lipid CBC Cardiac Enzymes/BNP/TSH/INR   Lab Results   Component Value Date    CHOL 148 11/23/2024    HDL 65 11/23/2024    TRIG 65 11/23/2024    BUN 14.7 12/03/2024     12/03/2024    CO2 33 (H) 12/03/2024    Lab Results   Component Value Date    WBC 10.5 12/04/2024    HGB 7.0 (L) 12/04/2024    HCT 22.0 (L) 12/04/2024    MCV 82 12/04/2024     12/04/2024    Lab Results   Component Value Date    TROPONINI <0.01 01/01/2023    TSH 1.07 12/02/2024    INR 1.05 11/25/2024     Lab Results   Component Value Date    TROPONINI <0.01 01/01/2023          Weight:    Wt Readings from Last 3 Encounters:   12/03/24 72.7 kg (160 lb 4.8 oz)   11/22/24 73.3 kg (161 lb 11.2 oz)   06/21/24 69.9 kg (154 lb)       Allergies  Allergies   Allergen Reactions    Codeine Unknown     Patient states possibly caused N/V but can't remember for sure    Semaglutide Nausea and Vomiting    Acetaminophen-Codeine Rash         Surgical History  Past Surgical History:   Procedure Laterality Date    AMPUTATE TOE(S) Right 7/31/2020    Procedure: AMPUTATION, third digit right foot;  Surgeon: Chris Vega DPM;  Location: [facility]  Main OR;  Service: Podiatry    CORONARY ARTERY BYPASS GRAFT, WITH ENDOSCOPIC VESSEL PROCUREMENT N/A 11/24/2024    Procedure: CORONARY ARTERY BYPASS GRAFT TIMES THREE, LEFT INTERNAL MAMMARY ARTERY HARVEST, LEFT LEG ENDOSCOPIC VESSEL PROCUREMENT,;  Surgeon: Zhen Carter MD;  Location: [facility]    CV CORONARY ANGIOGRAM N/A 11/23/2024    Procedure: Coronary Angiogram;  Surgeon: Roque Eisenberg MD;  Location: [facility] CATH LAB CV    CV INTRA AORTIC BALLOON N/A 11/24/2024    Procedure: Intra aortic Balloon Pump Insertion;  Surgeon: Roque Eisenberg MD;  Location: [facility] CATH LAB CV    CV LEFT HEART CATH N/A 11/23/2024    Procedure: Left Heart Catheterization;  Surgeon: Roque Eisenberg MD;  Location: [facility] LAB CV    ENDOMETRIAL BIOPSY      FOOT ARTHROPLASTY Right 09/24/2020    Fourth and fifth toe by Dr. Vega    HC REPAIR OF HAMMERTOE,ONE Left 12/7/2020    Procedure: ARTHROPLASTY, digits two, three, four and five left foot;  Surgeon: Chris Vega DPM;  Location: [facility];  Service: Podiatry    HERNIA REPAIR      HYSTERECTOMY      IR LUMBAR PUNCTURE  7/20/2023    REPAIR TENDON ACHILLES Bilateral     Left was plate  , right was torn    TONSILLECTOMY      TRANSESOPHAGEAL ECHOCARDIOGRAM INTRAOPERATIVE  11/24/2024    Procedure: ECHOCARDIOGRAM, TRANSESOPHAGEAL, INTRA-OPERATIVE;  Surgeon: Zhen Carter MD;  Location: [facility] REMOVAL OF OVARY(S)      Description: Oophorectomy - Bilateral (Removal Of Both Ovaries);  Recorded: 10/09/2008;       Social History  Tobacco:   History   Smoking Status    Never   Smokeless Tobacco    Never    Alcohol:   Social History    Substance and Sexual Activity      Alcohol use: No   Illicit Drugs:   History   Drug Use No       Family History  Family History   Problem Relation Age of Onset    Diabetes Mother     Hypertension Mother     Acute Myocardial Infarction No family hx of           Namita Fitzgerald MD on  12/4/2024      cc: Saúl Hunt

## 2024-12-04 NOTE — PROGRESS NOTES
[facility]    Medicine Progress Note - Hospitalist Service    Date of Admission:  11/23/2024    Assessment & Plan   Gill Mendez is a 64 year old female with a medical history significant for hypertension, hyperlipidemia, type II DM, asthma and anxiety disorder who is admitted with NSTEMI and acute CHF decompensation. Cardiac cath showed multivessel CAD. Cardiovascular surgeon performed CABG on 11/24/24. Extubated on 11/25/24.  Urine culture grew Klebsiella oxytoca for which she received ceftriaxone. Recently lost her father and had episodes of anxiety during hospital stay.      Acute Non-STEMI  Multivessel CAD  S/p CABG  -- Coronary angiogram revealed multivessel CAD on 11/23/24.  She underwent CABG on 11/24/2024.  Remained intubated postprocedure and managed in ICU and subsequently extubated on 11/25/2024.  Required vasopressors briefly in the ICU.  -- Aspirin daily  -- Atorvastatin 80 mg daily  -- Cardiothoracic surgery following-appreciate assistance  -- Postoperative surgical management per primary thoracic surgery service    Acute CHF Decompensation, pulmonary edema  -- Echo 12/2/24 revealed LVEF of 35-40% (moderately reduced) with anteroseptal, septal and apical wall hypokinesis.  -- Received metolazone 5 mg once on 12/1/2024  -- Continue Empagliflozin  -- Monitor electrolytes and replace as needed  -- Cardiology consult appreciated.  -- 12/04: Lasix held due to orthostatic symptoms     Essential Hypertension  -- Continue Metoprolol ER     Acute blood loss anemia   Symptomatic anemia   -- She experienced dizziness and had multiple episodes of orthostatic hypotension in the last few days   -- 12/04: 1 unit PRBC. Monitor H/H     Orthostatic hypotension  -- Blood pressure check revealed significant orthostatic changes on 12/1/2024. TSH wnl  -- Received IV albumin  -- Monitor blood pressure  -- Delvin stocking  -- Cortisol: 9.3. Will order cosyntropin test in am    Mild diverticulitis  --  "Afebrile. No leukocytosis. Has LLQ pain and tenderness  -- Augmentin started on 12/04     Suspected UTI  -- Urine culture grew Klebsiella oxytoca  -- Completed 4 doses of ceftriaxone     Type II Diabetes Mellitus  -- Continue empagliflozin  -- Insulin sliding scale  -- Continue metformin  -- Monitor for hypoglycemia current per protocol      Asthma   -- Not in exacerbation  -- Resume home albuterol as needed for shortness of breath.  -- Duonebs PRN, 3 day course of prednsione   -- Continue maintenance therapy with Breo Ellipta or equivalent inhaler.     Anxiety Disorder  Severe claustrophobia   -- Continue Celexa at the current dose.  -- Monitor for any anxiety exacerbation during hospitalization.              Diet: Combination Diet Moderate Consistent Carb (60 g CHO per Meal) Diet; Low Saturated Fat Na <2400mg Diet  Fluid restriction 1800 ML FLUID  Snacks/Supplements Adult: Magic Cup; Between Meals    DVT Prophylaxis: Heparin SQ  Mcdaniels Catheter: Not present  Lines: None     Cardiac Monitoring: ACTIVE order. Indication: Open heart surgery (7 days)  Code Status: Full Code      Clinically Significant Risk Factors          # Hypochloremia: Lowest Cl = 96 mmol/L in last 2 days, will monitor as appropriate      # Hypoalbuminemia: Lowest albumin = 3.1 g/dL at 11/25/2024  4:26 AM, will monitor as appropriate     # Hypertension: Noted on problem list  # Acute heart failure with reduced ejection fraction: last echo with EF <40% and receiving IV diuretics    # Acute Hypoxic Respiratory Failure: Documented O2 saturation < 90%. Continue supplemental oxygen as needed         # Overweight: Estimated body mass index is 26.53 kg/m  as calculated from the following:    Height as of this encounter: 1.651 m (5' 5\").    Weight as of this encounter: 72.3 kg (159 lb 6.4 oz).      # Financial/Environmental Concerns: none  # Asthma: noted on problem list  # History of CABG: noted on surgical history       Social Drivers of Health  "   Alcohol Use: Medium Risk (5/25/2023)    Received from [place], [facility]    Alcohol Use     Alcohol Use Status: Yes   Interpersonal Safety: Low Risk  (11/25/2024)    Interpersonal Safety     Do you feel physically and emotionally safe where you currently live?: Patient unable to answer     Within the past 12 months, have you been hit, slapped, kicked or otherwise physically hurt by someone?: No     Within the past 12 months, have you been humiliated or emotionally abused in other ways by your partner or ex-partner?: No   Recent Concern: Interpersonal Safety - High Risk (11/23/2024)    Interpersonal Safety     Do you feel physically and emotionally safe where you currently live?: No     Within the past 12 months, have you been hit, slapped, kicked or otherwise physically hurt by someone?: No     Within the past 12 months, have you been humiliated or emotionally abused in other ways by your partner or ex-partner?: No    Received from [employer] [facility], [facility] [facility]    Social Connections          Disposition Plan     Medically Ready for Discharge: Anticipated in 2-4 Days             Samantha Lennon MD  Hospitalist Service  [facility]  Securely message with [employer] (more info)  Text page via [facility] Paging/Directory   ______________________________________________________________________    Interval History   Patient is new to me today. Chart reviewed.  Patient is seen and examined at bedside.   Patient feels lightheaded during exertion. No other complaints  Plan of care discussed with patient. All questions answered. Pt verbalized understanding.     Physical Exam   Vital Signs: Temp: 98.3  F (36.8  C) Temp src: Oral BP: 106/58 Pulse: 74   Resp: 16 SpO2: 96 % O2 Device: None (Room air) Oxygen Delivery: 1 LPM  Weight: 159 lbs 6.4 oz    GEN: Alert and oriented. Not in acute distress.  HEENT: Atraumatic, mucous membrane- moist  and pink.  Chest: Bilateral air entry.  CVS: S1S2 regular.   Abdomen: Soft. Non-tender, non-distended. No organomegaly. No guarding or rigidity. Bowel sounds active.   Extremities: + b/l LE edema.  CNS: No involuntary movements.  Skin: no cyanosis or clubbing.     Medical Decision Making       78 MINUTES SPENT BY ME on the date of service doing chart review, history, exam, documentation & further activities per the note.      Data

## 2024-12-04 NOTE — PROGRESS NOTES
sc  Patient Name: Gill Mendez   MRN: [id number]   Date of Admission: 11/23/2024    Procedure: Procedure(s):  CORONARY ARTERY BYPASS GRAFT TIMES THREE, LEFT INTERNAL MAMMARY ARTERY HARVEST, LEFT LEG ENDOSCOPIC VESSEL PROCUREMENT,  ECHOCARDIOGRAM, TRANSESOPHAGEAL, INTRA-OPERATIVE    Post Op day #:9    Subjective (Patient focus/Primary Problem for shift):  Increase mobility & activity tolerance, continue to investigate cause of anemia          Pain Goal 0 Pain Rating 6           Pain Medication/ Regime effective to reduce patient pain Yes    Objective (Physical assessment):           Rhythm: normal sinus rhythm            Bowel Activity: yes if Yes indicate when: 12/02          Bowel Medications: yes            Incision: healing well          Incentive Spirometry Q 1-2 hour when awake:  yes Volume: 750          Epicardial Pacing Wires:  no            Patient Activity:           Up to chair for meals: yes          Ambulation with RN x2 (Not including CR): no - once.          Shower Daily {YES/NO/[id number]          Nasal  Nozin BID AM/PM x 10 days: yes              Chest Tubes   Pleural: not applicable Draining: not applicable               Suction: not applicable              Mediastinal: not applicable Draining: not applicable               Suction: not applicable   Dressing Change Daily:not applicable                      Urinary Catheter: no           Preventative WOC consult (need MD order): no       Assessment (Nursing primary shift focus): Pt A/O x 4. Pressures stable & pt saturating well on RA - lungs diminished to lower lobes bilaterally. Pt placing NC back on as needed for comfort. Tele shows NSR. Pt went down for CT scan - PRN ativan given prior to CT for anxiety. Pt endorsed ride-sided pleuritic pain, improved following PRN tylenol/oxy. Pt denies SOB or MCWILLIAMS. Ambulated to nurses station & back this evening without any symptoms or distress. K/Mag/Phos protocols & CBC all for recheck in the morning.      Plan (Patient Care Plan/focus): Await radiologist interpretation of abdominal/pelvic CT scan, continue to trend hemoglobin & increase pt mobility.       Aftab Huertas RN   12/3/2024   10:35 PM

## 2024-12-04 NOTE — PROGRESS NOTES
"CVTS Daily Progress Note   POD 10 s/p CABG x3 (LIMA-LAD, rSVG-PDA, rSVG-OM)  Attending: Raul  LOS: 11    SUBJECTIVE/INTERVAL EVENTS:    No acute events overnight. O2 continues to be replaced overnight despite SpO2 high 90s. Patient reports she is very dizzy this AM, Hgb 7.0, will get 1u RBCs. Patient progressing overall well. Maintaining oxygen saturations on room air as above. Normotensive. Pain well controlled. + BM. Tolerating PO intake. Adequate UOP. Patient denies new chest pain, shortness of breath, and nausea. Reports that she has some RUQ pain this AM, had abd CT yesterday, recommended RUQ US if clinically indicated. All questions answered on rounds to patient's satisfaction.    OBJECTIVE:  Temp:  [97.8  F (36.6  C)-98.7  F (37.1  C)] 97.8  F (36.6  C)  Pulse:  [75-96] 96  Resp:  [18] 18  BP: ()/(52-59) 123/58  SpO2:  [88 %-100 %] 96 %  Vitals:    11/30/24 1704 12/01/24 0410 12/01/24 0525 12/02/24 0440   Weight: 74.9 kg (165 lb 3.2 oz) 73.6 kg (162 lb 4.8 oz) 73.1 kg (161 lb 3.2 oz) 73.5 kg (162 lb)    12/03/24 0325   Weight: 72.7 kg (160 lb 4.8 oz)       Clinically Significant Risk Factors          # Hypochloremia: Lowest Cl = 96 mmol/L in last 2 days, will monitor as appropriate      # Hypoalbuminemia: Lowest albumin = 3.1 g/dL at 11/25/2024  4:26 AM, will monitor as appropriate     # Hypertension: Noted on problem list    # Acute heart failure with reduced ejection fraction: last echo with EF <40% and receiving IV diuretics           # Overweight: Estimated body mass index is 26.68 kg/m  as calculated from the following:    Height as of this encounter: 1.651 m (5' 5\").    Weight as of this encounter: 72.7 kg (160 lb 4.8 oz).        # Financial/Environmental Concerns: none  # Asthma: noted on problem list  # History of CABG: noted on surgical history            Current Medications:    Scheduled Meds:  Current Facility-Administered Medications   Medication Dose Route Frequency Provider Last Rate " Last Admin    aspirin (ASA) chewable tablet 162 mg  162 mg Oral or NG Tube Daily Barbara Boggs NP   162 mg at 12/03/24 0819    atorvastatin (LIPITOR) tablet 80 mg  80 mg Oral or Feeding Tube QPM Barbara Boggs NP   80 mg at 12/03/24 2109    citalopram (celeXA) tablet 20 mg  20 mg Oral or Feeding Tube QAM Barbara Boggs NP   20 mg at 12/03/24 0818    empagliflozin (JARDIANCE) tablet 25 mg  25 mg Oral Daily Beena Mitchell PA-C   25 mg at 12/03/24 0820    fluticasone (ARNUITY ELLIPTA) 100 MCG/ACT inhaler 1 puff  1 puff Inhalation Daily Barbara Boggs NP   1 puff at 12/03/24 0821    furosemide (LASIX) tablet 20 mg  20 mg Oral Daily Namita Fitzgerald MD        guaiFENesin (MUCINEX) 12 hr tablet 600 mg  600 mg Oral BID Beena Mitchell PA-C   600 mg at 12/03/24 2109    heparin ANTICOAGULANT injection 5,000 Units  5,000 Units Subcutaneous Q8H Barbara Boggs NP   5,000 Units at 12/04/24 0341    insulin aspart (NovoLOG) injection (RAPID ACTING)  1-7 Units Subcutaneous TID AC Barbara Boggs NP   2 Units at 12/03/24 1153    ipratropium - albuterol 0.5 mg/2.5 mg/3 mL (DUONEB) neb solution 3 mL  3 mL Nebulization 4x daily Barbara Boggs NP   3 mL at 12/03/24 1926    metFORMIN (GLUCOPHAGE XR) 24 hr tablet 1,000 mg  1,000 mg Oral Daily with breakfast Beena Mitchell PA-C   1,000 mg at 12/03/24 0819    metoprolol succinate ER (TOPROL XL) 24 hr tablet 25 mg  25 mg Oral Daily Namita Fitzgerald MD   25 mg at 12/03/24 0818    polyethylene glycol (MIRALAX) Packet 17 g  17 g Oral Daily Barbara Boggs NP   17 g at 11/28/24 0805    pramipexole (MIRAPEX) tablet 0.125 mg  0.125 mg Oral Daily Sudarshan Jimenez MD   0.125 mg at 12/03/24 1438    pramipexole (MIRAPEX) tablet 0.5 mg  0.5 mg Oral At Bedtime Barbara Boggs NP   0.5 mg at 12/03/24 2109    senna-docusate (SENOKOT-S/PERICOLACE) 8.6-50 MG per tablet 1 tablet  1 tablet Oral BID Barbara Boggs NP   1 tablet at 12/03/24 2109     sodium chloride (PF) 0.9% PF flush 3 mL  3 mL Intracatheter Q8H Barbara Boggs NP   3 mL at 12/03/24 1087     Continuous Infusions:  Current Facility-Administered Medications   Medication Dose Route Frequency Provider Last Rate Last Admin     PRN Meds:  Current Facility-Administered Medications   Medication Dose Route Frequency Provider Last Rate Last Admin    acetaminophen (TYLENOL) tablet 650 mg  650 mg Oral Q4H PRN Barbara Boggs NP   650 mg at 12/04/24 0651    albuterol (PROVENTIL HFA/VENTOLIN HFA) inhaler  2 puff Inhalation Q6H PRN Barbara Boggs NP        bisacodyl (DULCOLAX) suppository 10 mg  10 mg Rectal Daily PRN Barbara Boggs NP        glucose gel 15-30 g  15-30 g Oral Q15 Min PRN Barbara Boggs NP        Or    dextrose 50 % injection 25-50 mL  25-50 mL Intravenous Q15 Min PRBarbara Platt NP        Or    glucagon injection 1 mg  1 mg Subcutaneous Q15 Min PRN Barbara Boggs NP        hydrALAZINE (APRESOLINE) injection 10 mg  10 mg Intravenous Q6H PRN Barbara Boggs NP        hydrOXYzine HCl (ATARAX) tablet 10 mg  10 mg Oral or Feeding Tube 4x Daily PRN Barbara Boggs NP   10 mg at 12/04/24 0651    magnesium hydroxide (MILK OF MAGNESIA) suspension 30 mL  30 mL Oral Daily PRN Barbara Boggs NP        naloxone (NARCAN) injection 0.2 mg  0.2 mg Intravenous Q2 Min PRBarbara Platt NP        Or    naloxone (NARCAN) injection 0.4 mg  0.4 mg Intravenous Q2 Min PRBarbara Platt NP        Or    naloxone (NARCAN) injection 0.2 mg  0.2 mg Intramuscular Q2 Min PRBarbara Platt NP        Or    naloxone (NARCAN) injection 0.4 mg  0.4 mg Intramuscular Q2 Min PRN Barbara Boggs NP        ondansetron (ZOFRAN ODT) ODT tab 4 mg  4 mg Oral Q6H PRN Barbara Boggs NP   4 mg at 12/04/24 0651    Or    ondansetron (ZOFRAN) injection 4 mg  4 mg Intravenous Q6H PRN Barbara Boggs NP   4 mg at 11/30/24 2018    oxyCODONE IR (ROXICODONE) half-tab 2.5 mg  2.5 mg Oral Q4H PRN  Beena Mitchell PA-C   2.5 mg at 12/04/24 0401    Or    oxyCODONE (ROXICODONE) tablet 5 mg  5 mg Oral Q4H PRN Beena Mitchell PA-C        sodium chloride (PF) 0.9% PF flush 3 mL  3 mL Intracatheter q1 min prn Tia Boggsnamrata SISI, NP   3 mL at 12/03/24 0018       Cardiographics:    Telemetry monitoring demonstrates SR with rates in the 70-80s per my personal review.    Imaging:  Recent Results (from the past 24 hours)   CT Abdomen Pelvis w/o Contrast    Narrative    EXAM: CT ABDOMEN PELVIS W/O CONTRAST  LOCATION: [facility]  DATE: 12/3/2024    INDICATION: abdominal pain and sudden hemoglobin drop  COMPARISON: Chest radiographs 12/20/2024. CTA chest 1/1/2023. CT abdomen and pelvis 3/30/2012.  TECHNIQUE: CT scan of the abdomen and pelvis was performed without IV contrast. Multiplanar reformats were obtained. Dose reduction techniques were used.  CONTRAST: None.    FINDINGS:   LOWER CHEST: Poststernotomy. Enlarged heart. Severe calcification of the included native coronary arteries. Small bilateral pleural effusions and bibasilar atelectasis.    HEPATOBILIARY: Sludge and/or stones in the gallbladder which has a mildly thickened wall. No pericholecystic fluid or inflammation. No liver mass or biliary dilatation.    PANCREAS: Normal.    SPLEEN: Normal.    ADRENAL GLANDS: Normal.    KIDNEYS/BLADDER: 4 mm and 3 mm nonobstructing left intrarenal stones. Normal right kidney and bladder.    BOWEL: Small periampullary duodenal diverticulum. Colonic diverticulosis. Mild wall thickening and subtle inflammation of the distal descending colon centered on a diverticulum consistent with mild diverticulitis. No obstruction, free air or abscess.   Normal appendix.    LYMPH NODES: Normal.    VASCULATURE: Mild to moderate aortoiliac atherosclerosis.    PELVIC ORGANS: Post hysterectomy. Minimal free pelvic fluid.    MUSCULOSKELETAL: Left femoral acetabular arthroplasty. Degenerative changes of the spine.  Mild body wall edema. Injection therapy changes of the ventral abdominal wall.      Impression    IMPRESSION:   1.  Mild diverticulitis of the distal descending colon.  2.  Mild nonspecific wall thickening of the gallbladder which contains sludge and/or stones. If there are symptoms referable to the right upper quadrant, further evaluation by ultrasound would be recommended.  3.  Nonobstructive nephrolithiasis on the left.  4.  Small bilateral pleural effusions and bibasilar atelectasis.  5.  Small pericardial effusion.  6.  Body wall edema.         Labs, personally reviewed.  Hemoglobin   Date Value Ref Range Status   12/04/2024 7.0 (L) 11.7 - 15.7 g/dL Final   12/03/2024 7.3 (L) 11.7 - 15.7 g/dL Final   12/03/2024 7.1 (L) 11.7 - 15.7 g/dL Final     WBC Count   Date Value Ref Range Status   12/04/2024 10.5 4.0 - 11.0 10e3/uL Final   12/03/2024 9.7 4.0 - 11.0 10e3/uL Final   12/02/2024 12.2 (H) 4.0 - 11.0 10e3/uL Final     Platelet Count   Date Value Ref Range Status   12/04/2024 328 150 - 450 10e3/uL Final   12/03/2024 301 150 - 450 10e3/uL Final   12/02/2024 317 150 - 450 10e3/uL Final     Creatinine   Date Value Ref Range Status   12/03/2024 0.95 0.51 - 0.95 mg/dL Final   12/02/2024 0.81 0.51 - 0.95 mg/dL Final   12/01/2024 0.83 0.51 - 0.95 mg/dL Final     Potassium   Date Value Ref Range Status   12/04/2024 4.0 3.4 - 5.3 mmol/L Final   12/03/2024 4.2 3.4 - 5.3 mmol/L Final   12/03/2024 4.2 3.4 - 5.3 mmol/L Final   01/02/2023 4.0 3.5 - 5.0 mmol/L Final   01/01/2023 4.1 3.5 - 5.0 mmol/L Final   03/22/2021 4.4 3.5 - 5.0 mmol/L Final     Potassium POCT   Date Value Ref Range Status   11/24/2024 4.0 3.4 - 5.3 mmol/L Final   11/24/2024 3.5 3.4 - 5.3 mmol/L Final   11/24/2024 4.4 3.4 - 5.3 mmol/L Final     Magnesium   Date Value Ref Range Status   12/04/2024 2.2 1.7 - 2.3 mg/dL Final   12/03/2024 2.2 1.7 - 2.3 mg/dL Final   12/02/2024 2.0 1.7 - 2.3 mg/dL Final          I/O:  I/O last 3 completed shifts:  In: 1330  [P.O.:1330]  Out: 2400 [Urine:2400]       Physical Exam:    General: Patient seen in bed this AM, NAD. Conversant, pleasant, calm, cooperative on exam.  HEENT: no scleral icterus, moist mucosa, no tracheal deviation  CV: RRR on monitor - SR, WWP. Negligible LE edema.  Incision C/D/I, no erythema or induration  Pulm: Unlabored breathing on RA, occasional loose cough per baseline  Abd: RUQ tenderness to palpation, otherwise nontender. Soft, nondistended.   Ext: Negligible pedal edema, incision C/D/I without erythema or induration  Neuro: A&O, CNs grossly intact, face symmetric, speech clear, SPARKS      ASSESSMENT/PLAN:    Gill Mendez is a 64 year old female who is s/p CABG x3.    Active Problems:    * No active hospital problems. *        NEURO  PSYCH:  Acute postop pain  ANX // RLS  - Scheduled Tylenol/lidocaine patches and PRN Tylenol/oxycodone for pain  - PTA Celexa and mirapex resumed  - PRN Atarax available PRN for anxiety    CV:  ICM/HFmrEF  NSTEMI/CAD s/p CABG x3  HTN // HLD  Hypervolemia, improving  Orthostatic hypotension  - Preop echo LVEF 25-30%, 12/1 LVEF 35-40%  - Small pericardial effusion on abd CT yesterday. With increased dizziness this AM, will repeat echo.  - IABP removed POD 1, med CT and TPW removed 11/29, pleurals removed 11/30  - Normotensive  - Hold PTA amlodipine and lisinopril  - ASA 162mg  - Atorvastatin 80mg daily  - Cardiology consult for GDMT   - 25 mg metop XL qday, lasix 20 mg PO qday  - Will need HF follow-up at discharge   - PTA Jardiance    PULM:  Acute hypoxic respiratory insufficiency - resolved  Asthma/COPD  Right PTX - resolved  - Extubated POD1  - Maintaining oxygen saturations on room air this AM  - PTA Arnuity Ellipta & albuterol inhalers  - Duonebs QID & EZPap  - Encourage pulmonary toilet/OOB/activity    GI  NUTRITION:  - Diet: Cardiac/consistent carb  - Bowel regimen, LBM 12/3  - Mild distal descending colon diverticulitis on abd CT yesterday, but no LLQ pain,  leukocytosis, fever, or tachycardia. Will treat w/ augmentin 875/125 q8 x5 days to prevent complications.  - Also gallbladder wall thickening on abd CT yesterday which better correlates with symptoms of RUQ tenderness, will do RUQ US and potential gen surg consult pending results    RENAL  FE:  CARMEN, resolved  Urinary retention 2/2 Klebsiella CAUTI - resolved  - Adequate UOP  - Cr WNL  - Diuresis per cards w/ 20 mg PO lasix started today  - Hold PTA chlorthalidone  - Continue electrolyte replacement protocol  - UTI abx completed as below.    HEME:  Acute blood loss anemia  - Hgb continues to hover around 7.0  - 1u PRBCs today  - Recheck Hgb ordered for 1200  - Hep SQ, ASA    ID:  Stress-induced leukocytosis, resolved  Klebsiella CAUTI, resolved  - Yecenia op ppx complete, afebrile  - Rocephin 11/27-28, Macrobid started 11/29-12/1, rocephin per HMS 12/1-12/2    ENDO:  DM2   - Hgb A1c 6.1%  - Sliding scale insulin - minimal need, anticipate discharge on PTA DM meds  - PTA Jardiance, metformin resumed  - Hold PTA tirzepatide  - BG goal <180 to promote optimal wound healing    PPx:  - DVT: SCDs, SQ heparin TID, OOB/ambulation   - GI: Protonix 40mg PO daily    DISPO:  - General telemetry status since POD 4  - Medically Ready for Discharge: Anticipated Tomorrow or Friday pending optimization of HF mgmt, RUQ US, and Hgb  - Therapies recommending discharge home with OP CR once medically stable    Patient discussed w/ Dr. Irwin.      Amy Mitchell PA-C  [facility] Cardiothoracic Surgery  Vocera or Secure Chat  December 4, 2024

## 2024-12-04 NOTE — PROGRESS NOTES
.[facility] was introduced to the patient today including our role in the home management of their heart failure.  Heart Failure Education Materials were shared today with the patient.  Introduction to the expectations including daily weight tracking using the Daily Weight Log  Home B/P monitoring as appropriate( to bring in home monitoring cuff to the clinic appointment for verification)  Low Sodium diet of 2000mg or less daily, review of label reading, aim for fresh and avoid processed items  Daily Fluid restriction of 2000ml  considerations  Exercise importance as able explained  Monitor and report s/s of heart failure. How to report these changes.  Follow up appointments and testing expectations.    Met with patient at the bedside and provided our education material.  Patient is accepting of the education and knows to call the HF Nurse Line with questions.  We currently are supporting a family member of hers and she is familiar with our clinic.  Patient received an [employer] application.      Al Vanessa RN    [facility]  [facility]   [phone] Nurse Line  [facility]   [facility]   [address] [place] [address]

## 2024-12-04 NOTE — PROGRESS NOTES
sc  Patient Name: Gill Mendez   MRN: [id number]   Date of Admission: 11/23/2024    Procedure: Procedure(s):  CORONARY ARTERY BYPASS GRAFT TIMES THREE, LEFT INTERNAL MAMMARY ARTERY HARVEST, LEFT LEG ENDOSCOPIC VESSEL PROCUREMENT,  ECHOCARDIOGRAM, TRANSESOPHAGEAL, INTRA-OPERATIVE    Post Op day #:10    Subjective (Patient focus/Primary Problem for shift): low HGB and O2 sats low 86% still needs )2 at 1 liter per n/c          Pain Goal0 Pain Rating0           Pain Medication/ Regime effective to reduce patient pain tylenol prn    Objective (Physical assessment):           Rhythm: normal sinus rhythm            Bowel Activity: yes if Yes indicate when: 12-3-24          Bowel Medications: yes            Incision: healing well          Incentive Spirometry Q 1-2 hour when awake:  yes Volume: 750          Epicardial Pacing Wires:  no            Patient Activity:           Up to chair for meals: yes          Ambulation with RN x2 (Not including CR): yes           Shower Daily {YES/NO/NA:[id number]          Nasal  Nozin BID AM/PM x 10 days: yes              Chest Tubes   Pleural: no Draining: no               Suction: no              Mediastinal: no Draining: no               Suction: no   Dressing Change Daily:no If No, why?open to air                     Urinary Catheter: no           Preventative WOC consult (need MD order): no       Assessment (Nursing primary shift focus): Patient getting one unit PRBC. And the HGB 7.0 . Plan to recheck Hgb at 1400 today.  in to discuss patient going home with home care. Patient is agreeable.  helps the patient at home per wife.    Plan (Patient Care Plan/focus): Transfuse the one unit of blood. Follow up HGB. Try to wean off oxygen. Neb treatments per respiratory therapist. Plan on patient to go home with Home Care. Call light in reach. Cardiac rehab.       Monisha Lewis RN   12/4/2024   10:20 AM

## 2024-12-04 NOTE — PLAN OF CARE
Goal Outcome Evaluation:                  sc  Patient Name: Gill Mendez   MRN: [id number]   Date of Admission: 11/23/2024    Procedure: Procedure(s):  CORONARY ARTERY BYPASS GRAFT TIMES THREE, LEFT INTERNAL MAMMARY ARTERY HARVEST, LEFT LEG ENDOSCOPIC VESSEL PROCUREMENT,  ECHOCARDIOGRAM, TRANSESOPHAGEAL, INTRA-OPERATIVE    Post Op day #:10    Subjective (Patient focus/Primary Problem for shift): unable to wean O2          Pain Goal3 Pain Rating6           Pain Medication/ Regime effective to reduce patient painprn oxycodone 2.5 mg    Objective (Physical assessment):           Rhythm: normal sinus rhythm            Bowel Activity: yes, but not this shift if Yes indicate when: 12/2          Bowel Medications: yes            Incision: healing well          Incentive Spirometry Q 1-2 hour when awake:  yes Volume: 750          Epicardial Pacing Wires:  no            Patient Activity:           Up to chair for meals: yes          Ambulation with RN x2 (Not including CR): not applicable           Shower Daily {YES/NO/NA:[id number]          Nasal  Nozin BID AM/PM x 10 days: yes              Chest Tubes   Pleural: no Draining: not applicable               Suction: not applicable              Mediastinal: no Draining: not applicable               Suction: not applicable   Dressing Change Daily:no If No, why?open to air                     Urinary Catheter: no           Preventative WOC consult (need MD order): no       Assessment (Nursing primary shift focus): Patient alert and oriented.  Lung sounds diminished, though clear.  PRN oxycodone 2.5 mg given for right lower chest pain of 5-6 out of 10, reduces pain to 3/10.  RN attempted to wean patient to room air, but patient desaturates to 87-88% while asleep, needed 1 L/min nasal cannula.    Plan (Patient Care Plan/focus): Get patient up in recliner, while patient will be awake during day, remove nasal cannula and see if patient can tolerate room air.     At 0645,  patient said she felt very dizzy, nauseous, and anxious.  BP was 123/58.  Tylenol, atarax, and zofran given.  Patient felt like she could not catch her breath after transfer to [facility], so RN was unable to attempt to wean nasal cannula at this time.  Will have day shift get her out of bed when dizziness has improved.    Elizabeth Butt RN   12/4/2024   5:57 AM

## 2024-12-05 ENCOUNTER — APPOINTMENT (OUTPATIENT)
Dept: OCCUPATIONAL THERAPY | Facility: HOSPITAL | Age: 64
DRG: 233 | End: 2024-12-05
Attending: INTERNAL MEDICINE
Payer: COMMERCIAL

## 2024-12-05 ENCOUNTER — APPOINTMENT (OUTPATIENT)
Dept: NUCLEAR MEDICINE | Facility: HOSPITAL | Age: 64
DRG: 233 | End: 2024-12-05
Attending: SPECIALIST
Payer: COMMERCIAL

## 2024-12-05 ENCOUNTER — APPOINTMENT (OUTPATIENT)
Dept: RADIOLOGY | Facility: HOSPITAL | Age: 64
DRG: 233 | End: 2024-12-05
Payer: COMMERCIAL

## 2024-12-05 VITALS
HEIGHT: 64 IN | RESPIRATION RATE: 16 BRPM | TEMPERATURE: 98.9 F | SYSTOLIC BLOOD PRESSURE: 126 MMHG | OXYGEN SATURATION: 94 % | DIASTOLIC BLOOD PRESSURE: 57 MMHG | BODY MASS INDEX: 27.1 KG/M2 | HEART RATE: 93 BPM | WEIGHT: 158.7 LBS

## 2024-12-05 LAB
ANION GAP SERPL CALCULATED.3IONS-SCNC: 7 MMOL/L (ref 7–15)
BASOPHILS # BLD AUTO: 0 10E3/UL (ref 0–0.2)
BASOPHILS NFR BLD AUTO: 0 %
BUN SERPL-MCNC: 18.1 MG/DL (ref 8–23)
CALCIUM SERPL-MCNC: 9.2 MG/DL (ref 8.8–10.4)
CHLORIDE SERPL-SCNC: 98 MMOL/L (ref 98–107)
CORTICOSTER 1H P 250 UG ACTH SERPL-SCNC: 21.2 UG/DL
CORTIS SERPL-MCNC: 9.3 UG/DL
CREAT SERPL-MCNC: 0.92 MG/DL (ref 0.51–0.95)
CRP SERPL-MCNC: 78.8 MG/L
EGFRCR SERPLBLD CKD-EPI 2021: 70 ML/MIN/1.73M2
EOSINOPHIL # BLD AUTO: 0.6 10E3/UL (ref 0–0.7)
EOSINOPHIL NFR BLD AUTO: 5 %
ERYTHROCYTE [DISTWIDTH] IN BLOOD BY AUTOMATED COUNT: 17 % (ref 10–15)
GLUCOSE BLDC GLUCOMTR-MCNC: 181 MG/DL (ref 70–99)
GLUCOSE BLDC GLUCOMTR-MCNC: 192 MG/DL (ref 70–99)
GLUCOSE BLDC GLUCOMTR-MCNC: 211 MG/DL (ref 70–99)
GLUCOSE BLDC GLUCOMTR-MCNC: 251 MG/DL (ref 70–99)
GLUCOSE SERPL-MCNC: 167 MG/DL (ref 70–99)
HCO3 SERPL-SCNC: 35 MMOL/L (ref 22–29)
HCT VFR BLD AUTO: 27.7 % (ref 35–47)
HGB BLD-MCNC: 8.8 G/DL (ref 11.7–15.7)
IMM GRANULOCYTES # BLD: 0.1 10E3/UL
IMM GRANULOCYTES NFR BLD: 1 %
LYMPHOCYTES # BLD AUTO: 2.1 10E3/UL (ref 0.8–5.3)
LYMPHOCYTES NFR BLD AUTO: 17 %
MAGNESIUM SERPL-MCNC: 2.2 MG/DL (ref 1.7–2.3)
MCH RBC QN AUTO: 26.4 PG (ref 26.5–33)
MCHC RBC AUTO-ENTMCNC: 31.8 G/DL (ref 31.5–36.5)
MCV RBC AUTO: 83 FL (ref 78–100)
MONOCYTES # BLD AUTO: 1.1 10E3/UL (ref 0–1.3)
MONOCYTES NFR BLD AUTO: 9 %
NEUTROPHILS # BLD AUTO: 8.3 10E3/UL (ref 1.6–8.3)
NEUTROPHILS NFR BLD AUTO: 68 %
NRBC # BLD AUTO: 0 10E3/UL
NRBC BLD AUTO-RTO: 0 /100
PHOSPHATE SERPL-MCNC: 3.7 MG/DL (ref 2.5–4.5)
PLATELET # BLD AUTO: 357 10E3/UL (ref 150–450)
POTASSIUM SERPL-SCNC: 4.8 MMOL/L (ref 3.4–5.3)
RBC # BLD AUTO: 3.33 10E6/UL (ref 3.8–5.2)
SODIUM SERPL-SCNC: 140 MMOL/L (ref 135–145)
WBC # BLD AUTO: 12.1 10E3/UL (ref 4–11)

## 2024-12-05 PROCEDURE — 210N000001 HC R&B IMCU HEART CARE

## 2024-12-05 PROCEDURE — 97535 SELF CARE MNGMENT TRAINING: CPT | Mod: GO

## 2024-12-05 PROCEDURE — 250N000011 HC RX IP 250 OP 636: Performed by: SURGERY

## 2024-12-05 PROCEDURE — 94640 AIRWAY INHALATION TREATMENT: CPT

## 2024-12-05 PROCEDURE — 250N000013 HC RX MED GY IP 250 OP 250 PS 637

## 2024-12-05 PROCEDURE — 250N000013 HC RX MED GY IP 250 OP 250 PS 637: Performed by: SURGERY

## 2024-12-05 PROCEDURE — 250N000013 HC RX MED GY IP 250 OP 250 PS 637: Performed by: INTERNAL MEDICINE

## 2024-12-05 PROCEDURE — 250N000011 HC RX IP 250 OP 636: Performed by: STUDENT IN AN ORGANIZED HEALTH CARE EDUCATION/TRAINING PROGRAM

## 2024-12-05 PROCEDURE — 999N000157 HC STATISTIC RCP TIME EA 10 MIN

## 2024-12-05 PROCEDURE — 83735 ASSAY OF MAGNESIUM: CPT | Performed by: SURGERY

## 2024-12-05 PROCEDURE — 82533 TOTAL CORTISOL: CPT | Performed by: STUDENT IN AN ORGANIZED HEALTH CARE EDUCATION/TRAINING PROGRAM

## 2024-12-05 PROCEDURE — 250N000009 HC RX 250: Performed by: SURGERY

## 2024-12-05 PROCEDURE — 78226 HEPATOBILIARY SYSTEM IMAGING: CPT

## 2024-12-05 PROCEDURE — 71046 X-RAY EXAM CHEST 2 VIEWS: CPT

## 2024-12-05 PROCEDURE — 94799 UNLISTED PULMONARY SVC/PX: CPT

## 2024-12-05 PROCEDURE — A9537 TC99M MEBROFENIN: HCPCS | Performed by: SPECIALIST

## 2024-12-05 PROCEDURE — 94660 CPAP INITIATION&MGMT: CPT

## 2024-12-05 PROCEDURE — 86140 C-REACTIVE PROTEIN: CPT

## 2024-12-05 PROCEDURE — 99233 SBSQ HOSP IP/OBS HIGH 50: CPT | Performed by: STUDENT IN AN ORGANIZED HEALTH CARE EDUCATION/TRAINING PROGRAM

## 2024-12-05 PROCEDURE — 80048 BASIC METABOLIC PNL TOTAL CA: CPT | Performed by: STUDENT IN AN ORGANIZED HEALTH CARE EDUCATION/TRAINING PROGRAM

## 2024-12-05 PROCEDURE — 250N000013 HC RX MED GY IP 250 OP 250 PS 637: Performed by: STUDENT IN AN ORGANIZED HEALTH CARE EDUCATION/TRAINING PROGRAM

## 2024-12-05 PROCEDURE — 343N000001 HC RX 343 MED OP 636: Performed by: SPECIALIST

## 2024-12-05 PROCEDURE — 36415 COLL VENOUS BLD VENIPUNCTURE: CPT | Performed by: STUDENT IN AN ORGANIZED HEALTH CARE EDUCATION/TRAINING PROGRAM

## 2024-12-05 PROCEDURE — 82024 ASSAY OF ACTH: CPT | Performed by: STUDENT IN AN ORGANIZED HEALTH CARE EDUCATION/TRAINING PROGRAM

## 2024-12-05 PROCEDURE — 84100 ASSAY OF PHOSPHORUS: CPT | Performed by: SURGERY

## 2024-12-05 PROCEDURE — 99233 SBSQ HOSP IP/OBS HIGH 50: CPT | Performed by: INTERNAL MEDICINE

## 2024-12-05 PROCEDURE — 97110 THERAPEUTIC EXERCISES: CPT | Mod: GO

## 2024-12-05 PROCEDURE — 250N000013 HC RX MED GY IP 250 OP 250 PS 637: Performed by: FAMILY MEDICINE

## 2024-12-05 PROCEDURE — 94640 AIRWAY INHALATION TREATMENT: CPT | Mod: 76

## 2024-12-05 PROCEDURE — 85025 COMPLETE CBC W/AUTO DIFF WBC: CPT | Performed by: STUDENT IN AN ORGANIZED HEALTH CARE EDUCATION/TRAINING PROGRAM

## 2024-12-05 RX ORDER — FUROSEMIDE 20 MG/1
20 TABLET ORAL DAILY
Status: DISCONTINUED | OUTPATIENT
Start: 2024-12-05 | End: 2024-12-06 | Stop reason: HOSPADM

## 2024-12-05 RX ORDER — HYDROXYZINE HYDROCHLORIDE 50 MG/1
50 TABLET, FILM COATED ORAL EVERY 6 HOURS PRN
Status: DISCONTINUED | OUTPATIENT
Start: 2024-12-05 | End: 2024-12-06 | Stop reason: HOSPADM

## 2024-12-05 RX ORDER — SINCALIDE 5 UG/5ML
0.02 INJECTION, POWDER, LYOPHILIZED, FOR SOLUTION INTRAVENOUS ONCE
Status: COMPLETED | OUTPATIENT
Start: 2024-12-05 | End: 2024-12-05

## 2024-12-05 RX ORDER — KIT FOR THE PREPARATION OF TECHNETIUM TC 99M MEBROFENIN 45 MG/10ML
8.4 INJECTION, POWDER, LYOPHILIZED, FOR SOLUTION INTRAVENOUS ONCE
Status: COMPLETED | OUTPATIENT
Start: 2024-12-05 | End: 2024-12-05

## 2024-12-05 RX ORDER — HYDROXYZINE HYDROCHLORIDE 25 MG/1
25 TABLET, FILM COATED ORAL EVERY 6 HOURS PRN
Status: DISCONTINUED | OUTPATIENT
Start: 2024-12-05 | End: 2024-12-06 | Stop reason: HOSPADM

## 2024-12-05 RX ORDER — COSYNTROPIN 0.25 MG/ML
250 INJECTION, POWDER, FOR SOLUTION INTRAMUSCULAR; INTRAVENOUS ONCE
Status: COMPLETED | OUTPATIENT
Start: 2024-12-05 | End: 2024-12-05

## 2024-12-05 RX ORDER — LORAZEPAM 0.5 MG/1
0.5 TABLET ORAL DAILY PRN
Status: COMPLETED | OUTPATIENT
Start: 2024-12-05 | End: 2024-12-05

## 2024-12-05 RX ADMIN — AMOXICILLIN AND CLAVULANATE POTASSIUM 1 TABLET: 875; 125 TABLET, FILM COATED ORAL at 20:52

## 2024-12-05 RX ADMIN — SENNOSIDES AND DOCUSATE SODIUM 1 TABLET: 8.6; 5 TABLET ORAL at 20:52

## 2024-12-05 RX ADMIN — COSYNTROPIN 250 MCG: 0.25 INJECTION, POWDER, LYOPHILIZED, FOR SOLUTION INTRAMUSCULAR; INTRAVENOUS at 07:56

## 2024-12-05 RX ADMIN — SINCALIDE 1.4 MCG: 5 INJECTION, POWDER, LYOPHILIZED, FOR SOLUTION INTRAVENOUS at 14:07

## 2024-12-05 RX ADMIN — INSULIN ASPART 2 UNITS: 100 INJECTION, SOLUTION INTRAVENOUS; SUBCUTANEOUS at 15:04

## 2024-12-05 RX ADMIN — ACETAMINOPHEN 650 MG: 325 TABLET ORAL at 00:06

## 2024-12-05 RX ADMIN — PRAMIPEXOLE DIHYDROCHLORIDE 0.12 MG: 0.12 TABLET ORAL at 15:04

## 2024-12-05 RX ADMIN — ACETAMINOPHEN 650 MG: 325 TABLET ORAL at 22:27

## 2024-12-05 RX ADMIN — OXYCODONE HYDROCHLORIDE 2.5 MG: 5 TABLET ORAL at 01:32

## 2024-12-05 RX ADMIN — GUAIFENESIN 600 MG: 600 TABLET ORAL at 20:52

## 2024-12-05 RX ADMIN — GUAIFENESIN 600 MG: 600 TABLET ORAL at 08:09

## 2024-12-05 RX ADMIN — OXYCODONE HYDROCHLORIDE 2.5 MG: 5 TABLET ORAL at 02:49

## 2024-12-05 RX ADMIN — AMOXICILLIN AND CLAVULANATE POTASSIUM 1 TABLET: 875; 125 TABLET, FILM COATED ORAL at 11:23

## 2024-12-05 RX ADMIN — HYDROXYZINE HYDROCHLORIDE 10 MG: 10 TABLET ORAL at 00:06

## 2024-12-05 RX ADMIN — OXYCODONE HYDROCHLORIDE 5 MG: 5 TABLET ORAL at 22:28

## 2024-12-05 RX ADMIN — ATORVASTATIN CALCIUM 80 MG: 40 TABLET, FILM COATED ORAL at 20:52

## 2024-12-05 RX ADMIN — METFORMIN ER 500 MG 1000 MG: 500 TABLET ORAL at 08:10

## 2024-12-05 RX ADMIN — OXYCODONE HYDROCHLORIDE 2.5 MG: 5 TABLET ORAL at 08:08

## 2024-12-05 RX ADMIN — INSULIN ASPART 1 UNITS: 100 INJECTION, SOLUTION INTRAVENOUS; SUBCUTANEOUS at 07:10

## 2024-12-05 RX ADMIN — AMOXICILLIN AND CLAVULANATE POTASSIUM 1 TABLET: 875; 125 TABLET, FILM COATED ORAL at 03:55

## 2024-12-05 RX ADMIN — EMPAGLIFLOZIN 25 MG: 25 TABLET, FILM COATED ORAL at 08:09

## 2024-12-05 RX ADMIN — ASPIRIN 81 MG CHEWABLE TABLET 162 MG: 81 TABLET CHEWABLE at 08:08

## 2024-12-05 RX ADMIN — FUROSEMIDE 20 MG: 20 TABLET ORAL at 10:36

## 2024-12-05 RX ADMIN — HEPARIN SODIUM 5000 UNITS: 5000 INJECTION, SOLUTION INTRAVENOUS; SUBCUTANEOUS at 03:55

## 2024-12-05 RX ADMIN — IPRATROPIUM BROMIDE AND ALBUTEROL SULFATE 3 ML: .5; 3 SOLUTION RESPIRATORY (INHALATION) at 11:55

## 2024-12-05 RX ADMIN — HYDROXYZINE HYDROCHLORIDE 25 MG: 25 TABLET, FILM COATED ORAL at 23:41

## 2024-12-05 RX ADMIN — METOPROLOL SUCCINATE 25 MG: 25 TABLET, EXTENDED RELEASE ORAL at 08:10

## 2024-12-05 RX ADMIN — HEPARIN SODIUM 5000 UNITS: 5000 INJECTION, SOLUTION INTRAVENOUS; SUBCUTANEOUS at 20:52

## 2024-12-05 RX ADMIN — INSULIN ASPART 3 UNITS: 100 INJECTION, SOLUTION INTRAVENOUS; SUBCUTANEOUS at 18:23

## 2024-12-05 RX ADMIN — IPRATROPIUM BROMIDE AND ALBUTEROL SULFATE 3 ML: .5; 3 SOLUTION RESPIRATORY (INHALATION) at 07:28

## 2024-12-05 RX ADMIN — LORAZEPAM 0.5 MG: 0.5 TABLET ORAL at 11:23

## 2024-12-05 RX ADMIN — IPRATROPIUM BROMIDE AND ALBUTEROL SULFATE 3 ML: .5; 3 SOLUTION RESPIRATORY (INHALATION) at 15:20

## 2024-12-05 RX ADMIN — ALBUTEROL SULFATE 2 PUFF: 90 AEROSOL, METERED RESPIRATORY (INHALATION) at 01:44

## 2024-12-05 RX ADMIN — POLYETHYLENE GLYCOL 3350 17 G: 17 POWDER, FOR SOLUTION ORAL at 08:10

## 2024-12-05 RX ADMIN — PRAMIPEXOLE DIHYDROCHLORIDE 0.5 MG: 0.5 TABLET ORAL at 21:11

## 2024-12-05 RX ADMIN — MEBROFENIN 8.4 MILLICURIE: 45 INJECTION, POWDER, LYOPHILIZED, FOR SOLUTION INTRAVENOUS at 12:43

## 2024-12-05 RX ADMIN — IPRATROPIUM BROMIDE AND ALBUTEROL SULFATE 3 ML: .5; 3 SOLUTION RESPIRATORY (INHALATION) at 20:18

## 2024-12-05 RX ADMIN — ACETAMINOPHEN 650 MG: 325 TABLET ORAL at 06:25

## 2024-12-05 RX ADMIN — HEPARIN SODIUM 5000 UNITS: 5000 INJECTION, SOLUTION INTRAVENOUS; SUBCUTANEOUS at 11:23

## 2024-12-05 RX ADMIN — CITALOPRAM HYDROBROMIDE 20 MG: 20 TABLET ORAL at 08:09

## 2024-12-05 RX ADMIN — HYDROXYZINE HYDROCHLORIDE 25 MG: 25 TABLET, FILM COATED ORAL at 17:27

## 2024-12-05 RX ADMIN — SENNOSIDES AND DOCUSATE SODIUM 1 TABLET: 8.6; 5 TABLET ORAL at 08:09

## 2024-12-05 ASSESSMENT — ACTIVITIES OF DAILY LIVING (ADL)
ADLS_ACUITY_SCORE: 44

## 2024-12-05 NOTE — PROGRESS NOTES
Pt SpO2 found to be 81% on room air prior to Duoneb and volara therapy. After therapy, her SpO2 was 93% on 1L NC. RT contacted MD and they will be placing a continuous pulse ox order so we can better monitor her oxygen needs.     Davide Caldwell, RT

## 2024-12-05 NOTE — PROGRESS NOTES
[facility]    Medicine Progress Note - Hospitalist Service    Date of Admission:  11/23/2024    Assessment & Plan   Gill Mendez is a 64 year old female with a medical history significant for hypertension, hyperlipidemia, type II DM, asthma and anxiety disorder who is admitted with NSTEMI and acute CHF decompensation. Cardiac cath showed multivessel CAD. Cardiovascular surgeon performed CABG on 11/24/24. Extubated on 11/25/24.  Urine culture grew Klebsiella oxytoca for which she received ceftriaxone. Recently lost her father and had episodes of anxiety during hospital stay.      Acute Non-STEMI  Multivessel CAD  S/p CABG  -- Coronary angiogram revealed multivessel CAD on 11/23/24.  She underwent CABG on 11/24/2024.  Remained intubated postprocedure and managed in ICU and subsequently extubated on 11/25/2024.  Required vasopressors briefly in the ICU.  -- Aspirin daily  -- Atorvastatin 80 mg daily  -- Cardiothoracic surgery following-appreciate assistance  -- Postoperative surgical management per primary thoracic surgery service    Acute CHF Decompensation, pulmonary edema  -- Echo 12/2/24 revealed LVEF of 35-40% (moderately reduced) with anteroseptal, septal and apical wall hypokinesis.  -- Received metolazone 5 mg once on 12/1/2024  -- Continue Empagliflozin  -- Monitor electrolytes and replace as needed  -- Cardiology consult appreciated.  -- 12/04: Lasix held due to orthostatic symptoms  -- 12/05: Lasix restarted at 20 mg po     Essential Hypertension  -- Continue Metoprolol ER     Acute blood loss anemia   Symptomatic anemia   -- She experienced dizziness and had multiple episodes of orthostatic hypotension in the last few days   -- 12/04: 1 unit PRBC. Monitor H/H     Orthostatic hypotension  -- Blood pressure check revealed significant orthostatic changes on 12/1/2024. TSH wnl  -- Received IV albumin  -- Monitor blood pressure  -- Delvin stocking  -- Cortisol: 9.3. Cosyntropin test done  "on 12/05. Adrenal insufficiency ruled out as Cortisol @ 60 minutes >18    Mild diverticulitis  -- Afebrile. No leukocytosis. Has LLQ pain and tenderness  -- Augmentin started on 12/04     Suspected UTI  -- Urine culture grew Klebsiella oxytoca  -- Completed 4 doses of ceftriaxone     Type II Diabetes Mellitus  -- Continue empagliflozin  -- Insulin sliding scale  -- Continue metformin  -- Monitor for hypoglycemia current per protocol      Asthma   -- Not in exacerbation  -- Resume home albuterol as needed for shortness of breath.  -- Duonebs PRN, 3 day course of prednsione   -- Continue maintenance therapy with Breo Ellipta or equivalent inhaler.     Anxiety Disorder  Severe claustrophobia   -- Continue Celexa at the current dose.  -- Monitor for any anxiety exacerbation during hospitalization.              Diet: Combination Diet Moderate Consistent Carb (60 g CHO per Meal) Diet; Low Saturated Fat Na <2400mg Diet  Fluid restriction 1800 ML FLUID  Snacks/Supplements Adult: Magic Cup; Between Meals    DVT Prophylaxis: Heparin SQ  Mcdaniels Catheter: Not present  Lines: None     Cardiac Monitoring: ACTIVE order. Indication: Open heart surgery (7 days)  Code Status: Full Code      Clinically Significant Risk Factors          # Hypochloremia: Lowest Cl = 97 mmol/L in last 2 days, will monitor as appropriate      # Hypoalbuminemia: Lowest albumin = 3.1 g/dL at 11/25/2024  4:26 AM, will monitor as appropriate     # Hypertension: Noted on problem list    # Chronic heart failure with reduced ejection fraction: last echo with EF <40%    # Acute Hypoxic Respiratory Failure: Documented O2 saturation < 90%. Continue supplemental oxygen as needed         # Overweight: Estimated body mass index is 27.6 kg/m  as calculated from the following:    Height as of this encounter: 1.615 m (5' 3.58\").    Weight as of this encounter: 72 kg (158 lb 11.2 oz).        # Financial/Environmental Concerns: none  # Asthma: noted on problem list  # " History of CABG: noted on surgical history       Social Drivers of Health    Alcohol Use: Medium Risk (5/25/2023)    Received from [place] [place]    Alcohol Use     Alcohol Use Status: Yes   Interpersonal Safety: Low Risk  (11/25/2024)    Interpersonal Safety     Do you feel physically and emotionally safe where you currently live?: Patient unable to answer     Within the past 12 months, have you been hit, slapped, kicked or otherwise physically hurt by someone?: No     Within the past 12 months, have you been humiliated or emotionally abused in other ways by your partner or ex-partner?: No   Recent Concern: Interpersonal Safety - High Risk (11/23/2024)    Interpersonal Safety     Do you feel physically and emotionally safe where you currently live?: No     Within the past 12 months, have you been hit, slapped, kicked or otherwise physically hurt by someone?: No     Within the past 12 months, have you been humiliated or emotionally abused in other ways by your partner or ex-partner?: No    Received from [facility], [facility]    Social Connections          Disposition Plan     Medically Ready for Discharge: Anticipated in 2-4 Days             Samantha Lennon MD  Hospitalist Service  [facility]  Securely message with [employer] (more info)  Text page via [employer] Paging/Directory   ______________________________________________________________________    Interval History   Patient is seen and examined at bedside.   Pt c/o RUQ pain. LLQ pain is improving.  Plan of care discussed with patient. All questions answered. Pt verbalized understanding.     Physical Exam   Vital Signs: Temp: 97.8  F (36.6  C) Temp src: Oral BP: 113/57 Pulse: 85   Resp: 20 SpO2: 95 % O2 Device: Nasal cannula Oxygen Delivery: 1 LPM  Weight: 158 lbs 11.2 oz    GEN: Alert and oriented. Not in acute distress.  HEENT: Atraumatic, mucous membrane-  moist and pink.  Chest: Bilateral air entry.  CVS: S1S2 regular.   Abdomen: Soft. Non-tender, non-distended. No organomegaly. No guarding or rigidity. Bowel sounds active.   Extremities: + b/l LE edema.  CNS: No involuntary movements.  Skin: no cyanosis or clubbing.     Medical Decision Making       56 MINUTES SPENT BY ME on the date of service doing chart review, history, exam, documentation & further activities per the note.      Data

## 2024-12-05 NOTE — PROGRESS NOTES
HEART CARE NOTE          Assessment/Recommendations   1. Severe HFrEF   Assessment / Plan  Patient denies orthostatic symptoms today; start low dose oral furosemide and continue to monitor UOP and renal function closely  GDMT as detailed below     Current Pharmacotherapy AHA Guideline-Directed Medical Therapy   Lisinopril - hold Lisinopril 20 mg twice daily   Metoprolol succinate 25 mg daily Carvedilol 25 mg twice daily   Spironolactone not started Spironolactone 25 mg once daily   Hydralazine NA Hydralazine 100 mg three times daily   Isosorbide dinitrate NA Isosorbide dinitrate 40 mg three times daily   SGLT2 inhibitor:Empagliflozin 25 mg daily Dapagliflozin or Empagliflozin 10 mg daily      2. Severe multi-vessel CAD  Assessment / Plan  S/p CABG; denies anginal equivalents  Continue ASA, high intensity atorvastatin, metoprolol     3. HTN  Assessment / Plan  Borderline BP with notable orthostatic hypotension  - hold afterload reduction     4. DM2  Assessment / Plan  Management per primary team - currently on ISS    Plan of care discussed on December 5, 2024 with patient at bedside, and primary team overseeing patient's care      History of Present Illness/Subjective    Ms. Gill Mendez is a 64 year old female with a PMHx significant for (per Epic notation) hypertension, hyperlipidemia, type II DM, asthma and anxiety disorder who is admitted with an NSTEMI and acute CHF decompensation.      Today, Mrs. Mendez denies acute cardiac events or complaints; Management plan as detailed above     ECG: Personally reviewed. normal sinus rhythm, nonspecific ST and T waves changes.     ECHO (personnaly Reviewed on 12/2/24):   The left ventricle is normal in size.  Left ventricular function is decreased. The ejection fraction is 35-40%  (moderately reduced). Anteroseptal, septal and apical wall hypokinesis.  Normal right ventricle size and systolic function.  The left atrium is mildly dilated.  Compared to the prior study  "dated 11/23/24, there are changes as noted. LVEF  has improved.    Telemetry: personally reviewed December 5, 2024; notable for sinus rhythm     Lab results: personally reviewed December 5, 2024; notable for renal function wnl    Medical history and pertinent documents reviewed in Care Everywhere please where applicable see details above        Physical Examination Review of Systems   /62 (BP Location: Right arm, Patient Position: Semi-Lazo's, Cuff Size: Adult Regular)   Pulse 81   Temp 98.5  F (36.9  C) (Oral)   Resp 16   Ht 1.615 m (5' 3.58\")   Wt 72 kg (158 lb 11.2 oz)   SpO2 94%   BMI 27.60 kg/m    Body mass index is 27.6 kg/m .  Wt Readings from Last 3 Encounters:   12/05/24 72 kg (158 lb 11.2 oz)   11/22/24 73.3 kg (161 lb 11.2 oz)   06/21/24 69.9 kg (154 lb)     General Appearance:   no distress, normal body habitus   ENT/Mouth: membranes moist, no oral lesions or bleeding gums.      EYES:  no scleral icterus, normal conjunctivae   Neck: no carotid bruits or thyromegaly   Chest/Lungs:   lungs are clear to auscultation, no rales or wheezing, equal chest wall expansion    Cardiovascular:   Regular. Normal first and second heart sounds with no murmurs, rubs, or gallops; the carotid, radial and posterior tibial pulses are intact, no JVD or LE edema bilaterally    Abdomen:  no organomegaly, masses, bruits, or tenderness; bowel sounds are present   Extremities: no cyanosis or clubbing   Skin: no xanthelasma, warm.    Neurologic: NAD     Psychiatric: alert and oriented x3, calm     A complete 10 systems ROS was reviewed  And is negative except what is listed in the HPI.          Medical History  Surgical History Family History Social History   Past Medical History:   Diagnosis Date    Anxiety     Asthma     Cellulitis     Diabetes mellitus (H)     Essential hypertension     Created by Conversion  Replacement Utility updated for latest IMO load    Gastroparesis     Hyperlipidemia     Hypertension     " Other and unspecified hyperlipidemia     Created by Conversion     PONV (postoperative nausea and vomiting)     Shortness of breath     Type II or unspecified type diabetes mellitus without mention of complication, not stated as uncontrolled     Created by Conversion     Past Surgical History:   Procedure Laterality Date    AMPUTATE TOE(S) Right 7/31/2020    Procedure: AMPUTATION, third digit right foot;  Surgeon: Chris Vega DPM;  Location: [facility] [place];  Service: Podiatry    CORONARY ARTERY BYPASS GRAFT, WITH ENDOSCOPIC VESSEL PROCUREMENT N/A 11/24/2024    Procedure: CORONARY ARTERY BYPASS GRAFT TIMES THREE, LEFT INTERNAL MAMMARY ARTERY HARVEST, LEFT LEG ENDOSCOPIC VESSEL PROCUREMENT,;  Surgeon: Zhen Carter MD;  Location: [facility] [place]    CV CORONARY ANGIOGRAM N/A 11/23/2024    Procedure: Coronary Angiogram;  Surgeon: Roque Eisenberg MD;  Location: [facility] CATH LAB CV    CV INTRA AORTIC BALLOON N/A 11/24/2024    Procedure: Intra aortic Balloon Pump Insertion;  Surgeon: Roque Eisenberg MD;  Location: [facility] CATH LAB CV    CV LEFT HEART CATH N/A 11/23/2024    Procedure: Left Heart Catheterization;  Surgeon: Roque Eisenberg MD;  Location: [facility] CATH LAB CV    ENDOMETRIAL BIOPSY      FOOT ARTHROPLASTY Right 09/24/2020    Fourth and fifth toe by Dr. Vega    HC REPAIR OF HAMMERTOE,ONE Left 12/7/2020    Procedure: ARTHROPLASTY, digits two, three, four and five left foot;  Surgeon: Chris Vega DPM;  Location: [facility];  Service: Podiatry    HERNIA REPAIR      HYSTERECTOMY      IR LUMBAR PUNCTURE  7/20/2023    REPAIR TENDON ACHILLES Bilateral     Left was plate  , right was torn    TONSILLECTOMY      TRANSESOPHAGEAL ECHOCARDIOGRAM INTRAOPERATIVE  11/24/2024    Procedure: ECHOCARDIOGRAM, TRANSESOPHAGEAL, INTRA-OPERATIVE;  Surgeon: Zhen Carter MD;  Location: [facility] REMOVAL OF OVARY(S)      Description: Oophorectomy - Bilateral (Removal Of  Both Ovaries);  Recorded: 10/09/2008;    no family history of premature coronary artery disease Social History     Socioeconomic History    Marital status:      Spouse name: Not on file    Number of children: Not on file    Years of education: Not on file    Highest education level: Not on file   Occupational History    Not on file   Tobacco Use    Smoking status: Never    Smokeless tobacco: Never   Substance and Sexual Activity    Alcohol use: No    Drug use: No    Sexual activity: Not on file   Other Topics Concern    Not on file   Social History Narrative    Not on file     Social Drivers of Health     Financial Resource Strain: Low Risk  (11/23/2024)    Financial Resource Strain     Within the past 12 months, have you or your family members you live with been unable to get utilities (heat, electricity) when it was really needed?: No   Food Insecurity: Low Risk  (11/23/2024)    Food Insecurity     Within the past 12 months, did you worry that your food would run out before you got money to buy more?: No     Within the past 12 months, did the food you bought just not last and you didn t have money to get more?: No   Transportation Needs: Low Risk  (11/23/2024)    Transportation Needs     Within the past 12 months, has lack of transportation kept you from medical appointments, getting your medicines, non-medical meetings or appointments, work, or from getting things that you need?: No   Physical Activity: Not on file   Stress: Not on file (11/6/2024)   Social Connections: Unknown (12/28/2021)    Received from [facility], [facility]    Social Connections     Frequency of Communication with Friends and Family: Not on file   Interpersonal Safety: Low Risk  (11/25/2024)    Interpersonal Safety     Do you feel physically and emotionally safe where you currently live?: Patient unable to answer     Within the past 12 months, have you been hit,  slapped, kicked or otherwise physically hurt by someone?: No     Within the past 12 months, have you been humiliated or emotionally abused in other ways by your partner or ex-partner?: No   Recent Concern: Interpersonal Safety - High Risk (11/23/2024)    Interpersonal Safety     Do you feel physically and emotionally safe where you currently live?: No     Within the past 12 months, have you been hit, slapped, kicked or otherwise physically hurt by someone?: No     Within the past 12 months, have you been humiliated or emotionally abused in other ways by your partner or ex-partner?: No   Housing Stability: Low Risk  (11/23/2024)    Housing Stability     Do you have housing? : Yes     Are you worried about losing your housing?: No           Lab Results    Chemistry/lipid CBC Cardiac Enzymes/BNP/TSH/INR   Lab Results   Component Value Date    CHOL 148 11/23/2024    HDL 65 11/23/2024    TRIG 65 11/23/2024    BUN 18.1 12/05/2024     12/05/2024    CO2 35 (H) 12/05/2024    Lab Results   Component Value Date    WBC 12.1 (H) 12/05/2024    HGB 8.8 (L) 12/05/2024    HCT 27.7 (L) 12/05/2024    MCV 83 12/05/2024     12/05/2024    Lab Results   Component Value Date    TROPONINI <0.01 01/01/2023    TSH 1.07 12/02/2024    INR 1.05 11/25/2024     Lab Results   Component Value Date    TROPONINI <0.01 01/01/2023          Weight:    Wt Readings from Last 3 Encounters:   12/05/24 72 kg (158 lb 11.2 oz)   11/22/24 73.3 kg (161 lb 11.2 oz)   06/21/24 69.9 kg (154 lb)       Allergies  Allergies   Allergen Reactions    Codeine Unknown     Patient states possibly caused N/V but can't remember for sure    Semaglutide Nausea and Vomiting    Acetaminophen-Codeine Rash         Surgical History  Past Surgical History:   Procedure Laterality Date    AMPUTATE TOE(S) Right 7/31/2020    Procedure: AMPUTATION, third digit right foot;  Surgeon: Chris Vega DPM;  [place] [facility] [place]: [place]iatry    CORONARY ARTERY  BYPASS GRAFT, WITH ENDOSCOPIC VESSEL PROCUREMENT N/A 11/24/2024    Procedure: CORONARY ARTERY BYPASS GRAFT TIMES THREE, LEFT INTERNAL MAMMARY ARTERY HARVEST, LEFT LEG ENDOSCOPIC VESSEL PROCUREMENT,;  Surgeon: Zhen Carter MD;  [facility]    CV CORONARY ANGIOGRAM N/A 11/23/2024    Procedure: Coronary Angiogram;  Surgeon: Roque Eisenberg MD;  Location: [facility] CATH LAB CV    CV INTRA AORTIC BALLOON N/A 11/24/2024    Procedure: Intra aortic Balloon Pump Insertion;  Surgeon: Roque Eisenberg MD;  Location: [facility] CATH LAB CV    CV LEFT HEART CATH N/A 11/23/2024    Procedure: Left Heart Catheterization;  Surgeon: Roque Eisenberg MD;  Location: [facility] CATH LAB CV    ENDOMETRIAL BIOPSY      FOOT ARTHROPLASTY Right 09/24/2020    Fourth and fifth toe by Dr. Vega    HC REPAIR OF HAMMERTOE,ONE Left 12/7/2020    Procedure: ARTHROPLASTY, digits two, three, four and five left foot;  Surgeon: Chris Vega DPM;  Location: [facility];  Service: Podiatry    HERNIA REPAIR      HYSTERECTOMY      IR LUMBAR PUNCTURE  7/20/2023    REPAIR TENDON ACHILLES Bilateral     Left was plate  , right was torn    TONSILLECTOMY      TRANSESOPHAGEAL ECHOCARDIOGRAM INTRAOPERATIVE  11/24/2024    Procedure: ECHOCARDIOGRAM, TRANSESOPHAGEAL, INTRA-OPERATIVE;  Surgeon: Zhen Carter MD;  Location: [facility] [place] REMOVAL OF OVARY(S)      Description: Oophorectomy - Bilateral (Removal Of Both Ovaries);  Recorded: 10/09/2008;       Social History  Tobacco:   History   Smoking Status    Never   Smokeless Tobacco    Never    Alcohol:   Social History    Substance and Sexual Activity      Alcohol use: No   Illicit Drugs:   History   Drug Use No       Family History  Family History   Problem Relation Age of Onset    Diabetes Mother     Hypertension Mother     Acute Myocardial Infarction No family hx of           Namita Fitzgerald MD on 12/5/2024      cc: Saúl Hunt

## 2024-12-05 NOTE — PLAN OF CARE
Goal Outcome Evaluation:       sc  Patient Name: Gill Mendez   MRN: [id number]   Date of Admission: 11/23/2024    Procedure: Procedure(s):  CORONARY ARTERY BYPASS GRAFT TIMES THREE, LEFT INTERNAL MAMMARY ARTERY HARVEST, LEFT LEG ENDOSCOPIC VESSEL PROCUREMENT,  ECHOCARDIOGRAM, TRANSESOPHAGEAL, INTRA-OPERATIVE    Post Op day #:11    Subjective (Patient focus/Primary Problem for shift): Pain mgmt           Pain Goal2 Pain Rating2           Pain Medication/ Regime effective to reduce patient pain oxycodone prn 2.5 mg    Objective (Physical assessment):           Rhythm: normal sinus rhythm            Bowel Activity: yes if Yes indicate when: 12/3          Bowel Medications: yes            Incision: healing well          Incentive Spirometry Q 1-2 hour when awake:  yes Volume: 1000          Epicardial Pacing Wires:  no            Patient Activity:           Up to chair for meals: yes          Ambulation with RN x2 (Not including CR): yes           Shower Daily will shower this evening           Nasal  Nozin BID AM/PM x 10 days: yes              Chest Tubes   Pleural: no Draining: no               Suction: no              Mediastinal: no Draining: no               Suction: no   Dressing Change Daily:yes If No, why?                      Urinary Catheter: no  up to bathroom standby assist.            Preventative WOC consult (need MD order): no       Assessment (Nursing primary shift focus): Pt had cortisol lab this morning and hepatobiliary scan this afternoon   Pt reports she tolerated well.  Pt blood glucose upon return 192 and ordered food.  Pt reports pain 2/10 well controlled.     Plan (Patient Care Plan/focus): Pt pain is managed well with prn oxycodone.  Pt did require 1L NC when preforming stairs with therapy.  Pt was on RA this morning with oxygen in mid 90's      Ahsan Sosa RN   12/5/2024   2:19 PM

## 2024-12-05 NOTE — CARE PLAN
12/05/24 1700   Home Oxygen Assessment (RN/RT ONLY)   Does patient have oxygen at home? No   1. SpO2 on room air at rest while awake 92       Oxygen LPM at rest 1   3. SpO2 on room air during Activity/with exercise 87   4. SpO2 with oxygen during activity/with exercise 94       Oxygen LPM during activity/with exercise 2   Does patient qualify for Home O2? Yes

## 2024-12-05 NOTE — PROGRESS NOTES
Consultation - Surgery  Gill Mendez,  1960, MRN [id number]    Admitting Dx: NSTEMI, acute pulmonary edema  NSTEMI (non-ST elevated myocardial infarction) (H)    PCP: Saúl Hunt, [phone]   Code status:  Full Code       Extended Emergency Contact Information  Primary Emergency Contact: Liberty Best  Mobile Phone: [phone]  Relation: Daughter  Secondary Emergency Contact: Faraz Mendez  Address: [address] [place] [address]  Mobile Phone: [phone]  Relation: Spouse       Assessment and Plan   Impression:  Right upper quadrant pain with mild edema of the gallbladder wall.  Patient has severe congestive heart failure and is status post recent coronary artery bypass graft.  Could potentially be acalculous cholecystitis but gallbladder wall thickening and of itself can be normal in somebody with congestive heart failure who has been through significant medical issues as she has.  She is afebrile.  White count is only mildly elevated.  Her LFTs were normal yesterday.      Plan:  Will get a hepatobiliary scan.  If positive will then consider laparoscopic cholecystectomy.  If that is the case, the question is whether or not she is a good candidate for surgery at the current time.  Perhaps cholecystostomy tube may be the better answer.  Again, need to prove this is cholecystitis before we do anything.           Chief Complaint NSTEMI (non-ST elevated myocardial infarction) (H)       HPI    We have been requested by the hospitalist service to evaluate Gill Mendez for possible cholecystitis.  This is a 64 year old year old female who is recently status post coronary artery bypass grafting.  Also has a history of congestive heart failure with a decreased ejection fraction.  Apparently over the last 24 hours she has been having increasing right upper quadrant pain.  On ultrasound there is some thickening and distention of the gallbladder.  No pericholecystic  fluid is noted.       Medical History  Patient Active Problem List   Diagnosis    Trochanteric Bursitis    Essential hypertension    Gastroparesis    Type 2 Diabetes Mellitus    Asthma    Allergy To Aspirin    Hyperlipidemia    Cellulitis and abscess of foot    Cellulitis    Dyspnea on exertion    Cellulitis of fifth toe of left foot    Herpetic gingivostomatitis    Insomnia, unspecified    Hammer toe of left foot    Diabetic ulcer of toe of right foot associated with type 2 diabetes mellitus, with necrosis of muscle (H)    History of osteomyelitis    Chest pain    Toe infection    Type 2 diabetes mellitus with skin complication, without long-term current use of insulin (H)    Cellulitis of toe of right foot    Diabetic ulcer of left midfoot associated with diabetes mellitus due to underlying condition, limited to breakdown of skin (H)    Anhidrosis    Hypoxia    Exacerbation of intermittent asthma, unspecified asthma severity    Pneumonia due to 2019 novel coronavirus    Acute pulmonary edema (H)    Restless leg syndrome    Type II or unspecified type diabetes mellitus with neurological manifestations, uncontrolled(250.62) (H)    Hyperlipidemia    Type 2 diabetes mellitus with skin complication, with long-term current use of insulin (H)       [unfilled] Surgical History  Past Surgical History:   Procedure Laterality Date    AMPUTATE TOE(S) Right 7/31/2020    Procedure: AMPUTATION, third digit right foot;  Surgeon: Chris Vega DPM;  Location: [facility];  Service: Podiatry    CORONARY ARTERY BYPASS GRAFT, WITH ENDOSCOPIC VESSEL PROCUREMENT N/A 11/24/2024    Procedure: CORONARY ARTERY BYPASS GRAFT TIMES THREE, LEFT INTERNAL MAMMARY ARTERY HARVEST, LEFT LEG ENDOSCOPIC VESSEL PROCUREMENT,;  Surgeon: Zhen Carter MD;  Location[facility] OR    CV CORONARY ANGIOGRAM N/A 11/23/2024    Procedure: Coronary Angiogram;  Surgeon: Roque Eisenberg MD;  Location: [facility] CATH LAB CV    CV INTRA  AORTIC BALLOON N/A 11/24/2024    Procedure: Intra aortic Balloon Pump Insertion;  Surgeon: Roque Eisenberg MD;  Location: [facility] CATH LAB CV    CV LEFT HEART CATH N/A 11/23/2024    Procedure: Left Heart Catheterization;  Surgeon: Roque Eisenberg MD;  Location: [facility] CATH LAB CV    ENDOMETRIAL BIOPSY      FOOT ARTHROPLASTY Right 09/24/2020    Fourth and fifth toe by Dr. Vega    HC REPAIR OF HAMMERTOE,ONE Left 12/7/2020    Procedure: ARTHROPLASTY, digits two, three, four and five left foot;  Surgeon: Chris Vega DPM;  Location: [facility];  Service: Podiatry    HERNIA REPAIR      HYSTERECTOMY      IR LUMBAR PUNCTURE  7/20/2023    REPAIR TENDON ACHILLES Bilateral     Left was plate  , right was torn    TONSILLECTOMY      TRANSESOPHAGEAL ECHOCARDIOGRAM INTRAOPERATIVE  11/24/2024    Procedure: ECHOCARDIOGRAM, TRANSESOPHAGEAL, INTRA-OPERATIVE;  Surgeon: Zhen Carter MD;  Location: [facility] REMOVAL OF OVARY(S)      Description: Oophorectomy - Bilateral (Removal Of Both Ovaries);  Recorded: 10/09/2008;        Social History  Social History     Tobacco Use    Smoking status: Never    Smokeless tobacco: Never   Substance Use Topics    Alcohol use: No    Drug use: No       Allergies  Allergies   Allergen Reactions    Codeine Unknown     Patient states possibly caused N/V but can't remember for sure    Semaglutide Nausea and Vomiting    Acetaminophen-Codeine Rash    Family History  Reviewed, and family history includes Diabetes in her mother; Hypertension in her mother.  The Family history is not pertinent to the patients chief complaint. Psychosocial Needs  Social History     Social History Narrative    Not on file     Additional psychosocial needs reviewed per nursing assessment.     Prior to Admission Medications   Current Outpatient Medications   Medication Instructions    acetaminophen (TYLENOL) 325-650 mg, Oral, EVERY 4 HOURS PRN    albuterol (PROVENTIL HFA;VENTOLIN HFA)  90 mcg/actuation inhaler 2 puffs, EVERY 6 HOURS PRN    ARNUITY ELLIPTA 100 MCG/ACT inhaler 1 puff, DAILY    aspirin (ASA) 162 mg, Oral or NG Tube, DAILY    atorvastatin (LIPITOR) 80 mg, Oral or Feeding Tube, EVERY EVENING    benzonatate (TESSALON) 100 mg, 3 TIMES DAILY PRN    chlorthalidone (HYGROTON) 25 MG tablet 1 tablet, DAILY    citalopram (CELEXA) 20 mg, EVERY MORNING    empagliflozin (JARDIANCE) 25 mg, Oral, DAILY    hydrOXYzine HCl (ATARAX) 10 mg, Oral or Feeding Tube, EVERY 8 HOURS PRN    lisinopril (ZESTRIL) 10 mg, DAILY    magnesium 250 MG tablet 2 tablets, Oral, DAILY    metFORMIN (GLUCOPHAGE XR) 1,000 mg, Oral, DAILY WITH BREAKFAST, Changed on 6/21/24, new prescription not yet sent    polyethylene glycol (MIRALAX) 17 g, Oral, DAILY    pramipexole (MIRAPEX) 0.125 mg, DAILY    pramipexole (MIRAPEX) 0.5 mg, AT BEDTIME    tamsulosin (FLOMAX) 0.4 mg, Oral, DAILY    tirzepatide (MOUNJARO) 15 mg, Subcutaneous, EVERY 7 DAYS           Review of Systems:  Pertinent items are noted in HPI. Physical Exam:  Temp:  [98.2  F (36.8  C)-99.5  F (37.5  C)] 98.7  F (37.1  C)  Pulse:  [81-90] 85  Resp:  [16-18] 18  BP: (114-127)/(59-67) 121/67  SpO2:  [81 %-100 %] 94 %    General appearance: alert, appears stated age, and cooperative  Eyes: No scleral icterus  Lungs: Short of breath.  Currently getting a neb treatment.  Heart: Mildly tachycardic  Abdomen: Abdomen is soft, mildly tender in the right upper quadrant.  No guarding or rebound.    Neurologic: Grossly normal       Pertinent Labs  Lab Results: personally reviewed.   Lab Results   Component Value Date    WBC 12.1 12/05/2024    HGB 8.8 12/05/2024    HCT 27.7 12/05/2024    MCV 83 12/05/2024     12/05/2024   LFTs yesterday were normal.     Pertinent Radiology  Radiology Results: Personally reviewed image/s and Personally reviewed impression/s  EKG Results: Not reviewed

## 2024-12-05 NOTE — PLAN OF CARE
Goal Outcome Evaluation:                  sc  Patient Name: Gill Mendez   MRN: [id number]   Date of Admission: 11/23/2024    Procedure: Procedure(s):  CORONARY ARTERY BYPASS GRAFT TIMES THREE, LEFT INTERNAL MAMMARY ARTERY HARVEST, LEFT LEG ENDOSCOPIC VESSEL PROCUREMENT,  ECHOCARDIOGRAM, TRANSESOPHAGEAL, INTRA-OPERATIVE    Post Op day #:11    Subjective (Patient focus/Primary Problem for shift): sleep          Pain Goal3 Pain Rating8-4           Pain Medication/ Regime effective to reduce patient painprn tylenol, prn atarax, and prn oxycodone given for pain    Objective (Physical assessment):           Rhythm: normal sinus rhythm            Bowel Activity: yes, though not this shift if Yes indicate when: 12/3/24          Bowel Medications: yes            Incision: healing well          Incentive Spirometry Q 1-2 hour when awake:  yes Volume: 750          Epicardial Pacing Wires:  no            Patient Activity:           Up to chair for meals: yes          Ambulation with RN x2 (Not including CR): not applicable           Shower Daily {YES/NO/NA:536005          Nasal  Nozin BID AM/PM x 10 days: no              Chest Tubes   Pleural: no Draining: not applicable               Suction: not applicable              Mediastinal: no Draining: not applicable               Suction: not applicable   Dressing Change Daily:no If No, why?open to air                     Urinary Catheter: no           Preventative WOC consult (need MD order): no       Assessment (Nursing primary shift focus): Patient alert and oriented.  Atarax given for anxiety.  Extra dose of 2.5 mg of oxycodone given once when abdominal pain was more severe and was making her short of breath.  Pain decreased to manageable level and patient was able to rest and felt more relaxed and less short of breath    Plan (Patient Care Plan/focus): Patient up in recliner for breakfast is goal.      Elizabeth Butt RN   12/5/2024   6:05 AM

## 2024-12-05 NOTE — PROGRESS NOTES
Patient has been assessed for Home Oxygen needs. Oxygen readings:    *Pulse oximetry (SpO2) = 92% on room air at rest while awake.    *SpO2 improved to 94% on 1liters/minute at rest.    *SpO2 = 87% on room air during activity/with exercise.    *SpO2 improved to 94 % on 2 liters/minute during activity/with exercise.

## 2024-12-05 NOTE — PLAN OF CARE
Goal Outcome Evaluation:      Plan of Care Reviewed With: patient    Overall Patient Progress: improving  sc  Patient Name: Gill Mendez   MRN: [id number]   Date of Admission: 11/23/2024    Procedure: Procedure(s):  CORONARY ARTERY BYPASS GRAFT TIMES THREE, LEFT INTERNAL MAMMARY ARTERY HARVEST, LEFT LEG ENDOSCOPIC VESSEL PROCUREMENT,  ECHOCARDIOGRAM, TRANSESOPHAGEAL, INTRA-OPERATIVE    Post Op day #:10    Subjective (Patient focus/Primary Problem for shift): Increasing activity          Pain Goal 0 Pain Rating 2           Pain Medication/ Regime effective to reduce patient pain pain regimen effective.     Objective (Physical assessment):           Rhythm: normal sinus rhythm with PVC            Bowel Activity: no if Yes indicate when: today          Bowel Medications: yes            Incision: healing well          Incentive Spirometry Q 1-2 hour when awake:  yes Volume: 750          Epicardial Pacing Wires:  not applicable            Patient Activity:           Up to chair for meals: yes          Ambulation with RN x2 (Not including CR): yes           Shower Daily NA          Nasal  Nozin BID AM/PM x 10 days: not applicable                     Assessment (Nursing primary shift focus): Mayelin had a good shift. Minimal complaints of pain managed with tylenol. Up in chair for most of the shift. Ambulated x2 with staff, to nursing desk and to ICU and back. Required oxygen while ambulating due to sats of 88 and shortness of breath. No dizziness reported this shift.     Plan (Patient Care Plan/focus): Continue to increase activity tolerance and wean oxygen      Courtney Pineda RN   12/4/2024   11:29 PM

## 2024-12-05 NOTE — PROGRESS NOTES
Gill Mendez is a 64-year-old female currently admitted for NSTEMI with acute CHF, general surgery consulted in the setting of radiographic concern for gallbladder wall edema for possible acute cholecystitis with RUQ abdominal pain.      She underwent a HIDA scan that had been unremarkable.  Sonographic findings favored secondary from the congestive heart failure with low concern for acute cholecystitis. No indication for laparoscopic cholecystectomy at this point in time.  Surgery to sign off, please page with any questions or concerns.

## 2024-12-05 NOTE — TREATMENT PLAN
RCAT Treatment Plan    Patient Score: 11  Patient Acuity: 3      Clinical Indication for Therapy: atelectasis and prevent atelectasis    Therapy Ordered: VOLARA QID, DUONEB QID    Assessment Summary: I personally assessed this patient and she has significant rhonchi/coarse crackles bbilaterally. Patient states she feels more relief/feels improvement after VOLARA tx. Does well with VOLARA although she states it hurts. It was evident post therapy patient had a more stimulated cough. RT will reassess as needed. RT will follow.      Kurt Ordonez, RT  12/4/2024

## 2024-12-05 NOTE — PROGRESS NOTES
"CVTS Daily Progress Note   POD 11 s/p CABG x3 (LIMA-LAD, rSVG-PDA, rSVG-OM)  Attending: Raul  LOS: 12    SUBJECTIVE/INTERVAL EVENTS:    Increased abdominal pain overnight, localized to RUQ. Worsens w/ taking deep breaths, but constant dull ache. Occasionally requiring 1L O2 w/ activity. No further dizziness since transfusion yesterday. Patient progressing overall well from a surgical standpoint. Maintaining oxygen saturations on room air as above. Normotensive. Pain well controlled. + BM. Tolerating PO intake. Adequate UOP. Patient denies new chest pain, shortness of breath, and nausea. RUQ US w/ GB wall thickening and mild distention although LFTs WNL. All questions answered on rounds to patient's satisfaction.    OBJECTIVE:  Temp:  [97.8  F (36.6  C)-99.5  F (37.5  C)] 98.5  F (36.9  C)  Pulse:  [74-97] 81  Resp:  [16-18] 16  BP: (102-127)/(55-62) 122/62  SpO2:  [82 %-100 %] 94 %  Vitals:    12/01/24 0525 12/02/24 0440 12/03/24 0325 12/04/24 0801   Weight: 73.1 kg (161 lb 3.2 oz) 73.5 kg (162 lb) 72.7 kg (160 lb 4.8 oz) 72.3 kg (159 lb 6.4 oz)    12/05/24 0410   Weight: 72 kg (158 lb 11.2 oz)       Clinically Significant Risk Factors          # Hypochloremia: Lowest Cl = 97 mmol/L in last 2 days, will monitor as appropriate      # Hypoalbuminemia: Lowest albumin = 3.1 g/dL at 11/25/2024  4:26 AM, will monitor as appropriate     # Hypertension: Noted on problem list    # Acute heart failure with reduced ejection fraction: last echo with EF <40% and receiving IV diuretics    # Acute Hypoxic Respiratory Failure: Documented O2 saturation < 90%. Continue supplemental oxygen as needed         # Overweight: Estimated body mass index is 27.6 kg/m  as calculated from the following:    Height as of this encounter: 1.615 m (5' 3.58\").    Weight as of this encounter: 72 kg (158 lb 11.2 oz).        # Financial/Environmental Concerns: none  # Asthma: noted on problem list  # History of CABG: noted on surgical history      "       Current Medications:    Scheduled Meds:  Current Facility-Administered Medications   Medication Dose Route Frequency Provider Last Rate Last Admin    amoxicillin-clavulanate (AUGMENTIN) 875-125 MG per tablet 1 tablet  1 tablet Oral Q8H Beena Mitchell PA-C   1 tablet at 12/05/24 0355    aspirin (ASA) chewable tablet 162 mg  162 mg Oral or NG Tube Daily Barbara Boggs NP   162 mg at 12/04/24 0759    atorvastatin (LIPITOR) tablet 80 mg  80 mg Oral or Feeding Tube QPM Barbara Boggs NP   80 mg at 12/04/24 2110    citalopram (celeXA) tablet 20 mg  20 mg Oral or Feeding Tube QAM Barbara Boggs NP   20 mg at 12/04/24 0759    empagliflozin (JARDIANCE) tablet 25 mg  25 mg Oral Daily Beena Mitchell PA-C   25 mg at 12/04/24 0800    fluticasone (ARNUITY ELLIPTA) 100 MCG/ACT inhaler 1 puff  1 puff Inhalation Daily Barbara Boggs NP   1 puff at 12/04/24 0800    [Held by provider] furosemide (LASIX) tablet 20 mg  20 mg Oral Daily Namita Fitzgerald MD   20 mg at 12/04/24 0800    guaiFENesin (MUCINEX) 12 hr tablet 600 mg  600 mg Oral BID Beena Mitchell PA-C   600 mg at 12/04/24 2110    heparin ANTICOAGULANT injection 5,000 Units  5,000 Units Subcutaneous Q8H Barbara Boggs NP   5,000 Units at 12/05/24 0355    insulin aspart (NovoLOG) injection (RAPID ACTING)  1-7 Units Subcutaneous TID AC Barbara Boggs NP   1 Units at 12/04/24 1215    ipratropium - albuterol 0.5 mg/2.5 mg/3 mL (DUONEB) neb solution 3 mL  3 mL Nebulization 4x daily Barbara Boggs NP   3 mL at 12/04/24 1948    metFORMIN (GLUCOPHAGE XR) 24 hr tablet 1,000 mg  1,000 mg Oral Daily with breakfast Beena Mitchell PA-C   1,000 mg at 12/04/24 0759    metoprolol succinate ER (TOPROL XL) 24 hr tablet 25 mg  25 mg Oral Daily Namita Fitzgerald MD   25 mg at 12/04/24 0801    polyethylene glycol (MIRALAX) Packet 17 g  17 g Oral Daily Barbara Bogsg NP   17 g at 11/28/24 0805    pramipexole (MIRAPEX) tablet 0.125 mg   0.125 mg Oral Daily Sudarshan Jimenez MD   0.125 mg at 12/04/24 1347    pramipexole (MIRAPEX) tablet 0.5 mg  0.5 mg Oral At Bedtime Barbara Boggs NP   0.5 mg at 12/04/24 2113    senna-docusate (SENOKOT-S/PERICOLACE) 8.6-50 MG per tablet 1 tablet  1 tablet Oral BID Barbara Boggs NP   1 tablet at 12/04/24 2110    sodium chloride (PF) 0.9% PF flush 3 mL  3 mL Intracatheter Q8H Barbara Boggs NP   3 mL at 12/05/24 0010     Continuous Infusions:  Current Facility-Administered Medications   Medication Dose Route Frequency Provider Last Rate Last Admin     PRN Meds:  Current Facility-Administered Medications   Medication Dose Route Frequency Provider Last Rate Last Admin    acetaminophen (TYLENOL) tablet 650 mg  650 mg Oral Q4H PRN Barbara Boggs NP   650 mg at 12/05/24 0625    albuterol (PROVENTIL HFA/VENTOLIN HFA) inhaler  2 puff Inhalation Q6H PRN Barbara Boggs NP   2 puff at 12/05/24 0144    bisacodyl (DULCOLAX) suppository 10 mg  10 mg Rectal Daily PRN Barbara Boggs NP        glucose gel 15-30 g  15-30 g Oral Q15 Min PRN Barbara Boggs NP        Or    dextrose 50 % injection 25-50 mL  25-50 mL Intravenous Q15 Min PRN Barbara Boggs NP        Or    glucagon injection 1 mg  1 mg Subcutaneous Q15 Min PRN Barbara Boggs NP        hydrALAZINE (APRESOLINE) injection 10 mg  10 mg Intravenous Q6H PRN Barbara Boggs NP        hydrOXYzine HCl (ATARAX) tablet 10 mg  10 mg Oral or Feeding Tube 4x Daily PRN Barbara Boggs NP   10 mg at 12/05/24 0006    magnesium hydroxide (MILK OF MAGNESIA) suspension 30 mL  30 mL Oral Daily PRN Barbara Boggs NP        naloxone (NARCAN) injection 0.2 mg  0.2 mg Intravenous Q2 Min PRN Barbara Boggs, NP        Or    naloxone (NARCAN) injection 0.4 mg  0.4 mg Intravenous Q2 Min PRN Barbara Boggs, NP        Or    naloxone (NARCAN) injection 0.2 mg  0.2 mg Intramuscular Q2 Min PRN Barbara Boggs, NP        Or    naloxone (NARCAN) injection 0.4 mg   0.4 mg Intramuscular Q2 Min PRN Barbara Boggs NP        ondansetron (ZOFRAN ODT) ODT tab 4 mg  4 mg Oral Q6H PRBarbara Jimenez NP   4 mg at 24 0651    Or    ondansetron (ZOFRAN) injection 4 mg  4 mg Intravenous Q6H PRN Barbara Boggs NP   4 mg at 24    oxyCODONE IR (ROXICODONE) half-tab 2.5 mg  2.5 mg Oral Q4H PRN Beena King PA-C   2.5 mg at 24 0132    Or    oxyCODONE (ROXICODONE) tablet 5 mg  5 mg Oral Q4H PRN Beena King PA-C        sodium chloride (PF) 0.9% PF flush 3 mL  3 mL Intracatheter q1 min prBarbara Jimenez NP   3 mL at 24 0018       Cardiographics:    Telemetry monitoring demonstrates SR with rates in the 70-80s per my personal review.    Imaging:  Recent Results (from the past 24 hours)   Echocardiogram Limited   Result Value    LVEF  30-35% (moderately reduced)    Narrative    [id number]  [id number]  [id number]  [id number]^FERNANDO^BEENA^[place] [address]     Name: KATIUSKA JUSTIN  MRN: [id number]  : 1960  Study Date: 2024 11:10 AM  Age: 64 yrs  Gender: Female  Patient [facility]: [facility]  Reason For Study: Pericardial Effusion  Ordering Physician: BEENA KING  Referring Physician: JENNIFER DODSON  Performed By: JINNY/ELINA     BSA: 1.8 m2  Height: 65 in  Weight: 159 lb  HR: 110  BP: 106/58 mmHg  ______________________________________________________________________________  Procedure  Limited Echo Adult. No hemodynamically significant valvular abnormalities on  2D or color flow imaging. Compared to the prior study dated 2024, there  have been no changes.  ______________________________________________________________________________  Interpretation Summary     1.Left ventricular function is decreased. The ejection fraction is 30-35%  (moderately reduced).  2.There is moderate anterior, septal, and apical wall hypokinesis.  3.Normal right ventricle size and systolic  function.  4.No hemodynamically significant valvular abnormalities on 2D or color flow  imaging although this was a limited study without complete Doppler.  5.Small posterior pericardial effusion, inflows were not samlpled to determine  risk of tamplonade.  Compared to the prior study dated 12/1/2024, there have been no changes. The  small posterior effusion is better visualized on this study.  ______________________________________________________________________________  I      WMSI = 2.00     % Normal = 0     X - Cannot   0 -                      (2) - Mildly 2 -          Segments  Size  Interpret    Hyperkinetic 1 - Normal  Hypokinetic  Hypokinetic  1-2     small                                                     7 -          3-5      moderate  3 - Akinetic 4 -          5 -         6 - Akinetic Dyskinetic   6-14    large               Dyskinetic   Aneurysmal  w/scar       w/scar       15-16   diffuse     Left Ventricle  Left ventricular function is decreased. The ejection fraction is 30-35%  (moderately reduced). There is normal left ventricular wall thickness. Left  ventricular diastolic function is not assessable. Septal motion is consistent  with post-operative state. There is moderate anterior, septal, and apical wall  hypokinesis.     Right Ventricle  Normal right ventricle size and systolic function.     Atria  The left atrium is mildly dilated. Right atrial size is normal. There is no  color Doppler evidence of an atrial shunt.     Mitral Valve  Mitral valve leaflets appear normal. There is no evidence of mitral stenosis  or clinically significant mitral regurgitation. There is trace mitral  regurgitation. There is no mitral valve stenosis.     Tricuspid Valve  The tricuspid valve is not well visualized, but is grossly normal. There is  trace tricuspid regurgitation. There is no tricuspid stenosis.     Aortic Valve  The aortic valve is not well visualized.     Pulmonic Valve  The pulmonic valve is not  well visualized.     Vessels  The aorta root is normal. Normal size ascending aorta. IVC diameter and  respiratory changes fall into an intermediate range suggesting an RA pressure  of 8 mmHg.     Pericardium  Small posterior pericardial effusion.     ______________________________________________________________________________  MMode/2D Measurements & Calculations  EF Biplane: 32.4 %     Time Measurements  MM HR: 79.0 BPM     ______________________________________________________________________________  Report approved by: Jd Garg MD on 12/04/2024 01:55 PM         US Abdomen Limited    Narrative    EXAM: US ABDOMEN LIMITED  LOCATION: [facility]  DATE: 12/4/2024    INDICATION: RUQ pain  COMPARISON: CT 12/3/2024  TECHNIQUE: Limited abdominal ultrasound.    FINDINGS:    GALLBLADDER: There is gallbladder wall thickening at 5 mm. No gallstones, however there is sludge with mild gallbladder distention. No Vitale's.    BILE DUCTS: No biliary dilatation. The common duct measures 6 mm.    LIVER: There is nodularity to the anterior surface contour which could represent cirrhosis. No focal mass.    RIGHT KIDNEY: No hydronephrosis.    PANCREAS: The visualized portions are normal.    No ascites.      Impression    IMPRESSION:  1.  Gallbladder wall thickening. This can be seen with acalculous cholecystitis, or cirrhosis.  2.  Hepatic surface nodularity suggesting fibrosis.  3.  No ascites.           Labs, personally reviewed.  Hemoglobin   Date Value Ref Range Status   12/05/2024 8.8 (L) 11.7 - 15.7 g/dL Final   12/04/2024 8.4 (L) 11.7 - 15.7 g/dL Final   12/04/2024 7.0 (L) 11.7 - 15.7 g/dL Final     WBC Count   Date Value Ref Range Status   12/05/2024 12.1 (H) 4.0 - 11.0 10e3/uL Final   12/04/2024 10.5 4.0 - 11.0 10e3/uL Final   12/03/2024 9.7 4.0 - 11.0 10e3/uL Final     Platelet Count   Date Value Ref Range Status   12/05/2024 357 150 - 450 10e3/uL Final   12/04/2024 328 150 - 450 10e3/uL  Final   12/03/2024 301 150 - 450 10e3/uL Final     Creatinine   Date Value Ref Range Status   12/05/2024 0.92 0.51 - 0.95 mg/dL Final   12/04/2024 0.94 0.51 - 0.95 mg/dL Final   12/03/2024 0.95 0.51 - 0.95 mg/dL Final     Potassium   Date Value Ref Range Status   12/05/2024 4.8 3.4 - 5.3 mmol/L Final   12/04/2024 4.0 3.4 - 5.3 mmol/L Final   12/04/2024 4.3 3.4 - 5.3 mmol/L Final   01/02/2023 4.0 3.5 - 5.0 mmol/L Final   01/01/2023 4.1 3.5 - 5.0 mmol/L Final   03/22/2021 4.4 3.5 - 5.0 mmol/L Final     Potassium POCT   Date Value Ref Range Status   11/24/2024 4.0 3.4 - 5.3 mmol/L Final   11/24/2024 3.5 3.4 - 5.3 mmol/L Final   11/24/2024 4.4 3.4 - 5.3 mmol/L Final     Magnesium   Date Value Ref Range Status   12/05/2024 2.2 1.7 - 2.3 mg/dL Final   12/04/2024 2.2 1.7 - 2.3 mg/dL Final   12/03/2024 2.2 1.7 - 2.3 mg/dL Final          I/O:  I/O last 3 completed shifts:  In: 1766 [P.O.:1250]  Out: 350 [Urine:350]       Physical Exam:    General: Patient seen up in chair this AM, NAD. Conversant, pleasant, cooperative on exam.  HEENT: no scleral icterus, moist mucosa, no tracheal deviation  CV: RRR on monitor - SR, WWP. Mild LE edema.  Incision C/D/I, no erythema or induration  Pulm: Unlabored breathing on RA, occasional loose cough per baseline  Abd: RUQ tenderness to palpation, otherwise nontender. Soft, nondistended.   Ext: Negligible pedal edema, incision C/D/I without erythema or induration  Neuro: A&O, CNs grossly intact, face symmetric, speech clear, SPARKS      ASSESSMENT/PLAN:    Gill Mendez is a 64 year old female who is s/p CABG x3.    Active Problems:    * No active hospital problems. *        NEURO  PSYCH:  Acute postop pain  ANX // RLS  - Scheduled Tylenol/lidocaine patches and PRN Tylenol/oxycodone for pain  - PTA Celexa and mirapex resumed  - PRN Atarax available PRN for anxiety    CV:  ICM/HFmrEF  NSTEMI/CAD s/p CABG x3  HTN // HLD  Hypervolemia, improving  Orthostatic hypotension  - Preop echo LVEF 25-30%,  12/1 LVEF 35-40%  - Small pericardial effusion on echo 12/4  - IABP removed POD 1, med CT and TPW removed 11/29, pleurals removed 11/30  - Normotensive  - Hold PTA amlodipine and lisinopril  - ASA 162mg  - Atorvastatin 80mg daily  - Cardiology consult for GDMT   - 25 mg metop XL qday, lasix 20 mg PO qday  - Will need HF follow-up at discharge   - PTA Jardiance    PULM:  Acute hypoxic respiratory insufficiency - resolved  Asthma/COPD  Right PTX - resolved  - Extubated POD1  - Maintaining oxygen saturations on room air this AM  - PTA Arnuity Ellipta & albuterol inhalers  - Duonebs QID & EZPap  - Encourage pulmonary toilet/OOB/activity  - Repeat CXR today    GI  NUTRITION:  RUQ Pain w/ poss acalculous cholecystitis  Mild diverticulitis  - Diet: Cardiac/consistent carb  - Bowel regimen, LBM 12/3  - Mild distal descending colon diverticulitis on abd CT yesterday, but no LLQ pain. Will treat w/ augmentin 875/125 q8 x5 days to prevent complications.  - GB wall thickening and mild distention on RUQ US, gen surg consult    RENAL  FE:  CARMEN, resolved  Urinary retention 2/2 Klebsiella CAUTI - resolved  - Adequate UOP  - Cr WNL  - Diuresis per cards w/ 20 mg PO lasix started today  - Hold PTA chlorthalidone  - Continue electrolyte replacement protocol  - UTI abx completed as below.    HEME:  Acute blood loss anemia  - Hgb 8.8  - 1u PRBCs 12/4  - Hep SQ, ASA    ID:  Stress-induced leukocytosis, resolved  Klebsiella CAUTI, resolved  - Yecenia op ppx complete, afebrile  - Rocephin 11/27-28, Macrobid started 11/29-12/1, rocephin per HMS 12/1-12/2  - Augmentin for mild diverticulitis as above  - Mild leukocytosis this AM - CXR and gen surg consult as above  - Repeat CRP    ENDO:  DM2   - Hgb A1c 6.1%  - Sliding scale insulin - minimal need, anticipate discharge on PTA DM meds  - PTA Jardiance, metformin resumed  - Hold PTA tirzepatide  - BG goal <180 to promote optimal wound healing    PPx:  - DVT: SCDs, SQ heparin TID, OOB/ambulation    - GI: Protonix 40mg PO daily    DISPO:  - General telemetry status since POD 4  - Medically Ready for Discharge: Anticipated Tomorrow or Saturday pending optimization of HF mgmt, gen surg consult  - Therapies recommending discharge home with home care once medically stable - accepted for RN/PT/OT w/ [facility] home care 12/5.     Patient discussed w/ Dr. Irwin.      Amy Mitchell PA-C  [facility] Cardiothoracic Surgery  Vocera or Secure Chat  December 5, 2024

## 2024-12-05 NOTE — PROGRESS NOTES
SPIRITUAL HEALTH SERVICES Progress Note  [facility], P3    Saw pt Gill Mendez per length of stay assessment.    Patient/Family Understanding of Illness and Goals of Care - Mayelin shared about her heart surgery and recovery. She has a test related to her gall bladder today, so she is awaiting additional information about her plan of care.     Distress and Loss - Ongoing health challenges. She shared about the recent death of her father who meant very much to her.     Strengths, Coping, and Resources - Family is source of support. She has three children that live locally and four grandchildren that are source of prudence and motivation for her.     Meaning, Beliefs, and Spirituality - Patient comes from [place] background and derives meaning, purpose, and comfort from a[place]. She has [employer] a[place] background; no aure[place] community identified at this time.     Plan of Care - A  will continue to visit as able or per request by patient/family/staff.      Sha Gonzales MDiv, [facility]  /Manager [facility]  [phone]       Spiritual Health Services is available 24/7 for emergent requests and consults, either by paging the on-call  or by entering an ASAP/STAT consult in [employer], which will also page the on-call .

## 2024-12-06 ENCOUNTER — APPOINTMENT (OUTPATIENT)
Dept: OCCUPATIONAL THERAPY | Facility: HOSPITAL | Age: 64
DRG: 233 | End: 2024-12-06
Attending: INTERNAL MEDICINE
Payer: COMMERCIAL

## 2024-12-06 VITALS
OXYGEN SATURATION: 98 % | RESPIRATION RATE: 20 BRPM | DIASTOLIC BLOOD PRESSURE: 57 MMHG | TEMPERATURE: 98 F | HEIGHT: 64 IN | BODY MASS INDEX: 26.91 KG/M2 | WEIGHT: 157.6 LBS | SYSTOLIC BLOOD PRESSURE: 107 MMHG | HEART RATE: 86 BPM

## 2024-12-06 PROBLEM — E55.9 VITAMIN D DEFICIENCY: Status: ACTIVE | Noted: 2023-05-25

## 2024-12-06 PROBLEM — M67.952 TENDINOPATHY OF LEFT GLUTEUS MEDIUS: Status: ACTIVE | Noted: 2023-08-23

## 2024-12-06 PROBLEM — Z96.649 STATUS POST REVISION OF TOTAL HIP: Chronic | Status: ACTIVE | Noted: 2023-12-14

## 2024-12-06 PROBLEM — M76.899 ENTHESOPATHY OF HIP REGION: Status: ACTIVE | Noted: 2023-02-03

## 2024-12-06 PROBLEM — J45.909 ASTHMA: Status: ACTIVE | Noted: 2023-01-31

## 2024-12-06 PROBLEM — G57.22: Status: RESOLVED | Noted: 2023-08-23 | Resolved: 2024-12-06

## 2024-12-06 PROBLEM — E04.9 GOITER: Chronic | Status: RESOLVED | Noted: 2023-05-28 | Resolved: 2024-12-06

## 2024-12-06 PROBLEM — S72.112A: Status: ACTIVE | Noted: 2023-08-23

## 2024-12-06 PROBLEM — E11.9 DIABETES MELLITUS (H): Status: ACTIVE | Noted: 2023-12-15

## 2024-12-06 LAB
ACTH PLAS-MCNC: 62 PG/ML
ANION GAP SERPL CALCULATED.3IONS-SCNC: 9 MMOL/L (ref 7–15)
BUN SERPL-MCNC: 22.4 MG/DL (ref 8–23)
CALCIUM SERPL-MCNC: 9.2 MG/DL (ref 8.8–10.4)
CHLORIDE SERPL-SCNC: 98 MMOL/L (ref 98–107)
CREAT SERPL-MCNC: 0.93 MG/DL (ref 0.51–0.95)
EGFRCR SERPLBLD CKD-EPI 2021: 69 ML/MIN/1.73M2
ERYTHROCYTE [DISTWIDTH] IN BLOOD BY AUTOMATED COUNT: 17.1 % (ref 10–15)
GLUCOSE BLDC GLUCOMTR-MCNC: 138 MG/DL (ref 70–99)
GLUCOSE BLDC GLUCOMTR-MCNC: 184 MG/DL (ref 70–99)
GLUCOSE SERPL-MCNC: 145 MG/DL (ref 70–99)
HCO3 SERPL-SCNC: 32 MMOL/L (ref 22–29)
HCT VFR BLD AUTO: 28.7 % (ref 35–47)
HGB BLD-MCNC: 8.9 G/DL (ref 11.7–15.7)
MAGNESIUM SERPL-MCNC: 2 MG/DL (ref 1.7–2.3)
MCH RBC QN AUTO: 25.9 PG (ref 26.5–33)
MCHC RBC AUTO-ENTMCNC: 31 G/DL (ref 31.5–36.5)
MCV RBC AUTO: 83 FL (ref 78–100)
PHOSPHATE SERPL-MCNC: 3.8 MG/DL (ref 2.5–4.5)
PLATELET # BLD AUTO: 368 10E3/UL (ref 150–450)
POTASSIUM SERPL-SCNC: 4.4 MMOL/L (ref 3.4–5.3)
RBC # BLD AUTO: 3.44 10E6/UL (ref 3.8–5.2)
SODIUM SERPL-SCNC: 139 MMOL/L (ref 135–145)
WBC # BLD AUTO: 11.4 10E3/UL (ref 4–11)

## 2024-12-06 PROCEDURE — 250N000013 HC RX MED GY IP 250 OP 250 PS 637: Performed by: INTERNAL MEDICINE

## 2024-12-06 PROCEDURE — 250N000013 HC RX MED GY IP 250 OP 250 PS 637: Performed by: SURGERY

## 2024-12-06 PROCEDURE — 97535 SELF CARE MNGMENT TRAINING: CPT | Mod: GO

## 2024-12-06 PROCEDURE — 84100 ASSAY OF PHOSPHORUS: CPT | Performed by: SURGERY

## 2024-12-06 PROCEDURE — 999N000157 HC STATISTIC RCP TIME EA 10 MIN

## 2024-12-06 PROCEDURE — 85027 COMPLETE CBC AUTOMATED: CPT

## 2024-12-06 PROCEDURE — 36415 COLL VENOUS BLD VENIPUNCTURE: CPT

## 2024-12-06 PROCEDURE — 250N000013 HC RX MED GY IP 250 OP 250 PS 637

## 2024-12-06 PROCEDURE — 99233 SBSQ HOSP IP/OBS HIGH 50: CPT | Performed by: INTERNAL MEDICINE

## 2024-12-06 PROCEDURE — 80048 BASIC METABOLIC PNL TOTAL CA: CPT | Performed by: STUDENT IN AN ORGANIZED HEALTH CARE EDUCATION/TRAINING PROGRAM

## 2024-12-06 PROCEDURE — 83735 ASSAY OF MAGNESIUM: CPT | Performed by: SURGERY

## 2024-12-06 PROCEDURE — 250N000011 HC RX IP 250 OP 636: Performed by: SURGERY

## 2024-12-06 PROCEDURE — 99232 SBSQ HOSP IP/OBS MODERATE 35: CPT | Performed by: STUDENT IN AN ORGANIZED HEALTH CARE EDUCATION/TRAINING PROGRAM

## 2024-12-06 RX ORDER — MAGNESIUM OXIDE 400 MG/1
400 TABLET ORAL EVERY 4 HOURS
Status: COMPLETED | OUTPATIENT
Start: 2024-12-06 | End: 2024-12-06

## 2024-12-06 RX ORDER — METOPROLOL SUCCINATE 25 MG/1
25 TABLET, EXTENDED RELEASE ORAL DAILY
Qty: 30 TABLET | Refills: 1 | Status: SHIPPED | OUTPATIENT
Start: 2024-12-06

## 2024-12-06 RX ORDER — FUROSEMIDE 20 MG/1
20 TABLET ORAL DAILY
Qty: 30 TABLET | Refills: 0 | Status: SHIPPED | OUTPATIENT
Start: 2024-12-06

## 2024-12-06 RX ORDER — OXYCODONE HYDROCHLORIDE 5 MG/1
2.5-5 TABLET ORAL EVERY 4 HOURS PRN
Qty: 10 TABLET | Refills: 0 | Status: SHIPPED | OUTPATIENT
Start: 2024-12-06

## 2024-12-06 RX ADMIN — ACETAMINOPHEN 650 MG: 325 TABLET ORAL at 03:53

## 2024-12-06 RX ADMIN — METFORMIN ER 500 MG 1000 MG: 500 TABLET ORAL at 08:50

## 2024-12-06 RX ADMIN — CITALOPRAM HYDROBROMIDE 20 MG: 20 TABLET ORAL at 08:50

## 2024-12-06 RX ADMIN — PRAMIPEXOLE DIHYDROCHLORIDE 0.12 MG: 0.12 TABLET ORAL at 13:59

## 2024-12-06 RX ADMIN — MAGNESIUM OXIDE TAB 400 MG (241.3 MG ELEMENTAL MG) 400 MG: 400 (241.3 MG) TAB at 08:49

## 2024-12-06 RX ADMIN — INSULIN ASPART 1 UNITS: 100 INJECTION, SOLUTION INTRAVENOUS; SUBCUTANEOUS at 12:09

## 2024-12-06 RX ADMIN — AMOXICILLIN AND CLAVULANATE POTASSIUM 1 TABLET: 875; 125 TABLET, FILM COATED ORAL at 03:53

## 2024-12-06 RX ADMIN — GUAIFENESIN 600 MG: 600 TABLET ORAL at 08:50

## 2024-12-06 RX ADMIN — MAGNESIUM OXIDE TAB 400 MG (241.3 MG ELEMENTAL MG) 400 MG: 400 (241.3 MG) TAB at 12:09

## 2024-12-06 RX ADMIN — EMPAGLIFLOZIN 25 MG: 25 TABLET, FILM COATED ORAL at 08:50

## 2024-12-06 RX ADMIN — HEPARIN SODIUM 5000 UNITS: 5000 INJECTION, SOLUTION INTRAVENOUS; SUBCUTANEOUS at 03:54

## 2024-12-06 RX ADMIN — ASPIRIN 81 MG CHEWABLE TABLET 162 MG: 81 TABLET CHEWABLE at 08:49

## 2024-12-06 RX ADMIN — METOPROLOL SUCCINATE 25 MG: 25 TABLET, EXTENDED RELEASE ORAL at 08:52

## 2024-12-06 RX ADMIN — AMOXICILLIN AND CLAVULANATE POTASSIUM 1 TABLET: 875; 125 TABLET, FILM COATED ORAL at 12:09

## 2024-12-06 RX ADMIN — FUROSEMIDE 20 MG: 20 TABLET ORAL at 08:50

## 2024-12-06 ASSESSMENT — ACTIVITIES OF DAILY LIVING (ADL)
ADLS_ACUITY_SCORE: 49
ADLS_ACUITY_SCORE: 44
ADLS_ACUITY_SCORE: 49
ADLS_ACUITY_SCORE: 44
ADLS_ACUITY_SCORE: 49
ADLS_ACUITY_SCORE: 44
ADLS_ACUITY_SCORE: 44
ADLS_ACUITY_SCORE: 49
ADLS_ACUITY_SCORE: 49
ADLS_ACUITY_SCORE: 44
ADLS_ACUITY_SCORE: 44
ADLS_ACUITY_SCORE: 49
ADLS_ACUITY_SCORE: 44

## 2024-12-06 NOTE — PROGRESS NOTES
Oxygen Documentation  I certify that this patient, Gill Mendez has been under my care (or a nurse practitioner or physican's assistant working with me). This is the face-to-face encounter for oxygen medical necessity.      At the time of this encounter, I have reviewed the qualifying testing and have determined that supplemental oxygen is reasonable and necessary and is expected to improve the patient's condition in a home setting.         Patient has continued oxygen desaturation due to Mild Persistent Asthma J45.30  CAD I25.10  Chronic Heart Failure I50  Atelectasis s/p CABG

## 2024-12-06 NOTE — PROGRESS NOTES
HEART CARE NOTE          Assessment/Recommendations   1. Severe HFrEF   Assessment / Plan  Tolerating oral diuretic regimen - no changes at this time; continue to monitor UOP and renal function closely  GDMT as detailed below     Current Pharmacotherapy AHA Guideline-Directed Medical Therapy   Lisinopril - hold Lisinopril 20 mg twice daily   Metoprolol succinate 25 mg daily Carvedilol 25 mg twice daily   Spironolactone not started Spironolactone 25 mg once daily   Hydralazine NA Hydralazine 100 mg three times daily   Isosorbide dinitrate NA Isosorbide dinitrate 40 mg three times daily   SGLT2 inhibitor:Empagliflozin 25 mg daily Dapagliflozin or Empagliflozin 10 mg daily      2. Severe multi-vessel CAD  Assessment / Plan  S/p CABG; denies anginal equivalents  Continue ASA, high intensity atorvastatin, metoprolol     3. HTN  Assessment / Plan  Borderline BP with notable orthostatic hypotension  - hold afterload reduction     4. DM2  Assessment / Plan  Management per primary team - currently on ISS    Plan of care discussed on December 6, 2024 with patient at bedside, and primary team overseeing patient's care     Cardiology team will sign-off for now. Discharge on currently prescribed cardiac meds. Please do not hesitate to consult us again if new questions or concerns arise. Follow-up appointment will be arranged by CORE/HF clinic.       History of Present Illness/Subjective    Ms. Gill Mendez is a 64 year old female with a PMHx significant for (per Epic notation) hypertension, hyperlipidemia, type II DM, asthma and anxiety disorder who is admitted with an NSTEMI and acute CHF decompensation.      Today, Mrs. Mendez denies acute cardiac events or complaints; denied orthostatic symptoms; Management plan as detailed above     ECG: Personally reviewed. normal sinus rhythm, nonspecific ST and T waves changes.     ECHO (personnaly Reviewed on 12/2/24):   The left ventricle is normal in size.  Left ventricular function  "is decreased. The ejection fraction is 35-40%  (moderately reduced). Anteroseptal, septal and apical wall hypokinesis.  Normal right ventricle size and systolic function.  The left atrium is mildly dilated.  Compared to the prior study dated 11/23/24, there are changes as noted. LVEF  has improved.    Telemetry: personally reviewed December 6, 2024; notable for sinus rhythm     Lab results: personally reviewed December 6, 2024; notable for renal function wnl    Medical history and pertinent documents reviewed in Care Everywhere please where applicable see details above        Physical Examination Review of Systems   /64 (BP Location: Right arm, Patient Position: Semi-Lazo's, Cuff Size: Adult Regular)   Pulse 87   Temp 98  F (36.7  C) (Oral)   Resp 18   Ht 1.615 m (5' 3.58\")   Wt 71.5 kg (157 lb 9.6 oz)   SpO2 96%   BMI 27.41 kg/m    Body mass index is 27.41 kg/m .  Wt Readings from Last 3 Encounters:   12/06/24 71.5 kg (157 lb 9.6 oz)   11/22/24 73.3 kg (161 lb 11.2 oz)   06/21/24 69.9 kg (154 lb)     General Appearance:   no distress, normal body habitus   ENT/Mouth: membranes moist, no oral lesions or bleeding gums.      EYES:  no scleral icterus, normal conjunctivae   Neck: no carotid bruits or thyromegaly   Chest/Lungs:   lungs are clear to auscultation, no rales or wheezing, equal chest wall expansion    Cardiovascular:   Regular. Normal first and second heart sounds with no murmurs, rubs, or gallops; the carotid, radial and posterior tibial pulses are intact, no JVD or LE edema bilaterally    Abdomen:  no organomegaly, masses, bruits, or tenderness; bowel sounds are present   Extremities: no cyanosis or clubbing   Skin: no xanthelasma, warm.    Neurologic: NAD     Psychiatric: alert and oriented x3, calm     A complete 10 systems ROS was reviewed  And is negative except what is listed in the HPI.          Medical History  Surgical History Family History Social History   Past Medical History: "   Diagnosis Date    Anxiety     Asthma     Cellulitis     Diabetes mellitus (H)     Essential hypertension     Created by Conversion  Replacement Utility updated for latest IMO load    Gastroparesis     Hyperlipidemia     Hypertension     Other and unspecified hyperlipidemia     Created by Conversion     PONV (postoperative nausea and vomiting)     Shortness of breath     Type II or unspecified type diabetes mellitus without mention of complication, not stated as uncontrolled     Created by Conversion     Past Surgical History:   Procedure Laterality Date    AMPUTATE TOE(S) Right 7/31/2020    Procedure: AMPUTATION, third digit right foot;  Surgeon: Chris Vega DPM;  Location: [facility];  Service: Podiatry    CORONARY ARTERY BYPASS GRAFT, WITH ENDOSCOPIC VESSEL PROCUREMENT N/A 11/24/2024    Procedure: CORONARY ARTERY BYPASS GRAFT TIMES THREE, LEFT INTERNAL MAMMARY ARTERY HARVEST, LEFT LEG ENDOSCOPIC VESSEL PROCUREMENT,;  Surgeon: Zhen Carter MD;  Location: [facility] [place]    CV CORONARY ANGIOGRAM N/A 11/23/2024    Procedure: Coronary Angiogram;  Surgeon: Roque Eisenberg MD;  Location: [facility] LAB CV    CV INTRA AORTIC BALLOON N/A 11/24/2024    Procedure: Intra aortic Balloon Pump Insertion;  Surgeon: Roque Eisenberg MD;  Location: [facility] LAB CV    CV LEFT HEART CATH N/A 11/23/2024    Procedure: Left Heart Catheterization;  Surgeon: Roque Eisenberg MD;  Location: [facility] CATH LAB CV    ENDOMETRIAL BIOPSY      FOOT ARTHROPLASTY Right 09/24/2020    Fourth and fifth toe by Dr. Vega    HC REPAIR OF HAMMERTOE,ONE Left 12/7/2020    Procedure: ARTHROPLASTY, digits two, three, four and five left foot;  Surgeon: Chris Vega DPM;  Location: [facility];  Service: Podiatry    HERNIA REPAIR      HYSTERECTOMY      IR LUMBAR PUNCTURE  7/20/2023    REPAIR TENDON ACHILLES Bilateral     Left was plate  , right was torn    TONSILLECTOMY      TRANSESOPHAGEAL ECHOCARDIOGRAM  INTRAOPERATIVE  11/24/2024    Procedure: ECHOCARDIOGRAM, TRANSESOPHAGEAL, INTRA-OPERATIVE;  Surgeon: Zhen Carter MD;  Location: [facility] REMOVAL OF OVARY(S)      Description: Oophorectomy - Bilateral (Removal Of Both Ovaries);  Recorded: 10/09/2008;    no family history of premature coronary artery disease Social History     Socioeconomic History    Marital status:      Spouse name: Not on file    Number of children: Not on file    Years of education: Not on file    Highest education level: Not on file   Occupational History    Not on file   Tobacco Use    Smoking status: Never    Smokeless tobacco: Never   Substance and Sexual Activity    Alcohol use: No    Drug use: No    Sexual activity: Not on file   Other Topics Concern    Not on file   Social History Narrative    Not on file     Social Drivers of Health     Financial Resource Strain: Low Risk  (11/23/2024)    Financial Resource Strain     Within the past 12 months, have you or your family members you live with been unable to get utilities (heat, electricity) when it was really needed?: No   Food Insecurity: Low Risk  (11/23/2024)    Food Insecurity     Within the past 12 months, did you worry that your food would run out before you got money to buy more?: No     Within the past 12 months, did the food you bought just not last and you didn t have money to get more?: No   Transportation Needs: Low Risk  (11/23/2024)    Transportation Needs     Within the past 12 months, has lack of transportation kept you from medical appointments, getting your medicines, non-medical meetings or appointments, work, or from getting things that you need?: No   Physical Activity: Not on file   Stress: Not on file (11/6/2024)   Social Connections: Unknown (12/28/2021)    Received from [facility] [facility], [facility]    Social Connections     Frequency of Communication with Friends and  Family: Not on file   Interpersonal Safety: Low Risk  (11/25/2024)    Interpersonal Safety     Do you feel physically and emotionally safe where you currently live?: Patient unable to answer     Within the past 12 months, have you been hit, slapped, kicked or otherwise physically hurt by someone?: No     Within the past 12 months, have you been humiliated or emotionally abused in other ways by your partner or ex-partner?: No   Recent Concern: Interpersonal Safety - High Risk (11/23/2024)    Interpersonal Safety     Do you feel physically and emotionally safe where you currently live?: No     Within the past 12 months, have you been hit, slapped, kicked or otherwise physically hurt by someone?: No     Within the past 12 months, have you been humiliated or emotionally abused in other ways by your partner or ex-partner?: No   Housing Stability: Low Risk  (11/23/2024)    Housing Stability     Do you have housing? : Yes     Are you worried about losing your housing?: No           Lab Results    Chemistry/lipid CBC Cardiac Enzymes/BNP/TSH/INR   Lab Results   Component Value Date    CHOL 148 11/23/2024    HDL 65 11/23/2024    TRIG 65 11/23/2024    BUN 22.4 12/06/2024     12/06/2024    CO2 32 (H) 12/06/2024    Lab Results   Component Value Date    WBC 11.4 (H) 12/06/2024    HGB 8.9 (L) 12/06/2024    HCT 28.7 (L) 12/06/2024    MCV 83 12/06/2024     12/06/2024    Lab Results   Component Value Date    TROPONINI <0.01 01/01/2023    TSH 1.07 12/02/2024    INR 1.05 11/25/2024     Lab Results   Component Value Date    TROPONINI <0.01 01/01/2023          Weight:    Wt Readings from Last 3 Encounters:   12/06/24 71.5 kg (157 lb 9.6 oz)   11/22/24 73.3 kg (161 lb 11.2 oz)   06/21/24 69.9 kg (154 lb)       Allergies  Allergies   Allergen Reactions    Codeine Unknown     Patient states possibly caused N/V but can't remember for sure    Semaglutide Nausea and Vomiting    Acetaminophen-Codeine Rash         Surgical  History  Past Surgical History:   Procedure Laterality Date    AMPUTATE TOE(S) Right 7/31/2020    Procedure: AMPUTATION, third digit right foot;  Surgeon: Chris Vega DPM;  Location: [facility];  Service: Podiatry    CORONARY ARTERY BYPASS GRAFT, WITH ENDOSCOPIC VESSEL PROCUREMENT N/A 11/24/2024    Procedure: CORONARY ARTERY BYPASS GRAFT TIMES THREE, LEFT INTERNAL MAMMARY ARTERY HARVEST, LEFT LEG ENDOSCOPIC VESSEL PROCUREMENT,;  Surgeon: Zhen Carter MD;  Location: [facility]    CV CORONARY ANGIOGRAM N/A 11/23/2024    Procedure: Coronary Angiogram;  Surgeon: Roque Eisenberg MD;  Location: [facility] CATH LAB CV    CV INTRA AORTIC BALLOON N/A 11/24/2024    Procedure: Intra aortic Balloon Pump Insertion;  Surgeon: Roque Eisenberg MD;  Location: [facility] CATH LAB CV    CV LEFT HEART CATH N/A 11/23/2024    Procedure: Left Heart Catheterization;  Surgeon: Roque Eisenberg MD;  Location: [facility] CATH LAB CV    ENDOMETRIAL BIOPSY      FOOT ARTHROPLASTY Right 09/24/2020    Fourth and fifth toe by Dr. Vega    HC REPAIR OF HAMMERTOE,ONE Left 12/7/2020    Procedure: ARTHROPLASTY, digits two, three, four and five left foot;  Surgeon: Chris Vega DPM;  Location: [facility];  Service: Podiatry    HERNIA REPAIR      HYSTERECTOMY      IR LUMBAR PUNCTURE  7/20/2023    REPAIR TENDON ACHILLES Bilateral     Left was plate  , right was torn    TONSILLECTOMY      TRANSESOPHAGEAL ECHOCARDIOGRAM INTRAOPERATIVE  11/24/2024    Procedure: ECHOCARDIOGRAM, TRANSESOPHAGEAL, INTRA-OPERATIVE;  Surgeon: Zhen Carter MD;  Location: [facility] REMOVAL OF OVARY(S)      Description: Oophorectomy - Bilateral (Removal Of Both Ovaries);  Recorded: 10/09/2008;       Social History  Tobacco:   History   Smoking Status    Never   Smokeless Tobacco    Never    Alcohol:   Social History    Substance and Sexual Activity      Alcohol use: No   Illicit Drugs:   History   Drug Use No        Family History  Family History   Problem Relation Age of Onset    Diabetes Mother     Hypertension Mother     Acute Myocardial Infarction No family hx of           Namita Fitzgerald MD on 12/6/2024      cc: Saúl Hunt

## 2024-12-06 NOTE — PLAN OF CARE
Goal Outcome Evaluation:                  sc  Patient Name: Gill Mendez   MRN: [id number]   Date of Admission: 11/23/2024    Procedure: Procedure(s):  CORONARY ARTERY BYPASS GRAFT TIMES THREE, LEFT INTERNAL MAMMARY ARTERY HARVEST, LEFT LEG ENDOSCOPIC VESSEL PROCUREMENT,  ECHOCARDIOGRAM, TRANSESOPHAGEAL, INTRA-OPERATIVE    Post Op day #:12    Subjective (Patient focus/Primary Problem for shift): sleep          Pain Goal0 Pain Rating5-3           Pain Medication/ Regime effective to reduce patient painprn atarax, prn tylenol given    Objective (Physical assessment):           Rhythm: normal sinus rhythm            Bowel Activity: yes if Yes indicate when: 12/5           Bowel Medications: yes            Incision: healing well          Incentive Spirometry Q 1-2 hour when awake:  yes Volume: 1000          Epicardial Pacing Wires:  no            Patient Activity:           Up to chair for meals: yes          Ambulation with RN x2 (Not including CR): yes           Shower Daily {YES/NO/[id number] yes          Nasal  Nozin BID AM/PM x 10 days: completed              Chest Tubes   Pleural: no Draining: not applicable               Suction: not applicable              Mediastinal: no Draining: not applicable               Suction: not applicable   Dressing Change Daily:no If No, why?open to air                     Urinary Catheter: no           Preventative WOC consult (need MD order): no       Assessment (Nursing primary shift focus): Patient alert and oriented.  Atarax and tylenol given for sharp pain in right abdomen, better controlled tonight.  Patient O2 desaturates without 1/2 L of O2 via nasal cannula.  Ambulates to restroom with gait belt and stand by assist.  Follows sternal precautions.    Plan (Patient Care Plan/focus): Sit up in recliner for breakfast.      Elizabeth Butt RN   12/6/2024   8:04 AM

## 2024-12-06 NOTE — PLAN OF CARE
Problem: Cardiovascular Surgery  Goal: Improved Activity Tolerance  Outcome: Progressing  Goal: Optimal Coping with Heart Surgery  Outcome: Progressing  Goal: Absence of Bleeding  Outcome: Progressing  Goal: Effective Bowel Elimination  Outcome: Progressing  Goal: Effective Cardiac Function  Outcome: Progressing  Goal: Optimal Cerebral Tissue Perfusion  Outcome: Progressing  Goal: Fluid and Electrolyte Balance  Outcome: Progressing  Goal: Blood Glucose Level Within Targeted Range  Outcome: Progressing  Goal: Absence of Infection Signs and Symptoms  Outcome: Progressing  Goal: Anesthesia/Sedation Recovery  Intervention: Optimize Anesthesia Recovery  Recent Flowsheet Documentation  Taken 12/5/2024 2115 by Radha Barnett RN  Administration (IS): self-administered  Level Incentive Spirometer (mL): (750 to 1000) 750  Number of Repetitions (IS): 10  Patient Tolerance (IS): good  Taken 12/5/2024 1630 by Radha Barnett RN  Administration (IS): self-administered  Level Incentive Spirometer (mL): 750  Number of Repetitions (IS): 10  Patient Tolerance (IS): good  Goal: Effective Oxygenation and Ventilation  Intervention: Promote Airway Secretion Clearance  Recent Flowsheet Documentation  Taken 12/5/2024 2115 by Radha Barnett RN  Administration (IS): self-administered  Level Incentive Spirometer (mL): (750 to 1000) 750  Cough And Deep Breathing: done independently per patient  Number of Repetitions (IS): 10  Patient Tolerance (IS): good  Taken 12/5/2024 1630 by Radha Barnett RN  Administration (IS): self-administered  Level Incentive Spirometer (mL): 750  Cough And Deep Breathing: done independently per patient  Number of Repetitions (IS): 10  Patient Tolerance (IS): good   Goal Outcome Evaluation:       sc  Patient Name: Gill Mendez   MRN: [id number]   Date of Admission: 11/23/2024    Procedure: Procedure(s):  CORONARY ARTERY BYPASS GRAFT TIMES THREE, LEFT INTERNAL MAMMARY ARTERY  HARVEST, LEFT LEG ENDOSCOPIC VESSEL PROCUREMENT,  ECHOCARDIOGRAM, TRANSESOPHAGEAL, INTRA-OPERATIVE    Post Op day #:11    Subjective (Patient focus/Primary Problem for shift): Pain          Pain Goal0 Pain [id number]           Pain Medication/ Regime effective to reduce patient painprn Oxycodone and Tylenol given    Objective (Physical assessment):           Rhythm: normal sinus rhythm            Bowel Activity: yes if Yes indicate when: tonight          Bowel Medications: yes            Incision: healing well          Incentive Spirometry Q 1-2 hour when awake:  yes Volume: 750 to 1000 ml          Epicardial Pacing Wires:  no            Patient Activity:           Up to chair for meals: yes          Ambulation with RN x2 (Not including CR): yes           Shower Daily yes          Nasal  Nozin BID AM/PM x 10 days: yes              Chest Tubes   Pleural: no Draining: no               Suction: no              Mediastinal: no Draining: no               Suction: no   Dressing Change Daily:no If No, why?[place]                     Urinary Catheter: no           Preventative WOC consult (need MD order): no       Assessment (Nursing primary shift focus): Pt is alert and oriented x 4, c/o pain in the right upper quadrant, pt stated it's from her gall bladder. Lung sounds clear in the left, crackles in the left lung field. Home O2 eval done, pt need O2 when she goes home.    Plan (Patient Care Plan/focus): Pain control, continue use of  IS and flutter valve , ambulate zaheer Barnett RN   12/5/2024   10:24 PM

## 2024-12-06 NOTE — PLAN OF CARE
Cardiac Rehab Discharge Summary    Reason for therapy discharge:    Discharging home.    Progress towards therapy goal(s). See goals on Care Plan in [facility] electronic health record for goal details.  Goals met.    Therapy recommendation(s):    Pt could benefit from TCU but defers & is going home w/family support; home OT/PT. Rec. Outpt CR after she completes home therapies.

## 2024-12-06 NOTE — PROGRESS NOTES
[facility]    Medicine Progress Note - Hospitalist Service    Date of Admission:  11/23/2024    Assessment & Plan   Gill Mendez is a 64 year old female with a medical history significant for hypertension, hyperlipidemia, type II DM, asthma and anxiety disorder who is admitted with NSTEMI and acute CHF decompensation. Cardiac cath showed multivessel CAD. Cardiovascular surgeon performed CABG on 11/24/24. Extubated on 11/25/24.  Urine culture grew Klebsiella oxytoca for which she received ceftriaxone. Recently lost her father and had episodes of anxiety during hospital stay.      Acute Non-STEMI  Multivessel CAD  S/p CABG  -- Coronary angiogram revealed multivessel CAD on 11/23/24.  She underwent CABG on 11/24/2024.  Remained intubated postprocedure and managed in ICU and subsequently extubated on 11/25/2024.  Required vasopressors briefly in the ICU.  -- Aspirin daily  -- Atorvastatin 80 mg daily  -- Cardiothoracic surgery following-appreciate assistance  -- Postoperative surgical management per primary thoracic surgery service    Acute CHF Decompensation, pulmonary edema  -- Echo 12/2/24 revealed LVEF of 35-40% (moderately reduced) with anteroseptal, septal and apical wall hypokinesis.  -- Received metolazone 5 mg once on 12/1/2024  -- Continue Empagliflozin  -- Monitor electrolytes and replace as needed  -- Cardiology consult appreciated.  -- 12/04: Lasix held due to orthostatic symptoms  -- 12/05: Lasix restarted at 20 mg po  -- 12/06: tolerating lasix     Essential Hypertension  -- Continue Metoprolol ER     Acute blood loss anemia   Symptomatic anemia   -- She experienced dizziness and had multiple episodes of orthostatic hypotension in the last few days   -- 12/04: 1 unit PRBC. Monitor H/H     Orthostatic hypotension  -- Blood pressure check revealed significant orthostatic changes on 12/1/2024. TSH wnl  -- Received IV albumin  -- Monitor blood pressure  -- Delvin stocking  -- Cortisol:  "9.3. Cosyntropin test done on 12/05. Adrenal insufficiency ruled out as Cortisol @ 60 minutes >18    Mild diverticulitis  -- Afebrile. Leukocytosis improving. LLQ pain is improving  -- Augmentin started on 12/04     Suspected UTI  -- Urine culture grew Klebsiella oxytoca  -- Completed 4 doses of ceftriaxone     Type II Diabetes Mellitus  -- Continue empagliflozin  -- Insulin sliding scale  -- Continue metformin  -- Monitor for hypoglycemia current per protocol      Asthma   -- Not in exacerbation  -- Resume home albuterol as needed for shortness of breath.  -- Duonebs PRN, 3 day course of prednsione   -- Continue maintenance therapy with Breo Ellipta or equivalent inhaler.     Anxiety Disorder  Severe claustrophobia   -- Continue Celexa at the current dose.  -- Monitor for any anxiety exacerbation during hospitalization.              Diet: Combination Diet Moderate Consistent Carb (60 g CHO per Meal) Diet; Low Saturated Fat Na <2400mg Diet  Fluid restriction 1800 ML FLUID  Snacks/Supplements Adult: Magic Cup; Between Meals  Diet    DVT Prophylaxis: Heparin SQ  Mcdaniels Catheter: Not present  Lines: None     Cardiac Monitoring: ACTIVE order. Indication: Open heart surgery (7 days)  Code Status: Full Code      Clinically Significant Risk Factors               # Hypoalbuminemia: Lowest albumin = 3.1 g/dL at 11/25/2024  4:26 AM, will monitor as appropriate     # Hypertension: Noted on problem list    # Chronic heart failure with reduced ejection fraction: last echo with EF <40%    # Acute Hypoxic Respiratory Failure: Documented O2 saturation < 90%. Continue supplemental oxygen as needed         # Overweight: Estimated body mass index is 27.41 kg/m  as calculated from the following:    Height as of this encounter: 1.615 m (5' 3.58\").    Weight as of this encounter: 71.5 kg (157 lb 9.6 oz).        # Financial/Environmental Concerns: none  # Asthma: noted on problem list  # History of CABG: noted on surgical history   "     Social Drivers of Health    Alcohol Use: Medium Risk (5/25/2023)    Received from [employer] [employer]    Alcohol Use     Alcohol Use Status: Yes   Interpersonal Safety: Low Risk  (11/25/2024)    Interpersonal Safety     Do you feel physically and emotionally safe where you currently live?: Patient unable to answer     Within the past 12 months, have you been hit, slapped, kicked or otherwise physically hurt by someone?: No     Within the past 12 months, have you been humiliated or emotionally abused in other ways by your partner or ex-partner?: No   Recent Concern: Interpersonal Safety - High Risk (11/23/2024)    Interpersonal Safety     Do you feel physically and emotionally safe where you currently live?: No     Within the past 12 months, have you been hit, slapped, kicked or otherwise physically hurt by someone?: No     Within the past 12 months, have you been humiliated or emotionally abused in other ways by your partner or ex-partner?: No    [facility]    Social Connections          Disposition Plan     Medically Ready for Discharge: Anticipated Today             Samantha Lennon MD  Hospitalist Service  [facility]  Securely message with [employer] (more info)  Text page via [employer] Paging/Directory   ______________________________________________________________________    Interval History   Patient is seen and examined at bedside.   No complaint.  Plan of care discussed with patient. All questions answered. Pt verbalized understanding.     Physical Exam   Vital Signs: Temp: 98  F (36.7  C) Temp src: Oral BP: 107/57 Pulse: 86   Resp: 20 SpO2: 98 % O2 Device: Nasal cannula Oxygen Delivery: 1/2 LPM  Weight: 157 lbs 9.6 oz    GEN: Alert and oriented. Not in acute distress.  HEENT: Atraumatic, mucous membrane- moist and pink.  Chest: Bilateral air entry.  CVS: S1S2 regular.   Abdomen:  Soft. Non-tender, non-distended. No organomegaly. No guarding or rigidity. Bowel sounds active.   Extremities: + b/l LE edema.  CNS: No involuntary movements.  Skin: no cyanosis or clubbing.     Medical Decision Making       48 MINUTES SPENT BY ME on the date of service doing chart review, history, exam, documentation & further activities per the note.      Data

## 2024-12-06 NOTE — PROGRESS NOTES
"sc  Patient Name: Gill Mendez   MRN: [id number]   Date of Admission: 11/23/2024    Procedure: Procedure(s):  CORONARY ARTERY BYPASS GRAFT TIMES THREE, LEFT INTERNAL MAMMARY ARTERY HARVEST, LEFT LEG ENDOSCOPIC VESSEL PROCUREMENT,  ECHOCARDIOGRAM, TRANSESOPHAGEAL, INTRA-OPERATIVE    Post Op day #:12    Subjective (Patient focus/Primary Problem for shift): 0          Pain Goal0 Pain Rating0           Pain Medication/ Regime effective to reduce patient pain tylenol    Objective (Physical assessment):           Rhythm: normal sinus rhythm            Bowel Activity: yes if Yes indicate when: 12-5-24          Bowel Medications: no            Incision: healing well          Incentive Spirometry Q 1-2 hour when awake:  yes Volume: 750          Epicardial Pacing Wires:  no            Patient Activity:           Up to chair for meals: yes          Ambulation with RN x2 (Not including CR): yes           Shower Daily {YES/NO/NA:[id number]          Nasal  Nozin BID AM/PM x 10 days: yes              Chest Tubes   Pleural: not applicable Draining: not applicable               Suction: not applicable              Mediastinal: not applicable Draining: not applicable               Suction: not applicable   Dressing Change Daily:not applicable                      Urinary Catheter: not applicable           Preventative WOC consult (need MD order): not applicable       Assessment (Nursing primary shift focus ;Patient excited to go home later today. PA in to discuss plan of care and Home oxygen to be delivered. Patient saw video on \"Going Hoe After Heart Surgery.\" IV out. Ate breakfast. Discharge orders in process and will review with the patient ans family.     Plan (Patient Care Plan/focus): Plan for discharge this afternoon and family to drive patient home with her belongings. Denied pain. Call light in reach. Cardiac Rehab today.       Monisha Lewis RN   12/6/2024   9:32 AM         "

## 2024-12-06 NOTE — PROGRESS NOTES
Care Management Discharge Note    Discharge Date: 12/06/2024       Discharge Disposition: Home, Home Care - [facility] for RN/PT/OT/HHA    Discharge Services: Home Care - [facility] for RN/PT/OT/HHA    Discharge DME: None    Discharge Transportation: other (see comments) (TBD)    Private pay costs discussed: Not applicable    Does the patient's insurance plan have a 3 day qualifying hospital stay waiver?  Yes     Which insurance plan 3 day waiver is available? Alternative insurance waiver    Will the waiver be used for post-acute placement? No    PAS Confirmation Code: NA  Patient/family educated on Medicare website which has current facility and service quality ratings: NA    Education Provided on the Discharge Plan: Yes per team  Persons Notified of Discharge Plans: Patient  Patient/Family in Agreement with the Plan: yes    Handoff Referral Completed: No, handoff not indicated or clinically appropriate    Additional Information:  Patient discharge to Home Care - [facility] for RN/PT/OT/HHA. Family will transport.    Orders sent to Home Care - [facility] for RN/PT/OT/HHA    Vandana Nina, RN     [id number] rec'd a call from bedside nurse, Monisha, patient has discharge orders and spouse is transporting. Would like to know when patient will receive the oxygen?    Called [facility] [phone] and spoke with Maryanne.   Maryanne report no set up note was received.  RNCM provided the needed info and per Maryanne, she will submit to the oxygen team and if approved, patient will get the oxygen in a few hrs.    [facility] called. They have the oxygen walk test done, but need provider to put in the .phrase for oxygen asap to release the oxygen.    Message sent to Dr. Lennon

## 2024-12-06 NOTE — PROGRESS NOTES
SPIRITUAL HEALTH SERVICES Progress Note  [facility], P3    Saw pt Mayelin per follow up visit. Mayelin shared about her gall bladder test yesterday and is happy to report there is no need for current treatment/surgery. She reports that she is discharging home this afternoon and is very appreciative for the care received during and after her heart surgery.     Sha Gonzales MDiv, [facility]  /Manager [facility] Services  [phone]       [facility]iritual [facility] Services is available 24/7 for emergent requests and consults, either by paging the on-call  or by entering an ASAP/STAT consult in [employer] inTouch, which will also page the on-call .

## 2024-12-08 ENCOUNTER — PATIENT OUTREACH (OUTPATIENT)
Dept: CARE COORDINATION | Facility: CLINIC | Age: 64
End: 2024-12-08
Payer: COMMERCIAL

## 2024-12-08 NOTE — PROGRESS NOTES
"[facility]  Transitions of Care Outreach  Chief Complaint   Patient presents with    Clinic Care Coordination - Post Hospital       Most Recent Admission Date: 11/23/2024   Most Recent Admission Diagnosis:      Most Recent Discharge Date: 12/6/2024   Most Recent Discharge Diagnosis: NSTEMI (non-ST elevated myocardial infarction) (H) - I21.4  Chest pain - R07.9  S/P CABG (coronary artery bypass graft) - Z95.1  Systolic heart failure, unspecified HF chronicity (H) - I50.20     Transitions of Care Assessment    Discharge Assessment  How are you doing now that you are home?: \"It's going pretty good\"  How are your symptoms? (Red Flag symptoms escalate to triage hotline per guidelines): Improved  Do you know how to contact your clinic care team if you have future questions or changes to your health status? : Yes  Does the patient have their discharge instructions? : Yes  Does the patient have questions regarding their discharge instructions? : No  Were you started on any new medications or were there changes to any of your previous medications? : Yes  Does the patient have all of their medications?: Yes  Do you have questions regarding any of your medications? : No    Post-op (CHW CTA Only)  If the patient had a surgery or procedure, do they have any questions for a nurse?: No      Follow up Plan     Discharge Follow-Up  Discharge follow up appointment scheduled in alignment with recommended follow up timeframe or Transitions of Risk Category? (Low = within 30 days; Moderate= within 14 days; High= within 7 days): No  Patient's follow up appointment not scheduled: Patient declined scheduling support. Education on the importance of transitions of care follow up. Provided scheduling phone number. (CHW provided phone number for PCP, Home Care, and Cardiac Rehab. CHW informed patient HC and Cardiac Rehab should be calling her on Monday or Tuesday of this week to arrange follow-up.)    Future Appointments "   Date Time Provider Department Center   12/23/2024  9:50 AM [facility]   12/23/2024 10:30 AM [facility]   2/12/2025  8:00 AM [facility]   2/19/2025 10:30 AM Levy Lake MD [facility]   5/23/2025  8:00 AM [facility]   5/30/2025  3:30 PM Levy Lake MD [facility]ENDO [facility]       Outpatient Plan as outlined on AVS reviewed with patient.    For any urgent concerns, please contact our 24 hour nurse triage line: [phone] ([phone])         Emelia Dhaliwal  Community Health Worker  Connected Care Resource [facility]    *Connected Care Resource Team does NOT follow patient ongoing. Referrals are identified based on internal discharge reports and the outreach is to ensure patient has an understanding of their discharge instructions.

## 2024-12-09 ENCOUNTER — TELEPHONE (OUTPATIENT)
Dept: CARDIOLOGY | Facility: CLINIC | Age: 64
End: 2024-12-09
Payer: COMMERCIAL

## 2024-12-09 NOTE — TELEPHONE ENCOUNTER
Cardiovascular Surgery  [facility]    DISCHARGE FOLLOW UP PHONE CALL      POST OP MONITORING  How is your pain on a 0-10 scale, how are you managing your pain? Pain is being managed with tylenol and oxycodone occasionally.    ACTIVITY  How is your activity tolerance? Up and walking.  Are you still doing sternal precautions? Yes.  Do you hear any clicking when you are moving or taking a deep breath? No.    Are you weighing yourself daily? Yes pt is currently 152 lbs.    SIGNS AND SYMPTOMS OF INFECTION  INCREASE IN PAIN - No  FEVER - No  DRAINAGE - No If so, color: NA  REDNESS - No  SWELLING - No    Pt experienced some chest tightness and neck pain last night, called the CV surgery on call number, but never received a call back from a physician. Pt is scheduled to see Dr. Garg today in [facility]. Recommended to pt he checks a chest x-ray to evaluate for pleural effusion.    ASSISTANCE  Do you have someone at home to assist you with your daily activities? Spouse is helping at home.    MEDICATIONS  Is someone helping you to set up your medications? Pt is managing.  Do you have any questions about your medications? No.    Are you on a blood thinner? No.  Who is managing your INRs? NA    FOLLOW UP  You are scheduled to see your primary care physician on - not yet scheduled.  You are scheduled to see our surgery advanced practive provider for post operative follow up on 12/16.    You are scheduled for cardiac rehab on - not yet scheduled.  You are scheduled to see your cardiologist on 12/10 ([facility]).  Heart failure on 12/23.    CONTACT INFORMATION  Please feel free to call us with any questions or symptoms that are concerning for you at [phone]. If it is after 4:00 in the afternoon, or a weekend please call [phone] and ask for the on call specialist. We want to do everything we can to help prevent you needing to return to the ED, so please do not hesitate to call us.    Caitlin Pope, RNCC  Cardiovascular  Surgery  [phone]  December 10, 2024 10:08 AM      Called pt and left VM requesting call back, CV RN contact info provided.      ----- Message from Caitlin JETER sent at 12/6/2024  8:49 AM CST -----  Regarding: FW: Discharge  POC 12/9  ----- Message -----  From: Beena Mitchell PA-C  Sent: 12/6/2024   8:45 AM CST  To: Caitlin Pope RN  Subject: Discharge                                        Discharging home with home care!    Of note:  - Repeat echo on 12/1 demonstrated EF improved to 35-40%. Cards consulted for GDMT. HF follow-up   - 20 mg PO lasix qday per cards until seen in HF clinic for f/u. She has not required K+ supplementation in several days so will not be sent with any.  - She was started on a 5 day course of augmentin for mild diverticulitis which she can stop after 12/8. - GB distention on CT, HIDA scan WNL. No gallbladder concerns at this time.  - Mild leukocytosis on day of discharge, downtrending. Downtrending CRP. Likely d/t atelectasis/ small pleural effusion. Recommend CBC with PCP (ordered).   - Small pericardial effusion noted on echo 12/4. Plan for follow-up echo in ~2 weeks (ordered).  - Patient intermittently requiring O2 with activity. CXR demonstrates atelectasis and small pleural effusion so she will be discharged with home O2.    Thanks!    Amy

## 2024-12-10 ENCOUNTER — OFFICE VISIT (OUTPATIENT)
Dept: CARDIOLOGY | Facility: CLINIC | Age: 64
End: 2024-12-10
Payer: COMMERCIAL

## 2024-12-10 ENCOUNTER — HOSPITAL ENCOUNTER (OUTPATIENT)
Dept: RADIOLOGY | Facility: CLINIC | Age: 64
Discharge: HOME | End: 2024-12-10
Attending: INTERNAL MEDICINE
Payer: COMMERCIAL

## 2024-12-10 VITALS
DIASTOLIC BLOOD PRESSURE: 64 MMHG | SYSTOLIC BLOOD PRESSURE: 114 MMHG | BODY MASS INDEX: 27.13 KG/M2 | HEART RATE: 105 BPM | RESPIRATION RATE: 14 BRPM | WEIGHT: 156 LBS

## 2024-12-10 DIAGNOSIS — E11.628 TYPE 2 DIABETES MELLITUS WITH OTHER SKIN COMPLICATION, WITHOUT LONG-TERM CURRENT USE OF INSULIN (H): ICD-10-CM

## 2024-12-10 DIAGNOSIS — I25.83 CORONARY ARTERY DISEASE DUE TO LIPID RICH PLAQUE: Primary | ICD-10-CM

## 2024-12-10 DIAGNOSIS — I50.20 SYSTOLIC HEART FAILURE, UNSPECIFIED HF CHRONICITY (H): ICD-10-CM

## 2024-12-10 DIAGNOSIS — Z95.1 S/P CABG (CORONARY ARTERY BYPASS GRAFT): ICD-10-CM

## 2024-12-10 DIAGNOSIS — E78.00 PURE HYPERCHOLESTEROLEMIA: ICD-10-CM

## 2024-12-10 DIAGNOSIS — I25.10 CORONARY ARTERY DISEASE DUE TO LIPID RICH PLAQUE: Primary | ICD-10-CM

## 2024-12-10 DIAGNOSIS — R06.09 DYSPNEA ON EXERTION: ICD-10-CM

## 2024-12-10 DIAGNOSIS — I10 ESSENTIAL HYPERTENSION: ICD-10-CM

## 2024-12-10 PROBLEM — E11.9 DIABETES MELLITUS (H): Status: RESOLVED | Noted: 2023-12-15 | Resolved: 2024-12-10

## 2024-12-10 LAB
ANION GAP SERPL CALCULATED.3IONS-SCNC: 12 MMOL/L (ref 7–15)
ATRIAL RATE - MUSE: 100 BPM
BUN SERPL-MCNC: 22.1 MG/DL (ref 8–23)
CALCIUM SERPL-MCNC: 9.7 MG/DL (ref 8.8–10.4)
CHLORIDE SERPL-SCNC: 100 MMOL/L (ref 98–107)
CREAT SERPL-MCNC: 0.96 MG/DL (ref 0.51–0.95)
DIASTOLIC BLOOD PRESSURE - MUSE: NORMAL MMHG
EGFRCR SERPLBLD CKD-EPI 2021: 67 ML/MIN/1.73M2
GLUCOSE SERPL-MCNC: 181 MG/DL (ref 70–99)
HCO3 SERPL-SCNC: 28 MMOL/L (ref 22–29)
INTERPRETATION ECG - MUSE: NORMAL
NT-PROBNP SERPL-MCNC: 7476 PG/ML (ref 0–900)
P AXIS - MUSE: 63 DEGREES
POTASSIUM SERPL-SCNC: 3.9 MMOL/L (ref 3.4–5.3)
PR INTERVAL - MUSE: 152 MS
QRS DURATION - MUSE: 82 MS
QT - MUSE: 382 MS
QTC - MUSE: 492 MS
R AXIS - MUSE: -14 DEGREES
SODIUM SERPL-SCNC: 140 MMOL/L (ref 135–145)
SYSTOLIC BLOOD PRESSURE - MUSE: NORMAL MMHG
T AXIS - MUSE: 138 DEGREES
VENTRICULAR RATE- MUSE: 100 BPM

## 2024-12-10 PROCEDURE — 80048 BASIC METABOLIC PNL TOTAL CA: CPT | Performed by: INTERNAL MEDICINE

## 2024-12-10 PROCEDURE — 71046 X-RAY EXAM CHEST 2 VIEWS: CPT

## 2024-12-10 PROCEDURE — 93000 ELECTROCARDIOGRAM COMPLETE: CPT | Performed by: INTERNAL MEDICINE

## 2024-12-10 PROCEDURE — G2211 COMPLEX E/M VISIT ADD ON: HCPCS | Performed by: INTERNAL MEDICINE

## 2024-12-10 PROCEDURE — 36415 COLL VENOUS BLD VENIPUNCTURE: CPT | Performed by: INTERNAL MEDICINE

## 2024-12-10 PROCEDURE — 99214 OFFICE O/P EST MOD 30 MIN: CPT | Performed by: INTERNAL MEDICINE

## 2024-12-10 PROCEDURE — 83880 ASSAY OF NATRIURETIC PEPTIDE: CPT | Performed by: INTERNAL MEDICINE

## 2024-12-10 NOTE — PATIENT INSTRUCTIONS
Ms. Gill Mendez,  It certainly was nice to meet you today.  Per our conversation you're retaining fluid and having some pains in the chest.  This could represent heart failure or fluid retention and the reason I am checking the blood work and chest x-ray.  We will call you the results of these tests.  In the interim continue the diuretic but increase it to 2 tablets or 40 mg for the next 3 mornings.  Limit your extra salt and extra fluid.    I will plan on seeing you Dr. Bragg in several months.    Jd Garg

## 2024-12-10 NOTE — LETTER
12/10/2024    Saúl Hunt, APRN CNP  [address]  [place] [address]    RE: Gill Mendez       Dear Colleague,     I had the pleasure of seeing Fatimahleonardo SISI Mendez in the [facility].      [facility]  Heart Care Clinic Follow-up Note    Assessment & Plan        (I25.10,  I25.83) Coronary artery disease due to lipid rich plaque  (primary encounter diagnosis)  Comment: Due to positive enzymes she underwent urgent angiography, this showed left main distal 90% lesion, proximal LAD 99% lesion, proximal circumflex 70% lesion with the first obtuse marginal artery 80% stenosis, and right coronary artery with a proximal 50% followed by mid 50% lesion.  This prompted coronary artery bypass grafting with the aid of a balloon pump.    (Z95.1) S/P CABG (coronary artery bypass graft)  Comment: November 24, 2024 she had a LIMA to the LAD, vein graft to obtuse marginal artery and vein graft to right coronary artery.  Postop care as we will defer to cardiothoracic surgery.    (I50.20) Systolic heart failure, unspecified HF chronicity (H)  Comment: Initial ejection fraction 25 to 30%, following coronary artery bypass grafting improved to 35 to 40%.  Given increased shortness of breath suspect this could be an element of heart failure and will arrange for a BNP and renal profile.  In the interim of asked to increase her diuretic to 40 mg of furosemide for the next 3 days and go back to 20 mg thereafter.  Will have her then follow-up with heart failure clinic.    (R06.09) Dyspnea on exertion  Comment: Given the increased shortness of breath could be an element of heart failure in the setting of midrange ejection fraction, will check BNP, renal profile, chest x-ray although ECG is unremarkable.  Will arrange for increase diuretic to 40 mg furosemide in the next several days.    (I10) Essential hypertension  Comment: Acceptable levels today.    (E78.00) Pure  hypercholesterolemia  Comment: Total cholesterol 148 with an LDL of 70 which is acceptable.    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    Plan  1.  Check chest x-ray.  2.  Check BNP and BMP.  3.  Follow-up with primary cardiologist Dr. Delvin Bragg.  4.  Increase furosemide up to 40 mg for the next several days.    The longitudinal plan of care for the diagnosis(es)/condition(s) as documented were addressed during this visit. Due to the added complexity in care, I will continue to support Mayelin in the subsequent management and with ongoing continuity of care.     Subjective  CC: 64-year-old white female being seen posthospital discharge visit urgently.  She was admitted with a non-Q wave MI, had angiography on 23 November with subsequent bypass 24 November.  She was discharged and sent for 6 directly home.  Since being at home she states her weights have continued to drop although she feels short of breath with a cough that seems congestion.  She feels edematous with a vague tightness in her chest that resembles her presentation for the initial MI.  She has bipedal edema but no syncope or presyncope.    Medications  Current Outpatient Medications   Medication Sig Dispense Refill     acetaminophen (TYLENOL) 325 MG tablet Take 1-2 tablets (325-650 mg) by mouth every 4 hours as needed for mild pain. 60 tablet 0     albuterol (PROVENTIL HFA;VENTOLIN HFA) 90 mcg/actuation inhaler Inhale 2 puffs into the lungs every 6 hours as needed       amoxicillin-clavulanate (AUGMENTIN) 875-125 MG tablet Take 1 tablet by mouth every 8 hours. For mild diverticulitis. 9 tablet 0     ARNUITY ELLIPTA 100 MCG/ACT inhaler Inhale 1 puff into the lungs daily.       aspirin (ASA) 81 MG chewable tablet Take 2 tablets (162 mg) by mouth or NG Tube daily. 180 tablet 3     atorvastatin (LIPITOR) 80 MG tablet Take  1 tablet (80 mg) by mouth or Feeding Tube every evening. 90 tablet 3     benzonatate (TESSALON) 100 MG capsule Take 100 mg by mouth 3 times daily as needed for cough.       citalopram (CELEXA) 20 MG tablet [CITALOPRAM (CELEXA) 20 MG TABLET] Take 20 mg by mouth every morning.   0     empagliflozin (JARDIANCE) 25 MG TABS tablet Take 1 tablet (25 mg) by mouth daily. 90 tablet 1     furosemide (LASIX) 20 MG tablet Take 1 tablet (20 mg) by mouth daily. 30 tablet 0     hydrOXYzine HCl (ATARAX) 10 MG tablet Take 1 tablet (10 mg) by mouth or Feeding Tube every 8 hours as needed for anxiety. 30 tablet 0     magnesium 250 MG tablet Take 2 tablets by mouth daily.       metFORMIN (GLUCOPHAGE XR) 500 MG 24 hr tablet Take 2 tablets (1,000 mg) by mouth daily (with breakfast). Changed on 6/21/24, new prescription not yet sent 180 tablet 1     metoprolol succinate ER (TOPROL XL) 25 MG 24 hr tablet Take 1 tablet (25 mg) by mouth daily. 30 tablet 1     oxyCODONE (ROXICODONE) 5 MG tablet Take 0.5-1 tablets (2.5-5 mg) by mouth every 4 hours as needed for moderate to severe pain. 10 tablet 0     polyethylene glycol (MIRALAX) 17 GM/Dose powder Take 17 g by mouth daily. 510 g 0     pramipexole (MIRAPEX) 0.125 MG tablet Take 0.125 mg by mouth daily. At 3pm  3     pramipexole (MIRAPEX) 0.5 MG tablet Take 0.5 mg by mouth at bedtime.       tirzepatide (MOUNJARO) 15 MG/0.5ML pen Inject 15 mg subcutaneously every 7 days. 6 mL 1       Objective  /64 (BP Location: Right arm, Patient Position: Sitting, Cuff Size: Adult Regular)   Pulse 105   Resp 14   Wt 70.8 kg (156 lb)   BMI 27.13 kg/m      General Appearance:    Alert, cooperative, no distress, appears stated age   Head:    Normocephalic, without obvious abnormality, atraumatic   Throat:   Lips, mucosa, and tongue normal; teeth and gums normal   Neck:   Supple, symmetrical, trachea midline, no adenopathy;        thyroid:  No enlargement/tenderness/nodules; no carotid    bruit, to jaw  "at 30 degrees JVD   Back:     Symmetric, no curvature, ROM normal, no CVA tenderness   Lungs:     Decreased breath sounds left side respirations unlabored   Chest wall:    No tenderness, midline sternotomy scar deformity   Heart:    Regular rate and rhythm, S1 and S2 normal, no murmur, rub   or gallop   Abdomen:     Soft, non-tender, bowel sounds active all four quadrants,     no masses, no organomegaly   Extremities:   Normal, atraumatic, no cyanosis, 1-2/4 bipedal pitting edema   Pulses:   2+ and symmetric all extremities   Skin:   Skin color, texture, turgor normal, no rashes or lesions     Results    Lab Results personally reviewed   Lab Results   Component Value Date    CHOL 148 11/23/2024    CHOL 134 09/20/2024     Lab Results   Component Value Date    HDL 65 11/23/2024    HDL 67 09/20/2024     No components found for: \"LDLCALC\"  Lab Results   Component Value Date    TRIG 65 11/23/2024    TRIG 45 09/20/2024     Lab Results   Component Value Date    WBC 11.4 (H) 12/06/2024    HGB 8.9 (L) 12/06/2024    HCT 28.7 (L) 12/06/2024     12/06/2024     Lab Results   Component Value Date    BUN 22.4 12/06/2024     12/06/2024    CO2 32 (H) 12/06/2024       Reviewed electrocardiogram sinus rhythm, leftward axis, poor R wave progression with anterior T wave inversion, prolonged QT interval, since prior ECG the T wave inversions are new and the QT has prolonged.              Thank you for allowing me to participate in the care of your patient.      Sincerely,     PARTH MOORE MD     [facility]  cc:   Beena Mitchell PA-C  [address] [place] [id number]      "

## 2024-12-11 ENCOUNTER — TELEPHONE (OUTPATIENT)
Dept: CARDIOLOGY | Facility: CLINIC | Age: 64
End: 2024-12-11
Payer: COMMERCIAL

## 2024-12-11 NOTE — TELEPHONE ENCOUNTER
Weight is currently 154 lbs. Pt states this week she is having what feels like reflux, similar to what she experienced prior to CAB surgery. Advised pt to try taking an antacid to see if that improves her symptoms.    Advised pt to call if she feels more short of breath, or feels like her symptoms are worsening. Encouraged regular use of her IS. All questions addressed at this time.    ----- Message from Beena Mitchell sent at 12/11/2024  9:30 AM CST -----    Agree with Forest rec to increase lasix to 40 x3 days. We can reevaluate when we see her next week in clinic unless her shortness of breath is worsening even with increasing lasix.     Thanks!  Amy  ----- Message -----  From: Caitlin Pope, ROBERT  Sent: 12/11/2024   9:26 AM CST  To: Beena Mitchell PA-C; #    Pt saw Forest yesterday in [facility], and he ordered a CXR.    Can you look at the results, and let me know recs?    Thanks,  Dianna

## 2024-12-12 ENCOUNTER — TELEPHONE (OUTPATIENT)
Dept: CARDIOLOGY | Facility: CLINIC | Age: 64
End: 2024-12-12
Payer: COMMERCIAL

## 2024-12-12 ENCOUNTER — OFFICE VISIT (OUTPATIENT)
Dept: CARDIOLOGY | Facility: CLINIC | Age: 64
End: 2024-12-12
Payer: COMMERCIAL

## 2024-12-12 VITALS
SYSTOLIC BLOOD PRESSURE: 128 MMHG | DIASTOLIC BLOOD PRESSURE: 64 MMHG | WEIGHT: 154.8 LBS | HEIGHT: 65 IN | HEART RATE: 103 BPM | BODY MASS INDEX: 25.79 KG/M2 | OXYGEN SATURATION: 97 % | RESPIRATION RATE: 24 BRPM

## 2024-12-12 DIAGNOSIS — R07.89 CHEST TIGHTNESS: ICD-10-CM

## 2024-12-12 DIAGNOSIS — R60.0 LOWER EXTREMITY EDEMA: ICD-10-CM

## 2024-12-12 DIAGNOSIS — I50.22 CHRONIC SYSTOLIC CONGESTIVE HEART FAILURE (H): Primary | ICD-10-CM

## 2024-12-12 DIAGNOSIS — R00.0 TACHYCARDIA: ICD-10-CM

## 2024-12-12 PROBLEM — I50.9 CHF (CONGESTIVE HEART FAILURE) (H): Status: ACTIVE | Noted: 2024-12-12

## 2024-12-12 LAB
ATRIAL RATE - MUSE: 100 BPM
DIASTOLIC BLOOD PRESSURE - MUSE: NORMAL MMHG
INTERPRETATION ECG - MUSE: NORMAL
P AXIS - MUSE: 56 DEGREES
PR INTERVAL - MUSE: 150 MS
QRS DURATION - MUSE: 80 MS
QT - MUSE: 380 MS
QTC - MUSE: 490 MS
R AXIS - MUSE: -19 DEGREES
SYSTOLIC BLOOD PRESSURE - MUSE: NORMAL MMHG
T AXIS - MUSE: 135 DEGREES
VENTRICULAR RATE- MUSE: 100 BPM

## 2024-12-12 RX ORDER — BUMETANIDE 1 MG/1
1 TABLET ORAL DAILY
Qty: 10 TABLET | Refills: 0 | Status: SHIPPED | OUTPATIENT
Start: 2024-12-12

## 2024-12-12 NOTE — PROGRESS NOTES
CARDIOTHORACIC SURGERY FOLLOW-UP VISIT     Gill Mendez   1960   [id number]      Reason for visit: Post operative clinic visit. Patient underwent CABG x3 w/ LLE EVH in s/o NSTEMI w/ HFrEF with Dr. Carter on 11/24/2024.    HPI: Gill Mendez is a 64 year old year old female seen in clinic for a routine follow-up appointment after surgery. Patient has past medical history as below. Hospital course was remarkable for klebsiella CAUTI and mild diverticulitis. Patient was discharged to home w/ home care and home O2 on POD#12.    Patient has been doing fairly since discharge. Patient did not yet follow-up with primary care since leaving the hospital. Reports that incision is healing well. Denies fevers, peripheral edema. Appetite is improving and patient is voiding without difficulty. Weight has been stable. Pain management at this point with PRN tylenol. Patient has been having home health PT/OT and this is going well. Patient is not on anti-coagulation.     She was seen in rapid access clinic on 12/10/2024 for increasing chest tightness, lower extremity edema, and shortness of breath. BNP and BMP were completed and showed fluid retention as well as slight Cr bump. CXR was completed which showed mild to mod left pleural effusion, improving from previous. ECG overall unchanged from previous. She discussed her current state of health with our RNCC today and expressed that she overall feels poorly and preferred to be seen in clinic prior to follow-up on 12/16. In clinic, she reports that she is no longer requiring the home O2. Breathing feels tight and she endorses a nonproductive cough. She is feeling chest tightness that worsens with laying down and she is needing to sleep upright in order to breathe comfortably. Despite increasing her lasix, she does not feel that she is urinating very much. Says her weight has increased 4lbs at home since 12/10 and that her leg swelling has worsened.     PAST MEDICAL  HISTORY:  Past Medical History:   Diagnosis Date    Anxiety     Asthma     Cellulitis     Diabetes mellitus (H)     Essential hypertension     Created by Conversion  Replacement Utility updated for latest IMO load    Femoral nerve palsy, left 08/23/2023    Gastroparesis     Goiter 05/28/2023 05/2023: Palpated on exam, has had stable imaging with endocrinology, continue to follow endocrinology.      Hyperlipidemia     Hypertension     Other and unspecified hyperlipidemia     Created by Conversion     PONV (postoperative nausea and vomiting)     Shortness of breath     Type II or unspecified type diabetes mellitus without mention of complication, not stated as uncontrolled     Created by Conversion        PAST SURGICAL HISTORY:  Past Surgical History:   Procedure Laterality Date    AMPUTATE TOE(S) Right 7/31/2020    Procedure: AMPUTATION, third digit right foot;  Surgeon: Chris Vega DPM;  Location: [facility];  Service: Podiatry    CORONARY ARTERY BYPASS GRAFT, WITH ENDOSCOPIC VESSEL PROCUREMENT N/A 11/24/2024    Procedure: CORONARY ARTERY BYPASS GRAFT TIMES THREE, LEFT INTERNAL MAMMARY ARTERY HARVEST, LEFT LEG ENDOSCOPIC VESSEL PROCUREMENT,;  Surgeon: Zhen Carter MD;  Location: [facility]    CV CORONARY ANGIOGRAM N/A 11/23/2024    Procedure: Coronary Angiogram;  Surgeon: Roque Eisenberg MD;  Location: [facility] LAB CV    CV INTRA AORTIC BALLOON N/A 11/24/2024    Procedure: Intra aortic Balloon Pump Insertion;  Surgeon: Roque Eisenberg MD;  Location: [facility] LAB CV    CV LEFT HEART CATH N/A 11/23/2024    Procedure: Left Heart Catheterization;  Surgeon: Roque Eisenberg MD;  Location: [facility] CATH LAB CV    ENDOMETRIAL BIOPSY      FOOT ARTHROPLASTY Right 09/24/2020    Fourth and fifth toe by Dr. Vega    HC REPAIR OF HAMMERTOE,ONE Left 12/7/2020    Procedure: ARTHROPLASTY, digits two, three, four and five left foot;  Surgeon: Chris Vega DPM;  Location:  [facility];  Service: Podiatry    HERNIA REPAIR      HYSTERECTOMY      IR LUMBAR PUNCTURE  7/20/2023    REPAIR TENDON ACHILLES Bilateral     Left was plate  , right was torn    TONSILLECTOMY      TRANSESOPHAGEAL ECHOCARDIOGRAM INTRAOPERATIVE  11/24/2024    Procedure: ECHOCARDIOGRAM, TRANSESOPHAGEAL, INTRA-OPERATIVE;  Surgeon: Zhen Carter MD;  Location: [facility] REMOVAL OF OVARY(S)      Description: Oophorectomy - Bilateral (Removal Of Both Ovaries);  Recorded: 10/09/2008;       CURRENT MEDICATIONS:     Current Outpatient Medications:     acetaminophen (TYLENOL) 325 MG tablet, Take 1-2 tablets (325-650 mg) by mouth every 4 hours as needed for mild pain., Disp: 60 tablet, Rfl: 0    albuterol (PROVENTIL HFA;VENTOLIN HFA) 90 mcg/actuation inhaler, Inhale 2 puffs into the lungs every 6 hours as needed, Disp: , Rfl:     amoxicillin-clavulanate (AUGMENTIN) 875-125 MG tablet, Take 1 tablet by mouth every 8 hours. For mild diverticulitis., Disp: 9 tablet, Rfl: 0    ARNUITY ELLIPTA 100 MCG/ACT inhaler, Inhale 1 puff into the lungs daily., Disp: , Rfl:     aspirin (ASA) 81 MG chewable tablet, Take 2 tablets (162 mg) by mouth or NG Tube daily., Disp: 180 tablet, Rfl: 3    atorvastatin (LIPITOR) 80 MG tablet, Take 1 tablet (80 mg) by mouth or Feeding Tube every evening., Disp: 90 tablet, Rfl: 3    citalopram (CELEXA) 20 MG tablet, [CITALOPRAM (CELEXA) 20 MG TABLET] Take 20 mg by mouth every morning. , Disp: , Rfl: 0    empagliflozin (JARDIANCE) 25 MG TABS tablet, Take 1 tablet (25 mg) by mouth daily., Disp: 90 tablet, Rfl: 1    furosemide (LASIX) 20 MG tablet, Take 1 tablet (20 mg) by mouth daily. (Patient taking differently: Take 20 mg by mouth daily. 40 mg daily until 12/13/24), Disp: 30 tablet, Rfl: 0    hydrOXYzine HCl (ATARAX) 10 MG tablet, Take 1 tablet (10 mg) by mouth or Feeding Tube every 8 hours as needed for anxiety., Disp: 30 tablet, Rfl: 0    metFORMIN (GLUCOPHAGE XR) 500 MG 24 hr  "tablet, Take 2 tablets (1,000 mg) by mouth daily (with breakfast). Changed on 6/21/24, new prescription not yet sent, Disp: 180 tablet, Rfl: 1    metoprolol succinate ER (TOPROL XL) 25 MG 24 hr tablet, Take 1 tablet (25 mg) by mouth daily., Disp: 30 tablet, Rfl: 1    oxyCODONE (ROXICODONE) 5 MG tablet, Take 0.5-1 tablets (2.5-5 mg) by mouth every 4 hours as needed for moderate to severe pain., Disp: 10 tablet, Rfl: 0    pramipexole (MIRAPEX) 0.125 MG tablet, Take 0.125 mg by mouth daily. At 3pm, Disp: , Rfl: 3    pramipexole (MIRAPEX) 0.5 MG tablet, Take 0.5 mg by mouth at bedtime., Disp: , Rfl:     tirzepatide (MOUNJARO) 15 MG/0.5ML pen, Inject 15 mg subcutaneously every 7 days., Disp: 6 mL, Rfl: 1    magnesium 250 MG tablet, Take 2 tablets by mouth daily. (Patient not taking: Reported on 12/12/2024), Disp: , Rfl:     polyethylene glycol (MIRALAX) 17 GM/Dose powder, Take 17 g by mouth daily. (Patient not taking: Reported on 12/12/2024), Disp: 510 g, Rfl: 0    ALLERGIES:      Allergies   Allergen Reactions    Codeine Unknown     Patient states possibly caused N/V but can't remember for sure    Semaglutide Nausea and Vomiting    Acetaminophen-Codeine Rash       ROS:  Gen: No fevers, weight loss/gain  CV: Denies chest pain, peripheral edema  Pulm: Denies SOB  GI/: Voiding without problems, appetite improving.     LABS:  None    IMAGING:  None    PHYSICAL EXAM:   /64 (BP Location: Right arm, Patient Position: Sitting, Cuff Size: Adult Regular)   Pulse 103   Resp 24   Ht 1.651 m (5' 5\")   Wt 70.2 kg (154 lb 12.8 oz)   SpO2 97%   BMI 25.76 kg/m    General: Alert and oriented, pleasant, no acute distress.  CV:  No peripheral edema.  Pulm: Easy work of breathing on room air.   GI: Soft, non-tender, and non-distended  Incision: Chest and left leg incisions clean dry and intact without erythema, swelling or drainage  Neuro: CNs grossly intact.      ASSESSMENT/PLAN:  Gill Mendez is a 64 year old year old " female status post CABG x3 who returns to clinic for postop visit.     - Surgically doing well. Incisions are healing well with no signs of infection.  - Hemodynamics are stable.   - Given ongoing lower extremity edema, albeit mild in nature, will trial replacing lasix 40 mg daily w/ bumex 1 mg daily to see if she has improved UOP. If not, would consider repeating BMP to check renal function.  - Will recheck ECG today given ongoing mild tachycardia to r/o atrial arrhythmias.  - Will recheck CXR to monitor pleural effusion/pulmonary edema.  - Reassured that patient SpO2 97% off home O2 w/o hypotension  - Consider echo if ongoing chest tightness in follow-up on 12/16 or if worsening tomorrow.   - Follow up w/ cardiology and HF clinic scheduled.   - May start driving 12/22/2024 (4 weeks post-op) if not using narcotic pain medications.  - Continue strict sternal precautions until 1/19/2025. No lifting >10bs; gradually increase at this point (8 weeks post-op).   - I will call tomorrow w/ results of CXR and to check in.   - Follow up appt w/ CVTS on Monday 12/16.          Amy Mitchell PA-C  [facility] Cardiothoracic Surgery  Vocera or Secure Chat  December 12, 2024

## 2024-12-12 NOTE — TELEPHONE ENCOUNTER
Called and talked to patient at approx. 0930. Patient states that since being seen in [facility] on 12/10, she has noticed that her weight has trended up ~2lbs per day, 4 total. She does not notice an increase in urination w/ increasing the lasix to 40  mg daily. Reports that her lower extremity swelling is worsening. She would like to be seen in clinic today. Appt scheduled for 1030. Discussed location [address] [facility] and patient verbalizes understanding.     Amy Mitchell PA-C  [facility] Cardiothoracic Surgery  Vocera or Secure Chat  December 12, 2024

## 2024-12-12 NOTE — PATIENT INSTRUCTIONS
- Try replacing lasix w/ bumex 1 mg a day.   - CXR today  - ECG again today  - I will call to check in tomorrow and we will plan to see you for your scheduled appt on Monday

## 2024-12-12 NOTE — TELEPHONE ENCOUNTER
Called and spoke to pt this morning and she is currently 156 lbs. She notes the swelling in her legs has increased. She doesn't feel like she is urinating often.    Will consult with cardiology on further recommendations.

## 2024-12-16 ENCOUNTER — APPOINTMENT (OUTPATIENT)
Dept: RADIOLOGY | Facility: HOSPITAL | Age: 64
End: 2024-12-16
Attending: EMERGENCY MEDICINE
Payer: COMMERCIAL

## 2024-12-16 ENCOUNTER — OFFICE VISIT (OUTPATIENT)
Dept: CARDIOLOGY | Facility: CLINIC | Age: 64
End: 2024-12-16
Payer: COMMERCIAL

## 2024-12-16 ENCOUNTER — HOSPITAL ENCOUNTER (EMERGENCY)
Facility: HOSPITAL | Age: 64
Discharge: HOME | End: 2024-12-16
Attending: EMERGENCY MEDICINE | Admitting: EMERGENCY MEDICINE
Payer: COMMERCIAL

## 2024-12-16 ENCOUNTER — TELEPHONE (OUTPATIENT)
Dept: CARDIOLOGY | Facility: CLINIC | Age: 64
End: 2024-12-16

## 2024-12-16 VITALS
HEART RATE: 105 BPM | WEIGHT: 153 LBS | DIASTOLIC BLOOD PRESSURE: 64 MMHG | SYSTOLIC BLOOD PRESSURE: 132 MMHG | BODY MASS INDEX: 25.46 KG/M2 | RESPIRATION RATE: 20 BRPM

## 2024-12-16 VITALS
WEIGHT: 155 LBS | HEART RATE: 93 BPM | RESPIRATION RATE: 22 BRPM | BODY MASS INDEX: 25.83 KG/M2 | OXYGEN SATURATION: 96 % | TEMPERATURE: 98.6 F | HEIGHT: 65 IN | SYSTOLIC BLOOD PRESSURE: 114 MMHG | DIASTOLIC BLOOD PRESSURE: 64 MMHG

## 2024-12-16 DIAGNOSIS — R07.89 CHEST TIGHTNESS: ICD-10-CM

## 2024-12-16 DIAGNOSIS — T81.41XA SUPERFICIAL INCISIONAL INFECTION OF SURGICAL SITE: ICD-10-CM

## 2024-12-16 DIAGNOSIS — R60.0 LOWER EXTREMITY EDEMA: ICD-10-CM

## 2024-12-16 DIAGNOSIS — R07.9 CHEST PAIN, UNSPECIFIED TYPE: ICD-10-CM

## 2024-12-16 DIAGNOSIS — R73.9 HYPERGLYCEMIA: ICD-10-CM

## 2024-12-16 DIAGNOSIS — Z95.1 S/P CABG (CORONARY ARTERY BYPASS GRAFT): Primary | ICD-10-CM

## 2024-12-16 DIAGNOSIS — I50.22 CHRONIC SYSTOLIC CONGESTIVE HEART FAILURE (H): ICD-10-CM

## 2024-12-16 DIAGNOSIS — L76.82 POSTOPERATIVE COMPLICATION OF SKIN INVOLVING DRAINAGE FROM SURGICAL WOUND: ICD-10-CM

## 2024-12-16 LAB
ANION GAP SERPL CALCULATED.3IONS-SCNC: 10 MMOL/L (ref 7–15)
ANION GAP SERPL CALCULATED.3IONS-SCNC: 11 MMOL/L (ref 7–15)
B-OH-BUTYR SERPL-SCNC: <0.18 MMOL/L
BASE EXCESS BLDV CALC-SCNC: 7.6 MMOL/L (ref -3–3)
BASOPHILS # BLD AUTO: 0 10E3/UL (ref 0–0.2)
BASOPHILS NFR BLD AUTO: 0 %
BUN SERPL-MCNC: 23.1 MG/DL (ref 8–23)
BUN SERPL-MCNC: 23.5 MG/DL (ref 8–23)
CALCIUM SERPL-MCNC: 9.7 MG/DL (ref 8.8–10.4)
CALCIUM SERPL-MCNC: 9.9 MG/DL (ref 8.8–10.4)
CHLORIDE SERPL-SCNC: 97 MMOL/L (ref 98–107)
CHLORIDE SERPL-SCNC: 99 MMOL/L (ref 98–107)
CK SERPL-CCNC: 54 U/L (ref 26–192)
CREAT SERPL-MCNC: 0.88 MG/DL (ref 0.51–0.95)
CREAT SERPL-MCNC: 0.93 MG/DL (ref 0.51–0.95)
EGFRCR SERPLBLD CKD-EPI 2021: 69 ML/MIN/1.73M2
EGFRCR SERPLBLD CKD-EPI 2021: 74 ML/MIN/1.73M2
EOSINOPHIL # BLD AUTO: 0.5 10E3/UL (ref 0–0.7)
EOSINOPHIL NFR BLD AUTO: 5 %
ERYTHROCYTE [DISTWIDTH] IN BLOOD BY AUTOMATED COUNT: 16.4 % (ref 10–15)
ERYTHROCYTE [DISTWIDTH] IN BLOOD BY AUTOMATED COUNT: 16.4 % (ref 10–15)
GLUCOSE BLDC GLUCOMTR-MCNC: 132 MG/DL (ref 70–99)
GLUCOSE BLDC GLUCOMTR-MCNC: 209 MG/DL (ref 70–99)
GLUCOSE SERPL-MCNC: 216 MG/DL (ref 70–99)
GLUCOSE SERPL-MCNC: 428 MG/DL (ref 70–99)
HCO3 BLDV-SCNC: 35 MMOL/L (ref 21–28)
HCO3 SERPL-SCNC: 31 MMOL/L (ref 22–29)
HCO3 SERPL-SCNC: 31 MMOL/L (ref 22–29)
HCT VFR BLD AUTO: 35.6 % (ref 35–47)
HCT VFR BLD AUTO: 38.7 % (ref 35–47)
HGB BLD-MCNC: 10.8 G/DL (ref 11.7–15.7)
HGB BLD-MCNC: 11.8 G/DL (ref 11.7–15.7)
HOLD SPECIMEN: NORMAL
HOLD SPECIMEN: NORMAL
IMM GRANULOCYTES # BLD: 0.1 10E3/UL
IMM GRANULOCYTES NFR BLD: 1 %
LYMPHOCYTES # BLD AUTO: 2.7 10E3/UL (ref 0.8–5.3)
LYMPHOCYTES NFR BLD AUTO: 28 %
MAGNESIUM SERPL-MCNC: 2.2 MG/DL (ref 1.7–2.3)
MCH RBC QN AUTO: 25 PG (ref 26.5–33)
MCH RBC QN AUTO: 25.2 PG (ref 26.5–33)
MCHC RBC AUTO-ENTMCNC: 30.3 G/DL (ref 31.5–36.5)
MCHC RBC AUTO-ENTMCNC: 30.5 G/DL (ref 31.5–36.5)
MCV RBC AUTO: 82 FL (ref 78–100)
MCV RBC AUTO: 83 FL (ref 78–100)
MONOCYTES # BLD AUTO: 0.7 10E3/UL (ref 0–1.3)
MONOCYTES NFR BLD AUTO: 7 %
NEUTROPHILS # BLD AUTO: 5.7 10E3/UL (ref 1.6–8.3)
NEUTROPHILS NFR BLD AUTO: 59 %
NRBC # BLD AUTO: 0 10E3/UL
NRBC BLD AUTO-RTO: 0 /100
NT-PROBNP SERPL-MCNC: 5787 PG/ML (ref 0–900)
O2/TOTAL GAS SETTING VFR VENT: 21 %
OXYHGB MFR BLDV: 12 % (ref 70–75)
PCO2 BLDV: 62 MM HG (ref 40–50)
PH BLDV: 7.36 [PH] (ref 7.32–7.43)
PHOSPHATE SERPL-MCNC: 4.1 MG/DL (ref 2.5–4.5)
PLATELET # BLD AUTO: 445 10E3/UL (ref 150–450)
PLATELET # BLD AUTO: 468 10E3/UL (ref 150–450)
PO2 BLDV: 15 MM HG (ref 25–47)
POTASSIUM SERPL-SCNC: 4 MMOL/L (ref 3.4–5.3)
POTASSIUM SERPL-SCNC: 4.5 MMOL/L (ref 3.4–5.3)
RBC # BLD AUTO: 4.32 10E6/UL (ref 3.8–5.2)
RBC # BLD AUTO: 4.68 10E6/UL (ref 3.8–5.2)
SAO2 % BLDV: 11.9 % (ref 70–75)
SODIUM SERPL-SCNC: 138 MMOL/L (ref 135–145)
SODIUM SERPL-SCNC: 141 MMOL/L (ref 135–145)
TROPONIN T SERPL HS-MCNC: 48 NG/L
TROPONIN T SERPL HS-MCNC: 52 NG/L
TSH SERPL DL<=0.005 MIU/L-ACNC: 0.61 UIU/ML (ref 0.3–4.2)
WBC # BLD AUTO: 7.3 10E3/UL (ref 4–11)
WBC # BLD AUTO: 9.7 10E3/UL (ref 4–11)

## 2024-12-16 PROCEDURE — 71046 X-RAY EXAM CHEST 2 VIEWS: CPT

## 2024-12-16 PROCEDURE — 83735 ASSAY OF MAGNESIUM: CPT | Performed by: EMERGENCY MEDICINE

## 2024-12-16 PROCEDURE — 82962 GLUCOSE BLOOD TEST: CPT

## 2024-12-16 PROCEDURE — 80048 BASIC METABOLIC PNL TOTAL CA: CPT | Performed by: EMERGENCY MEDICINE

## 2024-12-16 PROCEDURE — 82550 ASSAY OF CK (CPK): CPT | Performed by: EMERGENCY MEDICINE

## 2024-12-16 PROCEDURE — 85049 AUTOMATED PLATELET COUNT: CPT | Performed by: EMERGENCY MEDICINE

## 2024-12-16 PROCEDURE — 84100 ASSAY OF PHOSPHORUS: CPT | Performed by: EMERGENCY MEDICINE

## 2024-12-16 PROCEDURE — 93005 ELECTROCARDIOGRAM TRACING: CPT | Performed by: STUDENT IN AN ORGANIZED HEALTH CARE EDUCATION/TRAINING PROGRAM

## 2024-12-16 PROCEDURE — 82805 BLOOD GASES W/O2 SATURATION: CPT | Performed by: EMERGENCY MEDICINE

## 2024-12-16 PROCEDURE — 250N000013 HC RX MED GY IP 250 OP 250 PS 637: Performed by: EMERGENCY MEDICINE

## 2024-12-16 PROCEDURE — 84484 ASSAY OF TROPONIN QUANT: CPT | Performed by: EMERGENCY MEDICINE

## 2024-12-16 PROCEDURE — 36415 COLL VENOUS BLD VENIPUNCTURE: CPT

## 2024-12-16 PROCEDURE — 36415 COLL VENOUS BLD VENIPUNCTURE: CPT | Performed by: EMERGENCY MEDICINE

## 2024-12-16 PROCEDURE — 85018 HEMOGLOBIN: CPT | Performed by: EMERGENCY MEDICINE

## 2024-12-16 PROCEDURE — 85025 COMPLETE CBC W/AUTO DIFF WBC: CPT

## 2024-12-16 PROCEDURE — 82010 KETONE BODYS QUAN: CPT | Performed by: EMERGENCY MEDICINE

## 2024-12-16 PROCEDURE — 99285 EMERGENCY DEPT VISIT HI MDM: CPT | Mod: 25

## 2024-12-16 PROCEDURE — 80048 BASIC METABOLIC PNL TOTAL CA: CPT

## 2024-12-16 PROCEDURE — 93005 ELECTROCARDIOGRAM TRACING: CPT | Performed by: EMERGENCY MEDICINE

## 2024-12-16 PROCEDURE — 99024 POSTOP FOLLOW-UP VISIT: CPT

## 2024-12-16 PROCEDURE — 83880 ASSAY OF NATRIURETIC PEPTIDE: CPT

## 2024-12-16 PROCEDURE — 84443 ASSAY THYROID STIM HORMONE: CPT | Performed by: EMERGENCY MEDICINE

## 2024-12-16 PROCEDURE — 96365 THER/PROPH/DIAG IV INF INIT: CPT

## 2024-12-16 PROCEDURE — 250N000011 HC RX IP 250 OP 636: Performed by: EMERGENCY MEDICINE

## 2024-12-16 PROCEDURE — 258N000003 HC RX IP 258 OP 636: Performed by: EMERGENCY MEDICINE

## 2024-12-16 RX ORDER — ONDANSETRON 4 MG/1
4 TABLET, ORALLY DISINTEGRATING ORAL EVERY 8 HOURS PRN
Qty: 10 TABLET | Refills: 0 | Status: SHIPPED | OUTPATIENT
Start: 2024-12-16

## 2024-12-16 RX ORDER — CEFTRIAXONE 1 G/1
1 INJECTION, POWDER, FOR SOLUTION INTRAMUSCULAR; INTRAVENOUS ONCE
Status: COMPLETED | OUTPATIENT
Start: 2024-12-16 | End: 2024-12-16

## 2024-12-16 RX ORDER — ACETAMINOPHEN 325 MG/1
975 TABLET ORAL ONCE
Status: COMPLETED | OUTPATIENT
Start: 2024-12-16 | End: 2024-12-16

## 2024-12-16 RX ORDER — CEPHALEXIN 500 MG/1
500 CAPSULE ORAL 4 TIMES DAILY
Qty: 20 CAPSULE | Refills: 0 | Status: SHIPPED | OUTPATIENT
Start: 2024-12-16

## 2024-12-16 RX ADMIN — SODIUM CHLORIDE 250 ML: 9 INJECTION, SOLUTION INTRAVENOUS at 19:19

## 2024-12-16 RX ADMIN — CEFTRIAXONE SODIUM 1 G: 1 INJECTION, POWDER, FOR SOLUTION INTRAMUSCULAR; INTRAVENOUS at 19:19

## 2024-12-16 RX ADMIN — ACETAMINOPHEN 975 MG: 325 TABLET ORAL at 18:09

## 2024-12-16 ASSESSMENT — ENCOUNTER SYMPTOMS
SHORTNESS OF BREATH: 0
COUGH: 0
NAUSEA: 1
DYSURIA: 0
VOMITING: 1
DIARRHEA: 0
ABDOMINAL PAIN: 0
FEVER: 0

## 2024-12-16 ASSESSMENT — ACTIVITIES OF DAILY LIVING (ADL)
ADLS_ACUITY_SCORE: 63

## 2024-12-16 NOTE — PATIENT INSTRUCTIONS
- You can take up to 40 mg 2x a day of the omeprazole if needed for severe acid reflux.   - I sent Zofran to your pharmacy as needed for nausea  - Push fluids today  - Go back to taking lasix 20 mg daily starting tomorrow.   - We will get the echo (US of your heart) ASAP  - I'll call with the results of your labs.   - Take keflex x5 days for mild chest tube incision infection.

## 2024-12-16 NOTE — ED TRIAGE NOTES
Patient arrives to ED following referral from clinic. Per patient report, she had open heart triple bypass on 11/24. She now has mid sternal to left sided chest tightness radiating to her neck and shoulder and shortness of breath. EKG in progress.     Triage Assessment (Adult)       Row Name 12/16/24 1409          Triage Assessment    Airway WDL WDL        Respiratory WDL    Respiratory WDL X;rhythm/pattern     Rhythm/Pattern, Respiratory shortness of breath        Cardiac WDL    Cardiac WDL X;chest pain        Chest Pain Assessment    Chest Pain Location midsternal;anterior chest, left     Chest Pain Radiation neck;shoulder     Character tightness     Precipitating Factors unknown     Alleviating Factors nothing     Chest Pain Intervention 12-lead ECG obtained        Cognitive/Neuro/Behavioral WDL    Cognitive/Neuro/Behavioral WDL WDL

## 2024-12-16 NOTE — DISCHARGE INSTRUCTIONS
Up the cephalexin prescription as prescribed by the heart clinic for that drainage from your chest tube site.  You were given a dose IV tonight which will last you through the night.   the prescription in the morning and start taking it as directed.    Otherwise the rest of your tests look good.  Your blood sugar has come down wonderfully all on its own.  We did not need to give you any type of diabetes medicines here in the ER.    Continue to watch your sugars closely.    Return to the emergency department if you develop worsening elevated blood sugars, worsening chest pain, difficulty breathing, fevers, or any other concerns.    Contact the heart care clinic tomorrow and let them know that you missed your echo.  They can help reschedule that with you.    Thank you for choosing [facility].  It has been my pleasure caring for you today.     ~Dr. Nichole MD

## 2024-12-16 NOTE — PROGRESS NOTES
CARDIOTHORACIC SURGERY FOLLOW-UP VISIT     Gill Mendez   1960   [id number]      Reason for visit:  Post operative clinic visit. Patient underwent CABG x3 w/ LLE EVH in s/o NSTEMI w/ HFrEF with Dr. Carter on 11/24/2024.     HPI: Gill Mendez is a 64 year old year old female seen in clinic for a routine follow-up appointment after surgery. Patient has past medical history as below. Hospital course was remarkable for klebsiella CAUTI and mild diverticulitis. Patient was discharged to home w/ home care and home O2 on POD#12. She was seen in our clinic on 12/12/2024 for shortness of breath, chest tightness, lower extremity swelling, and general malaise. ECG this day was unremarkable and CXR showed stable moderate left pleural effusion. Her lasix was switched to bumex.     Today, she reports she still does not feel well. Reports ongoing intermittent chest tightness and shortness of breath. Also reports ongoing acid reflux that she feels in her throat and she did vomit once last night. Mild nausea this AM but no ongoing vomiting.      PAST MEDICAL HISTORY:  Past Medical History:   Diagnosis Date    Anxiety     Asthma     Cellulitis     Diabetes mellitus (H)     Essential hypertension     Created by Conversion  Replacement Utility updated for latest IMO load    Femoral nerve palsy, left 08/23/2023    Gastroparesis     Goiter 05/28/2023 05/2023: Palpated on exam, has had stable imaging with endocrinology, continue to follow endocrinology.      Hyperlipidemia     Hypertension     Other and unspecified hyperlipidemia     Created by Conversion     PONV (postoperative nausea and vomiting)     Shortness of breath     Type II or unspecified type diabetes mellitus without mention of complication, not stated as uncontrolled     Created by Conversion        PAST SURGICAL HISTORY:  Past Surgical History:   Procedure Laterality Date    AMPUTATE TOE(S) Right 7/31/2020    Procedure: AMPUTATION, third digit right foot;   Surgeon: Chris Vega DPM;  [place] [facility] [place]  Service: [place]iatry    CORONARY ARTERY BYPASS GRAFT, WITH ENDOSCOPIC VESSEL PROCUREMENT N/A 11/24/2024    Procedure: CORONARY ARTERY BYPASS GRAFT TIMES THREE, LEFT INTERNAL MAMMARY ARTERY HARVEST, LEFT LEG ENDOSCOPIC VESSEL PROCUREMENT,;  Surgeon: Zhen Carter MD;  [place] [facility] [place]    CV CORONARY ANGIOGRAM N/A 11/23/2024    Procedure: Coronary Angiogram;  Surgeon: Roque Eisenberg MD;  Location: [facility] CATH LAB CV    CV INTRA AORTIC BALLOON N/A 11/24/2024    Procedure: Intra aortic Balloon Pump Insertion;  Surgeon: Roque Eisenberg MD;  Location: [facility] CATH LAB CV    CV LEFT HEART CATH N/A 11/23/2024    Procedure: Left Heart Catheterization;  Surgeon: Roque Eisenberg MD;  Location: [facility] CATH LAB CV    ENDOMETRIAL BIOPSY      FOOT ARTHROPLASTY Right 09/24/2020    Fourth and fifth toe by Dr. Vega    HC REPAIR OF HAMMERTOE,ONE Left 12/7/2020    Procedure: ARTHROPLASTY, digits two, three, four and five left foot;  Surgeon: Chris Vega DPM;  Location: [facility];  Service: Podiatry    HERNIA REPAIR      HYSTERECTOMY      IR LUMBAR PUNCTURE  7/20/2023    REPAIR TENDON ACHILLES Bilateral     Left was plate  , right was torn    TONSILLECTOMY      TRANSESOPHAGEAL ECHOCARDIOGRAM INTRAOPERATIVE  11/24/2024    Procedure: ECHOCARDIOGRAM, TRANSESOPHAGEAL, INTRA-OPERATIVE;  Surgeon: Zhen Carter MD;  Location: [facility] REMOVAL OF OVARY(S)      Description: Oophorectomy - Bilateral (Removal Of Both Ovaries);  Recorded: 10/09/2008;       CURRENT MEDICATIONS:     Current Outpatient Medications:     acetaminophen (TYLENOL) 325 MG tablet, Take 1-2 tablets (325-650 mg) by mouth every 4 hours as needed for mild pain., Disp: 60 tablet, Rfl: 0    albuterol (PROVENTIL HFA;VENTOLIN HFA) 90 mcg/actuation inhaler, Inhale 2 puffs into the lungs every 6 hours as needed, Disp: , Rfl:      amoxicillin-clavulanate (AUGMENTIN) 875-125 MG tablet, Take 1 tablet by mouth every 8 hours. For mild diverticulitis., Disp: 9 tablet, Rfl: 0    ARNUITY ELLIPTA 100 MCG/ACT inhaler, Inhale 1 puff into the lungs daily., Disp: , Rfl:     aspirin (ASA) 81 MG chewable tablet, Take 2 tablets (162 mg) by mouth or NG Tube daily., Disp: 180 tablet, Rfl: 3    atorvastatin (LIPITOR) 80 MG tablet, Take 1 tablet (80 mg) by mouth or Feeding Tube every evening., Disp: 90 tablet, Rfl: 3    bumetanide (BUMEX) 1 MG tablet, Take 1 tablet (1 mg) by mouth daily., Disp: 10 tablet, Rfl: 0    citalopram (CELEXA) 20 MG tablet, [CITALOPRAM (CELEXA) 20 MG TABLET] Take 20 mg by mouth every morning. , Disp: , Rfl: 0    empagliflozin (JARDIANCE) 25 MG TABS tablet, Take 1 tablet (25 mg) by mouth daily., Disp: 90 tablet, Rfl: 1    hydrOXYzine HCl (ATARAX) 10 MG tablet, Take 1 tablet (10 mg) by mouth or Feeding Tube every 8 hours as needed for anxiety., Disp: 30 tablet, Rfl: 0    metFORMIN (GLUCOPHAGE XR) 500 MG 24 hr tablet, Take 2 tablets (1,000 mg) by mouth daily (with breakfast). Changed on 6/21/24, new prescription not yet sent, Disp: 180 tablet, Rfl: 1    metoprolol succinate ER (TOPROL XL) 25 MG 24 hr tablet, Take 1 tablet (25 mg) by mouth daily., Disp: 30 tablet, Rfl: 1    oxyCODONE (ROXICODONE) 5 MG tablet, Take 0.5-1 tablets (2.5-5 mg) by mouth every 4 hours as needed for moderate to severe pain., Disp: 10 tablet, Rfl: 0    pramipexole (MIRAPEX) 0.125 MG tablet, Take 0.125 mg by mouth daily. At 3pm, Disp: , Rfl: 3    pramipexole (MIRAPEX) 0.5 MG tablet, Take 0.5 mg by mouth at bedtime., Disp: , Rfl:     tirzepatide (MOUNJARO) 15 MG/0.5ML pen, Inject 15 mg subcutaneously every 7 days., Disp: 6 mL, Rfl: 1    furosemide (LASIX) 20 MG tablet, Take 1 tablet (20 mg) by mouth daily. (Patient not taking: Reported on 12/16/2024), Disp: 30 tablet, Rfl: 0    magnesium 250 MG tablet, Take 2 tablets by mouth daily. (Patient not taking: Reported on  12/16/2024), Disp: , Rfl:     polyethylene glycol (MIRALAX) 17 GM/Dose powder, Take 17 g by mouth daily. (Patient not taking: Reported on 12/16/2024), Disp: 510 g, Rfl: 0    ALLERGIES:      Allergies   Allergen Reactions    Codeine Unknown     Patient states possibly caused N/V but can't remember for sure    Semaglutide Nausea and Vomiting    Acetaminophen-Codeine Rash       ROS:  Gen: No fevers, weight loss/gain  CV: Denies chest pain, peripheral edema  Pulm: Denies SOB  GI/: Voiding without problems, appetite improving.     LABS:  None    IMAGING:  None    PHYSICAL EXAM:   /64 (BP Location: Right arm, Patient Position: Sitting, Cuff Size: Adult Regular)   Pulse 105   Resp 20   Wt 69.4 kg (153 lb)   BMI 25.46 kg/m    General: Alert and oriented, pleasant, no acute distress.  CV:  No peripheral edema.  Pulm: Easy work of breathing on room air.   GI: Soft, non-tender, and non-distended  Incision: Chest and leg incisions clean dry and intact without erythema, swelling or drainage. Chest tube incision w/ scant purulent drainage.  Neuro: CNs grossly intact.      ASSESSMENT/PLAN:  Gill Mendez is a 64 year old year old female status post CABG x4 who returns to clinic for postop visit.     - Surgically doing well. Incisions are healing well for the most part. Will plan for 5 day course of keflex for minor superficial infection of chest tube site.   - Hemodynamics are stable, your weight is decreasing, we will decrease your diuresis back to 20 mg lasix daily.   - Discussed ensuring appropriate fluid consumption today.    - Check CBC/BMP/BNP today and follow trends.  - Follow up with your cardiologist as scheduled (12/23/2024 w/ Maira CARTER)  - Continue Cardiac Rehab until completed.   - May start driving 12/22/2024 (4 weeks post-op) if not using narcotic pain medications.  - Continue strict sternal precautions until 1/19/2025. No lifting >10bs; may gradually increase at this point (8 weeks  post-op).  - Echo ordered at discharge, encouraged patient to schedule that ASAP given ongoing symptoms, will follow for results.   - No need for further follow-up appts with CV surgery unless concerns. Feel free to call our office with questions. [phone]         Amy Mitchell PA-C  [facility] Cardiothoracic Surgery  Vocera or Secure Chat  December 16, 2024

## 2024-12-16 NOTE — ED NOTES
Expected Patient Referral to ED  12:39 PM    Referring Clinic/Provider:  YARELIS Reyes  [facility]    Reason for referral/Clinical facts:  Experiencing Fatigue over the weekend with vomiting  Getting diuresis for CHF and leg swelling which looks better --> provider concerned pt is now dehydrated  Glucose >420 today (usual 150's), pt with vomiting  Suspect dehydrated and may need some IVF and eval for the elevated sugars    Recommendations provided:  Send to ED for further evaluation    Caller was informed that this institution does possess the capabilities and/or resources to provide for patient and should be transferred to our facility.    Discussed that if direct admit is sought and any hurdles are encountered, this ED would be happy to see the patient and evaluate.    Informed caller that recommendations provided are recommendations based only on the facts provided and that they responsible to accept or reject the advice, or to seek a formal in person consultation as needed and that this ED will see/treat patient should they arrive.      Mulu Varela MD  [facility] EMERGENCY DEPARTMENT  [address] [place] [id number] [phone]       Mulu Varela MD  12/16/24 [id number]

## 2024-12-16 NOTE — ED PROVIDER NOTES
EMERGENCY DEPARTMENT ENCOUNTER      NAME: Gill Mendez  AGE: 64 year old female  YOB: 1960  MRN: [id number]  EVALUATION DATE & TIME: No admission date for patient encounter.    PCP: Saúl Hunt    ED PROVIDER: Mulu Varela M.D.        Chief Complaint   Patient presents with    Chest Pain         FINAL IMPRESSION:    1. Hyperglycemia    2. Chest pain, unspecified type    3. Postoperative complication of skin involving drainage from surgical wound            MEDICAL DECISION MAKING:    Gill Mendez is a 64 year old female with history of hypertension, CAD status post three-vessel CABG few weeks ago, diabetes, asthma, CHF who presents to the ER with complaints of chest pain and hyperglycemia.    Patient was seen in heart care clinic earlier today for follow-up.  They have been diuresing her for CHF and now she reports her sugars have been greater than 420 and has had some vomiting.  Her care clinic was concerned about dehydration and her sugars.  They sent her to the ER for further evaluation.    Here her workup is actually quite benign.  No signs for DKA.  No increased work of breathing and troponin stable.  Glucose came down on its own without the need of any medications or fluids.  She does not appear significantly dry.  Will give her a slight bump of IV fluids.  She has a little bit of drainage from her chest tube site though no surrounding erythema or tenderness.  Heart care clinic wrote a prescription for Keflex but she will not build to get at pharmacy tonight before it closes and therefore I will give her a dose of IV ceftriaxone tonight to help cover her until tomorrow.  I do not think she appears toxic and a for admission to the hospital.  VBG abnormal but do not think it is truly clinically relevant at the moment.  No increased work of breathing, confusion, etc.  No signs for DKA.  I do not think her symptoms represent acute PE, etc.    She feels comfortable with discharge  home and understands what to watch for and when to return to the ER and all of her questions have been answered.      ED COURSE:  2:56 PM  I met with the patient to gather history and perform my exam. ED course and treatment discussed. This patient was taken to a private exam setting while in the waiting room during ED overcrowding. Patient gave verbal permission to be seen.     4:43 PM  Pt is at imaging.  Patient was sent to the ER due to concerns of hyperglycemia.  Sugar in the ER today is 216.  Anion gap is normal at 11.  Her bicarb is high and not low that we would see with DKA.  Serum ketones are negative.  Opponent 52 but this could still be coming down from elevated troponins that she had a few weeks ago with her heart attack and open heart surgery.  Will do a delta troponin.     Latest Reference Range & Units 12/06/24 04:34 12/10/24 15:44 12/16/24 10:21 12/16/24 16:03   Glucose 70 - 99 mg/dL 145 (H) 181 (H) 428 (H) 216 (H)      Latest Reference Range & Units 11/22/24 17:33 11/22/24 20:40 11/23/24 07:36 12/16/24 16:03   Troponin T, High Sensitivity <=14 ng/L 513 (HH) 562 (HH) 551 (HH) 52 (H)     6:57 PM  Patient has a VBG here with a normal pH of 7.36.  It reports a pCO2 of 62 and oxygen saturation of 11.9.  All of these do seem quite unusual, but she is not having any increased work of breathing, hypoxia, signs for DKA, etc.  At this time I am more reassured by the pH and the rest of her laboratories.  I do not think she is retaining significant amount of CO2 and she is not having any confusion.  I do not think she requires a hospitalization for this at this time.  He was prescribed Keflex for some slight drainage at her chest tube site.  Prescription was sent to her pharmacy but it will be close by the time she is done here.  I will give her a dose of ceftriaxone to cover her for now and through the night so she can pick it up tomorrow and start taking it.  I looked at this wound and it is very scant amount  of drainage without surrounding erythema or tenderness.  I do not think she requires admission to the hospital.  Do not think that represents true sepsis.  Unclear the etiology for her hyperglycemia when she first arrived but it has resolved all on its own without signs for DKA.  Troponins are stable.  Chest x-ray sounds like with improving pleural effusion.  She is not having ongoing symptoms to suggest a true pneumonia and therefore hold off on changing antibiotics.  Do not think that her CP represents PE, etc.    No signs for DKA.  We will send a urine before she leaves.  She is already going to be on antibiotics and we will wait for culture if this does show findings for UTI.  Did have a recent UTI last month which grew out Klebsiella.  It did show some sensitivities to cephalosporins.    I do not think that this represents rib fractures, myocarditis, pericarditis, endocarditis, PE, ruptured AAA, pneumothorax, aortic dissection, bowel obstruction, bowel ischemia, cholecystitis, kidney stone, pyelonephritis, or other such etiologies at this time.  I feel that acute new ACS is less likely, though I can not fully ruleout ACS in the emergency department. At this time I feel that this patient can be discharged from the emergency department and can followup as directed.    At the conclusion of the encounter I discussed the results of all of the tests and the disposition. Their questions were answered. The patient (and any family present) acknowledged understanding and were agreeable with the care plan.    CONSULTANTS:  none      MEDICATIONS GIVEN IN THE EMERGENCY:  Medications   cefTRIAXone (ROCEPHIN) 1 g vial to attach to  mL bag for ADULTS or NS 50 mL bag for PEDS (has no administration in time range)   sodium chloride 0.9% BOLUS 250 mL (has no administration in time range)   acetaminophen (TYLENOL) tablet 975 mg (975 mg Oral $Given 12/16/24 4532)           NEW PRESCRIPTIONS STARTED AT TODAY'S ER VISIT    "  Medication List      There are no discharge medications for this visit.             CONDITION:  stable        DISPOSITION:  D.c home with          =================================================================  =================================================================  TRIAGE ASSESSMENT:  Patient arrives to ED following referral from clinic. Per patient report, she had open heart triple bypass on 11/24. She now has mid sternal to left sided chest tightness radiating to her neck and shoulder and shortness of breath. EKG in progress.     Triage Assessment (Adult)       Row Name 12/16/24 4863          Triage Assessment    Airway WDL WDL        Respiratory WDL    Respiratory WDL X;rhythm/pattern     Rhythm/Pattern, Respiratory shortness of breath        Cardiac WDL    Cardiac WDL X;chest pain        Chest Pain Assessment    Chest Pain Location midsternal;anterior chest, left     Chest Pain Radiation neck;shoulder     Character tightness     Precipitating Factors unknown     Alleviating Factors nothing     Chest Pain Intervention 12-lead ECG obtained        Cognitive/Neuro/Behavioral WDL    Cognitive/Neuro/Behavioral WDL WDL                          ED Triage Vitals [12/16/24 1407]   Encounter Vitals Group      /61      Systolic BP Percentile       Diastolic BP Percentile       Pulse 110      Resp       Temp 98.6  F (37  C)      Temp src Oral      SpO2 96 %      Weight 70.3 kg (155 lb)      Height 1.651 m (5' 5\")          ================================================================  ================================================================    HPI    Patient information was obtained from: patient    Use of Intrepreter: N/A      Gill Mendez is a 64 year old female with history of hypertension, CAD status post three-vessel CABG few weeks ago, diabetes, asthma, CHF who presents to the ER with complaints of chest pain and hyperglycemia.    Patient was seen in heart care clinic earlier " today for follow-up.  They have been diuresing her for CHF and now she reports her sugars have been greater than 420 and has had some vomiting.  Her care clinic was concerned about dehydration and her sugars.  They sent her to the ER for further evaluation.    Patient states that that is true but that she also spoke with them about her chest pain that she been having ever since her CABG in November.  Denies any fevers, significant cough, shortness of breath, abdominal pain, vomiting or diarrhea.  She states the swelling in her legs have improved.    She states that she has been compliant with all of her diabetes medications and denies eating any sweets or high sugar foods.      CHART REVIEW:  Surgery:   11/24/2024 with Dr. Carter  PROCEDURE PERFORMED:  1.  Coronary artery bypass grafting x 3               - reversed saphenous vein graft to the right posterior descending coronary artery              - reversed saphenous vein graft to the obtuse marginal branch of the left circumflex coronary artery              - pedicled left internal mammary artery to left anterior descending coronary artery  2.  Endoscopic vein harvest of the greater saphenous vein from the left lower extremity.  3. Transesophageal echocardiogram     Latest Reference Range & Units 03/06/24 07:56 06/14/24 07:50 09/20/24 08:10 11/23/24 07:36   Hemoglobin A1C <5.7 % 7.4 (H) 6.5 (H) 6.3 (H) 6.1 (H)       REVIEW OF SYSTEMS  Review of Systems   Constitutional:  Negative for fever.   Respiratory:  Negative for cough and shortness of breath.    Cardiovascular:  Positive for chest pain and leg swelling (improving).   Gastrointestinal:  Positive for nausea and vomiting. Negative for abdominal pain and diarrhea.   Genitourinary:  Negative for dysuria.   All other systems reviewed and are negative.          PAST MEDICAL HISTORY:  Past Medical History:   Diagnosis Date    Anxiety     Asthma     Cellulitis     Diabetes mellitus (H)     Essential hypertension      Created by Conversion  Replacement Utility updated for latest IMO load    Femoral nerve palsy, left 08/23/2023    Gastroparesis     Goiter 05/28/2023 05/2023: Palpated on exam, has had stable imaging with endocrinology, continue to follow endocrinology.      Hyperlipidemia     Hypertension     Other and unspecified hyperlipidemia     Created by Conversion     PONV (postoperative nausea and vomiting)     Shortness of breath     Type II or unspecified type diabetes mellitus without mention of complication, not stated as uncontrolled     Created by Conversion          PAST SURGICAL HISTORY:  Past Surgical History:   Procedure Laterality Date    AMPUTATE TOE(S) Right 7/31/2020    Procedure: AMPUTATION, third digit right foot;  Surgeon: Chris Vega DPM;  Location: [facility] [place];  Service: Podiatry    CORONARY ARTERY BYPASS GRAFT, WITH ENDOSCOPIC VESSEL PROCUREMENT N/A 11/24/2024    Procedure: CORONARY ARTERY BYPASS GRAFT TIMES THREE, LEFT INTERNAL MAMMARY ARTERY HARVEST, LEFT LEG ENDOSCOPIC VESSEL PROCUREMENT,;  Surgeon: Zhen Carter MD;  Location: [facility] [place]    CV CORONARY ANGIOGRAM N/A 11/23/2024    Procedure: Coronary Angiogram;  Surgeon: Roque Eisenberg MD;  Location: [facility] CATH LAB CV    CV INTRA AORTIC BALLOON N/A 11/24/2024    Procedure: Intra aortic Balloon Pump Insertion;  Surgeon: Roque Eisenberg MD;  Location: [facility] CATH LAB CV    CV LEFT HEART CATH N/A 11/23/2024    Procedure: Left Heart Catheterization;  Surgeon: Roque Eisenberg MD;  Location: [facility] CATH LAB CV    ENDOMETRIAL BIOPSY      FOOT ARTHROPLASTY Right 09/24/2020    Fourth and fifth toe by Dr. Vega     REPAIR OF HAMMERTOE,ONE Left 12/7/2020    Procedure: ARTHROPLASTY, digits two, three, four and five left foot;  Surgeon: Chris Vega DPM;  Location: [facility];  Service: Podiatry    HERNIA REPAIR      HYSTERECTOMY      IR LUMBAR PUNCTURE  7/20/2023    REPAIR TENDON ACHILLES Bilateral      Left was plate  , right was torn    TONSILLECTOMY      TRANSESOPHAGEAL ECHOCARDIOGRAM INTRAOPERATIVE  11/24/2024    Procedure: ECHOCARDIOGRAM, TRANSESOPHAGEAL, INTRA-OPERATIVE;  Surgeon: Zhen Carter MD;  Location: [facility] [place] OR    [facility] REMOVAL OF OVARY(S)      Description: Oophorectomy - Bilateral (Removal Of Both Ovaries);  Recorded: 10/09/2008;         CURRENT MEDICATIONS:    Prior to Admission medications    Medication Sig Start Date End Date Taking? Authorizing Provider   acetaminophen (TYLENOL) 325 MG tablet Take 1-2 tablets (325-650 mg) by mouth every 4 hours as needed for mild pain. 12/2/24   Beena Mitchell PA-C   albuterol (PROVENTIL HFA;VENTOLIN HFA) 90 mcg/actuation inhaler Inhale 2 puffs into the lungs every 6 hours as needed 8/17/15   Provider, Historical   ARNUITY ELLIPTA 100 MCG/ACT inhaler Inhale 1 puff into the lungs daily. 8/30/24   Reported, Patient   aspirin (ASA) 81 MG chewable tablet Take 2 tablets (162 mg) by mouth or NG Tube daily. 12/2/24   Beena Mitchell PA-C   atorvastatin (LIPITOR) 80 MG tablet Take 1 tablet (80 mg) by mouth or Feeding Tube every evening. 12/2/24   Beena Mitchell PA-C   bumetanide (BUMEX) 1 MG tablet Take 1 tablet (1 mg) by mouth daily. 12/12/24   Beena Mitchell PA-C   cephALEXin (KEFLEX) 500 MG capsule Take 1 capsule (500 mg) by mouth 4 times daily. 12/16/24   Beena Mitchell PA-C   citalopram (CELEXA) 20 MG tablet [CITALOPRAM (CELEXA) 20 MG TABLET] Take 20 mg by mouth every morning.  4/25/18   Provider, Historical   empagliflozin (JARDIANCE) 25 MG TABS tablet Take 1 tablet (25 mg) by mouth daily. 9/27/24   Levy Lake MD   furosemide (LASIX) 20 MG tablet Take 1 tablet (20 mg) by mouth daily.  Patient not taking: Reported on 12/16/2024 12/6/24   Henshue, Beena K, PA-C   hydrOXYzine HCl (ATARAX) 10 MG tablet Take 1 tablet (10 mg) by mouth or Feeding Tube every 8 hours as needed for anxiety. 12/2/24   Stephen,  Beena STEVENSON PA-C   magnesium 250 MG tablet Take 2 tablets by mouth daily.  Patient not taking: Reported on 12/16/2024    Reported, Patient   metFORMIN (GLUCOPHAGE XR) 500 MG 24 hr tablet Take 2 tablets (1,000 mg) by mouth daily (with breakfast). Changed on 6/21/24, new prescription not yet sent 9/27/24   Levy Lake MD   metoprolol succinate ER (TOPROL XL) 25 MG 24 hr tablet Take 1 tablet (25 mg) by mouth daily. 12/6/24   Beena Mitchell PA-C   ondansetron (ZOFRAN ODT) 4 MG ODT tab Take 1 tablet (4 mg) by mouth every 8 hours as needed for nausea. 12/16/24   Beena Mitchell PA-C   oxyCODONE (ROXICODONE) 5 MG tablet Take 0.5-1 tablets (2.5-5 mg) by mouth every 4 hours as needed for moderate to severe pain. 12/6/24   Beena Mitchell PA-C   polyethylene glycol (MIRALAX) 17 GM/Dose powder Take 17 g by mouth daily.  Patient not taking: Reported on 12/16/2024 12/2/24   Beena Mitchell PA-C   pramipexole (MIRAPEX) 0.125 MG tablet Take 0.125 mg by mouth daily. At 3pm 6/18/18   Provider, Historical   pramipexole (MIRAPEX) 0.5 MG tablet Take 0.5 mg by mouth at bedtime.    Unknown, Entered By History   tirzepatide (MOUNJARO) 15 MG/0.5ML pen Inject 15 mg subcutaneously every 7 days. 9/27/24   Levy Lake MD         ALLERGIES:  Allergies   Allergen Reactions    Codeine Unknown     Patient states possibly caused N/V but can't remember for sure    Semaglutide Nausea and Vomiting    Acetaminophen-Codeine Rash         FAMILY HISTORY:  Family History   Problem Relation Age of Onset    Diabetes Mother     Hypertension Mother     Acute Myocardial Infarction No family hx of          SOCIAL HISTORY:  Social History     Socioeconomic History    Marital status:    Tobacco Use    Smoking status: Never    Smokeless tobacco: Never   Substance and Sexual Activity    Alcohol use: No    Drug use: No     Social Drivers of Health     Financial Resource Strain: Low Risk  (11/23/2024)    Financial Resource  Strain     Within the past 12 months, have you or your family members you live with been unable to get utilities (heat, electricity) when it was really needed?: No   Food Insecurity: Low Risk  (11/23/2024)    Food Insecurity     Within the past 12 months, did you worry that your food would run out before you got money to buy more?: No     Within the past 12 months, did the food you bought just not last and you didn t have money to get more?: No   Transportation Needs: Low Risk  (11/23/2024)    Transportation Needs     Within the past 12 months, has lack of transportation kept you from medical appointments, getting your medicines, non-medical meetings or appointments, work, or from getting things that you need?: No    Received from [facility], [facility] Member Desk [facility]    Social Connections   Interpersonal Safety: Low Risk  (11/25/2024)    Interpersonal Safety     Do you feel physically and emotionally safe where you currently live?: Patient unable to answer     Within the past 12 months, have you been hit, slapped, kicked or otherwise physically hurt by someone?: No     Within the past 12 months, have you been humiliated or emotionally abused in other ways by your partner or ex-partner?: No   Recent Concern: Interpersonal Safety - High Risk (11/23/2024)    Interpersonal Safety     Do you feel physically and emotionally safe where you currently live?: No     Within the past 12 months, have you been hit, slapped, kicked or otherwise physically hurt by someone?: No     Within the past 12 months, have you been humiliated or emotionally abused in other ways by your partner or ex-partner?: No   Housing Stability: Low Risk  (11/23/2024)    Housing Stability     Do you have housing? : Yes     Are you worried about losing your housing?: No         VITALS:  Patient Vitals for the past 24 hrs:   BP Temp Temp src Pulse Resp SpO2 Height Weight   12/16/24 1800 128/75 -- -- 101  "21 98 % -- --   12/16/24 1745 130/74 -- -- 100 23 97 % -- --   12/16/24 1730 126/72 -- -- 101 27 98 % -- --   12/16/24 1601 (!) 140/69 -- -- 103 20 99 % -- --   12/16/24 1407 114/61 98.6  F (37  C) Oral 110 -- 96 % 1.651 m (5' 5\") 70.3 kg (155 lb)       Wt Readings from Last 3 Encounters:   12/16/24 70.3 kg (155 lb)   12/16/24 69.4 kg (153 lb)   12/12/24 70.2 kg (154 lb 12.8 oz)       Estimated Creatinine Clearance: 61.7 mL/min (based on SCr of 0.93 mg/dL).    PHYSICAL EXAM    Constitutional:  Well developed, Well nourished, NAD  HENT:  Normocephalic, Atraumatic, Bilateral external ears normal, Nose normal. Neck- Supple, No stridor.   Eyes:  PERRL, EOMI, Conjunctiva normal, No discharge.  Respiratory:  Normal breath sounds, No respiratory distress, No wheezing, Speaks full sentences easily. No cough.   Cardiovascular:  Normal heart rate, Regular rhythm, No murmurs, No rubs, No gallops.   GI:  No excessive obesity.  Bowel sounds normal, Soft, No tenderness, No masses, No flank tenderness. No rebound or guarding.   : deferred  Musculoskeletal: 1+ BLE edema. No cyanosis, No clubbing. Good range of motion in all major joints. No tenderness to palpation or major deformities noted.  I did look at the chest tube site that was evaluated at heart care clinic.  They started her on Keflex for some drainage.  There is a tiny amount of drainage in this area without surrounding erythema or tenderness.  Integument:  Warm, Dry, No erythema, No rash.  No petechiae.   Neurologic:  Alert & oriented x 3, No focal deficits noted. Normal gait.   Psychiatric:  Affect normal, Cooperative       LAB:  All pertinent labs reviewed and interpreted.  Recent Results (from the past 24 hours)   CBC with platelets    Collection Time: 12/16/24 10:21 AM   Result Value Ref Range    WBC Count 7.3 4.0 - 11.0 10e3/uL    RBC Count 4.32 3.80 - 5.20 10e6/uL    Hemoglobin 10.8 (L) 11.7 - 15.7 g/dL    Hematocrit 35.6 35.0 - 47.0 %    MCV 82 78 - 100 fL    " MCH 25.0 (L) 26.5 - 33.0 pg    MCHC 30.3 (L) 31.5 - 36.5 g/dL    RDW 16.4 (H) 10.0 - 15.0 %    Platelet Count 445 150 - 450 10e3/uL   Basic metabolic panel    Collection Time: 12/16/24 10:21 AM   Result Value Ref Range    Sodium 138 135 - 145 mmol/L    Potassium 4.5 3.4 - 5.3 mmol/L    Chloride 97 (L) 98 - 107 mmol/L    Carbon Dioxide (CO2) 31 (H) 22 - 29 mmol/L    Anion Gap 10 7 - 15 mmol/L    Urea Nitrogen 23.1 (H) 8.0 - 23.0 mg/dL    Creatinine 0.88 0.51 - 0.95 mg/dL    GFR Estimate 74 >60 mL/min/1.73m2    Calcium 9.9 8.8 - 10.4 mg/dL    Glucose 428 (H) 70 - 99 mg/dL   N terminal pro BNP outpatient    Collection Time: 12/16/24 10:21 AM   Result Value Ref Range    N Terminal Pro BNP Outpatient 5,787 (H) 0 - 900 pg/mL   Basic metabolic panel    Collection Time: 12/16/24  4:03 PM   Result Value Ref Range    Sodium 141 135 - 145 mmol/L    Potassium 4.0 3.4 - 5.3 mmol/L    Chloride 99 98 - 107 mmol/L    Carbon Dioxide (CO2) 31 (H) 22 - 29 mmol/L    Anion Gap 11 7 - 15 mmol/L    Urea Nitrogen 23.5 (H) 8.0 - 23.0 mg/dL    Creatinine 0.93 0.51 - 0.95 mg/dL    GFR Estimate 69 >60 mL/min/1.73m2    Calcium 9.7 8.8 - 10.4 mg/dL    Glucose 216 (H) 70 - 99 mg/dL   Blood gas venous    Collection Time: 12/16/24  4:03 PM   Result Value Ref Range    pH Venous 7.36 7.32 - 7.43    pCO2 Venous 62 (H) 40 - 50 mm Hg    pO2 Venous 15 (L) 25 - 47 mm Hg    Bicarbonate Venous 35 (H) 21 - 28 mmol/L    Base Excess/Deficit Venous 7.6 (H) -3.0 - 3.0 mmol/L    FIO2 21     Oxyhemoglobin Venous 12 (L) 70 - 75 %    O2 Sat, Venous 11.9 (L) 70.0 - 75.0 %   Troponin T, High Sensitivity    Collection Time: 12/16/24  4:03 PM   Result Value Ref Range    Troponin T, High Sensitivity 52 (H) <=14 ng/L   Magnesium    Collection Time: 12/16/24  4:03 PM   Result Value Ref Range    Magnesium 2.2 1.7 - 2.3 mg/dL   Phosphorus    Collection Time: 12/16/24  4:03 PM   Result Value Ref Range    Phosphorus 4.1 2.5 - 4.5 mg/dL   TSH with free T4 reflex    Collection  Time: 12/16/24  4:03 PM   Result Value Ref Range    TSH 0.61 0.30 - 4.20 uIU/mL   Ketone Beta-Hydroxybutyrate Quantitative    Collection Time: 12/16/24  4:03 PM   Result Value Ref Range    Ketone (Beta-Hydroxybutyrate) Quantitative <0.18 <=0.30 mmol/L   CK total    Collection Time: 12/16/24  4:03 PM   Result Value Ref Range    CK 54 26 - 192 U/L   CBC with platelets and differential    Collection Time: 12/16/24  4:03 PM   Result Value Ref Range    WBC Count 9.7 4.0 - 11.0 10e3/uL    RBC Count 4.68 3.80 - 5.20 10e6/uL    Hemoglobin 11.8 11.7 - 15.7 g/dL    Hematocrit 38.7 35.0 - 47.0 %    MCV 83 78 - 100 fL    MCH 25.2 (L) 26.5 - 33.0 pg    MCHC 30.5 (L) 31.5 - 36.5 g/dL    RDW 16.4 (H) 10.0 - 15.0 %    Platelet Count 468 (H) 150 - 450 10e3/uL    % Neutrophils 59 %    % Lymphocytes 28 %    % Monocytes 7 %    % Eosinophils 5 %    % Basophils 0 %    % Immature Granulocytes 1 %    NRBCs per 100 WBC 0 <1 /100    Absolute Neutrophils 5.7 1.6 - 8.3 10e3/uL    Absolute Lymphocytes 2.7 0.8 - 5.3 10e3/uL    Absolute Monocytes 0.7 0.0 - 1.3 10e3/uL    Absolute Eosinophils 0.5 0.0 - 0.7 10e3/uL    Absolute Basophils 0.0 0.0 - 0.2 10e3/uL    Absolute Immature Granulocytes 0.1 <=0.4 10e3/uL    Absolute NRBCs 0.0 10e3/uL   Extra Blue Top Tube    Collection Time: 12/16/24  4:03 PM   Result Value Ref Range    Hold Specimen JIC    Extra Red Top Tube    Collection Time: 12/16/24  4:03 PM   Result Value Ref Range    Hold Specimen JIC    Glucose by meter    Collection Time: 12/16/24  4:14 PM   Result Value Ref Range    GLUCOSE BY METER POCT 209 (H) 70 - 99 mg/dL   Troponin T, High Sensitivity    Collection Time: 12/16/24  6:10 PM   Result Value Ref Range    Troponin T, High Sensitivity 48 (H) <=14 ng/L   Glucose by meter    Collection Time: 12/16/24  6:35 PM   Result Value Ref Range    GLUCOSE BY METER POCT 132 (H) 70 - 99 mg/dL       Lab Results   Component Value Date    ABORH O POS 12/03/2024           RADIOLOGY:  Reviewed all  pertinent imaging. Please see official radiology report.    Chest XR,  PA & LAT   Final Result   IMPRESSION: Sternotomy. Generalized cardiac enlargement. Stable moderate opacity in the left lower lobe behind the heart. On prior CT this appeared to be mostly pleural effusion and atelectatic lung. Right lung is now clear.      No significant new findings.            EKG:    Indication: Chest pain    Performed at: 14:12p  Impression: Tachycardia 107 bpm.  No ST elevation appreciated.  Flipped T waves noted in lead I, aVL and V2-V5.  AR interval 164 ms, QRS 88 ms, QTc 507 ms.  EKG does appear similar to December 12, 2024.      I have independently reviewed and interpreted the EKG(s) documented above.        PROCEDURES:  none    Medical Decision Making  Obtained supplemental history:Supplemental history obtained?: Documented in chart and Other: heart care clinic provider, ED referral note that I took prior to patient arrival to ER  Reviewed external records: External records reviewed?: Documented in chart and Outpatient Record: see HPI and I reviewed the heart care clinic note from earlier today.  Care impacted by chronic illness:Documented in Chart and Heart Disease  Did you consider but not order tests?: Work up considered but not performed and documented in chart, if applicable  Did you interpret images independently?: Independent interpretation of ECG and images noted in documentation, when applicable.  Consultation discussion with other provider:Did you involve another provider (consultant, MH, pharmacy, etc.)?: I discussed the care with another health care provider, see documentation for details.  Discharge. I recommended the patient continue their current prescription strength medication(s): Keflex 500 mg 4 times daily as prescribed by heart care clinic. I considered admission, but ultimately discharged patient laboratories and imaging overall reassuring.  Glucose has come down on its own and no signs for  TRISHA.    MIPS: Not Applicable    Mulu Varela M.D. FACEP  Emergency Medicine and Medical Toxicology  Formerly [facility] EMERGENCY DEPARTMENT  [facility] [facility] [id number] [phone]  Dept: [phone]           Mulu Varela MD  12/16/24 1199

## 2024-12-16 NOTE — TELEPHONE ENCOUNTER
Called patient to discuss results of BMP this AM which demonstrated . Patient checked her glucose using her home BG monitor which confirmed BG > 400. In combination with ongoing nausea, signs of dehydration both clinically and on labs, and episode of vomiting last evening, advised patient to present to the ED for further evaluation of dehydration and hyperglycemia. Currently calling report to ED provider.     Amy Mitchell PA-C  [facility] Cardiothoracic Surgery  Vocera or Secure Chat  December 16, 2024

## 2024-12-17 ENCOUNTER — HOSPITAL ENCOUNTER (OUTPATIENT)
Dept: CARDIOLOGY | Facility: CLINIC | Age: 64
Discharge: HOME | End: 2024-12-17
Payer: COMMERCIAL

## 2024-12-17 DIAGNOSIS — Z95.1 S/P CABG (CORONARY ARTERY BYPASS GRAFT): ICD-10-CM

## 2024-12-17 LAB — LVEF ECHO: NORMAL

## 2024-12-17 PROCEDURE — 255N000002 HC RX 255 OP 636

## 2024-12-17 PROCEDURE — 93306 TTE W/DOPPLER COMPLETE: CPT | Mod: 26 | Performed by: INTERNAL MEDICINE

## 2024-12-17 RX ADMIN — PERFLUTREN 2 ML: 6.52 INJECTION, SUSPENSION INTRAVENOUS at 10:45

## 2024-12-17 NOTE — ED NOTES
Pt unable to give a urine sample, had used restroom without giving sample on accident earlier. Provider aware and approves discharge at this time.

## 2024-12-19 ENCOUNTER — TELEPHONE (OUTPATIENT)
Dept: CARDIOLOGY | Facility: CLINIC | Age: 64
End: 2024-12-19
Payer: COMMERCIAL

## 2024-12-19 NOTE — TELEPHONE ENCOUNTER
----- Message from Beena Mitchell sent at 12/18/2024  4:47 PM CST -----    Regarding: Plan  Talked w/ Dr. Carter. He doesn't think a pericardial window is indicated at this point. Plan is to repeat echo in a week (scheduled) and have her see heart failure next week (scheduled). I called Mayelin and talked w/ her about the plan and she is agreeable. Discussed reasons to call us or go to ED if she feels worse in the meantime. Also gave a heads up to Leena that she may resurface in the next couple days pending her progress.     - Amy

## 2024-12-20 NOTE — PROGRESS NOTES
[facility]  [address], [place] [address]  Office: [phone]     Assessment/Recommendations   Assessment: Ms. Mendez presents to [facility] today for heart failure visit.    # Chronic systolic heart failure/HFrEF (EF 30-35% per echo 12/17/2024) secondary to ICM     Stage C. NYHA Class II.  Kidney function has remained stable. Her clinic weight today is 152 lbs which is the same as home weight today. Upon exam, patient is well-compensated and euvolemic.    -Fluid status: euvolemic; Diuretic: Lasix 20 mg daily, no KCL supp  -ACEi/ARB/ARNi/afterload reduction: lisinopril was held for borderline BP during most recent hospitalization  -BB: metoprolol succinate 25 mg daily, consider increasing for higher HRs lately  -Aldosterone antagonist: deferred while other medical therapy is prioritized  -SGLT2i: Jardiance 25 mg daily  -SCD prophylaxis: decision deferred during medication uptitration  -NSAID use: contraindicated    Heart failure education including medication compliance and lifestyle management reviewed today: low sodium diet <2g Na/day, fluid intake <2L/day, daily weight monitoring, and physical activity as tolerated.    # Hypertension  -BP today 104/56  -Continue GDMT as outlined above and uptitrate as tolerated    #Sinus Tachycardia  - today, reporting HRs 100s while at rest when checking VS at home  -Suspect combination of contributers including anxiety, post-op pain, and ongoing healing  -EKG today  -May consider increasing metoprolol     # Coronary artery disease s/p CABG 11/24/2024  # Dyslipidemia  -Antiplatelet therapy with ASA 81 mg for secondary ASCVD prevention  -LDL goal <70; high intensity statin therapy with atorvastatin 80 mg for secondary ASCVD prevention    # Diabetes Mellitus 2  -Most recent A1C 6.1 (11/23/2024)   -Jardiance 25 mg, metformin, tirzepitide  -Continue to manage with PCP    Plan:  -EKG, BMP and  NtproBNP today, will consider increasing metoprolol for increased HRs  -Continue to attend to diet/lifestyle modifications  -Repeat echo to reassess pericardial effusion- scheduled 12/27  -Follow up with General Cardiology, Dr. Bragg- scheduled Feb 25  -Follow up with HF COY in 1-month.       The longitudinal plan of care for the condition(s) below were addressed during this visit. Due to the added complexity in care, I will continue to support Ms. Mendez in the subsequent management of this condition(s) and with the ongoing continuity of care of this condition(s): HFrEF     History of Present Illness/Subjective    Ms. Mendez is a 64 year old female with a past medical history significant for HFrEF, HTN, CAD s/p CABG, DM2, asthma. Today patient presents to [facility] for heart failure visit.     Primary Cardiologist: Dr. Bragg.    Hospitalized 11/23-12/6. Initially admitted for chest pain and found to have NSTEMI. She is now s/p CABG x3 11/24. Repeat echo 12/1 showed improved EF 35-40%. Small pericardial effusion was noted on echo 12/4. She was discharged on lasix 20 mg. Discharge from hospital weight was 157 lbs. Amlodipine and lisinopril were stopped for borderline BP.     Cardiology follow-up with Dr. Garg 12/10/2025. She reported shortness of breath with cough suggestive of congestion. She felt tightness in her chest and had bipedal edema. CXR revealed mild to moderate left pleural effusion, slightly decreased from previous exam and small right pleural effusion, slightly increased. BMP was stable and ntproBNP elevated to 7476. Weight 155 lbs. Lasix was increased to 40 mg for several days.    Repeat CXR 12/12 stable. Lasix was switched to bumex 1mg daily.     CV Surgery follow-up 12/16. She reported ongoing intermittent chest tightness and shortness of breath. Keflex prescribed for superficial infection of chest tube site. Follow-up echo ordered. Weight 154 lbs    ED visit 12/16  for chest pain and hyperglycemia. No signs of DKA, glucose trending down without fluids or medications. Did not appear dry, small bolus of fluid given. CXR stable with stable moderate opacity in left lower lobe and right lung clear. Echo with moderate pericardial effusion. Per chart review, echo was reviewed with Dr. Teran, pericardial window not indicated at that time, plan to repeat echo in 1-week for monitoring.  Bumex was discontinued and patient resumed lasix 20 mg daily. Discharge weight 155 lbs.     Today, patient is accompanied by her daughter. Patient denies lightheadedness/dizziness, shortness of breath at rest, orthopnea, PND, fatigue/activity intolerance, abdominal fullness/bloating, and LE edema. She endorses ongoing post-operative chest pain that extends into arms and back of neck. On days with more activity, she notices more pain which is resolved with prn oxycodone. She reports she can feel her heart beating while at rest, HR 100s with home VS monitoring. SBP has been 120s. She endorses shortness of breath with activity and finds it harder to breath the more supine she is, she has been sleeping in recliner chair since after surgery and continues to follow restrictions per CV surgery. Patient reports anxiety with post-op recovery and seeks reassurance to ensure she is managing appropriately at home. Her weight at home today was 152 lbs.      Recent test results & labs below (personally reviewed):    Echocardiogram 12/17/2024  Interpretation Summary   1.Left ventricular function is decreased. The ejection fraction is 30-35%  (moderately reduced).  2.The left ventricle is borderline dilated.  3.There is mild to moderate anterior, septal, and apical wall hypokinesis.  4.Normal right ventricle size and systolic function.  5.No hemodynamically significant valvular abnormalities on 2D or color flow  imaging.  6.Moderate anterior pericardial effusion, There are no echocardiographic  indications of cardiac  tamponade.  Compared to the prior study dated 12/4/2024, the effusion is more anterior  localized and Doppler does not suggest any tamonade. Difficult to compare  images as technique different but does seem slightly smaller on current study.    Echocardiogram 12/4/2024  Interpretation Summary  1.Left ventricular function is decreased. The ejection fraction is 30-35%  (moderately reduced).  2.There is moderate anterior, septal, and apical wall hypokinesis.  3.Normal right ventricle size and systolic function.  4.No hemodynamically significant valvular abnormalities on 2D or color flow  imaging although this was a limited study without complete Doppler.  5.Small posterior pericardial effusion, inflows were not samlpled to determine  risk of tamplonade.  Compared to the prior study dated 12/1/2024, there have been no changes. The  small posterior effusion is better visualized on this study.    Echocardiogram 12/1/2024  Interpretation Summary  The left ventricle is normal in size.  Left ventricular function is decreased. The ejection fraction is 35-40%  (moderately reduced). Anteroseptal, septal and apical wall hypokinesis.  Normal right ventricle size and systolic function.  The left atrium is mildly dilated.  Compared to the prior study dated 11/23/24, there are changes as noted. LVEF  has improved.    Echocardiogram 11/23/2024  Interpretation Summary  Left ventricular function is decreased. The ejection fraction is 25-30%  (severely reduced).  There is severe anterior, septal, and apical wall hypokinesis.  Apical lateral akinesis.  Normal right ventricle size and systolic function.  Right ventricular systolic pressure is elevated, consistent with mild  pulmonary hypertension.  IVC diameter >2.1 cm collapsing <50% with sniff suggests a high RA pressure  estimated at 15 mmHg or greater.     Physical Examination Review of Systems   There were no vitals taken for this visit.  There is no height or weight on file to  calculate BMI.  Wt Readings from Last 3 Encounters:   12/16/24 70.3 kg (155 lb)   12/16/24 69.4 kg (153 lb)   12/12/24 70.2 kg (154 lb 12.8 oz)     General Appearance:   Appears comfortable, in no acute distress   HEENT: Eyes symmetrical, no discharge or icterus bilaterally. Mucous membranes moist and without lesions   Cardiovascular: Regular rhythm/tachycardic, +S1S2, no murmur, rub, or gallop. JVP not visible at 90 degrees.       Respiratory:   Respirations regular, even, and unlabored. Lungs CTA throughout   GI:  Soft and non distended   Musculoskeletal: No joint swelling or tenderness   Extremities   No cyanosis or clubbing. Trace LE peripheral edema.   Skin: Warm, no xanthelasma, no jaundice, no rashes or lesions    Neurologic: Alert and oriented X3, no focal deficits   Psychiatric: calm and cooperative                                             Negative unless noted in HPI     Medical History  Surgical History Family History Social History   Past Medical History:   Diagnosis Date    Anxiety     Asthma     Cellulitis     Diabetes mellitus (H)     Essential hypertension     Created by Conversion  Replacement Utility updated for latest IMO load    Femoral nerve palsy, left 08/23/2023    Gastroparesis     Goiter 05/28/2023 05/2023: Palpated on exam, has had stable imaging with endocrinology, continue to follow endocrinology.      Hyperlipidemia     Hypertension     Other and unspecified hyperlipidemia     Created by Conversion     PONV (postoperative nausea and vomiting)     Shortness of breath     Type II or unspecified type diabetes mellitus without mention of complication, not stated as uncontrolled     Created by Conversion     Past Surgical History:   Procedure Laterality Date    AMPUTATE TOE(S) Right 7/31/2020    Procedure: AMPUTATION, third digit right foot;  Surgeon: Chris Vega DPM;  Location: [facility];  Service: Podiatry    CORONARY ARTERY BYPASS GRAFT, WITH ENDOSCOPIC VESSEL  PROCUREMENT N/A 11/24/2024    Procedure: CORONARY ARTERY BYPASS GRAFT TIMES THREE, LEFT INTERNAL MAMMARY ARTERY HARVEST, LEFT LEG ENDOSCOPIC VESSEL PROCUREMENT,;  Surgeon: Zhen Carter MD;  [facility]    CV CORONARY ANGIOGRAM N/A 11/23/2024    Procedure: Coronary Angiogram;  Surgeon: Roque Eisenberg MD;  Location: [facility] CATH LAB CV    CV INTRA AORTIC BALLOON N/A 11/24/2024    Procedure: Intra aortic Balloon Pump Insertion;  Surgeon: Roque Eisenberg MD;  Location: [facility] CATH LAB CV    CV LEFT HEART CATH N/A 11/23/2024    Procedure: Left Heart Catheterization;  Surgeon: Roque Eisenberg MD;  Location: [facility] CV    ENDOMETRIAL BIOPSY      FOOT ARTHROPLASTY Right 09/24/2020    Fourth and fifth toe by Dr. Vega    HC REPAIR OF HAMMERTOE,ONE Left 12/7/2020    Procedure: ARTHROPLASTY, digits two, three, four and five left foot;  Surgeon: Chris Vega DPM;  Location: [facility];  Service: Podiatry    HERNIA REPAIR      HYSTERECTOMY      IR LUMBAR PUNCTURE  7/20/2023    REPAIR TENDON ACHILLES Bilateral     Left was plate  , right was torn    TONSILLECTOMY      TRANSESOPHAGEAL ECHOCARDIOGRAM INTRAOPERATIVE  11/24/2024    Procedure: ECHOCARDIOGRAM, TRANSESOPHAGEAL, INTRA-OPERATIVE;  Surgeon: Zhen Carter MD;  Location: [facility] REMOVAL OF OVARY(S)      Description: Oophorectomy - Bilateral (Removal Of Both Ovaries);  Recorded: 10/09/2008;    Family History   Problem Relation Age of Onset    Diabetes Mother     Hypertension Mother     Acute Myocardial Infarction No family hx of     Social History     Socioeconomic History    Marital status:      Spouse name: Not on file    Number of children: Not on file    Years of education: Not on file    Highest education level: Not on file   Occupational History    Not on file   Tobacco Use    Smoking status: Never    Smokeless tobacco: Never   Substance and Sexual Activity    Alcohol use: No     Drug use: No    Sexual activity: Not on file   Other Topics Concern    Not on file   Social History Narrative    Not on file     Social Drivers of Health     Financial Resource Strain: Low Risk  (11/23/2024)    Financial Resource Strain     Within the past 12 months, have you or your family members you live with been unable to get utilities (heat, electricity) when it was really needed?: No   Food Insecurity: Low Risk  (11/23/2024)    Food Insecurity     Within the past 12 months, did you worry that your food would run out before you got money to buy more?: No     Within the past 12 months, did the food you bought just not last and you didn t have money to get more?: No   Transportation Needs: Low Risk  (11/23/2024)    Transportation Needs     Within the past 12 months, has lack of transportation kept you from medical appointments, getting your medicines, non-medical meetings or appointments, work, or from getting things that you need?: No   Physical Activity: Not on file   Stress: Not on file (11/6/2024)   Social Connections: Unknown (12/28/2021)    Received from [facility], [facility]    Social Connections     Frequency of Communication with Friends and Family: Not on file   Interpersonal Safety: Low Risk  (11/25/2024)    Interpersonal Safety     Do you feel physically and emotionally safe where you currently live?: Patient unable to answer     Within the past 12 months, have you been hit, slapped, kicked or otherwise physically hurt by someone?: No     Within the past 12 months, have you been humiliated or emotionally abused in other ways by your partner or ex-partner?: No   Recent Concern: Interpersonal Safety - High Risk (11/23/2024)    Interpersonal Safety     Do you feel physically and emotionally safe where you currently live?: No     Within the past 12 months, have you been hit, slapped, kicked or otherwise physically hurt by someone?: No      Within the past 12 months, have you been humiliated or emotionally abused in other ways by your partner or ex-partner?: No   Housing Stability: Low Risk  (11/23/2024)    Housing Stability     Do you have housing? : Yes     Are you worried about losing your housing?: No        Medications  Allergies   Current Outpatient Medications   Medication Sig Dispense Refill    acetaminophen (TYLENOL) 325 MG tablet Take 1-2 tablets (325-650 mg) by mouth every 4 hours as needed for mild pain. 60 tablet 0    albuterol (PROVENTIL HFA;VENTOLIN HFA) 90 mcg/actuation inhaler Inhale 2 puffs into the lungs every 6 hours as needed      ARNUITY ELLIPTA 100 MCG/ACT inhaler Inhale 1 puff into the lungs daily.      aspirin (ASA) 81 MG chewable tablet Take 2 tablets (162 mg) by mouth or NG Tube daily. 180 tablet 3    atorvastatin (LIPITOR) 80 MG tablet Take 1 tablet (80 mg) by mouth or Feeding Tube every evening. 90 tablet 3    bumetanide (BUMEX) 1 MG tablet Take 1 tablet (1 mg) by mouth daily. 10 tablet 0    cephALEXin (KEFLEX) 500 MG capsule Take 1 capsule (500 mg) by mouth 4 times daily. 20 capsule 0    citalopram (CELEXA) 20 MG tablet [CITALOPRAM (CELEXA) 20 MG TABLET] Take 20 mg by mouth every morning.   0    empagliflozin (JARDIANCE) 25 MG TABS tablet Take 1 tablet (25 mg) by mouth daily. 90 tablet 1    furosemide (LASIX) 20 MG tablet Take 1 tablet (20 mg) by mouth daily. (Patient not taking: Reported on 12/16/2024) 30 tablet 0    hydrOXYzine HCl (ATARAX) 10 MG tablet Take 1 tablet (10 mg) by mouth or Feeding Tube every 8 hours as needed for anxiety. 30 tablet 0    magnesium 250 MG tablet Take 2 tablets by mouth daily. (Patient not taking: Reported on 12/16/2024)      metFORMIN (GLUCOPHAGE XR) 500 MG 24 hr tablet Take 2 tablets (1,000 mg) by mouth daily (with breakfast). Changed on 6/21/24, new prescription not yet sent 180 tablet 1    metoprolol succinate ER (TOPROL XL) 25 MG 24 hr tablet Take 1 tablet (25 mg) by mouth daily. 30  tablet 1    ondansetron (ZOFRAN ODT) 4 MG ODT tab Take 1 tablet (4 mg) by mouth every 8 hours as needed for nausea. 10 tablet 0    oxyCODONE (ROXICODONE) 5 MG tablet Take 0.5-1 tablets (2.5-5 mg) by mouth every 4 hours as needed for moderate to severe pain. 10 tablet 0    polyethylene glycol (MIRALAX) 17 GM/Dose powder Take 17 g by mouth daily. (Patient not taking: Reported on 2024) 510 g 0    pramipexole (MIRAPEX) 0.125 MG tablet Take 0.125 mg by mouth daily. At 3pm  3    pramipexole (MIRAPEX) 0.5 MG tablet Take 0.5 mg by mouth at bedtime.      tirzepatide (MOUNJARO) 15 MG/0.5ML pen Inject 15 mg subcutaneously every 7 days. 6 mL 1    Allergies   Allergen Reactions    Codeine Unknown     Patient states possibly caused N/V but can't remember for sure    Semaglutide Nausea and Vomiting    Acetaminophen-Codeine Rash         Lab Results    Chemistry/lipid CBC Cardiac Enzymes/BNP/TSH/INR   Lab Results   Component Value Date    CHOL 148 2024    HDL 65 2024    TRIG 65 2024    BUN 23.5 (H) 2024     2024    CO2 31 (H) 2024    Lab Results   Component Value Date    WBC 9.7 2024    HGB 11.8 2024    HCT 38.7 2024    MCV 83 2024     (H) 2024    Lab Results   Component Value Date    TROPONINI <0.01 2023    TSH 0.61 2024    INR 1.05 2024            Maira Parra, DNP, APRN, CNP  [facility] - Heart Failure Clinic [place]   Clinic and [phone]  Fax: [phone]  Heart Failure Nurses: [phone]

## 2024-12-23 ENCOUNTER — OFFICE VISIT (OUTPATIENT)
Dept: CARDIOLOGY | Facility: CLINIC | Age: 64
End: 2024-12-23
Attending: INTERNAL MEDICINE
Payer: COMMERCIAL

## 2024-12-23 ENCOUNTER — TELEPHONE (OUTPATIENT)
Dept: CARDIOLOGY | Facility: CLINIC | Age: 64
End: 2024-12-23

## 2024-12-23 VITALS
HEIGHT: 65 IN | SYSTOLIC BLOOD PRESSURE: 104 MMHG | OXYGEN SATURATION: 96 % | RESPIRATION RATE: 16 BRPM | WEIGHT: 152 LBS | HEART RATE: 112 BPM | BODY MASS INDEX: 25.33 KG/M2 | DIASTOLIC BLOOD PRESSURE: 56 MMHG

## 2024-12-23 DIAGNOSIS — I50.9 ACUTE DECOMPENSATED HEART FAILURE (H): ICD-10-CM

## 2024-12-23 DIAGNOSIS — R00.0 SINUS TACHYCARDIA: ICD-10-CM

## 2024-12-23 DIAGNOSIS — I25.83 CORONARY ARTERY DISEASE DUE TO LIPID RICH PLAQUE: ICD-10-CM

## 2024-12-23 DIAGNOSIS — I50.20 SYSTOLIC HEART FAILURE, UNSPECIFIED HF CHRONICITY (H): ICD-10-CM

## 2024-12-23 DIAGNOSIS — E11.69 TYPE 2 DIABETES MELLITUS WITH OTHER SPECIFIED COMPLICATION, WITHOUT LONG-TERM CURRENT USE OF INSULIN (H): ICD-10-CM

## 2024-12-23 DIAGNOSIS — Z95.1 S/P CABG (CORONARY ARTERY BYPASS GRAFT): ICD-10-CM

## 2024-12-23 DIAGNOSIS — I10 ESSENTIAL HYPERTENSION: ICD-10-CM

## 2024-12-23 DIAGNOSIS — I25.10 CORONARY ARTERY DISEASE DUE TO LIPID RICH PLAQUE: ICD-10-CM

## 2024-12-23 DIAGNOSIS — E78.5 DYSLIPIDEMIA: ICD-10-CM

## 2024-12-23 DIAGNOSIS — I50.22 CHRONIC SYSTOLIC HEART FAILURE (H): Primary | ICD-10-CM

## 2024-12-23 LAB
ANION GAP SERPL CALCULATED.3IONS-SCNC: 13 MMOL/L (ref 7–15)
ATRIAL RATE - MUSE: 111 BPM
BUN SERPL-MCNC: 17.2 MG/DL (ref 8–23)
CALCIUM SERPL-MCNC: 9.5 MG/DL (ref 8.8–10.4)
CHLORIDE SERPL-SCNC: 100 MMOL/L (ref 98–107)
CREAT SERPL-MCNC: 0.86 MG/DL (ref 0.51–0.95)
DIASTOLIC BLOOD PRESSURE - MUSE: NORMAL MMHG
EGFRCR SERPLBLD CKD-EPI 2021: 76 ML/MIN/1.73M2
GLUCOSE SERPL-MCNC: 153 MG/DL (ref 70–99)
HCO3 SERPL-SCNC: 29 MMOL/L (ref 22–29)
INTERPRETATION ECG - MUSE: NORMAL
NT-PROBNP SERPL-MCNC: 4303 PG/ML (ref 0–900)
P AXIS - MUSE: 65 DEGREES
POTASSIUM SERPL-SCNC: 3.7 MMOL/L (ref 3.4–5.3)
PR INTERVAL - MUSE: 158 MS
QRS DURATION - MUSE: 80 MS
QT - MUSE: 358 MS
QTC - MUSE: 486 MS
R AXIS - MUSE: -20 DEGREES
SODIUM SERPL-SCNC: 142 MMOL/L (ref 135–145)
SYSTOLIC BLOOD PRESSURE - MUSE: NORMAL MMHG
T AXIS - MUSE: 143 DEGREES
VENTRICULAR RATE- MUSE: 111 BPM

## 2024-12-23 PROCEDURE — 80048 BASIC METABOLIC PNL TOTAL CA: CPT | Performed by: NURSE PRACTITIONER

## 2024-12-23 PROCEDURE — 83880 ASSAY OF NATRIURETIC PEPTIDE: CPT | Performed by: NURSE PRACTITIONER

## 2024-12-23 PROCEDURE — 93000 ELECTROCARDIOGRAM COMPLETE: CPT | Performed by: INTERNAL MEDICINE

## 2024-12-23 PROCEDURE — 36415 COLL VENOUS BLD VENIPUNCTURE: CPT | Performed by: NURSE PRACTITIONER

## 2024-12-23 RX ORDER — METOPROLOL SUCCINATE 25 MG/1
25 TABLET, EXTENDED RELEASE ORAL 2 TIMES DAILY
Qty: 90 TABLET | Refills: 3 | Status: SHIPPED | OUTPATIENT
Start: 2024-12-23

## 2024-12-23 NOTE — TELEPHONE ENCOUNTER
----- Message from Maira Parra sent at 12/23/2024  4:04 PM CST -----  Stable labs. NTproBNP elevated although trending down. Please call patient and instruct her to increase metoprolol succinate to 25 mg twice daily for high heart rates. Monitor BP and HR at home and call if lightheaded/dizzy or increased fatigue.

## 2024-12-23 NOTE — PATIENT INSTRUCTIONS
It was a pleasure to see you today at the [facility].     Recommendations:  1. EKG and labs today. The nurse will call you to review results.   2. Continue to follow a low sodium diet (<2g/day) and maintain adequate fluid intake (~50-60 oz/day).   3. Continue to monitor your weight daily and please call if you gain 2 lbs or more in 1 day OR 5 lbs or more in 1-week.    -Echo scheduled   -Follow up with HF COY in 1-month  -Follow up with Dr. Bragg- scheduled       Please call with questions/concerns and/or if you experience new or worsening heart failure symptoms (shortness of breath, weight gain, fluid retention/lower extremity swelling, and/or reduced activity tolerance).    Heart Failure Nurse Line: [phone] ([id number])  24-hour HF Nurse Line: [phone] (weekends, evenings, holidays)      Maira Parra CNP  [facility] - [facility] Failure Clinic [facility] and [phone]  Fax: [phone]  Heart Failure Nurses: [phone]

## 2024-12-23 NOTE — RESULT ENCOUNTER NOTE
Called patient and relayed the update, she endorsed understanding.  She requested we give her the HF Nurse line through [employer]:    [phone].    Thank you    Al Vanessa

## 2024-12-23 NOTE — TELEPHONE ENCOUNTER
Called patient and relayed update, she endorsed understanding.     MAR update:  Metoprolol Succinate 25 MG.  Take 1 tablet (25 mg) twice daily    Thank you    lA Vanessa RN    -----------------------------------------------    Maira Parra, CNP  [facility]  Stable labs. NTproBNP elevated although trending down. Please call patient and instruct her to increase metoprolol succinate to 25 mg twice daily for high heart rates. Monitor BP and HR at home and call if lightheaded/dizzy or increased fatigue.

## 2024-12-23 NOTE — LETTER
12/23/2024    Saúl Hunt, APRN CNP  [address]  [facility] [address]    RE: Gill Mendez       Dear Colleague,     I had the pleasure of seeing Gill Mendez in the [facility].      [facility]  [address], [place] [address]  Office: [phone]     Assessment/Recommendations   Assessment: Ms. Mendez presents to [facility] today for heart failure visit.    # Chronic systolic heart failure/HFrEF (EF 30-35% per echo 12/17/2024) secondary to ICM     Stage C. NYHA Class II.  Kidney function has remained stable. Her clinic weight today is 152 lbs which is the same as home weight today. Upon exam, patient is well-compensated and euvolemic.    -Fluid status: euvolemic; Diuretic: Lasix 20 mg daily, no KCL supp  -ACEi/ARB/ARNi/afterload reduction: lisinopril was held for borderline BP during most recent hospitalization  -BB: metoprolol succinate 25 mg daily, consider increasing for higher HRs lately  -Aldosterone antagonist: deferred while other medical therapy is prioritized  -SGLT2i: Jardiance 25 mg daily  -SCD prophylaxis: decision deferred during medication uptitration  -NSAID use: contraindicated    Heart failure education including medication compliance and lifestyle management reviewed today: low sodium diet <2g Na/day, fluid intake <2L/day, daily weight monitoring, and physical activity as tolerated.    # Hypertension  -BP today 104/56  -Continue GDMT as outlined above and uptitrate as tolerated    #Sinus Tachycardia  - today, reporting HRs 100s while at rest when checking VS at home  -Suspect combination of contributers including anxiety, post-op pain, and ongoing healing  -EKG today  -May consider increasing metoprolol     # Coronary artery disease s/p CABG 11/24/2024  # Dyslipidemia  -Antiplatelet therapy with ASA 81 mg for secondary ASCVD prevention  -LDL goal <70; high intensity  statin therapy with atorvastatin 80 mg for secondary ASCVD prevention    # Diabetes Mellitus 2  -Most recent A1C 6.1 (11/23/2024)   -Jardiance 25 mg, metformin, tirzepitide  -Continue to manage with PCP    Plan:  -EKG, BMP and NtproBNP today, will consider increasing metoprolol for increased HRs  -Continue to attend to diet/lifestyle modifications  -Repeat echo to reassess pericardial effusion- scheduled 12/27  -Follow up with General Cardiology, Dr. Bragg- scheduled Feb 25  -Follow up with HF [facility] in 1-month.       The longitudinal plan of care for the condition(s) below were addressed during this visit. Due to the added complexity in care, I will continue to support Ms. Mendez in the subsequent management of this condition(s) and with the ongoing continuity of care of this condition(s): HFrEF     History of Present Illness/Subjective    Ms. Mendez is a 64 year old female with a past medical history significant for HFrEF, HTN, CAD s/p CABG, DM2, asthma. Today patient presents to [facility] for heart failure visit.     Primary Cardiologist: Dr. Bragg.    Hospitalized 11/23-12/6. Initially admitted for chest pain and found to have NSTEMI. She is now s/p CABG x3 11/24. Repeat echo 12/1 showed improved EF 35-40%. Small pericardial effusion was noted on echo 12/4. She was discharged on lasix 20 mg. Discharge from hospital weight was 157 lbs. Amlodipine and lisinopril were stopped for borderline BP.     Cardiology follow-up with Dr. Garg 12/10/2025. She reported shortness of breath with cough suggestive of congestion. She felt tightness in her chest and had bipedal edema. CXR revealed mild to moderate left pleural effusion, slightly decreased from previous exam and small right pleural effusion, slightly increased. BMP was stable and ntproBNP elevated to 7476. Weight 155 lbs. Lasix was increased to 40 mg for several days.    Repeat CXR 12/12 stable. Lasix was switched to bumex 1mg daily.      CV Surgery follow-up 12/16. She reported ongoing intermittent chest tightness and shortness of breath. Keflex prescribed for superficial infection of chest tube site. Follow-up echo ordered. Weight 154 lbs    ED visit 12/16 for chest pain and hyperglycemia. No signs of DKA, glucose trending down without fluids or medications. Did not appear dry, small bolus of fluid given. CXR stable with stable moderate opacity in left lower lobe and right lung clear. Echo with moderate pericardial effusion. Per chart review, echo was reviewed with Dr. Teran, pericardial window not indicated at that time, plan to repeat echo in 1-week for monitoring.  Bumex was discontinued and patient resumed lasix 20 mg daily. Discharge weight 155 lbs.     Today, patient is accompanied by her daughter. Patient denies lightheadedness/dizziness, shortness of breath at rest, orthopnea, PND, fatigue/activity intolerance, abdominal fullness/bloating, and LE edema. She endorses ongoing post-operative chest pain that extends into arms and back of neck. On days with more activity, she notices more pain which is resolved with prn oxycodone. She reports she can feel her heart beating while at rest, HR 100s with home VS monitoring. SBP has been 120s. She endorses shortness of breath with activity and finds it harder to breath the more supine she is, she has been sleeping in recliner chair since after surgery and continues to follow restrictions per CV surgery. Patient reports anxiety with post-op recovery and seeks reassurance to ensure she is managing appropriately at home. Her weight at home today was 152 lbs.      Recent test results & labs below (personally reviewed):    Echocardiogram 12/17/2024  Interpretation Summary   1.Left ventricular function is decreased. The ejection fraction is 30-35%  (moderately reduced).  2.The left ventricle is borderline dilated.  3.There is mild to moderate anterior, septal, and apical wall hypokinesis.  4.Normal  right ventricle size and systolic function.  5.No hemodynamically significant valvular abnormalities on 2D or color flow  imaging.  6.Moderate anterior pericardial effusion, There are no echocardiographic  indications of cardiac tamponade.  Compared to the prior study dated 12/4/2024, the effusion is more anterior  localized and Doppler does not suggest any tamonade. Difficult to compare  images as technique different but does seem slightly smaller on current study.    Echocardiogram 12/4/2024  Interpretation Summary  1.Left ventricular function is decreased. The ejection fraction is 30-35%  (moderately reduced).  2.There is moderate anterior, septal, and apical wall hypokinesis.  3.Normal right ventricle size and systolic function.  4.No hemodynamically significant valvular abnormalities on 2D or color flow  imaging although this was a limited study without complete Doppler.  5.Small posterior pericardial effusion, inflows were not samlpled to determine  risk of tamplonade.  Compared to the prior study dated 12/1/2024, there have been no changes. The  small posterior effusion is better visualized on this study.    Echocardiogram 12/1/2024  Interpretation Summary  The left ventricle is normal in size.  Left ventricular function is decreased. The ejection fraction is 35-40%  (moderately reduced). Anteroseptal, septal and apical wall hypokinesis.  Normal right ventricle size and systolic function.  The left atrium is mildly dilated.  Compared to the prior study dated 11/23/24, there are changes as noted. LVEF  has improved.    Echocardiogram 11/23/2024  Interpretation Summary  Left ventricular function is decreased. The ejection fraction is 25-30%  (severely reduced).  There is severe anterior, septal, and apical wall hypokinesis.  Apical lateral akinesis.  Normal right ventricle size and systolic function.  Right ventricular systolic pressure is elevated, consistent with mild  pulmonary hypertension.  IVC diameter  >2.1 cm collapsing <50% with sniff suggests a high RA pressure  estimated at 15 mmHg or greater.     Physical Examination Review of Systems   There were no vitals taken for this visit.  There is no height or weight on file to calculate BMI.  Wt Readings from Last 3 Encounters:   12/16/24 70.3 kg (155 lb)   12/16/24 69.4 kg (153 lb)   12/12/24 70.2 kg (154 lb 12.8 oz)     General Appearance:   Appears comfortable, in no acute distress   HEENT: Eyes symmetrical, no discharge or icterus bilaterally. Mucous membranes moist and without lesions   Cardiovascular: Regular rhythm/tachycardic, +S1S2, no murmur, rub, or gallop. JVP not visible at 90 degrees.       Respiratory:   Respirations regular, even, and unlabored. Lungs CTA throughout   GI:  Soft and non distended   Musculoskeletal: No joint swelling or tenderness   Extremities   No cyanosis or clubbing. Trace LE peripheral edema.   Skin: Warm, no xanthelasma, no jaundice, no rashes or lesions    Neurologic: Alert and oriented X3, no focal deficits   Psychiatric: calm and cooperative                                             Negative unless noted in HPI     Medical History  Surgical History Family History Social History   Past Medical History:   Diagnosis Date     Anxiety      Asthma      Cellulitis      Diabetes mellitus (H)      Essential hypertension     Created by Conversion  Replacement Utility updated for latest IMO load     Femoral nerve palsy, left 08/23/2023     Gastroparesis      Goiter 05/28/2023 05/2023: Palpated on exam, has had stable imaging with endocrinology, continue to follow endocrinology.       Hyperlipidemia      Hypertension      Other and unspecified hyperlipidemia     Created by Conversion      PONV (postoperative nausea and vomiting)      Shortness of breath      Type II or unspecified type diabetes mellitus without mention of complication, not stated as uncontrolled     Created by Conversion     Past Surgical History:   Procedure  Laterality Date     AMPUTATE TOE(S) Right 7/31/2020    Procedure: AMPUTATION, third digit right foot;  Surgeon: Chris Vega DPM;  [facility]  Service[facility] Podiatry     CORONARY ARTERY BYPASS GRAFT, WITH ENDOSCOPIC VESSEL PROCUREMENT N/A 11/24/2024    Procedure: CORONARY ARTERY BYPASS GRAFT TIMES THREE, LEFT INTERNAL MAMMARY ARTERY HARVEST, LEFT LEG ENDOSCOPIC VESSEL PROCUREMENT,;  Surgeon: Zhen Carter MD;  Location: [facility]     CV CORONARY ANGIOGRAM N/A 11/23/2024    Procedure: Coronary Angiogram;  Surgeon: Roque Eisenberg MD;  Location: [facility] CATH LAB CV     CV INTRA AORTIC BALLOON N/A 11/24/2024    Procedure: Intra aortic Balloon Pump Insertion;  Surgeon: Roque Eisenberg MD;  Location: [facility] CATH LAB CV     CV LEFT HEART CATH N/A 11/23/2024    Procedure: Left Heart Catheterization;  Surgeon: Roque Eisenberg MD;  Location: [facility] CATH LAB CV     ENDOMETRIAL BIOPSY       FOOT ARTHROPLASTY Right 09/24/2020    Fourth and fifth toe by Dr. Vgea     HC REPAIR OF HAMMERTOE,ONE Left 12/7/2020    Procedure: ARTHROPLASTY, digits two, three, four and five left foot;  Surgeon: Chris Vega DPM;  [facility]: [facility];  Service: Podiatry     HERNIA REPAIR       HYSTERECTOMY       IR LUMBAR PUNCTURE  7/20/2023     REPAIR TENDON ACHILLES Bilateral     Left was plate  , right was torn     TONSILLECTOMY       TRANSESOPHAGEAL ECHOCARDIOGRAM INTRAOPERATIVE  11/24/2024    Procedure: ECHOCARDIOGRAM, TRANSESOPHAGEAL, INTRA-OPERATIVE;  Surgeon: Zhen Carter MD;  Location: [facility] REMOVAL OF OVARY(S)      Description: Oophorectomy - Bilateral (Removal Of Both Ovaries);  Recorded: 10/09/2008;    Family History   Problem Relation Age of Onset     Diabetes Mother      Hypertension Mother      Acute Myocardial Infarction No family hx of     Social History     Socioeconomic History     Marital status:      Spouse name: Not on file      Number of children: Not on file     Years of education: Not on file     Highest education level: Not on file   Occupational History     Not on file   Tobacco Use     Smoking status: Never     Smokeless tobacco: Never   Substance and Sexual Activity     Alcohol use: No     Drug use: No     Sexual activity: Not on file   Other Topics Concern     Not on file   Social History Narrative     Not on file     Social Drivers of Health     Financial Resource Strain: Low Risk  (11/23/2024)    Financial Resource Strain      Within the past 12 months, have you or your family members you live with been unable to get utilities (heat, electricity) when it was really needed?: No   Food Insecurity: Low Risk  (11/23/2024)    Food Insecurity      Within the past 12 months, did you worry that your food would run out before you got money to buy more?: No      Within the past 12 months, did the food you bought just not last and you didn t have money to get more?: No   Transportation Needs: Low Risk  (11/23/2024)    Transportation Needs      Within the past 12 months, has lack of transportation kept you from medical appointments, getting your medicines, non-medical meetings or appointments, work, or from getting things that you need?: No   Physical Activity: Not on file   Stress: Not on file (11/6/2024)   Social Connections: Unknown (12/28/2021)    Received from [facility], [facility]    Social Connections      Frequency of Communication with Friends and Family: Not on file   Interpersonal Safety: Low Risk  (11/25/2024)    Interpersonal Safety      Do you feel physically and emotionally safe where you currently live?: Patient unable to answer      Within the past 12 months, have you been hit, slapped, kicked or otherwise physically hurt by someone?: No      Within the past 12 months, have you been humiliated or emotionally abused in other ways by your partner or  ex-partner?: No   Recent Concern: Interpersonal Safety - High Risk (11/23/2024)    Interpersonal Safety      Do you feel physically and emotionally safe where you currently live?: No      Within the past 12 months, have you been hit, slapped, kicked or otherwise physically hurt by someone?: No      Within the past 12 months, have you been humiliated or emotionally abused in other ways by your partner or ex-partner?: No   Housing Stability: Low Risk  (11/23/2024)    Housing Stability      Do you have housing? : Yes      Are you worried about losing your housing?: No        Medications  Allergies   Current Outpatient Medications   Medication Sig Dispense Refill     acetaminophen (TYLENOL) 325 MG tablet Take 1-2 tablets (325-650 mg) by mouth every 4 hours as needed for mild pain. 60 tablet 0     albuterol (PROVENTIL HFA;VENTOLIN HFA) 90 mcg/actuation inhaler Inhale 2 puffs into the lungs every 6 hours as needed       ARNUITY ELLIPTA 100 MCG/ACT inhaler Inhale 1 puff into the lungs daily.       aspirin (ASA) 81 MG chewable tablet Take 2 tablets (162 mg) by mouth or NG Tube daily. 180 tablet 3     atorvastatin (LIPITOR) 80 MG tablet Take 1 tablet (80 mg) by mouth or Feeding Tube every evening. 90 tablet 3     bumetanide (BUMEX) 1 MG tablet Take 1 tablet (1 mg) by mouth daily. 10 tablet 0     cephALEXin (KEFLEX) 500 MG capsule Take 1 capsule (500 mg) by mouth 4 times daily. 20 capsule 0     citalopram (CELEXA) 20 MG tablet [CITALOPRAM (CELEXA) 20 MG TABLET] Take 20 mg by mouth every morning.   0     empagliflozin (JARDIANCE) 25 MG TABS tablet Take 1 tablet (25 mg) by mouth daily. 90 tablet 1     furosemide (LASIX) 20 MG tablet Take 1 tablet (20 mg) by mouth daily. (Patient not taking: Reported on 12/16/2024) 30 tablet 0     hydrOXYzine HCl (ATARAX) 10 MG tablet Take 1 tablet (10 mg) by mouth or Feeding Tube every 8 hours as needed for anxiety. 30 tablet 0     magnesium 250 MG tablet Take 2 tablets by mouth daily. (Patient  not taking: Reported on 2024)       metFORMIN (GLUCOPHAGE XR) 500 MG 24 hr tablet Take 2 tablets (1,000 mg) by mouth daily (with breakfast). Changed on 24, new prescription not yet sent 180 tablet 1     metoprolol succinate ER (TOPROL XL) 25 MG 24 hr tablet Take 1 tablet (25 mg) by mouth daily. 30 tablet 1     ondansetron (ZOFRAN ODT) 4 MG ODT tab Take 1 tablet (4 mg) by mouth every 8 hours as needed for nausea. 10 tablet 0     oxyCODONE (ROXICODONE) 5 MG tablet Take 0.5-1 tablets (2.5-5 mg) by mouth every 4 hours as needed for moderate to severe pain. 10 tablet 0     polyethylene glycol (MIRALAX) 17 GM/Dose powder Take 17 g by mouth daily. (Patient not taking: Reported on 2024) 510 g 0     pramipexole (MIRAPEX) 0.125 MG tablet Take 0.125 mg by mouth daily. At 3pm  3     pramipexole (MIRAPEX) 0.5 MG tablet Take 0.5 mg by mouth at bedtime.       tirzepatide (MOUNJARO) 15 MG/0.5ML pen Inject 15 mg subcutaneously every 7 days. 6 mL 1    Allergies   Allergen Reactions     Codeine Unknown     Patient states possibly caused N/V but can't remember for sure     Semaglutide Nausea and Vomiting     Acetaminophen-Codeine Rash         Lab Results    Chemistry/lipid CBC Cardiac Enzymes/BNP/TSH/INR   Lab Results   Component Value Date    CHOL 148 2024    HDL 65 2024    TRIG 65 2024    BUN 23.5 (H) 2024     2024    CO2 31 (H) 2024    Lab Results   Component Value Date    WBC 9.7 2024    HGB 11.8 2024    HCT 38.7 2024    MCV 83 2024     (H) 2024    Lab Results   Component Value Date    TROPONINI <0.01 2023    TSH 0.61 2024    INR 1.05 2024            Maira Parra, DNP, APRN, CNP  [facility] and [phone]  Fax: [phone]  Heart Failure Nurses: [phone]      Thank you for allowing me to participate in the care of your  patient.      Sincerely,     Maira Parra, [facility]  cc:   Namita Fitzgerald MD  [address]  [place] [address]

## 2024-12-24 NOTE — TELEPHONE ENCOUNTER
"Gill is calling back to verify info she received in a phone call from the Cardiology clinic.    She was driving and didn't have anything to write with. She wanted to make sure she had the correct information.    Notified of msg from Provider, HERLINDA Parra CNP, noted below.    HR currently running ~110 - \"it's been high since my surgery\"  She has recently obtained an Apple Watch.    Pt verbalized understanding.  She will start the additional dosing tonight.      Sindhu Yun RN  [facility]  "

## 2024-12-26 LAB
ATRIAL RATE - MUSE: 107 BPM
DIASTOLIC BLOOD PRESSURE - MUSE: NORMAL MMHG
INTERPRETATION ECG - MUSE: NORMAL
P AXIS - MUSE: 62 DEGREES
PR INTERVAL - MUSE: 164 MS
QRS DURATION - MUSE: 88 MS
QT - MUSE: 380 MS
QTC - MUSE: 507 MS
R AXIS - MUSE: -3 DEGREES
SYSTOLIC BLOOD PRESSURE - MUSE: NORMAL MMHG
T AXIS - MUSE: 121 DEGREES
VENTRICULAR RATE- MUSE: 107 BPM

## 2024-12-27 ENCOUNTER — HOSPITAL ENCOUNTER (OUTPATIENT)
Dept: CARDIOLOGY | Facility: CLINIC | Age: 64
Discharge: HOME | End: 2024-12-27
Payer: COMMERCIAL

## 2024-12-27 DIAGNOSIS — Z95.1 S/P CABG (CORONARY ARTERY BYPASS GRAFT): ICD-10-CM

## 2024-12-27 LAB — LVEF ECHO: NORMAL

## 2024-12-27 PROCEDURE — 93306 TTE W/DOPPLER COMPLETE: CPT

## 2024-12-27 PROCEDURE — 93306 TTE W/DOPPLER COMPLETE: CPT | Mod: 26 | Performed by: INTERNAL MEDICINE

## 2024-12-30 ENCOUNTER — TELEPHONE (OUTPATIENT)
Dept: CARDIOLOGY | Facility: CLINIC | Age: 64
End: 2024-12-30
Payer: COMMERCIAL

## 2024-12-30 DIAGNOSIS — Z95.1 S/P CABG (CORONARY ARTERY BYPASS GRAFT): ICD-10-CM

## 2024-12-30 RX ORDER — FUROSEMIDE 20 MG/1
20 TABLET ORAL DAILY
Qty: 90 TABLET | Refills: 3 | Status: SHIPPED | OUTPATIENT
Start: 2024-12-30

## 2024-12-30 RX ORDER — FUROSEMIDE 20 MG/1
20 TABLET ORAL DAILY
Qty: 90 TABLET | Refills: 3 | Status: SHIPPED | OUTPATIENT
Start: 2024-12-30 | End: 2024-12-30

## 2024-12-30 NOTE — TELEPHONE ENCOUNTER
Called pt to follow up. Advised her 1/14 was soonest available for scheduled her TTE and that is she develops any concerning symptoms, TTE can be moved up, and order changed to stat.    Pt states she has been feeling pretty good lately. Advised pt to call if she notes any chest pain, or worsening shortness of breath. All questions addressed.

## 2024-12-30 NOTE — TELEPHONE ENCOUNTER
Health Call Center    Phone Message    May a detailed message be left on voicemail: yes     Reason for Call: Other: Patient will like to speak with Amy in regards to echo that they are supposed to have completed. Patient stated when they spoke with Amy, they were told they needed to get in this week for the echo but order states for routine and next available is on 01/14. Please call patient back to further discuss.     Action Taken: Other: Cardiology    Travel Screening: Not Applicable     Thank you!  [facility]

## 2025-01-03 ENCOUNTER — APPOINTMENT (OUTPATIENT)
Dept: CT IMAGING | Facility: HOSPITAL | Age: 65
End: 2025-01-03
Attending: EMERGENCY MEDICINE
Payer: COMMERCIAL

## 2025-01-03 ENCOUNTER — HOSPITAL ENCOUNTER (INPATIENT)
Facility: HOSPITAL | Age: 65
LOS: 2 days | Discharge: HOME | End: 2025-01-06
Attending: EMERGENCY MEDICINE | Admitting: INTERNAL MEDICINE
Payer: COMMERCIAL

## 2025-01-03 ENCOUNTER — APPOINTMENT (OUTPATIENT)
Dept: RADIOLOGY | Facility: HOSPITAL | Age: 65
End: 2025-01-03
Attending: EMERGENCY MEDICINE
Payer: COMMERCIAL

## 2025-01-03 DIAGNOSIS — Z95.1 S/P CABG (CORONARY ARTERY BYPASS GRAFT): ICD-10-CM

## 2025-01-03 DIAGNOSIS — I50.22 CHRONIC SYSTOLIC CONGESTIVE HEART FAILURE (H): Primary | ICD-10-CM

## 2025-01-03 DIAGNOSIS — R07.9 CHEST PAIN, UNSPECIFIED TYPE: ICD-10-CM

## 2025-01-03 LAB
ANION GAP SERPL CALCULATED.3IONS-SCNC: 11 MMOL/L (ref 7–15)
BASOPHILS # BLD AUTO: 0 10E3/UL (ref 0–0.2)
BASOPHILS NFR BLD AUTO: 1 %
BUN SERPL-MCNC: 20 MG/DL (ref 8–23)
CALCIUM SERPL-MCNC: 9.8 MG/DL (ref 8.8–10.4)
CHLORIDE SERPL-SCNC: 101 MMOL/L (ref 98–107)
CREAT SERPL-MCNC: 0.89 MG/DL (ref 0.51–0.95)
D DIMER PPP FEU-MCNC: 1.19 UG/ML FEU (ref 0–0.5)
EGFRCR SERPLBLD CKD-EPI 2021: 73 ML/MIN/1.73M2
EOSINOPHIL # BLD AUTO: 0.6 10E3/UL (ref 0–0.7)
EOSINOPHIL NFR BLD AUTO: 7 %
ERYTHROCYTE [DISTWIDTH] IN BLOOD BY AUTOMATED COUNT: 15.5 % (ref 10–15)
GLUCOSE SERPL-MCNC: 147 MG/DL (ref 70–99)
HCO3 SERPL-SCNC: 30 MMOL/L (ref 22–29)
HCT VFR BLD AUTO: 37.3 % (ref 35–47)
HGB BLD-MCNC: 11.3 G/DL (ref 11.7–15.7)
HOLD SPECIMEN: NORMAL
HOLD SPECIMEN: NORMAL
IMM GRANULOCYTES # BLD: 0 10E3/UL
IMM GRANULOCYTES NFR BLD: 0 %
LYMPHOCYTES # BLD AUTO: 2.8 10E3/UL (ref 0.8–5.3)
LYMPHOCYTES NFR BLD AUTO: 33 %
MAGNESIUM SERPL-MCNC: 1.8 MG/DL (ref 1.7–2.3)
MCH RBC QN AUTO: 24.4 PG (ref 26.5–33)
MCHC RBC AUTO-ENTMCNC: 30.3 G/DL (ref 31.5–36.5)
MCV RBC AUTO: 80 FL (ref 78–100)
MONOCYTES # BLD AUTO: 0.7 10E3/UL (ref 0–1.3)
MONOCYTES NFR BLD AUTO: 8 %
NEUTROPHILS # BLD AUTO: 4.3 10E3/UL (ref 1.6–8.3)
NEUTROPHILS NFR BLD AUTO: 52 %
NRBC # BLD AUTO: 0 10E3/UL
NRBC BLD AUTO-RTO: 0 /100
NT-PROBNP SERPL-MCNC: 3043 PG/ML (ref 0–900)
PLATELET # BLD AUTO: 343 10E3/UL (ref 150–450)
POTASSIUM SERPL-SCNC: 4 MMOL/L (ref 3.4–5.3)
RBC # BLD AUTO: 4.64 10E6/UL (ref 3.8–5.2)
SODIUM SERPL-SCNC: 142 MMOL/L (ref 135–145)
TROPONIN T SERPL HS-MCNC: 24 NG/L
TROPONIN T SERPL HS-MCNC: 25 NG/L
WBC # BLD AUTO: 8.4 10E3/UL (ref 4–11)

## 2025-01-03 PROCEDURE — 93005 ELECTROCARDIOGRAM TRACING: CPT | Performed by: STUDENT IN AN ORGANIZED HEALTH CARE EDUCATION/TRAINING PROGRAM

## 2025-01-03 PROCEDURE — 83735 ASSAY OF MAGNESIUM: CPT | Performed by: EMERGENCY MEDICINE

## 2025-01-03 PROCEDURE — 93005 ELECTROCARDIOGRAM TRACING: CPT | Performed by: EMERGENCY MEDICINE

## 2025-01-03 PROCEDURE — 85379 FIBRIN DEGRADATION QUANT: CPT | Performed by: EMERGENCY MEDICINE

## 2025-01-03 PROCEDURE — 83880 ASSAY OF NATRIURETIC PEPTIDE: CPT | Performed by: EMERGENCY MEDICINE

## 2025-01-03 PROCEDURE — 80048 BASIC METABOLIC PNL TOTAL CA: CPT | Performed by: EMERGENCY MEDICINE

## 2025-01-03 PROCEDURE — 82435 ASSAY OF BLOOD CHLORIDE: CPT | Performed by: EMERGENCY MEDICINE

## 2025-01-03 PROCEDURE — 250N000011 HC RX IP 250 OP 636: Performed by: EMERGENCY MEDICINE

## 2025-01-03 PROCEDURE — G0378 HOSPITAL OBSERVATION PER HR: HCPCS

## 2025-01-03 PROCEDURE — 85014 HEMATOCRIT: CPT | Performed by: EMERGENCY MEDICINE

## 2025-01-03 PROCEDURE — 99285 EMERGENCY DEPT VISIT HI MDM: CPT | Mod: 25

## 2025-01-03 PROCEDURE — 99223 1ST HOSP IP/OBS HIGH 75: CPT | Mod: AI | Performed by: INTERNAL MEDICINE

## 2025-01-03 PROCEDURE — 71046 X-RAY EXAM CHEST 2 VIEWS: CPT

## 2025-01-03 PROCEDURE — 36415 COLL VENOUS BLD VENIPUNCTURE: CPT | Performed by: EMERGENCY MEDICINE

## 2025-01-03 PROCEDURE — 71275 CT ANGIOGRAPHY CHEST: CPT

## 2025-01-03 PROCEDURE — 84484 ASSAY OF TROPONIN QUANT: CPT | Performed by: EMERGENCY MEDICINE

## 2025-01-03 PROCEDURE — 85025 COMPLETE CBC W/AUTO DIFF WBC: CPT | Performed by: EMERGENCY MEDICINE

## 2025-01-03 PROCEDURE — 70496 CT ANGIOGRAPHY HEAD: CPT

## 2025-01-03 RX ORDER — ACETAMINOPHEN 500 MG
500-1000 TABLET ORAL EVERY 6 HOURS PRN
Status: ON HOLD | COMMUNITY
End: 2025-01-06

## 2025-01-03 RX ORDER — LORAZEPAM 2 MG/ML
0.5 INJECTION INTRAMUSCULAR ONCE
Status: COMPLETED | OUTPATIENT
Start: 2025-01-03 | End: 2025-01-03

## 2025-01-03 RX ORDER — ATORVASTATIN CALCIUM 80 MG/1
80 TABLET, FILM COATED ORAL DAILY
COMMUNITY

## 2025-01-03 RX ORDER — IOPAMIDOL 755 MG/ML
89 INJECTION, SOLUTION INTRAVASCULAR ONCE
Status: COMPLETED | OUTPATIENT
Start: 2025-01-03 | End: 2025-01-03

## 2025-01-03 RX ORDER — IOPAMIDOL 755 MG/ML
75 INJECTION, SOLUTION INTRAVASCULAR ONCE
Status: COMPLETED | OUTPATIENT
Start: 2025-01-03 | End: 2025-01-03

## 2025-01-03 RX ORDER — POLYETHYLENE GLYCOL 3350 17 G/17G
1 POWDER, FOR SOLUTION ORAL DAILY PRN
COMMUNITY

## 2025-01-03 RX ADMIN — LORAZEPAM 0.5 MG: 2 INJECTION INTRAMUSCULAR; INTRAVENOUS at 21:09

## 2025-01-03 RX ADMIN — IOPAMIDOL 89 ML: 755 INJECTION, SOLUTION INTRAVENOUS at 22:07

## 2025-01-03 RX ADMIN — IOPAMIDOL 75 ML: 755 INJECTION, SOLUTION INTRAVENOUS at 21:38

## 2025-01-03 ASSESSMENT — COLUMBIA-SUICIDE SEVERITY RATING SCALE - C-SSRS
2. HAVE YOU ACTUALLY HAD ANY THOUGHTS OF KILLING YOURSELF IN THE PAST MONTH?: NO
6. HAVE YOU EVER DONE ANYTHING, STARTED TO DO ANYTHING, OR PREPARED TO DO ANYTHING TO END YOUR LIFE?: NO
1. IN THE PAST MONTH, HAVE YOU WISHED YOU WERE DEAD OR WISHED YOU COULD GO TO SLEEP AND NOT WAKE UP?: NO

## 2025-01-03 ASSESSMENT — ACTIVITIES OF DAILY LIVING (ADL)
ADLS_ACUITY_SCORE: 63

## 2025-01-03 NOTE — ED PROVIDER NOTES
EMERGENCY DEPARTMENT ENCOUNTER      NAME: Gill Mendez  AGE: 64 year old female  YOB: 1960  MRN: [id number]  EVALUATION DATE & TIME: No admission date for patient encounter.    PCP: Saúl Hunt    ED PROVIDER: Stephanie Dean M.D.      Chief Complaint   Patient presents with    Shortness of Breath    Chest Pain         FINAL IMPRESSION:  1. Chest pain, unspecified type    2. S/P CABG (coronary artery bypass graft)        ED COURSE & MEDICAL DECISION MAKING:    Pertinent Labs & Imaging studies reviewed. (See chart for details)  ED Course as of 01/03/25 2309   Fri Jan 03, 2025   0830 Patient is a pleasant 64-year-old female who is status post bypass surgery 6 weeks ago.  She complains today of chest pain down the left arm.  Initially started yesterday but is worse today.  She has had 3 days of some dizziness.  It is room spinning dizziness and comes and goes.  She is little bit of a headache.  She has also noticed some acid reflux symptoms which is similar to when she had a heart attack in the past.  Previously she did not have pain down the left arm but had a down the right arm.  She describes the pain as achy and constant with a sharp pain at times.  She says is worse with exertion.  She has no infectious symptoms at this time.  She has more chest pain and she does shortness of breath.  She took Tylenol earlier without significant relief.  She occasionally feels like her heart is beating hard and has reported elevated heart rates.  She says she is not eating very well.  She is on aspirin but no other blood thinners.  Her heart was tachycardic here.  I have ordered some basic labs on her and then we will have to determine what we find and what the plan would be going forward.  She has had low EF before.  We could try just a little bit of gentle fluid rehydration but I would go slow with her given her past history.   1834 After reviewing the patient's lab work and complaint I did add on a  D-dimer.  She was initially tachycardic here.  Her symptoms are kind of vague but she is 6 weeks postop significant surgery and not on anticoagulation.  For that to come back before we send her down to imaging in case she needs CT imaging of the chest.   2116 Patient's repeat troponin is stable.  D-dimer is 1.19 so I added on CTA imaging on the patient to further evaluate.  She remains tachycardic.   2252 Workup came back unremarkable.  I will run the case by cardiology and we can determine disposition.   2257 I discussed the case with Dr. Funes with cardiology.  He recommended we bring her in overnight get an echocardiogram in the morning and just see how her EF is.  I will discuss with her and we will work on getting her admitted.   2300 I did the patient on the findings and recommendations of cardiology.  She is in agreement with cardiology admission overnight.   2305 I discussed the case with Dr. Marin with the hospitalist service.  She agrees with a cardiac telemetry observation admission for chest pain following CABG.       Medical Decision Making    History:  Supplemental history from: None  External Record(s) reviewed: I reviewed vascular surgery discharge summary from 12/2/2024.  Patient had been admitted to the hospital for non-STEMI status post CABG x 3 with [facility].  Patient had had surgery on 11/24/2024 and underwent a triple bypass.  Surgery was uneventful and patient went to ICU postoperatively.  Patient had a repeat echo on 12 1 which demonstrated EF improved to 35 to 40%.  Patient had outpatient heart failure follow-up in place.  She did have a Klebsiella caudae postop and was treated with Rocephin and Macrobid.  She was discharged home with p.o. Lasix.    Work Up:  Emergent/Severe conditions considered and evaluated for: ACS, pericarditis, myocarditis, pneumothorax, pneumonia, esophageal rupture, Electrolyte abnormality, hyperglycemia, hypoglycemia, acidosis, anemia, thrombocytopenia, renal failure,  dehydration  I independently reviewed and interpreted EKG and chest x-ray which showed no pneumothorax. See full radiology report for all details. Rhythm strip shows sinus tachycardia at 107 bpm..  In addition to work up documented, I considered the following work up: None  Medications given that require intensive monitoring for toxicity: IV Ativan    External consultation:  Discussion of management with another provider: Cardiology, hospitalist    Complicating factors:  Care impacted by chronic illness: Congestive heart failure, coronary artery disease, type 2 diabetes, hyperlipidemia, asthma, hypertension, anxiety    Disposition considerations: Admission    CT Pulmonary Angiogram:The patient had an abnormal d-dimer.      At the conclusion of the encounter I discussed  the results of all of the tests and the disposition with patient.   All questions were answered.  The patient acknowledged understanding and was involved in the decision making regarding the overall care plan.      MEDICATIONS GIVEN IN THE EMERGENCY:  Medications   LORazepam (ATIVAN) injection 0.5 mg (0.5 mg Intravenous $Given 1/3/25 2109)   iopamidol (ISOVUE-370) solution 75 mL (75 mLs Intravenous $Given 1/3/25 2138)   iopamidol (ISOVUE-370) solution 89 mL (89 mLs Intravenous $Given 1/3/25 2207)       =================================================================    HPI    Triage Note: Patient walks in to ER for evaluation of SOB, CP radiating to L arm for past 2-3 days. Endorses dizziness. Had triple bypass 6 weeks ago.     Use of : None      Gill Mendez is a 64 year old female who presents for evaluation of some chest pain with pain down the left arm that started yesterday and was worse today with some associated dizziness.  It does seem worse with exertion.    PAST MEDICAL HISTORY:  Past Medical History:   Diagnosis Date    Anxiety     Asthma     Cellulitis     Diabetes mellitus (H)     Essential hypertension     Created by  Conversion  Replacement Utility updated for latest IMO load    Femoral nerve palsy, left 08/23/2023    Gastroparesis     Goiter 05/28/2023 05/2023: Palpated on exam, has had stable imaging with endocrinology, continue to follow endocrinology.      Hyperlipidemia     Hypertension     Other and unspecified hyperlipidemia     Created by Conversion     PONV (postoperative nausea and vomiting)     Shortness of breath     Type II or unspecified type diabetes mellitus without mention of complication, not stated as uncontrolled     Created by Conversion        PAST SURGICAL HISTORY:  Past Surgical History:   Procedure Laterality Date    AMPUTATE TOE(S) Right 7/31/2020    Procedure: AMPUTATION, third digit right foot;  Surgeon: Chris Vega DPM;  Location: [facility];  Service: Podiatry    CORONARY ARTERY BYPASS GRAFT, WITH ENDOSCOPIC VESSEL PROCUREMENT N/A 11/24/2024    Procedure: CORONARY ARTERY BYPASS GRAFT TIMES THREE, LEFT INTERNAL MAMMARY ARTERY HARVEST, LEFT LEG ENDOSCOPIC VESSEL PROCUREMENT,;  Surgeon: Zhen Carter MD;  Location: [facility]    CV CORONARY ANGIOGRAM N/A 11/23/2024    Procedure: Coronary Angiogram;  Surgeon: Roque Eisenberg MD;  Location: [facility] CATH LAB CV    CV INTRA AORTIC BALLOON N/A 11/24/2024    Procedure: Intra aortic Balloon Pump Insertion;  Surgeon: Roque Eisenberg MD;  Location: [facility] CATH LAB CV    CV LEFT HEART CATH N/A 11/23/2024    Procedure: Left Heart Catheterization;  Surgeon: Roque Eisenberg MD;  Location: [facility] CATH LAB CV    ENDOMETRIAL BIOPSY      FOOT ARTHROPLASTY Right 09/24/2020    Fourth and fifth toe by Dr. Vega    HC REPAIR OF HAMMERTOE,ONE Left 12/7/2020    Procedure: ARTHROPLASTY, digits two, three, four and five left foot;  Surgeon: Chris Vega DPM;  Location: [facility];  Service: Podiatry    HERNIA REPAIR      HYSTERECTOMY      IR LUMBAR PUNCTURE  7/20/2023    REPAIR TENDON ACHILLES Bilateral     Left was  plate  , right was torn    TONSILLECTOMY      TRANSESOPHAGEAL ECHOCARDIOGRAM INTRAOPERATIVE  11/24/2024    Procedure: ECHOCARDIOGRAM, TRANSESOPHAGEAL, INTRA-OPERATIVE;  Surgeon: Zhen Carter MD;  Location: [facility] OR    [facility] REMOVAL OF OVARY(S)      Description: Oophorectomy - Bilateral (Removal Of Both Ovaries);  Recorded: 10/09/2008;       CURRENT MEDICATIONS:    No current facility-administered medications for this encounter.    Current Outpatient Medications:     acetaminophen (TYLENOL) 325 MG tablet, Take 1-2 tablets (325-650 mg) by mouth every 4 hours as needed for mild pain., Disp: 60 tablet, Rfl: 0    albuterol (PROVENTIL HFA;VENTOLIN HFA) 90 mcg/actuation inhaler, Inhale 2 puffs into the lungs every 6 hours as needed, Disp: , Rfl:     ARNUITY ELLIPTA 100 MCG/ACT inhaler, Inhale 1 puff into the lungs daily., Disp: , Rfl:     aspirin (ASA) 81 MG chewable tablet, Take 2 tablets (162 mg) by mouth or NG Tube daily., Disp: 180 tablet, Rfl: 3    atorvastatin (LIPITOR) 80 MG tablet, Take 1 tablet (80 mg) by mouth or Feeding Tube every evening., Disp: 90 tablet, Rfl: 3    cephALEXin (KEFLEX) 500 MG capsule, Take 1 capsule (500 mg) by mouth 4 times daily., Disp: 20 capsule, Rfl: 0    citalopram (CELEXA) 20 MG tablet, [CITALOPRAM (CELEXA) 20 MG TABLET] Take 20 mg by mouth every morning. , Disp: , Rfl: 0    empagliflozin (JARDIANCE) 25 MG TABS tablet, Take 1 tablet (25 mg) by mouth daily., Disp: 90 tablet, Rfl: 1    furosemide (LASIX) 20 MG tablet, Take 1 tablet (20 mg) by mouth daily., Disp: 90 tablet, Rfl: 3    hydrOXYzine HCl (ATARAX) 10 MG tablet, Take 1 tablet (10 mg) by mouth or Feeding Tube every 8 hours as needed for anxiety., Disp: 30 tablet, Rfl: 0    magnesium 250 MG tablet, Take 2 tablets by mouth daily., Disp: , Rfl:     metFORMIN (GLUCOPHAGE XR) 500 MG 24 hr tablet, Take 2 tablets (1,000 mg) by mouth daily (with breakfast). Changed on 6/21/24, new prescription not yet sent, Disp: 180  tablet, Rfl: 1    metoprolol succinate ER (TOPROL XL) 25 MG 24 hr tablet, Take 1 tablet (25 mg) by mouth 2 times daily., Disp: 90 tablet, Rfl: 3    ondansetron (ZOFRAN ODT) 4 MG ODT tab, Take 1 tablet (4 mg) by mouth every 8 hours as needed for nausea., Disp: 10 tablet, Rfl: 0    oxyCODONE (ROXICODONE) 5 MG tablet, Take 0.5-1 tablets (2.5-5 mg) by mouth every 4 hours as needed for moderate to severe pain., Disp: 10 tablet, Rfl: 0    polyethylene glycol (MIRALAX) 17 GM/Dose powder, Take 17 g by mouth daily., Disp: 510 g, Rfl: 0    pramipexole (MIRAPEX) 0.125 MG tablet, Take 0.125 mg by mouth daily. At 3pm, Disp: , Rfl: 3    pramipexole (MIRAPEX) 0.5 MG tablet, Take 0.5 mg by mouth at bedtime., Disp: , Rfl:     tirzepatide (MOUNJARO) 15 MG/0.5ML pen, Inject 15 mg subcutaneously every 7 days., Disp: 6 mL, Rfl: 1    ALLERGIES:  Allergies   Allergen Reactions    Codeine Unknown     Patient states possibly caused N/V but can't remember for sure    Semaglutide Nausea and Vomiting    Acetaminophen-Codeine Rash       FAMILY HISTORY:  Family History   Problem Relation Age of Onset    Diabetes Mother     Hypertension Mother     Acute Myocardial Infarction No family hx of        SOCIAL HISTORY:   Social History     Socioeconomic History    Marital status:    Tobacco Use    Smoking status: Never    Smokeless tobacco: Never   Substance and Sexual Activity    Alcohol use: No    Drug use: No     Social Drivers of Health     Financial Resource Strain: Low Risk  (11/23/2024)    Financial Resource Strain     Within the past 12 months, have you or your family members you live with been unable to get utilities (heat, electricity) when it was really needed?: No   Food Insecurity: Low Risk  (11/23/2024)    Food Insecurity     Within the past 12 months, did you worry that your food would run out before you got money to buy more?: No     Within the past 12 months, did the food you bought just not last and you didn t have money to get  "more?: No   Transportation Needs: Low Risk  (11/23/2024)    Transportation Needs     Within the past 12 months, has lack of transportation kept you from medical appointments, getting your medicines, non-medical meetings or appointments, work, or from getting things that you need?: No    Received from [facility], [facility]    Social Connections   Interpersonal Safety: Low Risk  (11/25/2024)    Interpersonal Safety     Do you feel physically and emotionally safe where you currently live?: Patient unable to answer     Within the past 12 months, have you been hit, slapped, kicked or otherwise physically hurt by someone?: No     Within the past 12 months, have you been humiliated or emotionally abused in other ways by your partner or ex-partner?: No   Recent Concern: Interpersonal Safety - High Risk (11/23/2024)    Interpersonal Safety     Do you feel physically and emotionally safe where you currently live?: No     Within the past 12 months, have you been hit, slapped, kicked or otherwise physically hurt by someone?: No     Within the past 12 months, have you been humiliated or emotionally abused in other ways by your partner or ex-partner?: No   Housing Stability: Low Risk  (11/23/2024)    Housing Stability     Do you have housing? : Yes     Are you worried about losing your housing?: No       PHYSICAL EXAM    VITAL SIGNS: /63   Pulse 108   Temp 98.5  F (36.9  C) (Oral)   Resp 24   Ht 1.651 m (5' 5\")   Wt 68.5 kg (151 lb 1.6 oz)   SpO2 98%   BMI 25.14 kg/m     GENERAL: Awake, alert, answering questions appropriately, no acute distress  SPEECH:  Easy to understand speech, Normal volume and janna  PULMONARY: No respiratory distress, Lungs clear to auscultation bilaterally  CARDIOVASCULAR: Regular rate and rhythm, Distal pulses present and normal.  ABDOMINAL: Soft, Nondistended, Nontender, No rebound or guarding, No palpable " masses  EXTREMITIES: No lower extremity edema.  PSYCH: Normal mood and affect     LAB:  All pertinent labs reviewed and interpreted.  Results for orders placed or performed during the hospital encounter of 01/03/25   Chest XR,  PA & LAT    Impression    IMPRESSION:     Previous median sternotomy and coronary revascularization. The cardiac silhouette is at the upper limit of normal in size, unchanged. The vascular pedicle width is normal. Lung vascularity is normal.    Foci of linear atelectasis are present inferior to the left hilum. Opacity in the basal left chest present 12/16/2024 has cleared. No new airspace opacities no interlobular septal thickening or peribronchial thickening around the bhavik. No pleural   effusions are present.    There is diffuse thoracic spine disc space narrowing and minimal osteophytes of the thoracic and upper lumbar spine.   CTA Head Neck with Contrast    Impression    IMPRESSION:   HEAD CT:  No acute intracranial process.    HEAD CTA:  No stenosis/occlusion, aneurysm, or high flow vascular malformation.    NECK CTA:  No measurable stenosis or dissection.     CT Chest Pulmonary Embolism w Contrast    Impression    IMPRESSION:  1.  No evidence for pulmonary embolism.   Basic metabolic panel   Result Value Ref Range    Sodium 142 135 - 145 mmol/L    Potassium 4.0 3.4 - 5.3 mmol/L    Chloride 101 98 - 107 mmol/L    Carbon Dioxide (CO2) 30 (H) 22 - 29 mmol/L    Anion Gap 11 7 - 15 mmol/L    Urea Nitrogen 20.0 8.0 - 23.0 mg/dL    Creatinine 0.89 0.51 - 0.95 mg/dL    GFR Estimate 73 >60 mL/min/1.73m2    Calcium 9.8 8.8 - 10.4 mg/dL    Glucose 147 (H) 70 - 99 mg/dL   Result Value Ref Range    Troponin T, High Sensitivity 25 (H) <=14 ng/L   CBC with platelets and differential   Result Value Ref Range    WBC Count 8.4 4.0 - 11.0 10e3/uL    RBC Count 4.64 3.80 - 5.20 10e6/uL    Hemoglobin 11.3 (L) 11.7 - 15.7 g/dL    Hematocrit 37.3 35.0 - 47.0 %    MCV 80 78 - 100 fL    MCH 24.4 (L) 26.5 - 33.0  pg    MCHC 30.3 (L) 31.5 - 36.5 g/dL    RDW 15.5 (H) 10.0 - 15.0 %    Platelet Count 343 150 - 450 10e3/uL    % Neutrophils 52 %    % Lymphocytes 33 %    % Monocytes 8 %    % Eosinophils 7 %    % Basophils 1 %    % Immature Granulocytes 0 %    NRBCs per 100 WBC 0 <1 /100    Absolute Neutrophils 4.3 1.6 - 8.3 10e3/uL    Absolute Lymphocytes 2.8 0.8 - 5.3 10e3/uL    Absolute Monocytes 0.7 0.0 - 1.3 10e3/uL    Absolute Eosinophils 0.6 0.0 - 0.7 10e3/uL    Absolute Basophils 0.0 0.0 - 0.2 10e3/uL    Absolute Immature Granulocytes 0.0 <=0.4 10e3/uL    Absolute NRBCs 0.0 10e3/uL   Extra Blue Top Tube   Result Value Ref Range    Hold Specimen JIC    Extra Red Top Tube   Result Value Ref Range    Hold Specimen JIC    Result Value Ref Range    Magnesium 1.8 1.7 - 2.3 mg/dL   Nt probnp inpatient (BNP)   Result Value Ref Range    N terminal Pro BNP Inpatient 3,043 (H) 0 - 900 pg/mL   Result Value Ref Range    Troponin T, High Sensitivity 24 (H) <=14 ng/L   D dimer quantitative   Result Value Ref Range    D-Dimer Quantitative 1.19 (H) 0.00 - 0.50 ug/mL FEU       RADIOLOGY:  CT Chest Pulmonary Embolism w Contrast   Final Result   IMPRESSION:   1.  No evidence for pulmonary embolism.      CTA Head Neck with Contrast   Final Result   IMPRESSION:    HEAD CT:   No acute intracranial process.      HEAD CTA:   No stenosis/occlusion, aneurysm, or high flow vascular malformation.      NECK CTA:   No measurable stenosis or dissection.         Chest XR,  PA & LAT   Final Result   IMPRESSION:       Previous median sternotomy and coronary revascularization. The cardiac silhouette is at the upper limit of normal in size, unchanged. The vascular pedicle width is normal. Lung vascularity is normal.      Foci of linear atelectasis are present inferior to the left hilum. Opacity in the basal left chest present 12/16/2024 has cleared. No new airspace opacities no interlobular septal thickening or peribronchial thickening around the bhavik. No  pleural    effusions are present.      There is diffuse thoracic spine disc space narrowing and minimal osteophytes of the thoracic and upper lumbar spine.            EKG:    Date and time: January 3, 2025 at 1551  Rate: 108 bpm  Rhythm: Sinus tachycardia  CA interval: 168 ms  QRS interval: 82 ms  QT/QTc: 342/458 ms  ST changes or T wave changes: No acute ST or T wave abnormality  Change from prior ECG: No significant change from prior  I have independently reviewed and interpreted this EKG.     Stephanie Dean M.D.  Emergency Medicine  [facility] EMERGENCY DEPARTMENT  [address] [place] [id number]  [phone]  Dept: [phone]       Stephanie Dean MD  01/03/25 2310       Stephanie Dean MD  01/04/25 2017

## 2025-01-03 NOTE — ED TRIAGE NOTES
Patient walks in to ER for evaluation of SOB, CP radiating to L arm for past 2-3 days. Endorses dizziness. Had triple bypass 6 weeks ago.

## 2025-01-04 ENCOUNTER — APPOINTMENT (OUTPATIENT)
Dept: CARDIOLOGY | Facility: HOSPITAL | Age: 65
End: 2025-01-04
Attending: INTERNAL MEDICINE
Payer: COMMERCIAL

## 2025-01-04 PROBLEM — I10 ESSENTIAL HYPERTENSION: Status: ACTIVE | Noted: 2020-06-18

## 2025-01-04 PROBLEM — U07.1 PNEUMONIA DUE TO 2019 NOVEL CORONAVIRUS: Status: RESOLVED | Noted: 2023-01-01 | Resolved: 2025-01-04

## 2025-01-04 PROBLEM — J12.82 PNEUMONIA DUE TO 2019 NOVEL CORONAVIRUS: Status: RESOLVED | Noted: 2023-01-01 | Resolved: 2025-01-04

## 2025-01-04 PROBLEM — I25.5 ISCHEMIC CARDIOMYOPATHY: Status: ACTIVE | Noted: 2025-01-04

## 2025-01-04 PROBLEM — E78.5 HYPERLIPIDEMIA: Status: ACTIVE | Noted: 2020-06-18

## 2025-01-04 PROBLEM — G25.81 RESTLESS LEG SYNDROME: Status: ACTIVE | Noted: 2023-01-31

## 2025-01-04 LAB
GLUCOSE BLDC GLUCOMTR-MCNC: 137 MG/DL (ref 70–99)
GLUCOSE BLDC GLUCOMTR-MCNC: 148 MG/DL (ref 70–99)
GLUCOSE BLDC GLUCOMTR-MCNC: 279 MG/DL (ref 70–99)
HOLD SPECIMEN: NORMAL
HOLD SPECIMEN: NORMAL
LVEF ECHO: NORMAL
TROPONIN T SERPL HS-MCNC: 25 NG/L
TROPONIN T SERPL HS-MCNC: 26 NG/L
TROPONIN T SERPL HS-MCNC: 28 NG/L
TROPONIN T SERPL HS-MCNC: 29 NG/L

## 2025-01-04 PROCEDURE — 99024 POSTOP FOLLOW-UP VISIT: CPT | Performed by: STUDENT IN AN ORGANIZED HEALTH CARE EDUCATION/TRAINING PROGRAM

## 2025-01-04 PROCEDURE — 250N000011 HC RX IP 250 OP 636: Performed by: STUDENT IN AN ORGANIZED HEALTH CARE EDUCATION/TRAINING PROGRAM

## 2025-01-04 PROCEDURE — 250N000012 HC RX MED GY IP 250 OP 636 PS 637: Performed by: HOSPITALIST

## 2025-01-04 PROCEDURE — 250N000013 HC RX MED GY IP 250 OP 250 PS 637: Performed by: INTERNAL MEDICINE

## 2025-01-04 PROCEDURE — 250N000013 HC RX MED GY IP 250 OP 250 PS 637: Performed by: HOSPITALIST

## 2025-01-04 PROCEDURE — 36415 COLL VENOUS BLD VENIPUNCTURE: CPT | Performed by: INTERNAL MEDICINE

## 2025-01-04 PROCEDURE — 84484 ASSAY OF TROPONIN QUANT: CPT | Performed by: INTERNAL MEDICINE

## 2025-01-04 PROCEDURE — 255N000002 HC RX 255 OP 636: Performed by: INTERNAL MEDICINE

## 2025-01-04 PROCEDURE — 82962 GLUCOSE BLOOD TEST: CPT

## 2025-01-04 PROCEDURE — 250N000013 HC RX MED GY IP 250 OP 250 PS 637: Performed by: STUDENT IN AN ORGANIZED HEALTH CARE EDUCATION/TRAINING PROGRAM

## 2025-01-04 PROCEDURE — C8929 TTE W OR WO FOL WCON,DOPPLER: HCPCS

## 2025-01-04 PROCEDURE — 93010 ELECTROCARDIOGRAM REPORT: CPT | Performed by: INTERNAL MEDICINE

## 2025-01-04 PROCEDURE — G0378 HOSPITAL OBSERVATION PER HR: HCPCS

## 2025-01-04 PROCEDURE — 93005 ELECTROCARDIOGRAM TRACING: CPT

## 2025-01-04 PROCEDURE — 99232 SBSQ HOSP IP/OBS MODERATE 35: CPT | Performed by: HOSPITALIST

## 2025-01-04 PROCEDURE — 120N000004 HC R&B MS OVERFLOW

## 2025-01-04 PROCEDURE — 93005 ELECTROCARDIOGRAM TRACING: CPT | Performed by: INTERNAL MEDICINE

## 2025-01-04 PROCEDURE — 93306 TTE W/DOPPLER COMPLETE: CPT | Mod: 26 | Performed by: STUDENT IN AN ORGANIZED HEALTH CARE EDUCATION/TRAINING PROGRAM

## 2025-01-04 RX ORDER — POLYETHYLENE GLYCOL 3350 17 G/17G
17 POWDER, FOR SOLUTION ORAL 2 TIMES DAILY PRN
Status: DISCONTINUED | OUTPATIENT
Start: 2025-01-04 | End: 2025-01-06 | Stop reason: HOSPADM

## 2025-01-04 RX ORDER — METOPROLOL SUCCINATE 25 MG/1
25 TABLET, EXTENDED RELEASE ORAL 2 TIMES DAILY
Status: DISCONTINUED | OUTPATIENT
Start: 2025-01-04 | End: 2025-01-06 | Stop reason: HOSPADM

## 2025-01-04 RX ORDER — AMOXICILLIN 250 MG
2 CAPSULE ORAL 2 TIMES DAILY PRN
Status: DISCONTINUED | OUTPATIENT
Start: 2025-01-04 | End: 2025-01-06 | Stop reason: HOSPADM

## 2025-01-04 RX ORDER — NICOTINE POLACRILEX 4 MG
15-30 LOZENGE BUCCAL
Status: DISCONTINUED | OUTPATIENT
Start: 2025-01-04 | End: 2025-01-06 | Stop reason: HOSPADM

## 2025-01-04 RX ORDER — POLYETHYLENE GLYCOL 3350 17 G/17G
17 POWDER, FOR SOLUTION ORAL DAILY PRN
Status: DISCONTINUED | OUTPATIENT
Start: 2025-01-04 | End: 2025-01-04

## 2025-01-04 RX ORDER — HYDROMORPHONE HCL IN WATER/PF 6 MG/30 ML
0.2 PATIENT CONTROLLED ANALGESIA SYRINGE INTRAVENOUS
Status: DISCONTINUED | OUTPATIENT
Start: 2025-01-04 | End: 2025-01-05

## 2025-01-04 RX ORDER — NALOXONE HYDROCHLORIDE 0.4 MG/ML
0.2 INJECTION, SOLUTION INTRAMUSCULAR; INTRAVENOUS; SUBCUTANEOUS
Status: DISCONTINUED | OUTPATIENT
Start: 2025-01-04 | End: 2025-01-06 | Stop reason: HOSPADM

## 2025-01-04 RX ORDER — CALCIUM CARBONATE 500 MG/1
1000 TABLET, CHEWABLE ORAL 4 TIMES DAILY PRN
Status: DISCONTINUED | OUTPATIENT
Start: 2025-01-04 | End: 2025-01-06 | Stop reason: HOSPADM

## 2025-01-04 RX ORDER — AMOXICILLIN 250 MG
1 CAPSULE ORAL 2 TIMES DAILY PRN
Status: DISCONTINUED | OUTPATIENT
Start: 2025-01-04 | End: 2025-01-06 | Stop reason: HOSPADM

## 2025-01-04 RX ORDER — NALOXONE HYDROCHLORIDE 0.4 MG/ML
0.4 INJECTION, SOLUTION INTRAMUSCULAR; INTRAVENOUS; SUBCUTANEOUS
Status: DISCONTINUED | OUTPATIENT
Start: 2025-01-04 | End: 2025-01-06 | Stop reason: HOSPADM

## 2025-01-04 RX ORDER — DEXTROSE MONOHYDRATE 25 G/50ML
25-50 INJECTION, SOLUTION INTRAVENOUS
Status: DISCONTINUED | OUTPATIENT
Start: 2025-01-04 | End: 2025-01-06 | Stop reason: HOSPADM

## 2025-01-04 RX ORDER — ATORVASTATIN CALCIUM 40 MG/1
80 TABLET, FILM COATED ORAL DAILY
Status: DISCONTINUED | OUTPATIENT
Start: 2025-01-04 | End: 2025-01-06 | Stop reason: HOSPADM

## 2025-01-04 RX ORDER — ACETAMINOPHEN 500 MG
500-1000 TABLET ORAL EVERY 6 HOURS PRN
Status: DISCONTINUED | OUTPATIENT
Start: 2025-01-04 | End: 2025-01-05

## 2025-01-04 RX ORDER — ALBUTEROL SULFATE 90 UG/1
2 INHALANT RESPIRATORY (INHALATION) EVERY 6 HOURS PRN
Status: DISCONTINUED | OUTPATIENT
Start: 2025-01-04 | End: 2025-01-06 | Stop reason: HOSPADM

## 2025-01-04 RX ORDER — HYDROXYZINE HYDROCHLORIDE 10 MG/1
10 TABLET, FILM COATED ORAL EVERY 8 HOURS PRN
Status: DISCONTINUED | OUTPATIENT
Start: 2025-01-04 | End: 2025-01-06 | Stop reason: HOSPADM

## 2025-01-04 RX ORDER — CITALOPRAM HYDROBROMIDE 20 MG/1
20 TABLET ORAL EVERY MORNING
Status: DISCONTINUED | OUTPATIENT
Start: 2025-01-04 | End: 2025-01-06 | Stop reason: HOSPADM

## 2025-01-04 RX ORDER — BISACODYL 10 MG
10 SUPPOSITORY, RECTAL RECTAL DAILY PRN
Status: DISCONTINUED | OUTPATIENT
Start: 2025-01-04 | End: 2025-01-06 | Stop reason: HOSPADM

## 2025-01-04 RX ORDER — PRAMIPEXOLE DIHYDROCHLORIDE 0.12 MG/1
0.12 TABLET ORAL DAILY
Status: DISCONTINUED | OUTPATIENT
Start: 2025-01-04 | End: 2025-01-06 | Stop reason: HOSPADM

## 2025-01-04 RX ORDER — LIDOCAINE 40 MG/G
CREAM TOPICAL
Status: DISCONTINUED | OUTPATIENT
Start: 2025-01-04 | End: 2025-01-06 | Stop reason: HOSPADM

## 2025-01-04 RX ORDER — ASPIRIN 81 MG/1
162 TABLET, CHEWABLE ORAL DAILY
Status: DISCONTINUED | OUTPATIENT
Start: 2025-01-04 | End: 2025-01-06 | Stop reason: HOSPADM

## 2025-01-04 RX ORDER — OXYCODONE HYDROCHLORIDE 5 MG/1
5 TABLET ORAL EVERY 4 HOURS PRN
Status: DISCONTINUED | OUTPATIENT
Start: 2025-01-04 | End: 2025-01-06 | Stop reason: HOSPADM

## 2025-01-04 RX ORDER — HYDROMORPHONE HCL IN WATER/PF 6 MG/30 ML
0.4 PATIENT CONTROLLED ANALGESIA SYRINGE INTRAVENOUS
Status: DISCONTINUED | OUTPATIENT
Start: 2025-01-04 | End: 2025-01-05

## 2025-01-04 RX ORDER — LOSARTAN POTASSIUM 25 MG/1
25 TABLET ORAL DAILY
Status: DISCONTINUED | OUTPATIENT
Start: 2025-01-04 | End: 2025-01-06 | Stop reason: HOSPADM

## 2025-01-04 RX ORDER — PRAMIPEXOLE DIHYDROCHLORIDE 0.5 MG/1
0.5 TABLET ORAL AT BEDTIME
Status: DISCONTINUED | OUTPATIENT
Start: 2025-01-04 | End: 2025-01-06 | Stop reason: HOSPADM

## 2025-01-04 RX ADMIN — METOPROLOL SUCCINATE 25 MG: 25 TABLET, EXTENDED RELEASE ORAL at 19:18

## 2025-01-04 RX ADMIN — FLUTICASONE FUROATE 1 PUFF: 100 POWDER RESPIRATORY (INHALATION) at 19:18

## 2025-01-04 RX ADMIN — HYDROXYZINE HYDROCHLORIDE 10 MG: 10 TABLET ORAL at 20:59

## 2025-01-04 RX ADMIN — FLUTICASONE FUROATE 1 PUFF: 100 POWDER RESPIRATORY (INHALATION) at 08:41

## 2025-01-04 RX ADMIN — ASPIRIN 81 MG CHEWABLE TABLET 162 MG: 81 TABLET CHEWABLE at 08:40

## 2025-01-04 RX ADMIN — NITROGLYCERIN 7.5 MG: 20 OINTMENT TOPICAL at 05:59

## 2025-01-04 RX ADMIN — INSULIN ASPART 3 UNITS: 100 INJECTION, SOLUTION INTRAVENOUS; SUBCUTANEOUS at 14:36

## 2025-01-04 RX ADMIN — LOSARTAN POTASSIUM 25 MG: 25 TABLET, FILM COATED ORAL at 12:22

## 2025-01-04 RX ADMIN — ATORVASTATIN CALCIUM 80 MG: 40 TABLET, FILM COATED ORAL at 08:40

## 2025-01-04 RX ADMIN — PRAMIPEXOLE DIHYDROCHLORIDE 0.5 MG: 0.5 TABLET ORAL at 03:36

## 2025-01-04 RX ADMIN — OXYCODONE HYDROCHLORIDE 5 MG: 5 TABLET ORAL at 21:13

## 2025-01-04 RX ADMIN — PRAMIPEXOLE DIHYDROCHLORIDE 0.5 MG: 0.5 TABLET ORAL at 21:46

## 2025-01-04 RX ADMIN — PRAMIPEXOLE DIHYDROCHLORIDE 0.12 MG: 0.12 TABLET ORAL at 14:36

## 2025-01-04 RX ADMIN — CITALOPRAM HYDROBROMIDE 20 MG: 20 TABLET ORAL at 08:40

## 2025-01-04 RX ADMIN — METOPROLOL SUCCINATE 25 MG: 25 TABLET, EXTENDED RELEASE ORAL at 08:40

## 2025-01-04 RX ADMIN — EMPAGLIFLOZIN 25 MG: 25 TABLET, FILM COATED ORAL at 14:36

## 2025-01-04 RX ADMIN — NITROGLYCERIN 7.5 MG: 20 OINTMENT TOPICAL at 22:24

## 2025-01-04 RX ADMIN — INSULIN ASPART 1 UNITS: 100 INJECTION, SOLUTION INTRAVENOUS; SUBCUTANEOUS at 17:50

## 2025-01-04 RX ADMIN — HYDROMORPHONE HYDROCHLORIDE 0.2 MG: 0.2 INJECTION, SOLUTION INTRAMUSCULAR; INTRAVENOUS; SUBCUTANEOUS at 20:48

## 2025-01-04 RX ADMIN — PERFLUTREN 3 ML: 6.52 INJECTION, SUSPENSION INTRAVENOUS at 09:33

## 2025-01-04 ASSESSMENT — ACTIVITIES OF DAILY LIVING (ADL)
ADLS_ACUITY_SCORE: 35
ADLS_ACUITY_SCORE: 58
ADLS_ACUITY_SCORE: 63
ADLS_ACUITY_SCORE: 58
ADLS_ACUITY_SCORE: 35
ADLS_ACUITY_SCORE: 58
ADLS_ACUITY_SCORE: 36
ADLS_ACUITY_SCORE: 35
ADLS_ACUITY_SCORE: 63
ADLS_ACUITY_SCORE: 58
ADLS_ACUITY_SCORE: 35
ADLS_ACUITY_SCORE: 63
ADLS_ACUITY_SCORE: 58
ADLS_ACUITY_SCORE: 58
ADLS_ACUITY_SCORE: 35

## 2025-01-04 NOTE — CONSULTS
[facility] HEART CARE   [address], [place] [address]   [email or web address]   OFFICE: [phone]     CARDIOLOGY INPATIENT CONSULT NOTE     Impression and Plan     Assessment/Plan:  Ms. Gill Mendez is a 64 year old female with CAD s/p 3v CABG (LIMA-LAD, SVG-OM, SVG-rPDA) 11/24/2024, ischemic cardiomyopathy, HTN, HLD, DM2, who presented to the hospital with chest pain.     Chest pain/CAD   - Concern for cardiac angina possible graft dysfunction.     - TTE essentially stable    - Plan on NM vasodilator stress for further evaluation   - LDL 70 continue atorvastatin 80mg    Ischemic cardiomyopathy/chronic HFrEF   - Continue metoprolol   - Unclear why not on an ARB - start losartan 25mg qdaily   - Cont jardiance   - Appears well compensated    Will continue to follow    Primary Cardiologist: Dr. Garg    History of Present Illness      Ms. Gill Mendez is a 64 year old female with CAD s/p 3v CABG (LIMa-LAD, SVG-OM, SVG-rPDA) 11/24/2024, ischemic cardiomyopathy, HTN, HLD, DM2, who presented to the hospital with chest pain.  The patient notes a few episodes of chest pain L sided with sharp and ache components, radiating to hL shoulder.  She denies any relation to exertion however does note dyspnea on exertion which initially improved after CABG but then worsened.        Review of Systems:  Further review of systems is otherwise negative/noncontributory (based on review of medical record (admission H&P) and 13 point review of systems reviewed. Pertinent positives noted).    Cardiac Diagnostics     ECG: Personally reviewed and interpreted: 1/3/2025  ST anterior infarct  Lateral TW flattening    Telemetry (personally reviewed): NSR    Most recent:  Echocardiogram (results reviewed): 1/4/2025  Interpretation Summary     The left ventricle is normal in size. There is mild concentric left  ventricular hypertrophy.  Left ventricular function is decreased. The ejection fraction  is 40-45%  (mildly reduced). The anterior and anteroseptal walls are hypokinetic.     The right ventricle is normal in size and function.  The left atrium is mildly dilated. The right atrium is mildly dilated.  IVC diameter <2.1 cm collapsing >50% with sniff suggests a normal RA pressure  of 3 mmHg.  Compared to prior study, small pericardial effusion has resolved. LVEF and  wall motion is similar.    Cardiac Cath (results reviewed): 11/23/2024  1.  Severely calcified distal left main stenosis extending to ostium of LAD and proximal segment of left circumflex  2.  99% ostial LAD and heavily calcified segment.  MYNOR grade II-III flow distally  3.  Heavily calcified left circumflex ostium with 50 to 70% narrowing proximal segment with focal 80% stenosis of the vessel as it exits the AV groove into a large marginal branch.  4.  Heavily calcified ostial RCA.  Only mild to moderate angiographic stenosis but recurrent catheter damping upon entering vessel suggesting more severe stenosis due to eccentric calcification.  40 to 50% narrowing mid segment.  PDA and JULEE branches appear normal.  5.  Elevated LVEDP equals 28 to 29 mmHg post A wave.  No LV-AO gradient.        Medical History  Surgical History Family History Social History   Past Medical History:   Diagnosis Date    Anxiety     Asthma     Cellulitis     Diabetes mellitus (H)     Essential hypertension     Created by Conversion  Replacement Utility updated for latest IMO load    Femoral nerve palsy, left 08/23/2023    Gastroparesis     Goiter 05/28/2023 05/2023: Palpated on exam, has had stable imaging with endocrinology, continue to follow endocrinology.      Hyperlipidemia     Hypertension     Other and unspecified hyperlipidemia     Created by Conversion     PONV (postoperative nausea and vomiting)     Shortness of breath     Type II or unspecified type diabetes mellitus without mention of complication, not stated as uncontrolled     Created by Conversion       Past Surgical History:   Procedure Laterality Date    AMPUTATE TOE(S) Right 7/31/2020    Procedure: AMPUTATION, third digit right foot;  Surgeon: Chris Vega DPM;  Location: [facility] [place];  Service: Podiatry    CORONARY ARTERY BYPASS GRAFT, WITH ENDOSCOPIC VESSEL PROCUREMENT N/A 11/24/2024    Procedure: CORONARY ARTERY BYPASS GRAFT TIMES THREE, LEFT INTERNAL MAMMARY ARTERY HARVEST, LEFT LEG ENDOSCOPIC VESSEL PROCUREMENT,;  Surgeon: Zhen Carter MD;  [place] [facility] [place] CORONARY ANGIOGRAM N/A 11/23/2024    Procedure: Coronary Angiogram;  Surgeon: Roque Eisenberg MD;  Location: [facility] CATH LAB CV    CV INTRA AORTIC BALLOON N/A 11/24/2024    Procedure: Intra aortic Balloon Pump Insertion;  Surgeon: Roque Eisenberg MD;  Location: [facility] CATH LAB CV    CV LEFT HEART CATH N/A 11/23/2024    Procedure: Left Heart Catheterization;  Surgeon: Roque Eisenberg MD;  Location: [facility] CATH LAB CV    ENDOMETRIAL BIOPSY      FOOT ARTHROPLASTY Right 09/24/2020    Fourth and fifth toe by Dr. Vega    HC REPAIR OF HAMMERTOE,ONE Left 12/7/2020    Procedure: ARTHROPLASTY, digits two, three, four and five left foot;  Surgeon: Chris Vega DPM;  Location: [facility];  Service: Podiatry    HERNIA REPAIR      HYSTERECTOMY      IR LUMBAR PUNCTURE  7/20/2023    REPAIR TENDON ACHILLES Bilateral     Left was plate  , right was torn    TONSILLECTOMY      TRANSESOPHAGEAL ECHOCARDIOGRAM INTRAOPERATIVE  11/24/2024    Procedure: ECHOCARDIOGRAM, TRANSESOPHAGEAL, INTRA-OPERATIVE;  Surgeon: Zhen Carter MD;  Location: [facility] [place] REMOVAL OF OVARY(S)      Description: Oophorectomy - Bilateral (Removal Of Both Ovaries);  Recorded: 10/09/2008;     Family History   Problem Relation Age of Onset    Diabetes Mother     Hypertension Mother     Acute Myocardial Infarction No family hx of            Social History     Socioeconomic History    Marital status:      Spouse  name: Not on file    Number of children: Not on file    Years of education: Not on file    Highest education level: Not on file   Occupational History    Not on file   Tobacco Use    Smoking status: Never    Smokeless tobacco: Never   Substance and Sexual Activity    Alcohol use: No    Drug use: No    Sexual activity: Not on file   Other Topics Concern    Not on file   Social History Narrative    Not on file     Social Drivers of Health     Financial Resource Strain: Low Risk  (11/23/2024)    Financial Resource Strain     Within the past 12 months, have you or your family members you live with been unable to get utilities (heat, electricity) when it was really needed?: No   Food Insecurity: Low Risk  (11/23/2024)    Food Insecurity     Within the past 12 months, did you worry that your food would run out before you got money to buy more?: No     Within the past 12 months, did the food you bought just not last and you didn t have money to get more?: No   Transportation Needs: Low Risk  (11/23/2024)    Transportation Needs     Within the past 12 months, has lack of transportation kept you from medical appointments, getting your medicines, non-medical meetings or appointments, work, or from getting things that you need?: No   Physical Activity: Not on file   Stress: Not on file (11/6/2024)   Social Connections: Unknown (12/28/2021)    Received from [facility] [employer] [facility], [facility] [employer] [facility]    Social Connections     Frequency of Communication with Friends and Family: Not on file   Interpersonal Safety: Low Risk  (11/25/2024)    Interpersonal Safety     Do you feel physically and emotionally safe where you currently live?: Patient unable to answer     Within the past 12 months, have you been hit, slapped, kicked or otherwise physically hurt by someone?: No     Within the past 12 months, have you been humiliated or emotionally abused in other ways by your partner or  "ex-partner?: No   Recent Concern: Interpersonal Safety - High Risk (11/23/2024)    Interpersonal Safety     Do you feel physically and emotionally safe where you currently live?: No     Within the past 12 months, have you been hit, slapped, kicked or otherwise physically hurt by someone?: No     Within the past 12 months, have you been humiliated or emotionally abused in other ways by your partner or ex-partner?: No   Housing Stability: Low Risk  (11/23/2024)    Housing Stability     Do you have housing? : Yes     Are you worried about losing your housing?: No             Physical Examination   VITALS: /62   Pulse 98   Temp 98.3  F (36.8  C) (Oral)   Resp 25   Ht 1.651 m (5' 5\")   Wt 68.5 kg (151 lb 1.6 oz)   SpO2 95%   BMI 25.14 kg/m    BMI: Body mass index is 25.14 kg/m .  Wt Readings from Last 3 Encounters:   01/03/25 68.5 kg (151 lb 1.6 oz)   12/23/24 68.9 kg (152 lb)   12/16/24 70.3 kg (155 lb)     No intake or output data in the 24 hours ending 01/04/25 1123    General: pleasant female. No acute distress.   HENT: external ears normal. Nares patent. Mucous membranes moist.  Neck: No JVD  Lungs: clear to auscultation  COR:  regular rate and rhythm, No murmurs, rubs, or gallops  Abd: nondistended, BS present  Extrem: No edema         Non-cardiac Imaging Studies Reviewed             Lab Results Reviewed    Chemistry/lipid CBC Cardiac Enzymes/BNP/TSH/INR   Recent Labs   Lab Test 11/23/24  0736   CHOL 148   HDL 65   LDL 70   TRIG 65     Recent Labs   Lab Test 11/23/24  0736 09/20/24  0810 08/21/23  0821   LDL 70 58 78     Recent Labs   Lab Test 01/03/25  1705      POTASSIUM 4.0   CHLORIDE 101   CO2 30*   *   BUN 20.0   CR 0.89   GFRESTIMATED 73   VALENTE 9.8     Recent Labs   Lab Test 01/03/25  1705 12/23/24  1026 12/16/24  1603   CR 0.89 0.86 0.93     Recent Labs   Lab Test 11/23/24  0736 09/20/24  0810 06/14/24  0750   A1C 6.1* 6.3* 6.5*          Recent Labs   Lab Test 01/03/25  1705   WBC " 8.4   HGB 11.3*   HCT 37.3   MCV 80        Recent Labs   Lab Test 01/03/25  1705 12/16/24  1603 12/16/24  1021   HGB 11.3* 11.8 10.8*    Recent Labs   Lab Test 01/01/23  1303 09/25/20  0803 09/25/20  0157   TROPONINI <0.01 0.03 0.02     Recent Labs   Lab Test 01/03/25  1705 12/23/24  1026 12/16/24  1021 12/10/24  1544 11/22/24  1733   NTBNPI 3,043*  --   --   --  6,095*   NTBNP  --  4,303* 5,787* 7,476*  --      Recent Labs   Lab Test 12/16/24  1603   TSH 0.61     Recent Labs   Lab Test 11/25/24  0843 11/24/24  1750 11/24/24  1646   INR 1.05 1.14 1.36*           Current Inpatient Scheduled Medications   Scheduled Meds:  Current Facility-Administered Medications   Medication Dose Route Frequency Provider Last Rate Last Admin    aspirin (ASA) chewable tablet 162 mg  162 mg Oral or NG Tube Daily Robyn Marin MD   162 mg at 01/04/25 0840    atorvastatin (LIPITOR) tablet 80 mg  80 mg Oral Daily Robyn Marin MD   80 mg at 01/04/25 0840    citalopram (celeXA) tablet 20 mg  20 mg Oral QAM Robyn Marin MD   20 mg at 01/04/25 0840    fluticasone (ARNUITY ELLIPTA) 100 MCG/ACT inhaler 1 puff  1 puff Inhalation BID Robyn Marin MD   1 puff at 01/04/25 0841    metoprolol succinate ER (TOPROL XL) 24 hr tablet 25 mg  25 mg Oral BID Robyn Marin MD   25 mg at 01/04/25 0840    nitroGLYcerin (NITRO-BID) 2 % ointment 7.5 mg  0.5 inch Transdermal Once Robyn Marin MD   7.5 mg at 01/04/25 0559    pramipexole (MIRAPEX) tablet 0.125 mg  0.125 mg Oral Daily Robyn Marin MD        pramipexole (MIRAPEX) tablet 0.5 mg  0.5 mg Oral At Bedtime Robyn Marin MD   0.5 mg at 01/04/25 0336     Continuous Infusions:  Current Facility-Administered Medications   Medication Dose Route Frequency Provider Last Rate Last Admin       Current Outpatient Medications   Medication Sig Dispense Refill    acetaminophen (TYLENOL) 500 MG tablet Take 500-1,000 mg by mouth every 6 hours as needed for mild pain.      albuterol (PROVENTIL  HFA;VENTOLIN HFA) 90 mcg/actuation inhaler Inhale 2 puffs into the lungs every 6 hours as needed      ARNUITY ELLIPTA 100 MCG/ACT inhaler Inhale 1 puff into the lungs 2 times daily.      aspirin (ASA) 81 MG chewable tablet Take 2 tablets (162 mg) by mouth or NG Tube daily. 180 tablet 3    atorvastatin (LIPITOR) 80 MG tablet Take 80 mg by mouth daily.      citalopram (CELEXA) 20 MG tablet [CITALOPRAM (CELEXA) 20 MG TABLET] Take 20 mg by mouth every morning.   0    empagliflozin (JARDIANCE) 25 MG TABS tablet Take 1 tablet (25 mg) by mouth daily. 90 tablet 1    furosemide (LASIX) 20 MG tablet Take 1 tablet (20 mg) by mouth daily. 90 tablet 3    hydrOXYzine HCl (ATARAX) 10 MG tablet Take 1 tablet (10 mg) by mouth or Feeding Tube every 8 hours as needed for anxiety. 30 tablet 0    metFORMIN (GLUCOPHAGE XR) 500 MG 24 hr tablet Take 2 tablets (1,000 mg) by mouth daily (with breakfast). Changed on 6/21/24, new prescription not yet sent 180 tablet 1    metoprolol succinate ER (TOPROL XL) 25 MG 24 hr tablet Take 1 tablet (25 mg) by mouth 2 times daily. 90 tablet 3    ondansetron (ZOFRAN ODT) 4 MG ODT tab Take 1 tablet (4 mg) by mouth every 8 hours as needed for nausea. 10 tablet 0    oxyCODONE (ROXICODONE) 5 MG tablet Take 0.5-1 tablets (2.5-5 mg) by mouth every 4 hours as needed for moderate to severe pain. 10 tablet 0    polyethylene glycol (MIRALAX) 17 g packet Take 1 packet by mouth daily as needed for constipation.      pramipexole (MIRAPEX) 0.125 MG tablet Take 0.125 mg by mouth daily. At 3pm  3    pramipexole (MIRAPEX) 0.5 MG tablet Take 0.5 mg by mouth at bedtime.      tirzepatide (MOUNJARO) 15 MG/0.5ML pen Inject 15 mg subcutaneously every 7 days. 6 mL 1          Medications Prior to Admission   Prior to Admission medications    Medication Sig Start Date End Date Taking? Authorizing Provider   acetaminophen (TYLENOL) 500 MG tablet Take 500-1,000 mg by mouth every 6 hours as needed for mild pain.   Yes Reported,  Patient   albuterol (PROVENTIL HFA;VENTOLIN HFA) 90 mcg/actuation inhaler Inhale 2 puffs into the lungs every 6 hours as needed 8/17/15  Yes Provider, Historical   ARNUITY ELLIPTA 100 MCG/ACT inhaler Inhale 1 puff into the lungs 2 times daily. 8/30/24  Yes Reported, Patient   aspirin (ASA) 81 MG chewable tablet Take 2 tablets (162 mg) by mouth or NG Tube daily. 12/2/24  Yes Beena Mitchell PA-C   atorvastatin (LIPITOR) 80 MG tablet Take 80 mg by mouth daily.   Yes Reported, Patient   citalopram (CELEXA) 20 MG tablet [CITALOPRAM (CELEXA) 20 MG TABLET] Take 20 mg by mouth every morning.  4/25/18  Yes Provider, Historical   empagliflozin (JARDIANCE) 25 MG TABS tablet Take 1 tablet (25 mg) by mouth daily. 9/27/24  Yes Levy Lake MD   furosemide (LASIX) 20 MG tablet Take 1 tablet (20 mg) by mouth daily. 12/30/24  Yes Maira Parra CNP   hydrOXYzine HCl (ATARAX) 10 MG tablet Take 1 tablet (10 mg) by mouth or Feeding Tube every 8 hours as needed for anxiety. 12/2/24  Yes Beena Mitchell PA-C   metFORMIN (GLUCOPHAGE XR) 500 MG 24 hr tablet Take 2 tablets (1,000 mg) by mouth daily (with breakfast). Changed on 6/21/24, new prescription not yet sent 9/27/24  Yes Levy Lake MD   metoprolol succinate ER (TOPROL XL) 25 MG 24 hr tablet Take 1 tablet (25 mg) by mouth 2 times daily. 12/23/24  Yes Maira Parra CNP   ondansetron (ZOFRAN ODT) 4 MG ODT tab Take 1 tablet (4 mg) by mouth every 8 hours as needed for nausea. 12/16/24  Yes Beena Mitchell PA-C   oxyCODONE (ROXICODONE) 5 MG tablet Take 0.5-1 tablets (2.5-5 mg) by mouth every 4 hours as needed for moderate to severe pain. 12/6/24  Yes Beena Mitchell PA-C   polyethylene glycol (MIRALAX) 17 g packet Take 1 packet by mouth daily as needed for constipation.   Yes Reported, Patient   pramipexole (MIRAPEX) 0.125 MG tablet Take 0.125 mg by mouth daily. At 3pm 6/18/18  Yes Provider, Historical   pramipexole (MIRAPEX) 0.5 MG  "tablet Take 0.5 mg by mouth at bedtime.   Yes Unknown, Entered By History   tirzepatide (MOUNJARO) 15 MG/0.5ML pen Inject 15 mg subcutaneously every 7 days. 9/27/24  Yes Levy Lake MD Timothy Shih, [facility]  Non-invasive Cardiologist  [facility]      Clinically Significant Risk Factors Present on Admission                 # Drug Induced Platelet Defect: home medication list includes an antiplatelet medication   # Hypertension: Noted on problem list  # Heart failure, NOS: heart failure noted on the problem list and last echo with EF 40-50%          # Overweight: Estimated body mass index is 25.14 kg/m  as calculated from the following:    Height as of this encounter: 1.651 m (5' 5\").    Weight as of this encounter: 68.5 kg (151 lb 1.6 oz).       # Financial/Environmental Concerns:    # Asthma: noted on problem list  # History of CABG: noted on surgical history          "

## 2025-01-04 NOTE — PROGRESS NOTES
Pt is A0x4, endorses chest pain that radiates to left arm, SOB w/activity. SBA to bathroom. NSR on tele. .Nitroglycerin ointment applied to left chest. Calls appropriately

## 2025-01-04 NOTE — PROGRESS NOTES
[facility]    Medicine Progress Note - Hospitalist Service    Date of Admission:  1/3/2025    Assessment & Plan                Gill Mendez is a 64 year old female with history of HTN, hyperlipidemia, DM, asthma, CHF, CAD with recent CABG x3 six weeks ago; has had ongoing chest pain postoperatively with moderate pericardial effusion being monitored, presents for evaluation of worsening chest pain now improved status post Nitropaste application. Hospital Day: 2    #Chest pain  -responsive to ntg. Can reapply paste if pain recurs after current dose expires  -concerning in light of recent CABG  -No EKG changes  -Troponin stable in mid 20 range  -Echocardiogram unchanged  -Cardiology planning stress test Monday  -Continue home aspirin, statin, beta-blocker  -has not done cardiac rehab per patient, should start if cleared by cardiology    # Ischemic cardiomyopathy  -LVEF 40 to 45%, unchanged from prior echocardiogram.  Previous small pericardial effusion has resolved  -Cardiology titrating medications.  Starting on ARB  -Continue home , Jardiance and metoprolol  -home Lasix currently on hold  -Not felt to have acute CHF    #Diabetes  -Recent A1c 6.1  -on Mounjaro, Jardiance and metformin.    -Resume home Jardiance.  Holding metformin and Mounjaro.    -Accu-Cheks and sliding scale    # Asthma, home inhalers  # Hyperlipidemia, home Lipitor  # Mood, home Celexa, Atarax  # Hypertension, home metoprolol.  Starting on losartan as above  # RLS, home Mirapex          Diet: Combination Diet Low Saturated Fat Na <2400mg Diet    DVT Prophylaxis: Moderate risk.   Pneumatic Compression Devices  Mcdaniels Catheter: Not present  Lines: None     Cardiac Monitoring: ACTIVE order. Indication: Chest pain/ ACS rule out (24 hours)  Code Status: Full Code    Clinically Significant Risk Factors Present on Admission                 # Drug Induced Platelet Defect: home medication list includes an antiplatelet medication   #  "Hypertension: Noted on problem list  # Heart failure, NOS: heart failure noted on the problem list and last echo with EF 40-50%          # Overweight: Estimated body mass index is 25.14 kg/m  as calculated from the following:    Height as of this encounter: 1.651 m (5' 5\").    Weight as of this encounter: 68.5 kg (151 lb 1.6 oz).         # Financial/Environmental Concerns:    # Asthma: noted on problem list  # History of CABG: noted on surgical history       Disposition Plan     Medically Ready for Discharge: Anticipated in 2-4 Days         Discharge barrier(s): chest pain, stress test  Care discussed with: patient, dtr      Nicolette Chaves MD  Hospitalist Service  [facility]  Securely message with [employer] (more info)  Text page via [employer] Paging/Directory   ______________________________________________________________________      Physical Exam   Vital Signs: Temp: 98.3  F (36.8  C) Temp src: Oral BP: 115/62 Pulse: 98   Resp: 25 SpO2: 95 % O2 Device: None (Room air)    Weight: 151 lbs 1.6 oz    General: in no apparent distress, non-toxic, and alert female lying in hospital bed oriented x3  HEENT: Head normocephalic atraumatic, oral mucosa moist. Sclerae anicteric  CV: Regular rhythm, normal rate, no murmurs  Resp: No wheezes, no rales or rhonchi, no focal consolidations  GI: Belly soft, nondistended, nontender, bowel sounds present  Skin: No rashes or lesions  Extremities: Trace ankle edema bilaterally  Psych: Normal affect, mood euthymic  Neuro: Grossly normal      Medical Decision Making               Data   Recent Results (from the past 16 hours)   Troponin T, High Sensitivity    Collection Time: 01/04/25  3:26 AM   Result Value Ref Range    Troponin T, High Sensitivity 28 (H) <=14 ng/L   Troponin T, High Sensitivity    Collection Time: 01/04/25  7:18 AM   Result Value Ref Range    Troponin T, High Sensitivity 25 (H) <=14 ng/L   Extra Purple Top Tube    Collection Time: 01/04/25  7:18 AM "   Result Value Ref Range    Hold Specimen [facility]    Echocardiogram Complete    Collection Time: 01/04/25  9:35 AM   Result Value Ref Range    LVEF  40-45% (mildly reduced)    Troponin T, High Sensitivity    Collection Time: 01/04/25 10:15 AM   Result Value Ref Range    Troponin T, High Sensitivity 26 (H) <=14 ng/L       Interval History     Patient reports feeling quite well currently.  Pain resolved when Nitropaste was applied.  She reports a little bit of shortness of breath in the last few days, had improved following surgery but now back.  Denies any other complaints.  Has been eating and drinking fairly well, appetite is not great but this is chronic.  Denies nausea or vomiting.  Voiding and stooling well.  Echocardiogram currently being done.  Await cardiology input.  Of note, patient notes she had not started cardiac rehab yet due to multiple ER visits since her surgery.

## 2025-01-04 NOTE — MEDICATION SCRIBE - ADMISSION MEDICATION HISTORY
Medication Scribe Admission Medication History    Admission medication history is complete. The information provided in this note is only as accurate as the sources available at the time of the update.    Information Source(s): Patient via in-person    Pertinent Information:    Changes made to PTA medication list:  Added: None  Deleted: Cephalexin, Magnesium (per patient)  Changed: Tylenol from 325 mg to 500 mg, Arnuity Ellipta from 1 every day to 1 bid, Miralax, from 1 every day to 1 prn (per patient)    Allergies reviewed with patient and updates made in EHR: yes    Medication History Completed By: Nancy Ko 1/3/2025 11:57 PM    PTA Med List   Medication Sig Last Dose/Taking    acetaminophen (TYLENOL) 500 MG tablet Take 500-1,000 mg by mouth every 6 hours as needed for mild pain. Taking As Needed    albuterol (PROVENTIL HFA;VENTOLIN HFA) 90 mcg/actuation inhaler Inhale 2 puffs into the lungs every 6 hours as needed Taking As Needed    ARNUITY ELLIPTA 100 MCG/ACT inhaler Inhale 1 puff into the lungs 2 times daily. 1/3/2025 Morning    aspirin (ASA) 81 MG chewable tablet Take 2 tablets (162 mg) by mouth or NG Tube daily. 1/3/2025 Morning    atorvastatin (LIPITOR) 80 MG tablet Take 80 mg by mouth daily. 1/3/2025 Morning    citalopram (CELEXA) 20 MG tablet [CITALOPRAM (CELEXA) 20 MG TABLET] Take 20 mg by mouth every morning.  1/3/2025 Morning    empagliflozin (JARDIANCE) 25 MG TABS tablet Take 1 tablet (25 mg) by mouth daily. 1/3/2025 Morning    furosemide (LASIX) 20 MG tablet Take 1 tablet (20 mg) by mouth daily. 1/3/2025 Morning    hydrOXYzine HCl (ATARAX) 10 MG tablet Take 1 tablet (10 mg) by mouth or Feeding Tube every 8 hours as needed for anxiety. Taking As Needed    metFORMIN (GLUCOPHAGE XR) 500 MG 24 hr tablet Take 2 tablets (1,000 mg) by mouth daily (with breakfast). Changed on 6/21/24, new prescription not yet sent 1/3/2025 Morning    metoprolol succinate ER (TOPROL XL) 25 MG 24 hr tablet Take 1  tablet (25 mg) by mouth 2 times daily. 1/3/2025 Morning    ondansetron (ZOFRAN ODT) 4 MG ODT tab Take 1 tablet (4 mg) by mouth every 8 hours as needed for nausea. Taking As Needed    oxyCODONE (ROXICODONE) 5 MG tablet Take 0.5-1 tablets (2.5-5 mg) by mouth every 4 hours as needed for moderate to severe pain. Taking As Needed    polyethylene glycol (MIRALAX) 17 g packet Take 1 packet by mouth daily as needed for constipation. Taking As Needed    pramipexole (MIRAPEX) 0.125 MG tablet Take 0.125 mg by mouth daily. At 3pm 1/3/2025 Evening    pramipexole (MIRAPEX) 0.5 MG tablet Take 0.5 mg by mouth at bedtime. 1/2/2025 Bedtime    tirzepatide (MOUNJARO) 15 MG/0.5ML pen Inject 15 mg subcutaneously every 7 days. 12/30/2024 Morning

## 2025-01-04 NOTE — H&P
[facility]    History and Physical - Hospitalist Service       Date of Admission:  1/3/2025    Assessment & Plan   Gill Mendez is a 64-year-old female with a significant medical history of a triple coronary artery bypass graft (CABG) performed six weeks ago who presents to the er with chest pain.     Patient Active Problem List   Diagnosis    Enthesopathy of hip region    Essential hypertension    Gastroparesis    Asthma    Cellulitis and abscess of foot    Cellulitis    Dyspnea on exertion    Cellulitis of fifth toe of left foot    Herpetic gingivostomatitis    Insomnia, unspecified    Hammer toe of left foot    Diabetic ulcer of toe of right foot associated with type 2 diabetes mellitus, with necrosis of muscle (H)    History of osteomyelitis    Chest pain    Toe infection    Type 2 diabetes mellitus with skin complication, without long-term current use of insulin (H)    Cellulitis of toe of right foot    Diabetic ulcer of left midfoot associated with diabetes mellitus due to underlying condition, limited to breakdown of skin (H)    Anhidrosis    Hypoxia    Exacerbation of intermittent asthma, unspecified asthma severity    Pneumonia due to 2019 novel coronavirus    Acute pulmonary edema (H)    Restless leg syndrome    Type II or unspecified type diabetes mellitus with neurological manifestations, uncontrolled(250.62) (H)    Hyperlipidemia    Type 2 diabetes mellitus with skin complication, with long-term current use of insulin (H)    Displaced fracture of greater trochanter of left femur, initial encounter for closed fracture (H)    Status post revision of total hip    Tendinopathy of left gluteus medius    Vitamin D deficiency    Wheezing    Coronary artery disease due to lipid rich plaque    S/P CABG (coronary artery bypass graft)    CHF (congestive heart failure) (H)    Chest pain, unspecified type      Chest Pain s/p recent CABG    1. Admission and Monitoring:    Admit the patient  "for overnight observation on a cardiac telemetry unit to monitor heart rate and rhythm continuously, given the history of coronary artery disease (CAD) post-CABG.  Conduct serial troponin measurements to assess for myocardial injury.  Schedule an echocardiogram in the morning to reassess ejection fraction (EF) and evaluate for any pericardial effusion.  Consult cardiology to discuss findings and determine further management based on echocardiogram results.  Continue cardiac meds-aspirin, statin, metoprolol.    2.  History of CHF  Echocardiogram done 12/27/24 with 40 to 45%.  Hold Lasix, continue aspirin, metoprolol   daily ins and outs monitoring      3.  Mild intermittent asthma  Optimize lung cares with breathing treatments.  Resume albuterol and Ellipta    4.  Type II DM   Accu-Cheks, sliding scale insulin, blood sugar goal 100-1 40.  Hold metformin, Mounjaro and Jardiance.    5.  Hypertension-continue metoprolol.  Hold Lasix.    6 anxiety disorder.-Hydroxyzine as needed, Celexa, emotional support    7.  Hyperlipidemia-resume statin    8.  Chronic pain syndrome-resume oxycodone.    Rest of patient's medical problems management remains unchanged          Diet:  NPO  DVT Prophylaxis: DOAC and Pneumatic Compression Devices  Mcdaniels Catheter: Not present  Lines: None     Cardiac Monitoring: None  Code Status:  full code    Clinically Significant Risk Factors Present on Admission                 # Drug Induced Platelet Defect: home medication list includes an antiplatelet medication   # Hypertension: Noted on problem list  # Heart failure, NOS: heart failure noted on the problem list and last echo with EF 40-50%          # Overweight: Estimated body mass index is 25.14 kg/m  as calculated from the following:    Height as of this encounter: 1.651 m (5' 5\").    Weight as of this encounter: 68.5 kg (151 lb 1.6 oz).       # Financial/Environmental Concerns:    # Asthma: noted on problem list  # History of CABG: noted on " surgical history       Disposition Plan     Medically Ready for Discharge: Anticipated in 2-4 Days           Robyn Marin MD  Hospitalist Service  [facility]  Securely message with [employer] (more info)  Text page via [employer] Paging/Directory     ______________________________________________________________________    Chief Complaint   Chest pain        History of Present Illness   Gill Mendez is a 64-year-old female with a significant medical history of a triple coronary artery bypass graft (CABG) performed six weeks ago who presents to the er with chest pain.     Per history, She presented to the Emergency Department (ED) with complaints of chest pain radiating to her left arm, which began the day prior and worsened by today. The patient also reported experiencing dizziness described as room-spinning in nature, intermittent over the past three days, along with a mild headache. She noted symptoms of acid reflux, reminiscent of her previous heart attack, although this time the pain radiated down her left arm as opposed to the right. The chest pain was described as achy and constant, with occasional sharp pains, exacerbated by exertion. She denied any infectious symptoms but did experience shortness of breath, occasional palpitations, and a decreased appetite. Despite taking Tylenol, her symptoms persisted without significant relief. Her heart rate was elevated upon arrival, and she was only on aspirin, with no other anticoagulants.      Upon arrival at the ED, Ms. Mendez was tachycardic. Initial laboratory workups, including repeat troponin levels, were stable. Given her recent significant surgery and lack of anticoagulation, a D-dimer test was conducted, yielding a result of 1.19. Consequently, a computed tomography angiography (CTA) of the chest was performed, which returned unremarkable results. Her symptoms and recent surgery warranted further evaluation by cardiology.  Patient also  had a CTA head and neck    laboratory tests  done in the ER showed BNP- 3, 043, BMP which was unremarkable except for bicarbonate of 30.  BNP was 3043.  Troponin was 25.  CBC showed hemoglobin of 11.3, white count of 8.4, platelets 343.  D-dimer was 1.19.  A CTA chest done did not show any evidence of PE.  Patient also had a CTA head and neck which did not show any acute changes.      Given her anxiety Ativan was administered prior to the scan. The case was discussed with Dr. Funes from cardiology, who recommended an overnight admission for cardiac telemetry observation and a repeat echocardiogram in the morning to assess her ejection fraction (EF), which had previously been noted as low.    Patient is being admitted to cardiac telemetry for overnight monitoring on telemetry.  She may benefit from repeating echocardiogram to reassess ejection fraction and cardiac function.    Patient complained of pressure-like chest pain radiating to the left arm when seen.  Nitro-Bid was applied to chest.  Cardiology has been consulted to see.  Troponin levels not elevated.  Grossly nonfocal      Past Medical History    Past Medical History:   Diagnosis Date    Anxiety     Asthma     Cellulitis     Diabetes mellitus (H)     Essential hypertension     Created by Conversion  Replacement Utility updated for latest IMO load    Femoral nerve palsy, left 08/23/2023    Gastroparesis     Goiter 05/28/2023 05/2023: Palpated on exam, has had stable imaging with endocrinology, continue to follow endocrinology.      Hyperlipidemia     Hypertension     Other and unspecified hyperlipidemia     Created by Conversion     PONV (postoperative nausea and vomiting)     Shortness of breath     Type II or unspecified type diabetes mellitus without mention of complication, not stated as uncontrolled     Created by Conversion        Past Surgical History   Past Surgical History:   Procedure Laterality Date    AMPUTATE TOE(S) Right 7/31/2020     Procedure: AMPUTATION, third digit right foot;  Surgeon: Chris Vega DPM;  Location: [facility];  Service: Podiatry    CORONARY ARTERY BYPASS GRAFT, WITH ENDOSCOPIC VESSEL PROCUREMENT N/A 11/24/2024    Procedure: CORONARY ARTERY BYPASS GRAFT TIMES THREE, LEFT INTERNAL MAMMARY ARTERY HARVEST, LEFT LEG ENDOSCOPIC VESSEL PROCUREMENT,;  Surgeon: Zhen Carter MD;  Location: [facility] [place]    CV CORONARY ANGIOGRAM N/A 11/23/2024    Procedure: Coronary Angiogram;  Surgeon: Roque Eisenberg MD;  Location: [facility] CATH LAB CV    CV INTRA AORTIC BALLOON N/A 11/24/2024    Procedure: Intra aortic Balloon Pump Insertion;  Surgeon: Roque Eisenberg MD;  Location: [facility] CATH LAB CV    CV LEFT HEART CATH N/A 11/23/2024    Procedure: Left Heart Catheterization;  Surgeon: Roque Eisenberg MD;  Location: [facility] CATH LAB CV    ENDOMETRIAL BIOPSY      FOOT ARTHROPLASTY Right 09/24/2020    Fourth and fifth toe by Dr. Vega    HC REPAIR OF HAMMERTOE,ONE Left 12/7/2020    Procedure: ARTHROPLASTY, digits two, three, four and five left foot;  Surgeon: Chris Vega DPM;  Location: [facility];  Service: Podiatry    HERNIA REPAIR      HYSTERECTOMY      IR LUMBAR PUNCTURE  7/20/2023    REPAIR TENDON ACHILLES Bilateral     Left was plate  , right was torn    TONSILLECTOMY      TRANSESOPHAGEAL ECHOCARDIOGRAM INTRAOPERATIVE  11/24/2024    Procedure: ECHOCARDIOGRAM, TRANSESOPHAGEAL, INTRA-OPERATIVE;  Surgeon: Zhen Carter MD;  Location: [facility] REMOVAL OF OVARY(S)      Description: Oophorectomy - Bilateral (Removal Of Both Ovaries);  Recorded: 10/09/2008;       Prior to Admission Medications   Prior to Admission Medications   Prescriptions Last Dose Informant Patient Reported? Taking?   ARNUITY ELLIPTA 100 MCG/ACT inhaler   Yes No   Sig: Inhale 1 puff into the lungs daily.   acetaminophen (TYLENOL) 325 MG tablet   No No   Sig: Take 1-2 tablets (325-650 mg) by mouth every 4  hours as needed for mild pain.   albuterol (PROVENTIL HFA;VENTOLIN HFA) 90 mcg/actuation inhaler   Yes No   Sig: Inhale 2 puffs into the lungs every 6 hours as needed   aspirin (ASA) 81 MG chewable tablet   No No   Sig: Take 2 tablets (162 mg) by mouth or NG Tube daily.   atorvastatin (LIPITOR) 80 MG tablet   No No   Sig: Take 1 tablet (80 mg) by mouth or Feeding Tube every evening.   cephALEXin (KEFLEX) 500 MG capsule   No No   Sig: Take 1 capsule (500 mg) by mouth 4 times daily.   citalopram (CELEXA) 20 MG tablet   Yes No   Sig: [CITALOPRAM (CELEXA) 20 MG TABLET] Take 20 mg by mouth every morning.    empagliflozin (JARDIANCE) 25 MG TABS tablet   No No   Sig: Take 1 tablet (25 mg) by mouth daily.   furosemide (LASIX) 20 MG tablet   No No   Sig: Take 1 tablet (20 mg) by mouth daily.   hydrOXYzine HCl (ATARAX) 10 MG tablet   No No   Sig: Take 1 tablet (10 mg) by mouth or Feeding Tube every 8 hours as needed for anxiety.   magnesium 250 MG tablet   Yes No   Sig: Take 2 tablets by mouth daily.   metFORMIN (GLUCOPHAGE XR) 500 MG 24 hr tablet   No No   Sig: Take 2 tablets (1,000 mg) by mouth daily (with breakfast). Changed on 6/21/24, new prescription not yet sent   metoprolol succinate ER (TOPROL XL) 25 MG 24 hr tablet   No No   Sig: Take 1 tablet (25 mg) by mouth 2 times daily.   ondansetron (ZOFRAN ODT) 4 MG ODT tab   No No   Sig: Take 1 tablet (4 mg) by mouth every 8 hours as needed for nausea.   oxyCODONE (ROXICODONE) 5 MG tablet   No No   Sig: Take 0.5-1 tablets (2.5-5 mg) by mouth every 4 hours as needed for moderate to severe pain.   polyethylene glycol (MIRALAX) 17 GM/Dose powder   No No   Sig: Take 17 g by mouth daily.   pramipexole (MIRAPEX) 0.125 MG tablet   Yes No   Sig: Take 0.125 mg by mouth daily. At 3pm   pramipexole (MIRAPEX) 0.5 MG tablet   Yes No   Sig: Take 0.5 mg by mouth at bedtime.   tirzepatide (MOUNJARO) 15 MG/0.5ML pen   No No   Sig: Inject 15 mg subcutaneously every 7 days.       Facility-Administered Medications: None        Review of Systems    The 10 point Review of Systems is negative other than noted in the HPI or here.     Social History   I have reviewed this patient's social history and updated it with pertinent information if needed.  Social History     Tobacco Use    Smoking status: Never    Smokeless tobacco: Never   Substance Use Topics    Alcohol use: No    Drug use: No         Family History   I have reviewed this patient's family history and updated it with pertinent information if needed.  Family History   Problem Relation Age of Onset    Diabetes Mother     Hypertension Mother     Acute Myocardial Infarction No family hx of          Allergies   Allergies   Allergen Reactions    Codeine Unknown     Patient states possibly caused N/V but can't remember for sure    Semaglutide Nausea and Vomiting    Acetaminophen-Codeine Rash        Physical Exam   Vital Signs: Temp: 98.5  F (36.9  C) Temp src: Oral BP: 114/63 Pulse: 108   Resp: 24 SpO2: 98 % O2 Device: None (Room air)    Weight: 151 lbs 1.6 oz      General Aox3, appropriate affect, NAD, on RA  HEENT  MMM, EOMI, PERRL  Chest Adeq E b/l, No wheezing  Heart RRR, No M/R/G  Abd- Soft, NT, BS+  - Deferred,   Extremity- Moving all extremities, No digital clubbing,   No edema  Neuro- Aox3, grossly non focal, gait not checked  Skin  Has no tattoo, No skin rash     Medical Decision Making       85 MINUTES SPENT BY ME on the date of service doing chart review, history, exam, documentation & further activities per the note.      ------------------ MEDICAL DECISION MAKING ------------------------------------------------------------------------------------------------------  MANAGEMENT DISCUSSED with the following over the past 24 hours: patient and care team       Data   ------------------------- PAST 24 HR DATA REVIEWED -----------------------------------------------    I have personally reviewed the following data over the past 24  hrs:    8.4  \   11.3 (L)   / 343     142 101 20.0 /  147 (H)   4.0 30 (H) 0.89 \     Trop: 24 (H) BNP: 3,043 (H)     INR:  N/A PTT:  N/A   D-dimer:  1.19 (H) Fibrinogen:  N/A       Imaging results reviewed over the past 24 hrs:   Recent Results (from the past 24 hours)   Chest XR,  PA & LAT    Narrative    EXAM: XR CHEST 2 VIEWS  LOCATION: [facility]  DATE: 1/3/2025    INDICATION: chest pain  COMPARISON: AP and lateral views of the chest 12/16/2024      Impression    IMPRESSION:     Previous median sternotomy and coronary revascularization. The cardiac silhouette is at the upper limit of normal in size, unchanged. The vascular pedicle width is normal. Lung vascularity is normal.    Foci of linear atelectasis are present inferior to the left hilum. Opacity in the basal left chest present 12/16/2024 has cleared. No new airspace opacities no interlobular septal thickening or peribronchial thickening around the bhavik. No pleural   effusions are present.    There is diffuse thoracic spine disc space narrowing and minimal osteophytes of the thoracic and upper lumbar spine.   CTA Head Neck with Contrast    Narrative    EXAM: CTA HEAD NECK W CONTRAST  LOCATION: [facility]  DATE: 1/3/2025    INDICATION: Vertigo  COMPARISON: None.  CONTRAST: isovue 370 75ml  TECHNIQUE: Head and neck CT angiogram with IV contrast. Noncontrast head CT followed by axial helical CT images of the head and neck vessels obtained during the arterial phase of intravenous contrast administration. Axial 2D reconstructed images and   multiplanar 3D MIP reconstructed images of the head and neck vessels were performed by the technologist. Dose reduction techniques were used. All stenosis measurements made according to NASCET criteria unless otherwise specified.    FINDINGS:   NONCONTRAST HEAD CT:   INTRACRANIAL CONTENTS: No intracranial hemorrhage, extraaxial collection, or mass effect.  No CT evidence of  acute infarct. Normal parenchymal attenuation. Normal ventricles and sulci.     VISUALIZED ORBITS/SINUSES/MASTOIDS: No intraorbital abnormality. Mild mucosal thickening scattered about the paranasal sinuses. No middle ear or mastoid effusion.    BONES/SOFT TISSUES: No acute abnormality.    HEAD CTA:  ANTERIOR CIRCULATION: No stenosis/occlusion, aneurysm, or high flow vascular malformation. Standard [place] anatomy.    POSTERIOR CIRCULATION: No stenosis/occlusion, aneurysm, or high flow vascular malformation. Balanced vertebral arteries supply a normal basilar artery.     DURAL VENOUS SINUSES: Expected enhancement of the major dural venous sinuses.    NECK CTA:  RIGHT CAROTID: No measurable stenosis or dissection.    LEFT CAROTID: No measurable stenosis or dissection.    VERTEBRAL ARTERIES: No focal stenosis or dissection. Dominant left and smaller right vertebral arteries.    AORTIC ARCH: Classic aortic arch anatomy with no significant stenosis at the origin of the great vessels.    NONVASCULAR STRUCTURES: Unremarkable.      Impression    IMPRESSION:   HEAD CT:  No acute intracranial process.    HEAD CTA:  No stenosis/occlusion, aneurysm, or high flow vascular malformation.    NECK CTA:  No measurable stenosis or dissection.     CT Chest Pulmonary Embolism w Contrast    Narrative    EXAM: CT CHEST PULMONARY EMBOLISM W CONTRAST  LOCATION: [facility]  DATE: 1/3/2025    INDICATION: chest pain, + ddimer  COMPARISON: None.  TECHNIQUE: CT chest pulmonary angiogram during arterial phase injection of IV contrast. Multiplanar reformats and MIP reconstructions were performed. Dose reduction techniques were used.   CONTRAST: isovue 370 89ml    FINDINGS:  ANGIOGRAM CHEST: No pulmonary embolism. Pulmonary arteries normal in caliber. Thoracic aorta normal in caliber. No aortic dissection.    HEART: Prior CABG procedure. There are 3 opacified bypass grafts. Left ventricle appears mildly dilated.  Trace pericardial effusion. Severe native coronary artery calcification.    MEDIASTINUM: No adenopathy or mass. Trace retrosternal fluid compatible with recent CABG. Mild enlargement of the right thyroid lobe, with a few cystic lesions, largest measuring 1.7 cm.    LUNGS AND PLEURA: No pulmonary mass or consolidation. Mild subsegmental atelectasis in the left lower lobe. No pleural effusion or pneumothorax.    LIMITED UPPER ABDOMEN: Negative.    MUSCULOSKELETAL: Healing sternotomy without evidence for dehiscence. Severe multilevel degenerative change in the thoracic spine.      Impression    IMPRESSION:  1.  No evidence for pulmonary embolism.

## 2025-01-04 NOTE — ED NOTES
Pt reports about 3 days of dizziness. Started having left-sided chest pain that radiates to her arm last night. Hx of triple bypass 6 weeks ago. Plan for CTA but pt is requesting ativan prior to scan d/t anxiety. Provider updated.

## 2025-01-05 LAB
ANION GAP SERPL CALCULATED.3IONS-SCNC: 7 MMOL/L (ref 7–15)
ATRIAL RATE - MUSE: 87 BPM
BUN SERPL-MCNC: 15.8 MG/DL (ref 8–23)
CALCIUM SERPL-MCNC: 9.5 MG/DL (ref 8.8–10.4)
CHLORIDE SERPL-SCNC: 101 MMOL/L (ref 98–107)
CREAT SERPL-MCNC: 0.85 MG/DL (ref 0.51–0.95)
DIASTOLIC BLOOD PRESSURE - MUSE: NORMAL MMHG
EGFRCR SERPLBLD CKD-EPI 2021: 77 ML/MIN/1.73M2
GLUCOSE BLDC GLUCOMTR-MCNC: 122 MG/DL (ref 70–99)
GLUCOSE BLDC GLUCOMTR-MCNC: 158 MG/DL (ref 70–99)
GLUCOSE BLDC GLUCOMTR-MCNC: 169 MG/DL (ref 70–99)
GLUCOSE BLDC GLUCOMTR-MCNC: 90 MG/DL (ref 70–99)
GLUCOSE SERPL-MCNC: 152 MG/DL (ref 70–99)
HCO3 SERPL-SCNC: 32 MMOL/L (ref 22–29)
INTERPRETATION ECG - MUSE: NORMAL
P AXIS - MUSE: 57 DEGREES
POTASSIUM SERPL-SCNC: 4.4 MMOL/L (ref 3.4–5.3)
PR INTERVAL - MUSE: 184 MS
QRS DURATION - MUSE: 82 MS
QT - MUSE: 404 MS
QTC - MUSE: 486 MS
R AXIS - MUSE: -4 DEGREES
SODIUM SERPL-SCNC: 140 MMOL/L (ref 135–145)
SYSTOLIC BLOOD PRESSURE - MUSE: NORMAL MMHG
T AXIS - MUSE: 134 DEGREES
TROPONIN T SERPL HS-MCNC: 25 NG/L
VENTRICULAR RATE- MUSE: 87 BPM

## 2025-01-05 PROCEDURE — 82310 ASSAY OF CALCIUM: CPT | Performed by: HOSPITALIST

## 2025-01-05 PROCEDURE — 250N000013 HC RX MED GY IP 250 OP 250 PS 637: Performed by: INTERNAL MEDICINE

## 2025-01-05 PROCEDURE — 80048 BASIC METABOLIC PNL TOTAL CA: CPT | Performed by: HOSPITALIST

## 2025-01-05 PROCEDURE — 250N000013 HC RX MED GY IP 250 OP 250 PS 637: Performed by: HOSPITALIST

## 2025-01-05 PROCEDURE — 250N000013 HC RX MED GY IP 250 OP 250 PS 637: Performed by: STUDENT IN AN ORGANIZED HEALTH CARE EDUCATION/TRAINING PROGRAM

## 2025-01-05 PROCEDURE — 120N000004 HC R&B MS OVERFLOW

## 2025-01-05 PROCEDURE — 99024 POSTOP FOLLOW-UP VISIT: CPT | Performed by: STUDENT IN AN ORGANIZED HEALTH CARE EDUCATION/TRAINING PROGRAM

## 2025-01-05 PROCEDURE — 99232 SBSQ HOSP IP/OBS MODERATE 35: CPT | Performed by: HOSPITALIST

## 2025-01-05 PROCEDURE — 36415 COLL VENOUS BLD VENIPUNCTURE: CPT | Performed by: HOSPITALIST

## 2025-01-05 RX ORDER — HYDROMORPHONE HYDROCHLORIDE 1 MG/ML
0.5 INJECTION, SOLUTION INTRAMUSCULAR; INTRAVENOUS; SUBCUTANEOUS
Status: DISCONTINUED | OUTPATIENT
Start: 2025-01-05 | End: 2025-01-06 | Stop reason: HOSPADM

## 2025-01-05 RX ORDER — ACETAMINOPHEN 325 MG/1
650 TABLET ORAL EVERY 4 HOURS PRN
Status: DISCONTINUED | OUTPATIENT
Start: 2025-01-05 | End: 2025-01-06

## 2025-01-05 RX ORDER — HYDROMORPHONE HYDROCHLORIDE 1 MG/ML
0.3 INJECTION, SOLUTION INTRAMUSCULAR; INTRAVENOUS; SUBCUTANEOUS
Status: DISCONTINUED | OUTPATIENT
Start: 2025-01-05 | End: 2025-01-06 | Stop reason: HOSPADM

## 2025-01-05 RX ORDER — LIDOCAINE 4 G/G
1 PATCH TOPICAL
Status: DISCONTINUED | OUTPATIENT
Start: 2025-01-05 | End: 2025-01-06 | Stop reason: HOSPADM

## 2025-01-05 RX ADMIN — METOPROLOL SUCCINATE 25 MG: 25 TABLET, EXTENDED RELEASE ORAL at 20:00

## 2025-01-05 RX ADMIN — LOSARTAN POTASSIUM 25 MG: 25 TABLET, FILM COATED ORAL at 08:53

## 2025-01-05 RX ADMIN — HYDROXYZINE HYDROCHLORIDE 10 MG: 10 TABLET ORAL at 17:44

## 2025-01-05 RX ADMIN — ACETAMINOPHEN 650 MG: 325 TABLET ORAL at 22:33

## 2025-01-05 RX ADMIN — PRAMIPEXOLE DIHYDROCHLORIDE 0.5 MG: 0.5 TABLET ORAL at 22:33

## 2025-01-05 RX ADMIN — ASPIRIN 81 MG CHEWABLE TABLET 162 MG: 81 TABLET CHEWABLE at 08:53

## 2025-01-05 RX ADMIN — ACETAMINOPHEN 1000 MG: 500 TABLET, FILM COATED ORAL at 08:52

## 2025-01-05 RX ADMIN — ACETAMINOPHEN 650 MG: 325 TABLET ORAL at 18:41

## 2025-01-05 RX ADMIN — LIDOCAINE 1 PATCH: 4 PATCH TOPICAL at 09:59

## 2025-01-05 RX ADMIN — NITROGLYCERIN 7.5 MG: 20 OINTMENT TOPICAL at 20:01

## 2025-01-05 RX ADMIN — FLUTICASONE FUROATE 1 PUFF: 100 POWDER RESPIRATORY (INHALATION) at 20:01

## 2025-01-05 RX ADMIN — ATORVASTATIN CALCIUM 80 MG: 40 TABLET, FILM COATED ORAL at 08:52

## 2025-01-05 RX ADMIN — INSULIN ASPART 1 UNITS: 100 INJECTION, SOLUTION INTRAVENOUS; SUBCUTANEOUS at 17:24

## 2025-01-05 RX ADMIN — OXYCODONE HYDROCHLORIDE 2.5 MG: 5 TABLET ORAL at 14:21

## 2025-01-05 RX ADMIN — PRAMIPEXOLE DIHYDROCHLORIDE 0.12 MG: 0.12 TABLET ORAL at 14:23

## 2025-01-05 RX ADMIN — CITALOPRAM HYDROBROMIDE 20 MG: 20 TABLET ORAL at 08:53

## 2025-01-05 RX ADMIN — METOPROLOL SUCCINATE 25 MG: 25 TABLET, EXTENDED RELEASE ORAL at 08:53

## 2025-01-05 RX ADMIN — EMPAGLIFLOZIN 25 MG: 25 TABLET, FILM COATED ORAL at 08:52

## 2025-01-05 RX ADMIN — OXYCODONE HYDROCHLORIDE 2.5 MG: 5 TABLET ORAL at 18:41

## 2025-01-05 RX ADMIN — ACETAMINOPHEN 1000 MG: 500 TABLET, FILM COATED ORAL at 14:21

## 2025-01-05 RX ADMIN — OXYCODONE HYDROCHLORIDE 2.5 MG: 5 TABLET ORAL at 22:33

## 2025-01-05 RX ADMIN — FLUTICASONE FUROATE 1 PUFF: 100 POWDER RESPIRATORY (INHALATION) at 08:53

## 2025-01-05 ASSESSMENT — ACTIVITIES OF DAILY LIVING (ADL)
ADLS_ACUITY_SCORE: 36
DEPENDENT_IADLS:: INDEPENDENT
ADLS_ACUITY_SCORE: 36

## 2025-01-05 NOTE — PLAN OF CARE
Goal Outcome Evaluation:      Plan of Care Reviewed With: patient    Overall Patient Progress: improvingOverall Patient Progress: improving    Outcome Evaluation: Pt continues with chest wall pain.  Tylenol helped a little.  See previous notes on pain.  Pt on room air.  Pt awaiting stress test tomorrow.

## 2025-01-05 NOTE — CONSULTS
Care Management Initial Consult    General Information  Assessment completed with: Patient,    Type of CM/SW Visit: Initial Assessment    Primary Care Provider verified and updated as needed: Yes   Readmission within the last 30 days: no previous admission in last 30 days      Reason for Consult: discharge planning  Advance Care Planning:            Communication Assessment  Patient's communication style: spoken language (English or Bilingual)    Hearing Difficulty or Deaf: no   Wear Glasses or Blind: yes    Cognitive  Cognitive/Neuro/Behavioral: WDL                      Living Environment:   People in home: spouse     Current living Arrangements: house      Able to return to prior arrangements: yes       Family/Social Support:  Care provided by: self, child(mary), spouse/significant other  Provides care for: no one  Marital Status:   Support system:           Description of Support System: Supportive, Involved         Current Resources:   Patient receiving home care services: No        Community Resources: None  Equipment currently used at home: none  Supplies currently used at home: None    Employment/Financial:  Employment Status: disabled        Financial Concerns: none   Referral to Financial Worker: No       Does the patient's insurance plan have a 3 day qualifying hospital stay waiver?  Yes     Which insurance plan 3 day waiver is available? Alternative insurance waiver    Will the waiver be used for post-acute placement? Undetermined at this time    Lifestyle & Psychosocial Needs:  Social Drivers of Health     Food Insecurity: Low Risk  (1/4/2025)    Food Insecurity     Within the past 12 months, did you worry that your food would run out before you got money to buy more?: No     Within the past 12 months, did the food you bought just not last and you didn t have money to get more?: No   Depression: Not at risk (6/21/2024)    PHQ-2     PHQ-2 Score: 0   Housing Stability: Low Risk  (1/4/2025)     Housing Stability     Do you have housing? : Yes     Are you worried about losing your housing?: No   Tobacco Use: Low Risk  (12/23/2024)    Patient History     Smoking Tobacco Use: Never     Smokeless Tobacco Use: Never     Passive Exposure: Not on file   Financial Resource Strain: Low Risk  (1/4/2025)    Financial Resource Strain     Within the past 12 months, have you or your family members you live with been unable to get utilities (heat, electricity) when it was really needed?: No   Alcohol Use: Medium Risk (5/25/2023)    [facility] [employer] [employer]    Alcohol Use     Alcohol Use Status: Yes   Transportation Needs: Low Risk  (1/4/2025)    Transportation Needs     Within the past 12 months, has lack of transportation kept you from medical appointments, getting your medicines, non-medical meetings or appointments, work, or from getting things that you need?: No   Physical Activity: Not on file   Interpersonal Safety: Low Risk  (1/4/2025)    Interpersonal Safety     Do you feel physically and emotionally safe where you currently live?: Yes     Within the past 12 months, have you been hit, slapped, kicked or otherwise physically hurt by someone?: No     Within the past 12 months, have you been humiliated or emotionally abused in other ways by your partner or ex-partner?: No   Recent Concern: Interpersonal Safety - High Risk (11/23/2024)    Interpersonal Safety     Do you feel physically and emotionally safe where you currently live?: No     Within the past 12 months, have you been hit, slapped, kicked or otherwise physically hurt by someone?: No     Within the past 12 months, have you been humiliated or emotionally abused in other ways by your partner or ex-partner?: No   Stress: Not on file (11/6/2024)   Social Connections: Unknown (12/28/2021)    Received from [facility], [facility]    Social Connections     Frequency of  Communication with Friends and Family: Not on file   Health Literacy: Not on file       Functional Status:  Prior to admission patient needed assistance:   Dependent ADLs:: Independent  Dependent IADLs:: Independent       Mental Health Status:  Mental Health Status: No Current Concerns       Chemical Dependency Status:  Chemical Dependency Status: No Current Concerns             Values/Beliefs:  Spiritual, Cultural Beliefs, [employer]ian Practices, Values that affect care: no               Discussed  Partnership in Safe Discharge Planning  document with patient/family: NA    Additional Information:  SW met with patient at bedside, introduced self and CM role. Patient reports living in a house with her . At baseline, patient is independent with I/ADLs. No in home services or assistive devices used with ambulation. Pt plans to return home to prior living environment via spouse transporting.     Next Steps: following care progression to assist as needed with safe discharge planning and follow up on team recommendations.     CHRISTIANE Redman at 9:38 AM on 01/05/25

## 2025-01-05 NOTE — PROGRESS NOTES
[facility]   [address], [place] [address]   [email or web address]   OFFICE: [phone]     CARDIOLOGY INPATIENT PROGRESS NOTE     Impression and Plan     Assessment/Plan:  Ms. Gill Mendez is a 64 year old female with CAD s/p 3v CABG (LIMA-LAD, SVG-OM, SVG-rPDA) 11/24/2024, ischemic cardiomyopathy, HTN, HLD, DM2, who presented to the hospital with chest pain.      Chest pain/CAD   - Concern for cardiac angina possible graft dysfunction, however some characteristics for MSK pain.  Will provide with lidocaine patch   - TTE essentially stable    - Plan on NM vasodilator stress for further evaluation   - LDL 70 continue atorvastatin 80mg     Ischemic cardiomyopathy/chronic HFrEF   - Continue metoprolol   - Unclear why not on an ARB - started losartan 25mg qdaily   - Cont jardiance   - Appears well compensated     Will continue to follow     Primary Cardiologist: Dr. Garg      Subjective     Gill Mendez is having more L sided pain shooting to shoulder.          Review of Systems:  Further review of systems is otherwise negative/noncontributory (based on review of medical record (admission H&P) and 13 point review of systems reviewed. Pertinent positives noted).    Cardiac Diagnostics       ECG: Personally reviewed and interpreted: 1/3/2025  ST anterior infarct  Lateral TW flattening     Telemetry (personally reviewed): NSR     Most recent:  Echocardiogram (results reviewed): 1/4/2025  Interpretation Summary     The left ventricle is normal in size. There is mild concentric left  ventricular hypertrophy.  Left ventricular function is decreased. The ejection fraction is 40-45%  (mildly reduced). The anterior and anteroseptal walls are hypokinetic.     The right ventricle is normal in size and function.  The left atrium is mildly dilated. The right atrium is mildly dilated.  IVC diameter <2.1 cm collapsing >50% with sniff suggests a normal RA pressure  of 3  mmHg.  Compared to prior study, small pericardial effusion has resolved. LVEF and  wall motion is similar.     Cardiac Cath (results reviewed): 11/23/2024  1.  Severely calcified distal left main stenosis extending to ostium of LAD and proximal segment of left circumflex  2.  99% ostial LAD and heavily calcified segment.  MYNOR grade II-III flow distally  3.  Heavily calcified left circumflex ostium with 50 to 70% narrowing proximal segment with focal 80% stenosis of the vessel as it exits the AV groove into a large marginal branch.  4.  Heavily calcified ostial RCA.  Only mild to moderate angiographic stenosis but recurrent catheter damping upon entering vessel suggesting more severe stenosis due to eccentric calcification.  40 to 50% narrowing mid segment.  PDA and JULEE branches appear normal.  5.  Elevated LVEDP equals 28 to 29 mmHg post A wave.  No LV-AO gradient.           Medical History  Surgical History Family History Social History   Past Medical History:   Diagnosis Date    Anxiety     Asthma     Cellulitis     Diabetes mellitus (H)     Essential hypertension     Created by Conversion  Replacement Utility updated for latest IMO load    Femoral nerve palsy, left 08/23/2023    Gastroparesis     Goiter 05/28/2023 05/2023: Palpated on exam, has had stable imaging with endocrinology, continue to follow endocrinology.      Hyperlipidemia     Hypertension     Other and unspecified hyperlipidemia     Created by Conversion     PONV (postoperative nausea and vomiting)     Shortness of breath     Type II or unspecified type diabetes mellitus without mention of complication, not stated as uncontrolled     Created by Conversion      Past Surgical History:   Procedure Laterality Date    AMPUTATE TOE(S) Right 7/31/2020    Procedure: AMPUTATION, third digit right foot;  Surgeon: Chris Vega DPM;  Location: [facility] [place];  Service: Podiatry    CORONARY ARTERY BYPASS GRAFT, WITH ENDOSCOPIC VESSEL PROCUREMENT  N/A 11/24/2024    Procedure: CORONARY ARTERY BYPASS GRAFT TIMES THREE, LEFT INTERNAL MAMMARY ARTERY HARVEST, LEFT LEG ENDOSCOPIC VESSEL PROCUREMENT,;  Surgeon: Zhen Carter MD;  Location: [facility] [place]    CV CORONARY ANGIOGRAM N/A 11/23/2024    Procedure: Coronary Angiogram;  Surgeon: Roque Eisenberg MD;  Location: [facility] CATH LAB CV    CV INTRA AORTIC BALLOON N/A 11/24/2024    Procedure: Intra aortic Balloon Pump Insertion;  Surgeon: Roque Eisenberg MD;  Location: [facility] CATH LAB CV    CV LEFT HEART CATH N/A 11/23/2024    Procedure: Left Heart Catheterization;  Surgeon: Roque Eisenberg MD;  Location: [facility] CV    ENDOMETRIAL BIOPSY      FOOT ARTHROPLASTY Right 09/24/2020    Fourth and fifth toe by Dr. Vega    HC REPAIR OF HAMMERTOE,ONE Left 12/7/2020    Procedure: ARTHROPLASTY, digits two, three, four and five left foot;  Surgeon: Chris Vega DPM;  Location: [facility] [place];  Service: Podiatry    HERNIA REPAIR      HYSTERECTOMY      IR LUMBAR PUNCTURE  7/20/2023    REPAIR TENDON ACHILLES Bilateral     Left was plate  , right was torn    TONSILLECTOMY      TRANSESOPHAGEAL ECHOCARDIOGRAM INTRAOPERATIVE  11/24/2024    Procedure: ECHOCARDIOGRAM, TRANSESOPHAGEAL, INTRA-OPERATIVE;  Surgeon: Zhen Carter MD;  [place] [facility] [place] REMOVAL OF OVARY(S)      Description: Oophorectomy - Bilateral (Removal Of Both Ovaries);  Recorded: 10/09/2008;     Family History   Problem Relation Age of Onset    Diabetes Mother     Hypertension Mother     Acute Myocardial Infarction No family hx of            Social History     Socioeconomic History    Marital status:      Spouse name: Not on file    Number of children: Not on file    Years of education: Not on file    Highest education level: Not on file   Occupational History    Not on file   Tobacco Use    Smoking status: Never    Smokeless tobacco: Never   Substance and Sexual Activity    Alcohol use: No     Drug use: No    Sexual activity: Not on file   Other Topics Concern    Not on file   Social History Narrative    Not on file     Social Drivers of Health     Financial Resource Strain: Low Risk  (1/4/2025)    Financial Resource Strain     Within the past 12 months, have you or your family members you live with been unable to get utilities (heat, electricity) when it was really needed?: No   Food Insecurity: Low Risk  (1/4/2025)    Food Insecurity     Within the past 12 months, did you worry that your food would run out before you got money to buy more?: No     Within the past 12 months, did the food you bought just not last and you didn t have money to get more?: No   Transportation Needs: Low Risk  (1/4/2025)    Transportation Needs     Within the past 12 months, has lack of transportation kept you from medical appointments, getting your medicines, non-medical meetings or appointments, work, or from getting things that you need?: No   Physical Activity: Not on file   Stress: Not on file (11/6/2024)   Social Connections: Unknown (12/28/2021)    [facility], [facility] [employer] [facility]    Social Connections     Frequency of Communication with Friends and Family: Not on file   Interpersonal Safety: Low Risk  (1/4/2025)    Interpersonal Safety     Do you feel physically and emotionally safe where you currently live?: Yes     Within the past 12 months, have you been hit, slapped, kicked or otherwise physically hurt by someone?: No     Within the past 12 months, have you been humiliated or emotionally abused in other ways by your partner or ex-partner?: No   Recent Concern: Interpersonal Safety - High Risk (11/23/2024)    Interpersonal Safety     Do you feel physically and emotionally safe where you currently live?: No     Within the past 12 months, have you been hit, slapped, kicked or otherwise physically hurt by someone?: No     Within the past 12 months,  "have you been humiliated or emotionally abused in other ways by your partner or ex-partner?: No   Housing Stability: Low Risk  (1/4/2025)    Housing Stability     Do you have housing? : Yes     Are you worried about losing your housing?: No             Physical Examination   VITALS: /59 (BP Location: Right arm)   Pulse 86   Temp 99.2  F (37.3  C) (Oral)   Resp 18   Ht 1.651 m (5' 5\")   Wt 67.5 kg (148 lb 14.4 oz)   SpO2 95%   BMI 24.78 kg/m    BMI: Body mass index is 24.78 kg/m .  Wt Readings from Last 3 Encounters:   01/05/25 67.5 kg (148 lb 14.4 oz)   12/23/24 68.9 kg (152 lb)   12/16/24 70.3 kg (155 lb)       Intake/Output Summary (Last 24 hours) at 1/5/2025 1140  Last data filed at 1/5/2025 0900  Gross per 24 hour   Intake 500 ml   Output 1650 ml   Net -1150 ml       General: pleasant female. No acute distress.   HENT: external ears normal. Nares patent. Mucous membranes moist.  Neck: No JVD  Lungs: clear to auscultation  COR:  regular rate and rhythm, No murmurs, rubs, or gallops  Abd: nondistended, BS present  Extrem: No edema          Lab Results Reviewed    Chemistry/lipid CBC Cardiac Enzymes/BNP/TSH/INR   Recent Labs   Lab Test 11/23/24  0736   CHOL 148   HDL 65   LDL 70   TRIG 65     Recent Labs   Lab Test 11/23/24  0736 09/20/24  0810 08/21/23  0821   LDL 70 58 78     Recent Labs   Lab Test 01/05/25  1010      POTASSIUM 4.4   CHLORIDE 101   CO2 32*   *   BUN 15.8   CR 0.85   GFRESTIMATED 77   VALENTE 9.5     Recent Labs   Lab Test 01/05/25  1010 01/03/25  1705 12/23/24  1026   CR 0.85 0.89 0.86     Recent Labs   Lab Test 11/23/24  0736 09/20/24  0810 06/14/24  0750   A1C 6.1* 6.3* 6.5*          Recent Labs   Lab Test 01/03/25  1705   WBC 8.4   HGB 11.3*   HCT 37.3   MCV 80        Recent Labs   Lab Test 01/03/25  1705 12/16/24  1603 12/16/24  1021   HGB 11.3* 11.8 10.8*    Recent Labs   Lab Test 01/01/23  1303 09/25/20  0803 09/25/20  0157   TROPONINI <0.01 0.03 0.02     Recent " Labs   Lab Test 01/03/25  1705 12/23/24  1026 12/16/24  1021 12/10/24  1544 11/22/24  1733   NTBNPI 3,043*  --   --   --  6,095*   NTBNP  --  4,303* 5,787* 7,476*  --      Recent Labs   Lab Test 12/16/24  1603   TSH 0.61     Recent Labs   Lab Test 11/25/24  0843 11/24/24  1750 11/24/24  1646   INR 1.05 1.14 1.36*           Current Inpatient Scheduled Medications   Scheduled Meds:  Current Facility-Administered Medications   Medication Dose Route Frequency Provider Last Rate Last Admin    aspirin (ASA) chewable tablet 162 mg  162 mg Oral or NG Tube Daily Robyn Marin MD   162 mg at 01/05/25 0853    atorvastatin (LIPITOR) tablet 80 mg  80 mg Oral Daily Robyn Marin MD   80 mg at 01/05/25 0852    citalopram (celeXA) tablet 20 mg  20 mg Oral QAM Robyn Marin MD   20 mg at 01/05/25 0853    empagliflozin (JARDIANCE) tablet 25 mg  25 mg Oral Daily Nicolette Chaves MD   25 mg at 01/05/25 0852    fluticasone (ARNUITY ELLIPTA) 100 MCG/ACT inhaler 1 puff  1 puff Inhalation BID Robyn Marin MD   1 puff at 01/05/25 0853    insulin aspart (NovoLOG) injection (RAPID ACTING)  1-7 Units Subcutaneous TID AC Nicolette Chaves MD   1 Units at 01/04/25 1750    Lidocaine (LIDOCARE) 4 % Patch 1 patch  1 patch Transdermal Q24H José Miguel Funes DO   1 patch at 01/05/25 0959    losartan (COZAAR) tablet 25 mg  25 mg Oral Daily José Miguel Funes DO   25 mg at 01/05/25 0853    metoprolol succinate ER (TOPROL XL) 24 hr tablet 25 mg  25 mg Oral BID Robyn Marin MD   25 mg at 01/05/25 0853    nitroGLYcerin (NITRO-BID) 2 % ointment 7.5 mg  0.5 inch Transdermal Q24h Katty Diaz MD   7.5 mg at 01/04/25 2224    pramipexole (MIRAPEX) tablet 0.125 mg  0.125 mg Oral Daily Robyn Marin MD   0.125 mg at 01/04/25 1436    pramipexole (MIRAPEX) tablet 0.5 mg  0.5 mg Oral At Bedtime Robyn Marin MD   0.5 mg at 01/04/25 2146    sodium chloride (PF) 0.9% PF flush 3 mL  3 mL Intracatheter Q8H Nicolette Chaves MD   3 mL at 01/05/25 4023      Continuous Infusions:  Current Facility-Administered Medications   Medication Dose Route Frequency Provider Last Rate Last Admin       No current outpatient medications on file.          Medications Prior to Admission   Prior to Admission medications    Medication Sig Start Date End Date Taking? Authorizing Provider   acetaminophen (TYLENOL) 500 MG tablet Take 500-1,000 mg by mouth every 6 hours as needed for mild pain.   Yes Reported, Patient   albuterol (PROVENTIL HFA;VENTOLIN HFA) 90 mcg/actuation inhaler Inhale 2 puffs into the lungs every 6 hours as needed 8/17/15  Yes Provider, Historical   ARNUITY ELLIPTA 100 MCG/ACT inhaler Inhale 1 puff into the lungs 2 times daily. 8/30/24  Yes Reported, Patient   aspirin (ASA) 81 MG chewable tablet Take 2 tablets (162 mg) by mouth or NG Tube daily. 12/2/24  Yes Beena Mitchell PA-C   atorvastatin (LIPITOR) 80 MG tablet Take 80 mg by mouth daily.   Yes Reported, Patient   citalopram (CELEXA) 20 MG tablet [CITALOPRAM (CELEXA) 20 MG TABLET] Take 20 mg by mouth every morning.  4/25/18  Yes Provider, Historical   empagliflozin (JARDIANCE) 25 MG TABS tablet Take 1 tablet (25 mg) by mouth daily. 9/27/24  Yes Levy Lake MD   furosemide (LASIX) 20 MG tablet Take 1 tablet (20 mg) by mouth daily. 12/30/24  Yes Maira Parra CNP   hydrOXYzine HCl (ATARAX) 10 MG tablet Take 1 tablet (10 mg) by mouth or Feeding Tube every 8 hours as needed for anxiety. 12/2/24  Yes Beena Mitchell PA-C   metFORMIN (GLUCOPHAGE XR) 500 MG 24 hr tablet Take 2 tablets (1,000 mg) by mouth daily (with breakfast). Changed on 6/21/24, new prescription not yet sent 9/27/24  Yes Levy Lake MD   metoprolol succinate ER (TOPROL XL) 25 MG 24 hr tablet Take 1 tablet (25 mg) by mouth 2 times daily. 12/23/24  Yes Maira Parra CNP   ondansetron (ZOFRAN ODT) 4 MG ODT tab Take 1 tablet (4 mg) by mouth every 8 hours as needed for nausea. 12/16/24  Yes Beena Mitchell,  YARELIS   oxyCODONE (ROXICODONE) 5 MG tablet Take 0.5-1 tablets (2.5-5 mg) by mouth every 4 hours as needed for moderate to severe pain. 12/6/24  Yes Beena Mitchell PA-C   polyethylene glycol (MIRALAX) 17 g packet Take 1 packet by mouth daily as needed for constipation.   Yes Reported, Patient   pramipexole (MIRAPEX) 0.125 MG tablet Take 0.125 mg by mouth daily. At 3pm 6/18/18  Yes Provider, Historical   pramipexole (MIRAPEX) 0.5 MG tablet Take 0.5 mg by mouth at bedtime.   Yes Unknown, Entered By History   tirzepatide (MOUNJARO) 15 MG/0.5ML pen Inject 15 mg subcutaneously every 7 days. 9/27/24  Yes Levy Lake MD Timothy Shih, DO [facility]  Non-invasive Cardiologist  [facility]

## 2025-01-05 NOTE — PLAN OF CARE
Goal Outcome Evaluation:      Plan of Care Reviewed With: patient    Overall Patient Progress: improvingOverall Patient Progress: improving     Pt. Continues to have mild to moderate chest pain, sternal and left of sternum. She describes this as aching, with occasional sharp pangs as well. She also c/o lightheadedness. The pain did improve just a little by 0545. VSS with sbp 96. Also some slight dyspnea on exertion. Continue to monitor.

## 2025-01-05 NOTE — PROVIDER NOTIFICATION
RN updated Dr. Funes on pt still having chest pain.  Pt rating 4-6 out of 10.  Pt states it is burning, sharp and aching.  Pain worse with inspiration and palpation.  Pt states pain is unchanged from when she was seen in ED on 12/16/2024.  RN medicated with tylenol, ice, 2L NC.  Pt has nitroglycerin paste on.   Pt appears comfortable and in no acute distress.     Dr. Funes rounded on pt.  RN placed Lidocaine patch on chest per order.    Stress test planned for Monday

## 2025-01-05 NOTE — PROGRESS NOTES
RN educated pt on stress test on Monday.  Stress test [employer] document provided to pt.  RN verbally went over no caffeine, decaf, or chocolate today.  RN went over nothing by mouth after midnight.  Pt verbalized understanding.

## 2025-01-05 NOTE — SIGNIFICANT EVENT
Nitropaste removed at 1745 per orders, pt then began to have slowly increasing levels of chest pain starting around 1915.     RN notified cross cover of chest pain at 1925. Obtained orders for PRN oxycodone and dilaudid. Chest pain went from 2/10 with nitroglycerin patch to now 7/10. IV 0.2 mg dilaudid and PO 5 mg oxycodone given with no relief.     Cross cover hospitalist and on call cards paged at 2200 regarding continued pain with no relief after meds had been given time to work. Orders obtained for EKG, troponin, and nitroglycerin paste. Cards recommended paging again if pain continues to increase after nitropaste, stated that pt may need nitroglycerin drip.

## 2025-01-05 NOTE — PROGRESS NOTES
[facility]    Medicine Progress Note - Hospitalist Service    Date of Admission:  1/3/2025    Assessment & Plan                  Gill Mendez is a 64 year old female with history of HTN, hyperlipidemia, DM, asthma, CHF, CAD with recent CABG x3 six weeks ago; has had ongoing chest pain postoperatively with moderate pericardial effusion being monitored, presented for evaluation of worsening chest pain now improved status post Nitropaste application. Hospital Day: 3    #Chest pain  -Midsternal chest pain radiating down left arm.  -concerning in light of recent CABG  -No EKG changes  -CT PE run neg  -Troponin stable in mid 20 range  -Echocardiogram unchanged  -initially responsive to ntg.  Pain worse last night after Nitropaste removed.  Nitropaste reapplied today, still having some discomfort  -Increased dose of IV Dilaudid, previous dosage ineffective  -Cardiology planning stress test tomorrow  -Continue home aspirin, statin, beta-blocker  -Pleuritic component noted which seems less consistent with cardiac source.  Cardiology started her on topical therapy in case this is musculoskeletal.  -has not done cardiac rehab per patient, should start if cleared by cardiology    # Ischemic cardiomyopathy  -LVEF 40 to 45%, unchanged from prior echocardiogram.  Previous small pericardial effusion has resolved  -Cardiology titrating medications.  Started on ARB  -Continue home Jardiance and metoprolol  -home Lasix currently on hold  -Not felt to have acute CHF    #Diabetes  -Recent A1c 6.1  -on Mounjaro, Jardiance and metformin.    -continue home Jardiance.  Holding metformin and Mounjaro.    -Accu-Cheks and sliding scale    #MCWILLIAMS  -Suspect atelectasis  -IS    # Asthma, home inhalers  # Hyperlipidemia, home Lipitor  # Mood, home Celexa, Atarax  # Hypertension, home metoprolol.  Started on losartan as above  # RLS, home Mirapex          Diet: Combination Diet Low Saturated Fat Na <2400mg Diet    DVT  Prophylaxis: Moderate risk.   Pneumatic Compression Devices  Mcdaniels Catheter: Not present  Lines: None     Cardiac Monitoring: ACTIVE order. Indication: Chest pain/ ACS rule out (24 hours)  Code Status: Full Code    Clinically Significant Risk Factors                   # Hypertension: Noted on problem list  # Heart failure, NOS: heart failure noted on the problem list and last echo with EF 40-50%                 # Financial/Environmental Concerns: none  # Asthma: noted on problem list  # History of CABG: noted on surgical history       Disposition Plan     Medically Ready for Discharge: Anticipated in 2-4 Days         Discharge barrier(s): chest pain, stress test  Care discussed with: patient, sister      Nicolette Chaves MD  Hospitalist Service  [facility]  Securely message with [employer] (more info)  Text page via [employer] Paging/Directory   ______________________________________________________________________      Physical Exam   Vital Signs: Temp: 98.9  F (37.2  C) Temp src: Oral BP: 96/53 Pulse: 82   Resp: 18 SpO2: 95 % O2 Device: None (Room air)    Weight: 148 lbs 14.4 oz    General: in no apparent distress, non-toxic, and alert female lying in hospital bed oriented x3  HEENT: Head normocephalic atraumatic, oral mucosa moist. Sclerae anicteric  Skin: No rashes or lesions  Extremities: Trace ankle edema bilaterally  Psych: Normal affect, mood euthymic  Neuro: Grossly normal      Medical Decision Making               Data   Recent Results (from the past 16 hours)   Troponin T, High Sensitivity    Collection Time: 01/04/25 11:58 PM   Result Value Ref Range    Troponin T, High Sensitivity 25 (H) <=14 ng/L   Glucose by meter    Collection Time: 01/05/25  7:17 AM   Result Value Ref Range    GLUCOSE BY METER POCT 90 70 - 99 mg/dL   Basic metabolic panel    Collection Time: 01/05/25 10:10 AM   Result Value Ref Range    Sodium 140 135 - 145 mmol/L    Potassium 4.4 3.4 - 5.3 mmol/L    Chloride 101 98 -  107 mmol/L    Carbon Dioxide (CO2) 32 (H) 22 - 29 mmol/L    Anion Gap 7 7 - 15 mmol/L    Urea Nitrogen 15.8 8.0 - 23.0 mg/dL    Creatinine 0.85 0.51 - 0.95 mg/dL    GFR Estimate 77 >60 mL/min/1.73m2    Calcium 9.5 8.8 - 10.4 mg/dL    Glucose 152 (H) 70 - 99 mg/dL   Glucose by meter    Collection Time: 01/05/25 11:43 AM   Result Value Ref Range    GLUCOSE BY METER POCT 122 (H) 70 - 99 mg/dL       Interval History     Patient reports doing okay today.  Pain currently 4/10.  Describes it as a pleuritic pain that radiates into her left arm.  IV Dilaudid did not really help last night.  We discussed could consider a higher dose for tonight if symptoms recur.  Sounds like the plan is still for stress test tomorrow.  Patient denies any exertional of her pain.  She does endorse some shortness of breath and has been on O2.  Ordered incentive spirometer as I do not see one in her room.  Not ready for discharge.

## 2025-01-05 NOTE — PLAN OF CARE
Goal Outcome Evaluation:      Plan of Care Reviewed With: patient    Overall Patient Progress: no change Overall Patient Progress: no change     See note about recurrent chest pain and provider notification. Nitropaste applied again at 2225. Pt still endorsing dizziness and chest pain. Tele is NSR, HR in the 80s-90s. Pt moves very well, was independent but now SBA due to dizziness. No changes seen on EKG done at 2226 tonight. Awaiting stress test on Monday.

## 2025-01-05 NOTE — PLAN OF CARE
Goal Outcome Evaluation:      Plan of Care Reviewed With: patient          Outcome Evaluation: Patient plans to return home at time of discharge.

## 2025-01-06 ENCOUNTER — APPOINTMENT (OUTPATIENT)
Dept: NUCLEAR MEDICINE | Facility: HOSPITAL | Age: 65
End: 2025-01-06
Attending: STUDENT IN AN ORGANIZED HEALTH CARE EDUCATION/TRAINING PROGRAM
Payer: COMMERCIAL

## 2025-01-06 ENCOUNTER — APPOINTMENT (OUTPATIENT)
Dept: CARDIOLOGY | Facility: HOSPITAL | Age: 65
DRG: 313 | End: 2025-01-06
Attending: STUDENT IN AN ORGANIZED HEALTH CARE EDUCATION/TRAINING PROGRAM
Payer: COMMERCIAL

## 2025-01-06 VITALS
DIASTOLIC BLOOD PRESSURE: 65 MMHG | OXYGEN SATURATION: 98 % | TEMPERATURE: 98.1 F | BODY MASS INDEX: 25.09 KG/M2 | WEIGHT: 150.6 LBS | HEIGHT: 65 IN | RESPIRATION RATE: 20 BRPM | SYSTOLIC BLOOD PRESSURE: 124 MMHG | HEART RATE: 81 BPM

## 2025-01-06 LAB
ATRIAL RATE - MUSE: 108 BPM
CV STRESS CURRENT BP HE: NORMAL
CV STRESS CURRENT HR HE: 101
CV STRESS CURRENT HR HE: 101
CV STRESS CURRENT HR HE: 83
CV STRESS CURRENT HR HE: 84
CV STRESS CURRENT HR HE: 85
CV STRESS CURRENT HR HE: 93
CV STRESS CURRENT HR HE: 94
CV STRESS CURRENT HR HE: 96
CV STRESS CURRENT HR HE: 97
CV STRESS CURRENT HR HE: 98
CV STRESS CURRENT HR HE: 99
CV STRESS CURRENT HR HE: 99
CV STRESS DEVIATION TIME HE: NORMAL
CV STRESS ECHO PERCENT HR HE: NORMAL
CV STRESS EXERCISE STAGE HE: NORMAL
CV STRESS FINAL RESTING BP HE: NORMAL
CV STRESS FINAL RESTING HR HE: 94
CV STRESS MAX HR HE: 103
CV STRESS MAX TREADMILL GRADE HE: 0
CV STRESS MAX TREADMILL SPEED HE: 0
CV STRESS PEAK DIA BP HE: NORMAL
CV STRESS PEAK SYS BP HE: NORMAL
CV STRESS PHASE HE: NORMAL
CV STRESS PROTOCOL HE: NORMAL
CV STRESS RESTING PT POSITION HE: NORMAL
CV STRESS ST DEVIATION AMOUNT HE: NORMAL
CV STRESS ST DEVIATION ELEVATION HE: NORMAL
CV STRESS ST EVELATION AMOUNT HE: NORMAL
CV STRESS TEST TYPE HE: NORMAL
CV STRESS TOTAL STAGE TIME MIN 1 HE: NORMAL
DIASTOLIC BLOOD PRESSURE - MUSE: NORMAL MMHG
GLUCOSE BLDC GLUCOMTR-MCNC: 108 MG/DL (ref 70–99)
GLUCOSE BLDC GLUCOMTR-MCNC: 213 MG/DL (ref 70–99)
INTERPRETATION ECG - MUSE: NORMAL
NUC STRESS EJECTION FRACTION: 57 %
P AXIS - MUSE: 67 DEGREES
PR INTERVAL - MUSE: 168 MS
QRS DURATION - MUSE: 82 MS
QT - MUSE: 342 MS
QTC - MUSE: 458 MS
R AXIS - MUSE: 2 DEGREES
RATE PRESSURE PRODUCT: NORMAL
STRESS ECHO BASELINE DIASTOLIC HE: 63
STRESS ECHO BASELINE HR: 86
STRESS ECHO BASELINE SYSTOLIC BP: 132
STRESS ECHO CALCULATED PERCENT HR: 65 %
STRESS ECHO LAST STRESS DIASTOLIC BP: 57
STRESS ECHO LAST STRESS HR: 99
STRESS ECHO LAST STRESS SYSTOLIC BP: 108
STRESS ECHO TARGET HR: 158
SYSTOLIC BLOOD PRESSURE - MUSE: NORMAL MMHG
T AXIS - MUSE: 142 DEGREES
VENTRICULAR RATE- MUSE: 108 BPM

## 2025-01-06 PROCEDURE — 343N000001 HC RX 343 MED OP 636: Performed by: STUDENT IN AN ORGANIZED HEALTH CARE EDUCATION/TRAINING PROGRAM

## 2025-01-06 PROCEDURE — 250N000013 HC RX MED GY IP 250 OP 250 PS 637: Performed by: INTERNAL MEDICINE

## 2025-01-06 PROCEDURE — 78452 HT MUSCLE IMAGE SPECT MULT: CPT | Mod: 26 | Performed by: INTERNAL MEDICINE

## 2025-01-06 PROCEDURE — 250N000013 HC RX MED GY IP 250 OP 250 PS 637: Performed by: STUDENT IN AN ORGANIZED HEALTH CARE EDUCATION/TRAINING PROGRAM

## 2025-01-06 PROCEDURE — 250N000013 HC RX MED GY IP 250 OP 250 PS 637: Performed by: HOSPITALIST

## 2025-01-06 PROCEDURE — A9500 TC99M SESTAMIBI: HCPCS | Performed by: STUDENT IN AN ORGANIZED HEALTH CARE EDUCATION/TRAINING PROGRAM

## 2025-01-06 PROCEDURE — 93016 CV STRESS TEST SUPVJ ONLY: CPT | Performed by: INTERNAL MEDICINE

## 2025-01-06 PROCEDURE — 93018 CV STRESS TEST I&R ONLY: CPT | Performed by: INTERNAL MEDICINE

## 2025-01-06 PROCEDURE — 250N000011 HC RX IP 250 OP 636: Performed by: HOSPITALIST

## 2025-01-06 PROCEDURE — 99239 HOSP IP/OBS DSCHRG MGMT >30: CPT | Performed by: HOSPITALIST

## 2025-01-06 PROCEDURE — 93017 CV STRESS TEST TRACING ONLY: CPT

## 2025-01-06 PROCEDURE — 250N000011 HC RX IP 250 OP 636: Performed by: STUDENT IN AN ORGANIZED HEALTH CARE EDUCATION/TRAINING PROGRAM

## 2025-01-06 PROCEDURE — 99024 POSTOP FOLLOW-UP VISIT: CPT | Performed by: INTERNAL MEDICINE

## 2025-01-06 PROCEDURE — 78452 HT MUSCLE IMAGE SPECT MULT: CPT

## 2025-01-06 RX ORDER — LORAZEPAM 2 MG/ML
0.25 INJECTION INTRAMUSCULAR
Status: COMPLETED | OUTPATIENT
Start: 2025-01-06 | End: 2025-01-06

## 2025-01-06 RX ORDER — LORAZEPAM 0.5 MG/1
0.5 TABLET ORAL
Status: COMPLETED | OUTPATIENT
Start: 2025-01-06 | End: 2025-01-06

## 2025-01-06 RX ORDER — AMINOPHYLLINE 25 MG/ML
50-100 INJECTION, SOLUTION INTRAVENOUS
Status: COMPLETED | OUTPATIENT
Start: 2025-01-06 | End: 2025-01-06

## 2025-01-06 RX ORDER — ACETAMINOPHEN 325 MG/1
975 TABLET ORAL EVERY 8 HOURS PRN
Status: DISCONTINUED | OUTPATIENT
Start: 2025-01-06 | End: 2025-01-06 | Stop reason: HOSPADM

## 2025-01-06 RX ORDER — ACETAMINOPHEN 500 MG
500-1000 TABLET ORAL EVERY 6 HOURS PRN
Status: SHIPPED
Start: 2025-01-06

## 2025-01-06 RX ORDER — LIDOCAINE 40 MG/G
CREAM TOPICAL DAILY PRN
Qty: 15 G | Refills: 0 | Status: SHIPPED | OUTPATIENT
Start: 2025-01-06

## 2025-01-06 RX ORDER — LOSARTAN POTASSIUM 25 MG/1
25 TABLET ORAL DAILY
Qty: 30 TABLET | Refills: 3 | Status: SHIPPED | OUTPATIENT
Start: 2025-01-07

## 2025-01-06 RX ORDER — ACETAMINOPHEN 325 MG/1
975 TABLET ORAL EVERY 8 HOURS
Status: DISCONTINUED | OUTPATIENT
Start: 2025-01-06 | End: 2025-01-06

## 2025-01-06 RX ORDER — REGADENOSON 0.08 MG/ML
0.4 INJECTION, SOLUTION INTRAVENOUS ONCE
Status: COMPLETED | OUTPATIENT
Start: 2025-01-06 | End: 2025-01-06

## 2025-01-06 RX ADMIN — PRAMIPEXOLE DIHYDROCHLORIDE 0.12 MG: 0.12 TABLET ORAL at 11:29

## 2025-01-06 RX ADMIN — Medication 8.6 MILLICURIE: at 07:03

## 2025-01-06 RX ADMIN — Medication 29.8 MILLICURIE: at 10:30

## 2025-01-06 RX ADMIN — LORAZEPAM 0.25 MG: 2 INJECTION INTRAMUSCULAR; INTRAVENOUS at 10:07

## 2025-01-06 RX ADMIN — ATORVASTATIN CALCIUM 80 MG: 40 TABLET, FILM COATED ORAL at 08:06

## 2025-01-06 RX ADMIN — LOSARTAN POTASSIUM 25 MG: 25 TABLET, FILM COATED ORAL at 14:30

## 2025-01-06 RX ADMIN — LORAZEPAM 0.25 MG: 2 INJECTION INTRAMUSCULAR; INTRAVENOUS at 09:53

## 2025-01-06 RX ADMIN — ASPIRIN 81 MG CHEWABLE TABLET 162 MG: 81 TABLET CHEWABLE at 08:04

## 2025-01-06 RX ADMIN — LORAZEPAM 0.5 MG: 0.5 TABLET ORAL at 08:05

## 2025-01-06 RX ADMIN — FLUTICASONE FUROATE 1 PUFF: 100 POWDER RESPIRATORY (INHALATION) at 08:09

## 2025-01-06 RX ADMIN — CITALOPRAM HYDROBROMIDE 20 MG: 20 TABLET ORAL at 08:06

## 2025-01-06 RX ADMIN — LIDOCAINE 1 PATCH: 4 PATCH TOPICAL at 08:09

## 2025-01-06 RX ADMIN — REGADENOSON 0.4 MG: 0.08 INJECTION, SOLUTION INTRAVENOUS at 10:24

## 2025-01-06 RX ADMIN — ACETAMINOPHEN 650 MG: 325 TABLET ORAL at 11:23

## 2025-01-06 RX ADMIN — METOPROLOL SUCCINATE 25 MG: 25 TABLET, EXTENDED RELEASE ORAL at 14:30

## 2025-01-06 RX ADMIN — EMPAGLIFLOZIN 25 MG: 25 TABLET, FILM COATED ORAL at 08:06

## 2025-01-06 ASSESSMENT — ACTIVITIES OF DAILY LIVING (ADL)
ADLS_ACUITY_SCORE: 37
ADLS_ACUITY_SCORE: 36
ADLS_ACUITY_SCORE: 37
ADLS_ACUITY_SCORE: 37
ADLS_ACUITY_SCORE: 36
ADLS_ACUITY_SCORE: 37
ADLS_ACUITY_SCORE: 36
ADLS_ACUITY_SCORE: 36
ADLS_ACUITY_SCORE: 37
ADLS_ACUITY_SCORE: 37
ADLS_ACUITY_SCORE: 36
ADLS_ACUITY_SCORE: 36

## 2025-01-06 NOTE — PROVIDER NOTIFICATION
0845-RN was notified pt still not able to complete scans 2/2 claustrophobia      0930-MD put in orders for IV Ativan.    0950--RN went to [employer] to administer  IV ativan.      1007- Pt still feeling anxious. RN gave pt the additional IV ativan dose.    1015- Pt now feeling like she can tolerate the stress test scan.

## 2025-01-06 NOTE — PROGRESS NOTES
[facility] in [place] [phone]) unable to fill Lidocaine prescription today.  Patient elected to have prescription sent to [facility] pharmacy  in [place].  [facility] pharmacy [phone]) contacted and confirmed they will call [facility] so that the prescription could be sent.

## 2025-01-06 NOTE — PROGRESS NOTES
Nuclear Pharmacological Stress Test:  Sitting Protocol. Lexiscan 0.4mg IV administered over 15sec. Peak VS: HR= 101 BP= 104/50. No symptoms of CP produced. Patient felt typical vasodilator side effects from Lexiscan. No further dietary restrictions according to stress test. Results pending cardiology interpretation.   Lucia Fuentes RN  [facility]

## 2025-01-06 NOTE — PROVIDER NOTIFICATION
RN updated Dr Reynolds that pt is in [facility] for the stress test and pt feeling claustrophobic.  Pt is not able to proceed with the scans.  Pt requesting meds to help.      Dr. Reynolds put in orders for PO Ativan.      RN gave po ativan and pt attempted to do scans.

## 2025-01-06 NOTE — PLAN OF CARE
Goal Outcome Evaluation:      Plan of Care Reviewed With: patient    Overall Patient Progress: improving Overall Patient Progress: improving     Pt continues to have chest pain, but now down to 4/10. Managed with PRN oxycodone and Tylenol q4hrs. Nitropaste in place. Denies dizziness and lightheadedness tonight. Tele is NSR. NPO at midnight for stress test tomorrow AM. VSS on RA.

## 2025-01-06 NOTE — DISCHARGE SUMMARY
[facility] MEDICINE  DISCHARGE SUMMARY     Primary Care Physician: Saúl Hunt  Admission Date: 1/3/2025   Discharge Provider: Brain Reynolds MD Discharge Date: 1/6/2025   Diet:   Active Diet and Nourishment Order   Procedures    Combination Diet Low Saturated Fat Na <2400mg Diet    Diet       Code Status: Full Code   Activity: DCACTIVITY: Activity as tolerated        Condition at Discharge: Stable     REASON FOR PRESENTATION(See Admission Note for Details)     Chest pain    PRINCIPAL & ACTIVE DISCHARGE DIAGNOSES     MSK chest pain    PENDING LABS     Unresulted Labs Ordered in the Past 30 Days of this Admission       Date and Time Order Name Status Description    11/24/2024  1:41 PM Prepare red blood cells (unit) Preliminary     11/24/2024  9:11 AM Prepare red blood cells (unit) Preliminary               PROCEDURES ( this hospitalization only)          RECOMMENDATIONS TO OUTPATIENT PROVIDER FOR F/U VISIT     Follow-up Appointments       Hospital Follow-up with Existing Primary Care Provider (PCP)      Please see details below         Schedule Primary Care visit within: 7 Days                 DISPOSITION     Home    SUMMARY OF HOSPITAL COURSE:      64F with HTN, HLD, DM2, asthma, CAD with recent CABG x3 six weeks ago who presents with chest pain which is reproducible on palpation.  CT PE negative, EKG without acute ischemic changes and high sensitivity trops are flat.  Nuclear stress test with infarct without inducible ischemia.  All consistent with a MSK etiology to pain.     #CAD s/p CABG  #chronic HFmEF   #DM2  # Asthma, home inhalers  # Hyperlipidemia, home Lipitor  # Mood, home Celexa, Atarax  # Hypertension, home metoprolol.  Started on losartan   # RLS, home Mirapex    Discharge Medications with Med changes:     Current Discharge Medication List        START taking these medications    Details   lidocaine (LMX4) 4 % external cream Apply topically daily as needed for  mild pain (painful area on left chest wall).  Qty: 15 g, Refills: 0    Associated Diagnoses: Chest pain, unspecified type      losartan (COZAAR) 25 MG tablet Take 1 tablet (25 mg) by mouth daily.  Qty: 30 tablet, Refills: 3    Associated Diagnoses: Chronic systolic congestive heart failure (H)           CONTINUE these medications which have CHANGED    Details   acetaminophen (TYLENOL) 500 MG tablet Take 1-2 tablets (500-1,000 mg) by mouth every 6 hours as needed for mild pain (do NOT exceed 3,000mg in a 24hour period).    Associated Diagnoses: Chest pain, unspecified type           CONTINUE these medications which have NOT CHANGED    Details   albuterol (PROVENTIL HFA;VENTOLIN HFA) 90 mcg/actuation inhaler Inhale 2 puffs into the lungs every 6 hours as needed      ARNUITY ELLIPTA 100 MCG/ACT inhaler Inhale 1 puff into the lungs 2 times daily.      aspirin (ASA) 81 MG chewable tablet Take 2 tablets (162 mg) by mouth or NG Tube daily.  Qty: 180 tablet, Refills: 3    Associated Diagnoses: S/P CABG (coronary artery bypass graft)      atorvastatin (LIPITOR) 80 MG tablet Take 80 mg by mouth daily.      citalopram (CELEXA) 20 MG tablet [CITALOPRAM (CELEXA) 20 MG TABLET] Take 20 mg by mouth every morning.   Refills: 0      empagliflozin (JARDIANCE) 25 MG TABS tablet Take 1 tablet (25 mg) by mouth daily.  Qty: 90 tablet, Refills: 1    Associated Diagnoses: Type 2 diabetes mellitus with diabetic microalbuminuria, without long-term current use of insulin (H)      furosemide (LASIX) 20 MG tablet Take 1 tablet (20 mg) by mouth daily.  Qty: 90 tablet, Refills: 3    Associated Diagnoses: S/P CABG (coronary artery bypass graft)      hydrOXYzine HCl (ATARAX) 10 MG tablet Take 1 tablet (10 mg) by mouth or Feeding Tube every 8 hours as needed for anxiety.  Qty: 30 tablet, Refills: 0    Associated Diagnoses: S/P CABG (coronary artery bypass graft)      metFORMIN (GLUCOPHAGE XR) 500 MG 24 hr tablet Take 2 tablets (1,000 mg) by mouth  daily (with breakfast). Changed on 6/21/24, new prescription not yet sent  Qty: 180 tablet, Refills: 1    Associated Diagnoses: Type 2 diabetes mellitus with diabetic microalbuminuria, without long-term current use of insulin (H)      metoprolol succinate ER (TOPROL XL) 25 MG 24 hr tablet Take 1 tablet (25 mg) by mouth 2 times daily.  Qty: 90 tablet, Refills: 3    Associated Diagnoses: S/P CABG (coronary artery bypass graft)      ondansetron (ZOFRAN ODT) 4 MG ODT tab Take 1 tablet (4 mg) by mouth every 8 hours as needed for nausea.  Qty: 10 tablet, Refills: 0    Associated Diagnoses: S/P CABG (coronary artery bypass graft)      oxyCODONE (ROXICODONE) 5 MG tablet Take 0.5-1 tablets (2.5-5 mg) by mouth every 4 hours as needed for moderate to severe pain.  Qty: 10 tablet, Refills: 0    Associated Diagnoses: S/P CABG (coronary artery bypass graft)      polyethylene glycol (MIRALAX) 17 g packet Take 1 packet by mouth daily as needed for constipation.      !! pramipexole (MIRAPEX) 0.125 MG tablet Take 0.125 mg by mouth daily. At 3pm  Refills: 3      !! pramipexole (MIRAPEX) 0.5 MG tablet Take 0.5 mg by mouth at bedtime.      tirzepatide (MOUNJARO) 15 MG/0.5ML pen Inject 15 mg subcutaneously every 7 days.  Qty: 6 mL, Refills: 1    Associated Diagnoses: Type 2 diabetes mellitus with diabetic microalbuminuria, without long-term current use of insulin (H)       !! - Potential duplicate medications found. Please discuss with provider.                Rationale for medication changes:          Consults     CARDIOLOGY IP CONSULT  CARE MANAGEMENT / SOCIAL WORK IP CONSULT    Immunizations given this encounter     Most Recent Immunizations   Administered Date(s) Administered    COVID-19 MONOVALENT 12+ (Pfizer) 12/29/2021    Flu, Unspecified 10/13/2021    Influenza (IIV3) PF 11/30/2011    Influenza Vaccine 18-64 (Flublok) 09/25/2020    Influenza Vaccine >6 months,quad, PF 10/17/2022    Influenza Vaccine Trivalent (FluBlok) 11/29/2024     Influenza Vaccine, 6+MO IM (QUADRIVALENT W/PRESERVATIVES) 09/25/2020    Pneumococcal 23 valent 11/09/2016    TDAP (Adacel,Boostrix) 06/18/2019    Td, Absorbed, Pf, Adult, Lf Unspecified 01/01/2007    Td,adult,historic,unspecified 01/01/2007    Zoster recombinant adjuvanted (SHINGRIX) 11/21/2019           Anticoagulation Information            SIGNIFICANT IMAGING FINDINGS     Results for orders placed or performed during the hospital encounter of 01/03/25   Chest XR,  PA & LAT    Impression    IMPRESSION:     Previous median sternotomy and coronary revascularization. The cardiac silhouette is at the upper limit of normal in size, unchanged. The vascular pedicle width is normal. Lung vascularity is normal.    Foci of linear atelectasis are present inferior to the left hilum. Opacity in the basal left chest present 12/16/2024 has cleared. No new airspace opacities no interlobular septal thickening or peribronchial thickening around the bhavik. No pleural   effusions are present.    There is diffuse thoracic spine disc space narrowing and minimal osteophytes of the thoracic and upper lumbar spine.   CTA Head Neck with Contrast    Impression    IMPRESSION:   HEAD CT:  No acute intracranial process.    HEAD CTA:  No stenosis/occlusion, aneurysm, or high flow vascular malformation.    NECK CTA:  No measurable stenosis or dissection.     CT Chest Pulmonary Embolism w Contrast    Impression    IMPRESSION:  1.  No evidence for pulmonary embolism.   Echocardiogram Complete   Result Value Ref Range    LVEF  40-45% (mildly reduced)    NM Lexiscan stress test       Discharge Orders        Reason for your hospital stay    You were admitted with chest discomfort with a negative stress test and no blood clot on imaging.     Activity    Your activity upon discharge: activity as tolerated     Diet    Follow this diet upon discharge: Current Diet:Orders Placed This Encounter      Combination Diet Low Saturated Fat Na <2400mg Diet      Hospital Follow-up with Existing Primary Care Provider (PCP)    Please see details below            Examination   Physical Exam   Temp:  [98  F (36.7  C)-98.9  F (37.2  C)] 98.1  F (36.7  C)  Pulse:  [73-82] 81  Resp:  [16-20] 20  BP: ()/(53-65) 124/65  SpO2:  [92 %-98 %] 98 %  Wt Readings from Last 1 Encounters:   01/06/25 68.3 kg (150 lb 9.6 oz)         Please see EMR for more detailed significant labs, imaging, consultant notes etc.    IBrain MD, personally saw the patient today and spent greater than 30 minutes discharging this patient.    Brain Reynolds MD  [facility]    CC:Saúl Hunt

## 2025-01-06 NOTE — PLAN OF CARE
Goal Outcome Evaluation:       Pt continues to complain of L anterior chest pain 4/10 consistently. Pt declined PRN pain medications overnight. Vitals stable.     NPO for stress test today.

## 2025-01-06 NOTE — PROGRESS NOTES
"    Cardiology Progress Note    Assessment:  Chest pain, most likely incisional, no evidence of ischemia by cortical perfusion scan  Coronary artery disease status post recent emergency coronary artery bypass graft surgery in November 2024  Chronic systolic heart failure with mildly depressed LV systolic function, compensated, no fluid overload  Plan:  Discussed the etiology and treatment of chest pain with the patient.  I do not recommend coronary angiogram at this point.  Will continue current medications.  She has been very scheduled to follow-up with cardiology she can be discharged from cardiac standpoint      Subjective:   Still complains of chest pain that gets worse with touching of anterior chest wall    Objective:   /65 (BP Location: Right arm)   Pulse 81   Temp 98.1  F (36.7  C) (Oral)   Resp 20   Ht 1.651 m (5' 5\")   Wt 68.3 kg (150 lb 9.6 oz)   SpO2 98%   BMI 25.06 kg/m      Intake/Output Summary (Last 24 hours) at 1/6/2025 1456  Last data filed at 1/6/2025 1400  Gross per 24 hour   Intake 820 ml   Output 1425 ml   Net -605 ml         Physical Exam:  GENERAL: no distress  NECK: No JVD  LUNGS: Clear to auscultation.  CARDIAC: regular  rhythm, S1 & S2 normal.  No heaves, thrills, gallops or murmurs.  Stable sternotomy incision  ABDOMEN: flat, negative hepatosplenomegaly, soft and non-tender.  EXTREMITIES: No evidence of cyanosis, clubbing or edema.    Current Facility-Administered Medications Ordered in Epic   Medication Dose Route Frequency Provider Last Rate Last Admin    acetaminophen (TYLENOL) tablet 975 mg  975 mg Oral Q8H PRN Brain Reynolds MD        albuterol (PROVENTIL HFA/VENTOLIN HFA) inhaler  2 puff Inhalation Q6H PRN Robyn Marin MD        aspirin (ASA) chewable tablet 162 mg  162 mg Oral or NG Tube Daily Robyn Marin MD   162 mg at 01/06/25 0804    atorvastatin (LIPITOR) tablet 80 mg  80 mg Oral Daily Robyn Marin MD   80 mg at 01/06/25 0806    bisacodyl (DULCOLAX) " suppository 10 mg  10 mg Rectal Daily PRN Nicolette Chaves MD        calcium carbonate (TUMS) chewable tablet 1,000 mg  1,000 mg Oral 4x Daily PRN Nicolette Chaves MD        citalopram (celeXA) tablet 20 mg  20 mg Oral QAM Robyn Marin MD   20 mg at 01/06/25 0806    glucose gel 15-30 g  15-30 g Oral Q15 Min PRN Nicolette Chaves MD        Or    dextrose 50 % injection 25-50 mL  25-50 mL Intravenous Q15 Min PRN Nicolette Chaves MD        Or    glucagon injection 1 mg  1 mg Subcutaneous Q15 Min PRN Nicolette Chaves MD        empagliflozin (JARDIANCE) tablet 25 mg  25 mg Oral Daily Nicolette Chaves MD   25 mg at 01/06/25 0806    fluticasone (ARNUITY ELLIPTA) 100 MCG/ACT inhaler 1 puff  1 puff Inhalation BID Robyn Marin MD   1 puff at 01/06/25 0809    HYDROmorphone (PF) (DILAUDID) injection 0.3 mg  0.3 mg Intravenous Q3H PRN Nicolette Chaves MD        Or    HYDROmorphone (PF) (DILAUDID) injection 0.5 mg  0.5 mg Intravenous Q3H PRN Nicolette Chaves MD        hydrOXYzine HCl (ATARAX) tablet 10 mg  10 mg Oral or Feeding Tube Q8H PRN Robyn Marin MD   10 mg at 01/05/25 1744    insulin aspart (NovoLOG) injection (RAPID ACTING)  1-7 Units Subcutaneous TID AC Nicolette Chaves MD   1 Units at 01/05/25 1724    Lidocaine (LIDOCARE) 4 % Patch 1 patch  1 patch Transdermal Q24H Marin, José Miguel, DO   1 patch at 01/06/25 0809    lidocaine (LMX4) cream   Topical Q1H PRN Nicolette Chaves MD        lidocaine 1 % 0.1-1 mL  0.1-1 mL Other Q1H PRN Nicolette Chaves MD        losartan (COZAAR) tablet 25 mg  25 mg Oral Daily Marin, José Miguel, DO   25 mg at 01/06/25 1430    melatonin tablet 5 mg  5 mg Oral At Bedtime PRN Nicolette Chaves MD        metoprolol succinate ER (TOPROL XL) 24 hr tablet 25 mg  25 mg Oral BID Robyn Marin MD   25 mg at 01/06/25 1430    naloxone (NARCAN) injection 0.2 mg  0.2 mg Intravenous Q2 Min PRN Nicolette Chaves MD        Or    naloxone (NARCAN) injection 0.4 mg  0.4 mg Intravenous Q2 Min PRN Nicolette Chaves MD        Or    naloxone  (NARCAN) injection 0.2 mg  0.2 mg Intramuscular Q2 Min PRN Nicolette Chaves MD        Or    naloxone (NARCAN) injection 0.4 mg  0.4 mg Intramuscular Q2 Min PRN Nicolette Chaves MD        nitroGLYcerin (NITRO-BID) 2 % ointment 7.5 mg  0.5 inch Transdermal Q24h Katty Diaz MD   7.5 mg at 01/05/25 2001    oxyCODONE IR (ROXICODONE) half-tab 2.5 mg  2.5 mg Oral Q4H PRN Arpit Hernandez MD   2.5 mg at 01/05/25 2233    Or    oxyCODONE (ROXICODONE) tablet 5 mg  5 mg Oral Q4H PRN Arpit Hernandez MD   5 mg at 01/04/25 2113    polyethylene glycol (MIRALAX) Packet 17 g  17 g Oral BID PRN Nicolette Chaves MD        pramipexole (MIRAPEX) tablet 0.125 mg  0.125 mg Oral Daily Robyn Marin MD   0.125 mg at 01/06/25 1129    pramipexole (MIRAPEX) tablet 0.5 mg  0.5 mg Oral At Bedtime Robyn Marin MD   0.5 mg at 01/05/25 2233    senna-docusate (SENOKOT-S/PERICOLACE) 8.6-50 MG per tablet 1 tablet  1 tablet Oral BID PRN Nicolette Chaves MD        Or    senna-docusate (SENOKOT-S/PERICOLACE) 8.6-50 MG per tablet 2 tablet  2 tablet Oral BID PRN Nicolette Chaves MD        sodium chloride (PF) 0.9% PF flush 3 mL  3 mL Intracatheter Q8H Nicolette Chaves MD   3 mL at 01/06/25 1431    sodium chloride (PF) 0.9% PF flush 3 mL  3 mL Intracatheter q1 min prn Nicolette Chaves MD         Current Outpatient Medications Ordered in Epic   Medication Sig Dispense Refill    [START ON 1/7/2025] losartan (COZAAR) 25 MG tablet Take 1 tablet (25 mg) by mouth daily. 30 tablet 3       Cardiographics:    Telemetry: Normal sinus rhythm  ECG: Normal sinus rhythm no ischemic changes  Echocardiogram:  The left ventricle is normal in size. There is mild concentric left  ventricular hypertrophy.  Left ventricular function is decreased. The ejection fraction is 40-45%  (mildly reduced). The anterior and anteroseptal walls are hypokinetic.     The right ventricle is normal in size and function.  The left atrium is mildly dilated. The right atrium is mildly  dilated.  IVC diameter <2.1 cm collapsing >50% with sniff suggests a normal RA pressure  of 3 mmHg.  Compared to prior study, small pericardial effusion has resolved. LVEF and  wall motion is similar.     Stress Test:     The nuclear stress test is abnormal.    The patient is at a low risk of future cardiac ischemic events.    There is a medium sized area of a moderate degree of infarction in the mid to distal anteroseptal segment(s) of the left ventricle. No evidence of inducible myocardial ischemia.    The left ventricular ejection fraction at stress is 57% with mid to distal anteroseptal hypokinesis.    A prior study was conducted on 9/30/2020.  This study has changes noted when compared with the prior study. Fixed anteroseptal defect is now present.     Lab Results    Chemistry/lipid CBC Cardiac Enzymes/BNP/TSH/INR   Recent Labs   Lab Test 11/23/24  0736   CHOL 148   HDL 65   LDL 70   TRIG 65     Recent Labs   Lab Test 11/23/24  0736 09/20/24  0810 08/21/23  0821   LDL 70 58 78     Recent Labs   Lab Test 01/06/25  1424 01/05/25  1143 01/05/25  1010   NA  --   --  140   POTASSIUM  --   --  4.4   CHLORIDE  --   --  101   CO2  --   --  32*   *   < > 152*   BUN  --   --  15.8   CR  --   --  0.85   GFRESTIMATED  --   --  77   VALENTE  --   --  9.5    < > = values in this interval not displayed.     Recent Labs   Lab Test 01/05/25  1010 01/03/25  1705 12/23/24  1026   CR 0.85 0.89 0.86     Recent Labs   Lab Test 11/23/24  0736 09/20/24  0810 06/14/24  0750   A1C 6.1* 6.3* 6.5*          Recent Labs   Lab Test 01/03/25  1705   WBC 8.4   HGB 11.3*   HCT 37.3   MCV 80        Recent Labs   Lab Test 01/03/25  1705 12/16/24  1603 12/16/24  1021   HGB 11.3* 11.8 10.8*    Recent Labs   Lab Test 01/01/23  1303 09/25/20  0803 09/25/20  0157   TROPONINI <0.01 0.03 0.02     Recent Labs   Lab Test 01/03/25  1705 12/23/24  1026 12/16/24  1021 12/10/24  1544 11/22/24  1733   NTBNPI 3,043*  --   --   --  6,095*   NTBNP  --   4,303* 5,787* 7,476*  --      Recent Labs   Lab Test 12/16/24  1603   TSH 0.61     Recent Labs   Lab Test 11/25/24  0843 11/24/24  1750 11/24/24  1646   INR 1.05 1.14 1.36*

## 2025-01-06 NOTE — PROGRESS NOTES
Care Management Discharge Note    Discharge Date: 01/06/2025       Discharge Disposition: Home    Discharge Services: None    Discharge DME: None    Discharge Transportation: family or friend will provide    Private pay costs discussed: Not applicable    Does the patient's insurance plan have a 3 day qualifying hospital stay waiver?  Yes     Which insurance plan 3 day waiver is available? Alternative insurance waiver    Will the waiver be used for post-acute placement? No    PAS Confirmation Code: NA  Patient/family educated on Medicare website which has current facility and service quality ratings: NA    Education Provided on the Discharge Plan: Yes per team  Persons Notified of Discharge Plans: Patient  Patient/Family in Agreement with the Plan: yes    Handoff Referral Completed: No, handoff not indicated or clinically appropriate    Additional Information:  Patient discharge to home. Family will transport.    Vandana Nina RN

## 2025-01-06 NOTE — PROGRESS NOTES
[facility] med called RN that pt is having restless legs and there is quite a bit of motion on the pictures.  Pt requesting her requip.   RN went down to [employer] to administer requip and to assess pt since ativan doses.    Pt alert, oriented up in wheel chair eating a snack. See flowsheet for full assessment. See mar for admin times.

## 2025-01-06 NOTE — PROGRESS NOTES
[facility] Hospitalist Progress Note  [facility]  Admission date: 1/3/2025    Pain is very clearly reproduced on palpation and likely MSK in etiology.  While is radiates down left arm, Ms. Mendez also reports radiation down L leg and associated with RLS.  Await nuc stress result.  Tylenol PRN, trial toradol x 1 if nuc stress negative  Dispo plan TBD based on nuc stress results.  If negative anticipate discharge to home today    Brain Reynolds MD, [facility]  Internal Medicine Hospitalist

## 2025-01-06 NOTE — PLAN OF CARE
Goal Outcome Evaluation:  Discharge education and follow-up plan reviewed with patient.  Patient received losartan and will  lidocaine from [facility] tomorrow.   drove.

## 2025-01-07 ENCOUNTER — PATIENT OUTREACH (OUTPATIENT)
Dept: CARE COORDINATION | Facility: CLINIC | Age: 65
End: 2025-01-07
Payer: COMMERCIAL

## 2025-01-07 NOTE — PROGRESS NOTES
"Clinic Care Coordination Contact  Transitions of Care Outreach  Chief Complaint   Patient presents with    Clinic Care Coordination - Post Hospital       Most Recent Admission Date: 1/3/2025   Most Recent Admission Diagnosis: S/P CABG (coronary artery bypass graft) - Z95.1  Chest pain, unspecified type - R07.9     Most Recent Discharge Date: 1/6/2025   Most Recent Discharge Diagnosis: Chest pain, unspecified type - R07.9  S/P CABG (coronary artery bypass graft) - Z95.1  Chronic systolic congestive heart failure (H) - I50.22     Transitions of Care Assessment    Discharge Assessment  How are you doing now that you are home?: \"Pretty good I'm doing good I have what I need here too.\"  How are your symptoms? (Red Flag symptoms escalate to triage hotline per guidelines): Improved  Do you know how to contact your clinic care team if you have future questions or changes to your health status? : Yes  Does the patient have their discharge instructions? : Yes  Does the patient have questions regarding their discharge instructions? : No  Were you started on any new medications or were there changes to any of your previous medications? : Yes  Does the patient have all of their medications?: Yes  Do you have questions regarding any of your medications? : No  Do you have all of your needed medical supplies or equipment (DME)?  (i.e. oxygen tank, CPAP, cane, etc.): Yes    Post-op ([facility] CTA Only)  If the patient had a surgery or procedure, do they have any questions for a nurse?: No    Follow up Plan     Discharge Follow-Up  Discharge follow up appointment scheduled in alignment with recommended follow up timeframe or Transitions of Risk Category? (Low = within 30 days; Moderate= within 14 days; High= within 7 days): Yes  Discharge Follow Up Appointment Date: 01/14/25  Discharge Follow Up Appointment Scheduled with?: Specialty Care Provider (Cardiology appt.)    Future Appointments   Date Time Provider Department Center   1/14/2025 " 11:00 AM WW ECHO OP 3 [facility]TS [facility]   1/23/2025  2:50 PM Maira Parra, CNP HRSJN [facility]   2/12/2025  8:00 AM WBWW LAB WILABR [facility]   2/19/2025 10:30 AM Levy Lake MD MDENDO [facility]   2/25/2025 11:20 AM Raul Bragg MD HRWWH [facility]   5/23/2025  8:00 AM WBWW LAB WILABR [facility]   5/30/2025  3:30 PM Levy Lake MD MDENDO [facility]       Outpatient Plan as outlined on AVS reviewed with patient.    For any urgent concerns, please contact our 24 hour nurse triage line: [phone])       ELIANA Schofield  [phone]  Connected Care Resource [facility]

## 2025-01-13 DIAGNOSIS — I50.22 CHRONIC SYSTOLIC CONGESTIVE HEART FAILURE (H): ICD-10-CM

## 2025-01-13 DIAGNOSIS — Z95.1 S/P CABG (CORONARY ARTERY BYPASS GRAFT): Primary | ICD-10-CM

## 2025-01-13 DIAGNOSIS — I25.5 ISCHEMIC CARDIOMYOPATHY: ICD-10-CM

## 2025-01-15 ENCOUNTER — HOSPITAL ENCOUNTER (OUTPATIENT)
Dept: CARDIAC REHAB | Facility: CLINIC | Age: 65
Discharge: HOME | End: 2025-01-15
Payer: COMMERCIAL

## 2025-01-15 DIAGNOSIS — Z95.1 S/P CABG (CORONARY ARTERY BYPASS GRAFT): ICD-10-CM

## 2025-01-15 DIAGNOSIS — I50.22 CHRONIC SYSTOLIC CONGESTIVE HEART FAILURE (H): ICD-10-CM

## 2025-01-15 DIAGNOSIS — I25.5 ISCHEMIC CARDIOMYOPATHY: ICD-10-CM

## 2025-01-15 LAB
GLUCOSE BLDC GLUCOMTR-MCNC: 108 MG/DL (ref 70–99)
GLUCOSE BLDC GLUCOMTR-MCNC: 115 MG/DL (ref 70–99)
GLUCOSE BLDC GLUCOMTR-MCNC: 92 MG/DL (ref 70–99)

## 2025-01-15 PROCEDURE — 93798 PHYS/QHP OP CAR RHAB W/ECG: CPT

## 2025-01-15 PROCEDURE — 93797 PHYS/QHP OP CAR RHAB WO ECG: CPT

## 2025-01-16 ENCOUNTER — MYC MEDICAL ADVICE (OUTPATIENT)
Dept: CARDIOLOGY | Facility: CLINIC | Age: 65
End: 2025-01-16
Payer: COMMERCIAL

## 2025-01-17 NOTE — TELEPHONE ENCOUNTER
Called and spoke w/  Pharmacy and they will cancel the script (never filled it).     Called and spoke w/ [facility]. They confirmed they have the script for furosemide 20mg daily on file sent 12/30/24. They show that pt picked up 90 tab supply on 1/11 and they have refills on file.     Called pt to report the following, no answer. LM to call back to review and confirm receipt of message, see if there is something else she need.    CARL Palmer RN

## 2025-01-21 NOTE — TELEPHONE ENCOUNTER
Noted pt's response.     CARL Palmer RN   -----------------------------------------------  Maira Parra, Celio Rangel RN  Phone Number: [phone]     [facility]

## 2025-01-24 ENCOUNTER — TELEPHONE (OUTPATIENT)
Dept: CARDIOLOGY | Facility: CLINIC | Age: 65
End: 2025-01-24
Payer: COMMERCIAL

## 2025-01-24 NOTE — TELEPHONE ENCOUNTER
CORE RN to call pt to review response to Spironolactone and assess HF status Lisa Garrison  CORE RN BSN, CHFN, PCCN-K

## 2025-01-27 ENCOUNTER — HOSPITAL ENCOUNTER (OUTPATIENT)
Dept: CARDIAC REHAB | Facility: CLINIC | Age: 65
Discharge: HOME | End: 2025-01-27
Payer: COMMERCIAL

## 2025-01-27 PROCEDURE — 93798 PHYS/QHP OP CAR RHAB W/ECG: CPT

## 2025-01-30 NOTE — TELEPHONE ENCOUNTER
Called patient and left a voicemail.    Al Vanessa RN    -----------------------------    CORE RN to call pt to review response to Spironolactone and assess HF status Lisa PARIS RN BSN, CHFN, PCCN-K

## 2025-01-31 ENCOUNTER — LAB (OUTPATIENT)
Dept: CARDIOLOGY | Facility: CLINIC | Age: 65
End: 2025-01-31
Payer: COMMERCIAL

## 2025-01-31 DIAGNOSIS — E11.29 TYPE 2 DIABETES MELLITUS WITH DIABETIC MICROALBUMINURIA, WITHOUT LONG-TERM CURRENT USE OF INSULIN (H): ICD-10-CM

## 2025-01-31 DIAGNOSIS — R80.9 TYPE 2 DIABETES MELLITUS WITH DIABETIC MICROALBUMINURIA, WITHOUT LONG-TERM CURRENT USE OF INSULIN (H): ICD-10-CM

## 2025-01-31 DIAGNOSIS — I50.22 HEART FAILURE WITH MILDLY REDUCED EJECTION FRACTION (H): ICD-10-CM

## 2025-01-31 LAB
ANION GAP SERPL CALCULATED.3IONS-SCNC: 7 MMOL/L (ref 7–15)
BUN SERPL-MCNC: 17.1 MG/DL (ref 8–23)
CALCIUM SERPL-MCNC: 9.8 MG/DL (ref 8.8–10.4)
CHLORIDE SERPL-SCNC: 100 MMOL/L (ref 98–107)
CREAT SERPL-MCNC: 0.9 MG/DL (ref 0.51–0.95)
EGFRCR SERPLBLD CKD-EPI 2021: 72 ML/MIN/1.73M2
GLUCOSE SERPL-MCNC: 123 MG/DL (ref 70–99)
HCO3 SERPL-SCNC: 34 MMOL/L (ref 22–29)
POTASSIUM SERPL-SCNC: 4.5 MMOL/L (ref 3.4–5.3)
SODIUM SERPL-SCNC: 141 MMOL/L (ref 135–145)

## 2025-01-31 PROCEDURE — 80048 BASIC METABOLIC PNL TOTAL CA: CPT

## 2025-01-31 PROCEDURE — 36415 COLL VENOUS BLD VENIPUNCTURE: CPT

## 2025-01-31 NOTE — TELEPHONE ENCOUNTER
Mayelin is calling today to offer Heart Failure status updates. She has a stable wt of 151-152# has felt well on the Spironolactone, was advised to seek labs today, which she plans to do at [facility].  Continues to experience some shortness of breath with bilateral ankle swelling.  Denies difficulty sleeping, no chest heaviness or cough. Denies abdominal bloating. No wheezes.    Lisa PARIS RN BSN, CHFN, PCCN-K  FYI to Maira Parra, CNP

## 2025-02-04 ENCOUNTER — TELEPHONE (OUTPATIENT)
Dept: CARDIOLOGY | Facility: CLINIC | Age: 65
End: 2025-02-04
Payer: COMMERCIAL

## 2025-02-04 DIAGNOSIS — I50.22 CHRONIC SYSTOLIC CONGESTIVE HEART FAILURE (H): ICD-10-CM

## 2025-02-04 DIAGNOSIS — Z95.1 S/P CABG (CORONARY ARTERY BYPASS GRAFT): ICD-10-CM

## 2025-02-04 RX ORDER — METOPROLOL SUCCINATE 25 MG/1
37.5 TABLET, EXTENDED RELEASE ORAL 2 TIMES DAILY
Qty: 270 TABLET | Refills: 3 | Status: SHIPPED | OUTPATIENT
Start: 2025-02-04

## 2025-02-04 RX ORDER — LOSARTAN POTASSIUM 25 MG/1
12.5 TABLET ORAL DAILY
Qty: 45 TABLET | Refills: 3 | Status: SHIPPED | OUTPATIENT
Start: 2025-02-04

## 2025-02-04 NOTE — TELEPHONE ENCOUNTER
Spoke w/ pt and updated her w/ further recommendations from Maira. She verbalized understanding and is agreeable. She will continue logging VS, weights. Provided HF nurse line number for pt to call if any increased lightheadedness, dizziness, etc. Pt agreeable. She does believe she is getting a full 60oz of fluids in daily. She is also wearing compression socks as well. Will make reminder note to follow up w/ pt next week to review trends and med tolerance. Orders updated.     CARL Palmer RN

## 2025-02-04 NOTE — TELEPHONE ENCOUNTER
Maira Parra, CNP  P [facility] Core  Opt for better HR control. Increase metoprolol succinate to 37.5 mg bid. Decrease losartan to 12.5 mg daily. Continue to monitor HR, BP closely at home. Call for increased lightheadedness/dizziness. Ensure she is drinking adequate fluids. Compression socks for LE swelling and increase venous return. Follow-up in ~1-week to reassess BP/HR log.

## 2025-02-04 NOTE — TELEPHONE ENCOUNTER
Message from BMP lab results from 1/31/25     Maira Parra CNP  2/2/2025  6:17 PM CST       -Stable labs after starting spironolactone. Can we check in again on weight/fluid retention (leg swelling and SOB)?  -Please also ask her if taking pepcid has helped with GERD sx. Would encourage her to follow with PCP for ongoing gastric reflux and refills for pepcid.  -What are home HR and BPs? Is she still having intermittent lightheadedness. May still consider increasing BB if HRs are running higher.

## 2025-02-04 NOTE — TELEPHONE ENCOUNTER
Jt Vanessa RN  2/3/2025 10:48 AM CST       Patient called back:     Wt: 149.2 lbs     HF S/S: reports pain on left side since surgery.  Reports some SOB with activity on stairs or long walks, gets better with rest.  Reports some swelling at her ankles.     -110 today     BP: 108/62 and 110/67     Has not reported any issues with GERD since seeing Maira.  Encouraged her to contact PCP  if problem persists.     Thank you     Al Vanessa RN     -----------------------     Maira Parra, CNP  P [facility] Core  -Stable labs after starting spironolactone. Can we check in again on weight/fluid retention (leg swelling and SOB)?  -Please also ask her if taking pepcid has helped with GERD sx. Would encourage her to follow with PCP for ongoing gastric reflux and refills for pepcid.  -What are home HR and BPs? Is she still having intermittent lightheadedness. May still consider increasing BB if HRs are running higher.

## 2025-02-12 ENCOUNTER — LAB (OUTPATIENT)
Dept: LAB | Facility: CLINIC | Age: 65
End: 2025-02-12
Payer: COMMERCIAL

## 2025-02-12 DIAGNOSIS — R80.9 TYPE 2 DIABETES MELLITUS WITH DIABETIC MICROALBUMINURIA, WITHOUT LONG-TERM CURRENT USE OF INSULIN (H): ICD-10-CM

## 2025-02-12 DIAGNOSIS — E11.29 TYPE 2 DIABETES MELLITUS WITH DIABETIC MICROALBUMINURIA, WITHOUT LONG-TERM CURRENT USE OF INSULIN (H): ICD-10-CM

## 2025-02-12 LAB
ANION GAP SERPL CALCULATED.3IONS-SCNC: 11 MMOL/L (ref 7–15)
BUN SERPL-MCNC: 21.4 MG/DL (ref 8–23)
CALCIUM SERPL-MCNC: 9.8 MG/DL (ref 8.8–10.4)
CHLORIDE SERPL-SCNC: 103 MMOL/L (ref 98–107)
CREAT SERPL-MCNC: 0.8 MG/DL (ref 0.51–0.95)
EGFRCR SERPLBLD CKD-EPI 2021: 83 ML/MIN/1.73M2
EST. AVERAGE GLUCOSE BLD GHB EST-MCNC: 169 MG/DL
GLUCOSE SERPL-MCNC: 113 MG/DL (ref 70–99)
HBA1C MFR BLD: 7.5 % (ref 0–5.6)
HCO3 SERPL-SCNC: 27 MMOL/L (ref 22–29)
POTASSIUM SERPL-SCNC: 4.6 MMOL/L (ref 3.4–5.3)
SODIUM SERPL-SCNC: 141 MMOL/L (ref 135–145)

## 2025-02-12 PROCEDURE — 83036 HEMOGLOBIN GLYCOSYLATED A1C: CPT

## 2025-02-12 NOTE — TELEPHONE ENCOUNTER
Spoke with pt regarding med changes, pt reports her B/P today was 122/70 HR  over the last week, weight 149 lbs today, reports some lightheadedness/dizziness off and on (from a standing position, not from sudden change in position) but says it's not any different from her previous episodes, also reports a 90 min episode of right arm numbness/weakness that happened on Friday 2/7, continues to have adequate fluid intake and wears compression socks, denies any abd bloating, sob or increase in LE swelling    CONTRERAS Burton RN

## 2025-02-13 ENCOUNTER — TELEPHONE (OUTPATIENT)
Dept: CARDIOLOGY | Facility: CLINIC | Age: 65
End: 2025-02-13
Payer: COMMERCIAL

## 2025-02-13 DIAGNOSIS — Z95.1 S/P CABG (CORONARY ARTERY BYPASS GRAFT): Primary | ICD-10-CM

## 2025-02-13 RX ORDER — FUROSEMIDE 20 MG/1
40 TABLET ORAL DAILY
Qty: 180 TABLET | Refills: 3 | Status: SHIPPED | OUTPATIENT
Start: 2025-02-13

## 2025-02-13 NOTE — TELEPHONE ENCOUNTER
Mayelin is calling today to report her b/p reading was higher than her usual at 130/76 HR 93 she notes her wt is up 4# today at 158.2# has some mild LE ankle edema. She has more shortness of breath with activity today. Using Furosemide 20mg daily, normal Creat levels.   She also reports some arm numbness that comes and goes.     Maira Parra, CNP what are your recommendations at this time?    Lisa PARIS RN BSN, CHFN, PCCN-K

## 2025-02-13 NOTE — TELEPHONE ENCOUNTER
Mayelin is contacted with recommendations from Maira Parra CNP,   She was advised that the ZioPatch will be mailed out to her home with instructions on application and also how to return the monitor.   Discussed that she should be evaluated for sustained right arm pain that does not go away, by seeking the ED/[facility]. She has recently had an extensive workup for this recently with a Lexiscan and CTA. Naturally, she has anxiety related to this discomfort, given her recent CAB. Reassured today that it is not likely that she would have an area of recurrent blockage at this juncture. Nerve discomfort may be a possibility.  Orders placed.   Introduced CardioMEMS as a fluid management tool that may be helpful for her if she has ongoing challenges with wt gain/symptoms. She was receptive.  Lisa PARIS RN BSN, CHFN, PCCN-K            Aida, JAYY Lopez Shelly A, RN  Caller: Unspecified (Today,  8:23 AM)  Please return call to patient and have her increase lasix to 40 mg. BMP and NtproBNP next week. I will order zio monitor to be sent to her house to further evaluate ongoing tachycardia. Assure her BP is looking ok today. Regarding right arm numbness, she will need further work-up in person. Please review s/sx when to report to the ED with her.

## 2025-02-14 ENCOUNTER — ORDERS ONLY (AUTO-RELEASED) (OUTPATIENT)
Dept: CARDIOLOGY | Facility: CLINIC | Age: 65
End: 2025-02-14
Payer: COMMERCIAL

## 2025-02-14 DIAGNOSIS — Z95.1 S/P CABG (CORONARY ARTERY BYPASS GRAFT): ICD-10-CM

## 2025-02-16 ENCOUNTER — HOSPITAL ENCOUNTER (OUTPATIENT)
Facility: CLINIC | Age: 65
Setting detail: OBSERVATION
Discharge: ANOTHER HOSPITAL | End: 2025-02-17
Attending: EMERGENCY MEDICINE | Admitting: INTERNAL MEDICINE
Payer: COMMERCIAL

## 2025-02-16 DIAGNOSIS — R07.9 CHEST PAIN, UNSPECIFIED TYPE: ICD-10-CM

## 2025-02-16 DIAGNOSIS — Z95.1 S/P CABG (CORONARY ARTERY BYPASS GRAFT): Primary | ICD-10-CM

## 2025-02-16 DIAGNOSIS — M54.2 NECK PAIN: ICD-10-CM

## 2025-02-16 LAB
ANION GAP SERPL CALCULATED.3IONS-SCNC: 11 MMOL/L (ref 7–15)
ATRIAL RATE - MUSE: 110 BPM
BASOPHILS # BLD AUTO: 0 10E3/UL (ref 0–0.2)
BASOPHILS NFR BLD AUTO: 0 %
BUN SERPL-MCNC: 24.5 MG/DL (ref 8–23)
CALCIUM SERPL-MCNC: 9.2 MG/DL (ref 8.8–10.4)
CHLORIDE SERPL-SCNC: 98 MMOL/L (ref 98–107)
CREAT SERPL-MCNC: 0.94 MG/DL (ref 0.51–0.95)
D DIMER PPP FEU-MCNC: 0.57 UG/ML FEU (ref 0–0.5)
DIASTOLIC BLOOD PRESSURE - MUSE: NORMAL MMHG
EGFRCR SERPLBLD CKD-EPI 2021: 68 ML/MIN/1.73M2
EOSINOPHIL # BLD AUTO: 0.5 10E3/UL (ref 0–0.7)
EOSINOPHIL NFR BLD AUTO: 5 %
ERYTHROCYTE [DISTWIDTH] IN BLOOD BY AUTOMATED COUNT: 16.1 % (ref 10–15)
GLUCOSE SERPL-MCNC: 145 MG/DL (ref 70–99)
HCO3 SERPL-SCNC: 30 MMOL/L (ref 22–29)
HCT VFR BLD AUTO: 35.8 % (ref 35–47)
HGB BLD-MCNC: 11 G/DL (ref 11.7–15.7)
IMM GRANULOCYTES # BLD: 0 10E3/UL
IMM GRANULOCYTES NFR BLD: 0 %
INTERPRETATION ECG - MUSE: NORMAL
LYMPHOCYTES # BLD AUTO: 3.3 10E3/UL (ref 0.8–5.3)
LYMPHOCYTES NFR BLD AUTO: 35 %
MCH RBC QN AUTO: 23.2 PG (ref 26.5–33)
MCHC RBC AUTO-ENTMCNC: 30.7 G/DL (ref 31.5–36.5)
MCV RBC AUTO: 75 FL (ref 78–100)
MONOCYTES # BLD AUTO: 0.8 10E3/UL (ref 0–1.3)
MONOCYTES NFR BLD AUTO: 9 %
NEUTROPHILS # BLD AUTO: 4.7 10E3/UL (ref 1.6–8.3)
NEUTROPHILS NFR BLD AUTO: 50 %
NRBC # BLD AUTO: 0 10E3/UL
NRBC BLD AUTO-RTO: 0 /100
NT-PROBNP SERPL-MCNC: 991 PG/ML (ref 0–900)
P AXIS - MUSE: 71 DEGREES
PLATELET # BLD AUTO: 332 10E3/UL (ref 150–450)
POTASSIUM SERPL-SCNC: 3.8 MMOL/L (ref 3.4–5.3)
PR INTERVAL - MUSE: 166 MS
QRS DURATION - MUSE: 80 MS
QT - MUSE: 354 MS
QTC - MUSE: 479 MS
R AXIS - MUSE: 7 DEGREES
RBC # BLD AUTO: 4.75 10E6/UL (ref 3.8–5.2)
SODIUM SERPL-SCNC: 139 MMOL/L (ref 135–145)
SYSTOLIC BLOOD PRESSURE - MUSE: NORMAL MMHG
T AXIS - MUSE: 94 DEGREES
TROPONIN T SERPL HS-MCNC: 21 NG/L
VENTRICULAR RATE- MUSE: 110 BPM
WBC # BLD AUTO: 9.3 10E3/UL (ref 4–11)

## 2025-02-16 PROCEDURE — 80048 BASIC METABOLIC PNL TOTAL CA: CPT | Performed by: EMERGENCY MEDICINE

## 2025-02-16 PROCEDURE — 85048 AUTOMATED LEUKOCYTE COUNT: CPT | Performed by: EMERGENCY MEDICINE

## 2025-02-16 PROCEDURE — 83880 ASSAY OF NATRIURETIC PEPTIDE: CPT | Performed by: EMERGENCY MEDICINE

## 2025-02-16 PROCEDURE — 99285 EMERGENCY DEPT VISIT HI MDM: CPT | Mod: 25

## 2025-02-16 PROCEDURE — 85379 FIBRIN DEGRADATION QUANT: CPT | Performed by: EMERGENCY MEDICINE

## 2025-02-16 PROCEDURE — 250N000013 HC RX MED GY IP 250 OP 250 PS 637: Performed by: EMERGENCY MEDICINE

## 2025-02-16 PROCEDURE — 84484 ASSAY OF TROPONIN QUANT: CPT | Performed by: EMERGENCY MEDICINE

## 2025-02-16 PROCEDURE — 93005 ELECTROCARDIOGRAM TRACING: CPT | Performed by: EMERGENCY MEDICINE

## 2025-02-16 PROCEDURE — 82310 ASSAY OF CALCIUM: CPT | Performed by: EMERGENCY MEDICINE

## 2025-02-16 PROCEDURE — 85025 COMPLETE CBC W/AUTO DIFF WBC: CPT | Performed by: EMERGENCY MEDICINE

## 2025-02-16 PROCEDURE — 36415 COLL VENOUS BLD VENIPUNCTURE: CPT | Performed by: EMERGENCY MEDICINE

## 2025-02-16 RX ORDER — NITROGLYCERIN 0.4 MG/1
0.4 TABLET SUBLINGUAL EVERY 5 MIN PRN
Status: DISCONTINUED | OUTPATIENT
Start: 2025-02-16 | End: 2025-02-17 | Stop reason: HOSPADM

## 2025-02-16 RX ORDER — ASPIRIN 81 MG/1
162 TABLET, CHEWABLE ORAL ONCE
Status: COMPLETED | OUTPATIENT
Start: 2025-02-16 | End: 2025-02-16

## 2025-02-16 RX ADMIN — NITROGLYCERIN 0.4 MG: 0.4 TABLET, ORALLY DISINTEGRATING SUBLINGUAL at 23:23

## 2025-02-16 RX ADMIN — ASPIRIN 81 MG CHEWABLE TABLET 162 MG: 81 TABLET CHEWABLE at 23:22

## 2025-02-17 ENCOUNTER — APPOINTMENT (OUTPATIENT)
Dept: CARDIOLOGY | Facility: CLINIC | Age: 65
End: 2025-02-17
Attending: INTERNAL MEDICINE
Payer: COMMERCIAL

## 2025-02-17 ENCOUNTER — APPOINTMENT (OUTPATIENT)
Dept: CT IMAGING | Facility: CLINIC | Age: 65
End: 2025-02-17
Attending: FAMILY MEDICINE

## 2025-02-17 ENCOUNTER — APPOINTMENT (OUTPATIENT)
Dept: RADIOLOGY | Facility: CLINIC | Age: 65
End: 2025-02-17
Attending: EMERGENCY MEDICINE

## 2025-02-17 ENCOUNTER — HOSPITAL ENCOUNTER (OUTPATIENT)
Facility: HOSPITAL | Age: 65
Setting detail: OBSERVATION
LOS: 1 days | Discharge: HOME | End: 2025-02-18
Attending: INTERNAL MEDICINE | Admitting: INTERNAL MEDICINE
Payer: COMMERCIAL

## 2025-02-17 VITALS
BODY MASS INDEX: 27.97 KG/M2 | SYSTOLIC BLOOD PRESSURE: 131 MMHG | TEMPERATURE: 97.7 F | OXYGEN SATURATION: 99 % | DIASTOLIC BLOOD PRESSURE: 69 MMHG | RESPIRATION RATE: 25 BRPM | HEART RATE: 87 BPM | WEIGHT: 152 LBS | HEIGHT: 62 IN

## 2025-02-17 DIAGNOSIS — M54.12 CERVICAL RADICULOPATHY: Primary | ICD-10-CM

## 2025-02-17 DIAGNOSIS — R07.9 CHEST PAIN, UNSPECIFIED TYPE: ICD-10-CM

## 2025-02-17 DIAGNOSIS — Z95.1 S/P CABG (CORONARY ARTERY BYPASS GRAFT): ICD-10-CM

## 2025-02-17 PROBLEM — E11.628 TYPE 2 DIABETES MELLITUS WITH SKIN COMPLICATION, WITHOUT LONG-TERM CURRENT USE OF INSULIN (H): Status: ACTIVE | Noted: 2020-10-17

## 2025-02-17 PROBLEM — I25.110 CORONARY ARTERY DISEASE INVOLVING NATIVE CORONARY ARTERY OF NATIVE HEART WITH UNSTABLE ANGINA PECTORIS (H): Status: ACTIVE | Noted: 2024-12-10

## 2025-02-17 PROBLEM — M48.02 NEUROFORAMINAL STENOSIS OF CERVICAL SPINE: Status: ACTIVE | Noted: 2025-02-17

## 2025-02-17 PROBLEM — J45.909 ASTHMA: Status: ACTIVE | Noted: 2023-01-31

## 2025-02-17 LAB
ABO + RH BLD: NORMAL
BLD GP AB SCN SERPL QL: NEGATIVE
GLUCOSE BLDC GLUCOMTR-MCNC: 102 MG/DL (ref 70–99)
GLUCOSE BLDC GLUCOMTR-MCNC: 114 MG/DL (ref 70–99)
GLUCOSE BLDC GLUCOMTR-MCNC: 162 MG/DL (ref 70–99)
GLUCOSE BLDC GLUCOMTR-MCNC: 255 MG/DL (ref 70–99)
LVEF ECHO: NORMAL
SPECIMEN EXP DATE BLD: NORMAL
TROPONIN T SERPL HS-MCNC: 19 NG/L

## 2025-02-17 PROCEDURE — 258N000003 HC RX IP 258 OP 636: Performed by: PHYSICIAN ASSISTANT

## 2025-02-17 PROCEDURE — 99239 HOSP IP/OBS DSCHRG MGMT >30: CPT | Performed by: FAMILY MEDICINE

## 2025-02-17 PROCEDURE — G0378 HOSPITAL OBSERVATION PER HR: HCPCS

## 2025-02-17 PROCEDURE — 250N000013 HC RX MED GY IP 250 OP 250 PS 637: Performed by: FAMILY MEDICINE

## 2025-02-17 PROCEDURE — 255N000002 HC RX 255 OP 636: Performed by: INTERNAL MEDICINE

## 2025-02-17 PROCEDURE — 99152 MOD SED SAME PHYS/QHP 5/>YRS: CPT | Performed by: INTERNAL MEDICINE

## 2025-02-17 PROCEDURE — 250N000011 HC RX IP 250 OP 636: Performed by: INTERNAL MEDICINE

## 2025-02-17 PROCEDURE — 82962 GLUCOSE BLOOD TEST: CPT

## 2025-02-17 PROCEDURE — 272N000001 HC OR GENERAL SUPPLY STERILE: Performed by: INTERNAL MEDICINE

## 2025-02-17 PROCEDURE — 93306 TTE W/DOPPLER COMPLETE: CPT | Mod: 26 | Performed by: INTERNAL MEDICINE

## 2025-02-17 PROCEDURE — 99153 MOD SED SAME PHYS/QHP EA: CPT | Performed by: INTERNAL MEDICINE

## 2025-02-17 PROCEDURE — 999N000208 ECHOCARDIOGRAM COMPLETE

## 2025-02-17 PROCEDURE — 84484 ASSAY OF TROPONIN QUANT: CPT | Performed by: EMERGENCY MEDICINE

## 2025-02-17 PROCEDURE — 36415 COLL VENOUS BLD VENIPUNCTURE: CPT | Performed by: FAMILY MEDICINE

## 2025-02-17 PROCEDURE — 96361 HYDRATE IV INFUSION ADD-ON: CPT | Mod: 59

## 2025-02-17 PROCEDURE — 99222 1ST HOSP IP/OBS MODERATE 55: CPT | Performed by: INTERNAL MEDICINE

## 2025-02-17 PROCEDURE — C1894 INTRO/SHEATH, NON-LASER: HCPCS | Performed by: INTERNAL MEDICINE

## 2025-02-17 PROCEDURE — 99223 1ST HOSP IP/OBS HIGH 75: CPT | Performed by: INTERNAL MEDICINE

## 2025-02-17 PROCEDURE — 72125 CT NECK SPINE W/O DYE: CPT

## 2025-02-17 PROCEDURE — 250N000013 HC RX MED GY IP 250 OP 250 PS 637: Performed by: INTERNAL MEDICINE

## 2025-02-17 PROCEDURE — 93455 CORONARY ART/GRFT ANGIO S&I: CPT | Performed by: INTERNAL MEDICINE

## 2025-02-17 PROCEDURE — 250N000012 HC RX MED GY IP 250 OP 636 PS 637: Performed by: FAMILY MEDICINE

## 2025-02-17 PROCEDURE — 71045 X-RAY EXAM CHEST 1 VIEW: CPT

## 2025-02-17 PROCEDURE — 250N000011 HC RX IP 250 OP 636: Performed by: EMERGENCY MEDICINE

## 2025-02-17 PROCEDURE — 96372 THER/PROPH/DIAG INJ SC/IM: CPT | Performed by: INTERNAL MEDICINE

## 2025-02-17 PROCEDURE — 93455 CORONARY ART/GRFT ANGIO S&I: CPT | Mod: 26 | Performed by: INTERNAL MEDICINE

## 2025-02-17 PROCEDURE — 96374 THER/PROPH/DIAG INJ IV PUSH: CPT | Mod: XU

## 2025-02-17 PROCEDURE — 258N000003 HC RX IP 258 OP 636: Performed by: INTERNAL MEDICINE

## 2025-02-17 PROCEDURE — 36415 COLL VENOUS BLD VENIPUNCTURE: CPT | Performed by: EMERGENCY MEDICINE

## 2025-02-17 PROCEDURE — 250N000009 HC RX 250: Performed by: INTERNAL MEDICINE

## 2025-02-17 PROCEDURE — C1769 GUIDE WIRE: HCPCS | Performed by: INTERNAL MEDICINE

## 2025-02-17 PROCEDURE — 96360 HYDRATION IV INFUSION INIT: CPT | Mod: 59

## 2025-02-17 PROCEDURE — 86900 BLOOD TYPING SEROLOGIC ABO: CPT | Performed by: FAMILY MEDICINE

## 2025-02-17 PROCEDURE — 96361 HYDRATE IV INFUSION ADD-ON: CPT

## 2025-02-17 RX ORDER — HYDROMORPHONE HYDROCHLORIDE 2 MG/1
2 TABLET ORAL EVERY 4 HOURS PRN
Status: DISCONTINUED | OUTPATIENT
Start: 2025-02-17 | End: 2025-02-18 | Stop reason: HOSPADM

## 2025-02-17 RX ORDER — ALBUTEROL SULFATE 90 UG/1
2 INHALANT RESPIRATORY (INHALATION) EVERY 6 HOURS PRN
Status: DISCONTINUED | OUTPATIENT
Start: 2025-02-17 | End: 2025-02-17 | Stop reason: HOSPADM

## 2025-02-17 RX ORDER — NITROGLYCERIN 0.4 MG/1
0.4 TABLET SUBLINGUAL EVERY 5 MIN PRN
Status: DISCONTINUED | OUTPATIENT
Start: 2025-02-17 | End: 2025-02-18 | Stop reason: HOSPADM

## 2025-02-17 RX ORDER — SODIUM CHLORIDE 9 MG/ML
75 INJECTION, SOLUTION INTRAVENOUS CONTINUOUS
Status: ACTIVE | OUTPATIENT
Start: 2025-02-17 | End: 2025-02-17

## 2025-02-17 RX ORDER — NICOTINE POLACRILEX 4 MG
15-30 LOZENGE BUCCAL
Status: DISCONTINUED | OUTPATIENT
Start: 2025-02-17 | End: 2025-02-18 | Stop reason: HOSPADM

## 2025-02-17 RX ORDER — HEPARIN SODIUM 1000 [USP'U]/ML
INJECTION, SOLUTION INTRAVENOUS; SUBCUTANEOUS
Status: DISCONTINUED | OUTPATIENT
Start: 2025-02-17 | End: 2025-02-17 | Stop reason: HOSPADM

## 2025-02-17 RX ORDER — NALOXONE HYDROCHLORIDE 0.4 MG/ML
0.2 INJECTION, SOLUTION INTRAMUSCULAR; INTRAVENOUS; SUBCUTANEOUS
Status: DISCONTINUED | OUTPATIENT
Start: 2025-02-17 | End: 2025-02-18 | Stop reason: HOSPADM

## 2025-02-17 RX ORDER — ONDANSETRON 2 MG/ML
4 INJECTION INTRAMUSCULAR; INTRAVENOUS EVERY 6 HOURS PRN
Status: DISCONTINUED | OUTPATIENT
Start: 2025-02-17 | End: 2025-02-17 | Stop reason: HOSPADM

## 2025-02-17 RX ORDER — HYDROMORPHONE HCL IN WATER/PF 6 MG/30 ML
0.2 PATIENT CONTROLLED ANALGESIA SYRINGE INTRAVENOUS
Status: CANCELLED | OUTPATIENT
Start: 2025-02-17

## 2025-02-17 RX ORDER — ASPIRIN 325 MG
325 TABLET ORAL ONCE
Status: DISCONTINUED | OUTPATIENT
Start: 2025-02-17 | End: 2025-02-17 | Stop reason: HOSPADM

## 2025-02-17 RX ORDER — HYDROMORPHONE HYDROCHLORIDE 2 MG/1
2 TABLET ORAL EVERY 4 HOURS PRN
Status: CANCELLED | OUTPATIENT
Start: 2025-02-17

## 2025-02-17 RX ORDER — AMOXICILLIN 250 MG
1 CAPSULE ORAL 2 TIMES DAILY PRN
Status: DISCONTINUED | OUTPATIENT
Start: 2025-02-17 | End: 2025-02-18 | Stop reason: HOSPADM

## 2025-02-17 RX ORDER — CITALOPRAM HYDROBROMIDE 10 MG/1
20 TABLET ORAL EVERY MORNING
Status: DISCONTINUED | OUTPATIENT
Start: 2025-02-17 | End: 2025-02-17 | Stop reason: HOSPADM

## 2025-02-17 RX ORDER — AMOXICILLIN 250 MG
1 CAPSULE ORAL 2 TIMES DAILY PRN
Status: DISCONTINUED | OUTPATIENT
Start: 2025-02-17 | End: 2025-02-17 | Stop reason: HOSPADM

## 2025-02-17 RX ORDER — FUROSEMIDE 20 MG/1
40 TABLET ORAL DAILY
Status: DISCONTINUED | OUTPATIENT
Start: 2025-02-18 | End: 2025-02-18 | Stop reason: HOSPADM

## 2025-02-17 RX ORDER — SPIRONOLACTONE 25 MG
12.5 TABLET ORAL DAILY
Status: CANCELLED | OUTPATIENT
Start: 2025-02-18

## 2025-02-17 RX ORDER — FAMOTIDINE 20 MG/1
20 TABLET, FILM COATED ORAL DAILY
Status: CANCELLED | OUTPATIENT
Start: 2025-02-18

## 2025-02-17 RX ORDER — AMOXICILLIN 250 MG
2 CAPSULE ORAL 2 TIMES DAILY PRN
Status: DISCONTINUED | OUTPATIENT
Start: 2025-02-17 | End: 2025-02-18 | Stop reason: HOSPADM

## 2025-02-17 RX ORDER — NALOXONE HYDROCHLORIDE 0.4 MG/ML
0.2 INJECTION, SOLUTION INTRAMUSCULAR; INTRAVENOUS; SUBCUTANEOUS
Status: DISCONTINUED | OUTPATIENT
Start: 2025-02-17 | End: 2025-02-17

## 2025-02-17 RX ORDER — PRAMIPEXOLE DIHYDROCHLORIDE 0.12 MG/1
0.12 TABLET ORAL DAILY
Status: DISCONTINUED | OUTPATIENT
Start: 2025-02-17 | End: 2025-02-17 | Stop reason: HOSPADM

## 2025-02-17 RX ORDER — DEXTROSE MONOHYDRATE 25 G/50ML
25-50 INJECTION, SOLUTION INTRAVENOUS
Status: DISCONTINUED | OUTPATIENT
Start: 2025-02-17 | End: 2025-02-18 | Stop reason: HOSPADM

## 2025-02-17 RX ORDER — ONDANSETRON 2 MG/ML
4 INJECTION INTRAMUSCULAR; INTRAVENOUS EVERY 6 HOURS PRN
Status: DISCONTINUED | OUTPATIENT
Start: 2025-02-17 | End: 2025-02-18 | Stop reason: HOSPADM

## 2025-02-17 RX ORDER — PROCHLORPERAZINE MALEATE 10 MG
10 TABLET ORAL EVERY 6 HOURS PRN
Status: DISCONTINUED | OUTPATIENT
Start: 2025-02-17 | End: 2025-02-18 | Stop reason: HOSPADM

## 2025-02-17 RX ORDER — HYDROMORPHONE HCL IN WATER/PF 6 MG/30 ML
0.4 PATIENT CONTROLLED ANALGESIA SYRINGE INTRAVENOUS
Status: DISCONTINUED | OUTPATIENT
Start: 2025-02-17 | End: 2025-02-17 | Stop reason: HOSPADM

## 2025-02-17 RX ORDER — NICOTINE POLACRILEX 4 MG
15-30 LOZENGE BUCCAL
Status: CANCELLED | OUTPATIENT
Start: 2025-02-17

## 2025-02-17 RX ORDER — FAMOTIDINE 20 MG/1
20 TABLET, FILM COATED ORAL DAILY
Status: DISCONTINUED | OUTPATIENT
Start: 2025-02-17 | End: 2025-02-17 | Stop reason: HOSPADM

## 2025-02-17 RX ORDER — FENTANYL CITRATE 50 UG/ML
50 INJECTION, SOLUTION INTRAMUSCULAR; INTRAVENOUS ONCE
Status: COMPLETED | OUTPATIENT
Start: 2025-02-17 | End: 2025-02-17

## 2025-02-17 RX ORDER — OXYCODONE HYDROCHLORIDE 5 MG/1
10 TABLET ORAL EVERY 4 HOURS PRN
Status: DISCONTINUED | OUTPATIENT
Start: 2025-02-17 | End: 2025-02-18 | Stop reason: HOSPADM

## 2025-02-17 RX ORDER — LIDOCAINE 40 MG/G
CREAM TOPICAL DAILY PRN
Status: CANCELLED | OUTPATIENT
Start: 2025-02-17

## 2025-02-17 RX ORDER — ONDANSETRON 2 MG/ML
4 INJECTION INTRAMUSCULAR; INTRAVENOUS EVERY 6 HOURS PRN
Status: CANCELLED | OUTPATIENT
Start: 2025-02-17

## 2025-02-17 RX ORDER — HYDROMORPHONE HYDROCHLORIDE 2 MG/1
2 TABLET ORAL EVERY 4 HOURS PRN
Status: DISCONTINUED | OUTPATIENT
Start: 2025-02-17 | End: 2025-02-17 | Stop reason: HOSPADM

## 2025-02-17 RX ORDER — ENOXAPARIN SODIUM 100 MG/ML
40 INJECTION SUBCUTANEOUS EVERY 24 HOURS
Status: CANCELLED | OUTPATIENT
Start: 2025-02-18

## 2025-02-17 RX ORDER — PREDNISONE 20 MG/1
40 TABLET ORAL DAILY
Status: DISCONTINUED | OUTPATIENT
Start: 2025-02-17 | End: 2025-02-17 | Stop reason: HOSPADM

## 2025-02-17 RX ORDER — ENOXAPARIN SODIUM 100 MG/ML
40 INJECTION SUBCUTANEOUS EVERY 24 HOURS
Status: DISCONTINUED | OUTPATIENT
Start: 2025-02-17 | End: 2025-02-17 | Stop reason: HOSPADM

## 2025-02-17 RX ORDER — LORAZEPAM 0.5 MG/1
0.5 TABLET ORAL ONCE
Status: COMPLETED | OUTPATIENT
Start: 2025-02-17 | End: 2025-02-17

## 2025-02-17 RX ORDER — ASPIRIN 81 MG/1
162 TABLET, CHEWABLE ORAL DAILY
Status: DISCONTINUED | OUTPATIENT
Start: 2025-02-18 | End: 2025-02-18 | Stop reason: HOSPADM

## 2025-02-17 RX ORDER — HYDRALAZINE HYDROCHLORIDE 20 MG/ML
10 INJECTION INTRAMUSCULAR; INTRAVENOUS
Status: DISCONTINUED | OUTPATIENT
Start: 2025-02-17 | End: 2025-02-18 | Stop reason: HOSPADM

## 2025-02-17 RX ORDER — PRAMIPEXOLE DIHYDROCHLORIDE 0.12 MG/1
0.12 TABLET ORAL DAILY
Status: DISCONTINUED | OUTPATIENT
Start: 2025-02-18 | End: 2025-02-18 | Stop reason: HOSPADM

## 2025-02-17 RX ORDER — SPIRONOLACTONE 25 MG
12.5 TABLET ORAL DAILY
Status: DISCONTINUED | OUTPATIENT
Start: 2025-02-18 | End: 2025-02-18 | Stop reason: HOSPADM

## 2025-02-17 RX ORDER — AMOXICILLIN 250 MG
2 CAPSULE ORAL 2 TIMES DAILY PRN
Status: DISCONTINUED | OUTPATIENT
Start: 2025-02-17 | End: 2025-02-17 | Stop reason: HOSPADM

## 2025-02-17 RX ORDER — ASPIRIN 81 MG/1
162 TABLET, CHEWABLE ORAL DAILY
Status: DISCONTINUED | OUTPATIENT
Start: 2025-02-17 | End: 2025-02-17 | Stop reason: HOSPADM

## 2025-02-17 RX ORDER — POLYETHYLENE GLYCOL 3350 17 G/17G
17 POWDER, FOR SOLUTION ORAL DAILY PRN
Status: DISCONTINUED | OUTPATIENT
Start: 2025-02-17 | End: 2025-02-17 | Stop reason: HOSPADM

## 2025-02-17 RX ORDER — PRAMIPEXOLE DIHYDROCHLORIDE 0.5 MG/1
0.5 TABLET ORAL AT BEDTIME
Status: DISCONTINUED | OUTPATIENT
Start: 2025-02-17 | End: 2025-02-17 | Stop reason: HOSPADM

## 2025-02-17 RX ORDER — NALOXONE HYDROCHLORIDE 0.4 MG/ML
0.4 INJECTION, SOLUTION INTRAMUSCULAR; INTRAVENOUS; SUBCUTANEOUS
Status: DISCONTINUED | OUTPATIENT
Start: 2025-02-17 | End: 2025-02-18 | Stop reason: HOSPADM

## 2025-02-17 RX ORDER — FENTANYL CITRATE 50 UG/ML
25 INJECTION, SOLUTION INTRAMUSCULAR; INTRAVENOUS
Status: DISCONTINUED | OUTPATIENT
Start: 2025-02-17 | End: 2025-02-17

## 2025-02-17 RX ORDER — LIDOCAINE 40 MG/G
CREAM TOPICAL DAILY PRN
Status: DISCONTINUED | OUTPATIENT
Start: 2025-02-17 | End: 2025-02-18 | Stop reason: HOSPADM

## 2025-02-17 RX ORDER — PROCHLORPERAZINE MALEATE 10 MG
10 TABLET ORAL EVERY 6 HOURS PRN
Status: CANCELLED | OUTPATIENT
Start: 2025-02-17

## 2025-02-17 RX ORDER — GABAPENTIN 100 MG/1
100 CAPSULE ORAL 2 TIMES DAILY
Status: DISCONTINUED | OUTPATIENT
Start: 2025-02-17 | End: 2025-02-17

## 2025-02-17 RX ORDER — ACETAMINOPHEN 650 MG/1
650 SUPPOSITORY RECTAL EVERY 4 HOURS PRN
Status: CANCELLED | OUTPATIENT
Start: 2025-02-17

## 2025-02-17 RX ORDER — PRAMIPEXOLE DIHYDROCHLORIDE 0.12 MG/1
0.12 TABLET ORAL DAILY
Status: CANCELLED | OUTPATIENT
Start: 2025-02-18

## 2025-02-17 RX ORDER — LIDOCAINE 40 MG/G
CREAM TOPICAL
Status: CANCELLED | OUTPATIENT
Start: 2025-02-17

## 2025-02-17 RX ORDER — CITALOPRAM HYDROBROMIDE 20 MG/1
20 TABLET ORAL EVERY MORNING
Status: DISCONTINUED | OUTPATIENT
Start: 2025-02-18 | End: 2025-02-17

## 2025-02-17 RX ORDER — ASPIRIN 81 MG/1
162 TABLET, CHEWABLE ORAL DAILY
Status: DISCONTINUED | OUTPATIENT
Start: 2025-02-17 | End: 2025-02-17

## 2025-02-17 RX ORDER — ACETAMINOPHEN 650 MG/1
650 SUPPOSITORY RECTAL EVERY 4 HOURS PRN
Status: DISCONTINUED | OUTPATIENT
Start: 2025-02-17 | End: 2025-02-17

## 2025-02-17 RX ORDER — ASPIRIN 81 MG/1
162 TABLET, CHEWABLE ORAL DAILY
Status: CANCELLED | OUTPATIENT
Start: 2025-02-18

## 2025-02-17 RX ORDER — POLYETHYLENE GLYCOL 3350 17 G/17G
17 POWDER, FOR SOLUTION ORAL DAILY PRN
Status: CANCELLED | OUTPATIENT
Start: 2025-02-17

## 2025-02-17 RX ORDER — NITROGLYCERIN 5 MG/ML
VIAL (ML) INTRAVENOUS
Status: DISCONTINUED | OUTPATIENT
Start: 2025-02-17 | End: 2025-02-17 | Stop reason: HOSPADM

## 2025-02-17 RX ORDER — OXYCODONE HYDROCHLORIDE 5 MG/1
5 TABLET ORAL EVERY 4 HOURS PRN
Status: DISCONTINUED | OUTPATIENT
Start: 2025-02-17 | End: 2025-02-18 | Stop reason: HOSPADM

## 2025-02-17 RX ORDER — LIDOCAINE 40 MG/G
CREAM TOPICAL DAILY PRN
Status: DISCONTINUED | OUTPATIENT
Start: 2025-02-17 | End: 2025-02-17 | Stop reason: HOSPADM

## 2025-02-17 RX ORDER — ALBUTEROL SULFATE 90 UG/1
2 INHALANT RESPIRATORY (INHALATION) EVERY 6 HOURS PRN
Status: DISCONTINUED | OUTPATIENT
Start: 2025-02-17 | End: 2025-02-18 | Stop reason: HOSPADM

## 2025-02-17 RX ORDER — ALBUTEROL SULFATE 90 UG/1
2 INHALANT RESPIRATORY (INHALATION) EVERY 6 HOURS PRN
Status: CANCELLED | OUTPATIENT
Start: 2025-02-17

## 2025-02-17 RX ORDER — FUROSEMIDE 20 MG/1
40 TABLET ORAL DAILY
Status: CANCELLED | OUTPATIENT
Start: 2025-02-18

## 2025-02-17 RX ORDER — HYDROMORPHONE HCL IN WATER/PF 6 MG/30 ML
0.2 PATIENT CONTROLLED ANALGESIA SYRINGE INTRAVENOUS
Status: DISCONTINUED | OUTPATIENT
Start: 2025-02-17 | End: 2025-02-17 | Stop reason: HOSPADM

## 2025-02-17 RX ORDER — PROCHLORPERAZINE MALEATE 10 MG
10 TABLET ORAL EVERY 6 HOURS PRN
Status: DISCONTINUED | OUTPATIENT
Start: 2025-02-17 | End: 2025-02-17 | Stop reason: HOSPADM

## 2025-02-17 RX ORDER — ATORVASTATIN CALCIUM 40 MG/1
80 TABLET, FILM COATED ORAL DAILY
Status: CANCELLED | OUTPATIENT
Start: 2025-02-18

## 2025-02-17 RX ORDER — ONDANSETRON 4 MG/1
4 TABLET, ORALLY DISINTEGRATING ORAL EVERY 6 HOURS PRN
Status: DISCONTINUED | OUTPATIENT
Start: 2025-02-17 | End: 2025-02-18 | Stop reason: HOSPADM

## 2025-02-17 RX ORDER — ATORVASTATIN CALCIUM 40 MG/1
80 TABLET, FILM COATED ORAL DAILY
Status: DISCONTINUED | OUTPATIENT
Start: 2025-02-17 | End: 2025-02-17 | Stop reason: HOSPADM

## 2025-02-17 RX ORDER — NALOXONE HYDROCHLORIDE 0.4 MG/ML
0.4 INJECTION, SOLUTION INTRAMUSCULAR; INTRAVENOUS; SUBCUTANEOUS
Status: DISCONTINUED | OUTPATIENT
Start: 2025-02-17 | End: 2025-02-17

## 2025-02-17 RX ORDER — FUROSEMIDE 20 MG/1
40 TABLET ORAL DAILY
Status: DISCONTINUED | OUTPATIENT
Start: 2025-02-17 | End: 2025-02-17 | Stop reason: HOSPADM

## 2025-02-17 RX ORDER — FUROSEMIDE 20 MG/1
30 TABLET ORAL DAILY
Status: ON HOLD | COMMUNITY
End: 2025-02-18

## 2025-02-17 RX ORDER — ASPIRIN 81 MG/1
162 TABLET, CHEWABLE ORAL ONCE
Status: COMPLETED | OUTPATIENT
Start: 2025-02-17 | End: 2025-02-17

## 2025-02-17 RX ORDER — SODIUM CHLORIDE 9 MG/ML
INJECTION, SOLUTION INTRAVENOUS CONTINUOUS
Status: CANCELLED | OUTPATIENT
Start: 2025-02-17

## 2025-02-17 RX ORDER — IODIXANOL 320 MG/ML
INJECTION, SOLUTION INTRAVASCULAR
Status: DISCONTINUED | OUTPATIENT
Start: 2025-02-17 | End: 2025-02-17 | Stop reason: HOSPADM

## 2025-02-17 RX ORDER — POLYETHYLENE GLYCOL 3350 17 G/17G
17 POWDER, FOR SOLUTION ORAL DAILY PRN
Status: DISCONTINUED | OUTPATIENT
Start: 2025-02-17 | End: 2025-02-18 | Stop reason: HOSPADM

## 2025-02-17 RX ORDER — AMOXICILLIN 250 MG
2 CAPSULE ORAL 2 TIMES DAILY PRN
Status: CANCELLED | OUTPATIENT
Start: 2025-02-17

## 2025-02-17 RX ORDER — ACETAMINOPHEN 325 MG/1
650 TABLET ORAL EVERY 4 HOURS PRN
Status: DISCONTINUED | OUTPATIENT
Start: 2025-02-17 | End: 2025-02-17

## 2025-02-17 RX ORDER — PREDNISONE 20 MG/1
40 TABLET ORAL DAILY
Status: DISCONTINUED | OUTPATIENT
Start: 2025-02-18 | End: 2025-02-17

## 2025-02-17 RX ORDER — HYDROMORPHONE HCL IN WATER/PF 6 MG/30 ML
0.4 PATIENT CONTROLLED ANALGESIA SYRINGE INTRAVENOUS
Status: CANCELLED | OUTPATIENT
Start: 2025-02-17

## 2025-02-17 RX ORDER — FENTANYL CITRATE 50 UG/ML
INJECTION, SOLUTION INTRAMUSCULAR; INTRAVENOUS
Status: DISCONTINUED | OUTPATIENT
Start: 2025-02-17 | End: 2025-02-17 | Stop reason: HOSPADM

## 2025-02-17 RX ORDER — GABAPENTIN 100 MG/1
100 CAPSULE ORAL AT BEDTIME
Status: DISCONTINUED | OUTPATIENT
Start: 2025-02-17 | End: 2025-02-17

## 2025-02-17 RX ORDER — GABAPENTIN 100 MG/1
100 CAPSULE ORAL AT BEDTIME
Status: DISCONTINUED | OUTPATIENT
Start: 2025-02-17 | End: 2025-02-17 | Stop reason: HOSPADM

## 2025-02-17 RX ORDER — PRAMIPEXOLE DIHYDROCHLORIDE 0.5 MG/1
0.5 TABLET ORAL AT BEDTIME
Status: CANCELLED | OUTPATIENT
Start: 2025-02-17

## 2025-02-17 RX ORDER — CITALOPRAM HYDROBROMIDE 10 MG/1
20 TABLET ORAL EVERY MORNING
Status: CANCELLED | OUTPATIENT
Start: 2025-02-18

## 2025-02-17 RX ORDER — ACETAMINOPHEN 325 MG/1
650 TABLET ORAL EVERY 4 HOURS PRN
Status: DISCONTINUED | OUTPATIENT
Start: 2025-02-17 | End: 2025-02-17 | Stop reason: HOSPADM

## 2025-02-17 RX ORDER — HYDROXYZINE HYDROCHLORIDE 10 MG/1
10 TABLET, FILM COATED ORAL EVERY 8 HOURS PRN
Status: DISCONTINUED | OUTPATIENT
Start: 2025-02-17 | End: 2025-02-18 | Stop reason: HOSPADM

## 2025-02-17 RX ORDER — NICOTINE POLACRILEX 4 MG
15-30 LOZENGE BUCCAL
Status: DISCONTINUED | OUTPATIENT
Start: 2025-02-17 | End: 2025-02-17 | Stop reason: HOSPADM

## 2025-02-17 RX ORDER — ACETAMINOPHEN 325 MG/1
650 TABLET ORAL EVERY 4 HOURS PRN
Status: DISCONTINUED | OUTPATIENT
Start: 2025-02-17 | End: 2025-02-18 | Stop reason: HOSPADM

## 2025-02-17 RX ORDER — ONDANSETRON 4 MG/1
4 TABLET, ORALLY DISINTEGRATING ORAL EVERY 6 HOURS PRN
Status: CANCELLED | OUTPATIENT
Start: 2025-02-17

## 2025-02-17 RX ORDER — FAMOTIDINE 20 MG/1
20 TABLET, FILM COATED ORAL DAILY
Status: DISCONTINUED | OUTPATIENT
Start: 2025-02-18 | End: 2025-02-18 | Stop reason: HOSPADM

## 2025-02-17 RX ORDER — DEXTROSE MONOHYDRATE 25 G/50ML
25-50 INJECTION, SOLUTION INTRAVENOUS
Status: DISCONTINUED | OUTPATIENT
Start: 2025-02-17 | End: 2025-02-17 | Stop reason: HOSPADM

## 2025-02-17 RX ORDER — HYDROXYZINE HYDROCHLORIDE 10 MG/1
10 TABLET, FILM COATED ORAL EVERY 8 HOURS PRN
Status: DISCONTINUED | OUTPATIENT
Start: 2025-02-17 | End: 2025-02-17 | Stop reason: HOSPADM

## 2025-02-17 RX ORDER — GABAPENTIN 100 MG/1
100 CAPSULE ORAL 3 TIMES DAILY
Status: DISCONTINUED | OUTPATIENT
Start: 2025-02-18 | End: 2025-02-18 | Stop reason: HOSPADM

## 2025-02-17 RX ORDER — ASPIRIN 325 MG
325 TABLET ORAL ONCE
Status: CANCELLED | OUTPATIENT
Start: 2025-02-17 | End: 2025-02-17

## 2025-02-17 RX ORDER — PRAMIPEXOLE DIHYDROCHLORIDE 0.5 MG/1
0.5 TABLET ORAL AT BEDTIME
Status: DISCONTINUED | OUTPATIENT
Start: 2025-02-17 | End: 2025-02-18 | Stop reason: HOSPADM

## 2025-02-17 RX ORDER — ENOXAPARIN SODIUM 100 MG/ML
40 INJECTION SUBCUTANEOUS EVERY 24 HOURS
Status: DISCONTINUED | OUTPATIENT
Start: 2025-02-18 | End: 2025-02-18 | Stop reason: HOSPADM

## 2025-02-17 RX ORDER — ATORVASTATIN CALCIUM 40 MG/1
80 TABLET, FILM COATED ORAL DAILY
Status: DISCONTINUED | OUTPATIENT
Start: 2025-02-18 | End: 2025-02-18 | Stop reason: HOSPADM

## 2025-02-17 RX ORDER — HYDROXYZINE HYDROCHLORIDE 10 MG/1
10 TABLET, FILM COATED ORAL EVERY 8 HOURS PRN
Status: CANCELLED | OUTPATIENT
Start: 2025-02-17

## 2025-02-17 RX ORDER — ATROPINE SULFATE 0.1 MG/ML
0.5 INJECTION INTRAVENOUS
Status: ACTIVE | OUTPATIENT
Start: 2025-02-17 | End: 2025-02-18

## 2025-02-17 RX ORDER — ASPIRIN 81 MG/1
243 TABLET, CHEWABLE ORAL ONCE
Status: CANCELLED | OUTPATIENT
Start: 2025-02-17

## 2025-02-17 RX ORDER — GABAPENTIN 100 MG/1
100 CAPSULE ORAL AT BEDTIME
Status: CANCELLED | OUTPATIENT
Start: 2025-02-17

## 2025-02-17 RX ORDER — FLUMAZENIL 0.1 MG/ML
0.2 INJECTION, SOLUTION INTRAVENOUS
Status: DISCONTINUED | OUTPATIENT
Start: 2025-02-17 | End: 2025-02-17

## 2025-02-17 RX ORDER — ACETAMINOPHEN 325 MG/1
650 TABLET ORAL EVERY 4 HOURS PRN
Status: CANCELLED | OUTPATIENT
Start: 2025-02-17

## 2025-02-17 RX ORDER — SODIUM CHLORIDE, SODIUM LACTATE, POTASSIUM CHLORIDE, CALCIUM CHLORIDE 600; 310; 30; 20 MG/100ML; MG/100ML; MG/100ML; MG/100ML
INJECTION, SOLUTION INTRAVENOUS CONTINUOUS
Status: DISCONTINUED | OUTPATIENT
Start: 2025-02-17 | End: 2025-02-17

## 2025-02-17 RX ORDER — ONDANSETRON 4 MG/1
4 TABLET, ORALLY DISINTEGRATING ORAL EVERY 6 HOURS PRN
Status: DISCONTINUED | OUTPATIENT
Start: 2025-02-17 | End: 2025-02-17 | Stop reason: HOSPADM

## 2025-02-17 RX ORDER — ACETAMINOPHEN 650 MG/1
650 SUPPOSITORY RECTAL EVERY 4 HOURS PRN
Status: DISCONTINUED | OUTPATIENT
Start: 2025-02-17 | End: 2025-02-17 | Stop reason: HOSPADM

## 2025-02-17 RX ORDER — NITROGLYCERIN 0.4 MG/1
0.4 TABLET SUBLINGUAL EVERY 5 MIN PRN
Status: CANCELLED | OUTPATIENT
Start: 2025-02-17

## 2025-02-17 RX ORDER — PREDNISONE 20 MG/1
40 TABLET ORAL DAILY
Status: CANCELLED | OUTPATIENT
Start: 2025-02-18 | End: 2025-02-22

## 2025-02-17 RX ORDER — AMOXICILLIN 250 MG
1 CAPSULE ORAL 2 TIMES DAILY PRN
Status: CANCELLED | OUTPATIENT
Start: 2025-02-17

## 2025-02-17 RX ORDER — DEXTROSE MONOHYDRATE 25 G/50ML
25-50 INJECTION, SOLUTION INTRAVENOUS
Status: CANCELLED | OUTPATIENT
Start: 2025-02-17

## 2025-02-17 RX ORDER — SPIRONOLACTONE 25 MG
12.5 TABLET ORAL DAILY
Status: DISCONTINUED | OUTPATIENT
Start: 2025-02-17 | End: 2025-02-17 | Stop reason: HOSPADM

## 2025-02-17 RX ORDER — ASPIRIN 81 MG/1
243 TABLET, CHEWABLE ORAL ONCE
Status: DISCONTINUED | OUTPATIENT
Start: 2025-02-17 | End: 2025-02-17 | Stop reason: HOSPADM

## 2025-02-17 RX ADMIN — FLUTICASONE FUROATE 1 PUFF: 100 POWDER RESPIRATORY (INHALATION) at 07:56

## 2025-02-17 RX ADMIN — ACETAMINOPHEN 650 MG: 325 TABLET ORAL at 03:29

## 2025-02-17 RX ADMIN — PREDNISONE 40 MG: 20 TABLET ORAL at 09:00

## 2025-02-17 RX ADMIN — HYDROMORPHONE HYDROCHLORIDE 1 MG: 2 TABLET ORAL at 03:29

## 2025-02-17 RX ADMIN — ATORVASTATIN CALCIUM 80 MG: 40 TABLET, FILM COATED ORAL at 07:56

## 2025-02-17 RX ADMIN — LOSARTAN POTASSIUM 12.5 MG: 25 TABLET, FILM COATED ORAL at 07:57

## 2025-02-17 RX ADMIN — CITALOPRAM HYDROBROMIDE 20 MG: 10 TABLET ORAL at 07:57

## 2025-02-17 RX ADMIN — FENTANYL CITRATE 50 MCG: 50 INJECTION INTRAMUSCULAR; INTRAVENOUS at 00:19

## 2025-02-17 RX ADMIN — LORAZEPAM 0.5 MG: 0.5 TABLET ORAL at 09:28

## 2025-02-17 RX ADMIN — METOPROLOL SUCCINATE 37.5 MG: 25 TABLET, EXTENDED RELEASE ORAL at 20:28

## 2025-02-17 RX ADMIN — PERFLUTREN 3 ML: 6.52 INJECTION, SUSPENSION INTRAVENOUS at 11:00

## 2025-02-17 RX ADMIN — ASPIRIN 81 MG CHEWABLE TABLET 162 MG: 81 TABLET CHEWABLE at 07:56

## 2025-02-17 RX ADMIN — METOPROLOL SUCCINATE ER TABLETS 37.5 MG: 25 TABLET, FILM COATED, EXTENDED RELEASE ORAL at 07:57

## 2025-02-17 RX ADMIN — SODIUM CHLORIDE, POTASSIUM CHLORIDE, SODIUM LACTATE AND CALCIUM CHLORIDE: 600; 310; 30; 20 INJECTION, SOLUTION INTRAVENOUS at 03:30

## 2025-02-17 RX ADMIN — GABAPENTIN 100 MG: 100 CAPSULE ORAL at 20:28

## 2025-02-17 RX ADMIN — PRAMIPEXOLE DIHYDROCHLORIDE 0.5 MG: 0.5 TABLET ORAL at 20:27

## 2025-02-17 RX ADMIN — ASPIRIN 81 MG CHEWABLE TABLET 162 MG: 81 TABLET CHEWABLE at 11:22

## 2025-02-17 RX ADMIN — PRAMIPEXOLE DIHYDROCHLORIDE 0.5 MG: 0.5 TABLET ORAL at 04:44

## 2025-02-17 RX ADMIN — FUROSEMIDE 40 MG: 20 TABLET ORAL at 07:57

## 2025-02-17 RX ADMIN — SODIUM CHLORIDE 75 ML/HR: 0.9 INJECTION, SOLUTION INTRAVENOUS at 16:37

## 2025-02-17 RX ADMIN — FAMOTIDINE 20 MG: 20 TABLET, FILM COATED ORAL at 07:56

## 2025-02-17 RX ADMIN — GABAPENTIN 100 MG: 100 CAPSULE ORAL at 09:00

## 2025-02-17 RX ADMIN — FLUTICASONE FUROATE 1 PUFF: 100 POWDER RESPIRATORY (INHALATION) at 20:29

## 2025-02-17 RX ADMIN — HYDROMORPHONE HYDROCHLORIDE 1 MG: 2 TABLET ORAL at 08:03

## 2025-02-17 RX ADMIN — SPIRONOLACTONE 12.5 MG: 25 TABLET, FILM COATED ORAL at 07:58

## 2025-02-17 RX ADMIN — ENOXAPARIN SODIUM 40 MG: 40 INJECTION SUBCUTANEOUS at 03:30

## 2025-02-17 RX ADMIN — INSULIN ASPART 1 UNITS: 100 INJECTION, SOLUTION INTRAVENOUS; SUBCUTANEOUS at 16:05

## 2025-02-17 RX ADMIN — ACETAMINOPHEN 650 MG: 325 TABLET ORAL at 08:03

## 2025-02-17 ASSESSMENT — ACTIVITIES OF DAILY LIVING (ADL)
ADLS_ACUITY_SCORE: 59
ADLS_ACUITY_SCORE: 59
ADLS_ACUITY_SCORE: 58
ADLS_ACUITY_SCORE: 59
ADLS_ACUITY_SCORE: 58
ADLS_ACUITY_SCORE: 59
ADLS_ACUITY_SCORE: 60
ADLS_ACUITY_SCORE: 59
ADLS_ACUITY_SCORE: 59
ADLS_ACUITY_SCORE: 60
ADLS_ACUITY_SCORE: 58
ADLS_ACUITY_SCORE: 60
ADLS_ACUITY_SCORE: 58
ADLS_ACUITY_SCORE: 59
ADLS_ACUITY_SCORE: 36
ADLS_ACUITY_SCORE: 59
ADLS_ACUITY_SCORE: 34
ADLS_ACUITY_SCORE: 59

## 2025-02-17 NOTE — CONSULTS
[facility] HEART [facility]   [address], [place] [address]   [email or web address]   OFFICE: [phone]     CARDIOLOGY INPATIENT CONSULT NOTE     Impression and Plan     Assessment:  Acute chest pain - with some concerning anginal features (associated with SOB,radiating down left arm, at rest and with exertion, similar to previous anginal pain) and some atypical features (severe prolonged pain with minimal troponin elevation). Despite the reassuring features of no increase in cardiac enzymes, Ms. Mendez osorio been hospitalized twice in the past 6 weeks with similar pain. The pain is potentially unstable angina. Proceeding with coronary angiogram and possible PCI at this time will help to define coronary anatomy and, if negative, provide greater reassurance if/when the pain returns and she considers ER presentation.  Coronary artery disease s/p CABG x3 (LIMA to LAD, SVG to rPDA, SVG to OM)  Ischemic cardiomyopathy - with improving LVEF after surgery. On reasonable GDMT  Hypertension - stable  Dyslipidemia - on appropriate statin    Plan:  Transfer to [facility] for coronary angiogram with grafts, left heart catheterization, and possible percutaneous coronary intervention. Discussed with the patient the risks and benefits of left heart catheterization with coronary angiogram including possible PCI. The risks include but are not limited to death, myocardial infarction, stroke, kidney dysfunction, vessel trauma, hemorrhage, need for emergency corrective surgery, allergy, and dysrhythmia.  Echocardiogram to evaluate for wall motion abnormalities.  Participated in shared decision making with this management as a wait-and-see approach could also be reasonable in the setting of reassuring cardiac enzymes. With the invasive evaluation we hope to avoid additional ER presentations and hospitalizations for recurrent pain.    Primary Cardiologist: Dr. Bragg    History of Present Illness       Ms. Gill Mendez is a 65 year old female with known CAD and recent CABG in November, 2024, HTN, HLD, T2DM, gastroparesis who presented to the ER with chest pain. Chest pain onset was while at rest. Was up to 10/10 in severity. Radiated down left arm, then right arm. Associated with SOB. Not particularly exertional. Presented to ER and had some improvement with fentanyl. R arm is now numb. Chest pain at 6/10 (appears comfortable). Says pain is very similar if not identical to the pain she had in November when she initially presented with her NSTEMI.    Other than noted above, Ms. Mendez denies any chest pain/pressure/tightness, shortness of breath at rest or with exertion, light headedness/dizziness, pre-syncope, syncope, lower extremity swelling, palpitations, paroxysmal nocturnal dyspnea (PND), or orthopnea.    Review of Systems:  Further review of systems is otherwise negative/noncontributory (based on review of medical record (admission H&P) and 13 point review of systems reviewed. Pertinent positives noted).    Cardiac Diagnostics     ECG: Personally reviewed and interpreted: 2/16/25 - sinus tachycardia, septal infarct.    Telemetry (personally reviewed): sinus rhythm.    Most recent:  Echocardiogram (results reviewed):   TTE 1/4/25  The left ventricle is normal in size. There is mild concentric left ventricular hypertrophy.  Left ventricular function is decreased. The ejection fraction is 40-45% (mildly reduced). The anterior and anteroseptal walls are hypokinetic.     The right ventricle is normal in size and function.  The left atrium is mildly dilated. The right atrium is mildly dilated.  IVC diameter <2.1 cm collapsing >50% with sniff suggests a normal RA pressure of 3 mmHg.  Compared to prior study, small pericardial effusion has resolved. LVEF and wall motion is similar.    Cardiac Cath (results reviewed): 11/23/24    Mid LM to Dist LM lesion is 90% stenosed.    Ost LAD to Prox LAD lesion is 99%  stenosed.    Ost Cx to Prox Cx lesion is 70% stenosed.    1st Mrg lesion is 80% stenosed.    Ost RCA to Prox RCA lesion is 50% stenosed.    Prox RCA to Mid RCA lesion is 50% stenosed.    Left ventricular filling pressures are severely elevated.     1.  Severely calcified distal left main stenosis extending to ostium of LAD and proximal segment of left circumflex  2.  99% ostial LAD and heavily calcified segment.  MYNOR grade II-III flow distally  3.  Heavily calcified left circumflex ostium with 50 to 70% narrowing proximal segment with focal 80% stenosis of the vessel as it exits the AV groove into a large marginal branch.  4.  Heavily calcified ostial RCA.  Only mild to moderate angiographic stenosis but recurrent catheter damping upon entering vessel suggesting more severe stenosis due to eccentric calcification.  40 to 50% narrowing mid segment.  PDA and JULEE branches appear normal.  5.  Elevated LVEDP equals 28 to 29 mmHg post A wave.  No LV-AO gradient.    Cardiac Stress Testing (results reviewed): 1/6/25    The nuclear stress test is abnormal.    The patient is at a low risk of future cardiac ischemic events.    There is a medium sized area of a moderate degree of infarction in the mid to distal anteroseptal segment(s) of the left ventricle. No evidence of inducible myocardial ischemia.    The left ventricular ejection fraction at stress is 57% with mid to distal anteroseptal hypokinesis.    A prior study was conducted on 9/30/2020.  This study has changes noted when compared with the prior study. Fixed anteroseptal defect is now present.      Medical History  Surgical History Family History Social History   Past Medical History:   Diagnosis Date    Anxiety     Asthma     Cellulitis     Diabetes mellitus (H)     Essential hypertension     Created by Conversion  Replacement Utility updated for latest IMO load    Femoral nerve palsy, left 08/23/2023    Gastroparesis     Goiter 05/28/2023 05/2023: Palpated on  exam, has had stable imaging with endocrinology, continue to follow endocrinology.      Hyperlipidemia     Hypertension     Other and unspecified hyperlipidemia     Created by Conversion     PONV (postoperative nausea and vomiting)     Shortness of breath     Type II or unspecified type diabetes mellitus without mention of complication, not stated as uncontrolled     Created by Conversion      Past Surgical History:   Procedure Laterality Date    AMPUTATE TOE(S) Right 7/31/2020    Procedure: AMPUTATION, third digit right foot;  Surgeon: Chris Vega DPM;  Location: [facility];  Service: Podiatry    CORONARY ARTERY BYPASS GRAFT, WITH ENDOSCOPIC VESSEL PROCUREMENT N/A 11/24/2024    Procedure: CORONARY ARTERY BYPASS GRAFT TIMES THREE, LEFT INTERNAL MAMMARY ARTERY HARVEST, LEFT LEG ENDOSCOPIC VESSEL PROCUREMENT,;  Surgeon: Zhen Carter MD;  Location: [facility]    CV CORONARY ANGIOGRAM N/A 11/23/2024    Procedure: Coronary Angiogram;  Surgeon: Roque Eisenberg MD;  Location: [facility] LAB CV    CV INTRA AORTIC BALLOON N/A 11/24/2024    Procedure: Intra aortic Balloon Pump Insertion;  Surgeon: Roque Eisenberg MD;  Location: [facility] LAB CV    CV LEFT HEART CATH N/A 11/23/2024    Procedure: Left Heart Catheterization;  Surgeon: Roque Eisenberg MD;  [facility] LAB CV    ENDOMETRIAL BIOPSY      FOOT ARTHROPLASTY Right 09/24/2020    Fourth and fifth toe by Dr. Vega    HC REPAIR OF HAMMERTOE,ONE Left 12/7/2020    Procedure: ARTHROPLASTY, digits two, three, four and five left foot;  Surgeon: Chris Vega DPM;  Location: [facility];  Service: Podiatry    HERNIA REPAIR      HYSTERECTOMY      IR LUMBAR PUNCTURE  7/20/2023    REPAIR TENDON ACHILLES Bilateral     Left was plate  , right was torn    TONSILLECTOMY      TRANSESOPHAGEAL ECHOCARDIOGRAM INTRAOPERATIVE  11/24/2024    Procedure: ECHOCARDIOGRAM, TRANSESOPHAGEAL, INTRA-OPERATIVE;  Surgeon: Raul  Zhen Acosta MD;  [place] [facility] [place]      Description: Oophorectomy - Bilateral (Removal Of Both Ovaries);  Recorded: 10/09/2008;     Family History   Problem Relation Age of Onset    Diabetes Mother     Hypertension Mother     Acute Myocardial Infarction No family hx of              Social History     Socioeconomic History    Marital status:      Spouse name: Not on file    Number of children: Not on file    Years of education: Not on file    Highest education level: Not on file   Occupational History    Not on file   Tobacco Use    Smoking status: Never    Smokeless tobacco: Never   Substance and Sexual Activity    Alcohol use: No    Drug use: No    Sexual activity: Not on file   Other Topics Concern    Not on file   Social History Narrative    Not on file     Social Drivers of Health     Financial Resource Strain: Low Risk  (1/4/2025)    Financial Resource Strain     Within the past 12 months, have you or your family members you live with been unable to get utilities (heat, electricity) when it was really needed?: No   Food Insecurity: Low Risk  (1/4/2025)    Food Insecurity     Within the past 12 months, did you worry that your food would run out before you got money to buy more?: No     Within the past 12 months, did the food you bought just not last and you didn t have money to get more?: No   Transportation Needs: Low Risk  (1/4/2025)    Transportation Needs     Within the past 12 months, has lack of transportation kept you from medical appointments, getting your medicines, non-medical meetings or appointments, work, or from getting things that you need?: No   Physical Activity: Not on file   Stress: Not on file (11/6/2024)   Social Connections: Unknown (12/28/2021)    Received from [facility] [employer] [facility] [employer] [facility]    Social Connections     Frequency of Communication with Friends and Family: Not on file  "  Interpersonal Safety: Low Risk  (1/4/2025)    Interpersonal Safety     Do you feel physically and emotionally safe where you currently live?: Yes     Within the past 12 months, have you been hit, slapped, kicked or otherwise physically hurt by someone?: No     Within the past 12 months, have you been humiliated or emotionally abused in other ways by your partner or ex-partner?: No   Recent Concern: Interpersonal Safety - High Risk (11/23/2024)    Interpersonal Safety     Do you feel physically and emotionally safe where you currently live?: No     Within the past 12 months, have you been hit, slapped, kicked or otherwise physically hurt by someone?: No     Within the past 12 months, have you been humiliated or emotionally abused in other ways by your partner or ex-partner?: No   Housing Stability: Low Risk  (1/4/2025)    Housing Stability     Do you have housing? : Yes     Are you worried about losing your housing?: No             Physical Examination   VITALS: /71   Pulse 91   Temp 97.7  F (36.5  C) (Temporal)   Resp 26   Ht 1.575 m (5' 2\")   Wt 68.9 kg (152 lb)   SpO2 98%   BMI 27.80 kg/m    BMI: Body mass index is 27.8 kg/m .  Wt Readings from Last 3 Encounters:   02/16/25 68.9 kg (152 lb)   01/23/25 69.9 kg (154 lb)   01/06/25 68.3 kg (150 lb 9.6 oz)     No intake or output data in the 24 hours ending 02/17/25 0802    General: pleasant female. No acute distress.   HENT: external ears normal. Nares patent. Mucous membranes moist.  Eyes: perrla, extraocular muscles intact. No scleral icterus.   Neck: No JVD  Lungs: clear to auscultation  COR: regular rate and rhythm, No murmurs, rubs, or gallops  Abd: nondistended, BS present  Extrem: No edema         Non-cardiac Imaging Studies Reviewed      Chest xray: IMPRESSION: No convincing evidence of active cardiopulmonary disease.        Lab Results Reviewed    Chemistry/lipid CBC Cardiac Enzymes/BNP/TSH/INR   Recent Labs   Lab Test 11/23/24  0736   CHOL " 148   HDL 65   LDL 70   TRIG 65     Recent Labs   Lab Test 11/23/24  0736 09/20/24  0810 08/21/23  0821   LDL 70 58 78     Recent Labs   Lab Test 02/17/25  0619 02/16/25  2322   NA  --  139   POTASSIUM  --  3.8   CHLORIDE  --  98   CO2  --  30*   * 145*   BUN  --  24.5*   CR  --  0.94   GFRESTIMATED  --  68   VALENTE  --  9.2     Recent Labs   Lab Test 02/16/25  2322 02/12/25  0804 01/31/25  1125   CR 0.94 0.80 0.90     Recent Labs   Lab Test 02/12/25  0804 11/23/24  0736 09/20/24  0810   A1C 7.5* 6.1* 6.3*          Recent Labs   Lab Test 02/16/25 2322   WBC 9.3   HGB 11.0*   HCT 35.8   MCV 75*        Recent Labs   Lab Test 02/16/25  2322 01/03/25  1705 12/16/24  1603   HGB 11.0* 11.3* 11.8    Recent Labs   Lab Test 01/01/23  1303 09/25/20  0803 09/25/20  0157   TROPONINI <0.01 0.03 0.02     Recent Labs   Lab Test 02/16/25  2322 01/23/25  1539 01/03/25  1705 12/23/24  1026 12/16/24  1021 12/10/24  1544 11/22/24  1733   NTBNPI 991*  --  3,043*  --   --   --  6,095*   NTBNP  --  2,724*  --  4,303* 5,787*   < >  --     < > = values in this interval not displayed.     Recent Labs   Lab Test 12/16/24  1603   TSH 0.61     Recent Labs   Lab Test 11/25/24  0843 11/24/24  1750 11/24/24  1646   INR 1.05 1.14 1.36*           Current Inpatient Scheduled Medications   Scheduled Meds:  Current Facility-Administered Medications   Medication Dose Route Frequency Provider Last Rate Last Admin    aspirin (ASA) chewable tablet 162 mg  162 mg Oral or NG Tube Daily Yimi Mcghee MD   162 mg at 02/17/25 0756    atorvastatin (LIPITOR) tablet 80 mg  80 mg Oral Daily Yimi Mcghee MD   80 mg at 02/17/25 0756    citalopram (celeXA) tablet 20 mg  20 mg Oral QAM Yimi Mcghee MD   20 mg at 02/17/25 0757    empagliflozin (JARDIANCE) tablet 25 mg  25 mg Oral Daily Yimi Mcghee MD        enoxaparin ANTICOAGULANT (LOVENOX) injection 40 mg  40 mg Subcutaneous Q24H Yimi Mcghee MD   40 mg at 02/17/25 0334     famotidine (PEPCID) tablet 20 mg  20 mg Oral Daily Yimi Mcghee MD   20 mg at 02/17/25 0756    fluticasone (ARNUITY ELLIPTA) 100 MCG/ACT inhaler 1 puff  1 puff Inhalation BID Yimi Mcghee MD   1 puff at 02/17/25 0756    furosemide (LASIX) tablet 40 mg  40 mg Oral Daily Yimi Mcghee MD   40 mg at 02/17/25 0757    gabapentin (NEURONTIN) capsule 100 mg  100 mg Oral BID Mely Queen MD        insulin aspart (NovoLOG) injection (RAPID ACTING)  1-7 Units Subcutaneous Q4H Yimi Mcghee MD        losartan (COZAAR) half-tab 12.5 mg  12.5 mg Oral Daily Yimi Mcghee MD   12.5 mg at 02/17/25 0757    metoprolol succinate ER (TOPROL-XL) 24 hr half-tab 37.5 mg  37.5 mg Oral BID Yimi Mcghee MD   37.5 mg at 02/17/25 0757    pramipexole (MIRAPEX) tablet 0.125 mg  0.125 mg Oral Daily Yimi Mcghee MD        pramipexole (MIRAPEX) tablet 0.5 mg  0.5 mg Oral At Bedtime Yimi Mcghee MD   0.5 mg at 02/17/25 0444    predniSONE (DELTASONE) tablet 40 mg  40 mg Oral Daily Mely Queen MD        spironolactone (ALDACTONE) half-tab 12.5 mg  12.5 mg Oral Daily Yimi Mcghee MD   12.5 mg at 02/17/25 0758     Continuous Infusions:  Current Facility-Administered Medications   Medication Dose Route Frequency Provider Last Rate Last Admin       Current Outpatient Medications   Medication Sig Dispense Refill    ARNUITY ELLIPTA 100 MCG/ACT inhaler Inhale 1 puff into the lungs 2 times daily.      aspirin (ASA) 81 MG chewable tablet Take 2 tablets (162 mg) by mouth or NG Tube daily. 180 tablet 3    atorvastatin (LIPITOR) 80 MG tablet Take 80 mg by mouth daily.      citalopram (CELEXA) 20 MG tablet [CITALOPRAM (CELEXA) 20 MG TABLET] Take 20 mg by mouth every morning.   0    empagliflozin (JARDIANCE) 25 MG TABS tablet Take 1 tablet (25 mg) by mouth daily. 90 tablet 1    furosemide (LASIX) 20 MG tablet Take 30 mg by mouth daily.      losartan (COZAAR) 25 MG tablet Take 0.5 tablets (12.5 mg) by mouth daily. 45  tablet 3    metFORMIN (GLUCOPHAGE XR) 500 MG 24 hr tablet Take 2 tablets (1,000 mg) by mouth daily (with breakfast). Changed on 6/21/24, new prescription not yet sent 180 tablet 1    metoprolol succinate ER (TOPROL XL) 25 MG 24 hr tablet Take 1.5 tablets (37.5 mg) by mouth 2 times daily. 270 tablet 3    spironolactone (ALDACTONE) 25 MG tablet Take 0.5 tablets (12.5 mg) by mouth daily. 45 tablet 2    acetaminophen (TYLENOL) 500 MG tablet Take 1-2 tablets (500-1,000 mg) by mouth every 6 hours as needed for mild pain (do NOT exceed 3,000mg in a 24hour period).      albuterol (PROVENTIL HFA;VENTOLIN HFA) 90 mcg/actuation inhaler Inhale 2 puffs into the lungs every 6 hours as needed      famotidine (PEPCID) 20 MG tablet Take 1 tablet (20 mg) by mouth as needed (take as needed for acid reflux). 30 tablet 0    lidocaine (LMX4) 4 % external cream Apply topically daily as needed for mild pain (painful area on left chest wall). 15 g 0    polyethylene glycol (MIRALAX) 17 g packet Take 1 packet by mouth daily as needed for constipation.      pramipexole (MIRAPEX) 0.125 MG tablet Take 0.125 mg by mouth daily. At 3pm  3    pramipexole (MIRAPEX) 0.5 MG tablet Take 0.5 mg by mouth at bedtime.      tirzepatide (MOUNJARO) 15 MG/0.5ML pen Inject 15 mg subcutaneously every 7 days. 6 mL 1          Medications Prior to Admission   Prior to Admission medications    Medication Sig Start Date End Date Taking? Authorizing Provider   JACOB ELLIPTA 100 MCG/ACT inhaler Inhale 1 puff into the lungs 2 times daily. 8/30/24  Yes Reported, Patient   aspirin (ASA) 81 MG chewable tablet Take 2 tablets (162 mg) by mouth or NG Tube daily. 12/2/24  Yes Beena Mitchell PA-C   atorvastatin (LIPITOR) 80 MG tablet Take 80 mg by mouth daily.   Yes Reported, Patient   citalopram (CELEXA) 20 MG tablet [CITALOPRAM (CELEXA) 20 MG TABLET] Take 20 mg by mouth every morning.  4/25/18  Yes Provider, Historical   empagliflozin (JARDIANCE) 25 MG TABS tablet  Take 1 tablet (25 mg) by mouth daily. 9/27/24  Yes Levy Lake MD   acetaminophen (TYLENOL) 500 MG tablet Take 1-2 tablets (500-1,000 mg) by mouth every 6 hours as needed for mild pain (do NOT exceed 3,000mg in a 24hour period). 1/6/25   Brain Reynolds MD   albuterol (PROVENTIL HFA;VENTOLIN HFA) 90 mcg/actuation inhaler Inhale 2 puffs into the lungs every 6 hours as needed 8/17/15   Provider, Historical   famotidine (PEPCID) 20 MG tablet Take 1 tablet (20 mg) by mouth as needed (take as needed for acid reflux). 1/23/25   Maira Parra CNP   hydrOXYzine HCl (ATARAX) 10 MG tablet Take 1 tablet (10 mg) by mouth or Feeding Tube every 8 hours as needed for anxiety. 12/2/24   Beena Mitchell PA-C   lidocaine (LMX4) 4 % external cream Apply topically daily as needed for mild pain (painful area on left chest wall). 1/6/25   Brain Reynolds MD   losartan (COZAAR) 25 MG tablet Take 0.5 tablets (12.5 mg) by mouth daily. 2/4/25   Maira Parra CNP   metFORMIN (GLUCOPHAGE XR) 500 MG 24 hr tablet Take 2 tablets (1,000 mg) by mouth daily (with breakfast). Changed on 6/21/24, new prescription not yet sent 9/27/24   Levy Lake MD   metoprolol succinate ER (TOPROL XL) 25 MG 24 hr tablet Take 1.5 tablets (37.5 mg) by mouth 2 times daily. 2/4/25   Maira Parra CNP   ondansetron (ZOFRAN ODT) 4 MG ODT tab Take 1 tablet (4 mg) by mouth every 8 hours as needed for nausea. 12/16/24   Beena Mitchell PA-C   oxyCODONE (ROXICODONE) 5 MG tablet Take 0.5-1 tablets (2.5-5 mg) by mouth every 4 hours as needed for moderate to severe pain. 12/6/24   Beena Mitchell PA-C   polyethylene glycol (MIRALAX) 17 g packet Take 1 packet by mouth daily as needed for constipation.    Reported, Patient   pramipexole (MIRAPEX) 0.125 MG tablet Take 0.125 mg by mouth daily. At 3pm 6/18/18   Provider, Historical   pramipexole (MIRAPEX) 0.5 MG tablet Take 0.5 mg by mouth at bedtime.    Unknown, Entered By History  "  spironolactone (ALDACTONE) 25 MG tablet Take 0.5 tablets (12.5 mg) by mouth daily. 1/24/25   Maira Parra CNP   tirzepatide (MOUNJARO) 15 MG/0.5ML pen Inject 15 mg subcutaneously every 7 days. 9/27/24   Levy Lake MD              Clinically Significant Risk Factors Present on Admission                 # Drug Induced Platelet Defect: home medication list includes an antiplatelet medication   # Hypertension: Noted on problem list  # Heart failure, NOS: heart failure noted on the problem list and last echo with EF 40-50%         # DMII: A1C = 7.5 % (Ref range: 0.0 - 5.6 %) within past 6 months    # Overweight: Estimated body mass index is 27.8 kg/m  as calculated from the following:    Height as of this encounter: 1.575 m (5' 2\").    Weight as of this encounter: 68.9 kg (152 lb).         # Financial/Environmental Concerns:    # Asthma: noted on problem list  # History of CABG: noted on surgical history    # Type 2 diabetes mellitus, not on insulin        "

## 2025-02-17 NOTE — PHARMACY-ADMISSION MEDICATION HISTORY
Admission medication history completed at [facility]. Please see Pharmacist Admission Medication History note from 2/17/2025.

## 2025-02-17 NOTE — PHARMACY-ADMISSION MEDICATION HISTORY
Pharmacist Admission Medication History    Admission medication history is complete. The information provided in this note is only as accurate as the sources available at the time of the update.    Information Source(s): Patient via in-person    Pertinent Information: Per patient, she is taking 30mg of Furosemide daily. Recent note 2/13/25 suggests she was recommended to take 40mg daily.      Changes made to PTA medication list:  Added: None  Deleted: Hydroxyzine, ondansetron, oxycodone, miralax  Changed: Furosemide dose    Allergies reviewed with patient and updates made in EHR: yes    Medication History Completed By: Barry Edge Jr [facility] 2/17/2025 8:10 AM    PTA Med List   Medication Sig Last Dose/Taking    ARNUITY ELLIPTA 100 MCG/ACT inhaler Inhale 1 puff into the lungs 2 times daily. 2/16/2025 Morning    aspirin (ASA) 81 MG chewable tablet Take 2 tablets (162 mg) by mouth or NG Tube daily. 2/16/2025 Morning    atorvastatin (LIPITOR) 80 MG tablet Take 80 mg by mouth daily. 2/16/2025 Morning    citalopram (CELEXA) 20 MG tablet [CITALOPRAM (CELEXA) 20 MG TABLET] Take 20 mg by mouth every morning.  2/16/2025 Morning    empagliflozin (JARDIANCE) 25 MG TABS tablet Take 1 tablet (25 mg) by mouth daily. 2/16/2025 Morning    furosemide (LASIX) 20 MG tablet Take 30 mg by mouth daily. 2/16/2025 Morning    losartan (COZAAR) 25 MG tablet Take 0.5 tablets (12.5 mg) by mouth daily. 2/16/2025 Morning    metFORMIN (GLUCOPHAGE XR) 500 MG 24 hr tablet Take 2 tablets (1,000 mg) by mouth daily (with breakfast). Changed on 6/21/24, new prescription not yet sent 2/16/2025 Morning    metoprolol succinate ER (TOPROL XL) 25 MG 24 hr tablet Take 1.5 tablets (37.5 mg) by mouth 2 times daily. 2/16/2025 Morning    spironolactone (ALDACTONE) 25 MG tablet Take 0.5 tablets (12.5 mg) by mouth daily. 2/16/2025 Morning

## 2025-02-17 NOTE — H&P
[facility]    History and Physical - Hospitalist Service       Date of Admission:  2/16/2025    Assessment & Plan    Active Problems:    Chest pain, unspecified type      Gill Mendez is a 65 year old female admitted on 2/16/2025. She has medical history significant for CAD s/p three-vessel CABG in November 2024, hypertension, hyperlipidemia, type 2 diabetes mellitus, gastroparesis, left femoral nerve palsy, asthma, anxiety, HFrEF, and chronic pain syndrome.  She presented to the ED complaints of chest pain.  Patient reports 10/10, intermittent, substernal chest pressure/discomfort that radiates down the right arm.  She reports associated shortness of breath and also numbness of the right arm.  She reports chest pain both at rest and with exertion.  She reports that she took 2 tablets of 25 mg aspirin without improvement.  She also reports that she received nitro in the ED without improvement.  She states that the pain came down to 4/10 with fentanyl received in the ED.  She denies any heavy lifting.  She denies any acute stress.  She she reports that she started feeling some numbness in her left arm.  She reports that the symptoms are similar to when she had a heart attack in November of last year.    She denies any fevers, chills, nausea, vomiting, diarrhea, constipation, dysuria, hematuria, or any other new symptoms.    In the ER, vital signs were stable except for heart rate in the 90s to 100s.  D-dimer was normal for patient's age.  BNP was 991.  Initial troponin was 25.  Repeat troponin came down to 21.  Basic metabolic panel was unremarkable.  CBC was unremarkable.  EKG showed sinus rhythm with heart rate of 110.  There was no ST elevations or depressions.  No T wave inversions.  QTc was 479.    # Chest pain: Patient with substernal chest pain that radiates to the arms and shoulder.  Reports symptoms feel similar to when she had a heart attack in November 2024.  Troponin on  "presentation was mildly elevated.  Repeat troponin came down to 21, decreased from 25.  Cardiac monitoring  N.p.o.  Gentle hydration  Trend troponin and EKG  Cardiology consult    # Type 2 diabetes mellitus  Correctional scale insulin  Hypoglycemia protocol  Continue Jardiance    #  # Chronic HFrEF: Patient with EF of 40 to 45% on most recent echocardiogram.  Has 1+ right lower extremity edema and no left lower extremity edema  Continue Lasix  Strict I's and O's  Daily standing weight  1500 cc daily fluid restriction  Continue metoprolol and losartan    # RLS  Continue Mirapex    # Hyperlipidemia  Continue Lipitor    # Asthma: Not in exacerbation  Continue breathing treatments       Observation Goals: -diagnostic tests and consults completed and resulted, -vital signs normal or at patient baseline, -adequate pain control on oral analgesics, Nurse to notify provider when observation goals have been met and patient is ready for discharge.  Diet: NPO per Anesthesia Guidelines for Procedure/Surgery Except for: Meds    DVT Prophylaxis: Enoxaparin (Lovenox) SQ  Mcdaniels Catheter: Not present  Lines: None     Cardiac Monitoring: None  Code Status: Full Code      Clinically Significant Risk Factors Present on Admission                 # Drug Induced Platelet Defect: home medication list includes an antiplatelet medication   # Hypertension: Noted on problem list  # Heart failure, NOS: heart failure noted on the problem list and last echo with EF 40-50%         # DMII: A1C = 7.5 % (Ref range: 0.0 - 5.6 %) within past 6 months    # Overweight: Estimated body mass index is 27.8 kg/m  as calculated from the following:    Height as of this encounter: 1.575 m (5' 2\").    Weight as of this encounter: 68.9 kg (152 lb).       # Financial/Environmental Concerns:    # Asthma: noted on problem list  # History of CABG: noted on surgical history       Disposition Plan     Medically Ready for Discharge: Anticipated in 2-4 Days       "     Yimi Mcghee MD  Hospitalist Service  [facility]  Securely message with [employer] (more info)  Text page via [facility] Paging/Directory     ______________________________________________________________________    Chief Complaint   Chest pain     History is obtained from the patient    History of Present Illness   Gill Mendez is a 65 year old female with medical history significant for CAD s/p three-vessel CABG in November 2024, hypertension, hyperlipidemia, type 2 diabetes mellitus, gastroparesis, left femoral nerve palsy, asthma, anxiety, HFrEF, and chronic pain syndrome.  She presented to the ED complaints of chest pain.  Patient reports 10/10, intermittent, substernal chest pressure/discomfort that radiates down the right arm.  She reports associated shortness of breath and also numbness of the right arm.  She reports chest pain both at rest and with exertion.  She reports that she took 2 tablets of 25 mg aspirin without improvement.  She also reports that she received nitro in the ED without improvement.  She states that the pain came down to 4/10 with fentanyl received in the ED.  She denies any heavy lifting.  She denies any acute stress.  She she reports that she started feeling some numbness in her left arm.  She reports that the symptoms are similar to when she had a heart attack in November of last year.      She denies any fevers, chills, nausea, vomiting, diarrhea, constipation, dysuria, hematuria, or any other new symptoms.      Past Medical History    Past Medical History:   Diagnosis Date    Anxiety     Asthma     Cellulitis     Diabetes mellitus (H)     Essential hypertension     Created by Conversion  Replacement Utility updated for latest IMO load    Femoral nerve palsy, left 08/23/2023    Gastroparesis     Goiter 05/28/2023 05/2023: Palpated on exam, has had stable imaging with endocrinology, continue to follow endocrinology.      Hyperlipidemia     Hypertension      Other and unspecified hyperlipidemia     Created by Conversion     PONV (postoperative nausea and vomiting)     Shortness of breath     Type II or unspecified type diabetes mellitus without mention of complication, not stated as uncontrolled     Created by Conversion        Past Surgical History   Past Surgical History:   Procedure Laterality Date    AMPUTATE TOE(S) Right 7/31/2020    Procedure: AMPUTATION, third digit right foot;  Surgeon: Chris Vega DPM;  Location: [facility];  Service: Podiatry    CORONARY ARTERY BYPASS GRAFT, WITH ENDOSCOPIC VESSEL PROCUREMENT N/A 11/24/2024    Procedure: CORONARY ARTERY BYPASS GRAFT TIMES THREE, LEFT INTERNAL MAMMARY ARTERY HARVEST, LEFT LEG ENDOSCOPIC VESSEL PROCUREMENT,;  Surgeon: Zhne Carter MD;  Location: [facility]    CV CORONARY ANGIOGRAM N/A 11/23/2024    Procedure: Coronary Angiogram;  Surgeon: Roque Eisenberg MD;  Location: [facility] CATH LAB CV    CV INTRA AORTIC BALLOON N/A 11/24/2024    Procedure: Intra aortic Balloon Pump Insertion;  Surgeon: Roque Eisenberg MD;  Location: [facility] CATH LAB CV    CV LEFT HEART CATH N/A 11/23/2024    Procedure: Left Heart Catheterization;  Surgeon: Roque Eisenberg MD;  Location: [facility] CATH LAB CV    ENDOMETRIAL BIOPSY      FOOT ARTHROPLASTY Right 09/24/2020    Fourth and fifth toe by Dr. Vega    HC REPAIR OF HAMMERTOE,ONE Left 12/7/2020    Procedure: ARTHROPLASTY, digits two, three, four and five left foot;  Surgeon: Chris Vega DPM;  Location: [facility];  Service: Podiatry    HERNIA REPAIR      HYSTERECTOMY      IR LUMBAR PUNCTURE  7/20/2023    REPAIR TENDON ACHILLES Bilateral     Left was plate  , right was torn    TONSILLECTOMY      TRANSESOPHAGEAL ECHOCARDIOGRAM INTRAOPERATIVE  11/24/2024    Procedure: ECHOCARDIOGRAM, TRANSESOPHAGEAL, INTRA-OPERATIVE;  Surgeon: Zhen Carter MD;  Location: [facility] [place], [place] REMOVAL OF OVARY(S)      Description:  Oophorectomy - Bilateral (Removal Of Both Ovaries);  Recorded: 10/09/2008;       Prior to Admission Medications   Prior to Admission Medications   Prescriptions Last Dose Informant Patient Reported? Taking?   ARNUITY ELLIPTA 100 MCG/ACT inhaler  Self Yes No   Sig: Inhale 1 puff into the lungs 2 times daily.   acetaminophen (TYLENOL) 500 MG tablet   No No   Sig: Take 1-2 tablets (500-1,000 mg) by mouth every 6 hours as needed for mild pain (do NOT exceed 3,000mg in a 24hour period).   albuterol (PROVENTIL HFA;VENTOLIN HFA) 90 mcg/actuation inhaler  Self Yes No   Sig: Inhale 2 puffs into the lungs every 6 hours as needed   aspirin (ASA) 81 MG chewable tablet  Self No No   Sig: Take 2 tablets (162 mg) by mouth or NG Tube daily.   atorvastatin (LIPITOR) 80 MG tablet  Self Yes No   Sig: Take 80 mg by mouth daily.   citalopram (CELEXA) 20 MG tablet  Self Yes No   Sig: [CITALOPRAM (CELEXA) 20 MG TABLET] Take 20 mg by mouth every morning.    empagliflozin (JARDIANCE) 25 MG TABS tablet  Self No No   Sig: Take 1 tablet (25 mg) by mouth daily.   famotidine (PEPCID) 20 MG tablet   No No   Sig: Take 1 tablet (20 mg) by mouth as needed (take as needed for acid reflux).   furosemide (LASIX) 20 MG tablet   No No   Sig: Take 2 tablets (40 mg) by mouth daily.   hydrOXYzine HCl (ATARAX) 10 MG tablet  Self No No   Sig: Take 1 tablet (10 mg) by mouth or Feeding Tube every 8 hours as needed for anxiety.   lidocaine (LMX4) 4 % external cream   No No   Sig: Apply topically daily as needed for mild pain (painful area on left chest wall).   losartan (COZAAR) 25 MG tablet   No No   Sig: Take 0.5 tablets (12.5 mg) by mouth daily.   metFORMIN (GLUCOPHAGE XR) 500 MG 24 hr tablet  Self No No   Sig: Take 2 tablets (1,000 mg) by mouth daily (with breakfast). Changed on 6/21/24, new prescription not yet sent   metoprolol succinate ER (TOPROL XL) 25 MG 24 hr tablet   No No   Sig: Take 1.5 tablets (37.5 mg) by mouth 2 times daily.   ondansetron  (ZOFRAN ODT) 4 MG ODT tab  Self No No   Sig: Take 1 tablet (4 mg) by mouth every 8 hours as needed for nausea.   oxyCODONE (ROXICODONE) 5 MG tablet  Self No No   Sig: Take 0.5-1 tablets (2.5-5 mg) by mouth every 4 hours as needed for moderate to severe pain.   polyethylene glycol (MIRALAX) 17 g packet  Self Yes No   Sig: Take 1 packet by mouth daily as needed for constipation.   pramipexole (MIRAPEX) 0.125 MG tablet  Self Yes No   Sig: Take 0.125 mg by mouth daily. At 3pm   pramipexole (MIRAPEX) 0.5 MG tablet  Self Yes No   Sig: Take 0.5 mg by mouth at bedtime.   spironolactone (ALDACTONE) 25 MG tablet   No No   Sig: Take 0.5 tablets (12.5 mg) by mouth daily.   tirzepatide (MOUNJARO) 15 MG/0.5ML pen  Self No No   Sig: Inject 15 mg subcutaneously every 7 days.      Facility-Administered Medications: None        Review of Systems    As per HPI, otherwise 14 point review of system is negative.     Physical Exam   Vital Signs: Temp: 97.7  F (36.5  C) Temp src: Temporal BP: 123/68 Pulse: 95   Resp: 13 SpO2: 97 % O2 Device: None (Room air)    Weight: 152 lbs 0 oz    General: AAOx3, NAD, pleasant.  HEENT: NCAT, pupils are equal and round, anicteric, oral mucosa is moist.  Neck: Supple,  Chest: Healing medial sternotomy scar with no erythema or swelling or warmth.  Cardiac: RRR.  No murmur. No rub or gallop.  Respiratory: CTAB, no crackles, wheezes, rales, or rhonchi  Abdomen: Soft, NT, ND, + bowel sounds  Extremity: 1+ pitting edema of right lower extremity.  No edema of the left lower extremity.  DP pulses palpable.   Neuro: AAO x3,   Psych: Calm and cooperative.         Medical Decision Making       >55 MINUTES SPENT BY ME on the date of service doing chart review, history, exam, documentation & further activities per the note.      Data     I have personally reviewed the following data over the past 24 hrs:    9.3  \   11.0 (L)   / 332     139 98 24.5 (H) /  145 (H)   3.8 30 (H) 0.94 \     Trop: 19 (H) BNP: 991 (H)      INR:  N/A PTT:  N/A   D-dimer:  0.57 (H) Fibrinogen:  N/A       Imaging results reviewed over the past 24 hrs:   Recent Results (from the past 24 hours)   XR Chest Port 1 View    Narrative    EXAM: CHEST SINGLE VIEW PORTABLE  LOCATION: [facility]  DATE: 2/17/2025    INDICATION: Chest pain.  COMPARISON: 1/3/2025.    FINDINGS: The lungs are clear. Unchanged cardiac silhouette. Prior median sternotomy.      Impression    IMPRESSION: No convincing evidence of active cardiopulmonary disease.

## 2025-02-17 NOTE — ED PROVIDER NOTES
EMERGENCY DEPARTMENT ENCOUNTER      NAME: Gill Mendez  AGE: 65 year old female  YOB: 1960  MRN: [id number]  EVALUATION DATE & TIME: 2/16/2025 11:12 PM    PCP: Saúl Hunt    ED PROVIDER: Angelita Marks M.D.      Chief Complaint   Patient presents with    Chest Pain         FINAL IMPRESSION:  1. Chest pain, unspecified type        MEDICAL DECISION MAKING:    Pertinent Labs & Imaging studies reviewed. (See chart for details)  ED Course as of 02/17/25 0302   Sun Feb 16, 2025   2319 Afebrile.  Vital signs here with some mild tachycardia otherwise unremarkable.  Patient with history of hypertension, hyperlipidemia diabetes, CAD status post CABG done on 11/23/2024.  She is coming in here today with chest pain.  She states that she has been having some left anterior chest pain that radiates over towards her right arm.  She has had intermittent right arm numbness over the past week.  She has had some mild nausea, slight shortness of breath.  She has been feeling restless in her legs.  She states these are the exact same symptoms she had last time she had a heart attack back in November 2024 which caused her to have her CABG.  She states the symptoms today are identical.  Coming in here for evaluation.  Did call her cardiologist prior to coming in who advised her to come to the emergency department to be seen.    Patient's EKG here shows sinus tachycardia rate of 110.  There is no ST elevations or depressions.  No ectopy.  Intervals are appropriate.  Normal axis.  QTc is 479, QRS 80, .  As compared to the previous EKG on January 4, 2025 no significant changes.    Patient's physical exam here reveals some mild tachycardia, she is mildly anxious, otherwise her exam here is unremarkable.    Certainly patient with significant risk factors.  She does take 2 baby aspirin daily, she did take her dose this morning but none prior to coming in so we will give her 2 additional aspirin here.  Will order  nitro, labs, troponin, D-dimer given her mild shortness of breath and the tachycardia.  I do however think that some of this tachycardia is anxiety from having this chest pain as she is anxious that she may be having another cardiac event.  Will continue to monitor all of this closely   Mon Feb 17, 2025   0147 Patient's blood work back here with initial troponin at 21.  D-dimer 0.57 but age-adjusted appropriate.  BNP slightly elevated at 991.  Will repeat second troponin.  Will get chest x-ray as well.   0148 Plan here for patient will be likely admit for observation for continued cardiac monitoring.   0216 Delta troponin here is unremarkable.  Did place for possible transfer order for patient for observation for ACS evaluation         Medical Decision Making  Obtained supplemental history:Supplemental history obtained?: No  Reviewed external records: External records reviewed?: Outpatient Record: Cardiology Visit 1/31/25  Care impacted by chronic illness:Chronic Lung Disease, Diabetes, Heart Disease, and Hypertension  Did you consider but not order tests?: Work up considered but not performed and documented in chart, if applicable  Did you interpret images independently?: Independent interpretation of ECG and images noted in documentation, when applicable.  Consultation discussion with other provider:Did you involve another provider (consultant, MH, pharmacy, etc.)?: I discussed the care with another health care provider, see documentation for details.  Admit.    MIPS (CTPE, Dental pain, Mcdaniels, Sinusitis, Asthma/COPD, Head Trauma): Not Applicable        Critical care: 0 minutes excluding separately billable procedures.  Includes bedside management, time reviewing test results, review of records, discussing the case with staff, documenting the medical record and time spent with family members (or surrogate decision makers) discussing specific treatment issues.          ED COURSE:  11:17 PM I introduced myself to the  patient, obtained patient history, performed a physical exam, and discussed plan for ED workup including potential diagnostic laboratory/imaging studies and interventions.   2:35 AM I spoke with the hospitalist, Dr. Mcghee. We discussed the patient's case and they agree to admit the patient.     The importance of close follow up was discussed. We reviewed warning signs and symptoms, and I instructed Ms. Mendez to return to the emergency department immediately if she develops any new or worsening symptoms. I provided additional verbal discharge instructions. Ms. Mendez expressed understanding and agreement with this plan of care, her questions were answered, and she was discharged in stable condition.     MEDICATIONS GIVEN IN THE EMERGENCY:  Medications   nitroGLYcerin (NITROSTAT) sublingual tablet 0.4 mg (0.4 mg Sublingual $Given 2/16/25 2323)   aspirin (ASA) chewable tablet 162 mg (162 mg Oral $Given 2/16/25 2322)   fentaNYL (PF) (SUBLIMAZE) injection 50 mcg (50 mcg Intravenous $Given 2/17/25 0019)       NEW PRESCRIPTIONS STARTED AT TODAY'S ER VISIT:  New Prescriptions    No medications on file          =================================================================    HPI    Patient information was obtained from: Patient    Use of : N/A       Gill Mendez is a 65 year old female who presents for evaluation of chest pain. Patient reporting intermittent left anterior chest pain that radiates to her right arm with some mild nausea and slight shortness of breath. Also reports intermittent right arm numbness over the past week. Her symptoms are similar to when she last hd a heart attack in November 2024. She called her cardiology clinic and they recommended she come to the ED for evaluation.    Per chart review, patient s/p CABG x3 with CHANTAL NICHOLS in s/o NSTEMI with HFrEF on 11/24/2024 with Dr. Carter. Seen in cardiology clinic on 1/31/2025 concerned about lump at incision site. Lump consistent with  residual hematoma. Surgically doing well and sternum stable.    REVIEW OF SYSTEMS   Refer to HPI. All other systems negative.      PAST MEDICAL HISTORY:  Past Medical History:   Diagnosis Date    Anxiety     Asthma     Cellulitis     Diabetes mellitus (H)     Essential hypertension     Created by Conversion  Replacement Utility updated for latest IMO load    Femoral nerve palsy, left 08/23/2023    Gastroparesis     Goiter 05/28/2023 05/2023: Palpated on exam, has had stable imaging with endocrinology, continue to follow endocrinology.      Hyperlipidemia     Hypertension     Other and unspecified hyperlipidemia     Created by Conversion     PONV (postoperative nausea and vomiting)     Shortness of breath     Type II or unspecified type diabetes mellitus without mention of complication, not stated as uncontrolled     Created by Conversion        PAST SURGICAL HISTORY:  Past Surgical History:   Procedure Laterality Date    AMPUTATE TOE(S) Right 7/31/2020    Procedure: AMPUTATION, third digit right foot;  Surgeon: Chris Vega DPM;  Location: [facility] [place];  Service: Podiatry    CORONARY ARTERY BYPASS GRAFT, WITH ENDOSCOPIC VESSEL PROCUREMENT N/A 11/24/2024    Procedure: CORONARY ARTERY BYPASS GRAFT TIMES THREE, LEFT INTERNAL MAMMARY ARTERY HARVEST, LEFT LEG ENDOSCOPIC VESSEL PROCUREMENT,;  Surgeon: Zhen Carter MD;  Location: [facility]    CV CORONARY ANGIOGRAM N/A 11/23/2024    Procedure: Coronary Angiogram;  Surgeon: Roque Eisenberg MD;  Location: [facility] LAB CV    CV INTRA AORTIC BALLOON N/A 11/24/2024    Procedure: Intra aortic Balloon Pump Insertion;  Surgeon: Roque Eisenberg MD;  Location: [facility] LAB CV    CV LEFT HEART CATH N/A 11/23/2024    Procedure: Left Heart Catheterization;  Surgeon: Roque Eisenberg MD;  Location: [facility] CATH LAB CV    ENDOMETRIAL BIOPSY      FOOT ARTHROPLASTY Right 09/24/2020    Fourth and fifth toe by Dr. Vega    HC REPAIR OF  HAMMERTOE,ONE Left 12/7/2020    Procedure: ARTHROPLASTY, digits two, three, four and five left foot;  Surgeon: Chris Vega DPM;  Location: [facility];  Service: Podiatry    HERNIA REPAIR      HYSTERECTOMY      IR LUMBAR PUNCTURE  7/20/2023    REPAIR TENDON ACHILLES Bilateral     Left was plate  , right was torn    TONSILLECTOMY      TRANSESOPHAGEAL ECHOCARDIOGRAM INTRAOPERATIVE  11/24/2024    Procedure: ECHOCARDIOGRAM, TRANSESOPHAGEAL, INTRA-OPERATIVE;  Surgeon: Zhen Carter MD;  [place] [facility] [place]AL OF OVARY(S)      Description: Oophorectomy - Bilateral (Removal Of Both Ovaries);  Recorded: 10/09/2008;       CURRENT MEDICATIONS:      Current Facility-Administered Medications:     nitroGLYcerin (NITROSTAT) sublingual tablet 0.4 mg, 0.4 mg, Sublingual, Q5 Min PRN, Angelita Marks MD, 0.4 mg at 02/16/25 6094    Current Outpatient Medications:     acetaminophen (TYLENOL) 500 MG tablet, Take 1-2 tablets (500-1,000 mg) by mouth every 6 hours as needed for mild pain (do NOT exceed 3,000mg in a 24hour period)., Disp: , Rfl:     albuterol (PROVENTIL HFA;VENTOLIN HFA) 90 mcg/actuation inhaler, Inhale 2 puffs into the lungs every 6 hours as needed, Disp: , Rfl:     ARNUITY ELLIPTA 100 MCG/ACT inhaler, Inhale 1 puff into the lungs 2 times daily., Disp: , Rfl:     aspirin (ASA) 81 MG chewable tablet, Take 2 tablets (162 mg) by mouth or NG Tube daily., Disp: 180 tablet, Rfl: 3    atorvastatin (LIPITOR) 80 MG tablet, Take 80 mg by mouth daily., Disp: , Rfl:     citalopram (CELEXA) 20 MG tablet, [CITALOPRAM (CELEXA) 20 MG TABLET] Take 20 mg by mouth every morning. , Disp: , Rfl: 0    empagliflozin (JARDIANCE) 25 MG TABS tablet, Take 1 tablet (25 mg) by mouth daily., Disp: 90 tablet, Rfl: 1    famotidine (PEPCID) 20 MG tablet, Take 1 tablet (20 mg) by mouth as needed (take as needed for acid reflux)., Disp: 30 tablet, Rfl: 0    furosemide (LASIX) 20 MG tablet, Take 2 tablets (40 mg) by  mouth daily., Disp: 180 tablet, Rfl: 3    hydrOXYzine HCl (ATARAX) 10 MG tablet, Take 1 tablet (10 mg) by mouth or Feeding Tube every 8 hours as needed for anxiety., Disp: 30 tablet, Rfl: 0    lidocaine (LMX4) 4 % external cream, Apply topically daily as needed for mild pain (painful area on left chest wall)., Disp: 15 g, Rfl: 0    losartan (COZAAR) 25 MG tablet, Take 0.5 tablets (12.5 mg) by mouth daily., Disp: 45 tablet, Rfl: 3    metFORMIN (GLUCOPHAGE XR) 500 MG 24 hr tablet, Take 2 tablets (1,000 mg) by mouth daily (with breakfast). Changed on 6/21/24, new prescription not yet sent, Disp: 180 tablet, Rfl: 1    metoprolol succinate ER (TOPROL XL) 25 MG 24 hr tablet, Take 1.5 tablets (37.5 mg) by mouth 2 times daily., Disp: 270 tablet, Rfl: 3    ondansetron (ZOFRAN ODT) 4 MG ODT tab, Take 1 tablet (4 mg) by mouth every 8 hours as needed for nausea., Disp: 10 tablet, Rfl: 0    oxyCODONE (ROXICODONE) 5 MG tablet, Take 0.5-1 tablets (2.5-5 mg) by mouth every 4 hours as needed for moderate to severe pain., Disp: 10 tablet, Rfl: 0    polyethylene glycol (MIRALAX) 17 g packet, Take 1 packet by mouth daily as needed for constipation., Disp: , Rfl:     pramipexole (MIRAPEX) 0.125 MG tablet, Take 0.125 mg by mouth daily. At 3pm, Disp: , Rfl: 3    pramipexole (MIRAPEX) 0.5 MG tablet, Take 0.5 mg by mouth at bedtime., Disp: , Rfl:     spironolactone (ALDACTONE) 25 MG tablet, Take 0.5 tablets (12.5 mg) by mouth daily., Disp: 45 tablet, Rfl: 2    tirzepatide (MOUNJARO) 15 MG/0.5ML pen, Inject 15 mg subcutaneously every 7 days., Disp: 6 mL, Rfl: 1    ALLERGIES:  Allergies   Allergen Reactions    Codeine Unknown     Patient states possibly caused N/V but can't remember for sure    Semaglutide Nausea and Vomiting    Acetaminophen-Codeine Rash       FAMILY HISTORY:  Family History   Problem Relation Age of Onset    Diabetes Mother     Hypertension Mother     Acute Myocardial Infarction No family hx of        SOCIAL HISTORY:    Social History     Socioeconomic History    Marital status:    Tobacco Use    Smoking status: Never    Smokeless tobacco: Never   Substance and Sexual Activity    Alcohol use: No    Drug use: No     Social Drivers of Health     Financial Resource Strain: Low Risk  (1/4/2025)    Financial Resource Strain     Within the past 12 months, have you or your family members you live with been unable to get utilities (heat, electricity) when it was really needed?: No   Food Insecurity: Low Risk  (1/4/2025)    Food Insecurity     Within the past 12 months, did you worry that your food would run out before you got money to buy more?: No     Within the past 12 months, did the food you bought just not last and you didn t have money to get more?: No   Transportation Needs: Low Risk  (1/4/2025)    Transportation Needs     Within the past 12 months, has lack of transportation kept you from medical appointments, getting your medicines, non-medical meetings or appointments, work, or from getting things that you need?: No    Received from [facility], [facility]    Social Connections   Interpersonal Safety: Low Risk  (1/4/2025)    Interpersonal Safety     Do you feel physically and emotionally safe where you currently live?: Yes     Within the past 12 months, have you been hit, slapped, kicked or otherwise physically hurt by someone?: No     Within the past 12 months, have you been humiliated or emotionally abused in other ways by your partner or ex-partner?: No   Recent Concern: Interpersonal Safety - High Risk (11/23/2024)    Interpersonal Safety     Do you feel physically and emotionally safe where you currently live?: No     Within the past 12 months, have you been hit, slapped, kicked or otherwise physically hurt by someone?: No     Within the past 12 months, have you been humiliated or emotionally abused in other ways by your partner or ex-partner?: No  "  Housing Stability: Low Risk  (1/4/2025)    Housing Stability     Do you have housing? : Yes     Are you worried about losing your housing?: No       PHYSICAL EXAM:    Vitals: /68   Pulse 95   Temp 97.7  F (36.5  C) (Temporal)   Resp 13   Ht 1.575 m (5' 2\")   Wt 68.9 kg (152 lb)   SpO2 97%   BMI 27.80 kg/m     General:. Alert and interactive, comfortable appearing.  HENT: Oropharynx without erythema or exudates. MMM.  TMs clear bilaterally.  Eyes: Pupils mid-sized and equally reactive.   Neck: Full AROM.  No midline tenderness to palpation.  Cardiovascular: Mildly tachycardic, regular rhythm. Peripheral pulses 2+ bilaterally.  Chest/Pulmonary: Normal work of breathing. Lung sounds clear and equal throughout, no wheezes or crackles. No chest wall tenderness or deformities.  Abdomen: Soft, nondistended. Nontender without guarding or rebound.  Back/Spine: No CVA or midline tenderness.  Extremities: Normal ROM of all major joints. No lower extremity edema.   Skin: Warm and dry. Normal skin color.   Neuro: Speech clear. CNs grossly intact. Moves all extremities appropriately. Strength and sensation grossly intact to all extremities.   Psych: Mildly anxious, cooperative, memory appropriate.     LAB:  All pertinent labs reviewed and interpreted.  Labs Ordered and Resulted from Time of ED Arrival to Time of ED Departure   BASIC METABOLIC PANEL - Abnormal       Result Value    Sodium 139      Potassium 3.8      Chloride 98      Carbon Dioxide (CO2) 30 (*)     Anion Gap 11      Urea Nitrogen 24.5 (*)     Creatinine 0.94      GFR Estimate 68      Calcium 9.2      Glucose 145 (*)    TROPONIN T, HIGH SENSITIVITY - Abnormal    Troponin T, High Sensitivity 21 (*)    NT PROBNP INPATIENT - Abnormal    N terminal Pro BNP Inpatient 991 (*)    D DIMER QUANTITATIVE - Abnormal    D-Dimer Quantitative 0.57 (*)    CBC WITH PLATELETS AND DIFFERENTIAL - Abnormal    WBC Count 9.3      RBC Count 4.75      Hemoglobin 11.0 (*)     " Hematocrit 35.8      MCV 75 (*)     MCH 23.2 (*)     MCHC 30.7 (*)     RDW 16.1 (*)     Platelet Count 332      % Neutrophils 50      % Lymphocytes 35      % Monocytes 9      % Eosinophils 5      % Basophils 0      % Immature Granulocytes 0      NRBCs per 100 WBC 0      Absolute Neutrophils 4.7      Absolute Lymphocytes 3.3      Absolute Monocytes 0.8      Absolute Eosinophils 0.5      Absolute Basophils 0.0      Absolute Immature Granulocytes 0.0      Absolute NRBCs 0.0     TROPONIN T, HIGH SENSITIVITY - Abnormal    Troponin T, High Sensitivity 19 (*)        RADIOLOGY:  XR Chest Port 1 View   Final Result   IMPRESSION: No convincing evidence of active cardiopulmonary disease.           EKG:  See [facility]      PROCEDURES:   None        [employer] Documentation:   CMS Diagnoses: None        I, Alan Davis, am serving as a scribe to document services personally performed by Dr. Angelita Marks  based on my observation and the provider's statements to me. IAngelita MD attest that Alan Davis is acting in a scribe capacity, has observed my performance of the services and has documented them in accordance with my direction.      Angelita Marks M.D.  Emergency Medicine  [facility] EMERGENCY ROOM  [address] [id number] [phone]  Dept: [phone]     Angelita Marks MD  02/17/25 4891

## 2025-02-17 NOTE — PRE-PROCEDURE
GENERAL PRE-PROCEDURE:   Procedure:  Coronary angiogram with possible percutaneous coronary intervention  Date/Time:  2/17/2025 1:54 PM    Written consent obtained?: Yes    Risks and benefits: Risks, benefits and alternatives were discussed    Consent given by:  Patient  Patient states understanding of procedure being performed: Yes    Patient's understanding of procedure matches consent: Yes    Procedure consent matches procedure scheduled: Yes    Expected level of sedation:  Moderate  Appropriately NPO:  Yes  ASA Class:  3  Mallampati  :  Grade 1- soft palate, uvula, tonsillar pillars, and posterior pharyngeal wall visible  Lungs:  Lungs clear with good breath sounds bilaterally  Heart:  Normal heart sounds and rate  History & Physical reviewed:  History and physical reviewed and updates made (see comment)  H&P Comments:  Clinically Significant Risk Factors Present on Admission    Cardiovascular : unstable angina, ischemic cardiomyopathy, coronary artery disease with CABGx3, hypertension    Fluid & Electrolyte Disorders : Not present on admission    Gastroenterology : Not present on admission    Hematology/Oncology : Not present on admission    Nephrology : Not present on admission    Neurology : Not present on admission    Pulmonology : COPD    Systemic : DM2    [unfilled]    Statement of review:  I have reviewed the lab findings, diagnostic data, medications, and the plan for sedation

## 2025-02-17 NOTE — CONSULTS
[facility]  Consult Note - Hospitalist Service  Date of Admission:  2/17/2025  Consult Requested by: Vania Marin  Reason for Consult: Post procedure medical management    Assessment & Plan     Gill Mendez is a 65 year old female with history of CAD s/p CABG admitted on 2/17/2025 for evaluation of acute chest pain.     CAD with unstable angina:  Coronary angiogram reveals severe multivessel coronary disease with subtotal occlusion of the distal left main involving the proximal left circumflex and  of the ostial left anterior descending artery and distal right coronary artery. Patent LIMA to the LAD and vein grafts to the left circumflex system and distal right coronary artery.  Chest pain currently resolved.  - Per cardiology, recommend medical management  - Continue PTA aspirin, Lipitor, and metoprolol   - Cardiac rehab    Coronary artery disease s/p CABG x3 (LIMA to LAD, SVG to rPDA, SVG to OM)  Ischemic cardiomyopathy   Essential hypertension  Hyperlipidemia  Echocardiogram revealed decreased ejection fraction of 40-45%, no hemodynamically significant valvular abnormalities   - Continue Jardiance, losartan, and spironolactone    Cervical spine stenosis with radiculopathy  Reports having intermittent neck pain with new right arm numbness and weakness for one week.    CT cervical spine revealed multilevel DJD, high-grade foraminal stenosis and spinal stenosis in C4-C7.    - Symptom control: gabapentin. Tylenol prn  -  Neurosurgery consult    Diabetes, type II: On Tirzepatide and metformin.   - Hold PTA metformin.  NovoLog sliding scale.    Moderate persistent asthma: Continue PTA inhaler    Restless leg syndrome: Continue PTA Mirapex       Clinically Significant Risk Factors Present on Admission                 # Drug Induced Platelet Defect: home medication list includes an antiplatelet medication   # Hypertension: Noted on problem list  # Heart failure, NOS: heart failure noted on  "the problem list and last echo with EF 40-50%         # DMII: A1C = 7.5 % (Ref range: 0.0 - 5.6 %) within past 6 months    # Overweight: Estimated body mass index is 27.8 kg/m  as calculated from the following:    Height as of this encounter: 1.575 m (5' 2\").    Weight as of this encounter: 68.9 kg (152 lb).       # Financial/Environmental Concerns:    # Asthma: noted on problem list  # History of CABG: noted on surgical history       Siddhartha Villalpando MD  Hospitalist Service  Securely message with [employer] (more info)  Text page via [facility] Paging/Directory   ______________________________________________________________________    Chief Complaint   Postprocedure medical management      History of Present Illness   Gill Mendez is a 65 year old female with history of coronary artery disease with CABG x 3 (LIMA to LAD, SVG to rPDA, SVG to OM) 11/2024, chronic systolic CHF, ischemic cardiomyopathy, essential hypertension, dyslipidemia, DM2, gastroparesis, left femoral nerve palsy, persistent asthma, chronic pain syndrome, anxiety, who presented to [facility] ER for evaluation of acute chest pain. She was transferred to [facility] for coronary angiogram.     Past Medical History    Past Medical History:   Diagnosis Date    Anxiety     Asthma     Cellulitis     Diabetes mellitus (H)     Essential hypertension     Created by Conversion  Replacement Utility updated for latest IMO load    Femoral nerve palsy, left 08/23/2023    Gastroparesis     Goiter 05/28/2023 05/2023: Palpated on exam, has had stable imaging with endocrinology, continue to follow endocrinology.      Hyperlipidemia     Hypertension     Other and unspecified hyperlipidemia     Created by Conversion     PONV (postoperative nausea and vomiting)     Shortness of breath     Type II or unspecified type diabetes mellitus without mention of complication, not stated as uncontrolled     Created by Conversion        Past Surgical History   Past " Surgical History:   Procedure Laterality Date    AMPUTATE TOE(S) Right 7/31/2020    Procedure: AMPUTATION, third digit right foot;  Surgeon: Chris Vega DPM;  Location: [facility];  Service: Podiatry    CORONARY ARTERY BYPASS GRAFT, WITH ENDOSCOPIC VESSEL PROCUREMENT N/A 11/24/2024    Procedure: CORONARY ARTERY BYPASS GRAFT TIMES THREE, LEFT INTERNAL MAMMARY ARTERY HARVEST, LEFT LEG ENDOSCOPIC VESSEL PROCUREMENT,;  Surgeon: Zhen Carter MD;  Location: [facility]    CV CORONARY ANGIOGRAM N/A 11/23/2024    Procedure: Coronary Angiogram;  Surgeon: Roque Eisenberg MD;  Location: [facility] CATH LAB CV    CV INTRA AORTIC BALLOON N/A 11/24/2024    Procedure: Intra aortic Balloon Pump Insertion;  Surgeon: Roque Eisenberg MD;  Location: [facility] CATH LAB CV    CV LEFT HEART CATH N/A 11/23/2024    Procedure: Left Heart Catheterization;  Surgeon: Roque Eisenberg MD;  Location: [facility] CATH LAB CV    ENDOMETRIAL BIOPSY      FOOT ARTHROPLASTY Right 09/24/2020    Fourth and fifth toe by Dr. Vega    HC REPAIR OF HAMMERTOE,ONE Left 12/7/2020    Procedure: ARTHROPLASTY, digits two, three, four and five left foot;  Surgeon: Chris Vega DPM;  [facility]: [facility];  Service: Podiatry    HERNIA REPAIR      HYSTERECTOMY      IR LUMBAR PUNCTURE  7/20/2023    REPAIR TENDON ACHILLES Bilateral     Left was plate  , right was torn    TONSILLECTOMY      TRANSESOPHAGEAL ECHOCARDIOGRAM INTRAOPERATIVE  11/24/2024    Procedure: ECHOCARDIOGRAM, TRANSESOPHAGEAL, INTRA-OPERATIVE;  Surgeon: Zhen Carter MD;  Location: [facility] REMOVAL OF OVARY(S)      Description: Oophorectomy - Bilateral (Removal Of Both Ovaries);  Recorded: 10/09/2008;       Medications   I have reviewed this patient's current medications       Review of Systems    The 5 point Review of Systems is negative other than noted in the HPI or here.      Physical Exam   Vital Signs: Temp: 98  F (36.7  C) Temp  src: Oral BP: 100/59 Pulse: 92   Resp: 20 SpO2: 92 % O2 Device: None (Room air) Oxygen Delivery: 2 LPM  Weight: 152 lbs 0 oz    General appearance: not in acute distress  HEENT: PERRL, EOMI  Lungs: Clear breath sounds in bilateral lung fields  Cardiovascular: Regular rate and rhythm, normal S1-S2  Abdomen: Soft, non tender, no distension  Musculoskeletal: No joint swelling  Skin: No rash and no edema  Neurology: AAO ×3.  Cranial nerves II - XII normal. Slightly decreased muscle strength and sensation to light touch of right arm. Normal muscle strength in all other extremities.     Medical Decision Making       50 MINUTES SPENT BY ME on the date of service doing chart review, history, exam, documentation & further activities per the note.      Data     I have personally reviewed the following data over the past 24 hrs:    9.3  \   11.0 (L)   / 332     139 98 24.5 (H) /  255 (H)   3.8 30 (H) 0.94 \     Trop: 19 (H) BNP: 991 (H)     INR:  N/A PTT:  N/A   D-dimer:  0.57 (H) Fibrinogen:  N/A       Imaging results reviewed over the past 24 hrs:   Recent Results (from the past 24 hours)   XR Chest Port 1 View    Narrative    EXAM: CHEST SINGLE VIEW PORTABLE  LOCATION: [facility]  DATE: 2/17/2025    INDICATION: Chest pain.  COMPARISON: 1/3/2025.    FINDINGS: The lungs are clear. Unchanged cardiac silhouette. Prior median sternotomy.      Impression    IMPRESSION: No convincing evidence of active cardiopulmonary disease.    CT Cervical Spine w/o Contrast    Narrative    EXAM: CT CERVICAL SPINE W/O CONTRAST  LOCATION: [facility]  DATE: 2/17/2025    INDICATION: Neck pain, no complicating feature. No chronic neck pain >= 3 months  COMPARISON: Thyroid ultrasound 07/24/2024.  TECHNIQUE: Routine CT Cervical Spine without IV contrast. Multiplanar reformats. Dose reduction techniques were used.    FINDINGS: No cervical spinal fracture. Vertebral body heights are maintained. There  is reversal of the typical cervical lordosis. 2 mm retrolisthesis at C4-C5 and at C5-C6. There is advanced intervertebral disc height loss and endplate degenerative   changes at C4-C5, C5-C6, and at C6-C7. Probable intraosseous hemangiomas of the C4 and C5 vertebral bodies. Hypodense right thyroid lobe nodule measuring 2.7 cm in AP dimension is better evaluated on the recent thyroid ultrasound.     Craniovertebral Junction and C1-C2: Mild degenerative changes of the anterior C1-C2 articulation. No narrowing of the upper cervical spinal canal.    C2-C3: Mild intervertebral disc height loss. There is no disc bulge or spinal canal stenosis. Moderate left facet arthropathy and mild right facet arthropathy. There is mild left uncovertebral arthropathy. Mild left neural foraminal stenosis. No right   neural foraminal stenosis.     C3-C4: Mild intervertebral disc height loss. Central disc protrusion results in mild to moderate spinal canal stenosis. There is mild bilateral facet arthropathy. Mild right neural foraminal stenosis. No left neural foraminal stenosis.     C4-C5: Severe intervertebral disc height loss. Disc osteophyte complex results in moderate to severe spinal canal stenosis. There is mild bilateral facet arthropathy and bilateral uncovertebral arthropathy. Moderate to severe bilateral neural foraminal   stenosis.     C5-C6: Severe intervertebral disc height loss. Disc osteophyte complex results in moderate to severe spinal canal stenosis. There is moderate left and mild right facet arthropathy. Bilateral uncovertebral arthropathy. Moderate to severe bilateral neural   foraminal stenosis.     C6-C7: Severe intervertebral disc height loss. Disc osteophyte complex results in moderate spinal canal stenosis. There is moderate left and mild right facet arthropathy. Moderate left uncovertebral arthropathy. Severe left neural foraminal stenosis and   moderate right neural foraminal stenosis.     C7-T1: Mild  intervertebral disc height loss. Mild disc bulge resulting in mild spinal canal stenosis. No facet arthropathy. No neural foraminal stenosis.       Impression    IMPRESSION:  1.  At C4-C5 and at C5-C6, there is advanced intervertebral disc height loss and disc osteophyte complexes resulting in moderate to severe spinal canal stenosis.  2.  Degenerative changes contribute to multilevel high-grade neural foraminal narrowing bilaterally at C4-C5, bilaterally at C5-C6, and on the left at C6-C7.     Echocardiogram Complete   Result Value    LVEF  40-45% (mildly reduced)    [facility]    [id number]  [id number]  [id number]  [id number] [address]     Name: KATIUSKA JUSTIN  MRN: [id number]  : 1960  Study Date: 2025 10:31 AM  Age: 65 yrs  Gender: Female  Patient Location: [facility]  Reason For Study: Chest Pain  Ordering Physician: BRYANT STOLL  Performed By: FORD     BSA: 1.7 m2  Height: 62 in  Weight: 152 lb  HR: 87  BP: 131/69 mmHg  ______________________________________________________________________________  Procedure  Echocardiogram with two-dimensional, color and spectral Doppler. Definity (NDC  #[id number]) given intravenously.  ______________________________________________________________________________  Interpretation Summary     Left ventricular function is decreased. The ejection fraction is 40-45%  (mildly reduced).  Moderate anteroseptal hypokinesia with small segment of apical akinesia was  noted  Normal right ventricle size and systolic function.  No hemodynamically significant valvular abnormalities on 2D or color flow  imaging.  ______________________________________________________________________________  Left Ventricle  The left ventricle is normal in size. Left ventricular function is decreased.  The ejection fraction is 40-45% (mildly reduced). Left ventricular diastolic  function is abnormal. Moderate anteroseptal hypokinesia with  small segment of  apical akinesia was noted.     Right Ventricle  Normal right ventricle size and systolic function.     Atria  The left atrium is mildly dilated. Right atrial size is normal. There is no  color Doppler evidence of an atrial shunt.     Mitral Valve  Mitral valve leaflets appear normal. There is no evidence of mitral stenosis  or clinically significant mitral regurgitation.     Tricuspid Valve  Tricuspid valve leaflets appear normal. There is no evidence of tricuspid  stenosis or clinically significant tricuspid regurgitation.     Aortic Valve  Aortic valve leaflets appear normal. There is no evidence of aortic stenosis  or clinically significant aortic regurgitation.     Pulmonic Valve  Normal pulmonic valve.     Vessels  Normal size ascending aorta. Inferior vena cava not well visualized for  estimation of right atrial pressure.     Pericardium  Trivial pericardial effusion.     ______________________________________________________________________________  MMode/2D Measurements & Calculations  IVSd: 0.69 cm  LVIDd: 5.3 cm  LVIDs: 4.0 cm  LVPWd: 1.1 cm  FS: 25.0 %     LV mass(C)d: 173.1 grams  LV mass(C)dI: 101.7 grams/m2  Ao root diam: 3.2 cm  LA dimension: 3.5 cm  asc Aorta Diam: 3.2 cm  LA/Ao: 1.1  LVOT diam: 2.0 cm  LVOT area: 3.1 cm2  Ao root diam index Ht(cm/m): 2.0  Ao root diam index BSA (cm/m2): 1.9  Asc Ao diam index BSA (cm/m2): 1.9  Asc Ao diam index Ht(cm/m): 2.0  EF Biplane: 24.9 %  LA Volume (BP): 53.9 ml     LA Volume Index (BP): 31.7 ml/m2  LA Volume Indexed (AL/bp): 34.1 ml/m2  RV Base: 3.0 cm  RWT: 0.42  TAPSE: 1.9 cm     Time Measurements  MM HR: 87.0 BPM     Doppler Measurements & Calculations  MV E max sony: 87.3 cm/sec  MV A max sony: 35.4 cm/sec  MV E/A: 2.5  MV max P.7 mmHg  MV mean P.0 mmHg  MV V2 VTI: 19.7 cm  MVA(VTI): 3.1 cm2  MV dec slope: 713.0 cm/sec2  MV dec time: 0.12 sec  Ao V2 max: 106.5 cm/sec  Ao max P.0 mmHg  Ao V2 mean: 76.0 cm/sec  Ao mean PG: 3.0  mmHg  Ao V2 VTI: 24.2 cm  RADHA(I,D): 2.5 cm2  RADHA(V,D): 2.6 cm2  LV V1 max PG: 3.2 mmHg  LV V1 max: 89.4 cm/sec  LV V1 VTI: 19.2 cm  SV(LVOT): 60.3 ml  SI(LVOT): 35.5 ml/m2  PA V2 max: 86.8 cm/sec  PA max PG: 3.0 mmHg  PA acc time: 0.12 sec  AV Colt Ratio (DI): 0.84  RADHA Index (cm2/m2): 1.5  E/E': 38.0  E/E' av.4  Lateral E/e': 12.5  Medial E/e': 38.3  Peak E' Colt: 2.3 cm/sec  RV S Colt: 9.8 cm/sec     ______________________________________________________________________________  Report approved by: Delvin Bragg MD on 2025 11:41 AM         Cardiac Catheterization    Narrative    CARDIAC CATHETERIZATION AND INTERVENTION REPORT    PATIENT NAME:  Gill Mendez          MEDICAL RECORD NUMBER: [id number]  : 1960       PROCEDURE DATE: 2025        CONCLUSIONS:   Severe multivessel coronary disease with subtotal occlusion of the distal   left main involving the proximal left circumflex and  of the ostial   left anterior descending artery and distal right coronary artery.  Patent LIMA to the LAD and vein grafts to the left circumflex system and   distal right coronary artery.      RECOMMENDATIONS:   Usual postprocedure cares, please see orders.  Recommend medical management of patient's coronary artery disease.  Aggressive risk factor modification for secondary prevention.    PROCEDURE PERFORMED:   Selective right and left coronary angiography  Coronary artery bypass angiography    PRE-OP DIAGNOSIS:  Chest pain    POST-OP DIAGNOSIS:   Chest pain     PROCEDURALISTS: Mian Campbell MD      DIAGNOSTIC PROCEDURAL DETAILS:   Signed, informed consent was obtained. The patient was placed on the table   and prepped and draped in the usual fashion. Time out and site   verification performed.   Access: Left radial artery  Catheters: JL 3.5, JR 4, AL-1, OSBALDO  Hemostasis: TR band    PROCEDURAL MEDICATIONS:  Conscious sedation - midazolam and fentanyl, please see procedure log for   dosing.   Local  anesthesia - 1% lidocaine   Procedural anticoagulation - 75 units/kg of heparin     CONTRAST VOLUME: 95 mL Visipaque  BLOOD LOSS: minimal   FLUIDS: None   SPECIMENS: None  COMPLICATIONS: None    FINDINGS:  Coronary Angiography:  LEFT MAIN: Normal caliber vessel that bifurcates into left anterior   descending and left circumflex arteries.  Proximal left main has mild   disease 99% stenosis in the distal portion.  LEFT ANTERIOR DESCENDING: Occluded ostially and fills distally via LIMA   graft.  LEFT CIRCUMFLEX: 95% ostial stenosis followed by 70% stenosis in the   proximal portion.  Distally the left circumflex has competitive flow from   the vein graft.  RIGHT CORONARY ARTERY: Small, dominant vessel that gives rise to right   posterior descending artery and posterolateral system.  The ostial right   coronary artery has moderate concentric disease followed by moderate   diffuse disease.  The right coronary artery is subtotally occluded and   fills via vein graft.      Coronary Artery Bypass Angiography:     To the LAD is widely patent with mild disease of the native vessel beyond   the anastomosis.  There is retrograde flow into the proximal to mid LAD   and the diagonal branches.    Vein graft to the OM branch is widely patent with mild luminal   regularities of the native vessel beyond the anastomosis.  There is   retrograde flow into the proximal to mid left circumflex.    Vein graft to the right coronary artery is widely patent with mild disease   of the native vessel beyond the anastomosis.    Please do not hesitate to contact me if you have any questions or   concerns. I was present for the procedure in its entirety. Moderate   conscious sedation at level 3-4 with fentanyl and midazolam was   administered, and I spent continuous face to face time with the patient   which encompassed the duration of the procedure.  Please refer to the   sedation flow sheet for additional information.  I have reviewed and agree    with the documentation provided in the RN sedation flow sheet as outlined.   I concluded sedation and recovery was monitored by the trained independent   observer. I attest that I have ordered all medications administered,   equipment used, and tests performed during the procedure.    Mian Campbell MD  Interventional Cardiology

## 2025-02-17 NOTE — DISCHARGE SUMMARY
[facility] MEDICINE  DISCHARGE SUMMARY     Primary Care Physician: Saúl Hunt  Admission Date: 2/16/2025   Discharge Provider: Mely Queen MD Discharge Date: 2/17/2025   Diet:   NPO per Anesthesia Guidelines for Procedure/Surgery Except for: Meds   Code Status: Full Code   Activity: Bed rest        Condition at Discharge: Good     REASON FOR PRESENTATION(See Admission Note for Details)   Left-sided chest pain and right upper extremity paresthesia    PRINCIPAL & ACTIVE DISCHARGE DIAGNOSES     Acute chest pain with angina  Coronary artery disease with CABG x 3 (LIMA to LAD, SVG to rPDA, SVG to OM) 11/2024  Ischemic cardiomyopathy, 40 to 45%  Essential hypertension  Dyslipidemia  DM2  Gastroparesis  Left femoral nerve palsy  Moderate persistent asthma  Anxiety and depression  Chronic pain syndrome  Neck pain, DJD of cervical spine, moderate to severe spinal canal stenosis at C4-5, C5-6, multilevel high-grade neural foraminal stenosis at C4-5 bilaterally, C5-6 bilaterally and left C6/7  Restless leg syndrome  GERD    PENDING LABS     Unresulted Labs Ordered in the Past 30 Days of this Admission       No orders found for last 31 day(s).             PROCEDURES ( this hospitalization only)      Percutaneous coronary angiogram today at [facility]    RECOMMENDATIONS TO OUTPATIENT PROVIDER FOR F/U VISIT     Discharge instruction from [facility]  Neurosurgery consultation for neck pain and severe spinal canal stenosis and high grade multilevel foraminal narrowing  DISPOSITION     [facility]    SUMMARY OF HOSPITAL COURSE:      Ms.Caye SISI Mendez is a 65 year old female with history of coronary artery disease with CABG x 3 (LIMA to LAD, SVG to rPDA, SVG to OM) 11/2024, chronic systolic CHF, ischemic cardiomyopathy, essential hypertension, dyslipidemia, DM2, gastroparesis, left femoral nerve palsy, persistent asthma, chronic pain syndrome, anxiety, came with  substernal and left-sided chest pain radiating to the right upper extremity.  Some short of breath.  Has nonexertional chest pain without nausea, vomiting or diaphoresis.  She had similar chest pain in November 2024.  She had similar pain again in 1/2025.  Nuclear stress test was abnormal.  Had medium sized area of moderate degree of infarction  mid to distal anteroseptal segment(s) of the left ventricle.  She was medically managed.  Evaluated by cardiology.  Will have percutaneous coronary angiogram today.  Resume aspirin, statin, metoprolol, losartan.  Has ischemic cardiomyopathy.  Resume beta-blocker, ARB, Jardiance 25 mg daily spironolactone 12.5 mg daily, Lasix 40 mg daily.  No acute pulmonary congestion. Transfer to [facility] for angiogram today.    Has new right hand paresthesia.  She has intermittent neck pain.  No neck injury.  Next CT scan revealed multilevel DJD, high-grade foraminal stenosis and spinal stenosis.  Started on prednisone 40 mg daily.  PT and OT evaluation.  Follow-up with neurosurgery as outpatient.    Discharge Medications with Med changes:     Personally Reviewed.  Medications   Current Facility-Administered Medications   Medication Dose Route Frequency Provider Last Rate Last Admin     Current Facility-Administered Medications   Medication Dose Route Frequency Provider Last Rate Last Admin    aspirin (ASA) chewable tablet 162 mg  162 mg Oral or NG Tube Daily Yimi Mcghee MD   162 mg at 02/17/25 0756    atorvastatin (LIPITOR) tablet 80 mg  80 mg Oral Daily Yimi Mcghee MD   80 mg at 02/17/25 0756    citalopram (celeXA) tablet 20 mg  20 mg Oral QAM iYmi Mcghee MD   20 mg at 02/17/25 0757    empagliflozin (JARDIANCE) tablet 25 mg  25 mg Oral Daily Yimi Mcghee MD        enoxaparin ANTICOAGULANT (LOVENOX) injection 40 mg  40 mg Subcutaneous Q24H Yimi Mcghee MD   40 mg at 02/17/25 0330    famotidine (PEPCID) tablet 20 mg  20 mg Oral Daily Yimi Mcghee  MD   20 mg at 02/17/25 0756    fluticasone (ARNUITY ELLIPTA) 100 MCG/ACT inhaler 1 puff  1 puff Inhalation BID Yimi Mcghee MD   1 puff at 02/17/25 0756    furosemide (LASIX) tablet 40 mg  40 mg Oral Daily Yimi Mcghee MD   40 mg at 02/17/25 0757    gabapentin (NEURONTIN) capsule 100 mg  100 mg Oral At Bedtime Mely Queen MD        insulin aspart (NovoLOG) injection (RAPID ACTING)  1-7 Units Subcutaneous Q4H Yimi Mcghee MD        LORazepam (ATIVAN) tablet 0.5 mg  0.5 mg Oral Once Mely Queen MD        losartan (COZAAR) half-tab 12.5 mg  12.5 mg Oral Daily Yimi Mcghee MD   12.5 mg at 02/17/25 0757    metoprolol succinate ER (TOPROL-XL) 24 hr half-tab 37.5 mg  37.5 mg Oral BID Yimi Mcghee MD   37.5 mg at 02/17/25 0757    pramipexole (MIRAPEX) tablet 0.125 mg  0.125 mg Oral Daily Yimi Mcghee MD        pramipexole (MIRAPEX) tablet 0.5 mg  0.5 mg Oral At Bedtime Yimi Mcghee MD   0.5 mg at 02/17/25 0444    predniSONE (DELTASONE) tablet 40 mg  40 mg Oral Daily Mely Queen MD   40 mg at 02/17/25 0900    spironolactone (ALDACTONE) half-tab 12.5 mg  12.5 mg Oral Daily Yimi Mcghee MD   12.5 mg at 02/17/25 0758                Rationale for medication changes:              Consults     Cardiology    Immunizations given this encounter     Most Recent Immunizations   Administered Date(s) Administered    COVID-19 MONOVALENT 12+ (Pfizer) 12/29/2021    Flu, Unspecified 10/13/2021    Influenza (IIV3) PF 11/30/2011    Influenza Vaccine 18-64 (Flublok) 09/25/2020    Influenza Vaccine >6 months,quad, PF 10/17/2022    Influenza Vaccine Trivalent (FluBlok) 11/29/2024    Influenza Vaccine, 6+MO IM (QUADRIVALENT W/PRESERVATIVES) 09/25/2020    Pneumococcal 23 valent 11/09/2016    TDAP (Adacel,Boostrix) 06/18/2019    Td, Absorbed, Pf, Adult, Lf Unspecified 01/01/2007    Td,adult,historic,unspecified 01/01/2007    Zoster recombinant adjuvanted (SHINGRIX) 11/21/2019            Anticoagulation Information      None      SIGNIFICANT IMAGING FINDINGS     CXR  No convincing evidence of active cardiopulmonary disease.       Cervical spine CT  1.  At C4-C5 and at C5-C6, there is advanced intervertebral disc height loss and disc osteophyte complexes resulting in moderate to severe spinal canal stenosis.  2.  Degenerative changes contribute to multilevel high-grade neural foraminal narrowing bilaterally at C4-C5, bilaterally at C5-C6, and on the left at C6-C7    SIGNIFICANT LABORATORY FINDINGS     High sensitivity troponin 21-->19  proBNP 991  Creatinine 0.94  Hemoglobin 11, MCV 75, MCH 23, MCHC 30.7  WBC 9.3, platelet 332  Discharge Orders     No discharge procedures on file.    Examination   Physical Exam   Temp:  [97.7  F (36.5  C)] 97.7  F (36.5  C)  Pulse:  [] 91  Resp:  [11-88] 26  BP: (108-135)/(57-81) 128/71  SpO2:  [94 %-99 %] 98 %  Wt Readings from Last 1 Encounters:   02/16/25 68.9 kg (152 lb)     GENERAL:  Alert, appears comfortable, in no acute distress, appears stated age   HEAD:  Normocephalic, without obvious abnormality, atraumatic   EYES:  PERRL, conjunctiva clear,   EOM's intact   NOSE: Nares normal,  mucosa normal, no drainage   THROAT: Lips, mucosa, gums normal, mouth moist   NECK: Supple, symmetrical, trachea midline   BACK:   Symmetric, no curvature, ROM normal   LUNGS:   Clear to auscultation bilaterally, no rales, rhonchi, or wheezing, symmetric chest rise on inhalation, respirations unlabored   CHEST WALL:  No tenderness or deformity   HEART:  Regular rate and rhythm, S1 and S2 normal, no murmur    ABDOMEN:   Soft, non-tender, bowel sounds active , no masses, no organomegaly, no rebound or guarding   EXTREMITIES: No BLE edema    SKIN: No rashes in the visualized areas   NEURO: Alert, oriented x 3, right hand paresthesia   PSYCH: Cooperative, behavior is appropriate          Please see EMR for more detailed significant labs, imaging, consultant notes etc.    I,  Mely Queen MD, personally saw the patient today and spent greater than 30 minutes discharging this patient.    Mely Queen MD  [facility]    CC:Saúl Hunt

## 2025-02-17 NOTE — ED TRIAGE NOTES
Patient presents to the ED complaining of left anterior chest pain, right arm numbness for the past week, restless right leg, headache, and nausea.  Patient had triple bypass in November and states the symptoms feel similar to the last time she had a heart attack.       Triage Assessment (Adult)       Row Name 02/16/25 9270          Triage Assessment    Airway WDL WDL        Respiratory WDL    Respiratory WDL X;rhythm/pattern     Rhythm/Pattern, Respiratory shortness of breath;dyspnea upon exertion        Skin Circulation/Temperature WDL    Skin Circulation/Temperature WDL WDL        Cardiac WDL    Cardiac WDL X;chest pain        Chest Pain Assessment    Chest Pain Location anterior chest, left     Chest Pain Radiation arm  right     Chest Pain Intervention 12-lead ECG obtained        Peripheral/Neurovascular WDL    Peripheral Neurovascular WDL WDL        Cognitive/Neuro/Behavioral WDL    Cognitive/Neuro/Behavioral WDL WDL

## 2025-02-17 NOTE — PLAN OF CARE
Patient report given to [facility] cath lab for transfer there for angiogram. Transport with EMS is scheduled at 1230-1pm.  Chest pain still present and left arm numbness still present.   Meds whole, alert and oriented, vss and room air.   Echo done and ct of cervical spine performed as well.   Pending pt .   Remains NPO, q4h blood sugar checks stable in the 100's.  Angelica Rice RN      Problem: Adult Inpatient Plan of Care  Goal: Optimal Comfort and Wellbeing  Outcome: Progressing  Goal: Readiness for Transition of Care  Outcome: Progressing   Goal Outcome Evaluation:

## 2025-02-18 ENCOUNTER — APPOINTMENT (OUTPATIENT)
Dept: RADIOLOGY | Facility: HOSPITAL | Age: 65
End: 2025-02-18
Attending: FAMILY MEDICINE
Payer: COMMERCIAL

## 2025-02-18 ENCOUNTER — APPOINTMENT (OUTPATIENT)
Dept: MRI IMAGING | Facility: HOSPITAL | Age: 65
End: 2025-02-18
Attending: FAMILY MEDICINE
Payer: COMMERCIAL

## 2025-02-18 VITALS
OXYGEN SATURATION: 92 % | DIASTOLIC BLOOD PRESSURE: 58 MMHG | HEIGHT: 62 IN | TEMPERATURE: 97.8 F | BODY MASS INDEX: 28.34 KG/M2 | HEART RATE: 86 BPM | RESPIRATION RATE: 18 BRPM | SYSTOLIC BLOOD PRESSURE: 110 MMHG | WEIGHT: 154 LBS

## 2025-02-18 LAB
GLUCOSE BLDC GLUCOMTR-MCNC: 149 MG/DL (ref 70–99)
GLUCOSE BLDC GLUCOMTR-MCNC: 213 MG/DL (ref 70–99)

## 2025-02-18 PROCEDURE — G0378 HOSPITAL OBSERVATION PER HR: HCPCS

## 2025-02-18 PROCEDURE — 72052 X-RAY EXAM NECK SPINE 6/>VWS: CPT

## 2025-02-18 PROCEDURE — 96372 THER/PROPH/DIAG INJ SC/IM: CPT | Performed by: FAMILY MEDICINE

## 2025-02-18 PROCEDURE — 99239 HOSP IP/OBS DSCHRG MGMT >30: CPT | Performed by: FAMILY MEDICINE

## 2025-02-18 PROCEDURE — 82962 GLUCOSE BLOOD TEST: CPT

## 2025-02-18 PROCEDURE — 250N000013 HC RX MED GY IP 250 OP 250 PS 637: Performed by: FAMILY MEDICINE

## 2025-02-18 PROCEDURE — 250N000011 HC RX IP 250 OP 636: Performed by: FAMILY MEDICINE

## 2025-02-18 PROCEDURE — 72141 MRI NECK SPINE W/O DYE: CPT

## 2025-02-18 PROCEDURE — G0463 HOSPITAL OUTPT CLINIC VISIT: HCPCS

## 2025-02-18 PROCEDURE — 250N000011 HC RX IP 250 OP 636

## 2025-02-18 PROCEDURE — 250N000013 HC RX MED GY IP 250 OP 250 PS 637: Performed by: INTERNAL MEDICINE

## 2025-02-18 PROCEDURE — 99203 OFFICE O/P NEW LOW 30 MIN: CPT

## 2025-02-18 RX ORDER — GABAPENTIN 100 MG/1
100 CAPSULE ORAL 3 TIMES DAILY
Status: DISCONTINUED | OUTPATIENT
Start: 2025-02-18 | End: 2025-02-18

## 2025-02-18 RX ORDER — GABAPENTIN 100 MG/1
100 CAPSULE ORAL 3 TIMES DAILY
Qty: 90 CAPSULE | Refills: 0 | Status: SHIPPED | OUTPATIENT
Start: 2025-02-18 | End: 2025-03-20

## 2025-02-18 RX ORDER — LORAZEPAM 2 MG/ML
2 INJECTION INTRAMUSCULAR ONCE
Status: COMPLETED | OUTPATIENT
Start: 2025-02-18 | End: 2025-02-18

## 2025-02-18 RX ADMIN — SPIRONOLACTONE 12.5 MG: 25 TABLET, FILM COATED ORAL at 08:54

## 2025-02-18 RX ADMIN — LORAZEPAM 2 MG: 2 INJECTION INTRAMUSCULAR; INTRAVENOUS at 11:23

## 2025-02-18 RX ADMIN — PRAMIPEXOLE DIHYDROCHLORIDE 0.12 MG: 0.12 TABLET ORAL at 08:54

## 2025-02-18 RX ADMIN — FUROSEMIDE 40 MG: 20 TABLET ORAL at 08:53

## 2025-02-18 RX ADMIN — ASPIRIN 81 MG CHEWABLE TABLET 162 MG: 81 TABLET CHEWABLE at 08:53

## 2025-02-18 RX ADMIN — ENOXAPARIN SODIUM 40 MG: 40 INJECTION SUBCUTANEOUS at 08:52

## 2025-02-18 RX ADMIN — LOSARTAN POTASSIUM 12.5 MG: 25 TABLET, FILM COATED ORAL at 08:53

## 2025-02-18 RX ADMIN — ATORVASTATIN CALCIUM 80 MG: 40 TABLET, FILM COATED ORAL at 08:53

## 2025-02-18 RX ADMIN — GABAPENTIN 100 MG: 100 CAPSULE ORAL at 08:53

## 2025-02-18 RX ADMIN — FLUTICASONE FUROATE 1 PUFF: 100 POWDER RESPIRATORY (INHALATION) at 08:52

## 2025-02-18 RX ADMIN — FAMOTIDINE 20 MG: 20 TABLET, FILM COATED ORAL at 08:53

## 2025-02-18 RX ADMIN — EMPAGLIFLOZIN 25 MG: 25 TABLET, FILM COATED ORAL at 08:54

## 2025-02-18 RX ADMIN — METOPROLOL SUCCINATE 37.5 MG: 25 TABLET, EXTENDED RELEASE ORAL at 08:53

## 2025-02-18 ASSESSMENT — ACTIVITIES OF DAILY LIVING (ADL)
ADLS_ACUITY_SCORE: 33
ADLS_ACUITY_SCORE: 34
ADLS_ACUITY_SCORE: 33
ADLS_ACUITY_SCORE: 34
ADLS_ACUITY_SCORE: 33

## 2025-02-18 NOTE — CARE PLAN
PRIMARY DIAGNOSIS: right arm numbness  OUTPATIENT/OBSERVATION GOALS TO BE MET BEFORE DISCHARGE:  1. Pain Status: Pain free.    2. Return to near baseline physical activity: Yes    3. Cleared for discharge by consultants (if involved): No    Discharge Planner Nurse   Safe discharge environment identified: Yes  Barriers to discharge: Yes       Entered by: Belkys Marroquin RN 02/18/2025 1:06 AM     Right arm numbness- better per patient. Neuro surgery to see in am. Denies any pain.  Wound right ariella. Mepilex dressing dry and intact. Wound RN to see.   Post angio site- left radial- WNL. No hematoma or bruising.

## 2025-02-18 NOTE — PLAN OF CARE
PRIMARY DIAGNOSIS: TR BAND RECOVERY   OUTPATIENT/OBSERVATION GOALS TO BE MET BEFORE DISCHARGE:  TR band status: removed  Radial pulse and CMS (circulation, motion, sensation) are WDL: Yes  Bleeding or hematoma present at site: No      Activity restriction education reviewed with patient: No  Stable vital signs:  Yes  ADLs back to baseline:  Yes  Activity and level of assistance: Ambulating independently.  Interpretation of rhythm per telemetry tech: Normal sinus      Discharge Planner Nurse   Safe discharge environment identified: Yes  Barriers to discharge:  right arm numbness which started 2 weeks ago       Entered by: Radha Barnett RN 02/17/2025 10:38 PM     Please review provider order for any additional goals.   Nurse to notify provider when observation goals have been met and patient is ready for discharge.Goal Outcome Evaluation:

## 2025-02-18 NOTE — PROGRESS NOTES
NSGY chart check    Cervical MRI and x-rays reviewed and discussed with Dr. Pringle today.  Will plan for conservative treatment with gabapentin, outpatient physical therapy, follow-up in outpatient clinic with Dr. Pringle next week.  Okay for patient to discharge from neurosurgical point of view once medically stable. Schedulers messaged. Discussed with hospitalist.  Navigator complete. NSGY signing off.    Imaging:  Imaging Interpretation provided by radiologist.   EXAM: MR CERVICAL SPINE W/O CONTRAST  LOCATION: [facility]  DATE: 2/18/2025     INDICATION: right arm numbness, multi level high grade foraminal stenosis on Cervical CT  COMPARISON: Thyroid ultrasound 07/13/2012  TECHNIQUE: MRI Cervical Spine without IV contrast.     FINDINGS: 2 mm retrolisthesis of C3 on C4. 3 mm retrolisthesis from C4-C5 to C6-C7. Vertebral body heights are maintained. Modic type I changes from C4-C5 to C6-C7. Patchy T2 prolongation within the jenna, likely due to chronic hypertensive/microvascular   ischemic white matter changes. No abnormal cord signal. Mild symmetric atrophy of the posterior paraspinal musculature. The flow-voids of the vertebral arteries are present. 2.1 x 2.7 cm dominant nodule in the right lobe of the thyroid gland, not   significantly changed compared to previous thyroid ultrasound.     C2-C3: Normal intervertebral disc height. Shallow broad-based disc bulge. Mild right and moderate left facet arthropathy. No spinal canal narrowing. No right neuroforaminal narrowing. Mild left neuroforaminal narrowing.      C3-C4: Mild intervertebral disc height loss. Broad-based disc bulge with superimposed small central disc protrusion. Moderate bilateral facet arthropathy. Moderate spinal canal narrowing. Moderate right neuroforaminal narrowing. Mild left neuroforaminal   narrowing.      C4-C5: Moderate intervertebral disc height loss. Broad-based disc bulge. Mild right and moderate left facet  arthropathy. Moderate to severe spinal canal narrowing. Moderate to severe bilateral neuroforaminal narrowing.      C5-C6: Mild intervertebral disc height loss. Broad-based disc bulge with superimposed central disc protrusion. Moderate bilateral facet arthropathy. Severe spinal canal narrowing. Mild right neuroforaminal narrowing. Moderate left neuroforaminal   narrowing.      C6-C7: Mild intervertebral disc height loss. Broad-based disc bulge with superimposed central disc protrusion. Moderate bilateral facet arthropathy. Moderate to severe spinal canal narrowing and moderate right neuroforaminal narrowing. Moderate to severe   left neuroforaminal narrowing.      C7-T1: Normal intervertebral disc height. Shallow broad-based disc bulge. Moderate bilateral facet arthropathy. No spinal canal narrowing. Mild right neuroforaminal narrowing. No left neuroforaminal narrowing.                                                    IMPRESSION:  1.  Severe spinal canal narrowing at C5-C6.  2.  Moderate to severe spinal canal narrowing at C4-C5 and C6-C7.  3.  Moderate spinal canal narrowing at C3-C4.  4.  Moderate to severe bilateral neuroforaminal narrowing at C4-C5.  5.  Moderate to severe left and moderate right neuroforaminal narrowing at C6-C7.  6.  Moderate right and mild left neuroforaminal narrowing at C3-C4.  7.  Moderate left and mild right neuroforaminal narrowing at C5-C6.   8.  Modic type I changes from C4-C5 to C6-C7.      EXAM: XR CERVICAL SPINE COMPLETE W OBL and FLEX/EXT  LOCATION: [facility]  DATE: 2/18/2025     INDICATION: right arm numbness, multi level high grade foraminal stenosis on cervical CT  COMPARISON: Same day MRI. 2/17/2025 CT.                                                                   IMPRESSION: Straightening of the normal cervical lordosis. Grade 1 anterolisthesis at C2-C3. Vertebral body heights within normal limits. No substantial subluxation with  flexion/extension. No discernible acute fracture. Prevertebral soft tissue within   normal limits.    Priya Ramirez PA-C  [facility]  [address] [address]

## 2025-02-18 NOTE — CONSULTS
[facility]    Neurosurgery Consultation     Date of Admission:  2/17/2025  Date of Consult (When I saw the patient): 02/18/25    Assessment & Plan   Gill Mendez is a 65 year old female history of CAD s/p CABG, HTN, HLD, Diabetes, ischemic cardiomyopathy admitted for evaluation of acute chest pain. NSGY consulted for right arm numbness.    Patient currently anticoagulated on aspirin and Lovenox. Underwent coronary angiogram yesterday.    Patient reporting development of neck pain and diffuse paresthesias in the right upper extremity several weeks ago. No radicular pain or weakness. Denies left upper extremity symptoms, acute bowel or bladder dysfunction, recent fall/trauma, history of neck surgery/PT/[facility].    No focal neurological deficits on examination.     Cervical CT reviewed and discussed with patient. Imaging demonstrates multi-level advanced disc height loss and osteophyte complexes resulting in moderate to severe canal stenosis at C4-C5 and C5-C6, multi-level high-grade foraminal narrowing bilaterally at C4-C5, bilaterally at C5-C6, and on the left at C6-C7.    Recommend Cervical Mri and flexion/extension xrays for further evaluation. Discussed starting conservative treatment regarding neck pain and right arm numbness including outpatient physical therapy and gabapentin. Will have patient follow up in outpatient neurosurgery clinic if she should have persistent symptoms despite medical management and PT.       Imaging:  Imaging Interpretation provided by radiologist.   EXAM: CT CERVICAL SPINE W/O CONTRAST  LOCATION: [facility]  DATE: 2/17/2025     INDICATION: Neck pain, no complicating feature. No chronic neck pain >= 3 months  COMPARISON: Thyroid ultrasound 07/24/2024.  TECHNIQUE: Routine CT Cervical Spine without IV contrast. Multiplanar reformats. Dose reduction techniques were used.     FINDINGS: No cervical spinal fracture. Vertebral body heights are  maintained. There is reversal of the typical cervical lordosis. 2 mm retrolisthesis at C4-C5 and at C5-C6. There is advanced intervertebral disc height loss and endplate degenerative   changes at C4-C5, C5-C6, and at C6-C7. Probable intraosseous hemangiomas of the C4 and C5 vertebral bodies. Hypodense right thyroid lobe nodule measuring 2.7 cm in AP dimension is better evaluated on the recent thyroid ultrasound.      Craniovertebral Junction and C1-C2: Mild degenerative changes of the anterior C1-C2 articulation. No narrowing of the upper cervical spinal canal.     C2-C3: Mild intervertebral disc height loss. There is no disc bulge or spinal canal stenosis. Moderate left facet arthropathy and mild right facet arthropathy. There is mild left uncovertebral arthropathy. Mild left neural foraminal stenosis. No right   neural foraminal stenosis.      C3-C4: Mild intervertebral disc height loss. Central disc protrusion results in mild to moderate spinal canal stenosis. There is mild bilateral facet arthropathy. Mild right neural foraminal stenosis. No left neural foraminal stenosis.      C4-C5: Severe intervertebral disc height loss. Disc osteophyte complex results in moderate to severe spinal canal stenosis. There is mild bilateral facet arthropathy and bilateral uncovertebral arthropathy. Moderate to severe bilateral neural foraminal   stenosis.      C5-C6: Severe intervertebral disc height loss. Disc osteophyte complex results in moderate to severe spinal canal stenosis. There is moderate left and mild right facet arthropathy. Bilateral uncovertebral arthropathy. Moderate to severe bilateral neural   foraminal stenosis.      C6-C7: Severe intervertebral disc height loss. Disc osteophyte complex results in moderate spinal canal stenosis. There is moderate left and mild right facet arthropathy. Moderate left uncovertebral arthropathy. Severe left neural foraminal stenosis and   moderate right neural foraminal stenosis.       C7-T1: Mild intervertebral disc height loss. Mild disc bulge resulting in mild spinal canal stenosis. No facet arthropathy. No neural foraminal stenosis.                                                          IMPRESSION:  1.  At C4-C5 and at C5-C6, there is advanced intervertebral disc height loss and disc osteophyte complexes resulting in moderate to severe spinal canal stenosis.  2.  Degenerative changes contribute to multilevel high-grade neural foraminal narrowing bilaterally at C4-C5, bilaterally at C5-C6, and on the left at C6-C7.    NSGY Recommendations:  - No urgent/emergent neurosurgical intervention  -Recommend Cervical Mri and flexion/extension xrays for further evaluation. Discussed starting conservative treatment regarding neck pain and right arm numbness including outpatient physical therapy and gabapentin. Will have patient follow up in outpatient neurosurgery clinic if she should have persistent symptoms despite medical management and PT.     Navigator updated. Discussed with Hospitalist.     I have discussed the following assessment and plan with Dr. Pringle who is in agreement with initial plan and will follow up with further consultation recommendations.    Priya Ramirez PA-C  [facility]  [place] [id number]    Text page via [employer] Paging/Directory    Code Status    Full Code    Reason for Consult   Reason for consult: Right arm numbness and weakness    Primary Care Physician   Saúl Hunt    Chief Complaint   Acute chest pain    History is obtained from the patient and chart review    History of Present Illness   Gill Mendez is a 65 year old female history of CAD s/p CABG, HTN, HLD, Diabetes, ischemic cardiomyopathy admitted for evaluation of acute chest pain.     Patient currently anticoagulated on aspirin and Lovenox. Underwent coronary angiogram yesterday.    Patient reporting development of  neck pain and diffuse paresthesias in the right upper extremity several weeks ago. No radicular pain or weakness. Denies left upper extremity symptoms, acute bowel or bladder dysfunction, recent fall/trauma, history of neck surgery/PT/[facility].    Past Medical History   I have reviewed this patient's medical history and updated it with pertinent information if needed.   Past Medical History:   Diagnosis Date    Anxiety     Asthma     Cellulitis     Diabetes mellitus (H)     Essential hypertension     Created by Conversion  Replacement Utility updated for latest IMO load    Femoral nerve palsy, left 08/23/2023    Gastroparesis     Goiter 05/28/2023 05/2023: Palpated on exam, has had stable imaging with endocrinology, continue to follow endocrinology.      Hyperlipidemia     Hypertension     Other and unspecified hyperlipidemia     Created by Conversion     PONV (postoperative nausea and vomiting)     Shortness of breath     Type II or unspecified type diabetes mellitus without mention of complication, not stated as uncontrolled     Created by Conversion        Past Surgical History   I have reviewed this patient's surgical history and updated it with pertinent information if needed.  Past Surgical History:   Procedure Laterality Date    AMPUTATE TOE(S) Right 7/31/2020    Procedure: AMPUTATION, third digit right foot;  Surgeon: Chris Vega DPM;  Location: [facility] [place];  Service: Podiatry    CORONARY ARTERY BYPASS GRAFT, WITH ENDOSCOPIC VESSEL PROCUREMENT N/A 11/24/2024    Procedure: CORONARY ARTERY BYPASS GRAFT TIMES THREE, LEFT INTERNAL MAMMARY ARTERY HARVEST, LEFT LEG ENDOSCOPIC VESSEL PROCUREMENT,;  Surgeon: Zhen Carter MD;  Location: [facility] [place]    CV CORONARY ANGIOGRAM N/A 11/23/2024    Procedure: Coronary Angiogram;  Surgeon: Roque Eisenberg MD;  [facility] CATH LAB CV    CV INTRA AORTIC BALLOON N/A 11/24/2024    Procedure: Intra aortic Balloon Pump Insertion;  Surgeon:  Roque Eisenberg MD;  Location: [facility] LAB CV    CV LEFT HEART CATH N/A 11/23/2024    Procedure: Left Heart Catheterization;  Surgeon: Roque Eisenberg MD;  Location: [facility] CATH LAB CV    ENDOMETRIAL BIOPSY      FOOT ARTHROPLASTY Right 09/24/2020    Fourth and fifth toe by Dr. eVga     REPAIR OF MISHAERTOSHERYL,ONE Left 12/7/2020    Procedure: ARTHROPLASTY, digits two, three, four and five left foot;  Surgeon: Chris Vega DPM;  Location: [facility];  Service: Podiatry    HERNIA REPAIR      HYSTERECTOMY      IR LUMBAR PUNCTURE  7/20/2023    REPAIR TENDON ACHILLES Bilateral     Left was plate  , right was torn    TONSILLECTOMY      TRANSESOPHAGEAL ECHOCARDIOGRAM INTRAOPERATIVE  11/24/2024    Procedure: ECHOCARDIOGRAM, TRANSESOPHAGEAL, INTRA-OPERATIVE;  Surgeon: Zhen Carter MD;  [place] [facility] [place]      Description: Oophorectomy - Bilateral (Removal Of Both Ovaries);  Recorded: 10/09/2008;       Prior to Admission Medications   Prior to Admission Medications   Prescriptions Last Dose Informant Patient Reported? Taking?   ARNUITY ELLIPTA 100 MCG/ACT inhaler  Self Yes No   Sig: Inhale 1 puff into the lungs 2 times daily.   acetaminophen (TYLENOL) 500 MG tablet   No No   Sig: Take 1-2 tablets (500-1,000 mg) by mouth every 6 hours as needed for mild pain (do NOT exceed 3,000mg in a 24hour period).   albuterol (PROVENTIL HFA;VENTOLIN HFA) 90 mcg/actuation inhaler  Self Yes No   Sig: Inhale 2 puffs into the lungs every 6 hours as needed   aspirin (ASA) 81 MG chewable tablet  Self No No   Sig: Take 2 tablets (162 mg) by mouth or NG Tube daily.   atorvastatin (LIPITOR) 80 MG tablet  Self Yes No   Sig: Take 80 mg by mouth daily.   citalopram (CELEXA) 20 MG tablet  Self Yes No   Sig: [CITALOPRAM (CELEXA) 20 MG TABLET] Take 20 mg by mouth every morning.    empagliflozin (JARDIANCE) 25 MG TABS tablet  Self No No   Sig: Take 1 tablet (25 mg) by mouth daily.    famotidine (PEPCID) 20 MG tablet   No No   Sig: Take 1 tablet (20 mg) by mouth as needed (take as needed for acid reflux).   furosemide (LASIX) 20 MG tablet   Yes No   Sig: Take 30 mg by mouth daily.   lidocaine (LMX4) 4 % external cream   No No   Sig: Apply topically daily as needed for mild pain (painful area on left chest wall).   losartan (COZAAR) 25 MG tablet   No No   Sig: Take 0.5 tablets (12.5 mg) by mouth daily.   metFORMIN (GLUCOPHAGE XR) 500 MG 24 hr tablet  Self No No   Sig: Take 2 tablets (1,000 mg) by mouth daily (with breakfast). Changed on 6/21/24, new prescription not yet sent   metoprolol succinate ER (TOPROL XL) 25 MG 24 hr tablet   No No   Sig: Take 1.5 tablets (37.5 mg) by mouth 2 times daily.   polyethylene glycol (MIRALAX) 17 g packet  Self Yes No   Sig: Take 1 packet by mouth daily as needed for constipation.   pramipexole (MIRAPEX) 0.125 MG tablet  Self Yes No   Sig: Take 0.125 mg by mouth daily. At 3pm   pramipexole (MIRAPEX) 0.5 MG tablet  Self Yes No   Sig: Take 0.5 mg by mouth at bedtime.   spironolactone (ALDACTONE) 25 MG tablet   No No   Sig: Take 0.5 tablets (12.5 mg) by mouth daily.   tirzepatide (MOUNJARO) 15 MG/0.5ML pen  Self No No   Sig: Inject 15 mg subcutaneously every 7 days.      Facility-Administered Medications: None     Allergies   Allergies   Allergen Reactions    Codeine Unknown     Patient states possibly caused N/V but can't remember for sure    Semaglutide Nausea and Vomiting    Acetaminophen-Codeine Rash       Social History   I have reviewed this patient's social history and updated it with pertinent information if needed. Gill Mendez  reports that she has never smoked. She has never used smokeless tobacco. She reports that she does not drink alcohol and does not use drugs.    Family History   I have reviewed this patient's family history and updated it with pertinent information if needed.   Family History   Problem Relation Age of Onset    Diabetes Mother      "Hypertension Mother     Acute Myocardial Infarction No family hx of        ROS: 10 point ROS negative other than the symptoms noted above in the HPI.    Physical Exam   Temp: 98.1  F (36.7  C) Temp src: Oral BP: 112/57 Pulse: 97   Resp: 20 SpO2: 98 % O2 Device: None (Room air) Oxygen Delivery: 2 LPM  Vital Signs with Ranges  Temp:  [97.6  F (36.4  C)-98.6  F (37  C)] 98.1  F (36.7  C)  Pulse:  [] 97  Resp:  [11-26] 20  BP: ()/(49-71) 112/57  SpO2:  [92 %-99 %] 98 %  154 lbs 0 oz     , Blood pressure 112/57, pulse 97, temperature 98.1  F (36.7  C), temperature source Oral, resp. rate 20, height 1.575 m (5' 2\"), weight 69.9 kg (154 lb), SpO2 98%, not currently breastfeeding.  154 lbs 0 oz    Sitting up in hospital bed. NAD  HEENT:  Normocephalic, atraumatic.  PERRL.  EOM s intact.     NEUROLOGICAL EXAMINATION:   Mental status:  Alert and Oriented x 3, speech is fluent.  Cranial nerves:  II-XII intact.   Motor:   Shoulder Abduction:       Right:  5/5   Left:  5/5  Elbow Flexion:                Right:  5/5   Left:  5/5  Elbow Extension:            Right:  5/5   Left:  5/5  Wrist Extensors:             Right:  5/5   Left:  5/5  Wrist Flexors:                 Right:  5/5   Left:  5/5  interosseus :                  Right:  5/5   Left:  5/5  Hip Flexion:                    Right: 5/5  Left:  5/5  Knee Flexion:                 Right:  5/5  Left:  5/5  Knee Extension:             Right:  5/5  Left:  5/5  Dorsiflexion:                   Right:  5/5  Left:  5/5  Plantar Flexion:             Right:  5/5  Left:  5/5  EHL:                              Right:  5/5  Left:  5/5  Sensation:  Intact to light touch throughout BUE/BLE    Reflexes:  Negative Clonus Bilaterally. Negative Lutz's Bilaterally.    Gait: Not formally assessed  Appropriate cervical ROM.     Data     CBC RESULTS:   Recent Labs   Lab Test 02/16/25  2322   WBC 9.3   RBC 4.75   HGB 11.0*   HCT 35.8   MCV 75*   MCH 23.2*   MCHC 30.7*   RDW 16.1*   PLT " 332     Basic Metabolic Panel:  Lab Results   Component Value Date     02/16/2025      Lab Results   Component Value Date    POTASSIUM 3.8 02/16/2025    POTASSIUM 4.0 11/24/2024    POTASSIUM 4.0 01/02/2023     Lab Results   Component Value Date    CHLORIDE 98 02/16/2025    CHLORIDE 102 01/02/2023     Lab Results   Component Value Date    VALENTE 9.2 02/16/2025     Lab Results   Component Value Date    CO2 30 02/16/2025    CO2 25 01/02/2023     Lab Results   Component Value Date    BUN 24.5 02/16/2025    BUN 30 01/02/2023     Lab Results   Component Value Date    CR 0.94 02/16/2025     Lab Results   Component Value Date     02/18/2025     01/02/2023     INR:  Lab Results   Component Value Date    INR 1.05 11/25/2024    INR 1.14 11/24/2024    INR 1.36 11/24/2024    INR 0.98 03/22/2021

## 2025-02-18 NOTE — PLAN OF CARE
"  Problem: Adult Inpatient Plan of Care  Goal: Plan of Care Review  Description: The Plan of Care Review/Shift note should be completed every shift.  The Outcome Evaluation is a brief statement about your assessment that the patient is improving, declining, or no change.  This information will be displayed automatically on your shift  note.  Outcome: Met  Goal: Patient-Specific Goal (Individualized)  Description: You can add care plan individualizations to a care plan. Examples of Individualization might be:  \"Parent requests to be called daily at 9am for status\", \"I have a hard time hearing out of my right ear\", or \"Do not touch me to wake me up as it startles  me\".  Outcome: Met  Goal: Absence of Hospital-Acquired Illness or Injury  Outcome: Met  Goal: Optimal Comfort and Wellbeing  Outcome: Met  Goal: Readiness for Transition of Care  Outcome: Met   Goal Outcome Evaluation:                        "

## 2025-02-18 NOTE — PLAN OF CARE
PRIMARY DIAGNOSIS: TR BAND RECOVERY   OUTPATIENT/OBSERVATION GOALS TO BE MET BEFORE DISCHARGE:  TR band status: removed  Radial pulse and CMS (circulation, motion, sensation) are WDL: Yes  Bleeding or hematoma present at site: No      Activity restriction education reviewed with patient: No  Stable vital signs:  Yes  ADLs back to baseline:  Yes  Activity and level of assistance: Up with standby assistance.  Interpretation of rhythm per telemetry tech: Normal sinus      Discharge Planner Nurse   Safe discharge environment identified: Yes  Barriers to discharge:  new order for neuro surgery for right arm numbness which is improving per pt's report.       Entered by: Radha Barnett RN 02/17/2025 10:40 PM     Please review provider order for any additional goals.   Nurse to notify provider when observation goals have been met and patient is ready for discharge.Goal Outcome Evaluation:       Pt is alert and oriented x 4, denies pain or SOB. Pt verbalized right arm numbness which she noticed 2 weeks ago and it's still there although it's better tonight. Lung sounds clear, on RA, HR in the 90's at rest, low 100's with activity. Post radial site is soft, no hematoma noted, no bleeding noted, pulse WNL. Reverse barbeau WNL. Pt is SBA , ambulated in the hallway, tolerated 250 ft.  at dinner time and 255 at HS, insulin given per sliding scale. Pt is also eating snack at HS per request. She is drinking and voiding well. Wound noted in the right heel, per pt's report it was due to dry skin which causes it to open. MD informed, ordered wound consult.

## 2025-02-18 NOTE — DISCHARGE INSTRUCTIONS
Right heel wound(s): Every 3 days  Cleanse with saline, pat dry  Cover with mepilex     You are scheduled with Dr. Vega for your right heel wound on March 4th, with a 1:45 arrival time  Our clinic is located inside [facility], please enter through the main doors and they will direct you to the clinic  Call if you need to re-schedule: [phone]      Interventional Cardiology  Coronary Angiogram/Angioplasty/Stent/Atherectomy Discharge  Instructions -   Radial (wrist) Approach     The instructions below are to help you understand how to take care of yourself. There is also information about when to call the doctor or emergency services.    **Do not stop your aspirin or platelet inhibitor unless directed by your Cardiologist.  These medications help to prevent platelets in your blood from sticking together and forming a clot.   Examples of these medications are: Ticagrelor (Brilinta), Clopidogrel (Plavix), Prasugrel (Effient)    For 24 hours after procedure:  Do not subject hand/arm to any forceful movements for 24 hours, such as supporting weight when rising from a chair or bed.  Do not drive a car for 24 hours.  The dressing on the puncture site may be removed after 24 hours and left open to air. If minor oozing, you may apply a Band-Aid and remove after 12 hours.   You may shower on the day after your procedure. Do not take a tub bath or wash dishes (no soaking wrist) with the puncture site in water for 3 days after the procedure.    For 48 hours following the procedure:  Do not operate a lawnmower, motorcycle, chainsaw or all-terrain vehicle.  Do not lift anything heavier than 5-10 pounds with affected arm for 5 days.  Avoid excessive bending (flexion/extension) wrist movement.  Do not engage in vigorous exercise (i.e. tennis, golf) using the affected arm for 5 days after discharge.  You may return to work in 72 hours if no complications and no heavy lifting.    If bleeding should occur following  discharge:  Sit down and apply firm pressure with your thumb against the puncture site and fingers against back of wrist for 10 minutes.  If the bleeding stops, continue to rest, keeping your wrist still for 2 hours. Notify your doctor as soon as possible.  If bleeding does not stop after 10 minutes or if there is a large amount of bleeding or spurting, call 911 immediately. Do not drive yourself to the hospital.           Contact the Heart Clinic at [phone] if you develop:  Fever over 100.4, that lasts more than one day  Redness, heat, or pus at the puncture site  Change in color or temperature of the hand or arm    Expect mild tingling of hand and tenderness at the puncture site for up to 3 days. You may take Tylenol or a pain medicine recommended by your doctor.                       Our Cardiac Rehab staff may visit briefly with you while your in the hospital.   If they miss you, someone will contact you after you are home.  You are encouraged to enroll in an Outpatient Cardiac Rehab Program       Your Procedural Physician was: ***  the phone number is: [phone]    [facility]:  [phone]  If you are calling after hours, please listen to the entire voicemail, a live  will answer at the end of the message

## 2025-02-18 NOTE — CONSULTS
"[facility]  WO Nurse Inpatient Assessment     Consulted for: right heel    Summary: chronic wound on right heel. Former patient of Dr. Vega, would like to see him in clinic again for follow up. Appt scheduled for patient.     [employer] nurse follow-up plan: weekly    Patient History (according to provider note(s):          Assessment:      Areas visualized during today's visit: Focused:    Wound location: right heel    Last photo: NA  Wound due to: Diabetic Ulcer  Wound history/plan of care: patient reports wound started a couple months ago as \"cracking\"  Wound base: 100 % pale pink wound bed     Palpation of the wound bed: normal      Drainage: small     Description of drainage: serous     Measurements (length x width x depth, in cm): 3  x 1  x  0.2 cm      Tunneling: N/A     Undermining: N/A  Periwound skin: Intact      Color: normal and consistent with surrounding tissue      Temperature: normal   Odor: none  Pain: absent,   Pain interventions prior to dressing change: no significant pain present   Treatment goal: Heal   STATUS: initial assessment  Supplies ordered: at bedside       Treatment Plan:     Right heel wound(s): Every 3 days  Cleanse with saline, pat dry  Cover with mepilex     Orders: Written    RECOMMEND PRIMARY TEAM ORDER: None, at this time  Education provided: plan of care. Patient would like to get scheduled with Dr. Vega for follow up. Appointment scheduled in [place]  Discussed plan of care with: Patient  Notify WO if wound(s) deteriorate.  Nursing to notify the Provider(s) and re-consult the WOC Nurse if new skin concern.    DATA:     Current support surface: Standard  Standard gel mattress (Isoflex)  Containment of urine/stool: Continent of bladder and Continent of bowel  BMI: Body mass index is 28.17 kg/m .   Active diet order: Orders Placed This Encounter      Combination Diet Moderate Consistent Carb (60 g CHO per Meal) Diet; Low Saturated Fat Na <2400mg Diet   "   Output: I/O last 3 completed shifts:  In: 940 [P.O.:940]  Out: 950 [Urine:950]     Labs:   Recent Labs   Lab 02/16/25 2322 02/12/25  0804   HGB 11.0*  --    WBC 9.3  --    A1C  --  7.5*     Pressure injury risk assessment:   Sensory Perception: 4-->no impairment  Moisture: 4-->rarely moist  Activity: 3-->walks occasionally  Mobility: 4-->no limitation  Nutrition: 3-->adequate  Friction and Shear: 3-->no apparent problem  Wesley Score: 21    DARRYL ALVARENGA RN CWOCN, CFCN  Pager no longer is use, please contact through Otterology group: [facility]

## 2025-02-18 NOTE — PROGRESS NOTES
Discharge instructions given to pt, she verbalized understanding. Pt went home with the spouse. NA brought pt to the lobby per wheelchair.

## 2025-02-18 NOTE — PROGRESS NOTES
Patient doing well.  Discussed briefly with Neuro Surg.  If ok with heart care will likely discharge patient today.  Full note to follow.

## 2025-02-18 NOTE — DISCHARGE SUMMARY
"[facility]  Hospitalist Discharge Summary      Date of Admission:  2/17/2025  Date of Discharge:  2/18/2025  4:02 PM  Discharging Provider: Joe Bennett MD  Discharge Service: Hospitalist Service    Discharge Diagnoses   Unstable Angina and Cervical Stenosis    Clinically Significant Risk Factors     # DMII: A1C = 7.5 % (Ref range: 0.0 - 5.6 %) within past 6 months  # Overweight: Estimated body mass index is 28.17 kg/m  as calculated from the following:    Height as of this encounter: 1.575 m (5' 2\").    Weight as of this encounter: 69.9 kg (154 lb).       Follow-ups Needed After Discharge   Follow-up Appointments       Hospital Follow-up with Existing Primary Care Provider (PCP)      Please see details below         Schedule Primary Care visit within: 7 Days       Follow Up      Okay to follow up in outpatient neurosurgery clinic if having ongoing arm pain, numbness, weakness. Please contact neurosurgery clinic via telephone [phone] for questions, concerns, scheduling assistance. Please contact neurosurgery clinic if you should develop new or worsening arm pain, numbness/tingling, weakness.        Follow Up      Follow up with Cardiology Clinic as directed.                Unresulted Labs Ordered in the Past 30 Days of this Admission       No orders found from 1/18/2025 to 2/18/2025.        These results will be followed up by n/a    Discharge Disposition   Discharged to home  Condition at discharge: Stable    Hospital Course   Gill Mendez is a 65 year old female with history of CAD s/p CABG admitted on 2/17/2025 for evaluation of acute chest pain.      CAD with unstable angina:  Coronary angiogram reveals severe multivessel coronary disease with subtotal occlusion of the distal left main involving the proximal left circumflex and  of the ostial left anterior descending artery and distal right coronary artery. Patent LIMA to the LAD and vein grafts to the left circumflex " system and distal right coronary artery.  Chest pain currently resolved.  - Per cardiology, recommend medical management  - Continue PTA aspirin, Lipitor, and metoprolol   - Cardiac rehab as outpatient     Coronary artery disease s/p CABG x3 (LIMA to LAD, SVG to rPDA, SVG to OM)  Ischemic cardiomyopathy   Essential hypertension  Hyperlipidemia  Echocardiogram revealed decreased ejection fraction of 40-45%, no hemodynamically significant valvular abnormalities   - Continue Jardiance, losartan, and spironolactone     Cervical spine stenosis with radiculopathy  Reports having intermittent neck pain with new right arm numbness and weakness for one week.    Imaging shows multilevel DJD, high-grade foraminal stenosis and spinal stenosis in C4-C7.    - Symptom control: gabapentin. Tylenol prn  -  Neurosurgery recommends outpatient follow up.     Diabetes, type II: On Tirzepatide and metformin.      Moderate persistent asthma: Continue PTA inhaler     Restless leg syndrome: Continue PTA Mirapex      Consultations This Hospital Stay   CARE MANAGEMENT / SOCIAL WORK IP CONSULT  PHARMACY IP CONSULT  HOSPITALIST IP CONSULT  NEUROSURGERY IP CONSULT  WOUND OSTOMY CONTINENCE NURSE  IP CONSULT  SMOKING CESSATION PROGRAM IP CONSULT    Code Status   Prior    Time Spent on this Encounter   I, Joe Bennett MD, personally saw the patient today and spent greater than 30 minutes discharging this patient.       Joe Bennett MD  [facility]  [address] [place] [id number]  Phone: [phone]  Fax: [phone]  ______________________________________________________________________    Physical Exam   Vital Signs: Temp: 97.8  F (36.6  C) Temp src: Oral BP: 110/58 Pulse: 86   Resp: 18 SpO2: 92 % O2 Device: None (Room air)    Weight: 154 lbs 0 oz  Constitutional: awake, alert, cooperative, no apparent distress, and appears stated age  Respiratory: no increased work of breathing,  good air exchange, and no retractions  Cardiovascular: normal S1 and S2  GI: normal bowel sounds  Skin: no rashes and no jaundice  Musculoskeletal: There is no redness, warmth, or swelling of the joints.  Full range of motion noted.  Motor strength appears symmetrical, reports decreased fine touch.  Neurologic: Mental Status Exam:  Level of Alertness:   awake  Orientation:   person, place, time  Cranial Nerves:  cranial nerves II-XII are grossly intact  Motor Exam:  moves all extremities well and symmetrically       Primary Care Physician   Saúl Hunt    Discharge Orders      Physical Therapy  Referral      Cardiac Rehab  Referral      Activity    Activity as tolerated regarding  your neck pain and right arm numbness. Recommend continuing to monitor symptoms and contacting neurosurgery clinic if developing new or worsening arm pain, numbness/tingling, weakness.     Follow Up    Okay to follow up in outpatient neurosurgery clinic if having ongoing arm pain, numbness, weakness. Please contact neurosurgery clinic via telephone [phone] for questions, concerns, scheduling assistance. Please contact neurosurgery clinic if you should develop new or worsening arm pain, numbness/tingling, weakness.     Reason for your hospital stay    Unstable angina     Activity    Your activity upon discharge: activity as tolerated     Follow Up    Follow up with Cardiology Clinic as directed.     Diet    Follow this diet upon discharge: Regular     Hospital Follow-up with Existing Primary Care Provider (PCP)    Please see details below            Significant Results and Procedures   Results for orders placed or performed during the hospital encounter of 02/17/25   MR Cervical Spine w/o Contrast    Narrative    EXAM: MR CERVICAL SPINE W/O CONTRAST  LOCATION: [facility]  DATE: 2/18/2025    INDICATION: right arm numbness, multi level high grade foraminal stenosis on Cervical  CT  COMPARISON: Thyroid ultrasound 07/13/2012  TECHNIQUE: MRI Cervical Spine without IV contrast.    FINDINGS: 2 mm retrolisthesis of C3 on C4. 3 mm retrolisthesis from C4-C5 to C6-C7. Vertebral body heights are maintained. Modic type I changes from C4-C5 to C6-C7. Patchy T2 prolongation within the jenna, likely due to chronic hypertensive/microvascular   ischemic white matter changes. No abnormal cord signal. Mild symmetric atrophy of the posterior paraspinal musculature. The flow-voids of the vertebral arteries are present. 2.1 x 2.7 cm dominant nodule in the right lobe of the thyroid gland, not   significantly changed compared to previous thyroid ultrasound.    C2-C3: Normal intervertebral disc height. Shallow broad-based disc bulge. Mild right and moderate left facet arthropathy. No spinal canal narrowing. No right neuroforaminal narrowing. Mild left neuroforaminal narrowing.     C3-C4: Mild intervertebral disc height loss. Broad-based disc bulge with superimposed small central disc protrusion. Moderate bilateral facet arthropathy. Moderate spinal canal narrowing. Moderate right neuroforaminal narrowing. Mild left neuroforaminal   narrowing.     C4-C5: Moderate intervertebral disc height loss. Broad-based disc bulge. Mild right and moderate left facet arthropathy. Moderate to severe spinal canal narrowing. Moderate to severe bilateral neuroforaminal narrowing.     C5-C6: Mild intervertebral disc height loss. Broad-based disc bulge with superimposed central disc protrusion. Moderate bilateral facet arthropathy. Severe spinal canal narrowing. Mild right neuroforaminal narrowing. Moderate left neuroforaminal   narrowing.     C6-C7: Mild intervertebral disc height loss. Broad-based disc bulge with superimposed central disc protrusion. Moderate bilateral facet arthropathy. Moderate to severe spinal canal narrowing and moderate right neuroforaminal narrowing. Moderate to severe   left neuroforaminal narrowing.      C7-T1: Normal intervertebral disc height. Shallow broad-based disc bulge. Moderate bilateral facet arthropathy. No spinal canal narrowing. Mild right neuroforaminal narrowing. No left neuroforaminal narrowing.      Impression    IMPRESSION:  1.  Severe spinal canal narrowing at C5-C6.  2.  Moderate to severe spinal canal narrowing at C4-C5 and C6-C7.  3.  Moderate spinal canal narrowing at C3-C4.  4.  Moderate to severe bilateral neuroforaminal narrowing at C4-C5.  5.  Moderate to severe left and moderate right neuroforaminal narrowing at C6-C7.  6.  Moderate right and mild left neuroforaminal narrowing at C3-C4.  7.  Moderate left and mild right neuroforaminal narrowing at C5-C6.   8.  Modic type I changes from C4-C5 to C6-C7.       XR Cervical Spine Comp w Obl & Flex/Ext    Narrative    EXAM: XR CERVICAL SPINE COMPLETE W OBL and FLEX/EXT  LOCATION: [facility]  DATE: 2025    INDICATION: right arm numbness, multi level high grade foraminal stenosis on cervical CT  COMPARISON: Same day MRI. 2025 CT.      Impression    IMPRESSION: Straightening of the normal cervical lordosis. Grade 1 anterolisthesis at C2-C3. Vertebral body heights within normal limits. No substantial subluxation with flexion/extension. No discernible acute fracture. Prevertebral soft tissue within   normal limits.     Cardiac Catheterization    Narrative    CARDIAC CATHETERIZATION AND INTERVENTION REPORT    PATIENT NAME:  Gill Mendez          MEDICAL RECORD NUMBER: [id number]  : 1960       PROCEDURE DATE: 2025        CONCLUSIONS:   Severe multivessel coronary disease with subtotal occlusion of the distal   left main involving the proximal left circumflex and  of the ostial   left anterior descending artery and distal right coronary artery.  Patent LIMA to the LAD and vein grafts to the left circumflex system and   distal right coronary artery.      RECOMMENDATIONS:   Usual  postprocedure cares, please see orders.  Recommend medical management of patient's coronary artery disease.  Aggressive risk factor modification for secondary prevention.    PROCEDURE PERFORMED:   Selective right and left coronary angiography  Coronary artery bypass angiography    PRE-OP DIAGNOSIS:  Chest pain    POST-OP DIAGNOSIS:   Chest pain     PROCEDURALISTS: Mian Campbell MD      DIAGNOSTIC PROCEDURAL DETAILS:   Signed, informed consent was obtained. The patient was placed on the table   and prepped and draped in the usual fashion. Time out and site   verification performed.   Access: Left radial artery  Catheters: JL 3.5, JR 4, AL-1, OSBALDO  Hemostasis: TR band    PROCEDURAL MEDICATIONS:  Conscious sedation - midazolam and fentanyl, please see procedure log for   dosing.   Local anesthesia - 1% lidocaine   Procedural anticoagulation - 75 units/kg of heparin     CONTRAST VOLUME: 95 mL Visipaque  BLOOD LOSS: minimal   FLUIDS: None   SPECIMENS: None  COMPLICATIONS: None    FINDINGS:  Coronary Angiography:  LEFT MAIN: Normal caliber vessel that bifurcates into left anterior   descending and left circumflex arteries.  Proximal left main has mild   disease 99% stenosis in the distal portion.  LEFT ANTERIOR DESCENDING: Occluded ostially and fills distally via LIMA   graft.  LEFT CIRCUMFLEX: 95% ostial stenosis followed by 70% stenosis in the   proximal portion.  Distally the left circumflex has competitive flow from   the vein graft.  RIGHT CORONARY ARTERY: Small, dominant vessel that gives rise to right   posterior descending artery and posterolateral system.  The ostial right   coronary artery has moderate concentric disease followed by moderate   diffuse disease.  The right coronary artery is subtotally occluded and   fills via vein graft.      Coronary Artery Bypass Angiography:     To the LAD is widely patent with mild disease of the native vessel beyond   the anastomosis.  There is retrograde flow into the  proximal to mid LAD   and the diagonal branches.    Vein graft to the OM branch is widely patent with mild luminal   regularities of the native vessel beyond the anastomosis.  There is   retrograde flow into the proximal to mid left circumflex.    Vein graft to the right coronary artery is widely patent with mild disease   of the native vessel beyond the anastomosis.    Please do not hesitate to contact me if you have any questions or   concerns. I was present for the procedure in its entirety. Moderate   conscious sedation at level 3-4 with fentanyl and midazolam was   administered, and I spent continuous face to face time with the patient   which encompassed the duration of the procedure.  Please refer to the   sedation flow sheet for additional information.  I have reviewed and agree   with the documentation provided in the RN sedation flow sheet as outlined.   I concluded sedation and recovery was monitored by the trained independent   observer. I attest that I have ordered all medications administered,   equipment used, and tests performed during the procedure.    Mian Campbell MD  Interventional Cardiology        Discharge Medications   Discharge Medication List as of 2/18/2025  3:58 PM        START taking these medications    Details   gabapentin (NEURONTIN) 100 MG capsule Take 1 capsule (100 mg) by mouth 3 times daily., Disp-90 capsule, R-0, E-Prescribe           CONTINUE these medications which have NOT CHANGED    Details   acetaminophen (TYLENOL) 500 MG tablet Take 1-2 tablets (500-1,000 mg) by mouth every 6 hours as needed for mild pain (do NOT exceed 3,000mg in a 24hour period)., No Print Out      albuterol (PROVENTIL HFA;VENTOLIN HFA) 90 mcg/actuation inhaler Inhale 2 puffs into the lungs every 6 hours as needed, Historical      ARNUITY ELLIPTA 100 MCG/ACT inhaler Inhale 1 puff into the lungs 2 times daily., VICKY, Historical      aspirin (ASA) 81 MG chewable tablet Take 2 tablets (162 mg) by mouth or  NG Tube daily., Disp-180 tablet, R-3, E-Prescribe      atorvastatin (LIPITOR) 80 MG tablet Take 80 mg by mouth daily., Historical      citalopram (CELEXA) 20 MG tablet [CITALOPRAM (CELEXA) 20 MG TABLET] Take 20 mg by mouth every morning. , R-0, Historical      empagliflozin (JARDIANCE) 25 MG TABS tablet Take 1 tablet (25 mg) by mouth daily., Disp-90 tablet, R-1, E-Prescribe      famotidine (PEPCID) 20 MG tablet Take 1 tablet (20 mg) by mouth as needed (take as needed for acid reflux)., Disp-30 tablet, R-0, E-Prescribe      lidocaine (LMX4) 4 % external cream Apply topically daily as needed for mild pain (painful area on left chest wall).Disp-15 g, E-3O-Iertcutfp      losartan (COZAAR) 25 MG tablet Take 0.5 tablets (12.5 mg) by mouth daily., Disp-45 tablet, R-3, E-PrescribeDose decrease. Please do not fill until patient requests.      metFORMIN (GLUCOPHAGE XR) 500 MG 24 hr tablet Take 2 tablets (1,000 mg) by mouth daily (with breakfast). Changed on 6/21/24, new prescription not yet sent, Disp-180 tablet, R-1, E-Prescribe      metoprolol succinate ER (TOPROL XL) 25 MG 24 hr tablet Take 1.5 tablets (37.5 mg) by mouth 2 times daily., Disp-270 tablet, R-3, E-PrescribeDose increase. Please do not fill until patient requests.      polyethylene glycol (MIRALAX) 17 g packet Take 1 packet by mouth daily as needed for constipation., Historical      !! pramipexole (MIRAPEX) 0.125 MG tablet Take 0.125 mg by mouth daily. At 3pm, R-3, Historical      !! pramipexole (MIRAPEX) 0.5 MG tablet Take 0.5 mg by mouth at bedtime., Historical      spironolactone (ALDACTONE) 25 MG tablet Take 0.5 tablets (12.5 mg) by mouth daily., Disp-45 tablet, R-2, E-Prescribe      tirzepatide (MOUNJARO) 15 MG/0.5ML pen Inject 15 mg subcutaneously every 7 days., Disp-6 mL, R-1, E-Prescribe       !! - Potential duplicate medications found. Please discuss with provider.        STOP taking these medications       furosemide (LASIX) 20 MG tablet Comments:    Reason for Stopping:         furosemide (LASIX) 20 MG tablet Comments:   Reason for Stopping:         hydrOXYzine HCl (ATARAX) 10 MG tablet Comments:   Reason for Stopping:             Allergies   Allergies   Allergen Reactions    Codeine Unknown     Patient states possibly caused N/V but can't remember for sure    Semaglutide Nausea and Vomiting    Acetaminophen-Codeine Rash

## 2025-02-18 NOTE — PLAN OF CARE
Problem: Adult Inpatient Plan of Care  Goal: Absence of Hospital-Acquired Illness or Injury  Intervention: Identify and Manage Fall Risk  Recent Flowsheet Documentation  Taken 2/18/2025 1309 by Elo Voss RN  Safety Promotion/Fall Prevention: activity supervised  Taken 2/18/2025 0900 by Elo Voss RN  Safety Promotion/Fall Prevention: activity supervised  Intervention: Prevent Infection  Recent Flowsheet Documentation  Taken 2/18/2025 1309 by Elo Voss RN  Infection Prevention: rest/sleep promoted  Taken 2/18/2025 0900 by Elo Voss RN  Infection Prevention: rest/sleep promoted     Problem: Cardiac Catheterization (Diagnostic/Interventional)  Goal: Absence of Bleeding  Outcome: Adequate for Care Transition  Goal: Absence of Contrast-Induced Injury  Outcome: Adequate for Care Transition  Goal: Stable Heart Rate and Rhythm  Outcome: Adequate for Care Transition  Goal: Absence of Embolism Signs and Symptoms  Outcome: Adequate for Care Transition  Goal: Anesthesia/Sedation Recovery  Outcome: Adequate for Care Transition  Intervention: Optimize Anesthesia Recovery  Recent Flowsheet Documentation  Taken 2/18/2025 1309 by Elo Voss RN  Safety Promotion/Fall Prevention: activity supervised  Taken 2/18/2025 0900 by Elo Voss RN  Safety Promotion/Fall Prevention: activity supervised  Goal: Optimal Pain Control and Function  Outcome: Adequate for Care Transition  Goal: Absence of Vascular Access Complication  Outcome: Adequate for Care Transition  Intervention: Prevent and Manage Access Complications  Recent Flowsheet Documentation  Taken 2/18/2025 1309 by Elo Voss RN  Activity Management: activity adjusted per tolerance  Taken 2/18/2025 0900 by Elo Voss RN  Activity Management: activity adjusted per tolerance   Goal Outcome Evaluation:    On RA.  Ind in room.  NSR.  Neuro ordered MRI and x-ray.  Gave Ativan 2mg IV 1230 for anxiety so pt can do the MRI.  Pt back on the unit at  1330. Pt very lethargic.  Opens eyes to voice.  Held gabapentin at 1400.

## 2025-02-19 ENCOUNTER — PATIENT OUTREACH (OUTPATIENT)
Dept: CARE COORDINATION | Facility: CLINIC | Age: 65
End: 2025-02-19

## 2025-02-19 ENCOUNTER — VIRTUAL VISIT (OUTPATIENT)
Dept: ENDOCRINOLOGY | Facility: CLINIC | Age: 65
End: 2025-02-19
Payer: COMMERCIAL

## 2025-02-19 DIAGNOSIS — R80.9 TYPE 2 DIABETES MELLITUS WITH DIABETIC MICROALBUMINURIA, WITHOUT LONG-TERM CURRENT USE OF INSULIN (H): ICD-10-CM

## 2025-02-19 DIAGNOSIS — E11.29 TYPE 2 DIABETES MELLITUS WITH DIABETIC MICROALBUMINURIA, WITHOUT LONG-TERM CURRENT USE OF INSULIN (H): ICD-10-CM

## 2025-02-19 PROCEDURE — 98006 SYNCH AUDIO-VIDEO EST MOD 30: CPT | Performed by: INTERNAL MEDICINE

## 2025-02-19 RX ORDER — METFORMIN HYDROCHLORIDE 500 MG/1
1500 TABLET, EXTENDED RELEASE ORAL
Qty: 270 TABLET | Refills: 1 | Status: SHIPPED | OUTPATIENT
Start: 2025-02-19

## 2025-02-19 NOTE — PATIENT INSTRUCTIONS
-Increase metformin to 1500 mg (3 x 500 mg tablets) once daily with food  -Continue remainder of medications without changes   -Check blood glucose once daily, alternating between fasting, before evening meal or at bedtime; also check with any unusual symptoms  -Follow-up in May 2025 as scheduled, with labs before visit  -We will communicate results via [employer], or if needed by phone

## 2025-02-19 NOTE — PROGRESS NOTES
"Clinic Care Coordination Contact  Transitions of Care Outreach  Chief Complaint   Patient presents with    Clinic Care Coordination - Post Hospital       Most Recent Admission Date: 2/17/2025   Most Recent Admission Diagnosis: Chest pain, unspecified type - R07.9  S/P CABG (coronary artery bypass graft) - Z95.1     Most Recent Discharge Date: 2/18/2025   Most Recent Discharge Diagnosis: Chest pain, unspecified type - R07.9  S/P CABG (coronary artery bypass graft) - Z95.1  Cervical radiculopathy - M54.12     Transitions of Care Assessment    Discharge Assessment  How are you doing now that you are home?: \"Yep I'm doing well. Going to get ahold of whoever for my MRI for my neck.\"  How are your symptoms? (Red Flag symptoms escalate to triage hotline per guidelines): Improved  Do you know how to contact your clinic care team if you have future questions or changes to your health status? : Yes  Does the patient have their discharge instructions? : Yes  Does the patient have questions regarding their discharge instructions? : No  Were you started on any new medications or were there changes to any of your previous medications? : Yes  Does the patient have all of their medications?: Yes  Do you have questions regarding any of your medications? : No  Do you have all of your needed medical supplies or equipment (DME)?  (i.e. oxygen tank, CPAP, cane, etc.): Yes    Post-op ([facility] Only)  If the patient had a surgery or procedure, do they have any questions for a nurse?: No    Follow up Plan     Discharge Follow-Up  Discharge follow up appointment scheduled in alignment with recommended follow up timeframe or Transitions of Risk Category? (Low = within 30 days; Moderate= within 14 days; High= within 7 days): Yes  Discharge Follow Up Appointment Date: 02/24/25  Discharge Follow Up Appointment Scheduled with?: Specialty Care Provider (Cardiac Rehab appt.)    Future Appointments   Date Time Provider Department Center   2/19/2025 " 10:30 AM Levy Lake MD MDENDO [facility]   2/24/2025 10:00 AM [facility] CR 2 WWCVRB [facility]   2/25/2025 11:20 AM Raul Bragg MD HRWWH [facility]   2/27/2025  2:00 PM Earnestine Pringle MD SNNRSG [facility]   3/4/2025  2:00 PM Chris Vega, MIKEL WIVSC [facility]   4/23/2025 10:30 AM Maira Parra, CNP HRSJN [facility]   5/23/2025  8:00 [facility]   5/30/2025  3:30 PM Levy Lake MD MDENDO [facility]       Outpatient Plan as outlined on AVS reviewed with patient.    For any urgent concerns, please contact our 24 hour nurse triage line: [phone] ([phone])       ELIANA Schofield  [phone]  [facility]

## 2025-02-19 NOTE — PROGRESS NOTES
ENDOCRINOLOGY VIDEO FOLLOW-UP         HISTORY OF PRESENT ILLNESS    Gill Mendez is seen in follow-up via billable video visit.    1.  Diabetes mellitus.  In the interim since last visit, the patient was admitted with NSTEMI in 11/2024: During that hospitalization, she underwent three-vessel CABG.    The patient was admitted with chest pain and right hand paresthesia in 2/2025: Imaging revealed multilevel DJD and high-grade foraminal stenosis.  She was prescribed prednisone and discharged with follow-up in neurosurgery.    She subsequently was admitted later in February (2/17/2025) with unstable angina: Medical management of coronary artery disease was recommended.    Current diabetes regimen: Mounjaro 15 mg weekly, Jardiance 25 mg daily, metformin extended release 1000 mg daily.    Monitoring glucose 1-2 times per day.  Patient sent [employer] update with glucose data.    Fasting AM  120   119   92   119   85      Evening (before dinner)  202  115  129  117  133    2.  Nodular goiter.  Ms. Mendez had last thyroid ultrasound performed on 7/24/2024: This showed that mixed cystic and solid right mid thyroid lobe nodule measured 4.4 x 3.2 x 2.6 cm (previously 3.9 x 3.2 x 2.5 cm).  This represents 17% increase in nodule size.    Pertinent endocrine and related history:  1.  Diabetes mellitus, type 2. Has previously followed with Dr. Quinteros in the [facility] system.  -Diabetes was diagnosed in 2005.  Has been complicated by diabetic retinopathy, neuropathy and nephropathy.  -Previously on prandial insulin: Found it difficult to take consistently.  Also prescribed Rybelsus in the past: This caused nausea.  Was on glipizide, this was discontinued due to escalating insulin requirement.  We discontinued Actos in 9/2022. Transitioned from Trulicity to Mounjaro in 8/2023.  -Previously used freestyle lilia but had reaction to sensor adhesive.    2.  History of foot ulcer; has history of 2 toe amputations.  3. Thyroid nodule.  Goiter palpated on exam in 7/2022.  -No  history of excessive head or neck radiation exposure.  Family history is notable for mother who had thyroid nodules removed surgically: No thyroid cancer.  2 sisters also have thyroid nodules, no thyroid cancer.  -Thyroid US performed 7/13/2022 and showed a heterogenous gland with a 3.7 x 3 x 1.8 cm mixed cystic and solid nodule in the right lobe of the thyroid.  -Ultrasound-guided FNA of thyroid nodule on 7/20/2022, with benign cytology findings.    Pertinent Social History: , has 2 sons and a daughter.  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    PAST MEDICAL HISTORY  Past Medical History:   Diagnosis Date    Anxiety     Asthma     Cellulitis     Diabetes mellitus (H)     Essential hypertension     Created by Conversion  Replacement Utility updated for latest IMO load    Femoral nerve palsy, left 08/23/2023    Gastroparesis     Goiter 05/28/2023 05/2023: Palpated on exam, has had stable imaging with endocrinology, continue to follow endocrinology.      Hyperlipidemia     Hypertension     Other and unspecified hyperlipidemia     Created by Conversion     PONV (postoperative nausea and vomiting)     Shortness of breath     Type II or unspecified type diabetes mellitus without mention of complication, not stated as uncontrolled     Created by Conversion        MEDICATIONS  Current Outpatient Medications   Medication Sig Dispense Refill    acetaminophen (TYLENOL) 500 MG tablet Take 1-2 tablets (500-1,000 mg) by mouth every 6 hours as needed for mild pain (do NOT exceed 3,000mg in a 24hour period).      albuterol (PROVENTIL HFA;VENTOLIN HFA) 90 mcg/actuation inhaler Inhale 2 puffs into the lungs every 6 hours as needed      ARNUITY ELLIPTA 100 MCG/ACT inhaler Inhale 1 puff into the lungs 2 times daily.      aspirin (ASA) 81 MG chewable tablet Take 2 tablets (162 mg) by  mouth or NG Tube daily. 180 tablet 3    atorvastatin (LIPITOR) 80 MG tablet Take 80 mg by mouth daily.      citalopram (CELEXA) 20 MG tablet [CITALOPRAM (CELEXA) 20 MG TABLET] Take 20 mg by mouth every morning.   0    empagliflozin (JARDIANCE) 25 MG TABS tablet Take 1 tablet (25 mg) by mouth daily. 90 tablet 1    famotidine (PEPCID) 20 MG tablet Take 1 tablet (20 mg) by mouth as needed (take as needed for acid reflux). 30 tablet 0    gabapentin (NEURONTIN) 100 MG capsule Take 1 capsule (100 mg) by mouth 3 times daily. 90 capsule 0    lidocaine (LMX4) 4 % external cream Apply topically daily as needed for mild pain (painful area on left chest wall). 15 g 0    losartan (COZAAR) 25 MG tablet Take 0.5 tablets (12.5 mg) by mouth daily. 45 tablet 3    metFORMIN (GLUCOPHAGE XR) 500 MG 24 hr tablet Take 3 tablets (1,500 mg) by mouth daily (with breakfast). Changed on 6/21/24, new prescription not yet sent 270 tablet 1    metoprolol succinate ER (TOPROL XL) 25 MG 24 hr tablet Take 1.5 tablets (37.5 mg) by mouth 2 times daily. 270 tablet 3    polyethylene glycol (MIRALAX) 17 g packet Take 1 packet by mouth daily as needed for constipation.      pramipexole (MIRAPEX) 0.125 MG tablet Take 0.125 mg by mouth daily. At 3pm  3    pramipexole (MIRAPEX) 0.5 MG tablet Take 0.5 mg by mouth at bedtime.      spironolactone (ALDACTONE) 25 MG tablet Take 0.5 tablets (12.5 mg) by mouth daily. 45 tablet 2    tirzepatide (MOUNJARO) 15 MG/0.5ML pen Inject 15 mg subcutaneously every 7 days. 6 mL 1       Allergies, family, and social history were reviewed and documented as needed in EHR.     REVIEW OF SYSTEMS  A focused ROS was performed, with pertinent positives and negatives as noted in the HPI.      PHYSICAL EXAM  There were no vitals taken for this visit.  There is no height or weight on file to calculate BMI.  Constitutional: Patient is alert, oriented and appears in no acute distress.  Eyes: Eyes grossly normal to inspection, EOMI, no  stare, lid lag, or retraction; no conjunctival injection.  ENMT: Lips are without lesions.   Neck: No visible goiter or neck mass.  Respiratory: No audible wheeze or cough. No visible cyanosis. No visible increased work of breathing.  Neurological: Alert and oriented times 3.  Cranial nerves grossly intact.        DATA REVIEW  Each of the following laboratory and/or imaging studies were reviewed.          ASSESSMENT  1.  Diabetes mellitus, type 2.  Upward trend in hemoglobin A1c, potentially related to acute stress of illness and prior exposure to glucocorticoid.  We can adjust metformin dose slightly upward, otherwise continue current regimen without changes.    2.  Diabetes preventive care.  -History of diabetic retinopathy, records of eye exam from 3/18/2024 ([facility]) indicate proliferative diabetic retinopathy, status post PRP and Avastin (continues on Avastin, although needing less frequent treatment).    -CKD and microalbuminuria on urine microalbumin screen in 9/2024, continue lisinopril and Jardiance.   -Foot exam performed on 11/29/2023: Footcare reviewed at that visit    3.  Dyslipidemia.  On statin therapy, optimal lipid profile in 11/2024.    4.  CAD. S/p CABG.    5.  Goiter, with thyroid nodule.   Asymmetric thyroid, with prominence of the right lobe of the thyroid noted on exam in 7/2022; thyroid ultrasound revealed a right lobe nodule, which was aspirated on 7/20/2022.  Cytology was benign.  Has had normal thyroid function testing.  Follow-up thyroid ultrasound in 7/2024 showed modest change in right thyroid nodule (17% increase) which I would not consider significant, especially given context of benign cytology on prior aspiration.  Continue careful follow-up (anticipate ultrasound around 7/2025, we will coordinate at next visit).    PLAN  -Increase metformin to 1500 mg (3 x 500 mg tablets) once daily with food  -Continue remainder of medications without changes   -Check blood  glucose once daily, alternating between fasting, before evening meal or at bedtime; also check with any unusual symptoms  -Follow-up in May 2025 as scheduled, with labs before visit  -We will communicate results via [employer], or if needed by phone      Orders Placed This Encounter   Procedures    Hemoglobin A1c    Basic metabolic panel     Video start time: 10:40 AM  Video end time: 10:48 AM    Physician location: On site    Video platform: Carolyne Lake MD   Division of Diabetes, Endocrinology and Metabolism  Department of Medicine

## 2025-02-19 NOTE — LETTER
2/19/2025      Gill Mendez  [address] [place] [id number]      Dear Colleague,    Thank you for referring your patient, Gill Mendez, to the [facility]. Please see a copy of my visit note below.      ENDOCRINOLOGY VIDEO FOLLOW-UP         HISTORY OF PRESENT ILLNESS    Gill Mendez is seen in follow-up via billable video visit.    1.  Diabetes mellitus.  In the interim since last visit, the patient was admitted with NSTEMI in 11/2024: During that hospitalization, she underwent three-vessel CABG.    The patient was admitted with chest pain and right hand paresthesia in 2/2025: Imaging revealed multilevel DJD and high-grade foraminal stenosis.  She was prescribed prednisone and discharged with follow-up in neurosurgery.    She subsequently was admitted later in February (2/17/2025) with unstable angina: Medical management of coronary artery disease was recommended.    Current diabetes regimen: Mounjaro 15 mg weekly, Jardiance 25 mg daily, metformin extended release 1000 mg daily.    Monitoring glucose 1-2 times per day.  Patient sent [employer] update with glucose data.    Fasting AM  120   119   92   119   85      Evening (before dinner)  202  115  129  117  133    2.  Nodular goiter.  Ms. Mendez had last thyroid ultrasound performed on 7/24/2024: This showed that mixed cystic and solid right mid thyroid lobe nodule measured 4.4 x 3.2 x 2.6 cm (previously 3.9 x 3.2 x 2.5 cm).  This represents 17% increase in nodule size.    Pertinent endocrine and related history:  1.  Diabetes mellitus, type 2. Has previously followed with Dr. Quinteros in the [facility] system.  -Diabetes was diagnosed in 2005.  Has been complicated by diabetic retinopathy, neuropathy and nephropathy.  -Previously on prandial insulin: Found it difficult to take consistently.  Also prescribed Rybelsus in the past: This caused nausea.  Was on glipizide, this was discontinued due to escalating insulin requirement.  We  discontinued Actos in 9/2022. Transitioned from Trulicity to Mounjaro in 8/2023.  -Previously used freestyle lilia but had reaction to sensor adhesive.    2.  History of foot ulcer; has history of 2 toe amputations.  3. Thyroid nodule. Goiter palpated on exam in 7/2022.  -No  history of excessive head or neck radiation exposure.  Family history is notable for mother who had thyroid nodules removed surgically: No thyroid cancer.  2 sisters also have thyroid nodules, no thyroid cancer.  -Thyroid US performed 7/13/2022 and showed a heterogenous gland with a 3.7 x 3 x 1.8 cm mixed cystic and solid nodule in the right lobe of the thyroid.  -Ultrasound-guided FNA of thyroid nodule on 7/20/2022, with benign cytology findings.    Pertinent Social History: , has 2 sons and a daughter.  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    PAST MEDICAL HISTORY  Past Medical History:   Diagnosis Date     Anxiety      Asthma      Cellulitis      Diabetes mellitus (H)      Essential hypertension     Created by Conversion  Replacement Utility updated for latest IMO load     Femoral nerve palsy, left 08/23/2023     Gastroparesis      Goiter 05/28/2023 05/2023: Palpated on exam, has had stable imaging with endocrinology, continue to follow endocrinology.       Hyperlipidemia      Hypertension      Other and unspecified hyperlipidemia     Created by Conversion      PONV (postoperative nausea and vomiting)      Shortness of breath      Type II or unspecified type diabetes mellitus without mention of complication, not stated as uncontrolled     Created by Conversion        MEDICATIONS  Current Outpatient Medications   Medication Sig Dispense Refill     acetaminophen (TYLENOL) 500 MG tablet Take 1-2 tablets (500-1,000 mg) by mouth every 6 hours as needed for mild pain (do NOT exceed 3,000mg in a 24hour period).       albuterol (PROVENTIL  HFA;VENTOLIN HFA) 90 mcg/actuation inhaler Inhale 2 puffs into the lungs every 6 hours as needed       ARNUITY ELLIPTA 100 MCG/ACT inhaler Inhale 1 puff into the lungs 2 times daily.       aspirin (ASA) 81 MG chewable tablet Take 2 tablets (162 mg) by mouth or NG Tube daily. 180 tablet 3     atorvastatin (LIPITOR) 80 MG tablet Take 80 mg by mouth daily.       citalopram (CELEXA) 20 MG tablet [CITALOPRAM (CELEXA) 20 MG TABLET] Take 20 mg by mouth every morning.   0     empagliflozin (JARDIANCE) 25 MG TABS tablet Take 1 tablet (25 mg) by mouth daily. 90 tablet 1     famotidine (PEPCID) 20 MG tablet Take 1 tablet (20 mg) by mouth as needed (take as needed for acid reflux). 30 tablet 0     gabapentin (NEURONTIN) 100 MG capsule Take 1 capsule (100 mg) by mouth 3 times daily. 90 capsule 0     lidocaine (LMX4) 4 % external cream Apply topically daily as needed for mild pain (painful area on left chest wall). 15 g 0     losartan (COZAAR) 25 MG tablet Take 0.5 tablets (12.5 mg) by mouth daily. 45 tablet 3     metFORMIN (GLUCOPHAGE XR) 500 MG 24 hr tablet Take 3 tablets (1,500 mg) by mouth daily (with breakfast). Changed on 6/21/24, new prescription not yet sent 270 tablet 1     metoprolol succinate ER (TOPROL XL) 25 MG 24 hr tablet Take 1.5 tablets (37.5 mg) by mouth 2 times daily. 270 tablet 3     polyethylene glycol (MIRALAX) 17 g packet Take 1 packet by mouth daily as needed for constipation.       pramipexole (MIRAPEX) 0.125 MG tablet Take 0.125 mg by mouth daily. At 3pm  3     pramipexole (MIRAPEX) 0.5 MG tablet Take 0.5 mg by mouth at bedtime.       spironolactone (ALDACTONE) 25 MG tablet Take 0.5 tablets (12.5 mg) by mouth daily. 45 tablet 2     tirzepatide (MOUNJARO) 15 MG/0.5ML pen Inject 15 mg subcutaneously every 7 days. 6 mL 1       Allergies, family, and social history were reviewed and documented as needed in EHR.     REVIEW OF SYSTEMS  A focused ROS was performed, with pertinent positives and negatives as  noted in the HPI.      PHYSICAL EXAM  There were no vitals taken for this visit.  There is no height or weight on file to calculate BMI.  Constitutional: Patient is alert, oriented and appears in no acute distress.  Eyes: Eyes grossly normal to inspection, EOMI, no stare, lid lag, or retraction; no conjunctival injection.  ENMT: Lips are without lesions.   Neck: No visible goiter or neck mass.  Respiratory: No audible wheeze or cough. No visible cyanosis. No visible increased work of breathing.  Neurological: Alert and oriented times 3.  Cranial nerves grossly intact.        DATA REVIEW  Each of the following laboratory and/or imaging studies were reviewed.          ASSESSMENT  1.  Diabetes mellitus, type 2.  Upward trend in hemoglobin A1c, potentially related to acute stress of illness and prior exposure to glucocorticoid.  We can adjust metformin dose slightly upward, otherwise continue current regimen without changes.    2.  Diabetes preventive care.  -History of diabetic retinopathy, records of eye exam from 3/18/2024 ([facility]) indicate proliferative diabetic retinopathy, status post PRP and Avastin (continues on Avastin, although needing less frequent treatment).    -CKD and microalbuminuria on urine microalbumin screen in 9/2024, continue lisinopril and Jardiance.   -Foot exam performed on 11/29/2023: Footcare reviewed at that visit    3.  Dyslipidemia.  On statin therapy, optimal lipid profile in 11/2024.    4.  CAD. S/p CABG.    5.  Goiter, with thyroid nodule.   Asymmetric thyroid, with prominence of the right lobe of the thyroid noted on exam in 7/2022; thyroid ultrasound revealed a right lobe nodule, which was aspirated on 7/20/2022.  Cytology was benign.  Has had normal thyroid function testing.  Follow-up thyroid ultrasound in 7/2024 showed modest change in right thyroid nodule (17% increase) which I would not consider significant, especially given context of benign cytology on  prior aspiration.  Continue careful follow-up (anticipate ultrasound around 7/2025, we will coordinate at next visit).    PLAN  -Increase metformin to 1500 mg (3 x 500 mg tablets) once daily with food  -Continue remainder of medications without changes   -Check blood glucose once daily, alternating between fasting, before evening meal or at bedtime; also check with any unusual symptoms  -Follow-up in May 2025 as scheduled, with labs before visit  -We will communicate results via [employer], or if needed by phone      Orders Placed This Encounter   Procedures     Hemoglobin A1c     Basic metabolic panel     Video start time: 10:40 AM  Video end time: 10:48 AM    Physician location: On site    [facility]    Levy Lake MD   Division of Diabetes, Endocrinology and Metabolism  Department of Medicine          Again, thank you for allowing me to participate in the care of your patient.        Sincerely,        Levy Lake MD    Electronically signed

## 2025-02-25 NOTE — PROGRESS NOTES
HEART CARE ENCOUNTER NOTE      [facility]  [phone]    Primary Care: Saúl Hunt  Primary Cardiologist: Dr. Bragg    Assessment/Recommendations   Assessment:  Chronic heart failure with mildly reduced ejection fraction  LVEF 45% via echo January 2025 (improved post revascularization from 25-30%)  Ischemic etiology  Diuretic: none  GDMT: Jardiance 25mg, losartan 25mg, metoprolol succinate 37.5mg BID, spironolactone 12.5mg  On exam euvolemic  Sinus tachycardia  Chronic.  Previously thought to be related to postop pain or dehydration, less likely now  May be related to inhaler use vs IST vs deconditioning   CAD  S/p CABG (LIMA-LAD, SVG-OM, SVG-RCA) 11/24/2024  Prior episodes of chest pain but angiogram 2/17/2025 showed patent grafts.  Has noncardiac chest pain.  Continued shortness of breath.    On aspirin  Pericardial effusion  Noted on 12/16/2024 - resolved  Shortness of breath  Unlikely anginal given angiogram results  Euvolemic on exam and weight has been stable, heart failure unlikely the cause  Could be related to tachycardia  Could be related to asthma especially with the changing season.   Could also be component of deconditioning post bypass surgery, did not complete cardiac rehab  Noncardiac chest pain  Tender to palpation on left rib.  Worse when sleeping on it at night and early in the morning when waking up.  Reproducible on exam and likely musculoskeletal    Other pertinent medical hx reviewed:  DM2  GERD    Plan:  Obtain a chest x-ray for shortness of breath and rib pain  Increase metoprolol succinate for heart failure GDMT and elevated heart rates  Notify the office if worsening shortness of breath on increase  Work on increasing physical activity  Reassess MCWILLIAMS at heart failure visit next month and consider trial of low-dose diuretic    Follow up with heart failure clinic in April as scheduled, Dr. Bragg in 6 months.      The longitudinal plan of care for the  diagnosis(es)/condition(s) as documented were addressed during this visit. Due to the added complexity in care, I will continue to support Mayelin in the subsequent management and with ongoing continuity of care.      History of Present Illness/Subjective     Gill Mendez is a 65 year old female with PMHx of CAD with CABG November 2024, chronic systolic heart failure with improved ejection fraction following revascularization, sinus tachycardia, DM2, GERD presenting for follow-up.      She was last seen by heart failure clinic on 1/23/2025.  At that time, patient was following up for multiple admissions and ED visits following his CABG in November 2024.  At the time of his follow-up her shortness of breath was improving but continued to have chest pain radiating into her arm relieved with Tylenol.  She was also endorsing lower extremity edema.  She was recommended updated lab work including BMP and NT proBNP with consideration for adding low-dose MRA.  She was also started on famotidine for concern of GERD as cause of her chest discomfort.    ED on 2/16/2025 for chest pain.  Given her multiple presentations for chest pain she was recommended to undergo repeat angiography.  Angiogram showed patent grafts.  She was recommended medical management.  Imaging was also done for her neck pain and right arm numbness which showed high-grade for foraminal stenosis and spinal stenosis and C4-C7 neurosurgery follow-up.    Since then she reports the pain in her left side is worse when she pushes on it or lays on it at night.  It is not exertional.   She also feels her shortness of breath has been getting worse.  Denies any wheezing with this.  Also denies any weight gain, edema.  Has chronic orthopnea.  She did not complete cardiac rehab post surgery.        Cardiac Testing     ECG, 2/16/2025: Sinus tachycardia      Echo:    TTE, 2/17/2025  Left ventricular function is decreased. The ejection fraction is 40-45%  (mildly  reduced).  Moderate anteroseptal hypokinesia with small segment of apical akinesia was  noted  Normal right ventricle size and systolic function.  No hemodynamically significant valvular abnormalities on 2D or color flow  imaging.      Nuclear stress test, 1/6/2025:    The nuclear stress test is abnormal.    The patient is at a low risk of future cardiac ischemic events.    There is a medium sized area of a moderate degree of infarction in the mid to distal anteroseptal segment(s) of the left ventricle. No evidence of inducible myocardial ischemia.    The left ventricular ejection fraction at stress is 57% with mid to distal anteroseptal hypokinesis.    A prior study was conducted on 9/30/2020.  This study has changes noted when compared with the prior study. Fixed anteroseptal defect is now present.      Cardiac Cath 2/17/2025:  CONCLUSIONS:   1. Severe multivessel coronary disease with subtotal occlusion of the distal left main involving the proximal left circumflex and  of the ostial left anterior descending artery and distal right coronary artery.  2. Patent LIMA to the LAD and vein grafts to the left circumflex system and distal right coronary artery.  RECOMMENDATIONS:   1. Usual postprocedure cares, please see orders.  2. Recommend medical management of patient's coronary artery disease.  3. Aggressive risk factor modification for secondary prevention.      Labs:  LDL 70  Potassium 3.8  Creatinine 0.94  Hemoglobin 11  Platelet 332  A1c 7.5  NT-proBNP 900    Physical Examination    Vitals: /61 (BP Location: Left arm, Patient Position: Sitting, Cuff Size: Adult Regular)   Pulse 109   Wt 70.8 kg (156 lb)   SpO2 99%   BMI 28.53 kg/m      General: Well appearing, in no acute distress. Resting comfortably in exam chair  Skin: No clubbing, no cyanosis.  Eyes: Extra ocular movements intact  Neck: No jugular venous distention, no carotid bruits, carotids have a normal upstroke  Lungs: Clear to auscultation  bilaterally, no wheezing or rhonchi.  Heart: Tachycardic, regular rhythm, PMI not displaced, S1, S2 normal, no S3, no S4, no heaves, no rub and no murmur.  Abdomen: Soft, nontender  Extremities: No peripheral edema . Grade 2/4 distal pulses bilaterally.  Neuro: Oriented to person, place and time, alert, cooperative, gait coordinated.    BP Readings from Last 3 Encounters:   03/06/25 115/61   03/04/25 118/73   03/04/25 125/60       Pulse Readings from Last 3 Encounters:   03/06/25 109   03/04/25 102   03/04/25 98       Wt Readings from Last 3 Encounters:   03/06/25 70.8 kg (156 lb)   03/04/25 70.3 kg (155 lb)   02/18/25 69.9 kg (154 lb)         Review of Systems      Please refer above for cardiac ROS details.       History     MEDICAL HISTORY:  Past Medical History:   Diagnosis Date    Anxiety     Asthma     Cellulitis     Diabetes mellitus (H)     Essential hypertension     Created by Conversion  Replacement Utility updated for latest IMO load    Femoral nerve palsy, left 08/23/2023    Gastroparesis     Goiter 05/28/2023 05/2023: Palpated on exam, has had stable imaging with endocrinology, continue to follow endocrinology.      Hyperlipidemia     Hypertension     Other and unspecified hyperlipidemia     Created by Conversion     PONV (postoperative nausea and vomiting)     Shortness of breath     Type II or unspecified type diabetes mellitus without mention of complication, not stated as uncontrolled     Created by Conversion      SURGICAL HISTORY  Past Surgical History:   Procedure Laterality Date    AMPUTATE TOE(S) Right 7/31/2020    Procedure: AMPUTATION, third digit right foot;  Surgeon: Chris Vega DPM;  Location: [facility] [place];  Service: Podiatry    CORONARY ARTERY BYPASS GRAFT, WITH ENDOSCOPIC VESSEL PROCUREMENT N/A 11/24/2024    Procedure: CORONARY ARTERY BYPASS GRAFT TIMES THREE, LEFT INTERNAL MAMMARY ARTERY HARVEST, LEFT LEG ENDOSCOPIC VESSEL PROCUREMENT,;  Surgeon: Zhen Carter,  MD;  Location: [facility] [place] OR    CV ANGIOGRAM CORONARY GRAFT N/A 2/17/2025    Procedure: Coronary Angiogram Graft;  Surgeon: Mian Campbell MD;  Location: [facility] CATH LAB CV    CV CORONARY ANGIOGRAM N/A 11/23/2024    Procedure: Coronary Angiogram;  Surgeon: Roque Eisenberg MD;  Location: [facility] CATH LAB CV    CV INTRA AORTIC BALLOON N/A 11/24/2024    Procedure: Intra aortic Balloon Pump Insertion;  Surgeon: Roque Eisenberg MD;  Location: [facility] CATH LAB CV    CV LEFT HEART CATH N/A 11/23/2024    Procedure: Left Heart Catheterization;  Surgeon: Roque Eisenberg MD;  Location: [facility] CATH LAB CV    CV LEFT HEART CATH N/A 2/17/2025    Procedure: Left Heart Catheterization;  Surgeon: Mian Campbell MD;  Location: [facility] LAB CV    ENDOMETRIAL BIOPSY      FOOT ARTHROPLASTY Right 09/24/2020    Fourth and fifth toe by Dr. Vega    HC REPAIR OF HAMMERTOE,ONE Left 12/7/2020    Procedure: ARTHROPLASTY, digits two, three, four and five left foot;  Surgeon: Chris Vega DPM;  Location: [facility];  Service: Podiatry    HERNIA REPAIR      HYSTERECTOMY      IR LUMBAR PUNCTURE  7/20/2023    REPAIR TENDON ACHILLES Bilateral     Left was plate  , right was torn    TONSILLECTOMY      TRANSESOPHAGEAL ECHOCARDIOGRAM INTRAOPERATIVE  11/24/2024    Procedure: ECHOCARDIOGRAM, TRANSESOPHAGEAL, INTRA-OPERATIVE;  Surgeon: Zhen Carter MD;  Location: [facility] [place] REMOVAL OF OVARY(S)      Description: Oophorectomy - Bilateral (Removal Of Both Ovaries);  Recorded: 10/09/2008;      FAMILY HISTORY:  Family History   Problem Relation Age of Onset    Diabetes Mother     Hypertension Mother     Acute Myocardial Infarction No family hx of     FAMILY HISTORY:  Family History   Problem Relation Age of Onset    Diabetes Mother     Hypertension Mother     Acute Myocardial Infarction No family hx of       SOCIAL HISTORY:  Social History     Socioeconomic History    Marital status:       Spouse name: Not on file    Number of children: Not on file    Years of education: Not on file    Highest education level: Not on file   Occupational History    Not on file   Tobacco Use    Smoking status: Never    Smokeless tobacco: Never   Substance and Sexual Activity    Alcohol use: No    Drug use: No    Sexual activity: Not on file   Other Topics Concern    Not on file   Social History Narrative    Not on file     Social Drivers of Health     Financial Resource Strain: Low Risk  (2/17/2025)    Financial Resource Strain     Within the past 12 months, have you or your family members you live with been unable to get utilities (heat, electricity) when it was really needed?: No   Food Insecurity: Low Risk  (2/17/2025)    Food Insecurity     Within the past 12 months, did you worry that your food would run out before you got money to buy more?: No     Within the past 12 months, did the food you bought just not last and you didn t have money to get more?: No   Transportation Needs: Low Risk  (2/17/2025)    Transportation Needs     Within the past 12 months, has lack of transportation kept you from medical appointments, getting your medicines, non-medical meetings or appointments, work, or from getting things that you need?: No   Physical Activity: Not on file   Stress: Not on file (11/6/2024)   Social Connections: Unknown (12/28/2021)    Received from [facility]    Social Connections     Frequency of Communication with Friends and Family: Not on file   Interpersonal Safety: Low Risk  (1/4/2025)    Interpersonal Safety     Do you feel physically and emotionally safe where you currently live?: Yes     Within the past 12 months, have you been hit, slapped, kicked or otherwise physically hurt by someone?: No     Within the past 12 months, have you been humiliated or emotionally abused in other ways by your partner or ex-partner?: No   Recent  Concern: Interpersonal Safety - High Risk (11/23/2024)    Interpersonal Safety     Do you feel physically and emotionally safe where you currently live?: No     Within the past 12 months, have you been hit, slapped, kicked or otherwise physically hurt by someone?: No     Within the past 12 months, have you been humiliated or emotionally abused in other ways by your partner or ex-partner?: No   Housing Stability: Low Risk  (2/17/2025)    Housing Stability     Do you have housing? : Yes     Are you worried about losing your housing?: No    ALLERGIES:  Allergies   Allergen Reactions    Codeine Unknown     Patient states possibly caused N/V but can't remember for sure    Semaglutide Nausea and Vomiting    Acetaminophen-Codeine Rash            Medications:     Current Outpatient Medications   Medication Sig Dispense Refill    acetaminophen (TYLENOL) 500 MG tablet Take 1-2 tablets (500-1,000 mg) by mouth every 6 hours as needed for mild pain (do NOT exceed 3,000mg in a 24hour period).      albuterol (PROVENTIL HFA;VENTOLIN HFA) 90 mcg/actuation inhaler Inhale 2 puffs into the lungs every 6 hours as needed      ARNUITY ELLIPTA 100 MCG/ACT inhaler Inhale 1 puff into the lungs 2 times daily.      aspirin (ASA) 81 MG chewable tablet Take 2 tablets (162 mg) by mouth or NG Tube daily. 180 tablet 3    atorvastatin (LIPITOR) 80 MG tablet Take 80 mg by mouth daily.      citalopram (CELEXA) 20 MG tablet [CITALOPRAM (CELEXA) 20 MG TABLET] Take 20 mg by mouth every morning.   0    empagliflozin (JARDIANCE) 25 MG TABS tablet Take 1 tablet (25 mg) by mouth daily. 90 tablet 1    famotidine (PEPCID) 20 MG tablet Take 1 tablet (20 mg) by mouth as needed (take as needed for acid reflux). 30 tablet 0    gabapentin (NEURONTIN) 100 MG capsule Take 1 capsule (100 mg) by mouth 3 times daily. 90 capsule 0    lidocaine (LMX4) 4 % external cream Apply topically daily as needed for mild pain (painful area on left chest wall). 15 g 0    losartan  (COZAAR) 25 MG tablet Take 0.5 tablets (12.5 mg) by mouth daily. 45 tablet 3    metFORMIN (GLUCOPHAGE XR) 500 MG 24 hr tablet Take 3 tablets (1,500 mg) by mouth daily (with breakfast). Changed on 6/21/24, new prescription not yet sent 270 tablet 1    metoprolol succinate ER (TOPROL XL) 25 MG 24 hr tablet Take 1.5 tablets (37.5 mg) by mouth 2 times daily. 270 tablet 3    polyethylene glycol (MIRALAX) 17 g packet Take 1 packet by mouth daily as needed for constipation.      pramipexole (MIRAPEX) 0.125 MG tablet Take 0.125 mg by mouth daily. At 3pm  3    pramipexole (MIRAPEX) 0.5 MG tablet Take 0.5 mg by mouth at bedtime.      spironolactone (ALDACTONE) 25 MG tablet Take 0.5 tablets (12.5 mg) by mouth daily. 45 tablet 2    tirzepatide (MOUNJARO) 15 MG/0.5ML pen Inject 15 mg subcutaneously every 7 days. 6 mL 1           Miguel Roberts PA-C  March 6, 2025    This note was partially generated using Dragon voice recognition system, and there may be some incorrect words,  spellings, and punctuation that were not noted in checking the note before saving.

## 2025-02-26 ENCOUNTER — TRANSFERRED RECORDS (OUTPATIENT)
Dept: HEALTH INFORMATION MANAGEMENT | Facility: CLINIC | Age: 65
End: 2025-02-26

## 2025-03-04 ENCOUNTER — OFFICE VISIT (OUTPATIENT)
Dept: NEUROSURGERY | Facility: CLINIC | Age: 65
End: 2025-03-04
Attending: SURGERY
Payer: COMMERCIAL

## 2025-03-04 ENCOUNTER — OFFICE VISIT (OUTPATIENT)
Dept: VASCULAR SURGERY | Facility: CLINIC | Age: 65
End: 2025-03-04
Attending: PODIATRIST
Payer: COMMERCIAL

## 2025-03-04 VITALS
DIASTOLIC BLOOD PRESSURE: 60 MMHG | HEART RATE: 98 BPM | BODY MASS INDEX: 28.52 KG/M2 | OXYGEN SATURATION: 97 % | SYSTOLIC BLOOD PRESSURE: 125 MMHG | WEIGHT: 155 LBS | HEIGHT: 62 IN

## 2025-03-04 VITALS
SYSTOLIC BLOOD PRESSURE: 118 MMHG | RESPIRATION RATE: 16 BRPM | HEART RATE: 102 BPM | DIASTOLIC BLOOD PRESSURE: 73 MMHG | TEMPERATURE: 98.3 F | OXYGEN SATURATION: 98 %

## 2025-03-04 DIAGNOSIS — E11.621 DIABETIC ULCER OF RIGHT HEEL ASSOCIATED WITH TYPE 2 DIABETES MELLITUS, LIMITED TO BREAKDOWN OF SKIN (H): Primary | ICD-10-CM

## 2025-03-04 DIAGNOSIS — M54.12 CERVICAL RADICULOPATHY: Primary | ICD-10-CM

## 2025-03-04 DIAGNOSIS — L97.411 DIABETIC ULCER OF RIGHT HEEL ASSOCIATED WITH TYPE 2 DIABETES MELLITUS, LIMITED TO BREAKDOWN OF SKIN (H): Primary | ICD-10-CM

## 2025-03-04 DIAGNOSIS — M25.371 ANKLE INSTABILITY, RIGHT: ICD-10-CM

## 2025-03-04 PROCEDURE — 1126F AMNT PAIN NOTED NONE PRSNT: CPT | Performed by: PODIATRIST

## 2025-03-04 PROCEDURE — 3074F SYST BP LT 130 MM HG: CPT | Performed by: SURGERY

## 2025-03-04 PROCEDURE — 99213 OFFICE O/P EST LOW 20 MIN: CPT | Performed by: SURGERY

## 2025-03-04 PROCEDURE — 1125F AMNT PAIN NOTED PAIN PRSNT: CPT | Performed by: SURGERY

## 2025-03-04 PROCEDURE — 99213 OFFICE O/P EST LOW 20 MIN: CPT | Mod: 25 | Performed by: PODIATRIST

## 2025-03-04 PROCEDURE — 11042 DBRDMT SUBQ TIS 1ST 20SQCM/<: CPT | Performed by: PODIATRIST

## 2025-03-04 PROCEDURE — 3078F DIAST BP <80 MM HG: CPT | Performed by: PODIATRIST

## 2025-03-04 PROCEDURE — 3078F DIAST BP <80 MM HG: CPT | Performed by: SURGERY

## 2025-03-04 PROCEDURE — 3074F SYST BP LT 130 MM HG: CPT | Performed by: PODIATRIST

## 2025-03-04 PROCEDURE — 99215 OFFICE O/P EST HI 40 MIN: CPT | Mod: 25 | Performed by: PODIATRIST

## 2025-03-04 ASSESSMENT — PAIN SCALES - GENERAL
PAINLEVEL_OUTOF10: MILD PAIN (3)
PAINLEVEL_OUTOF10: NO PAIN (0)

## 2025-03-04 NOTE — PROGRESS NOTES
"NEUROSURGERY FOLLOW UP  NOTE    Gill Mendez comes today in follow-up. Recently presented to the hospital with few weeks of right arm numbness. Also had neck and shoulder and chest pain. Believed she was having another MI. She underwent coronary angiogram which was negative for acute ischemia. Currently neck pain extends into her proximal right arm. The distal arm is then numb into her fingers. No significant left arm symptoms. No imbalance. No bowel or bladder loss. Her right arm is feeling weak and will drop objects.     PHYSICAL EXAM:   Constitutional: /60   Pulse 98   Ht 5' 2\" (1.575 m)   Wt 155 lb (70.3 kg)   SpO2 97%   BMI 28.35 kg/m       Mental Status: A & O in no acute distress.  Affect is appropriate.  Speech is fluent.  Recent and remote memory are intact.  Attention span and concentration are normal.       IMAGING:   I personally reviewed all radiographic images        CONSULTATION ASSESSMENT AND PLAN:    Reviewed MRI of her cervical spine which shows severe spinal canal stenosis at cervical 5-6 and moderate to severe spinal canal narrowing at cervical 4-5 and cervical 6-7 she also has moderate spinal canal stenosis at cervical 3-4.  In addition she has multilevel foraminal narrowing most severe at cervical 4-5 and left cervical 6-7.  She also has moderate foraminal narrowing on the right at cervical 6-7 and cervical 3-4 and left at cervical 5-6.  Her right arm symptoms appears most consistent with a C7 radiculopathy.  She has no symptoms of myelopathy at this time.Trial of conservative management including a right cervical 6-7 TFESI and physical therapy. She will also follow up with cardiology as planned. If symptoms fail to improve consider anterior cervical decompression and fusion pending cardiology clearance.  She will follow-up in clinic in 6 to 8 weeks.    Earnestine Pringle MD      CC:     Saúl Hunt  [address]  [facility] [id number]  "

## 2025-03-04 NOTE — LETTER
[facility]  [address] [place] [address] [address], [place] [address]  [phone]      Fax [phone]    [facility]           FAX: [phone]            Customer Service: [phone]        Account #: [id number]    Wound Dressing Rx and Order Form  Order Status: new  Verbal: [place]  Date: 2025     Gill Mendez  Gender: female  : 1960  [address] [place] [id number]  [phone] (home)     Medical Record: [id number]  Primary Care Provider: Saúl Hunt      ICD-10-CM    1. Diabetic ulcer of right heel associated with type 2 diabetes mellitus, limited to breakdown of skin (H)  E11.621 Rolling Knee Walker/Scooter Order for DME - ONLY FOR DME    L97.411 DEBRIDE SKIN/SUBQ TISSUE      2. Ankle instability, right  M25.371 Ankle/Foot Bracing Supplies Order Walking Boot; Right; Pneumatic; Short            Insurance Info:  INSURER: Payor: [employer] / Plan: [employer] OPEN ACCESS / Product Type: HMO /   Policy ID#:  [id number]  SECONDARY INSURANCE:    Secondary Policy ID#:  N/A        Physician Info:   Name:  LUIS PETERSON     Dept Address/Phones:   [address] [place] [address], [facility] [address] [id number] [phone]  Fax: [phone]    Lymphedema circumferential measurements (in cm):       No data to display                  Wound info:  Wound Heel Other (comment) (Active)   Wound Bed Erythema, blanchable;Red;White 25 0000   Wound Length (cm) 4.5 cm 25 1530   Wound Width (cm) 1.5 cm 25 1530   Wound Surface Area (cm^2) 6.75 cm^2 25 1530   Drainage Amount None 25 1530   Wound Care/Cleansing Normal saline 25 1530   Dressing Foam 25 0400   Dressing Status Clean, dry, intact 25 0400   Wound Image   25 1530   Number of days: 15       VASC Wound right heel (Active)   Pre Size Length 1.5 25 1349   Pre Size Width 1 25 1349   Pre Size Depth 0.2 25 1349   Pre Total Sq cm 1.5  "03/04/25 1349   Post Size Length 1.5 03/04/25 1349   Post Size Width 1 03/04/25 1349   Post Size Depth 0.2 03/04/25 1349   Post Total Sq cm 1.5 03/04/25 1349   Number of days:        Incision/Surgical Site Left Popliteal (Active)   Number of days:        Incision/Surgical Site Left Groin (Active)   Number of days:        Incision/Surgical Site Sternum (Active)   Number of days:         Drainage: moderate  Thickness:  full  Duration of Need: 30 DAYS  Days Supply: 30 DAYS  Start Date: 3/4/2025  Starter Kit, Ancillary Kit (saline, gloves, gauze): Yes   Qualifying wound/Debridement: Yes     DISPENSE AS WRITTEN.   Call [phone]. Please call patient for out-of-pocket costs and options.      Dressing Type Brand Size Frequency of change  Quantity   Primary Manuka med hd supralite  4\"x5\" EVERY OTHER DAY and as needed     Square gauze  4\"x4\" EVERY OTHER DAY and as needed     Sof form roll gauze  4\"x75\" EVERY OTHER DAY and as needed     Paper tape  2\" rolls       DISPENSE AS WRITTEN. Call [phone] with questions.       OK to forward to covered supplier.    Electronically Signed Physician:  LUIS PETERSON             Date: March 4, 2025    "

## 2025-03-04 NOTE — PATIENT INSTRUCTIONS
*Wheelchairs are never guaranteed at the front door, if you have your own wheel chair or knee walker, please bring that with you to your appointment. Thank you for your understanding!*    Wound care supplies were ordered today through [employer] and if you are not receiving your supplies or have a question on your bill please contact Mark Gomez [phone]. Please allow 2-5 business days for delivery of supplies. You may get a call from a 1-800 # if there are additional information [place] needs. It is important to  or return their call. PLEASE NOTE: If you need to return your supplies, you MUST call customer service within 15 days of delivery date.     Important lnstructions      WEIGHT BEARING STATUS: You are to remain NON WEIGHT BEARING on your right foot. NON WEIGHT BEARING MEANS NO PRESSURE ON YOUR FOOT OR HEEL AT ANY TIME FOR ANY REASON!    2. OFFLOADING DEVICE: Must use a A ROLLING KNEE WALKER at all times! (do not use affected foot to push wheelchair)    3. STABILIZATION DEVICE: Use a CAM BOOT . You will need to WEAR THIS ANYTIME YOU ARE UP AND OUT OF BED, IT IS OKAY TO REMOVE WHEN YOU ARE SLEEPING..     4. ELEVATE: Elevating your leg means laying with your head on a pillow and your foot ABOVE YOUR HEART.     5. DO NOT MOVE YOUR FOOT.  There is a risk of worsening the wound or incision. To give yourself a higher chance of healing, please DO NOT swing foot back and forth and wiggle foot/toes especially when inside a stabilization device. Limited movement is allowable with therapy as recommended by the doctor.     6. TAKE A PROTEIN SHAKE TWICE A DAY.  (For ex: Boost, Ensure, Glucerna)    7. KEEP YOUR WOUND DRY AT ALL TIMES    Use a shower bag or a cast cover to keep your foot/leg dry during showers. These can be purchased on [employer] or any pharmacy.         Dressing Change lnstructions    EVERY OTHER DAY and as needed, Cleanse your right heel wound(s) with Normal saline.    Pat Dry with non-sterile  "gauze    Primary Dressing: Apply Medihoney or Manuka honey into/onto the wounds    Secondary dressing: Cover with dry gauze    Secure with non-sterile roll gauze (4\" x 75\" roll) and tape (1\" roll tape) as needed; avoid adhesive directly on the skin         SEEK MEDICAL CARE IF:  You have an increase in swelling, pain, or redness around the wound.  You have an increase in the amount of pus coming from the wound.  There is a bad smell coming from the wound.  The wound appears to be worsening/enlarging  You have a fever greater than 101.5 F      It is ok to continue current wound care treatment/products for the next 2-3 days until new wound care supplies are ordered and arrive. If longer than this please contact our office at [phone].      We want to hear from you!   In the next few weeks, you should receive a call or email to complete a survey about your visit at [facility]. Please help us improve your appointment experience by letting us know how we did today. We strive to make your experience good and value any ways in which we could do better.      We value your input and suggestions.    Thank you for choosing the [facility]!    "

## 2025-03-04 NOTE — PATIENT INSTRUCTIONS
Trial of conservative management including a right cervical 6-7 TFESI and physical therapy for right radiculopathy. She will also follow up with cardiology as planned. If symptoms fail to improve can consider anterior cervical decompression and fusion.

## 2025-03-04 NOTE — NURSING NOTE
"Gill Mendez is a 65 year old female who presents for:  Chief Complaint   Patient presents with    RECHECK     Patient has pain in the middle of the neck. Right are is numb, also has weakness in the arm. Lvl 3.        Initial Vitals:  /60   Pulse 98   Ht 5' 2\" (1.575 m)   Wt 155 lb (70.3 kg)   SpO2 97%   BMI 28.35 kg/m   Estimated body mass index is 28.35 kg/m  as calculated from the following:    Height as of this encounter: 5' 2\" (1.575 m).    Weight as of this encounter: 155 lb (70.3 kg).. Body surface area is 1.75 meters squared. BP completed using cuff size: regular  Mild Pain (3)    Nursing Comments:       Chelsy Quesada   "

## 2025-03-04 NOTE — LETTER
"3/4/2025      Gill Mendez  [address] [place] [id number]      Dear Colleague,    Thank you for referring your patient, Gill Mendez, to the [facility]. Please see a copy of my visit note below.    NEUROSURGERY FOLLOW UP  NOTE    Gill Mendez comes today in follow-up. Recently presented to the hospital with few weeks of right arm numbness. Also had neck and shoulder and chest pain. Believed she was having another MI. She underwent coronary angiogram which was negative for acute ischemia. Currently neck pain extends into her proximal right arm. The distal arm is then numb into her fingers. No significant left arm symptoms. No imbalance. No bowel or bladder loss. Her right arm is feeling weak and will drop objects.     PHYSICAL EXAM:   Constitutional: /60   Pulse 98   Ht 5' 2\" (1.575 m)   Wt 155 lb (70.3 kg)   SpO2 97%   BMI 28.35 kg/m       Mental Status: A & O in no acute distress.  Affect is appropriate.  Speech is fluent.  Recent and remote memory are intact.  Attention span and concentration are normal.       IMAGING:   I personally reviewed all radiographic images        CONSULTATION ASSESSMENT AND PLAN:    Reviewed MRI of her cervical spine which shows severe spinal canal stenosis at cervical 5-6 and moderate to severe spinal canal narrowing at cervical 4-5 and cervical 6-7 she also has moderate spinal canal stenosis at cervical 3-4.  In addition she has multilevel foraminal narrowing most severe at cervical 4-5 and left cervical 6-7.  She also has moderate foraminal narrowing on the right at cervical 6-7 and cervical 3-4 and left at cervical 5-6.  Her right arm symptoms appears most consistent with a C7 radiculopathy.  She has no symptoms of myelopathy at this time.Trial of conservative management including a right cervical 6-7 TFESI and physical therapy. She will also follow up with cardiology as planned. If symptoms fail to improve consider anterior " cervical decompression and fusion pending cardiology clearance.  She will follow-up in clinic in 6 to 8 weeks.    Earnestine Pringle MD      CC:     Saúl Hunt  [address]  [facility] [id number]      Again, thank you for allowing me to participate in the care of your patient.        Sincerely,        Earnestine Pringle MD    Electronically signed

## 2025-03-04 NOTE — PROGRESS NOTES
FOOT AND ANKLE SURGERY/PODIATRY CONSULT NOTE        ASSESSMENT: Diabetic ulceration right heel into subcutaneous tissue        TREATMENT:  -I discussed the patient that the ulceration on the plantar right heel stable without signs of infection.    -We reviewed the importance of nonweightbearing on the right foot.  I referred the patient for a knee scooter.  Also referred for cam boot for stability.    -Patient to discuss with cardiology if non-weight bearing is possible given cardiac recovery plan.     -After discussion of risk factors, nursing staff removed dressing, cleansed wound and consent obtained 2% Lidocaine HCL jelly was applied, under clean conditions, the right heel ulceration(s) were debrided using #15 blade scalpel.  Devitalized and nonviable tissue, along with any fibrin and slough, was removed to improve granulation tissue formation, stimulate wound healing, decrease overall bacteria load, disrupt biofilm formation and decrease edge senescence. Wound drainage was small Serosanguinous. Total excisional debridement was 1.5 sq cm into the subcutaneous tissue with a depth of 0.2 cm.   Ulcers were improved afterwards and .  Measures were as noted on the flow sheet.  Medihoney with gauze dressing applied today which she will reapply every other day.    -She will follow-up in 3 weeks    Chris Vega DPM  [facility]      HPI: Gill Mendez was seen today for a new sore along the plantar right heel.  Patient reports that she developed the right heel ulceration while recovering from heart surgery.    Past Medical History:   Diagnosis Date    Anxiety     Asthma     Cellulitis     Diabetes mellitus (H)     Essential hypertension     Created by Conversion  Replacement Utility updated for latest IMO load    Femoral nerve palsy, left 08/23/2023    Gastroparesis     Goiter 05/28/2023 05/2023: Palpated on exam, has had stable imaging with endocrinology, continue to follow  endocrinology.      Hyperlipidemia     Hypertension     Other and unspecified hyperlipidemia     Created by Conversion     PONV (postoperative nausea and vomiting)     Shortness of breath     Type II or unspecified type diabetes mellitus without mention of complication, not stated as uncontrolled     Created by Conversion        Past Surgical History:   Procedure Laterality Date    AMPUTATE TOE(S) Right 7/31/2020    Procedure: AMPUTATION, third digit right foot;  Surgeon: Chris Vega DPM;  Location: [facility];  Service: Podiatry    CORONARY ARTERY BYPASS GRAFT, WITH ENDOSCOPIC VESSEL PROCUREMENT N/A 11/24/2024    Procedure: CORONARY ARTERY BYPASS GRAFT TIMES THREE, LEFT INTERNAL MAMMARY ARTERY HARVEST, LEFT LEG ENDOSCOPIC VESSEL PROCUREMENT,;  Surgeon: Zhen Carter MD;  Location: [facility] OR    CV ANGIOGRAM CORONARY GRAFT N/A 2/17/2025    Procedure: Coronary Angiogram Graft;  Surgeon: Mian Campbell MD;  Location: [facility] CATH LAB CV    CV CORONARY ANGIOGRAM N/A 11/23/2024    Procedure: Coronary Angiogram;  Surgeon: Roque Eisenberg MD;  Location: [facility] CATH LAB CV    CV INTRA AORTIC BALLOON N/A 11/24/2024    Procedure: Intra aortic Balloon Pump Insertion;  Surgeon: Roque Eisenberg MD;  Location: [facility] CATH LAB CV    CV LEFT HEART CATH N/A 11/23/2024    Procedure: Left Heart Catheterization;  Surgeon: Roque Eisenberg MD;  Location: [facility] CATH LAB CV    CV LEFT HEART CATH N/A 2/17/2025    Procedure: Left Heart Catheterization;  Surgeon: Mian Campbell MD;  Location: [facility] CATH LAB CV    ENDOMETRIAL BIOPSY      FOOT ARTHROPLASTY Right 09/24/2020    Fourth and fifth toe by Dr. Vega    HC REPAIR OF HAMMERTOE,ONE Left 12/7/2020    Procedure: ARTHROPLASTY, digits two, three, four and five left foot;  Surgeon: Chris Vega DPM;  Location: [facility];  Service: Podiatry    HERNIA REPAIR      HYSTERECTOMY      IR LUMBAR PUNCTURE  7/20/2023    REPAIR  TENDON ACHILLES Bilateral     Left was plate  , right was torn    TONSILLECTOMY      TRANSESOPHAGEAL ECHOCARDIOGRAM INTRAOPERATIVE  11/24/2024    Procedure: ECHOCARDIOGRAM, TRANSESOPHAGEAL, INTRA-OPERATIVE;  Surgeon: Zhen Carter MD;  [facility]    [facility] REMOVAL OF OVARY(S)      Description: Oophorectomy - Bilateral (Removal Of Both Ovaries);  Recorded: 10/09/2008;        Allergies   Allergen Reactions    Codeine Unknown     Patient states possibly caused N/V but can't remember for sure    Semaglutide Nausea and Vomiting    Acetaminophen-Codeine Rash         Current Outpatient Medications:     acetaminophen (TYLENOL) 500 MG tablet, Take 1-2 tablets (500-1,000 mg) by mouth every 6 hours as needed for mild pain (do NOT exceed 3,000mg in a 24hour period)., Disp: , Rfl:     albuterol (PROVENTIL HFA;VENTOLIN HFA) 90 mcg/actuation inhaler, Inhale 2 puffs into the lungs every 6 hours as needed, Disp: , Rfl:     ARNUITY ELLIPTA 100 MCG/ACT inhaler, Inhale 1 puff into the lungs 2 times daily., Disp: , Rfl:     aspirin (ASA) 81 MG chewable tablet, Take 2 tablets (162 mg) by mouth or NG Tube daily., Disp: 180 tablet, Rfl: 3    atorvastatin (LIPITOR) 80 MG tablet, Take 80 mg by mouth daily., Disp: , Rfl:     citalopram (CELEXA) 20 MG tablet, [CITALOPRAM (CELEXA) 20 MG TABLET] Take 20 mg by mouth every morning. , Disp: , Rfl: 0    empagliflozin (JARDIANCE) 25 MG TABS tablet, Take 1 tablet (25 mg) by mouth daily., Disp: 90 tablet, Rfl: 1    famotidine (PEPCID) 20 MG tablet, Take 1 tablet (20 mg) by mouth as needed (take as needed for acid reflux)., Disp: 30 tablet, Rfl: 0    gabapentin (NEURONTIN) 100 MG capsule, Take 1 capsule (100 mg) by mouth 3 times daily., Disp: 90 capsule, Rfl: 0    lidocaine (LMX4) 4 % external cream, Apply topically daily as needed for mild pain (painful area on left chest wall)., Disp: 15 g, Rfl: 0    losartan (COZAAR) 25 MG tablet, Take 0.5 tablets (12.5 mg) by mouth daily.,  Disp: 45 tablet, Rfl: 3    metFORMIN (GLUCOPHAGE XR) 500 MG 24 hr tablet, Take 3 tablets (1,500 mg) by mouth daily (with breakfast). Changed on 6/21/24, new prescription not yet sent, Disp: 270 tablet, Rfl: 1    metoprolol succinate ER (TOPROL XL) 25 MG 24 hr tablet, Take 1.5 tablets (37.5 mg) by mouth 2 times daily., Disp: 270 tablet, Rfl: 3    polyethylene glycol (MIRALAX) 17 g packet, Take 1 packet by mouth daily as needed for constipation., Disp: , Rfl:     pramipexole (MIRAPEX) 0.125 MG tablet, Take 0.125 mg by mouth daily. At 3pm, Disp: , Rfl: 3    pramipexole (MIRAPEX) 0.5 MG tablet, Take 0.5 mg by mouth at bedtime., Disp: , Rfl:     spironolactone (ALDACTONE) 25 MG tablet, Take 0.5 tablets (12.5 mg) by mouth daily., Disp: 45 tablet, Rfl: 2    tirzepatide (MOUNJARO) 15 MG/0.5ML pen, Inject 15 mg subcutaneously every 7 days., Disp: 6 mL, Rfl: 1    Social History     Social History Narrative    Not on file       Family History   Problem Relation Age of Onset    Diabetes Mother     Hypertension Mother     Acute Myocardial Infarction No family hx of        Review of Systems - 10 point Review of Systems is negative except for right heel ulcer which is noted in HPI.      OBJECTIVE:  Appearance: alert, well appearing, and in no distress.    /73   Pulse 102   Temp 98.3  F (36.8  C)   Resp 16   SpO2 98%     BMI= There is no height or weight on file to calculate BMI.    General appearance: Patient is alert and fully cooperative with history & exam.  No sign of distress is noted during the visit.     Psychiatric: Affect is pleasant & appropriate.  Patient appears motivated to improve health.     Respiratory: Breathing is regular & unlabored while sitting.     HEENT: Hearing is intact to spoken word.  Speech is clear.  No gross evidence of visual impairment that would impact ambulation.    Vascular: Dorsalis pedis palpableRight.  Dermatologic:   Wound Heel Other (comment) (Active)   Wound Bed Erythema,  blanchable;Red;White 02/18/25 0000   Wound Length (cm) 4.5 cm 02/17/25 1530   Wound Width (cm) 1.5 cm 02/17/25 1530   Wound Surface Area (cm^2) 6.75 cm^2 02/17/25 1530   Drainage Amount None 02/17/25 1530   Wound Care/Cleansing Normal saline 02/17/25 1530   Dressing Foam 02/18/25 0400   Dressing Status Clean, dry, intact 02/18/25 0400   Wound Image   02/17/25 1530       VASC Wound right heel (Active)   Pre Size Length 1.5 03/04/25 1349   Pre Size Width 1 03/04/25 1349   Pre Size Depth 0.2 03/04/25 1349   Pre Total Sq cm 1.5 03/04/25 1349       Incision/Surgical Site Left Popliteal (Active)       Incision/Surgical Site Left Groin (Active)       Incision/Surgical Site Sternum (Active)   Mixed granular/fibrotic tissue ulceration on plantar right heel, no erythema.  Neurologic: Diminished to light touch Right.  Musculoskeletal: Contracted digits noted Right.

## 2025-03-06 ENCOUNTER — OFFICE VISIT (OUTPATIENT)
Dept: CARDIOLOGY | Facility: CLINIC | Age: 65
End: 2025-03-06
Attending: INTERNAL MEDICINE
Payer: COMMERCIAL

## 2025-03-06 VITALS
SYSTOLIC BLOOD PRESSURE: 115 MMHG | WEIGHT: 156 LBS | OXYGEN SATURATION: 99 % | DIASTOLIC BLOOD PRESSURE: 61 MMHG | BODY MASS INDEX: 28.53 KG/M2 | HEART RATE: 109 BPM

## 2025-03-06 DIAGNOSIS — R07.82 INTERCOSTAL PAIN: ICD-10-CM

## 2025-03-06 DIAGNOSIS — R80.9 TYPE 2 DIABETES MELLITUS WITH DIABETIC MICROALBUMINURIA, WITHOUT LONG-TERM CURRENT USE OF INSULIN (H): ICD-10-CM

## 2025-03-06 DIAGNOSIS — I50.22 CHRONIC SYSTOLIC CONGESTIVE HEART FAILURE (H): ICD-10-CM

## 2025-03-06 DIAGNOSIS — I10 ESSENTIAL HYPERTENSION: ICD-10-CM

## 2025-03-06 DIAGNOSIS — Z95.1 S/P CABG (CORONARY ARTERY BYPASS GRAFT): Primary | ICD-10-CM

## 2025-03-06 DIAGNOSIS — I25.5 ISCHEMIC CARDIOMYOPATHY: ICD-10-CM

## 2025-03-06 DIAGNOSIS — E11.29 TYPE 2 DIABETES MELLITUS WITH DIABETIC MICROALBUMINURIA, WITHOUT LONG-TERM CURRENT USE OF INSULIN (H): ICD-10-CM

## 2025-03-06 DIAGNOSIS — R06.09 DOE (DYSPNEA ON EXERTION): ICD-10-CM

## 2025-03-06 DIAGNOSIS — R00.0 SINUS TACHYCARDIA: ICD-10-CM

## 2025-03-06 RX ORDER — METOPROLOL SUCCINATE 50 MG/1
50 TABLET, EXTENDED RELEASE ORAL 2 TIMES DAILY
Qty: 180 TABLET | Refills: 3 | Status: SHIPPED | OUTPATIENT
Start: 2025-03-06

## 2025-03-06 NOTE — PATIENT INSTRUCTIONS
It was great to see you today! Your care team includes myself and Dr. Bragg.    Recommendations:  Updated chest xray for shortness of breath and rib pain   Increase metoprolol to 50mg twice per day.   Monitor for signs or symptoms of fluid retention such as increased leg or abdominal swelling, worsening shortness of breath, difficulty breathing or rapid weight gain (>3 pounds in 1 day or >5 pounds in 1 week)   Notify the office if you are having worsening shortness of breath or chest pain.    Continue a heart healthy diet (plant based or mediterranean diet).  Work on increasing physical activity with an end goal of 150 minutes per week of mild to moderate intensity exercise (brisk walk, biking, swimming)     Follow up with Dr. Li in 6 monthsMaira in April.      If you have questions, concerns, or new concerning symptoms prior to your next appointment, please contact the Cardiology Nursing team at [phone].    For scheduling issues/changes, please call [phone].

## 2025-03-06 NOTE — LETTER
3/6/2025    Saúl Hunt, APRN CNP  [address]  [facility] [address]    RE: Gill Mendez       Dear Colleague,     I had the pleasure of seeing Gill Mendez in the [facility].  HEART CARE ENCOUNTER NOTE      [facility]  [phone]    Primary Care: Saúl Hunt  Primary Cardiologist: Dr. Bragg    Assessment/Recommendations   Assessment:  Chronic heart failure with mildly reduced ejection fraction  LVEF 45% via echo January 2025 (improved post revascularization from 25-30%)  Ischemic etiology  Diuretic: none  GDMT: Jardiance 25mg, losartan 25mg, metoprolol succinate 37.5mg BID, spironolactone 12.5mg  On exam euvolemic  Sinus tachycardia  Chronic.  Previously thought to be related to postop pain or dehydration, less likely now  May be related to inhaler use vs IST vs deconditioning   CAD  S/p CABG (LIMA-LAD, SVG-OM, SVG-RCA) 11/24/2024  Prior episodes of chest pain but angiogram 2/17/2025 showed patent grafts.  Has noncardiac chest pain.  Continued shortness of breath.    On aspirin  Pericardial effusion  Noted on 12/16/2024 - resolved  Shortness of breath  Unlikely anginal given angiogram results  Euvolemic on exam and weight has been stable, heart failure unlikely the cause  Could be related to tachycardia  Could be related to asthma especially with the changing season.   Could also be component of deconditioning post bypass surgery, did not complete cardiac rehab  Noncardiac chest pain  Tender to palpation on left rib.  Worse when sleeping on it at night and early in the morning when waking up.  Reproducible on exam and likely musculoskeletal    Other pertinent medical hx reviewed:  DM2  GERD    Plan:  Obtain a chest x-ray for shortness of breath and rib pain  Increase metoprolol succinate for heart failure GDMT and elevated heart rates  Notify the office if worsening shortness of breath on increase  Work on increasing physical  activity  Reassess [facility]WILLIAMS at heart failure visit next month and consider trial of low-dose diuretic    Follow up with heart failure clinic in April as scheduled, Dr. Bragg in 6 months.      The longitudinal plan of care for the diagnosis(es)/condition(s) as documented were addressed during this visit. Due to the added complexity in care, I will continue to support Mayelin in the subsequent management and with ongoing continuity of care.      History of Present Illness/Subjective     Gill Mendez is a 65 year old female with PMHx of CAD with CABG November 2024, chronic systolic heart failure with improved ejection fraction following revascularization, sinus tachycardia, DM2, GERD presenting for follow-up.      She was last seen by heart failure clinic on 1/23/2025.  At that time, patient was following up for multiple admissions and ED visits following his CABG in November 2024.  At the time of his follow-up her shortness of breath was improving but continued to have chest pain radiating into her arm relieved with Tylenol.  She was also endorsing lower extremity edema.  She was recommended updated lab work including BMP and NT proBNP with consideration for adding low-dose MRA.  She was also started on famotidine for concern of GERD as cause of her chest discomfort.    ED on 2/16/2025 for chest pain.  Given her multiple presentations for chest pain she was recommended to undergo repeat angiography.  Angiogram showed patent grafts.  She was recommended medical management.  Imaging was also done for her neck pain and right arm numbness which showed high-grade for foraminal stenosis and spinal stenosis and C4-C7 neurosurgery follow-up.    Since then she reports the pain in her left side is worse when she pushes on it or lays on it at night.  It is not exertional.   She also feels her shortness of breath has been getting worse.  Denies any wheezing with this.  Also denies any weight gain, edema.  Has chronic orthopnea.  She  did not complete cardiac rehab post surgery.        Cardiac Testing     ECG, 2/16/2025: Sinus tachycardia      Echo:    TTE, 2/17/2025  Left ventricular function is decreased. The ejection fraction is 40-45%  (mildly reduced).  Moderate anteroseptal hypokinesia with small segment of apical akinesia was  noted  Normal right ventricle size and systolic function.  No hemodynamically significant valvular abnormalities on 2D or color flow  imaging.      Nuclear stress test, 1/6/2025:     The nuclear stress test is abnormal.     The patient is at a low risk of future cardiac ischemic events.     There is a medium sized area of a moderate degree of infarction in the mid to distal anteroseptal segment(s) of the left ventricle. No evidence of inducible myocardial ischemia.     The left ventricular ejection fraction at stress is 57% with mid to distal anteroseptal hypokinesis.     A prior study was conducted on 9/30/2020.  This study has changes noted when compared with the prior study. Fixed anteroseptal defect is now present.      Cardiac Cath 2/17/2025:  CONCLUSIONS:   1. Severe multivessel coronary disease with subtotal occlusion of the distal left main involving the proximal left circumflex and  of the ostial left anterior descending artery and distal right coronary artery.  2. Patent LIMA to the LAD and vein grafts to the left circumflex system and distal right coronary artery.  RECOMMENDATIONS:   1. Usual postprocedure cares, please see orders.  2. Recommend medical management of patient's coronary artery disease.  3. Aggressive risk factor modification for secondary prevention.      Labs:  LDL 70  Potassium 3.8  Creatinine 0.94  Hemoglobin 11  Platelet 332  A1c 7.5  NT-proBNP 900    Physical Examination    Vitals: /61 (BP Location: Left arm, Patient Position: Sitting, Cuff Size: Adult Regular)   Pulse 109   Wt 70.8 kg (156 lb)   SpO2 99%   BMI 28.53 kg/m      General: Well appearing, in no acute distress.  Resting comfortably in exam chair  Skin: No clubbing, no cyanosis.  Eyes: Extra ocular movements intact  Neck: No jugular venous distention, no carotid bruits, carotids have a normal upstroke  Lungs: Clear to auscultation bilaterally, no wheezing or rhonchi.  Heart: Tachycardic, regular rhythm, PMI not displaced, S1, S2 normal, no S3, no S4, no heaves, no rub and no murmur.  Abdomen: Soft, nontender  Extremities: No peripheral edema . Grade 2/4 distal pulses bilaterally.  Neuro: Oriented to person, place and time, alert, cooperative, gait coordinated.    BP Readings from Last 3 Encounters:   03/06/25 115/61   03/04/25 118/73   03/04/25 125/60       Pulse Readings from Last 3 Encounters:   03/06/25 109   03/04/25 102   03/04/25 98       Wt Readings from Last 3 Encounters:   03/06/25 70.8 kg (156 lb)   03/04/25 70.3 kg (155 lb)   02/18/25 69.9 kg (154 lb)         Review of Systems      Please refer above for cardiac ROS details.       History     MEDICAL HISTORY:  Past Medical History:   Diagnosis Date     Anxiety      Asthma      Cellulitis      Diabetes mellitus (H)      Essential hypertension     Created by Conversion  Replacement Utility updated for latest IMO load     Femoral nerve palsy, left 08/23/2023     Gastroparesis      Goiter 05/28/2023 05/2023: Palpated on exam, has had stable imaging with endocrinology, continue to follow endocrinology.       Hyperlipidemia      Hypertension      Other and unspecified hyperlipidemia     Created by Conversion      PONV (postoperative nausea and vomiting)      Shortness of breath      Type II or unspecified type diabetes mellitus without mention of complication, not stated as uncontrolled     Created by Conversion      SURGICAL HISTORY  Past Surgical History:   Procedure Laterality Date     AMPUTATE TOE(S) Right 7/31/2020    Procedure: AMPUTATION, third digit right foot;  Surgeon: Chris Vega DPM;  [place] [facility] [place]  Service: Podiatry     CORONARY  ARTERY BYPASS GRAFT, WITH ENDOSCOPIC VESSEL PROCUREMENT N/A 11/24/2024    Procedure: CORONARY ARTERY BYPASS GRAFT TIMES THREE, LEFT INTERNAL MAMMARY ARTERY HARVEST, LEFT LEG ENDOSCOPIC VESSEL PROCUREMENT,;  Surgeon: Zhen Carter MD;  Location: [facility] OR     CV ANGIOGRAM CORONARY GRAFT N/A 2/17/2025    Procedure: Coronary Angiogram Graft;  Surgeon: Mian Campbell MD;  Location: [facility] CATH LAB CV     CV CORONARY ANGIOGRAM N/A 11/23/2024    Procedure: Coronary Angiogram;  Surgeon: Roque Eisenberg MD;  Location: [facility] CATH LAB CV     CV INTRA AORTIC BALLOON N/A 11/24/2024    Procedure: Intra aortic Balloon Pump Insertion;  Surgeon: Roque Eisenberg MD;  Location: [facility] CATH LAB CV     CV LEFT HEART CATH N/A 11/23/2024    Procedure: Left Heart Catheterization;  Surgeon: Roque Eisenberg MD;  Location: [facility] CATH LAB CV     CV LEFT HEART CATH N/A 2/17/2025    Procedure: Left Heart Catheterization;  Surgeon: Mian Campbell MD;  Location: [facility] CATH LAB CV     ENDOMETRIAL BIOPSY       FOOT ARTHROPLASTY Right 09/24/2020    Fourth and fifth toe by Dr. Vega     HC REPAIR OF HAMMERTOE,ONE Left 12/7/2020    Procedure: ARTHROPLASTY, digits two, three, four and five left foot;  Surgeon: Chris Vega DPM;  Location: [facility];  Service: Podiatry     HERNIA REPAIR       HYSTERECTOMY       IR LUMBAR PUNCTURE  7/20/2023     REPAIR TENDON ACHILLES Bilateral     Left was plate  , right was torn     TONSILLECTOMY       TRANSESOPHAGEAL ECHOCARDIOGRAM INTRAOPERATIVE  11/24/2024    Procedure: ECHOCARDIOGRAM, TRANSESOPHAGEAL, INTRA-OPERATIVE;  Surgeon: Zhen Carter MD;  Location: [facility] REMOVAL OF OVARY(S)      Description: Oophorectomy - Bilateral (Removal Of Both Ovaries);  Recorded: 10/09/2008;      FAMILY HISTORY:  Family History   Problem Relation Age of Onset     Diabetes Mother      Hypertension Mother      Acute Myocardial Infarction No family hx  of     FAMILY HISTORY:  Family History   Problem Relation Age of Onset     Diabetes Mother      Hypertension Mother      Acute Myocardial Infarction No family hx of       SOCIAL HISTORY:  Social History     Socioeconomic History     Marital status:      Spouse name: Not on file     Number of children: Not on file     Years of education: Not on file     Highest education level: Not on file   Occupational History     Not on file   Tobacco Use     Smoking status: Never     Smokeless tobacco: Never   Substance and Sexual Activity     Alcohol use: No     Drug use: No     Sexual activity: Not on file   Other Topics Concern     Not on file   Social History Narrative     Not on file     Social Drivers of Health     Financial Resource Strain: Low Risk  (2/17/2025)    Financial Resource Strain      Within the past 12 months, have you or your family members you live with been unable to get utilities (heat, electricity) when it was really needed?: No   Food Insecurity: Low Risk  (2/17/2025)    Food Insecurity      Within the past 12 months, did you worry that your food would run out before you got money to buy more?: No      Within the past 12 months, did the food you bought just not last and you didn t have money to get more?: No   Transportation Needs: Low Risk  (2/17/2025)    Transportation Needs      Within the past 12 months, has lack of transportation kept you from medical appointments, getting your medicines, non-medical meetings or appointments, work, or from getting things that you need?: No   Physical Activity: Not on file   Stress: Not on file (11/6/2024)   Social Connections: Unknown (12/28/2021)    [facility]    Social Connections      Frequency of Communication with Friends and Family: Not on file   Interpersonal Safety: Low Risk  (1/4/2025)    Interpersonal Safety      Do you feel physically and emotionally safe  where you currently live?: Yes      Within the past 12 months, have you been hit, slapped, kicked or otherwise physically hurt by someone?: No      Within the past 12 months, have you been humiliated or emotionally abused in other ways by your partner or ex-partner?: No   Recent Concern: Interpersonal Safety - High Risk (11/23/2024)    Interpersonal Safety      Do you feel physically and emotionally safe where you currently live?: No      Within the past 12 months, have you been hit, slapped, kicked or otherwise physically hurt by someone?: No      Within the past 12 months, have you been humiliated or emotionally abused in other ways by your partner or ex-partner?: No   Housing Stability: Low Risk  (2/17/2025)    Housing Stability      Do you have housing? : Yes      Are you worried about losing your housing?: No    ALLERGIES:  Allergies   Allergen Reactions     Codeine Unknown     Patient states possibly caused N/V but can't remember for sure     Semaglutide Nausea and Vomiting     Acetaminophen-Codeine Rash            Medications:     Current Outpatient Medications   Medication Sig Dispense Refill     acetaminophen (TYLENOL) 500 MG tablet Take 1-2 tablets (500-1,000 mg) by mouth every 6 hours as needed for mild pain (do NOT exceed 3,000mg in a 24hour period).       albuterol (PROVENTIL HFA;VENTOLIN HFA) 90 mcg/actuation inhaler Inhale 2 puffs into the lungs every 6 hours as needed       ARNUITY ELLIPTA 100 MCG/ACT inhaler Inhale 1 puff into the lungs 2 times daily.       aspirin (ASA) 81 MG chewable tablet Take 2 tablets (162 mg) by mouth or NG Tube daily. 180 tablet 3     atorvastatin (LIPITOR) 80 MG tablet Take 80 mg by mouth daily.       citalopram (CELEXA) 20 MG tablet [CITALOPRAM (CELEXA) 20 MG TABLET] Take 20 mg by mouth every morning.   0     empagliflozin (JARDIANCE) 25 MG TABS tablet Take 1 tablet (25 mg) by mouth daily. 90 tablet 1     famotidine (PEPCID) 20 MG tablet Take 1 tablet (20 mg) by mouth as  needed (take as needed for acid reflux). 30 tablet 0     gabapentin (NEURONTIN) 100 MG capsule Take 1 capsule (100 mg) by mouth 3 times daily. 90 capsule 0     lidocaine (LMX4) 4 % external cream Apply topically daily as needed for mild pain (painful area on left chest wall). 15 g 0     losartan (COZAAR) 25 MG tablet Take 0.5 tablets (12.5 mg) by mouth daily. 45 tablet 3     metFORMIN (GLUCOPHAGE XR) 500 MG 24 hr tablet Take 3 tablets (1,500 mg) by mouth daily (with breakfast). Changed on 6/21/24, new prescription not yet sent 270 tablet 1     metoprolol succinate ER (TOPROL XL) 25 MG 24 hr tablet Take 1.5 tablets (37.5 mg) by mouth 2 times daily. 270 tablet 3     polyethylene glycol (MIRALAX) 17 g packet Take 1 packet by mouth daily as needed for constipation.       pramipexole (MIRAPEX) 0.125 MG tablet Take 0.125 mg by mouth daily. At 3pm  3     pramipexole (MIRAPEX) 0.5 MG tablet Take 0.5 mg by mouth at bedtime.       spironolactone (ALDACTONE) 25 MG tablet Take 0.5 tablets (12.5 mg) by mouth daily. 45 tablet 2     tirzepatide (MOUNJARO) 15 MG/0.5ML pen Inject 15 mg subcutaneously every 7 days. 6 mL 1           Miguel Roberts PA-C  March 6, 2025    This note was partially generated using Dragon voice recognition system, and there may be some incorrect words,  spellings, and punctuation that were not noted in checking the note before saving.      Thank you for allowing me to participate in the care of your patient.      Sincerely,     Miguel Roberts PA-C     [facility]  cc:   Micheline Garg MD  [address], [address]  [place] [address]

## 2025-03-10 DIAGNOSIS — Z95.1 S/P CABG (CORONARY ARTERY BYPASS GRAFT): ICD-10-CM

## 2025-03-10 RX ORDER — ASPIRIN 81 MG/1
162 TABLET, CHEWABLE ORAL DAILY
Qty: 60 TABLET | Refills: 0 | Status: SHIPPED | OUTPATIENT
Start: 2025-03-10

## 2025-04-16 ENCOUNTER — MYC MEDICAL ADVICE (OUTPATIENT)
Dept: CARDIOLOGY | Facility: CLINIC | Age: 65
End: 2025-04-16
Payer: COMMERCIAL

## 2025-04-16 NOTE — TELEPHONE ENCOUNTER
Miguel Roberts, YARELIS  You1 hour ago (11:30 AM)     DO  Agree with recommendations for ED or urgent care if new symptoms otherwise ok to follow up with Maira as scheduled.      Thanks  Nestor         --copied into [facility]. -[facility] [place]

## 2025-04-17 ENCOUNTER — APPOINTMENT (OUTPATIENT)
Dept: RADIOLOGY | Facility: CLINIC | Age: 65
End: 2025-04-17
Attending: STUDENT IN AN ORGANIZED HEALTH CARE EDUCATION/TRAINING PROGRAM
Payer: COMMERCIAL

## 2025-04-17 ENCOUNTER — HOSPITAL ENCOUNTER (EMERGENCY)
Facility: CLINIC | Age: 65
Discharge: HOME | End: 2025-04-17
Attending: STUDENT IN AN ORGANIZED HEALTH CARE EDUCATION/TRAINING PROGRAM
Payer: COMMERCIAL

## 2025-04-17 ENCOUNTER — APPOINTMENT (OUTPATIENT)
Dept: CT IMAGING | Facility: CLINIC | Age: 65
End: 2025-04-17
Attending: STUDENT IN AN ORGANIZED HEALTH CARE EDUCATION/TRAINING PROGRAM
Payer: COMMERCIAL

## 2025-04-17 VITALS
BODY MASS INDEX: 24.83 KG/M2 | OXYGEN SATURATION: 100 % | RESPIRATION RATE: 16 BRPM | SYSTOLIC BLOOD PRESSURE: 136 MMHG | HEART RATE: 84 BPM | DIASTOLIC BLOOD PRESSURE: 67 MMHG | HEIGHT: 65 IN | TEMPERATURE: 97.2 F | WEIGHT: 149 LBS

## 2025-04-17 DIAGNOSIS — E86.0 DEHYDRATION: ICD-10-CM

## 2025-04-17 DIAGNOSIS — R10.12 LUQ ABDOMINAL PAIN: ICD-10-CM

## 2025-04-17 DIAGNOSIS — R07.9 LEFT-SIDED CHEST PAIN: ICD-10-CM

## 2025-04-17 LAB
ALBUMIN SERPL BCG-MCNC: 3.8 G/DL (ref 3.5–5.2)
ALBUMIN UR-MCNC: NEGATIVE MG/DL
ALP SERPL-CCNC: 90 U/L (ref 40–150)
ALT SERPL W P-5'-P-CCNC: 10 U/L (ref 0–50)
ANION GAP SERPL CALCULATED.3IONS-SCNC: 13 MMOL/L (ref 7–15)
APPEARANCE UR: CLEAR
AST SERPL W P-5'-P-CCNC: 29 U/L (ref 0–45)
ATRIAL RATE - MUSE: 94 BPM
BASOPHILS # BLD AUTO: 0 10E3/UL (ref 0–0.2)
BASOPHILS NFR BLD AUTO: 0 %
BILIRUB DIRECT SERPL-MCNC: 0.13 MG/DL (ref 0–0.3)
BILIRUB SERPL-MCNC: 0.4 MG/DL
BILIRUB UR QL STRIP: NEGATIVE
BUN SERPL-MCNC: 13.4 MG/DL (ref 8–23)
CALCIUM SERPL-MCNC: 9.3 MG/DL (ref 8.8–10.4)
CHLORIDE SERPL-SCNC: 101 MMOL/L (ref 98–107)
COLOR UR AUTO: ABNORMAL
CREAT SERPL-MCNC: 1.2 MG/DL (ref 0.51–0.95)
D DIMER PPP FEU-MCNC: 0.28 UG/ML FEU (ref 0–0.5)
DIASTOLIC BLOOD PRESSURE - MUSE: NORMAL MMHG
EGFRCR SERPLBLD CKD-EPI 2021: 51 ML/MIN/1.73M2
EOSINOPHIL # BLD AUTO: 0.3 10E3/UL (ref 0–0.7)
EOSINOPHIL NFR BLD AUTO: 3 %
ERYTHROCYTE [DISTWIDTH] IN BLOOD BY AUTOMATED COUNT: 19.7 % (ref 10–15)
GLUCOSE SERPL-MCNC: 90 MG/DL (ref 70–99)
GLUCOSE UR STRIP-MCNC: 1000 MG/DL
HCO3 SERPL-SCNC: 27 MMOL/L (ref 22–29)
HCT VFR BLD AUTO: 36.7 % (ref 35–47)
HGB BLD-MCNC: 11.6 G/DL (ref 11.7–15.7)
HGB UR QL STRIP: NEGATIVE
HOLD SPECIMEN: NORMAL
IMM GRANULOCYTES # BLD: 0 10E3/UL
IMM GRANULOCYTES NFR BLD: 0 %
INTERPRETATION ECG - MUSE: NORMAL
KETONES UR STRIP-MCNC: NEGATIVE MG/DL
LEUKOCYTE ESTERASE UR QL STRIP: NEGATIVE
LIPASE SERPL-CCNC: 30 U/L (ref 13–60)
LYMPHOCYTES # BLD AUTO: 2.6 10E3/UL (ref 0.8–5.3)
LYMPHOCYTES NFR BLD AUTO: 28 %
MCH RBC QN AUTO: 23.2 PG (ref 26.5–33)
MCHC RBC AUTO-ENTMCNC: 31.6 G/DL (ref 31.5–36.5)
MCV RBC AUTO: 73 FL (ref 78–100)
MONOCYTES # BLD AUTO: 0.6 10E3/UL (ref 0–1.3)
MONOCYTES NFR BLD AUTO: 7 %
NEUTROPHILS # BLD AUTO: 5.7 10E3/UL (ref 1.6–8.3)
NEUTROPHILS NFR BLD AUTO: 62 %
NITRATE UR QL: NEGATIVE
NRBC # BLD AUTO: 0 10E3/UL
NRBC BLD AUTO-RTO: 0 /100
NT-PROBNP SERPL-MCNC: 991 PG/ML (ref 0–900)
P AXIS - MUSE: 84 DEGREES
PH UR STRIP: 7.5 [PH] (ref 5–7)
PLATELET # BLD AUTO: 310 10E3/UL (ref 150–450)
POTASSIUM SERPL-SCNC: 5.2 MMOL/L (ref 3.4–5.3)
PR INTERVAL - MUSE: 164 MS
PROT SERPL-MCNC: 7.3 G/DL (ref 6.4–8.3)
QRS DURATION - MUSE: 78 MS
QT - MUSE: 384 MS
QTC - MUSE: 480 MS
R AXIS - MUSE: 101 DEGREES
RBC # BLD AUTO: 5 10E6/UL (ref 3.8–5.2)
RBC URINE: 4 /HPF
SODIUM SERPL-SCNC: 141 MMOL/L (ref 135–145)
SP GR UR STRIP: 1.03 (ref 1–1.03)
SQUAMOUS EPITHELIAL: 1 /HPF
SYSTOLIC BLOOD PRESSURE - MUSE: NORMAL MMHG
T AXIS - MUSE: 50 DEGREES
TROPONIN T SERPL HS-MCNC: 17 NG/L
TROPONIN T SERPL HS-MCNC: 19 NG/L
UROBILINOGEN UR STRIP-MCNC: NORMAL MG/DL
VENTRICULAR RATE- MUSE: 94 BPM
WBC # BLD AUTO: 9.3 10E3/UL (ref 4–11)
WBC URINE: 8 /HPF
YEAST #/AREA URNS HPF: ABNORMAL /HPF

## 2025-04-17 PROCEDURE — 83690 ASSAY OF LIPASE: CPT | Performed by: STUDENT IN AN ORGANIZED HEALTH CARE EDUCATION/TRAINING PROGRAM

## 2025-04-17 PROCEDURE — 74177 CT ABD & PELVIS W/CONTRAST: CPT

## 2025-04-17 PROCEDURE — 82310 ASSAY OF CALCIUM: CPT | Performed by: STUDENT IN AN ORGANIZED HEALTH CARE EDUCATION/TRAINING PROGRAM

## 2025-04-17 PROCEDURE — 82248 BILIRUBIN DIRECT: CPT | Performed by: STUDENT IN AN ORGANIZED HEALTH CARE EDUCATION/TRAINING PROGRAM

## 2025-04-17 PROCEDURE — 81001 URINALYSIS AUTO W/SCOPE: CPT | Performed by: STUDENT IN AN ORGANIZED HEALTH CARE EDUCATION/TRAINING PROGRAM

## 2025-04-17 PROCEDURE — 96361 HYDRATE IV INFUSION ADD-ON: CPT

## 2025-04-17 PROCEDURE — 71046 X-RAY EXAM CHEST 2 VIEWS: CPT

## 2025-04-17 PROCEDURE — 96374 THER/PROPH/DIAG INJ IV PUSH: CPT | Mod: 59

## 2025-04-17 PROCEDURE — 36415 COLL VENOUS BLD VENIPUNCTURE: CPT | Performed by: STUDENT IN AN ORGANIZED HEALTH CARE EDUCATION/TRAINING PROGRAM

## 2025-04-17 PROCEDURE — 99285 EMERGENCY DEPT VISIT HI MDM: CPT | Mod: 25

## 2025-04-17 PROCEDURE — 81003 URINALYSIS AUTO W/O SCOPE: CPT | Performed by: STUDENT IN AN ORGANIZED HEALTH CARE EDUCATION/TRAINING PROGRAM

## 2025-04-17 PROCEDURE — 84484 ASSAY OF TROPONIN QUANT: CPT | Performed by: STUDENT IN AN ORGANIZED HEALTH CARE EDUCATION/TRAINING PROGRAM

## 2025-04-17 PROCEDURE — 85004 AUTOMATED DIFF WBC COUNT: CPT | Performed by: STUDENT IN AN ORGANIZED HEALTH CARE EDUCATION/TRAINING PROGRAM

## 2025-04-17 PROCEDURE — 82247 BILIRUBIN TOTAL: CPT | Performed by: STUDENT IN AN ORGANIZED HEALTH CARE EDUCATION/TRAINING PROGRAM

## 2025-04-17 PROCEDURE — 93005 ELECTROCARDIOGRAM TRACING: CPT | Performed by: STUDENT IN AN ORGANIZED HEALTH CARE EDUCATION/TRAINING PROGRAM

## 2025-04-17 PROCEDURE — 250N000011 HC RX IP 250 OP 636: Performed by: STUDENT IN AN ORGANIZED HEALTH CARE EDUCATION/TRAINING PROGRAM

## 2025-04-17 PROCEDURE — 80048 BASIC METABOLIC PNL TOTAL CA: CPT | Performed by: STUDENT IN AN ORGANIZED HEALTH CARE EDUCATION/TRAINING PROGRAM

## 2025-04-17 PROCEDURE — 85379 FIBRIN DEGRADATION QUANT: CPT | Performed by: STUDENT IN AN ORGANIZED HEALTH CARE EDUCATION/TRAINING PROGRAM

## 2025-04-17 PROCEDURE — 83880 ASSAY OF NATRIURETIC PEPTIDE: CPT | Performed by: STUDENT IN AN ORGANIZED HEALTH CARE EDUCATION/TRAINING PROGRAM

## 2025-04-17 PROCEDURE — 258N000003 HC RX IP 258 OP 636: Performed by: STUDENT IN AN ORGANIZED HEALTH CARE EDUCATION/TRAINING PROGRAM

## 2025-04-17 RX ORDER — IOPAMIDOL 755 MG/ML
75 INJECTION, SOLUTION INTRAVASCULAR ONCE
Status: COMPLETED | OUTPATIENT
Start: 2025-04-17 | End: 2025-04-17

## 2025-04-17 RX ORDER — LORAZEPAM 2 MG/ML
0.5 INJECTION INTRAMUSCULAR ONCE
Status: COMPLETED | OUTPATIENT
Start: 2025-04-17 | End: 2025-04-17

## 2025-04-17 RX ADMIN — LORAZEPAM 0.5 MG: 2 INJECTION INTRAMUSCULAR; INTRAVENOUS at 16:31

## 2025-04-17 RX ADMIN — IOPAMIDOL 75 ML: 755 INJECTION, SOLUTION INTRAVENOUS at 16:38

## 2025-04-17 RX ADMIN — SODIUM CHLORIDE 1000 ML: 9 INJECTION, SOLUTION INTRAVENOUS at 16:31

## 2025-04-17 ASSESSMENT — ACTIVITIES OF DAILY LIVING (ADL)
ADLS_ACUITY_SCORE: 56

## 2025-04-17 NOTE — ED PROVIDER NOTES
EMERGENCY DEPARTMENT ENCOUNTER       ED Course & Medical Decision Making     Final Impression  65 year old female presents for evaluation of left-sided chest pain ongoing for the last 2 days.  States that the pain started at rest, has been fairly constant, history of CABG x 3 about 5 months ago, states she had some similar symptoms with her prior NSTEMI that led to her CABG.  States that she also has some mild associated shortness of breath, worse with exertion, as well as in the last 24 hours developed some nausea and vomiting.  On exam, patient in no acute distress, vital stable.  Physical exam fairly unremarkable other than some left upper quadrant/left lower rib cage pain with palpation.  No rashes in this area indicate shingles.  EKG essentially unchanged from prior.  D-dimer negative, CBC at baseline, mild bump in creatinine to 1.20, baseline appears to be closer to 0.85-0.90.  Mild chronic anemia of 11.6.  900 which is far below baseline.  Chest x-ray within normal limits.  CT abdomen pelvis without acute abnormality other than some mild stool throughout the colon though certainly no obstruction.  CT did comment on possible haziness around the bladder thus UA was obtained, UA negative for bacteria.  Patient given 1 L fluid bolus for mild bump in creatinine.  On reevaluation states that she does feel a little bit better after fluids, though still has a little bit of left sided pain.  Cannot think of any trauma or injuries or physical exertion that would have caused it.  Patient appears fairly comfortable, vitals reassuring, ED workup very reassuring.  Reviewed all findings and results with patient.  Will discharge home    Prior to making a final disposition on this patient the results of patient's tests and other diagnostic studies were discussed with the patient. All questions were answered. Patient expressed understanding of the plan and was amenable to it.    Medical Decision Making  Supplemental history  "from: NA  External Record(s) reviewed as documented below;  3/6/2025, [place] cardiology clinic note, seen for follow-up of CHF, sinus tachycardia, CAD s/p  CABG  11/20/2024, surgery encounter for coronary artery bypass graft x 3, note reviewed  Chart documentation includes differential considered  EKGs or imaging independently interpreted my me (see Labs & Imaging and EKG sections).  DDx considered but not limited to: ACS, pulmonary embolism, pneumonia, bowel obstruction, gastric ulcer, shingles, dehydration, CHF  Care impacted by chronic illness: Type 2 diabetes, hyperlipidemia, HTN, coronary artery disease s/p CABG, congestive heart failure  Disposition considerations: Discharge. I prescribed additional prescription strength medication(s) as charted. I considered admission, but discharged patient after significant clinical improvement.        None    Medications   LORazepam (ATIVAN) injection 0.5 mg (0.5 mg Intravenous $Given 4/17/25 1631)   iopamidol (ISOVUE-370) solution 75 mL (75 mLs Intravenous $Given 4/17/25 1638)   sodium chloride 0.9% BOLUS 1,000 mL (1,000 mLs Intravenous $New Bag 4/17/25 1631)       New Prescriptions    No medications on file     Modified Medications    No medications on file       Final Impression     1. Left-sided chest pain    2. LUQ abdominal pain    3. Dehydration        Chief Complaint     Chief Complaint   Patient presents with    Chest Pain     Pt presents to the ED with c/o left sided CP that started two days ago. Pt reports that the pain radiates into left side of neck. Hx of triple bypass 5 months ago. Reports symptoms are similar. Worse with activity.     HPI     Gill Mendez is a 65 year old female who presents for evaluation of left-sided chest pain.    Physical Exam     BP (!) 140/77   Pulse 96   Temp 98.8  F (37.1  C) (Temporal)   Resp 15   Ht 1.651 m (5' 5\")   Wt 67.6 kg (149 lb)   SpO2 95%   BMI 24.79 kg/m    Constitutional: Awake, alert, in no acute " distress.  Head: Normocephalic, atraumatic.  ENT: Mucous membranes moist.  Eyes: Conjunctiva normal.  Respiratory: Respirations even, unlabored, in no acute respiratory distress.  Lungs clear to auscultation bilaterally.  Cardiovascular: Regular rate and rhythm.  No appreciable rubs or murmurs.  Good peripheral perfusion.  GI: Abdomen soft, non-tender.  Mild left upper quadrant tenderness, extends up into the rib cage  Musculoskeletal: Moves all 4 extremities equally.  Integument: Warm, dry.  No rash on the left chest or left upper abdomen.  Neurologic: Alert & oriented x 3. Normal speech. Grossly normal motor and sensory function. No focal deficits noted.  Psychiatric: Normal mood    Labs & Imaging     Imaging reviewed and independently interpreted as below;   CT abdomen pelvis images reviewed, no bowel obstruction, does have small amount of stool retention throughout the colon, mostly in the transverse and descending colon, though no obstruction.    Results for orders placed or performed during the hospital encounter of 04/17/25   Chest XR,  PA & LAT    Impression    IMPRESSION: Stable cardiomegaly. Pulmonary vascularity is normal. Similar bibasilar atelectasis. No new focal consolidation, pleural effusion, or pneumothorax. Prior median sternotomy and CABG.   CT Abdomen Pelvis w Contrast    Impression    IMPRESSION:   1.  Diverticular disease of the colon without diverticulitis.  2.  Increased stool volume in the colon without obstruction.  3.  Hazy changes of the fat surrounding the urinary bladder. Recommend correlation with urinalysis to exclude cystitis.  4.  Nonobstructing stones left kidney.  5.  Hepatic steatosis.  6.  Interval resolution of the bilateral pleural effusions and bibasilar atelectasis. There is some residual interstitial changes in the left lower lobe which may be residual changes from the prior pneumonia or pneumonitis.     Extra Blue Top Tube   Result Value Ref Range    Hold Specimen JIC     Extra Red Top Tube   Result Value Ref Range    Hold Specimen JIC    Extra Green Top (Lithium Heparin) Tube   Result Value Ref Range    Hold Specimen     Extra Purple Top Tube   Result Value Ref Range    Hold Specimen     Basic metabolic panel   Result Value Ref Range    Sodium 141 135 - 145 mmol/L    Potassium 5.2 3.4 - 5.3 mmol/L    Chloride 101 98 - 107 mmol/L    Carbon Dioxide (CO2) 27 22 - 29 mmol/L    Anion Gap 13 7 - 15 mmol/L    Urea Nitrogen 13.4 8.0 - 23.0 mg/dL    Creatinine 1.20 (H) 0.51 - 0.95 mg/dL    GFR Estimate 51 (L) >60 mL/min/1.73m2    Calcium 9.3 8.8 - 10.4 mg/dL    Glucose 90 70 - 99 mg/dL   Result Value Ref Range    Lipase 30 13 - 60 U/L   Hepatic function panel   Result Value Ref Range    Protein Total 7.3 6.4 - 8.3 g/dL    Albumin 3.8 3.5 - 5.2 g/dL    Bilirubin Total 0.4 <=1.2 mg/dL    Alkaline Phosphatase 90 40 - 150 U/L    AST 29 0 - 45 U/L    ALT 10 0 - 50 U/L    Bilirubin Direct 0.13 0.00 - 0.30 mg/dL   Result Value Ref Range    Troponin T, High Sensitivity 17 (H) <=14 ng/L   D dimer quantitative   Result Value Ref Range    D-Dimer Quantitative 0.28 0.00 - 0.50 ug/mL FEU   CBC with platelets and differential   Result Value Ref Range    WBC Count 9.3 4.0 - 11.0 10e3/uL    RBC Count 5.00 3.80 - 5.20 10e6/uL    Hemoglobin 11.6 (L) 11.7 - 15.7 g/dL    Hematocrit 36.7 35.0 - 47.0 %    MCV 73 (L) 78 - 100 fL    MCH 23.2 (L) 26.5 - 33.0 pg    MCHC 31.6 31.5 - 36.5 g/dL    RDW 19.7 (H) 10.0 - 15.0 %    Platelet Count 310 150 - 450 10e3/uL    % Neutrophils 62 %    % Lymphocytes 28 %    % Monocytes 7 %    % Eosinophils 3 %    % Basophils 0 %    % Immature Granulocytes 0 %    NRBCs per 100 WBC 0 <1 /100    Absolute Neutrophils 5.7 1.6 - 8.3 10e3/uL    Absolute Lymphocytes 2.6 0.8 - 5.3 10e3/uL    Absolute Monocytes 0.6 0.0 - 1.3 10e3/uL    Absolute Eosinophils 0.3 0.0 - 0.7 10e3/uL    Absolute Basophils 0.0 0.0 - 0.2 10e3/uL    Absolute Immature Granulocytes 0.0 <=0.4 10e3/uL    Absolute NRBCs  0.0 10e3/uL   Result Value Ref Range    Troponin T, High Sensitivity 19 (H) <=14 ng/L   N terminal pro BNP outpatient   Result Value Ref Range    N Terminal Pro BNP Outpatient 991 (H) 0 - 900 pg/mL   UA with Microscopic reflex to Culture    Specimen: Urine, Clean Catch   Result Value Ref Range    Color Urine Light Yellow Colorless, Straw, Light Yellow, Yellow    Appearance Urine Clear Clear    Glucose Urine 1000 (A) Negative mg/dL    Bilirubin Urine Negative Negative    Ketones Urine Negative Negative mg/dL    Specific Gravity Urine 1.026 1.001 - 1.030    Blood Urine Negative Negative    pH Urine 7.5 (H) 5.0 - 7.0    Protein Albumin Urine Negative Negative mg/dL    Urobilinogen Urine Normal Normal mg/dL    Nitrite Urine Negative Negative    Leukocyte Esterase Urine Negative Negative    Budding Yeast Urine Few (A) None Seen /HPF    RBC Urine 4 (H) <=2 /HPF    WBC Urine 8 (H) <=5 /HPF    Squamous Epithelials Urine 1 <=1 /HPF   ECG 12-LEAD WITH MUSE (LHE)   Result Value Ref Range    Systolic Blood Pressure  mmHg    Diastolic Blood Pressure  mmHg    Ventricular Rate 94 BPM    Atrial Rate 94 BPM    IA Interval 164 ms    QRS Duration 78 ms     ms    QTc 480 ms    P Axis 84 degrees    R AXIS 101 degrees    T Axis 50 degrees    Interpretation ECG       Sinus rhythm  Rightward axis  Septal infarct (cited on or before 16-Feb-2025)  Abnormal ECG  When compared with ECG of 16-Feb-2025 23:11,  Questionable change in QRS axis  Confirmed by SEE ED PROVIDER NOTE FOR, ECG INTERPRETATION ([id number]),  CYNDIE DIAL ([id number]) on 4/17/2025 3:43:49 PM         EKG     EKG reviewed and independently interpreted as below;  Sinus rhythm, rate 84.  .  QRS 78.  QTc 480.  No STEMI.    Procedures          Phill Rome MD  04/17/25 [id number]

## 2025-04-17 NOTE — ED TRIAGE NOTES
Pt presents to the ED with c/o left sided CP that started two days ago. Pt reports that the pain radiates into left side of neck. Hx of triple bypass 5 months ago. Reports symptoms are similar. Worse with activity.      Triage Assessment (Adult)       Row Name 04/17/25 1518          Triage Assessment    Airway WDL WDL        Respiratory WDL    Respiratory WDL WDL        Skin Circulation/Temperature WDL    Skin Circulation/Temperature WDL WDL        Cardiac WDL    Cardiac WDL X;chest pain        Chest Pain Assessment    Chest Pain Location anterior chest, left     Chest Pain Radiation neck     Character sharp;tightness     Precipitating Factors activity     Alleviating Factors nothing     Chest Pain Intervention 12-lead ECG obtained;activity minimized        Peripheral/Neurovascular WDL    Peripheral Neurovascular WDL WDL        Cognitive/Neuro/Behavioral WDL    Cognitive/Neuro/Behavioral WDL WDL

## 2025-04-20 NOTE — PROGRESS NOTES
[facility]  [address], [place] [address]  Office: [phone]     Assessment/Recommendations   Assessment: Ms. Mendez presents to [facility] today for heart failure focused follow-up visit.    # Chronic heart failure with mildly reduced ejection fraction (EF 40-45% per echo 2/17//2025) 2/2 ICM     Stage C. NYHA Class II.  HFrEF 2/2 ICM with EF 20-25% (11/2024), s/p CABG 11/2024. LVEF improved to 40-45% per echo 12/17/2024. Kidney function has remained stable. Her weight on the clinic scale today is 152 lbs. Upon exam, patient is well-compensated. She is with ongoing MCWILLIAMS that is stable but not at her prior to surgery baseline. No overt fluid retention on exam. With most recent ED visit was thought to be dehydrated and received IVF. Consider that MCWILLIAMS could be 2/2 deconditioning, did not complete cardiac rehab. Less concern for dyspnea as anginal equivalent given recent reassuring angiogram results.     -Fluid status: near euvolemic; Diuretic: None, no KCL supp  -ACEi/ARB/ARNi/afterload reduction: Losartan 25 mg (started 1/6/2025)  -BB: metoprolol succinate 50 mg bid   -Aldosterone antagonist: spironolactone 12.5 mg   -SGLT2i: Jardiance 25 mg daily  -SCD prophylaxis: does not meet criteria for implant  -NSAID use: contraindicated  -Sleep apnea eval: not discussed today    Heart failure education including medication compliance and lifestyle management reviewed today: low sodium diet <2g Na/day, fluid intake <2L/day, daily weight monitoring, and physical activity as tolerated.    # Hypertension  -BP today 146/79; home BPs range ~120-130/70-80s  -Continue GDMT as outlined above and uptitrate as tolerated    #Sinus Tachycardia  -HR today 95, continues to report HRs 90s-100s while at rest when checking VS at home  -Will further increase metoprolol today  -Zio monitor was ordered 2/2025, has not been done, no arrhythmias have been previously  noticed     # Coronary artery disease s/p CABG 11/24/2024  # Dyslipidemia  -S/p CABG (LIMA-LAD, SVG-OM, SVG-RCA) 11/24/2024  -Episodes of recurrent chest pain with labile intensity, repeat angiogram 2/17/2025 showed patent grafts  -Antiplatelet therapy with  mg for secondary ASCVD prevention  -LDL goal <70; high intensity statin therapy with atorvastatin 80 mg for secondary ASCVD prevention    # Diabetes Mellitus 2  -Most recent A1C 6.1 (11/23/2024)   -Jardiance 25 mg, metformin, tirzepitide  -Continue to manage with PCP    # Rib pain  -Tender to palpation to left rib unchanged, pain is less if she lays on left side, upon bending over/sitting upright she self splints L ribs d/t pain  -CXR x2 w/o acute findings  -CT abdomen/pelvis w/o acute findings  -Reproducible on exam and likely musculoskeletal   -Referral to PT placed today    # Spinal stenosis  -Conservative management was recommended includinrg TFESI and PT, following with neurosurgery     # Asthma  -Inhaler tx, managed by PCP      Plan:  The nurse will call you to review your lab results  Increase metoprolol succinate to 125 mg daily, continue to monitor heart rate and blood pressure at home  PT referral placed today  Will confirm long-term antiplatelet plan with CV Surgery    Follow-up in the heart failure clinic with [facility] in ~3-months, sooner if needed.  Follow-up with general cardiology, Dr. Bragg ~September 2025.    The longitudinal plan of care for the condition(s) below were addressed during this visit. Due to the added complexity in care, I will continue to support Ms. Mendez in the subsequent management of this condition(s) and with the ongoing continuity of care of this condition(s): HFmrEF.     History of Present Illness/Subjective    Ms. Mendez is a 64 year old female with a past medical history significant for HFrEF, HTN, CAD s/p CABG 11/2024, DM2, asthma. Today patient presents to [facility] for heart failure  "focused follow-up visit.     ED on 2/16/2025 for chest pain. Given her multiple presentations for chest pain she was recommended to undergo repeat angiography. Angiogram showed patent grafts. She was recommended medical management.  Imaging was also done for her neck pain and right arm numbness which showed high-grade for foraminal stenosis and spinal stenosis and C4-C7 neurosurgery follow-up.    Last office visit with general cardiology, Miguel Armando 3/6/2025. At that time, patient reported L rib pain and progressive MCWILLIAMS. CXR w/o acute findings. Metoprolol was increased for ongoing sinus tachycardia.     ED visit 4/17/2025 for chest pain. Reassuring work-up in the ED. Thought to be mildly dehydrated w/Cr bump and received IVF.     Today patient endorses ongoing L rib pain for the past ~1.5 months. Pain is less if she lays on left side and worse upon bending over/sitting upright she self splints to help with pain.     She also has ongoing \"chest pain\" in the middle of her chest, intensity of this pain has been labile, sometimes radiates, has prompted several ED visits over the past several months. She continues to c/o MCWILLIAMS which is unchanged.     She is drinking adequate fluids and monitoring sodium in her diet.     She denies palpitations, lightheadedness/dizziness, presyncope, syncope, shortness of breath at rest, orthopnea, PND, fatigue/activity intolerance, abdominal fullness/bloating, LE edema, and bleeding.      Recent test results & labs below (personally reviewed):    Coronary angiogram 2/17/2025  CONCLUSIONS:   Severe multivessel coronary disease with subtotal occlusion of the distal left main involving the proximal left circumflex and  of the ostial left anterior descending artery and distal right coronary artery.  Patent LIMA to the LAD and vein grafts to the left circumflex system and distal right coronary artery.  RECOMMENDATIONS:   Usual postprocedure cares, please see orders.  Recommend medical " management of patient's coronary artery disease.  Aggressive risk factor modification for secondary prevention.    TTE 2/17/2025  Interpretation Summary  Left ventricular function is decreased. The ejection fraction is 40-45%  (mildly reduced).  Moderate anteroseptal hypokinesia with small segment of apical akinesia was  noted  Normal right ventricle size and systolic function.  No hemodynamically significant valvular abnormalities on 2D or color flow  imaging.    Echocardiogram 1/4/2025  Interpretation Summary  The left ventricle is normal in size. There is mild concentric left  ventricular hypertrophy.  Left ventricular function is decreased. The ejection fraction is 40-45%  (mildly reduced). The anterior and anteroseptal walls are hypokinetic.  The right ventricle is normal in size and function.  The left atrium is mildly dilated. The right atrium is mildly dilated.  IVC diameter <2.1 cm collapsing >50% with sniff suggests a normal RA pressure  of 3 mmHg.  Compared to prior study, small pericardial effusion has resolved. LVEF and  wall motion is similar.    Echocardiogram 12/17/2024  Interpretation Summary   1.Left ventricular function is decreased. The ejection fraction is 30-35%  (moderately reduced).  2.The left ventricle is borderline dilated.  3.There is mild to moderate anterior, septal, and apical wall hypokinesis.  4.Normal right ventricle size and systolic function.  5.No hemodynamically significant valvular abnormalities on 2D or color flow  imaging.  6.Moderate anterior pericardial effusion, There are no echocardiographic  indications of cardiac tamponade.  Compared to the prior study dated 12/4/2024, the effusion is more anterior  localized and Doppler does not suggest any tamonade. Difficult to compare  images as technique different but does seem slightly smaller on current study.    Echocardiogram 12/4/2024  Interpretation Summary  1.Left ventricular function is decreased. The ejection fraction is  30-35%  (moderately reduced).  2.There is moderate anterior, septal, and apical wall hypokinesis.  3.Normal right ventricle size and systolic function.  4.No hemodynamically significant valvular abnormalities on 2D or color flow  imaging although this was a limited study without complete Doppler.  5.Small posterior pericardial effusion, inflows were not samlpled to determine  risk of tamplonade.  Compared to the prior study dated 12/1/2024, there have been no changes. The  small posterior effusion is better visualized on this study.    Echocardiogram 12/1/2024  Interpretation Summary  The left ventricle is normal in size.  Left ventricular function is decreased. The ejection fraction is 35-40%  (moderately reduced). Anteroseptal, septal and apical wall hypokinesis.  Normal right ventricle size and systolic function.  The left atrium is mildly dilated.  Compared to the prior study dated 11/23/24, there are changes as noted. LVEF  has improved.    Echocardiogram 11/23/2024  Interpretation Summary  Left ventricular function is decreased. The ejection fraction is 25-30%  (severely reduced).  There is severe anterior, septal, and apical wall hypokinesis.  Apical lateral akinesis.  Normal right ventricle size and systolic function.  Right ventricular systolic pressure is elevated, consistent with mild  pulmonary hypertension.  IVC diameter >2.1 cm collapsing <50% with sniff suggests a high RA pressure  estimated at 15 mmHg or greater.     Physical Examination Review of Systems   BP (!) 146/79 (BP Location: Left arm, Patient Position: Sitting, Cuff Size: Adult Regular)   Pulse 95   Resp 16   Wt 69.2 kg (152 lb 9.6 oz)   SpO2 99%   BMI 25.39 kg/m    Body mass index is 25.39 kg/m .  Wt Readings from Last 3 Encounters:   04/23/25 69.2 kg (152 lb 9.6 oz)   04/17/25 67.6 kg (149 lb)   03/06/25 70.8 kg (156 lb)     General Appearance:   Appears comfortable, in no acute distress   HEENT: Eyes symmetrical, no discharge or  icterus bilaterally. Mucous membranes moist and without lesions   Cardiovascular: RRR, +S1S2, no murmur, rub, or gallop. JVP not visible at 90 degrees.       Respiratory:   Respirations regular, even, and unlabored. Lungs CTA throughout   GI:  Soft and non distended   Musculoskeletal: No joint swelling or tenderness   Extremities   No cyanosis or clubbing. No LE peripheral edema.   Skin: Warm, no xanthelasma, no jaundice, no rashes or lesions    Neurologic: Alert and oriented X3, no focal deficits   Psychiatric: calm and cooperative                                             Negative unless noted in HPI     Medical History  Surgical History Family History Social History   Past Medical History:   Diagnosis Date    Anxiety     Asthma     Cellulitis     Diabetes mellitus (H)     Essential hypertension     Created by Conversion  Replacement Utility updated for latest IMO load    Femoral nerve palsy, left 08/23/2023    Gastroparesis     Goiter 05/28/2023 05/2023: Palpated on exam, has had stable imaging with endocrinology, continue to follow endocrinology.      Hyperlipidemia     Hypertension     Other and unspecified hyperlipidemia     Created by Conversion     PONV (postoperative nausea and vomiting)     Shortness of breath     Type II or unspecified type diabetes mellitus without mention of complication, not stated as uncontrolled     Created by Conversion     Past Surgical History:   Procedure Laterality Date    AMPUTATE TOE(S) Right 7/31/2020    Procedure: AMPUTATION, third digit right foot;  Surgeon: Chris Vega DPM;  Location: [facility] OR;  Service: Podiatry    CORONARY ARTERY BYPASS GRAFT, WITH ENDOSCOPIC VESSEL PROCUREMENT N/A 11/24/2024    Procedure: CORONARY ARTERY BYPASS GRAFT TIMES THREE, LEFT INTERNAL MAMMARY ARTERY HARVEST, LEFT LEG ENDOSCOPIC VESSEL PROCUREMENT,;  Surgeon: Zhen Carter MD;  Location: [facility] OR    CV ANGIOGRAM CORONARY GRAFT N/A 2/17/2025    Procedure:  Coronary Angiogram Graft;  Surgeon: Mian Campbell MD;  Location: [facility] CATH LAB CV    CV CORONARY ANGIOGRAM N/A 11/23/2024    Procedure: Coronary Angiogram;  Surgeon: Roque Eisenberg MD;  Location: [facility] CATH LAB CV    CV INTRA AORTIC BALLOON N/A 11/24/2024    Procedure: Intra aortic Balloon Pump Insertion;  Surgeon: Roque Eisenberg MD;  Location: [facility] CATH LAB CV    CV LEFT HEART CATH N/A 11/23/2024    Procedure: Left Heart Catheterization;  Surgeon: Roque Eisenberg MD;  Location: [facility] CATH LAB CV    CV LEFT HEART CATH N/A 2/17/2025    Procedure: Left Heart Catheterization;  Surgeon: Mian Campbell MD;  Location: [facility] LAB CV    ENDOMETRIAL BIOPSY      FOOT ARTHROPLASTY Right 09/24/2020    Fourth and fifth toe by Dr. Vega    HC REPAIR OF HAMMERTOE,ONE Left 12/7/2020    Procedure: ARTHROPLASTY, digits two, three, four and five left foot;  Surgeon: Chris Vega DPM;  Location: [facility];  Service: Podiatry    HERNIA REPAIR      HYSTERECTOMY      IR LUMBAR PUNCTURE  7/20/2023    REPAIR TENDON ACHILLES Bilateral     Left was plate  , right was torn    TONSILLECTOMY      TRANSESOPHAGEAL ECHOCARDIOGRAM INTRAOPERATIVE  11/24/2024    Procedure: ECHOCARDIOGRAM, TRANSESOPHAGEAL, INTRA-OPERATIVE;  Surgeon: Zhen Carter MD;  Location: [facility] REMOVAL OF OVARY(S)      Description: Oophorectomy - Bilateral (Removal Of Both Ovaries);  Recorded: 10/09/2008;    Family History   Problem Relation Age of Onset    Diabetes Mother     Hypertension Mother     Acute Myocardial Infarction No family hx of     Social History     Socioeconomic History    Marital status:      Spouse name: Not on file    Number of children: Not on file    Years of education: Not on file    Highest education level: Not on file   Occupational History    Not on file   Tobacco Use    Smoking status: Never    Smokeless tobacco: Never   Substance and Sexual Activity    Alcohol use:  No    Drug use: No    Sexual activity: Not on file   Other Topics Concern    Not on file   Social History Narrative    Not on file     Social Drivers of Health     Financial Resource Strain: Low Risk  (2/17/2025)    Financial Resource Strain     Within the past 12 months, have you or your family members you live with been unable to get utilities (heat, electricity) when it was really needed?: No   Food Insecurity: Low Risk  (2/17/2025)    Food Insecurity     Within the past 12 months, did you worry that your food would run out before you got money to buy more?: No     Within the past 12 months, did the food you bought just not last and you didn t have money to get more?: No   Transportation Needs: Low Risk  (2/17/2025)    Transportation Needs     Within the past 12 months, has lack of transportation kept you from medical appointments, getting your medicines, non-medical meetings or appointments, work, or from getting things that you need?: No   Physical Activity: Not on file   Stress: Not on file (11/6/2024)   Social Connections: Unknown (12/28/2021)    [facility]    Social Connections     Frequency of Communication with Friends and Family: Not on file   Interpersonal Safety: Low Risk  (1/4/2025)    Interpersonal Safety     Do you feel physically and emotionally safe where you currently live?: Yes     Within the past 12 months, have you been hit, slapped, kicked or otherwise physically hurt by someone?: No     Within the past 12 months, have you been humiliated or emotionally abused in other ways by your partner or ex-partner?: No   Recent Concern: Interpersonal Safety - High Risk (11/23/2024)    Interpersonal Safety     Do you feel physically and emotionally safe where you currently live?: No     Within the past 12 months, have you been hit, slapped, kicked or otherwise physically hurt by someone?: No     Within the past  12 months, have you been humiliated or emotionally abused in other ways by your partner or ex-partner?: No   Housing Stability: Low Risk  (2/17/2025)    Housing Stability     Do you have housing? : Yes     Are you worried about losing your housing?: No        Medications  Allergies   Current Outpatient Medications   Medication Sig Dispense Refill    acetaminophen (TYLENOL) 500 MG tablet Take 1-2 tablets (500-1,000 mg) by mouth every 6 hours as needed for mild pain (do NOT exceed 3,000mg in a 24hour period).      albuterol (PROVENTIL HFA;VENTOLIN HFA) 90 mcg/actuation inhaler Inhale 2 puffs into the lungs every 6 hours as needed      ARNUITY ELLIPTA 100 MCG/ACT inhaler Inhale 1 puff into the lungs 2 times daily.      aspirin (ASA) 81 MG chewable tablet Take 2 tablets (162 mg) by mouth or NG Tube daily. 60 tablet 0    atorvastatin (LIPITOR) 80 MG tablet Take 80 mg by mouth daily.      citalopram (CELEXA) 20 MG tablet [CITALOPRAM (CELEXA) 20 MG TABLET] Take 20 mg by mouth every morning.   0    empagliflozin (JARDIANCE) 25 MG TABS tablet Take 1 tablet (25 mg) by mouth daily. 90 tablet 1    famotidine (PEPCID) 20 MG tablet Take 1 tablet (20 mg) by mouth as needed (take as needed for acid reflux). 30 tablet 0    lidocaine (LMX4) 4 % external cream Apply topically daily as needed for mild pain (painful area on left chest wall). 15 g 0    losartan (COZAAR) 25 MG tablet Take 0.5 tablets (12.5 mg) by mouth daily. 45 tablet 3    metFORMIN (GLUCOPHAGE XR) 500 MG 24 hr tablet Take 3 tablets (1,500 mg) by mouth daily (with breakfast). Changed on 6/21/24, new prescription not yet sent 270 tablet 1    metoprolol succinate ER (TOPROL XL) 50 MG 24 hr tablet Take 2.5 tablets (125 mg) by mouth daily. 225 tablet 3    polyethylene glycol (MIRALAX) 17 g packet Take 1 packet by mouth daily as needed for constipation.      pramipexole (MIRAPEX) 0.125 MG tablet Take 0.125 mg by mouth daily. At 3pm  3    pramipexole (MIRAPEX) 0.5 MG tablet  Take 0.5 mg by mouth at bedtime.      spironolactone (ALDACTONE) 25 MG tablet Take 0.5 tablets (12.5 mg) by mouth daily. 45 tablet 2    tirzepatide (MOUNJARO) 15 MG/0.5ML pen Inject 15 mg subcutaneously every 7 days. 6 mL 1    gabapentin (NEURONTIN) 100 MG capsule Take 1 capsule (100 mg) by mouth 3 times daily. 90 capsule 0    Allergies   Allergen Reactions    Codeine Unknown     Patient states possibly caused N/V but can't remember for sure    Semaglutide Nausea and Vomiting    Acetaminophen-Codeine Rash         Lab Results    Chemistry/lipid CBC Cardiac Enzymes/BNP/TSH/INR   Lab Results   Component Value Date    CHOL 148 2024    HDL 65 2024    TRIG 65 2024    BUN 13.4 2025     2025    CO2 27 2025    Lab Results   Component Value Date    WBC 9.3 2025    HGB 11.6 (L) 2025    HCT 36.7 2025    MCV 73 (L) 2025     2025    Lab Results   Component Value Date    TROPONINI <0.01 2023    TSH 0.61 2024    INR 1.05 2024            Maira Parra, DNP, APRN, CNP  [facility] [place]   Clinic and [phone]  Fax: [phone]  Heart Failure Nurses: [phone]

## 2025-04-23 ENCOUNTER — OFFICE VISIT (OUTPATIENT)
Dept: CARDIOLOGY | Facility: CLINIC | Age: 65
End: 2025-04-23
Payer: COMMERCIAL

## 2025-04-23 VITALS
OXYGEN SATURATION: 99 % | DIASTOLIC BLOOD PRESSURE: 79 MMHG | SYSTOLIC BLOOD PRESSURE: 146 MMHG | RESPIRATION RATE: 16 BRPM | WEIGHT: 152.6 LBS | BODY MASS INDEX: 25.39 KG/M2 | HEART RATE: 95 BPM

## 2025-04-23 DIAGNOSIS — E11.29 TYPE 2 DIABETES MELLITUS WITH DIABETIC MICROALBUMINURIA, WITHOUT LONG-TERM CURRENT USE OF INSULIN (H): ICD-10-CM

## 2025-04-23 DIAGNOSIS — I50.22 HEART FAILURE WITH MILDLY REDUCED EJECTION FRACTION (H): Primary | ICD-10-CM

## 2025-04-23 DIAGNOSIS — R80.9 TYPE 2 DIABETES MELLITUS WITH DIABETIC MICROALBUMINURIA, WITHOUT LONG-TERM CURRENT USE OF INSULIN (H): ICD-10-CM

## 2025-04-23 DIAGNOSIS — I10 ESSENTIAL HYPERTENSION: ICD-10-CM

## 2025-04-23 DIAGNOSIS — R00.0 SINUS TACHYCARDIA: ICD-10-CM

## 2025-04-23 DIAGNOSIS — I50.22 CHRONIC SYSTOLIC CONGESTIVE HEART FAILURE (H): ICD-10-CM

## 2025-04-23 DIAGNOSIS — I25.5 ISCHEMIC CARDIOMYOPATHY: ICD-10-CM

## 2025-04-23 DIAGNOSIS — R07.89 NON-CARDIAC CHEST PAIN: ICD-10-CM

## 2025-04-23 DIAGNOSIS — R53.81 PHYSICAL DECONDITIONING: ICD-10-CM

## 2025-04-23 DIAGNOSIS — I25.10 CORONARY ARTERY DISEASE DUE TO LIPID RICH PLAQUE: ICD-10-CM

## 2025-04-23 DIAGNOSIS — Z95.1 S/P CABG (CORONARY ARTERY BYPASS GRAFT): ICD-10-CM

## 2025-04-23 DIAGNOSIS — I25.83 CORONARY ARTERY DISEASE DUE TO LIPID RICH PLAQUE: ICD-10-CM

## 2025-04-23 DIAGNOSIS — R07.81 RIB PAIN: ICD-10-CM

## 2025-04-23 LAB
ANION GAP SERPL CALCULATED.3IONS-SCNC: 5 MMOL/L (ref 7–15)
BUN SERPL-MCNC: 13.8 MG/DL (ref 8–23)
CALCIUM SERPL-MCNC: 8.8 MG/DL (ref 8.8–10.4)
CHLORIDE SERPL-SCNC: 103 MMOL/L (ref 98–107)
CREAT SERPL-MCNC: 0.76 MG/DL (ref 0.51–0.95)
EGFRCR SERPLBLD CKD-EPI 2021: 88 ML/MIN/1.73M2
GLUCOSE SERPL-MCNC: 124 MG/DL (ref 70–99)
HCO3 SERPL-SCNC: 31 MMOL/L (ref 22–29)
POTASSIUM SERPL-SCNC: 3.9 MMOL/L (ref 3.4–5.3)
SODIUM SERPL-SCNC: 139 MMOL/L (ref 135–145)

## 2025-04-23 PROCEDURE — 80048 BASIC METABOLIC PNL TOTAL CA: CPT | Performed by: NURSE PRACTITIONER

## 2025-04-23 PROCEDURE — 36415 COLL VENOUS BLD VENIPUNCTURE: CPT | Performed by: NURSE PRACTITIONER

## 2025-04-23 RX ORDER — METOPROLOL SUCCINATE 50 MG/1
125 TABLET, EXTENDED RELEASE ORAL DAILY
Qty: 225 TABLET | Refills: 3 | Status: SHIPPED | OUTPATIENT
Start: 2025-04-23

## 2025-04-23 RX ORDER — ASPIRIN 81 MG/1
162 TABLET, CHEWABLE ORAL DAILY
Qty: 60 TABLET | Refills: 0 | Status: SHIPPED | OUTPATIENT
Start: 2025-04-23 | End: 2025-04-24

## 2025-04-23 NOTE — LETTER
4/23/2025    Saúl Hunt, APRN CNP  [address]  [facility] [address]    RE: Gill Mendez       Dear Colleague,     I had the pleasure of seeing Gill Mendez in the [facility].      [facility]  [address], [place] [address]  Office: [phone]     Assessment/Recommendations   Assessment: Ms. Mendez presents to [facility] today for heart failure focused follow-up visit.    # Chronic heart failure with mildly reduced ejection fraction (EF 40-45% per echo 2/17//2025) 2/2 ICM     Stage C. NYHA Class II.  HFrEF 2/2 ICM with EF 20-25% (11/2024), s/p CABG 11/2024. LVEF improved to 40-45% per echo 12/17/2024. Kidney function has remained stable. Her weight on the clinic scale today is 152 lbs. Upon exam, patient is well-compensated. She is with ongoing MCWILLIAMS that is stable but not at her prior to surgery baseline. No overt fluid retention on exam. With most recent ED visit was thought to be dehydrated and received IVF. Consider that MCWILLIAMS could be 2/2 deconditioning, did not complete cardiac rehab. Less concern for dyspnea as anginal equivalent given recent reassuring angiogram results.     -Fluid status: near euvolemic; Diuretic: None, no KCL supp  -ACEi/ARB/ARNi/afterload reduction: Losartan 25 mg (started 1/6/2025)  -BB: metoprolol succinate 50 mg bid   -Aldosterone antagonist: spironolactone 12.5 mg   -SGLT2i: Jardiance 25 mg daily  -SCD prophylaxis: does not meet criteria for implant  -NSAID use: contraindicated  -Sleep apnea eval: not discussed today    Heart failure education including medication compliance and lifestyle management reviewed today: low sodium diet <2g Na/day, fluid intake <2L/day, daily weight monitoring, and physical activity as tolerated.    # Hypertension  -BP today 146/79; home BPs range ~120-130/70-80s  -Continue GDMT as outlined above and uptitrate as tolerated    #Sinus  Tachycardia  -HR today 95, continues to report HRs 90s-100s while at rest when checking VS at home  -Will further increase metoprolol today  -Zio monitor was ordered 2/2025, has not been done, no arrhythmias have been previously noticed     # Coronary artery disease s/p CABG 11/24/2024  # Dyslipidemia  -S/p CABG (LIMA-LAD, SVG-OM, SVG-RCA) 11/24/2024  -Episodes of recurrent chest pain with labile intensity, repeat angiogram 2/17/2025 showed patent grafts  -Antiplatelet therapy with  mg for secondary ASCVD prevention  -LDL goal <70; high intensity statin therapy with atorvastatin 80 mg for secondary ASCVD prevention    # Diabetes Mellitus 2  -Most recent A1C 6.1 (11/23/2024)   -Jardiance 25 mg, metformin, tirzepitide  -Continue to manage with PCP    # Rib pain  -Tender to palpation to left rib unchanged, pain is less if she lays on left side, upon bending over/sitting upright she self splints L ribs d/t pain  -CXR x2 w/o acute findings  -CT abdomen/pelvis w/o acute findings  -Reproducible on exam and likely musculoskeletal   -Referral to PT placed today    # Spinal stenosis  -Conservative management was recommended includinrg TFESI and PT, following with neurosurgery     # Asthma  -Inhaler tx, managed by PCP      Plan:  The nurse will call you to review your lab results  Increase metoprolol succinate to 125 mg daily, continue to monitor heart rate and blood pressure at home  PT referral placed today  Will confirm long-term antiplatelet plan with CV Surgery    Follow-up in the heart failure clinic with [facility] in ~3-months, sooner if needed.  Follow-up with general cardiology, Dr. Bragg ~September 2025.    The longitudinal plan of care for the condition(s) below were addressed during this visit. Due to the added complexity in care, I will continue to support Ms. Mendez in the subsequent management of this condition(s) and with the ongoing continuity of care of this condition(s): HFmrEF.     History of Present  "Illness/Subjective    Ms. Mendez is a 64 year old female with a past medical history significant for HFrEF, HTN, CAD s/p CABG 11/2024, DM2, asthma. Today patient presents to [facility] for heart failure focused follow-up visit.     ED on 2/16/2025 for chest pain. Given her multiple presentations for chest pain she was recommended to undergo repeat angiography. Angiogram showed patent grafts. She was recommended medical management.  Imaging was also done for her neck pain and right arm numbness which showed high-grade for foraminal stenosis and spinal stenosis and C4-C7 neurosurgery follow-up.    Last office visit with general cardiology, Miguel Roberts 3/6/2025. At that time, patient reported L rib pain and progressive MCWILLIAMS. CXR w/o acute findings. Metoprolol was increased for ongoing sinus tachycardia.     ED visit 4/17/2025 for chest pain. Reassuring work-up in the ED. Thought to be mildly dehydrated w/Cr bump and received IVF.     Today patient endorses ongoing L rib pain for the past ~1.5 months. Pain is less if she lays on left side and worse upon bending over/sitting upright she self splints to help with pain.     She also has ongoing \"chest pain\" in the middle of her chest, intensity of this pain has been labile, sometimes radiates, has prompted several ED visits over the past several months. She continues to c/o MCWILLIAMS which is unchanged.     She is drinking adequate fluids and monitoring sodium in her diet.     She denies palpitations, lightheadedness/dizziness, presyncope, syncope, shortness of breath at rest, orthopnea, PND, fatigue/activity intolerance, abdominal fullness/bloating, LE edema, and bleeding.      Recent test results & labs below (personally reviewed):    Coronary angiogram 2/17/2025  CONCLUSIONS:   Severe multivessel coronary disease with subtotal occlusion of the distal left main involving the proximal left circumflex and  of the ostial left anterior descending artery " and distal right coronary artery.  Patent LIMA to the LAD and vein grafts to the left circumflex system and distal right coronary artery.  RECOMMENDATIONS:   Usual postprocedure cares, please see orders.  Recommend medical management of patient's coronary artery disease.  Aggressive risk factor modification for secondary prevention.    TTE 2/17/2025  Interpretation Summary  Left ventricular function is decreased. The ejection fraction is 40-45%  (mildly reduced).  Moderate anteroseptal hypokinesia with small segment of apical akinesia was  noted  Normal right ventricle size and systolic function.  No hemodynamically significant valvular abnormalities on 2D or color flow  imaging.    Echocardiogram 1/4/2025  Interpretation Summary  The left ventricle is normal in size. There is mild concentric left  ventricular hypertrophy.  Left ventricular function is decreased. The ejection fraction is 40-45%  (mildly reduced). The anterior and anteroseptal walls are hypokinetic.  The right ventricle is normal in size and function.  The left atrium is mildly dilated. The right atrium is mildly dilated.  IVC diameter <2.1 cm collapsing >50% with sniff suggests a normal RA pressure  of 3 mmHg.  Compared to prior study, small pericardial effusion has resolved. LVEF and  wall motion is similar.    Echocardiogram 12/17/2024  Interpretation Summary   1.Left ventricular function is decreased. The ejection fraction is 30-35%  (moderately reduced).  2.The left ventricle is borderline dilated.  3.There is mild to moderate anterior, septal, and apical wall hypokinesis.  4.Normal right ventricle size and systolic function.  5.No hemodynamically significant valvular abnormalities on 2D or color flow  imaging.  6.Moderate anterior pericardial effusion, There are no echocardiographic  indications of cardiac tamponade.  Compared to the prior study dated 12/4/2024, the effusion is more anterior  localized and Doppler does not suggest any tamonade.  Difficult to compare  images as technique different but does seem slightly smaller on current study.    Echocardiogram 12/4/2024  Interpretation Summary  1.Left ventricular function is decreased. The ejection fraction is 30-35%  (moderately reduced).  2.There is moderate anterior, septal, and apical wall hypokinesis.  3.Normal right ventricle size and systolic function.  4.No hemodynamically significant valvular abnormalities on 2D or color flow  imaging although this was a limited study without complete Doppler.  5.Small posterior pericardial effusion, inflows were not samlpled to determine  risk of tamplonade.  Compared to the prior study dated 12/1/2024, there have been no changes. The  small posterior effusion is better visualized on this study.    Echocardiogram 12/1/2024  Interpretation Summary  The left ventricle is normal in size.  Left ventricular function is decreased. The ejection fraction is 35-40%  (moderately reduced). Anteroseptal, septal and apical wall hypokinesis.  Normal right ventricle size and systolic function.  The left atrium is mildly dilated.  Compared to the prior study dated 11/23/24, there are changes as noted. LVEF  has improved.    Echocardiogram 11/23/2024  Interpretation Summary  Left ventricular function is decreased. The ejection fraction is 25-30%  (severely reduced).  There is severe anterior, septal, and apical wall hypokinesis.  Apical lateral akinesis.  Normal right ventricle size and systolic function.  Right ventricular systolic pressure is elevated, consistent with mild  pulmonary hypertension.  IVC diameter >2.1 cm collapsing <50% with sniff suggests a high RA pressure  estimated at 15 mmHg or greater.     Physical Examination Review of Systems   BP (!) 146/79 (BP Location: Left arm, Patient Position: Sitting, Cuff Size: Adult Regular)   Pulse 95   Resp 16   Wt 69.2 kg (152 lb 9.6 oz)   SpO2 99%   BMI 25.39 kg/m    Body mass index is 25.39 kg/m .  Wt Readings from  Last 3 Encounters:   04/23/25 69.2 kg (152 lb 9.6 oz)   04/17/25 67.6 kg (149 lb)   03/06/25 70.8 kg (156 lb)     General Appearance:   Appears comfortable, in no acute distress   HEENT: Eyes symmetrical, no discharge or icterus bilaterally. Mucous membranes moist and without lesions   Cardiovascular: RRR, +S1S2, no murmur, rub, or gallop. JVP not visible at 90 degrees.       Respiratory:   Respirations regular, even, and unlabored. Lungs CTA throughout   GI:  Soft and non distended   Musculoskeletal: No joint swelling or tenderness   Extremities   No cyanosis or clubbing. No LE peripheral edema.   Skin: Warm, no xanthelasma, no jaundice, no rashes or lesions    Neurologic: Alert and oriented X3, no focal deficits   Psychiatric: calm and cooperative                                             Negative unless noted in HPI     Medical History  Surgical History Family History Social History   Past Medical History:   Diagnosis Date     Anxiety      Asthma      Cellulitis      Diabetes mellitus (H)      Essential hypertension     Created by Conversion  Replacement Utility updated for latest IMO load     Femoral nerve palsy, left 08/23/2023     Gastroparesis      Goiter 05/28/2023 05/2023: Palpated on exam, has had stable imaging with endocrinology, continue to follow endocrinology.       Hyperlipidemia      Hypertension      Other and unspecified hyperlipidemia     Created by Conversion      PONV (postoperative nausea and vomiting)      Shortness of breath      Type II or unspecified type diabetes mellitus without mention of complication, not stated as uncontrolled     Created by Conversion     Past Surgical History:   Procedure Laterality Date     AMPUTATE TOE(S) Right 7/31/2020    Procedure: AMPUTATION, third digit right foot;  Surgeon: Chris Vega DPM;  Location: [facility];  Service: Podiatry     CORONARY ARTERY BYPASS GRAFT, WITH ENDOSCOPIC VESSEL PROCUREMENT N/A 11/24/2024    Procedure: CORONARY  ARTERY BYPASS GRAFT TIMES THREE, LEFT INTERNAL MAMMARY ARTERY HARVEST, LEFT LEG ENDOSCOPIC VESSEL PROCUREMENT,;  Surgeon: Zhen Carter MD;  Location: [facility]     CV ANGIOGRAM CORONARY GRAFT N/A 2/17/2025    Procedure: Coronary Angiogram Graft;  Surgeon: Mian Campbell MD;  Location: [facility] CATH LAB CV     CV CORONARY ANGIOGRAM N/A 11/23/2024    Procedure: Coronary Angiogram;  Surgeon: Roque Eisenberg MD;  Location: [facility] CATH LAB CV     CV INTRA AORTIC BALLOON N/A 11/24/2024    Procedure: Intra aortic Balloon Pump Insertion;  Surgeon: Roque Eisenberg MD;  Location: [facility] CATH LAB CV     CV LEFT HEART CATH N/A 11/23/2024    Procedure: Left Heart Catheterization;  Surgeon: Roque Eisenberg MD;  Location: [facility] LAB CV     CV LEFT HEART CATH N/A 2/17/2025    Procedure: Left Heart Catheterization;  Surgeon: Mian Campbell MD;  Location: [facility] LAB CV     ENDOMETRIAL BIOPSY       FOOT ARTHROPLASTY Right 09/24/2020    Fourth and fifth toe by Dr. Vega     HC REPAIR OF HAMMERTOE,ONE Left 12/7/2020    Procedure: ARTHROPLASTY, digits two, three, four and five left foot;  Surgeon: Chris Vega DPM;  Location: [facility];  Service: Podiatry     HERNIA REPAIR       HYSTERECTOMY       IR LUMBAR PUNCTURE  7/20/2023     REPAIR TENDON ACHILLES Bilateral     Left was plate  , right was torn     TONSILLECTOMY       TRANSESOPHAGEAL ECHOCARDIOGRAM INTRAOPERATIVE  11/24/2024    Procedure: ECHOCARDIOGRAM, TRANSESOPHAGEAL, INTRA-OPERATIVE;  Surgeon: Zhen Carter MD;  Location: [facility] REMOVAL OF OVARY(S)      Description: Oophorectomy - Bilateral (Removal Of Both Ovaries);  Recorded: 10/09/2008;    Family History   Problem Relation Age of Onset     Diabetes Mother      Hypertension Mother      Acute Myocardial Infarction No family hx of     Social History     Socioeconomic History     Marital status:      Spouse name: Not on file      Number of children: Not on file     Years of education: Not on file     Highest education level: Not on file   Occupational History     Not on file   Tobacco Use     Smoking status: Never     Smokeless tobacco: Never   Substance and Sexual Activity     Alcohol use: No     Drug use: No     Sexual activity: Not on file   Other Topics Concern     Not on file   Social History Narrative     Not on file     Social Drivers of Health     Financial Resource Strain: Low Risk  (2/17/2025)    Financial Resource Strain      Within the past 12 months, have you or your family members you live with been unable to get utilities (heat, electricity) when it was really needed?: No   Food Insecurity: Low Risk  (2/17/2025)    Food Insecurity      Within the past 12 months, did you worry that your food would run out before you got money to buy more?: No      Within the past 12 months, did the food you bought just not last and you didn t have money to get more?: No   Transportation Needs: Low Risk  (2/17/2025)    Transportation Needs      Within the past 12 months, has lack of transportation kept you from medical appointments, getting your medicines, non-medical meetings or appointments, work, or from getting things that you need?: No   Physical Activity: Not on file   Stress: Not on file (11/6/2024)   Social Connections: Unknown (12/28/2021)    Received from [facility], [facility]    Social Connections      Frequency of Communication with Friends and Family: Not on file   Interpersonal Safety: Low Risk  (1/4/2025)    Interpersonal Safety      Do you feel physically and emotionally safe where you currently live?: Yes      Within the past 12 months, have you been hit, slapped, kicked or otherwise physically hurt by someone?: No      Within the past 12 months, have you been humiliated or emotionally abused in other ways by your partner or ex-partner?: No   Recent Concern:  Interpersonal Safety - High Risk (11/23/2024)    Interpersonal Safety      Do you feel physically and emotionally safe where you currently live?: No      Within the past 12 months, have you been hit, slapped, kicked or otherwise physically hurt by someone?: No      Within the past 12 months, have you been humiliated or emotionally abused in other ways by your partner or ex-partner?: No   Housing Stability: Low Risk  (2/17/2025)    Housing Stability      Do you have housing? : Yes      Are you worried about losing your housing?: No        Medications  Allergies   Current Outpatient Medications   Medication Sig Dispense Refill     acetaminophen (TYLENOL) 500 MG tablet Take 1-2 tablets (500-1,000 mg) by mouth every 6 hours as needed for mild pain (do NOT exceed 3,000mg in a 24hour period).       albuterol (PROVENTIL HFA;VENTOLIN HFA) 90 mcg/actuation inhaler Inhale 2 puffs into the lungs every 6 hours as needed       ARNUITY ELLIPTA 100 MCG/ACT inhaler Inhale 1 puff into the lungs 2 times daily.       aspirin (ASA) 81 MG chewable tablet Take 2 tablets (162 mg) by mouth or NG Tube daily. 60 tablet 0     atorvastatin (LIPITOR) 80 MG tablet Take 80 mg by mouth daily.       citalopram (CELEXA) 20 MG tablet [CITALOPRAM (CELEXA) 20 MG TABLET] Take 20 mg by mouth every morning.   0     empagliflozin (JARDIANCE) 25 MG TABS tablet Take 1 tablet (25 mg) by mouth daily. 90 tablet 1     famotidine (PEPCID) 20 MG tablet Take 1 tablet (20 mg) by mouth as needed (take as needed for acid reflux). 30 tablet 0     lidocaine (LMX4) 4 % external cream Apply topically daily as needed for mild pain (painful area on left chest wall). 15 g 0     losartan (COZAAR) 25 MG tablet Take 0.5 tablets (12.5 mg) by mouth daily. 45 tablet 3     metFORMIN (GLUCOPHAGE XR) 500 MG 24 hr tablet Take 3 tablets (1,500 mg) by mouth daily (with breakfast). Changed on 6/21/24, new prescription not yet sent 270 tablet 1     metoprolol succinate ER (TOPROL XL) 50  MG 24 hr tablet Take 2.5 tablets (125 mg) by mouth daily. 225 tablet 3     polyethylene glycol (MIRALAX) 17 g packet Take 1 packet by mouth daily as needed for constipation.       pramipexole (MIRAPEX) 0.125 MG tablet Take 0.125 mg by mouth daily. At 3pm  3     pramipexole (MIRAPEX) 0.5 MG tablet Take 0.5 mg by mouth at bedtime.       spironolactone (ALDACTONE) 25 MG tablet Take 0.5 tablets (12.5 mg) by mouth daily. 45 tablet 2     tirzepatide (MOUNJARO) 15 MG/0.5ML pen Inject 15 mg subcutaneously every 7 days. 6 mL 1     gabapentin (NEURONTIN) 100 MG capsule Take 1 capsule (100 mg) by mouth 3 times daily. 90 capsule 0    Allergies   Allergen Reactions     Codeine Unknown     Patient states possibly caused N/V but can't remember for sure     Semaglutide Nausea and Vomiting     Acetaminophen-Codeine Rash         Lab Results    Chemistry/lipid CBC Cardiac Enzymes/BNP/TSH/INR   Lab Results   Component Value Date    CHOL 148 2024    HDL 65 2024    TRIG 65 2024    BUN 13.4 2025     2025    CO2 27 2025    Lab Results   Component Value Date    WBC 9.3 2025    HGB 11.6 (L) 2025    HCT 36.7 2025    MCV 73 (L) 2025     2025    Lab Results   Component Value Date    TROPONINI <0.01 2023    TSH 0.61 2024    INR 1.05 2024            Maira Parra, DNP, APRN, CNP  [facility] - [facility] Failure Clinic [facility] and [phone]  Fax: [phone]  Heart Failure Nurses: [phone]      Thank you for allowing me to participate in the care of your patient.      Sincerely,     JAYY [facility]  cc:   Maira Parra CNP  [facility] [place] [address]

## 2025-04-23 NOTE — PATIENT INSTRUCTIONS
It was a pleasure to see you today at the [facility].     Recommendations:  The nurse will call you to review your lab results  Increase metoprolol succinate to 125 mg daily, continue to monitor heart rate and blood pressure at home  PT referral placed today    -Continue to follow a low sodium diet (<2g/day) and maintain adequate fluid intake (~50-60 oz/day).   -Continue to monitor your weight daily and please call if you gain 2 lbs or more in 1 day OR 5 lbs or more in 1-week.    Follow-up in the heart failure clinic with [facility] in ~3-months, sooner if needed.  Follow-up with general cardiology, Dr. Bragg ~2025.    Please call with questions/concerns and/or if you experience new or worsening heart failure symptoms (shortness of breath, weight gain, fluid retention/lower extremity swelling, and/or reduced activity tolerance).    Heart Failure Nurse Line: [phone] (M-F 8a-430)  24-hour HF Nurse Line: [phone] (weekends, evenings, holidays)    Maira Parra CNP  [facility] [place] [facility]  Clinic and [phone]  Fax: [phone]  Heart Failure Nurses: [phone]

## 2025-04-24 RX ORDER — ASPIRIN 81 MG/1
162 TABLET, CHEWABLE ORAL DAILY
Qty: 180 TABLET | Refills: 3 | Status: SHIPPED | OUTPATIENT
Start: 2025-04-24

## 2025-05-12 ENCOUNTER — TELEPHONE (OUTPATIENT)
Dept: CARDIOLOGY | Facility: CLINIC | Age: 65
End: 2025-05-12
Payer: COMMERCIAL

## 2025-05-12 DIAGNOSIS — Z95.1 S/P CABG (CORONARY ARTERY BYPASS GRAFT): ICD-10-CM

## 2025-05-12 DIAGNOSIS — I25.5 ISCHEMIC CARDIOMYOPATHY: ICD-10-CM

## 2025-05-12 DIAGNOSIS — I50.22 CHRONIC SYSTOLIC CONGESTIVE HEART FAILURE (H): ICD-10-CM

## 2025-05-12 NOTE — TELEPHONE ENCOUNTER
----- Message from Maira Parra sent at 5/11/2025 10:30 PM CDT -----  Please call to check in with patient after increasing metoprolol. Review home VS. Can you ask her about zio patch ordered back in Feb, was this completed?

## 2025-05-12 NOTE — TELEPHONE ENCOUNTER
LM for patient to return call to discuss b/p , HR following recent medication changes. Lisa PARIS RN BSN, CHFN, PCCN-K

## 2025-05-13 VITALS — SYSTOLIC BLOOD PRESSURE: 118 MMHG | DIASTOLIC BLOOD PRESSURE: 70 MMHG | HEART RATE: 94 BPM

## 2025-05-13 RX ORDER — METOPROLOL SUCCINATE 50 MG/1
150 TABLET, EXTENDED RELEASE ORAL DAILY
Qty: 270 TABLET | Refills: 3 | Status: SHIPPED | OUTPATIENT
Start: 2025-05-13

## 2025-05-13 NOTE — TELEPHONE ENCOUNTER
Patient called us back and we relayed Maira's updates.  She confirmed HR has averaged in the 90s, and denied dizziness.  She was agreeable to increasing her metoprolol.  We confirmed patient's address, and will be placing her zio patch order also.    MAR update:   Metoprolol Succinate ER 50 MG.  Take 3 tablets (150 mg) daily    Thank you    Al Vanessa RN

## 2025-05-13 NOTE — TELEPHONE ENCOUNTER
"  We called patient and she reported:    Wt: 146 lbs 5/13, 145.8 lbs 5/12, 146.4 lbs 5/11, 146.4 lbs 5/10    Reports some SOB with activity that resolves with rest \"unsure if its asthma taking albuterol\", denies swelling, reporting her energy levels are baseline, denies SOB when sleeping but props her self up.  Reported some intermittent chest pain that radiates down her left ribs.  \"It comes and goes on its own\" resolves on its own.  It happens when she lays on left side or reaches with left arm.     BP: 5/13 94 /70, reports her BP has been in this area.  118/70-67 has been her average, HR under 100.  Patient reported she is not writing down these values but has been taking her reading daily.  She said she will start writing it down and logging it in her  weight log book.    Patient reported to be taking  mg daily as previously discussed.    Patient reported to have been taking \"3 tablets of Metoprolol\".  We had her  find her prescription bottle and read it to us, she is to be taking 2.5 tablets once a day.  MAR reflects Metoprolol Succinate ER 50 MG.  Take 2.5 tablets (125 mg) daily.  Denies any dizziness/lightheadedness.  Patient stated she will start taking 2.5 tablets of metoprolol moving forward.    Patient reported she received the Zio patch but returned it unused because of admitting to the hospital (2/16)    Thank you    Al Vanessa RN      "

## 2025-05-14 ENCOUNTER — ORDERS ONLY (AUTO-RELEASED) (OUTPATIENT)
Dept: CARDIOLOGY | Facility: CLINIC | Age: 65
End: 2025-05-14
Payer: COMMERCIAL

## 2025-05-14 DIAGNOSIS — I50.22 CHRONIC SYSTOLIC CONGESTIVE HEART FAILURE (H): ICD-10-CM

## 2025-05-21 ENCOUNTER — TELEPHONE (OUTPATIENT)
Dept: CARDIOLOGY | Facility: CLINIC | Age: 65
End: 2025-05-21
Payer: COMMERCIAL

## 2025-05-21 DIAGNOSIS — I50.22 CHRONIC SYSTOLIC CONGESTIVE HEART FAILURE (H): Primary | ICD-10-CM

## 2025-05-21 RX ORDER — FUROSEMIDE 20 MG/1
20 TABLET ORAL DAILY
Qty: 90 TABLET | Refills: 2 | Status: SHIPPED | OUTPATIENT
Start: 2025-05-21

## 2025-05-21 NOTE — TELEPHONE ENCOUNTER
Patient is LM today to discuss current HF status in view of her recent reported wt gain of 6#  needing to clarify if she is using Bumetanide or if this was discontinued. She had been using Bumex 1mg daily in addition to Spironolactone 12.5mg daily.    Awaiting response.  Lisa PARIS RN BSN, CHFN, PCCN-K

## 2025-05-21 NOTE — TELEPHONE ENCOUNTER
Maira Parra, CNP to Me  (Selected Message)  YONATHAN      5/21/25 10:15 AM  Start lasix 20 mg daily. Recheck BMP and follow-up with heart failure nurse next week. Call sooner if weight continues to trend up or other progressive sx.     Mayelin is contacted to discuss her recommendations for starting Furosemide 20mg daily, she was in agreement with this plan and will let us know if she is not improving with using this daily.   Lisa PARIS RN BSN, CHFN, PCCN-K

## 2025-05-21 NOTE — TELEPHONE ENCOUNTER
Mayelin is returning my call and reports that she has not been taking any of the loop diuretics, not on Furosemide or Bumex currently.   Spironolactone 12.5mg as a half tab is on her list presently.     Mayelin reports LE edema, about 5-6# wt gain 159#, has swelling up to her knees noted.    She is watching the sodium and fluids closely tracking this daily.  Her Creat is WNL.    Maira Parra, CNP: what are your current recommendations   Lisa PARIS RN BSN, CHFN, PCCN-K

## 2025-05-27 ENCOUNTER — LAB (OUTPATIENT)
Dept: LAB | Facility: CLINIC | Age: 65
End: 2025-05-27
Payer: COMMERCIAL

## 2025-05-27 DIAGNOSIS — R80.9 TYPE 2 DIABETES MELLITUS WITH DIABETIC MICROALBUMINURIA, WITHOUT LONG-TERM CURRENT USE OF INSULIN (H): ICD-10-CM

## 2025-05-27 DIAGNOSIS — E11.29 TYPE 2 DIABETES MELLITUS WITH DIABETIC MICROALBUMINURIA, WITHOUT LONG-TERM CURRENT USE OF INSULIN (H): ICD-10-CM

## 2025-05-27 LAB
ANION GAP SERPL CALCULATED.3IONS-SCNC: 9 MMOL/L (ref 7–15)
BUN SERPL-MCNC: 19 MG/DL (ref 8–23)
CALCIUM SERPL-MCNC: 9.4 MG/DL (ref 8.8–10.4)
CHLORIDE SERPL-SCNC: 103 MMOL/L (ref 98–107)
CREAT SERPL-MCNC: 0.94 MG/DL (ref 0.51–0.95)
EGFRCR SERPLBLD CKD-EPI 2021: 68 ML/MIN/1.73M2
EST. AVERAGE GLUCOSE BLD GHB EST-MCNC: 146 MG/DL
GLUCOSE SERPL-MCNC: 100 MG/DL (ref 70–99)
HBA1C MFR BLD: 6.7 % (ref 0–5.6)
HCO3 SERPL-SCNC: 29 MMOL/L (ref 22–29)
POTASSIUM SERPL-SCNC: 4.2 MMOL/L (ref 3.4–5.3)
SODIUM SERPL-SCNC: 141 MMOL/L (ref 135–145)

## 2025-05-27 PROCEDURE — 80048 BASIC METABOLIC PNL TOTAL CA: CPT

## 2025-05-27 PROCEDURE — 83036 HEMOGLOBIN GLYCOSYLATED A1C: CPT

## 2025-05-27 PROCEDURE — 36415 COLL VENOUS BLD VENIPUNCTURE: CPT

## 2025-05-29 LAB — CV ZIO PRELIM RESULTS: NORMAL

## 2025-05-30 ENCOUNTER — APPOINTMENT (OUTPATIENT)
Dept: CT IMAGING | Facility: HOSPITAL | Age: 65
End: 2025-05-30
Attending: STUDENT IN AN ORGANIZED HEALTH CARE EDUCATION/TRAINING PROGRAM
Payer: COMMERCIAL

## 2025-05-30 ENCOUNTER — HOSPITAL ENCOUNTER (EMERGENCY)
Facility: HOSPITAL | Age: 65
Discharge: HOME | End: 2025-05-30
Attending: STUDENT IN AN ORGANIZED HEALTH CARE EDUCATION/TRAINING PROGRAM | Admitting: STUDENT IN AN ORGANIZED HEALTH CARE EDUCATION/TRAINING PROGRAM
Payer: COMMERCIAL

## 2025-05-30 ENCOUNTER — RESULTS FOLLOW-UP (OUTPATIENT)
Dept: ENDOCRINOLOGY | Facility: CLINIC | Age: 65
End: 2025-05-30

## 2025-05-30 ENCOUNTER — APPOINTMENT (OUTPATIENT)
Dept: MRI IMAGING | Facility: HOSPITAL | Age: 65
End: 2025-05-30
Attending: EMERGENCY MEDICINE
Payer: COMMERCIAL

## 2025-05-30 ENCOUNTER — RESULTS FOLLOW-UP (OUTPATIENT)
Dept: CARDIOLOGY | Facility: CLINIC | Age: 65
End: 2025-05-30

## 2025-05-30 VITALS
DIASTOLIC BLOOD PRESSURE: 62 MMHG | TEMPERATURE: 97 F | SYSTOLIC BLOOD PRESSURE: 112 MMHG | BODY MASS INDEX: 25.41 KG/M2 | OXYGEN SATURATION: 95 % | WEIGHT: 152.5 LBS | HEART RATE: 86 BPM | RESPIRATION RATE: 25 BRPM | HEIGHT: 65 IN

## 2025-05-30 DIAGNOSIS — R20.2 PARESTHESIA OF RIGHT ARM: ICD-10-CM

## 2025-05-30 DIAGNOSIS — E04.1 THYROID NODULE: ICD-10-CM

## 2025-05-30 DIAGNOSIS — R07.9 ACUTE CHEST PAIN: ICD-10-CM

## 2025-05-30 LAB
ALBUMIN SERPL BCG-MCNC: 4.2 G/DL (ref 3.5–5.2)
ALP SERPL-CCNC: 103 U/L (ref 40–150)
ALT SERPL W P-5'-P-CCNC: 13 U/L (ref 0–50)
ANION GAP SERPL CALCULATED.3IONS-SCNC: 10 MMOL/L (ref 7–15)
AST SERPL W P-5'-P-CCNC: 30 U/L (ref 0–45)
BASOPHILS # BLD AUTO: 0.1 10E3/UL (ref 0–0.2)
BASOPHILS NFR BLD AUTO: 1 %
BILIRUB SERPL-MCNC: 0.4 MG/DL
BUN SERPL-MCNC: 14.8 MG/DL (ref 8–23)
CALCIUM SERPL-MCNC: 9.8 MG/DL (ref 8.8–10.4)
CHLORIDE SERPL-SCNC: 99 MMOL/L (ref 98–107)
CREAT SERPL-MCNC: 1.04 MG/DL (ref 0.51–0.95)
EGFRCR SERPLBLD CKD-EPI 2021: 60 ML/MIN/1.73M2
EOSINOPHIL # BLD AUTO: 0.4 10E3/UL (ref 0–0.7)
EOSINOPHIL NFR BLD AUTO: 3 %
ERYTHROCYTE [DISTWIDTH] IN BLOOD BY AUTOMATED COUNT: 18 % (ref 10–15)
ERYTHROCYTE [SEDIMENTATION RATE] IN BLOOD BY WESTERGREN METHOD: 19 MM/HR (ref 0–30)
GLUCOSE SERPL-MCNC: 90 MG/DL (ref 70–99)
HCO3 SERPL-SCNC: 30 MMOL/L (ref 22–29)
HCT VFR BLD AUTO: 39.3 % (ref 35–47)
HGB BLD-MCNC: 12.2 G/DL (ref 11.7–15.7)
IMM GRANULOCYTES # BLD: 0 10E3/UL
IMM GRANULOCYTES NFR BLD: 0 %
LYMPHOCYTES # BLD AUTO: 3.6 10E3/UL (ref 0.8–5.3)
LYMPHOCYTES NFR BLD AUTO: 29 %
MCH RBC QN AUTO: 23.5 PG (ref 26.5–33)
MCHC RBC AUTO-ENTMCNC: 31 G/DL (ref 31.5–36.5)
MCV RBC AUTO: 76 FL (ref 78–100)
MONOCYTES # BLD AUTO: 0.9 10E3/UL (ref 0–1.3)
MONOCYTES NFR BLD AUTO: 8 %
NEUTROPHILS # BLD AUTO: 7.3 10E3/UL (ref 1.6–8.3)
NEUTROPHILS NFR BLD AUTO: 60 %
NRBC # BLD AUTO: 0 10E3/UL
NRBC BLD AUTO-RTO: 0 /100
NT-PROBNP SERPL-MCNC: 900 PG/ML (ref 0–226)
PLATELET # BLD AUTO: 366 10E3/UL (ref 150–450)
POTASSIUM SERPL-SCNC: 5 MMOL/L (ref 3.4–5.3)
PROT SERPL-MCNC: 7.9 G/DL (ref 6.4–8.3)
RBC # BLD AUTO: 5.19 10E6/UL (ref 3.8–5.2)
SODIUM SERPL-SCNC: 139 MMOL/L (ref 135–145)
TROPONIN T SERPL HS-MCNC: 18 NG/L
TROPONIN T SERPL HS-MCNC: 20 NG/L
WBC # BLD AUTO: 12.3 10E3/UL (ref 4–11)

## 2025-05-30 PROCEDURE — 36415 COLL VENOUS BLD VENIPUNCTURE: CPT | Performed by: STUDENT IN AN ORGANIZED HEALTH CARE EDUCATION/TRAINING PROGRAM

## 2025-05-30 PROCEDURE — 85652 RBC SED RATE AUTOMATED: CPT | Performed by: STUDENT IN AN ORGANIZED HEALTH CARE EDUCATION/TRAINING PROGRAM

## 2025-05-30 PROCEDURE — 250N000011 HC RX IP 250 OP 636: Performed by: EMERGENCY MEDICINE

## 2025-05-30 PROCEDURE — 70496 CT ANGIOGRAPHY HEAD: CPT

## 2025-05-30 PROCEDURE — 99285 EMERGENCY DEPT VISIT HI MDM: CPT | Mod: 25

## 2025-05-30 PROCEDURE — 83880 ASSAY OF NATRIURETIC PEPTIDE: CPT | Performed by: STUDENT IN AN ORGANIZED HEALTH CARE EDUCATION/TRAINING PROGRAM

## 2025-05-30 PROCEDURE — 96374 THER/PROPH/DIAG INJ IV PUSH: CPT

## 2025-05-30 PROCEDURE — A9585 GADOBUTROL INJECTION: HCPCS | Performed by: EMERGENCY MEDICINE

## 2025-05-30 PROCEDURE — 85004 AUTOMATED DIFF WBC COUNT: CPT | Performed by: STUDENT IN AN ORGANIZED HEALTH CARE EDUCATION/TRAINING PROGRAM

## 2025-05-30 PROCEDURE — 250N000011 HC RX IP 250 OP 636: Performed by: STUDENT IN AN ORGANIZED HEALTH CARE EDUCATION/TRAINING PROGRAM

## 2025-05-30 PROCEDURE — 255N000002 HC RX 255 OP 636: Performed by: EMERGENCY MEDICINE

## 2025-05-30 PROCEDURE — 96376 TX/PRO/DX INJ SAME DRUG ADON: CPT

## 2025-05-30 PROCEDURE — 250N000013 HC RX MED GY IP 250 OP 250 PS 637: Performed by: STUDENT IN AN ORGANIZED HEALTH CARE EDUCATION/TRAINING PROGRAM

## 2025-05-30 PROCEDURE — 93005 ELECTROCARDIOGRAM TRACING: CPT | Performed by: STUDENT IN AN ORGANIZED HEALTH CARE EDUCATION/TRAINING PROGRAM

## 2025-05-30 PROCEDURE — 70553 MRI BRAIN STEM W/O & W/DYE: CPT

## 2025-05-30 PROCEDURE — 80053 COMPREHEN METABOLIC PANEL: CPT | Performed by: STUDENT IN AN ORGANIZED HEALTH CARE EDUCATION/TRAINING PROGRAM

## 2025-05-30 PROCEDURE — 84484 ASSAY OF TROPONIN QUANT: CPT | Performed by: STUDENT IN AN ORGANIZED HEALTH CARE EDUCATION/TRAINING PROGRAM

## 2025-05-30 PROCEDURE — 71275 CT ANGIOGRAPHY CHEST: CPT

## 2025-05-30 RX ORDER — IOPAMIDOL 755 MG/ML
100 INJECTION, SOLUTION INTRAVASCULAR ONCE
Status: COMPLETED | OUTPATIENT
Start: 2025-05-30 | End: 2025-05-30

## 2025-05-30 RX ORDER — LORAZEPAM 2 MG/ML
0.25 INJECTION INTRAMUSCULAR ONCE
Status: COMPLETED | OUTPATIENT
Start: 2025-05-30 | End: 2025-05-30

## 2025-05-30 RX ORDER — LORAZEPAM 2 MG/ML
1 INJECTION INTRAMUSCULAR ONCE
Status: COMPLETED | OUTPATIENT
Start: 2025-05-30 | End: 2025-05-30

## 2025-05-30 RX ORDER — ACETAMINOPHEN 325 MG/1
650 TABLET ORAL ONCE
Status: COMPLETED | OUTPATIENT
Start: 2025-05-30 | End: 2025-05-30

## 2025-05-30 RX ORDER — GADOBUTROL 604.72 MG/ML
7.5 INJECTION INTRAVENOUS ONCE
Status: COMPLETED | OUTPATIENT
Start: 2025-05-30 | End: 2025-05-30

## 2025-05-30 RX ADMIN — LORAZEPAM 1 MG: 2 INJECTION INTRAMUSCULAR; INTRAVENOUS at 17:25

## 2025-05-30 RX ADMIN — IOPAMIDOL 90 ML: 755 INJECTION, SOLUTION INTRAVENOUS at 19:32

## 2025-05-30 RX ADMIN — GADOBUTROL 7.5 ML: 604.72 INJECTION INTRAVENOUS at 21:44

## 2025-05-30 RX ADMIN — IOPAMIDOL 90 ML: 755 INJECTION, SOLUTION INTRAVENOUS at 18:38

## 2025-05-30 RX ADMIN — ACETAMINOPHEN 650 MG: 325 TABLET ORAL at 17:15

## 2025-05-30 RX ADMIN — LORAZEPAM 0.25 MG: 2 INJECTION INTRAMUSCULAR; INTRAVENOUS at 21:17

## 2025-05-30 ASSESSMENT — ACTIVITIES OF DAILY LIVING (ADL)
ADLS_ACUITY_SCORE: 56

## 2025-05-30 ASSESSMENT — COLUMBIA-SUICIDE SEVERITY RATING SCALE - C-SSRS
6. HAVE YOU EVER DONE ANYTHING, STARTED TO DO ANYTHING, OR PREPARED TO DO ANYTHING TO END YOUR LIFE?: NO
1. IN THE PAST MONTH, HAVE YOU WISHED YOU WERE DEAD OR WISHED YOU COULD GO TO SLEEP AND NOT WAKE UP?: NO
2. HAVE YOU ACTUALLY HAD ANY THOUGHTS OF KILLING YOURSELF IN THE PAST MONTH?: NO

## 2025-05-30 NOTE — ED TRIAGE NOTES
Pt presents from PCP across the street after experiencing right anterior chest pain, left arm numbness, and restless legs. These were all precursors to her heart attack 6 months ago, Pt was seeing Endocrinology and was advised to come to ED.     Triage Assessment (Adult)       Row Name 05/30/25 1619          Triage Assessment    Airway WDL WDL        Respiratory WDL    Respiratory WDL X;rhythm/pattern     Rhythm/Pattern, Respiratory shortness of breath  reports shortness of breath        Skin Circulation/Temperature WDL    Skin Circulation/Temperature WDL WDL        Cardiac WDL    Cardiac WDL X;chest pain        Chest Pain Assessment    Chest Pain Location anterior chest, right     Chest Pain Radiation arm        Peripheral/Neurovascular WDL    Peripheral Neurovascular WDL WDL        Cognitive/Neuro/Behavioral WDL    Cognitive/Neuro/Behavioral WDL WDL

## 2025-05-30 NOTE — ED PROVIDER NOTES
NAME: Gill Mendez  AGE: 65 year old female  YOB: 1960  MRN: [id number]  EVALUATION DATE & TIME: No admission date for patient encounter.    PCP: Saúl Hunt  ED PROVIDER: Elizabeth Berumen MD.    Chief Complaint   Patient presents with    Chest Pain    Shortness of Breath    Numbness     Right arm    Restless legs       FINAL IMPRESSION:  1. Acute chest pain    2. Thyroid nodule    3. Paresthesia of right arm        MEDICAL DECISION MAKIN:38 PM I met with the patient, obtained history, performed an initial exam, and discussed options and plan for diagnostics and treatment here in the ED.     64 y/o F with h/o asthma, T2DM, HTN, HLP, CAD s/p CABG, CHF among others who presents with chest pain. Patient felt okay when she woke up this morning but sometime around noon she developed mid chest pain, also noticed a headache, bilateral blurred vision and some numbness to her right arm.     Chest pain: dx ACS, PE, aortic dissection, CHF, intraabdominal pathology, pericarditis (doubt given nature of pain and EKG without findings of this), boerhaave's (doubt no vomiting, well appearing). EKG without acute ST changes. Initial troponin reassuring at 20, will trend. CTA chest/abd/pelvis ordered given chest pain + neuro reports to evaluate for dissection or other acute findings. However chest pain is reproducible on exam and so do suspect possible musculoskeletal component. Tylenol ordered.    Headache/blurred vision/arm paraesthesias: on exam no focal deficits (can feel me touch her arm, reports tingling). Given no deficits at this time did not call a code stroke. Will start with CTA head/neck and then get MRI. Dx: stroke, ICH, mass, dissection, among others. No sudden severe onset to suggest SAH. No fevers/nuchal rigidity to suggest meningitis. Normal ESR, lower suspicion for temporal arteritis. Bilateral nature/exam not suggestive of acute angle glaucoma.    Patient was signed out to Dr. Varela at  the end of my shift pending imaging results, BNP, delta troponin and final disposition.    Medical Decision Making      Admission considered. Patient was signed out to the oncoming physician, disposition pending.    MIPS (CTPE, Dental pain, Mcdaniels, Sinusitis, Asthma/COPD, Head Trauma): Not Applicable    SEPSIS: None    MEDICATIONS GIVEN IN THE EMERGENCY:  Medications   LORazepam (ATIVAN) injection 0.25 mg (has no administration in time range)   acetaminophen (TYLENOL) tablet 650 mg (650 mg Oral $Given 5/30/25 1715)   LORazepam (ATIVAN) injection 1 mg (1 mg Intravenous $Given 5/30/25 1725)   iopamidol (ISOVUE-370) solution 100 mL (90 mLs Intravenous $Given 5/30/25 1838)   iopamidol (ISOVUE-370) solution 100 mL (90 mLs Intravenous $Given 5/30/25 1932)       NEW PRESCRIPTIONS STARTED AT TODAY'S ER VISIT:  New Prescriptions    No medications on file        =================================================================  HPI    Patient information was obtained from: Patient  Use of : N/A       Gill Mendez is a 65 year old female with a past medical history of hypertension, asthma, dyspnea on exertion, chest pain, type 2 diabetes, acute pulmonary edema, hyperlipidemia, coronary artery disease S/P CABG, congestive heart failure, who presents to this emergency department by walk-in for evaluation of chest pain, shortness of breath, and right arm numbness.     Patient reports restless legs, right arm numbness, shortness of breath, and constant 7/10 chest pain all onset this morning. She was attending a follow-up appointment regarding her diabetes this afternoon at which time she mentioned these symptoms, immediately being advised to present to the emergency department for evaluation. She further reports an developing a headache, somewhat blurred vision bilaterally, and fatigue ~[id number] this afternoon. Patient is concerned as her chest pain and leg restlessness are similar to when she had her first heart  "attack. Her chest pain and shortness of breath are worsened when walking or laying flat. She also notes increased leg swelling recently. Prior to arrival, she took some aspirin.      Patient denies any sensation of tingling or any other symptoms at this time. She has not taken any nitroglycerin or Tylenol prior to arrival. Denies stroke history.     Per chart review: Patient was seen 4/23/2025 at [facility] for a heart failure focused follow-up visit. Noted emergency department visit of 2/16/2025 for chest pain at which time she was recommended to undergo repeat angiography which showed patent grafts; recommended medical management; imaging for neck pain and right arm numbness showed high-grade foraminal stenosis and spinal stenosis and C4-C7 neurosurgery follow-up. Also reviewed emergency department visit from 4/17 for evaluation of chest pain with reassuring workup. Follow-up visit: Plan to increase metoprolol succinate to 125 mg daily. PT referral placed. Plan to follow-up in heart failure clinic in 3 months and with general cardiology (Dr. Bragg) in September 2025.     PHYSICAL EXAM:    Vitals: BP (!) 155/78   Pulse 101   Temp 97  F (36.1  C) (Temporal)   Resp 14   Ht 1.651 m (5' 5\")   Wt 69.2 kg (152 lb 8 oz)   SpO2 98%   BMI 25.38 kg/m     Constitutional: Well developed, well nourished. No acute distress.  HENT: Normocephalic, atraumatic. Neck-gross ROM intact.   Eyes: Pupils mid-range and equal, sclera white  Respiratory: CTAB, no respiratory distress  Cardiovascular: Normal heart rate, regular rhythm. Trace lower extremity edema. Extremities warm and well perfused  GI: Soft, not distended, non tender, no guarding  Musculoskeletal: Moving extremities intentionally and without pain. Tenderness to palpation to the middle of the chest wall  Skin: Warm, dry, no rash.  Neuro: Alert and oriented, CN II-XII grossly intact, speech clear. 5/5 strength in upper and lower " extremities. Sensation to light touch intact in all 4 extremities. No pronator drift. No dysmetria with finger to nose. Able to walk to exam room with steady gait.    LAB:  All pertinent labs reviewed and interpreted.  Labs Ordered and Resulted from Time of ED Arrival to Time of ED Departure   TROPONIN T, HIGH SENSITIVITY - Abnormal       Result Value    Troponin T, High Sensitivity 20 (*)    COMPREHENSIVE METABOLIC PANEL - Abnormal    Sodium 139      Potassium 5.0      Carbon Dioxide (CO2) 30 (*)     Anion Gap 10      Urea Nitrogen 14.8      Creatinine 1.04 (*)     GFR Estimate 60 (*)     Calcium 9.8      Chloride 99      Glucose 90      Alkaline Phosphatase 103      AST 30      ALT 13      Protein Total 7.9      Albumin 4.2      Bilirubin Total 0.4     CBC WITH PLATELETS AND DIFFERENTIAL - Abnormal    WBC Count 12.3 (*)     RBC Count 5.19      Hemoglobin 12.2      Hematocrit 39.3      MCV 76 (*)     MCH 23.5 (*)     MCHC 31.0 (*)     RDW 18.0 (*)     Platelet Count 366      % Neutrophils 60      % Lymphocytes 29      % Monocytes 8      % Eosinophils 3      % Basophils 1      % Immature Granulocytes 0      NRBCs per 100 WBC 0      Absolute Neutrophils 7.3      Absolute Lymphocytes 3.6      Absolute Monocytes 0.9      Absolute Eosinophils 0.4      Absolute Basophils 0.1      Absolute Immature Granulocytes 0.0      Absolute NRBCs 0.0     TROPONIN T, HIGH SENSITIVITY - Abnormal    Troponin T, High Sensitivity 18 (*)    NT-PROBNP - Abnormal    NT-proBNP 900 (*)    ERYTHROCYTE SEDIMENTATION RATE AUTO - Normal    Erythrocyte Sedimentation Rate 19         RADIOLOGY:  CTA Chest Abdomen Pelvis w Contrast   Final Result   IMPRESSION:      1.  No aortic dissection or other acute aortic pathology.      2.  No acute process is identified in the chest, abdomen, or pelvis.      3.  Multiple nonemergent incidental findings as described in the report body.      CTA Head Neck with Contrast   Final Result   IMPRESSION:    HEAD CT:    1.  No acute intracranial abnormality.      HEAD CTA:    1.  No evidence of large vessel occlusion or high-grade stenosis.      NECK CTA:   1.  No evidence of large vessel occlusion or high-grade stenosis.   2.  Incidental right thyroid nodule for which ultrasound workup could be typically recommended if not previously performed.      MR Brain w/o & w Contrast    (Results Pending)       EKG:   Performed at: 5/30/2025, 16:22    Impression:   Sinus rhythm  Septal infarct (cited on or before 2/16/2025)  Abnormal ECG    Rate: 99 BPM  Rhythm: Sinus rhythm  QRS Interval: 80 ms  QTc Interval: 380/487 ms    Comparison: When compared with ECG of 4/17/2025 15:22,  Questionable change in QRS axis    I have independently reviewed and interpreted the EKG(s) documented above.     PROCEDURES:   Procedures   None    I, Jb Monteiro, am serving as a scribe to document services personally performed by Dr. Elizabeth Berumen based on my observation and the provider's statements to me. I, Elizabeth Berumen MD attest that Jb Monteiro is acting in a scribe capacity, has observed my performance of the services and has documented them in accordance with my direction.    Elizabeth Berumen M.D.  Emergency Medicine  [facility] EMERGENCY DEPARTMENT  [facility] [facility] [address] [phone]  Dept: [phone]      Elizabeth Berumen MD  05/30/25 2111

## 2025-05-30 NOTE — ED NOTES
EMERGENCY DEPARTMENT ENCOUNTER      NAME: Gill Mendez  AGE: 65 year old female  YOB: 1960  MRN: [id number]  EVALUATION DATE & TIME: 5/30/2025  4:52 PM    PCP: Saúl Hunt    ED PROVIDER: Mulu Varela M.D.        Chief Complaint   Patient presents with    Chest Pain    Shortness of Breath    Numbness     Right arm    Restless legs         FINAL IMPRESSION:    1. Acute chest pain    2. Thyroid nodule    3. Paresthesia of right arm          Patient was signed out to me at change of shift by Elizabeth Berumen at 6:32 PM. Please see their note for full HPI, exam and plan.    MEDICAL DECISION MAKING:      Gill Mendez is a 65 year old female with history of hypertension, diabetes, CAD status post CABG, CHF who presents to the ER with complaints of chest pain that began around noon.  Also complained of some blurry vision and some right arm tingling.    EKG evaluated by my partner and signed out to me at change of shift awaiting CTA chest/abdomen/pelvis and CTA head/neck as well as delta troponin.  If CTA still good then recommends doing MRI of the brain and consideration for discharge home.    CT imaging overall unremarkable except for incidental thyroid nodule which patient is aware of.  MRI imaging was done which is also nonspecific.  Case briefly discussed with stroke neurology and ultimately patient discharged home to follow-up with cardiology as an outpatient with regards to her chest pain and primary care with regards to her other symptoms and thyroid nodule.    BMP elevated but is coming down compared to January and April.  She is in no acute distress.  Not requiring any oxygen supplementation.  No signs for significant fluid overload on imaging.  Plan at this time is discharge home to have her continue with her home medications and can discuss this with cardiology at her follow-up appointment as well.        ED COURSE:  6:32 PM  Patient was signed out to me at change of shift by Elizabeth  [place]. Please see their note for full HPI, exam and plan.    7:32 PM   Latest Reference Range & Units 01/23/25 15:39 04/17/25 15:28 05/30/25 16:34   N-Terminal Pro Bnp 0 - 226 pg/mL 2,724 (H) 991 (H) 900 (H)     8:32 PM  Patient and  on current results.  Plan is to proceed with MRI imaging of the brain as recommended by Dr. Berumen.  Her shortness of breath is worse ever since she had the heart attack several months ago but not significantly worse in the last couple of days.  No increased work of breathing.  She is not requiring any oxygen supplementation here.  No signs of florid heart failure on imaging.  BNP is actually stable compared to where it was a month ago.    11:11 PM  Spoke with stroke neurology who reviewed her MRI imaging.  No concerns and they feel she can be discharged home from that standpoint.    11:20 PM  Patient updated on all results including thyroid nodule.  She states the thyroid nodule is known and she will follow-up with primary care with regards to that.  She agrees with plan for outpatient follow-up with cardiology and also primary care with regards to her arm tingling.  At this time I do not think she requires any further laboratory evaluation or radiology imaging.  No indication for admission to the hospital at this time.     She does not appear toxic or septic.  Exam is otherwise unremarkable.  Plan at this time is discharge home for outpatient follow-up.    I considered rib fractures, myocarditis, pericarditis, endocarditis, PE, ruptured AAA, pneumothorax, aortic dissection, bowel obstruction, bowel ischemia, cholecystitis, pancreatitis, diverticulitis, kidney stone, pyelonephritis, incarcerated or strangulated hernia, viscus perforation, perforated GI ulcer,ACS, CVA, TIA, SAH, glaucoma, temporal arteritis, sinus thrombosis, vascular dissection, pseudotumor cerebri, meningitis, enchephalitis, hypertensive urgency or emergency, and other such etiologies.    At the conclusion  of the encounter I discussed the results of all of the tests and the disposition. Their questions were answered. The patient (and any family present) acknowledged understanding and were agreeable with the care plan.    CONSULTANTS:  Stroke Neuro - Dr. Cardozo         MEDICATIONS GIVEN IN THE EMERGENCY:  Medications   acetaminophen (TYLENOL) tablet 650 mg (650 mg Oral $Given 5/30/25 1715)   LORazepam (ATIVAN) injection 1 mg (1 mg Intravenous $Given 5/30/25 1725)   iopamidol (ISOVUE-370) solution 100 mL (90 mLs Intravenous $Given 5/30/25 1838)   iopamidol (ISOVUE-370) solution 100 mL (90 mLs Intravenous $Given 5/30/25 1932)   LORazepam (ATIVAN) injection 0.25 mg (0.25 mg Intravenous $Given 5/30/25 2117)   gadobutrol (GADAVIST) injection 7.5 mL (7.5 mLs Intravenous $Given 5/30/25 2144)         NEW PRESCRIPTIONS STARTED AT TODAY'S ER VISIT  none      CONDITION:  stable        DISPOSITION:  D.c home with          =================================================================  =================================================================      Patient was signed out to me at change of shift by Elizabeth Berumen at 6:32 PM. Please see their note for full HPI, exam and plan.      HPI    Gill Mendez is a 65 year old female with history of hypertension, diabetes, CAD status post CABG, CHF who presents to the ER with complaints of chest pain that began around noon.  Also complained of some blurry vision and some right arm tingling.    EKG evaluated by my partner and reported to look okay.  Patient signed out to me at change of shift awaiting CTA chest/abdomen/pelvis and CTA head/neck as well as delta troponin.  If CTA still good then recommends doing MRI of the brain and consideration for discharge home.    PAST MEDICAL HISTORY:  Past Medical History:   Diagnosis Date    Anxiety     Asthma     Cellulitis     Diabetes mellitus (H)     Essential hypertension     Created by Conversion  Replacement Utility updated for  latest IMO load    Femoral nerve palsy, left 08/23/2023    Gastroparesis     Goiter 05/28/2023 05/2023: Palpated on exam, has had stable imaging with endocrinology, continue to follow endocrinology.      Hyperlipidemia     Hypertension     Other and unspecified hyperlipidemia     Created by Conversion     PONV (postoperative nausea and vomiting)     Shortness of breath     Type II or unspecified type diabetes mellitus without mention of complication, not stated as uncontrolled     Created by Conversion          PAST SURGICAL HISTORY:  Past Surgical History:   Procedure Laterality Date    AMPUTATE TOE(S) Right 7/31/2020    Procedure: AMPUTATION, third digit right foot;  Surgeon: Chris Vega DPM;  Location: [facility];  Service: Podiatry    CORONARY ARTERY BYPASS GRAFT, WITH ENDOSCOPIC VESSEL PROCUREMENT N/A 11/24/2024    Procedure: CORONARY ARTERY BYPASS GRAFT TIMES THREE, LEFT INTERNAL MAMMARY ARTERY HARVEST, LEFT LEG ENDOSCOPIC VESSEL PROCUREMENT,;  Surgeon: Zhen Carter MD;  Location: [facility]    CV ANGIOGRAM CORONARY GRAFT N/A 2/17/2025    Procedure: Coronary Angiogram Graft;  Surgeon: Mian Campbell MD;  Location: [facility] CATH LAB CV    CV CORONARY ANGIOGRAM N/A 11/23/2024    Procedure: Coronary Angiogram;  Surgeon: Roque Eisenberg MD;  Location: [facility] LAB CV    CV INTRA AORTIC BALLOON N/A 11/24/2024    Procedure: Intra aortic Balloon Pump Insertion;  Surgeon: Roque Eisenberg MD;  Location: [facility] CATH LAB CV    CV LEFT HEART CATH N/A 11/23/2024    Procedure: Left Heart Catheterization;  Surgeon: Roque Eisenberg MD;  Location: [facility] CATH LAB CV    CV LEFT HEART CATH N/A 2/17/2025    Procedure: Left Heart Catheterization;  Surgeon: Mian Campbell MD;  Location: [facility] CATH LAB CV    ENDOMETRIAL BIOPSY      FOOT ARTHROPLASTY Right 09/24/2020    Fourth and fifth toe by Dr. Vega    HC REPAIR OF HAMMERTOE,ONE Left 12/7/2020    Procedure: ARTHROPLASTY,  digits two, three, four and five left foot;  Surgeon: Chris Vega DPM;  Location: [facility];  Service: Podiatry    HERNIA REPAIR      HYSTERECTOMY      IR LUMBAR PUNCTURE  7/20/2023    REPAIR TENDON ACHILLES Bilateral     Left was plate  , right was torn    TONSILLECTOMY      TRANSESOPHAGEAL ECHOCARDIOGRAM INTRAOPERATIVE  11/24/2024    Procedure: ECHOCARDIOGRAM, TRANSESOPHAGEAL, INTRA-OPERATIVE;  Surgeon: Zhen Carter MD;  Location: [facility] REMOVAL OF OVARY(S)      Description: Oophorectomy - Bilateral (Removal Of Both Ovaries);  Recorded: 10/09/2008;         CURRENT MEDICATIONS:    Prior to Admission medications    Medication Sig Start Date End Date Taking? Authorizing Provider   acetaminophen (TYLENOL) 500 MG tablet Take 1-2 tablets (500-1,000 mg) by mouth every 6 hours as needed for mild pain (do NOT exceed 3,000mg in a 24hour period). 1/6/25   Brain Reynolds MD   albuterol (PROVENTIL HFA;VENTOLIN HFA) 90 mcg/actuation inhaler Inhale 2 puffs into the lungs every 6 hours as needed 8/17/15   Provider, Historical   ARNUITY ELLIPTA 100 MCG/ACT inhaler Inhale 1 puff into the lungs 2 times daily. 8/30/24   Reported, Patient   aspirin (ASA) 81 MG chewable tablet Take 2 tablets (162 mg) by mouth or NG Tube daily. 4/24/25   Maira Parra CNP   atorvastatin (LIPITOR) 80 MG tablet Take 80 mg by mouth daily.    Reported, Patient   citalopram (CELEXA) 20 MG tablet [CITALOPRAM (CELEXA) 20 MG TABLET] Take 20 mg by mouth every morning.  4/25/18   Provider, Historical   empagliflozin (JARDIANCE) 25 MG TABS tablet Take 1 tablet (25 mg) by mouth daily. 2/19/25   Levy Lake MD   famotidine (PEPCID) 20 MG tablet Take 1 tablet (20 mg) by mouth as needed (take as needed for acid reflux). 1/23/25   Maira Parra CNP   furosemide (LASIX) 20 MG tablet Take 1 tablet (20 mg) by mouth daily. 5/21/25   [place]le[place], Maira N, CNP   gabapentin (NEURONTIN) 100 MG capsule Take 1  capsule (100 mg) by mouth 3 times daily.  Patient taking differently: Take 100 mg by mouth 3 times daily as needed. 2/18/25 5/30/25  Joe Bennett MD   gentamicin (GARAMYCIN) 0.1 % external ointment Apply topically. 5/20/25   Reported, Patient   hydrOXYzine HCl (ATARAX) 10 MG tablet Take 10 mg by mouth every 6 hours as needed. 3/20/25   Reported, Patient   lidocaine (LMX4) 4 % external cream Apply topically daily as needed for mild pain (painful area on left chest wall). 1/6/25   Brain Reynolds MD   losartan (COZAAR) 25 MG tablet Take 0.5 tablets (12.5 mg) by mouth daily. 2/4/25   Maira Parra CNP   metFORMIN (GLUCOPHAGE XR) 500 MG 24 hr tablet Take 3 tablets (1,500 mg) by mouth daily (with breakfast). Changed on 6/21/24, new prescription not yet sent 2/19/25   Levy Lake MD   metoprolol succinate ER (TOPROL XL) 50 MG 24 hr tablet Take 3 tablets (150 mg) by mouth daily. 5/13/25   Maira Parra CNP   polyethylene glycol (MIRALAX) 17 g packet Take 1 packet by mouth daily as needed for constipation.    Reported, Patient   pramipexole (MIRAPEX) 0.125 MG tablet Take 0.125 mg by mouth daily. At 3pm 6/18/18   Provider, Historical   pramipexole (MIRAPEX) 0.5 MG tablet Take 0.5 mg by mouth at bedtime.    Unknown, Entered By History   spironolactone (ALDACTONE) 25 MG tablet Take 0.5 tablets (12.5 mg) by mouth daily. 1/24/25   Maira Parra CNP   tirzepatide (MOUNJARO) 15 MG/0.5ML pen Inject 15 mg subcutaneously every 7 days. 9/27/24   Levy Lake MD         ALLERGIES:  Allergies   Allergen Reactions    Codeine Unknown     Patient states possibly caused N/V but can't remember for sure    Semaglutide Nausea and Vomiting    Acetaminophen-Codeine Rash         FAMILY HISTORY:  Family History   Problem Relation Age of Onset    Diabetes Mother     Hypertension Mother     Acute Myocardial Infarction No family hx of          SOCIAL HISTORY:  Social History     Socioeconomic History     Marital status:    Tobacco Use    Smoking status: Never    Smokeless tobacco: Never   Substance and Sexual Activity    Alcohol use: No    Drug use: No     Social Drivers of Health     Financial Resource Strain: Low Risk  (2/17/2025)    Financial Resource Strain     Within the past 12 months, have you or your family members you live with been unable to get utilities (heat, electricity) when it was really needed?: No   Food Insecurity: Low Risk  (2/17/2025)    Food Insecurity     Within the past 12 months, did you worry that your food would run out before you got money to buy more?: No     Within the past 12 months, did the food you bought just not last and you didn t have money to get more?: No   Transportation Needs: Low Risk  (2/17/2025)    Transportation Needs     Within the past 12 months, has lack of transportation kept you from medical appointments, getting your medicines, non-medical meetings or appointments, work, or from getting things that you need?: No    [facility]    Social Connections   Interpersonal Safety: Low Risk  (1/4/2025)    Interpersonal Safety     Do you feel physically and emotionally safe where you currently live?: Yes     Within the past 12 months, have you been hit, slapped, kicked or otherwise physically hurt by someone?: No     Within the past 12 months, have you been humiliated or emotionally abused in other ways by your partner or ex-partner?: No   Recent Concern: Interpersonal Safety - High Risk (11/23/2024)    Interpersonal Safety     Do you feel physically and emotionally safe where you currently live?: No     Within the past 12 months, have you been hit, slapped, kicked or otherwise physically hurt by someone?: No     Within the past 12 months, have you been humiliated or emotionally abused in other ways by your partner or ex-partner?: No   Housing Stability: Low Risk  (2/17/2025)    Housing Stability     Do you have housing? :  "Yes     Are you worried about losing your housing?: No         VITALS:  Patient Vitals for the past 24 hrs:   BP Temp Temp src Pulse Resp SpO2 Height Weight   05/30/25 1845 (!) 155/78 -- -- 101 14 98 % -- --   05/30/25 1730 126/67 -- -- 93 16 98 % -- --   05/30/25 1700 118/60 -- -- 96 -- 98 % -- --   05/30/25 1657 130/68 -- -- 98 -- 98 % -- --   05/30/25 1618 139/72 97  F (36.1  C) Temporal 96 16 100 % 1.651 m (5' 5\") 69.2 kg (152 lb 8 oz)       Wt Readings from Last 3 Encounters:   05/30/25 69.2 kg (152 lb 8 oz)   05/30/25 69.4 kg (153 lb)   04/23/25 69.2 kg (152 lb 9.6 oz)       Estimated Creatinine Clearance: 54.1 mL/min (A) (based on SCr of 1.04 mg/dL (H)).    PHYSICAL EXAM    Please see initial providers note for detailed physical exam        LAB:  All pertinent labs reviewed and interpreted.  Recent Results (from the past 24 hours)   Troponin T, High Sensitivity    Collection Time: 05/30/25  4:34 PM   Result Value Ref Range    Troponin T, High Sensitivity 20 (H) <=14 ng/L   Comprehensive metabolic panel    Collection Time: 05/30/25  4:34 PM   Result Value Ref Range    Sodium 139 135 - 145 mmol/L    Potassium 5.0 3.4 - 5.3 mmol/L    Carbon Dioxide (CO2) 30 (H) 22 - 29 mmol/L    Anion Gap 10 7 - 15 mmol/L    Urea Nitrogen 14.8 8.0 - 23.0 mg/dL    Creatinine 1.04 (H) 0.51 - 0.95 mg/dL    GFR Estimate 60 (L) >60 mL/min/1.73m2    Calcium 9.8 8.8 - 10.4 mg/dL    Chloride 99 98 - 107 mmol/L    Glucose 90 70 - 99 mg/dL    Alkaline Phosphatase 103 40 - 150 U/L    AST 30 0 - 45 U/L    ALT 13 0 - 50 U/L    Protein Total 7.9 6.4 - 8.3 g/dL    Albumin 4.2 3.5 - 5.2 g/dL    Bilirubin Total 0.4 <=1.2 mg/dL   CBC with platelets and differential    Collection Time: 05/30/25  4:34 PM   Result Value Ref Range    WBC Count 12.3 (H) 4.0 - 11.0 10e3/uL    RBC Count 5.19 3.80 - 5.20 10e6/uL    Hemoglobin 12.2 11.7 - 15.7 g/dL    Hematocrit 39.3 35.0 - 47.0 %    MCV 76 (L) 78 - 100 fL    MCH 23.5 (L) 26.5 - 33.0 pg    MCHC 31.0 (L) " 31.5 - 36.5 g/dL    RDW 18.0 (H) 10.0 - 15.0 %    Platelet Count 366 150 - 450 10e3/uL    % Neutrophils 60 %    % Lymphocytes 29 %    % Monocytes 8 %    % Eosinophils 3 %    % Basophils 1 %    % Immature Granulocytes 0 %    NRBCs per 100 WBC 0 <1 /100    Absolute Neutrophils 7.3 1.6 - 8.3 10e3/uL    Absolute Lymphocytes 3.6 0.8 - 5.3 10e3/uL    Absolute Monocytes 0.9 0.0 - 1.3 10e3/uL    Absolute Eosinophils 0.4 0.0 - 0.7 10e3/uL    Absolute Basophils 0.1 0.0 - 0.2 10e3/uL    Absolute Immature Granulocytes 0.0 <=0.4 10e3/uL    Absolute NRBCs 0.0 10e3/uL   Erythrocyte sedimentation rate auto    Collection Time: 05/30/25  4:34 PM   Result Value Ref Range    Erythrocyte Sedimentation Rate 19 0 - 30 mm/hr   NT-proBNP    Collection Time: 05/30/25  4:34 PM   Result Value Ref Range    NT-proBNP 900 (H) 0 - 226 pg/mL   Troponin T, High Sensitivity    Collection Time: 05/30/25  6:47 PM   Result Value Ref Range    Troponin T, High Sensitivity 18 (H) <=14 ng/L       Lab Results   Component Value Date    ABORH O POS 02/17/2025         RADIOLOGY:  Reviewed all pertinent imaging. Please see official radiology report.    MR Brain w/o & w Contrast   Final Result   IMPRESSION:   1.  Susceptibility artifact obscures evaluation of the posterior fossa.   2.  Within these limitations, no acute findings.   3.  Mild age-related changes.      CTA Chest Abdomen Pelvis w Contrast   Final Result   IMPRESSION:      1.  No aortic dissection or other acute aortic pathology.      2.  No acute process is identified in the chest, abdomen, or pelvis.      3.  Multiple nonemergent incidental findings as described in the report body.      CTA Head Neck with Contrast   Final Result   IMPRESSION:    HEAD CT:   1.  No acute intracranial abnormality.      HEAD CTA:    1.  No evidence of large vessel occlusion or high-grade stenosis.      NECK CTA:   1.  No evidence of large vessel occlusion or high-grade stenosis.   2.  Incidental right thyroid nodule for  which ultrasound workup could be typically recommended if not previously performed.            EKG:    none      PROCEDURES:  none    Medical Decision Making  I discussed the care with another health care provider: see consults  Discharge. No recommendations on prescription strength medication(s). I considered admission, but ultimately discharged patient showing laboratories and imaging.    MIPS (CTPE, Dental pain, Mcdaniels, Sinusitis, Asthma/COPD, Head Trauma): Not Applicable    SEPSIS: None      Mulu Varela M.D. [facility]  Emergency Medicine and Medical Toxicology  [facility] EMERGENCY DEPARTMENT  [address] [place] [id number]  [phone]  Dept: [phone]         Mulu Varela MD  05/31/25 0052

## 2025-05-31 NOTE — DISCHARGE INSTRUCTIONS
Imaging looks good.  We do see an incidental thyroid nodule.  Discuss this with your family doctor and they can determine if you need an ultrasound to look at this further.  These are very common.    Imaging of the brain overall looks good.  Nothing to suggest acute stroke.  Workup with regards to your chest pain is also otherwise unremarkable.    At this time I recommend you follow-up with primary care to discuss the thyroid nodule as well as your right arm tingling.    Make an appointment to follow-up with your eye specialist to have an eye exam soon as possible.    Follow-up with cardiology as an outpatient with regards to your chest pain. Call the phone number to make this appointment.    Return to emergency department with worsening chest pain, worsening vision changes, worsening numbness or weakness in arms or legs, or any other concerns.    Thank you for choosing [facility].  It has been my pleasure caring for you today.     ~Dr. Nichole MD

## 2025-06-02 LAB
ATRIAL RATE - MUSE: 99 BPM
DIASTOLIC BLOOD PRESSURE - MUSE: NORMAL MMHG
INTERPRETATION ECG - MUSE: NORMAL
P AXIS - MUSE: 62 DEGREES
PR INTERVAL - MUSE: 180 MS
QRS DURATION - MUSE: 80 MS
QT - MUSE: 380 MS
QTC - MUSE: 487 MS
R AXIS - MUSE: 21 DEGREES
SYSTOLIC BLOOD PRESSURE - MUSE: NORMAL MMHG
T AXIS - MUSE: 97 DEGREES
VENTRICULAR RATE- MUSE: 99 BPM

## 2025-07-19 ENCOUNTER — HEALTH MAINTENANCE LETTER (OUTPATIENT)
Age: 65
End: 2025-07-19

## 2025-07-30 ENCOUNTER — TELEPHONE (OUTPATIENT)
Dept: CARDIOLOGY | Facility: CLINIC | Age: 65
End: 2025-07-30
Payer: COMMERCIAL

## 2025-07-30 NOTE — TELEPHONE ENCOUNTER
Mayelin Mendez to [facility] (supporting Maira Parra, CNP)  [place]      7/30/25  7:40 AM  Good morning  I am having trigger finger and elbow surgery August 11th.  I had my pre-op yesterday. My doctor wanted me to ask if I should be taking furosemide still with the other lasix I am on.  Wondering about how it s doing with my kidneys.  Also 2 nights ago I felt like my heart was beating on the outside of my chest  and a little short of breath.  Do I see you for that or my other cardiologist at [facility]? Or is this normal .  Otherwise I have been good.  Thank you   Mayelin zengleonardo jignesh       for patient advised her to return call to discuss in detail. Noted she is on Furosemide 20mg daily and Jardiance, and Spironolactone daily.     She is advised that these other medications can also pull fluid, but that the Lasix and Furosemide are the same, Trade Name, Generic name.     Lisa PARIS RN BSN, CHFN, PCCN-K

## 2025-07-30 NOTE — TELEPHONE ENCOUNTER
Mayelin is reached today to discuss her diuretic medications and labs that were drawn at her PCP office were also reviewed. She was reassured that the combination of medications we have her on are the best possible strategy in her HF management. Her labs are all very good as well. Lisa PARIS RN BSN, CHFN, PCCN-K

## 2025-09-02 ENCOUNTER — LAB (OUTPATIENT)
Dept: LAB | Facility: CLINIC | Age: 65
End: 2025-09-02
Payer: COMMERCIAL

## 2025-09-02 ENCOUNTER — HOSPITAL ENCOUNTER (OUTPATIENT)
Dept: ULTRASOUND IMAGING | Facility: CLINIC | Age: 65
Discharge: HOME | End: 2025-09-02
Attending: INTERNAL MEDICINE
Payer: COMMERCIAL

## 2025-09-02 DIAGNOSIS — E11.29 TYPE 2 DIABETES MELLITUS WITH DIABETIC MICROALBUMINURIA, WITHOUT LONG-TERM CURRENT USE OF INSULIN (H): ICD-10-CM

## 2025-09-02 DIAGNOSIS — R80.9 TYPE 2 DIABETES MELLITUS WITH DIABETIC MICROALBUMINURIA, WITHOUT LONG-TERM CURRENT USE OF INSULIN (H): ICD-10-CM

## 2025-09-02 DIAGNOSIS — E04.1 THYROID NODULE: ICD-10-CM

## 2025-09-02 LAB
ALBUMIN SERPL BCG-MCNC: 3.9 G/DL (ref 3.5–5.2)
ALP SERPL-CCNC: 88 U/L (ref 40–150)
ALT SERPL W P-5'-P-CCNC: 11 U/L (ref 0–50)
ANION GAP SERPL CALCULATED.3IONS-SCNC: 10 MMOL/L (ref 7–15)
AST SERPL W P-5'-P-CCNC: 23 U/L (ref 0–45)
BILIRUB SERPL-MCNC: 0.3 MG/DL
BUN SERPL-MCNC: 16.5 MG/DL (ref 8–23)
CALCIUM SERPL-MCNC: 9.7 MG/DL (ref 8.8–10.4)
CHLORIDE SERPL-SCNC: 100 MMOL/L (ref 98–107)
CREAT SERPL-MCNC: 0.86 MG/DL (ref 0.51–0.95)
EGFRCR SERPLBLD CKD-EPI 2021: 75 ML/MIN/1.73M2
EST. AVERAGE GLUCOSE BLD GHB EST-MCNC: 131 MG/DL
GLUCOSE SERPL-MCNC: 121 MG/DL (ref 70–99)
HBA1C MFR BLD: 6.2 % (ref 0–5.6)
HCO3 SERPL-SCNC: 29 MMOL/L (ref 22–29)
POTASSIUM SERPL-SCNC: 4.3 MMOL/L (ref 3.4–5.3)
PROT SERPL-MCNC: 7.4 G/DL (ref 6.4–8.3)
SODIUM SERPL-SCNC: 139 MMOL/L (ref 135–145)
TSH SERPL DL<=0.005 MIU/L-ACNC: 0.94 UIU/ML (ref 0.3–4.2)

## 2025-09-02 PROCEDURE — 84443 ASSAY THYROID STIM HORMONE: CPT

## 2025-09-02 PROCEDURE — 80053 COMPREHEN METABOLIC PANEL: CPT

## 2025-09-02 PROCEDURE — 36415 COLL VENOUS BLD VENIPUNCTURE: CPT

## 2025-09-02 PROCEDURE — 83036 HEMOGLOBIN GLYCOSYLATED A1C: CPT

## 2025-09-02 PROCEDURE — 76536 US EXAM OF HEAD AND NECK: CPT

## (undated) DEVICE — CABLE MYO/LEAD PACING BLUE DISP 019-535

## (undated) DEVICE — CUSTOM PACK CV ST JOES SCV5BCVHEA

## (undated) DEVICE — LIGACLIP LARGE AESCULAP O4120-1

## (undated) DEVICE — KIT HAND CONTROL ACIST 014644 AR-P54

## (undated) DEVICE — INTRO MICRO MINI STICK 4FR STIFF NITINOL 45-753

## (undated) DEVICE — CUSTOM PACK CAB SCV5BCBHEA

## (undated) DEVICE — SHTH INTRO 0.021IN ID 6FR DIA

## (undated) DEVICE — SU STERNAL WIRE SINGLE #6 046-032

## (undated) DEVICE — ESU GROUND PAD ADULT REM W/15' CORD E7507DB

## (undated) DEVICE — EXCHANGE WIRE .035 260 STAR/JFC/035/260/ M001491681

## (undated) DEVICE — SU PLEDGET SOFT TFE 3/8"X3/26"X1/16" PCP40

## (undated) DEVICE — CATH BALLOON IABP 7.5FRX50ML INTRA-AORTIC 0684-00-0576-01U

## (undated) DEVICE — SHEATH EXT 15CM STANDTALL ST0015L

## (undated) DEVICE — PATCH SURGICAL EVARREST FIBRIN SEALANT 4X2IN EVT5024

## (undated) DEVICE — CUSTOM PACK CORONARY SAN5BCRHEA

## (undated) DEVICE — MANIFOLD KIT ANGIO AUTOMATED 014613

## (undated) DEVICE — PITCHER STERILE 1000ML  SSK9004A

## (undated) DEVICE — SYR ANGIOGRAPHY MULTIUSE KIT ACIST 014612

## (undated) DEVICE — CATH THORACIC RT ANGLE CLOTSTOP 28FR

## (undated) DEVICE — SLEEVE TR BAND RADIAL COMPRESSION DEVICE 24CM TRB24-REG

## (undated) DEVICE — KIT ENDO VASOVIEW HEMOPRO 2 VH-4000

## (undated) DEVICE — CATH ANGIO INFINITI INTERNAL MAMMARY 6FRX100CM 534-660T

## (undated) DEVICE — ELECTRODE DEFIB CADENCE 22550R

## (undated) DEVICE — CELLSAVER

## (undated) DEVICE — GLOVE BIOGEL PI SZ 7.5 40875

## (undated) DEVICE — SOL WATER IRRIG 1000ML BOTTLE 2F7114

## (undated) DEVICE — PREP CHLORAPREP 26ML TINTED HI-LITE ORANGE 930815

## (undated) DEVICE — SU DERMABOND ADVANCED .7ML DNX12

## (undated) DEVICE — SU ETHIBOND 0 CT-1 CR 8X18" CX21D

## (undated) DEVICE — SU RETRACT-O-TAPE 1041

## (undated) DEVICE — CATH ANGIO SUPERTORQUE AL1 6FRX100CM 532-645

## (undated) DEVICE — PACK MINOR SINGLE BASIN SSK3001

## (undated) DEVICE — DRSG DRAIN 4X4" 7086

## (undated) DEVICE — Device

## (undated) DEVICE — CATH ANGIO JUDKINS JL4 6FRX100CM INFINITI 534620T

## (undated) DEVICE — SU PROLENE 4-0 RB-1DA 36" 8557H

## (undated) DEVICE — TUBING INSUFFLATION W/FILTER 28-0207

## (undated) DEVICE — SUCTION DRY CHEST DRAIN OASIS 3600-100

## (undated) DEVICE — HEMOSTASIS BONE OSTENE 2.5G SYNTHETIC 1503832

## (undated) DEVICE — SU STRATAFIX PDS PLUS 2-0 SPIRAL CT-1 30CM SXPP1B410

## (undated) DEVICE — GOWN IMPERVIOUS BREATHABLE SMART XLG 89045

## (undated) DEVICE — SET CANNULATION TOUNIQUET TS-10061

## (undated) DEVICE — CONNECTOR CARDIO BLAKE DRAIN BCC2

## (undated) DEVICE — SU PROLENE 8-0 BV130-5DA 24" 8732H

## (undated) DEVICE — CONNECTOR Y 1/2 X 3/8 X 3/8 STRL C440

## (undated) DEVICE — BLANKET BAIR ADLT PLYMR 60X36IN 63000

## (undated) DEVICE — RX SURGIFLO HEMOSTATIC MATRIX W/THROMBIN 8ML 2994

## (undated) DEVICE — DRSG TEGADERM 4X4 3/4" 1626W

## (undated) DEVICE — DEVICE TISSUE STABILIZATION OCTOBASE 28707

## (undated) DEVICE — CATH ANGIO JUDKINS R4 6FRX100CM INFINITI 534621T

## (undated) DEVICE — LEAD PACER MYOCARDIAL BIPOLAR TEMPORARY 53CM 6495F

## (undated) DEVICE — SUCTION CANISTER MEDIVAC LINER 3000ML W/LID 65651-530

## (undated) DEVICE — CATH THORACIC STRAIGHT CLOTSTOP 28FR

## (undated) DEVICE — SOL NACL 0.9% IRRIG 1000ML BOTTLE 2F7124

## (undated) DEVICE — ESU PENCIL SMOKE EVAC W/ROCKER SWITCH 0703-047-000

## (undated) DEVICE — SU STRATAFIX PDS PLUS 0 CT 45CM SXPP1A406

## (undated) DEVICE — CATH ANGIO JUDKINS JL3.5 6FRX100CM INFINITI 534618T

## (undated) DEVICE — CATH SUCTION 14FR W/O CTRL DYND41962

## (undated) DEVICE — INTRO SHEATH 9FRX10CM PINNACLE RSS902

## (undated) DEVICE — CATH DIAGNOSTIC RADIAL 5FR TIG 4.0

## (undated) DEVICE — SU STRATAFIX MONOCRYL 4-0 SPIRAL PS-2 SXMP1B118

## (undated) DEVICE — SU ETHIBOND 3-0 BBDA 36" X588H

## (undated) DEVICE — SU PROLENE 4-0 SHDA 36" 8521H

## (undated) RX ORDER — FENTANYL CITRATE 50 UG/ML
INJECTION, SOLUTION INTRAMUSCULAR; INTRAVENOUS
Status: DISPENSED
Start: 2025-02-17

## (undated) RX ORDER — NEOSTIGMINE METHYLSULFATE 1 MG/ML
VIAL (ML) INJECTION
Status: DISPENSED
Start: 2024-11-24

## (undated) RX ORDER — SINCALIDE 5 UG/5ML
INJECTION, POWDER, LYOPHILIZED, FOR SOLUTION INTRAVENOUS
Status: DISPENSED
Start: 2024-12-05

## (undated) RX ORDER — GLYCOPYRROLATE 0.2 MG/ML
INJECTION, SOLUTION INTRAMUSCULAR; INTRAVENOUS
Status: DISPENSED
Start: 2024-11-24

## (undated) RX ORDER — WATER 10 ML/10ML
INJECTION INTRAMUSCULAR; INTRAVENOUS; SUBCUTANEOUS
Status: DISPENSED
Start: 2024-12-05

## (undated) RX ORDER — LIDOCAINE HYDROCHLORIDE 10 MG/ML
INJECTION, SOLUTION INFILTRATION; PERINEURAL
Status: DISPENSED
Start: 2022-07-20

## (undated) RX ORDER — PROPOFOL 10 MG/ML
INJECTION, EMULSION INTRAVENOUS
Status: DISPENSED
Start: 2024-11-24

## (undated) RX ORDER — VANCOMYCIN HYDROCHLORIDE 1 G/20ML
INJECTION, POWDER, LYOPHILIZED, FOR SOLUTION INTRAVENOUS
Status: DISPENSED
Start: 2024-11-24

## (undated) RX ORDER — ONDANSETRON 2 MG/ML
INJECTION INTRAMUSCULAR; INTRAVENOUS
Status: DISPENSED
Start: 2024-11-24

## (undated) RX ORDER — FENTANYL CITRATE 50 UG/ML
INJECTION, SOLUTION INTRAMUSCULAR; INTRAVENOUS
Status: DISPENSED
Start: 2024-11-23

## (undated) RX ORDER — FENTANYL CITRATE 50 UG/ML
INJECTION, SOLUTION INTRAMUSCULAR; INTRAVENOUS
Status: DISPENSED
Start: 2024-11-24

## (undated) RX ORDER — DEXAMETHASONE SODIUM PHOSPHATE 10 MG/ML
INJECTION, SOLUTION INTRAMUSCULAR; INTRAVENOUS
Status: DISPENSED
Start: 2024-11-24

## (undated) RX ORDER — LIDOCAINE HYDROCHLORIDE 10 MG/ML
INJECTION, SOLUTION EPIDURAL; INFILTRATION; INTRACAUDAL; PERINEURAL
Status: DISPENSED
Start: 2024-11-24